# Patient Record
Sex: FEMALE | Race: WHITE | NOT HISPANIC OR LATINO | Employment: OTHER | URBAN - METROPOLITAN AREA
[De-identification: names, ages, dates, MRNs, and addresses within clinical notes are randomized per-mention and may not be internally consistent; named-entity substitution may affect disease eponyms.]

---

## 2017-04-17 ENCOUNTER — ALLSCRIPTS OFFICE VISIT (OUTPATIENT)
Dept: OTHER | Facility: OTHER | Age: 57
End: 2017-04-17

## 2017-06-08 ENCOUNTER — ALLSCRIPTS OFFICE VISIT (OUTPATIENT)
Dept: OTHER | Facility: OTHER | Age: 57
End: 2017-06-08

## 2017-06-08 DIAGNOSIS — Z12.31 ENCOUNTER FOR SCREENING MAMMOGRAM FOR MALIGNANT NEOPLASM OF BREAST: ICD-10-CM

## 2017-06-16 ENCOUNTER — HOSPITAL ENCOUNTER (OUTPATIENT)
Dept: RADIOLOGY | Facility: HOSPITAL | Age: 57
Discharge: HOME | End: 2017-06-16
Admitting: FAMILY MEDICINE
Payer: COMMERCIAL

## 2017-06-16 PROCEDURE — 76706 US ABDL AORTA SCREEN AAA: CPT

## 2017-06-20 ENCOUNTER — GENERIC CONVERSION - ENCOUNTER (OUTPATIENT)
Dept: OTHER | Facility: OTHER | Age: 57
End: 2017-06-20

## 2017-06-22 ENCOUNTER — GENERIC CONVERSION - ENCOUNTER (OUTPATIENT)
Dept: OTHER | Facility: OTHER | Age: 57
End: 2017-06-22

## 2017-07-20 ENCOUNTER — HOSPITAL ENCOUNTER (OUTPATIENT)
Dept: RADIOLOGY | Facility: HOSPITAL | Age: 57
Discharge: HOME | End: 2017-07-20
Payer: COMMERCIAL

## 2017-07-20 DIAGNOSIS — Z12.31 ENCOUNTER FOR SCREENING MAMMOGRAM FOR MALIGNANT NEOPLASM OF BREAST: ICD-10-CM

## 2017-07-20 PROCEDURE — G0202 SCR MAMMO BI INCL CAD: HCPCS

## 2017-07-25 ENCOUNTER — HOSPITAL ENCOUNTER (OUTPATIENT)
Dept: ULTRASOUND IMAGING | Facility: CLINIC | Age: 57
Discharge: HOME | End: 2017-07-25
Payer: COMMERCIAL

## 2017-07-25 ENCOUNTER — HOSPITAL ENCOUNTER (OUTPATIENT)
Dept: MAMMOGRAPHY | Facility: CLINIC | Age: 57
Discharge: HOME | End: 2017-07-25
Payer: COMMERCIAL

## 2017-07-25 ENCOUNTER — GENERIC CONVERSION - ENCOUNTER (OUTPATIENT)
Dept: OTHER | Facility: OTHER | Age: 57
End: 2017-07-25

## 2017-07-25 DIAGNOSIS — R92.8 MAMMOGRAM ABNORMAL: ICD-10-CM

## 2017-07-25 PROCEDURE — G0279 TOMOSYNTHESIS, MAMMO: HCPCS

## 2017-07-25 PROCEDURE — 76642 ULTRASOUND BREAST LIMITED: CPT

## 2017-07-25 PROCEDURE — G0206 DX MAMMO INCL CAD UNI: HCPCS

## 2017-08-08 ENCOUNTER — TRANSCRIBE ORDERS (OUTPATIENT)
Dept: LAB | Facility: CLINIC | Age: 57
End: 2017-08-08

## 2017-08-08 ENCOUNTER — APPOINTMENT (OUTPATIENT)
Dept: LAB | Facility: CLINIC | Age: 57
End: 2017-08-08
Payer: COMMERCIAL

## 2017-08-08 DIAGNOSIS — E06.3 CHRONIC LYMPHOCYTIC THYROIDITIS: ICD-10-CM

## 2017-08-08 DIAGNOSIS — R73.9 HYPERGLYCEMIA: ICD-10-CM

## 2017-08-08 DIAGNOSIS — E66.9 OBESITY, UNSPECIFIED: ICD-10-CM

## 2017-08-08 DIAGNOSIS — E66.9 OBESITY, UNSPECIFIED: Primary | ICD-10-CM

## 2017-08-08 LAB
ALBUMIN SERPL BCP-MCNC: 3.5 G/DL (ref 3.5–5)
ALP SERPL-CCNC: 90 U/L (ref 46–116)
ALT SERPL W P-5'-P-CCNC: 30 U/L (ref 12–78)
ANION GAP SERPL CALCULATED.3IONS-SCNC: 8 MMOL/L (ref 4–13)
AST SERPL W P-5'-P-CCNC: 23 U/L (ref 5–45)
BASOPHILS # BLD AUTO: 0.02 THOUSANDS/ΜL (ref 0–0.1)
BASOPHILS NFR BLD AUTO: 0 % (ref 0–1)
BILIRUB SERPL-MCNC: 0.4 MG/DL (ref 0.2–1)
BUN SERPL-MCNC: 19 MG/DL (ref 5–25)
CALCIUM SERPL-MCNC: 8.9 MG/DL (ref 8.3–10.1)
CHLORIDE SERPL-SCNC: 102 MMOL/L (ref 100–108)
CHOLEST SERPL-MCNC: 217 MG/DL (ref 50–200)
CO2 SERPL-SCNC: 30 MMOL/L (ref 21–32)
CREAT SERPL-MCNC: 0.77 MG/DL (ref 0.6–1.3)
EOSINOPHIL # BLD AUTO: 0.05 THOUSAND/ΜL (ref 0–0.61)
EOSINOPHIL NFR BLD AUTO: 1 % (ref 0–6)
ERYTHROCYTE [DISTWIDTH] IN BLOOD BY AUTOMATED COUNT: 14.3 % (ref 11.6–15.1)
GFR SERPL CREATININE-BSD FRML MDRD: 85 ML/MIN/1.73SQ M
GLUCOSE P FAST SERPL-MCNC: 117 MG/DL (ref 65–99)
HCT VFR BLD AUTO: 44 % (ref 34.8–46.1)
HDLC SERPL-MCNC: 79 MG/DL (ref 40–60)
HGB BLD-MCNC: 14.3 G/DL (ref 11.5–15.4)
LDLC SERPL CALC-MCNC: 124 MG/DL (ref 0–100)
LYMPHOCYTES # BLD AUTO: 1.53 THOUSANDS/ΜL (ref 0.6–4.47)
LYMPHOCYTES NFR BLD AUTO: 24 % (ref 14–44)
MCH RBC QN AUTO: 29.9 PG (ref 26.8–34.3)
MCHC RBC AUTO-ENTMCNC: 32.5 G/DL (ref 31.4–37.4)
MCV RBC AUTO: 92 FL (ref 82–98)
MONOCYTES # BLD AUTO: 0.57 THOUSAND/ΜL (ref 0.17–1.22)
MONOCYTES NFR BLD AUTO: 9 % (ref 4–12)
NEUTROPHILS # BLD AUTO: 4.15 THOUSANDS/ΜL (ref 1.85–7.62)
NEUTS SEG NFR BLD AUTO: 66 % (ref 43–75)
PLATELET # BLD AUTO: 253 THOUSANDS/UL (ref 149–390)
PMV BLD AUTO: 10.4 FL (ref 8.9–12.7)
POTASSIUM SERPL-SCNC: 4.1 MMOL/L (ref 3.5–5.3)
PROT SERPL-MCNC: 7.3 G/DL (ref 6.4–8.2)
RBC # BLD AUTO: 4.78 MILLION/UL (ref 3.81–5.12)
SODIUM SERPL-SCNC: 140 MMOL/L (ref 136–145)
TRIGL SERPL-MCNC: 72 MG/DL
TSH SERPL DL<=0.05 MIU/L-ACNC: 3.46 UIU/ML (ref 0.36–3.74)
WBC # BLD AUTO: 6.32 THOUSAND/UL (ref 4.31–10.16)

## 2017-08-08 PROCEDURE — 84443 ASSAY THYROID STIM HORMONE: CPT

## 2017-08-08 PROCEDURE — 80061 LIPID PANEL: CPT

## 2017-08-08 PROCEDURE — 36415 COLL VENOUS BLD VENIPUNCTURE: CPT

## 2017-08-08 PROCEDURE — 85025 COMPLETE CBC W/AUTO DIFF WBC: CPT

## 2017-08-08 PROCEDURE — 83036 HEMOGLOBIN GLYCOSYLATED A1C: CPT

## 2017-08-08 PROCEDURE — 80053 COMPREHEN METABOLIC PANEL: CPT

## 2017-08-09 DIAGNOSIS — R73.9 HYPERGLYCEMIA: ICD-10-CM

## 2017-08-09 LAB
EST. AVERAGE GLUCOSE BLD GHB EST-MCNC: 128 MG/DL
HBA1C MFR BLD: 6.1 % (ref 4.2–6.3)

## 2017-09-13 ENCOUNTER — GENERIC CONVERSION - ENCOUNTER (OUTPATIENT)
Dept: OTHER | Facility: OTHER | Age: 57
End: 2017-09-13

## 2017-09-14 ENCOUNTER — HOSPITAL ENCOUNTER (EMERGENCY)
Facility: HOSPITAL | Age: 57
End: 2017-09-14
Attending: EMERGENCY MEDICINE | Admitting: EMERGENCY MEDICINE
Payer: COMMERCIAL

## 2017-09-14 ENCOUNTER — HOSPITAL ENCOUNTER (INPATIENT)
Facility: HOSPITAL | Age: 57
LOS: 6 days | Discharge: HOME | DRG: 885 | End: 2017-09-20
Attending: PSYCHIATRY & NEUROLOGY | Admitting: PSYCHIATRY & NEUROLOGY
Payer: COMMERCIAL

## 2017-09-14 VITALS
BODY MASS INDEX: 33.2 KG/M2 | SYSTOLIC BLOOD PRESSURE: 121 MMHG | RESPIRATION RATE: 20 BRPM | OXYGEN SATURATION: 100 % | DIASTOLIC BLOOD PRESSURE: 63 MMHG | WEIGHT: 170 LBS | HEART RATE: 67 BPM | TEMPERATURE: 97.9 F

## 2017-09-14 DIAGNOSIS — F32.A DEPRESSION: Primary | ICD-10-CM

## 2017-09-14 DIAGNOSIS — F33.2 SEVERE EPISODE OF RECURRENT MAJOR DEPRESSIVE DISORDER, WITHOUT PSYCHOTIC FEATURES (HCC): Primary | Chronic | ICD-10-CM

## 2017-09-14 DIAGNOSIS — R45.851 SUICIDAL IDEATION: ICD-10-CM

## 2017-09-14 LAB
ALBUMIN SERPL BCP-MCNC: 3.7 G/DL (ref 3.5–5)
ALP SERPL-CCNC: 65 U/L (ref 46–116)
ALT SERPL W P-5'-P-CCNC: 35 U/L (ref 12–78)
AMPHETAMINES SERPL QL SCN: NEGATIVE
ANION GAP SERPL CALCULATED.3IONS-SCNC: 6 MMOL/L (ref 4–13)
AST SERPL W P-5'-P-CCNC: 22 U/L (ref 5–45)
BARBITURATES UR QL: NEGATIVE
BASOPHILS # BLD AUTO: 0.02 THOUSANDS/ΜL (ref 0–0.1)
BASOPHILS NFR BLD AUTO: 0 % (ref 0–1)
BENZODIAZ UR QL: NEGATIVE
BILIRUB SERPL-MCNC: 0.6 MG/DL (ref 0.2–1)
BUN SERPL-MCNC: 9 MG/DL (ref 5–25)
CALCIUM SERPL-MCNC: 9 MG/DL (ref 8.3–10.1)
CHLORIDE SERPL-SCNC: 104 MMOL/L (ref 100–108)
CLARITY, POC: CLEAR
CO2 SERPL-SCNC: 31 MMOL/L (ref 21–32)
COCAINE UR QL: NEGATIVE
COLOR, POC: CLEAR
CREAT SERPL-MCNC: 0.86 MG/DL (ref 0.6–1.3)
EOSINOPHIL # BLD AUTO: 0.01 THOUSAND/ΜL (ref 0–0.61)
EOSINOPHIL NFR BLD AUTO: 0 % (ref 0–6)
ERYTHROCYTE [DISTWIDTH] IN BLOOD BY AUTOMATED COUNT: 14.6 % (ref 11.6–15.1)
ETHANOL EXG-MCNC: 0 MG/DL
EXT BILIRUBIN, UA: NEGATIVE
EXT BLOOD URINE: NEGATIVE
EXT GLUCOSE, UA: NEGATIVE
EXT KETONES: NEGATIVE
EXT NITRITE, UA: NEGATIVE
EXT PH, UA: 6
EXT PROTEIN, UA: NEGATIVE
EXT SPECIFIC GRAVITY, UA: 1.01
EXT UROBILINOGEN: 0.2
GFR SERPL CREATININE-BSD FRML MDRD: 75 ML/MIN/1.73SQ M
GLUCOSE SERPL-MCNC: 104 MG/DL (ref 65–140)
HCT VFR BLD AUTO: 43.8 % (ref 34.8–46.1)
HGB BLD-MCNC: 14.5 G/DL (ref 11.5–15.4)
LYMPHOCYTES # BLD AUTO: 1.32 THOUSANDS/ΜL (ref 0.6–4.47)
LYMPHOCYTES NFR BLD AUTO: 18 % (ref 14–44)
MCH RBC QN AUTO: 30.1 PG (ref 26.8–34.3)
MCHC RBC AUTO-ENTMCNC: 33.1 G/DL (ref 31.4–37.4)
MCV RBC AUTO: 91 FL (ref 82–98)
METHADONE UR QL: NEGATIVE
MONOCYTES # BLD AUTO: 0.66 THOUSAND/ΜL (ref 0.17–1.22)
MONOCYTES NFR BLD AUTO: 9 % (ref 4–12)
NEUTROPHILS # BLD AUTO: 5.37 THOUSANDS/ΜL (ref 1.85–7.62)
NEUTS SEG NFR BLD AUTO: 73 % (ref 43–75)
OPIATES UR QL SCN: NEGATIVE
PCP UR QL: NEGATIVE
PLATELET # BLD AUTO: 242 THOUSANDS/UL (ref 149–390)
PMV BLD AUTO: 10.4 FL (ref 8.9–12.7)
POTASSIUM SERPL-SCNC: 3.6 MMOL/L (ref 3.5–5.3)
PROT SERPL-MCNC: 7.1 G/DL (ref 6.4–8.2)
RBC # BLD AUTO: 4.81 MILLION/UL (ref 3.81–5.12)
SODIUM SERPL-SCNC: 141 MMOL/L (ref 136–145)
THC UR QL: NEGATIVE
TSH SERPL DL<=0.05 MIU/L-ACNC: 1.59 UIU/ML (ref 0.36–3.74)
WBC # BLD AUTO: 7.38 THOUSAND/UL (ref 4.31–10.16)
WBC # BLD EST: NEGATIVE 10*3/UL

## 2017-09-14 PROCEDURE — 80307 DRUG TEST PRSMV CHEM ANLYZR: CPT | Performed by: EMERGENCY MEDICINE

## 2017-09-14 PROCEDURE — 82075 ASSAY OF BREATH ETHANOL: CPT | Performed by: EMERGENCY MEDICINE

## 2017-09-14 PROCEDURE — 99285 EMERGENCY DEPT VISIT HI MDM: CPT

## 2017-09-14 PROCEDURE — 81002 URINALYSIS NONAUTO W/O SCOPE: CPT | Performed by: EMERGENCY MEDICINE

## 2017-09-14 PROCEDURE — 85025 COMPLETE CBC W/AUTO DIFF WBC: CPT | Performed by: EMERGENCY MEDICINE

## 2017-09-14 PROCEDURE — 84443 ASSAY THYROID STIM HORMONE: CPT | Performed by: EMERGENCY MEDICINE

## 2017-09-14 PROCEDURE — 36415 COLL VENOUS BLD VENIPUNCTURE: CPT | Performed by: EMERGENCY MEDICINE

## 2017-09-14 PROCEDURE — 80053 COMPREHEN METABOLIC PANEL: CPT | Performed by: EMERGENCY MEDICINE

## 2017-09-14 PROCEDURE — 93005 ELECTROCARDIOGRAM TRACING: CPT | Performed by: EMERGENCY MEDICINE

## 2017-09-14 RX ORDER — LORAZEPAM 2 MG/ML
0.5 INJECTION INTRAMUSCULAR EVERY 6 HOURS PRN
Status: DISCONTINUED | OUTPATIENT
Start: 2017-09-14 | End: 2017-09-20 | Stop reason: HOSPADM

## 2017-09-14 RX ORDER — ACETAMINOPHEN 325 MG/1
650 TABLET ORAL EVERY 6 HOURS PRN
Status: CANCELLED | OUTPATIENT
Start: 2017-09-14

## 2017-09-14 RX ORDER — OLANZAPINE 10 MG/1
2.5 INJECTION, POWDER, LYOPHILIZED, FOR SOLUTION INTRAMUSCULAR EVERY 6 HOURS PRN
Status: CANCELLED | OUTPATIENT
Start: 2017-09-14

## 2017-09-14 RX ORDER — OLANZAPINE 10 MG/1
2.5 INJECTION, POWDER, LYOPHILIZED, FOR SOLUTION INTRAMUSCULAR EVERY 6 HOURS PRN
Status: DISCONTINUED | OUTPATIENT
Start: 2017-09-14 | End: 2017-09-20 | Stop reason: HOSPADM

## 2017-09-14 RX ORDER — ACETAMINOPHEN 325 MG/1
650 TABLET ORAL EVERY 4 HOURS PRN
Status: DISCONTINUED | OUTPATIENT
Start: 2017-09-14 | End: 2017-09-20 | Stop reason: HOSPADM

## 2017-09-14 RX ORDER — OLANZAPINE 5 MG/1
2.5 TABLET ORAL EVERY 6 HOURS PRN
Status: DISCONTINUED | OUTPATIENT
Start: 2017-09-14 | End: 2017-09-20 | Stop reason: HOSPADM

## 2017-09-14 RX ORDER — ACETAMINOPHEN 325 MG/1
650 TABLET ORAL EVERY 6 HOURS PRN
Status: DISCONTINUED | OUTPATIENT
Start: 2017-09-14 | End: 2017-09-20 | Stop reason: HOSPADM

## 2017-09-14 RX ORDER — OLANZAPINE 2.5 MG/1
2.5 TABLET ORAL EVERY 6 HOURS PRN
Status: CANCELLED | OUTPATIENT
Start: 2017-09-14

## 2017-09-14 RX ORDER — LORAZEPAM 0.5 MG/1
0.5 TABLET ORAL EVERY 6 HOURS PRN
Status: CANCELLED | OUTPATIENT
Start: 2017-09-14

## 2017-09-14 RX ORDER — RISPERIDONE 1 MG/1
1 TABLET, ORALLY DISINTEGRATING ORAL EVERY 8 HOURS PRN
Status: DISCONTINUED | OUTPATIENT
Start: 2017-09-14 | End: 2017-09-20 | Stop reason: HOSPADM

## 2017-09-14 RX ORDER — MAGNESIUM HYDROXIDE/ALUMINUM HYDROXICE/SIMETHICONE 120; 1200; 1200 MG/30ML; MG/30ML; MG/30ML
30 SUSPENSION ORAL EVERY 4 HOURS PRN
Status: DISCONTINUED | OUTPATIENT
Start: 2017-09-14 | End: 2017-09-20 | Stop reason: HOSPADM

## 2017-09-14 RX ORDER — ACETAMINOPHEN 325 MG/1
650 TABLET ORAL EVERY 4 HOURS PRN
Status: CANCELLED | OUTPATIENT
Start: 2017-09-14

## 2017-09-14 RX ORDER — TRAZODONE HYDROCHLORIDE 50 MG/1
25 TABLET ORAL
Status: CANCELLED | OUTPATIENT
Start: 2017-09-14

## 2017-09-14 RX ORDER — RISPERIDONE 1 MG/1
1 TABLET, ORALLY DISINTEGRATING ORAL EVERY 8 HOURS PRN
Status: CANCELLED | OUTPATIENT
Start: 2017-09-14

## 2017-09-14 RX ORDER — LACOSAMIDE 100 MG/1
100 TABLET ORAL EVERY 12 HOURS SCHEDULED
COMMUNITY
End: 2018-03-19 | Stop reason: SDUPTHER

## 2017-09-14 RX ORDER — MAGNESIUM HYDROXIDE/ALUMINUM HYDROXICE/SIMETHICONE 120; 1200; 1200 MG/30ML; MG/30ML; MG/30ML
30 SUSPENSION ORAL EVERY 4 HOURS PRN
Status: CANCELLED | OUTPATIENT
Start: 2017-09-14

## 2017-09-14 RX ORDER — LORAZEPAM 0.5 MG/1
0.5 TABLET ORAL EVERY 6 HOURS PRN
Status: DISCONTINUED | OUTPATIENT
Start: 2017-09-14 | End: 2017-09-20 | Stop reason: HOSPADM

## 2017-09-14 RX ORDER — TRAZODONE HYDROCHLORIDE 50 MG/1
25 TABLET ORAL
Status: DISCONTINUED | OUTPATIENT
Start: 2017-09-14 | End: 2017-09-20 | Stop reason: HOSPADM

## 2017-09-14 RX ORDER — ACETAMINOPHEN 325 MG/1
975 TABLET ORAL EVERY 6 HOURS PRN
Status: CANCELLED | OUTPATIENT
Start: 2017-09-14

## 2017-09-14 RX ORDER — ACETAMINOPHEN 325 MG/1
975 TABLET ORAL EVERY 6 HOURS PRN
Status: DISCONTINUED | OUTPATIENT
Start: 2017-09-14 | End: 2017-09-20 | Stop reason: HOSPADM

## 2017-09-14 RX ORDER — LORAZEPAM 2 MG/ML
0.5 INJECTION INTRAMUSCULAR EVERY 6 HOURS PRN
Status: CANCELLED | OUTPATIENT
Start: 2017-09-14

## 2017-09-14 RX ADMIN — TRAZODONE HYDROCHLORIDE 25 MG: 50 TABLET ORAL at 21:01

## 2017-09-15 PROBLEM — F33.2 SEVERE EPISODE OF RECURRENT MAJOR DEPRESSIVE DISORDER, WITHOUT PSYCHOTIC FEATURES (HCC): Chronic | Status: ACTIVE | Noted: 2017-09-15

## 2017-09-15 LAB
ALBUMIN SERPL BCP-MCNC: 3.4 G/DL (ref 3.5–5)
ALP SERPL-CCNC: 68 U/L (ref 46–116)
ALT SERPL W P-5'-P-CCNC: 30 U/L (ref 12–78)
ANION GAP SERPL CALCULATED.3IONS-SCNC: 5 MMOL/L (ref 4–13)
AST SERPL W P-5'-P-CCNC: 17 U/L (ref 5–45)
BASOPHILS # BLD AUTO: 0.01 THOUSANDS/ΜL (ref 0–0.1)
BASOPHILS NFR BLD AUTO: 0 % (ref 0–1)
BILIRUB SERPL-MCNC: 0.54 MG/DL (ref 0.2–1)
BUN SERPL-MCNC: 12 MG/DL (ref 5–25)
CALCIUM SERPL-MCNC: 8.7 MG/DL (ref 8.3–10.1)
CHLORIDE SERPL-SCNC: 106 MMOL/L (ref 100–108)
CO2 SERPL-SCNC: 28 MMOL/L (ref 21–32)
CREAT SERPL-MCNC: 0.72 MG/DL (ref 0.6–1.3)
EOSINOPHIL # BLD AUTO: 0.02 THOUSAND/ΜL (ref 0–0.61)
EOSINOPHIL NFR BLD AUTO: 0 % (ref 0–6)
ERYTHROCYTE [DISTWIDTH] IN BLOOD BY AUTOMATED COUNT: 14.4 % (ref 11.6–15.1)
GFR SERPL CREATININE-BSD FRML MDRD: 93 ML/MIN/1.73SQ M
GLUCOSE SERPL-MCNC: 115 MG/DL (ref 65–140)
HCT VFR BLD AUTO: 44 % (ref 34.8–46.1)
HGB BLD-MCNC: 14.7 G/DL (ref 11.5–15.4)
LYMPHOCYTES # BLD AUTO: 1.42 THOUSANDS/ΜL (ref 0.6–4.47)
LYMPHOCYTES NFR BLD AUTO: 21 % (ref 14–44)
MCH RBC QN AUTO: 30.6 PG (ref 26.8–34.3)
MCHC RBC AUTO-ENTMCNC: 33.4 G/DL (ref 31.4–37.4)
MCV RBC AUTO: 92 FL (ref 82–98)
MONOCYTES # BLD AUTO: 0.64 THOUSAND/ΜL (ref 0.17–1.22)
MONOCYTES NFR BLD AUTO: 9 % (ref 4–12)
NEUTROPHILS # BLD AUTO: 4.78 THOUSANDS/ΜL (ref 1.85–7.62)
NEUTS SEG NFR BLD AUTO: 70 % (ref 43–75)
NRBC BLD AUTO-RTO: 0 /100 WBCS
PLATELET # BLD AUTO: 237 THOUSANDS/UL (ref 149–390)
PMV BLD AUTO: 10.7 FL (ref 8.9–12.7)
POTASSIUM SERPL-SCNC: 3.9 MMOL/L (ref 3.5–5.3)
PROT SERPL-MCNC: 6.7 G/DL (ref 6.4–8.2)
RBC # BLD AUTO: 4.8 MILLION/UL (ref 3.81–5.12)
SODIUM SERPL-SCNC: 139 MMOL/L (ref 136–145)
TSH SERPL DL<=0.05 MIU/L-ACNC: 1.98 UIU/ML (ref 0.36–3.74)
WBC # BLD AUTO: 6.89 THOUSAND/UL (ref 4.31–10.16)

## 2017-09-15 PROCEDURE — 85025 COMPLETE CBC W/AUTO DIFF WBC: CPT | Performed by: PHYSICIAN ASSISTANT

## 2017-09-15 PROCEDURE — 86592 SYPHILIS TEST NON-TREP QUAL: CPT | Performed by: PHYSICIAN ASSISTANT

## 2017-09-15 PROCEDURE — 80053 COMPREHEN METABOLIC PANEL: CPT | Performed by: PHYSICIAN ASSISTANT

## 2017-09-15 PROCEDURE — 84443 ASSAY THYROID STIM HORMONE: CPT | Performed by: PHYSICIAN ASSISTANT

## 2017-09-15 RX ORDER — CITALOPRAM 20 MG/1
20 TABLET ORAL DAILY
Status: DISCONTINUED | OUTPATIENT
Start: 2017-09-15 | End: 2017-09-20 | Stop reason: HOSPADM

## 2017-09-15 RX ORDER — MIRTAZAPINE 15 MG/1
15 TABLET, FILM COATED ORAL
Status: DISCONTINUED | OUTPATIENT
Start: 2017-09-15 | End: 2017-09-20 | Stop reason: HOSPADM

## 2017-09-15 RX ORDER — LACOSAMIDE 100 MG/1
100 TABLET ORAL EVERY 12 HOURS SCHEDULED
Status: DISCONTINUED | OUTPATIENT
Start: 2017-09-15 | End: 2017-09-20 | Stop reason: HOSPADM

## 2017-09-15 RX ORDER — CITALOPRAM 10 MG/1
10 TABLET ORAL DAILY
Status: DISCONTINUED | OUTPATIENT
Start: 2017-09-15 | End: 2017-09-15

## 2017-09-15 RX ADMIN — LACOSAMIDE 100 MG: 100 TABLET, FILM COATED ORAL at 21:22

## 2017-09-15 RX ADMIN — TRAZODONE HYDROCHLORIDE 25 MG: 50 TABLET ORAL at 21:23

## 2017-09-15 RX ADMIN — MIRTAZAPINE 15 MG: 15 TABLET, FILM COATED ORAL at 21:22

## 2017-09-15 RX ADMIN — LACOSAMIDE 100 MG: 100 TABLET, FILM COATED ORAL at 13:00

## 2017-09-15 RX ADMIN — CITALOPRAM HYDROBROMIDE 20 MG: 20 TABLET ORAL at 12:25

## 2017-09-16 RX ADMIN — MIRTAZAPINE 15 MG: 15 TABLET, FILM COATED ORAL at 21:11

## 2017-09-16 RX ADMIN — CITALOPRAM HYDROBROMIDE 20 MG: 20 TABLET ORAL at 08:47

## 2017-09-16 RX ADMIN — LACOSAMIDE 100 MG: 100 TABLET, FILM COATED ORAL at 08:47

## 2017-09-16 RX ADMIN — TRAZODONE HYDROCHLORIDE 25 MG: 50 TABLET ORAL at 21:11

## 2017-09-16 RX ADMIN — LACOSAMIDE 100 MG: 100 TABLET, FILM COATED ORAL at 21:11

## 2017-09-17 RX ADMIN — LACOSAMIDE 100 MG: 100 TABLET, FILM COATED ORAL at 21:15

## 2017-09-17 RX ADMIN — MIRTAZAPINE 15 MG: 15 TABLET, FILM COATED ORAL at 21:15

## 2017-09-17 RX ADMIN — TRAZODONE HYDROCHLORIDE 25 MG: 50 TABLET ORAL at 21:15

## 2017-09-17 RX ADMIN — LACOSAMIDE 100 MG: 100 TABLET, FILM COATED ORAL at 08:41

## 2017-09-17 RX ADMIN — CITALOPRAM HYDROBROMIDE 20 MG: 20 TABLET ORAL at 08:41

## 2017-09-18 LAB — RPR SER QL: NORMAL

## 2017-09-18 RX ADMIN — CITALOPRAM HYDROBROMIDE 20 MG: 20 TABLET ORAL at 08:22

## 2017-09-18 RX ADMIN — TRAZODONE HYDROCHLORIDE 25 MG: 50 TABLET ORAL at 21:19

## 2017-09-18 RX ADMIN — MIRTAZAPINE 15 MG: 15 TABLET, FILM COATED ORAL at 21:19

## 2017-09-18 RX ADMIN — LACOSAMIDE 100 MG: 100 TABLET, FILM COATED ORAL at 08:22

## 2017-09-18 RX ADMIN — LACOSAMIDE 100 MG: 100 TABLET, FILM COATED ORAL at 21:19

## 2017-09-19 ENCOUNTER — GENERIC CONVERSION - ENCOUNTER (OUTPATIENT)
Dept: OTHER | Facility: OTHER | Age: 57
End: 2017-09-19

## 2017-09-19 RX ADMIN — TRAZODONE HYDROCHLORIDE 25 MG: 50 TABLET ORAL at 21:29

## 2017-09-19 RX ADMIN — MIRTAZAPINE 15 MG: 15 TABLET, FILM COATED ORAL at 21:30

## 2017-09-19 RX ADMIN — LACOSAMIDE 100 MG: 100 TABLET, FILM COATED ORAL at 21:30

## 2017-09-19 RX ADMIN — CITALOPRAM HYDROBROMIDE 20 MG: 20 TABLET ORAL at 08:23

## 2017-09-19 RX ADMIN — LACOSAMIDE 100 MG: 100 TABLET, FILM COATED ORAL at 08:23

## 2017-09-20 VITALS
WEIGHT: 170 LBS | DIASTOLIC BLOOD PRESSURE: 64 MMHG | TEMPERATURE: 97.6 F | SYSTOLIC BLOOD PRESSURE: 121 MMHG | HEART RATE: 74 BPM | HEIGHT: 60 IN | RESPIRATION RATE: 18 BRPM | BODY MASS INDEX: 33.38 KG/M2 | OXYGEN SATURATION: 99 %

## 2017-09-20 LAB
ATRIAL RATE: 59 BPM
P AXIS: 64 DEGREES
PR INTERVAL: 148 MS
QRS AXIS: 23 DEGREES
QRSD INTERVAL: 72 MS
QT INTERVAL: 406 MS
QTC INTERVAL: 401 MS
T WAVE AXIS: 18 DEGREES
VENTRICULAR RATE: 59 BPM

## 2017-09-20 RX ORDER — MIRTAZAPINE 15 MG/1
15 TABLET, FILM COATED ORAL
Qty: 30 TABLET | Refills: 0 | Status: SHIPPED | OUTPATIENT
Start: 2017-09-20 | End: 2021-01-20 | Stop reason: HOSPADM

## 2017-09-20 RX ORDER — CITALOPRAM 20 MG/1
20 TABLET ORAL DAILY
Qty: 30 TABLET | Refills: 0 | Status: SHIPPED | OUTPATIENT
Start: 2017-09-20 | End: 2021-01-20 | Stop reason: HOSPADM

## 2017-09-20 RX ADMIN — CITALOPRAM HYDROBROMIDE 20 MG: 20 TABLET ORAL at 08:20

## 2017-09-20 RX ADMIN — LACOSAMIDE 100 MG: 100 TABLET, FILM COATED ORAL at 08:20

## 2017-09-21 ENCOUNTER — GENERIC CONVERSION - ENCOUNTER (OUTPATIENT)
Dept: OTHER | Facility: OTHER | Age: 57
End: 2017-09-21

## 2017-09-21 ENCOUNTER — ALLSCRIPTS OFFICE VISIT (OUTPATIENT)
Dept: PSYCHOLOGY | Facility: CLINIC | Age: 57
End: 2017-09-21
Payer: COMMERCIAL

## 2017-09-21 PROCEDURE — G0410 GRP PSYCH PARTIAL HOSP 45-50: HCPCS | Performed by: PSYCHIATRY & NEUROLOGY

## 2017-09-21 PROCEDURE — S0201 PARTIAL HOSPITALIZATION SERV: HCPCS | Performed by: PSYCHIATRY & NEUROLOGY

## 2017-09-21 PROCEDURE — 90791 PSYCH DIAGNOSTIC EVALUATION: CPT | Performed by: PSYCHIATRY & NEUROLOGY

## 2017-09-21 PROCEDURE — G0176 OPPS/PHP;ACTIVITY THERAPY: HCPCS | Performed by: PSYCHIATRY & NEUROLOGY

## 2017-09-21 PROCEDURE — G0177 OPPS/PHP; TRAIN & EDUC SERV: HCPCS | Performed by: PSYCHIATRY & NEUROLOGY

## 2017-09-22 ENCOUNTER — GENERIC CONVERSION - ENCOUNTER (OUTPATIENT)
Dept: OTHER | Facility: OTHER | Age: 57
End: 2017-09-22

## 2017-09-22 ENCOUNTER — APPOINTMENT (OUTPATIENT)
Dept: PSYCHOLOGY | Facility: CLINIC | Age: 57
End: 2017-09-22
Payer: COMMERCIAL

## 2017-09-22 PROCEDURE — G0176 OPPS/PHP;ACTIVITY THERAPY: HCPCS | Performed by: PSYCHIATRY & NEUROLOGY

## 2017-09-22 PROCEDURE — S0201 PARTIAL HOSPITALIZATION SERV: HCPCS | Performed by: PSYCHIATRY & NEUROLOGY

## 2017-09-22 PROCEDURE — G0177 OPPS/PHP; TRAIN & EDUC SERV: HCPCS | Performed by: PSYCHIATRY & NEUROLOGY

## 2017-09-22 PROCEDURE — G0410 GRP PSYCH PARTIAL HOSP 45-50: HCPCS | Performed by: PSYCHIATRY & NEUROLOGY

## 2017-09-25 ENCOUNTER — GENERIC CONVERSION - ENCOUNTER (OUTPATIENT)
Dept: OTHER | Facility: OTHER | Age: 57
End: 2017-09-25

## 2017-09-25 ENCOUNTER — APPOINTMENT (OUTPATIENT)
Dept: PSYCHOLOGY | Facility: CLINIC | Age: 57
End: 2017-09-25
Payer: COMMERCIAL

## 2017-09-25 PROCEDURE — S0201 PARTIAL HOSPITALIZATION SERV: HCPCS | Performed by: PSYCHIATRY & NEUROLOGY

## 2017-09-25 PROCEDURE — G0176 OPPS/PHP;ACTIVITY THERAPY: HCPCS | Performed by: PSYCHIATRY & NEUROLOGY

## 2017-09-25 PROCEDURE — G0177 OPPS/PHP; TRAIN & EDUC SERV: HCPCS | Performed by: PSYCHIATRY & NEUROLOGY

## 2017-09-25 PROCEDURE — G0410 GRP PSYCH PARTIAL HOSP 45-50: HCPCS | Performed by: PSYCHIATRY & NEUROLOGY

## 2017-09-26 ENCOUNTER — APPOINTMENT (OUTPATIENT)
Dept: PSYCHOLOGY | Facility: CLINIC | Age: 57
End: 2017-09-26
Payer: COMMERCIAL

## 2017-09-26 ENCOUNTER — GENERIC CONVERSION - ENCOUNTER (OUTPATIENT)
Dept: OTHER | Facility: OTHER | Age: 57
End: 2017-09-26

## 2017-09-26 PROCEDURE — G0410 GRP PSYCH PARTIAL HOSP 45-50: HCPCS | Performed by: PSYCHIATRY & NEUROLOGY

## 2017-09-26 PROCEDURE — G0177 OPPS/PHP; TRAIN & EDUC SERV: HCPCS | Performed by: PSYCHIATRY & NEUROLOGY

## 2017-09-26 PROCEDURE — G0176 OPPS/PHP;ACTIVITY THERAPY: HCPCS | Performed by: PSYCHIATRY & NEUROLOGY

## 2017-09-26 PROCEDURE — S0201 PARTIAL HOSPITALIZATION SERV: HCPCS | Performed by: PSYCHIATRY & NEUROLOGY

## 2017-09-27 ENCOUNTER — GENERIC CONVERSION - ENCOUNTER (OUTPATIENT)
Dept: OTHER | Facility: OTHER | Age: 57
End: 2017-09-27

## 2017-09-27 ENCOUNTER — APPOINTMENT (OUTPATIENT)
Dept: PSYCHOLOGY | Facility: CLINIC | Age: 57
End: 2017-09-27
Payer: COMMERCIAL

## 2017-09-27 PROCEDURE — G0177 OPPS/PHP; TRAIN & EDUC SERV: HCPCS | Performed by: PSYCHIATRY & NEUROLOGY

## 2017-09-27 PROCEDURE — G0410 GRP PSYCH PARTIAL HOSP 45-50: HCPCS | Performed by: PSYCHIATRY & NEUROLOGY

## 2017-09-27 PROCEDURE — S0201 PARTIAL HOSPITALIZATION SERV: HCPCS | Performed by: PSYCHIATRY & NEUROLOGY

## 2017-09-27 PROCEDURE — G0176 OPPS/PHP;ACTIVITY THERAPY: HCPCS | Performed by: PSYCHIATRY & NEUROLOGY

## 2017-09-28 ENCOUNTER — GENERIC CONVERSION - ENCOUNTER (OUTPATIENT)
Dept: OTHER | Facility: OTHER | Age: 57
End: 2017-09-28

## 2017-09-28 ENCOUNTER — APPOINTMENT (OUTPATIENT)
Dept: PSYCHOLOGY | Facility: CLINIC | Age: 57
End: 2017-09-28
Payer: COMMERCIAL

## 2017-09-28 PROCEDURE — S0201 PARTIAL HOSPITALIZATION SERV: HCPCS | Performed by: PSYCHIATRY & NEUROLOGY

## 2017-09-28 PROCEDURE — G0177 OPPS/PHP; TRAIN & EDUC SERV: HCPCS | Performed by: PSYCHIATRY & NEUROLOGY

## 2017-09-28 PROCEDURE — G0176 OPPS/PHP;ACTIVITY THERAPY: HCPCS | Performed by: PSYCHIATRY & NEUROLOGY

## 2017-09-28 PROCEDURE — G0410 GRP PSYCH PARTIAL HOSP 45-50: HCPCS | Performed by: PSYCHIATRY & NEUROLOGY

## 2017-09-29 ENCOUNTER — APPOINTMENT (OUTPATIENT)
Dept: PSYCHOLOGY | Facility: CLINIC | Age: 57
End: 2017-09-29
Payer: COMMERCIAL

## 2017-09-29 ENCOUNTER — GENERIC CONVERSION - ENCOUNTER (OUTPATIENT)
Dept: OTHER | Facility: OTHER | Age: 57
End: 2017-09-29

## 2017-09-29 PROCEDURE — G0177 OPPS/PHP; TRAIN & EDUC SERV: HCPCS | Performed by: PSYCHIATRY & NEUROLOGY

## 2017-09-29 PROCEDURE — G0410 GRP PSYCH PARTIAL HOSP 45-50: HCPCS | Performed by: PSYCHIATRY & NEUROLOGY

## 2017-09-29 PROCEDURE — G0176 OPPS/PHP;ACTIVITY THERAPY: HCPCS | Performed by: PSYCHIATRY & NEUROLOGY

## 2017-09-29 PROCEDURE — S0201 PARTIAL HOSPITALIZATION SERV: HCPCS | Performed by: PSYCHIATRY & NEUROLOGY

## 2017-10-02 ENCOUNTER — GENERIC CONVERSION - ENCOUNTER (OUTPATIENT)
Dept: OTHER | Facility: OTHER | Age: 57
End: 2017-10-02

## 2017-10-02 ENCOUNTER — APPOINTMENT (OUTPATIENT)
Dept: PSYCHOLOGY | Facility: CLINIC | Age: 57
End: 2017-10-02
Payer: COMMERCIAL

## 2017-10-02 PROCEDURE — S0201 PARTIAL HOSPITALIZATION SERV: HCPCS | Performed by: PSYCHIATRY & NEUROLOGY

## 2017-10-02 PROCEDURE — G0410 GRP PSYCH PARTIAL HOSP 45-50: HCPCS | Performed by: PSYCHIATRY & NEUROLOGY

## 2017-10-02 PROCEDURE — G0176 OPPS/PHP;ACTIVITY THERAPY: HCPCS | Performed by: PSYCHIATRY & NEUROLOGY

## 2017-10-02 PROCEDURE — G0177 OPPS/PHP; TRAIN & EDUC SERV: HCPCS | Performed by: PSYCHIATRY & NEUROLOGY

## 2017-10-03 ENCOUNTER — APPOINTMENT (OUTPATIENT)
Dept: PSYCHOLOGY | Facility: CLINIC | Age: 57
End: 2017-10-03
Payer: COMMERCIAL

## 2017-10-03 ENCOUNTER — GENERIC CONVERSION - ENCOUNTER (OUTPATIENT)
Dept: OTHER | Facility: OTHER | Age: 57
End: 2017-10-03

## 2017-10-03 PROCEDURE — G0176 OPPS/PHP;ACTIVITY THERAPY: HCPCS | Performed by: PSYCHIATRY & NEUROLOGY

## 2017-10-03 PROCEDURE — S0201 PARTIAL HOSPITALIZATION SERV: HCPCS | Performed by: PSYCHIATRY & NEUROLOGY

## 2017-10-03 PROCEDURE — G0177 OPPS/PHP; TRAIN & EDUC SERV: HCPCS | Performed by: PSYCHIATRY & NEUROLOGY

## 2017-10-03 PROCEDURE — G0410 GRP PSYCH PARTIAL HOSP 45-50: HCPCS | Performed by: PSYCHIATRY & NEUROLOGY

## 2017-10-04 ENCOUNTER — GENERIC CONVERSION - ENCOUNTER (OUTPATIENT)
Dept: OTHER | Facility: OTHER | Age: 57
End: 2017-10-04

## 2017-10-04 ENCOUNTER — APPOINTMENT (OUTPATIENT)
Dept: PSYCHOLOGY | Facility: CLINIC | Age: 57
End: 2017-10-04
Payer: COMMERCIAL

## 2017-10-04 PROCEDURE — S0201 PARTIAL HOSPITALIZATION SERV: HCPCS | Performed by: PSYCHIATRY & NEUROLOGY

## 2017-10-04 PROCEDURE — G0176 OPPS/PHP;ACTIVITY THERAPY: HCPCS | Performed by: PSYCHIATRY & NEUROLOGY

## 2017-10-04 PROCEDURE — G0410 GRP PSYCH PARTIAL HOSP 45-50: HCPCS | Performed by: PSYCHIATRY & NEUROLOGY

## 2017-10-04 PROCEDURE — G0177 OPPS/PHP; TRAIN & EDUC SERV: HCPCS | Performed by: PSYCHIATRY & NEUROLOGY

## 2018-01-10 NOTE — PSYCH
Assessment    1  Major depressive disorder, recurrent severe without psychotic features (296 33) (F33 2)   2  Anxiety (300 00) (F41 9)   3  History of concussion (V15 52) (Z87 820)   4  History of  Section   5  Education Level: Some college   6  Daily caffeine consumption   7     8  Disabled    Plan    1  Citalopram Hydrobromide 10 MG Oral Tablet    [facility] Physician's Orders  ADMIT TO: Partial Hospitalization 5 x per week for 15 days  Vital signs routine  Diet: Regular   Group Psychotherapy 9 x per week    Allied Therapy Group 6 x per week     Diagnosis: F 33 2, F 41 9  Medications: As per medication list     âI certify that the continuation of Partial Hospitalization services is medically necessary to improve and/or maintain the patient's condition and functional level, and to prevent relapse or hospitalization, and that this could not be done at a less intensive level of care  â     Physician Signature: Ced Gilbert MD     Nurse Signature: Talita Quintero RN      Chief Complaint  This is a 59year-old female who came for follow up after discharge from [facility] where she was admitted from 2017 to 2017 due to severe depression and suicidal thoughts  History of Present Illness    Benjamin Goldberg is a 59year-old female with depression and anxiety who was admitted to [place] [address] Unit from 2017 to 2017 due to severe depression, agitation, suicidal thoughts, disorganized since she had a surgery lobectomy on   She has been feeling hopeless, confused, she has episodes of increase agitation toward her   She was complaining of anxiety, depression, difficulty sleeping, suicidal thoughts, poor concentration, poor appetite and tearfulness  Onset of symptoms was gradual starting after a surgery and has more difficulties after moving to a new placed on 2017   She was seen on Consultation on  to aggression and confusion  Her stressors are medical issues and difficulty sleeping  She states she love her family and does no want to hurt herself or others  Today she is anxious, denies any suicidal or homicidal ideation, plan or intent and she denies any psychotic symptoms, denies any manic episodes  Her PHQ-9 is 9  Review of Systems  depression, sleep disturbances and decreased functioning ability  Constitutional: No fever, no chills, no recent weight gain or recent weight loss  ENT: no ear ache, no loss of hearing, no nosebleeds or nasal discharge, no sore throat or hoarseness  Cardiovascular: no complaints of slow or fast heart rate, no chest pain, no palpitations, no leg claudication or lower extremity edema  Respiratory: no complaints of shortness of breath, no wheezing, no dyspnea on exertion, no orthopnea or PND  Gastrointestinal: no complaints of abdominal pain, no constipation, no nausea or diarrhea, no vomiting, no bloody stools  Genitourinary: no complaints of dysuria, no incontinence, no pelvic pain, no dysmenorrhea, no vaginal discharge or abnormal vaginal bleeding  Musculoskeletal: no complaints of arthralgia, no myalgia, no joint swelling or stiffness, no limb pain or swelling  Integumentary: no complaints of skin rash or lesion, no itching or dry skin, no skin wounds  Neurological: no complaints of headache, no confusion, no numbness or tingling, no dizziness or fainting  Other Symptoms: Endocrine is negative  ROS reviewed  Past Psychiatric History    Past Psychiatric History: The patient has depression on 1999 and was treated with Zoloft, Celexa and Remeron  Substance Abuse Hx    Substance Abuse History: She denies alcohol, drugs or tobacco           Active Problems    1  Anxiety (300 00) (F41 9)   2  Complex partial epilepsy with generalization and with intractable epilepsy (345 41)   (G40 219)   3  Hashimoto's thyroiditis (245 2) (E06 3)   4  Idiopathic insomnia (307 42) (F51 01)   5  Obesity (BMI 30-39 9) (278 00) (E66 9)   6  Prediabetes (790 29) (R73 03)    Past Medical History    1  History of concussion (V15 52) (Z87 820)   2  History of stroke (V12 54) (Z86 73)    The active problems and past medical history were reviewed and updated today  Surgical History    The surgical history was reviewed and updated today  Allergies    1  No Known Drug Allergies    Current Meds   1  CeleXA 20 MG Oral Tablet (Citalopram Hydrobromide); TAKE 1 TABLET Every morning; Therapy: (Recorded:07Aop8319) to Recorded   2  Citalopram Hydrobromide 10 MG Oral Tablet; Take 1 tablet daily; Therapy: 33XLZ6482 to (Evaluate:10Ggh9282)  Requested for: 76PRB9099; Last   Rx:85Qfd6064 Ordered   3  Mirtazapine 15 MG Oral Tablet; TAKE 1 TABLET Bedtime; Therapy: (Susana Holt) to Recorded   4  Vimpat 100 MG Oral Tablet; take 1 tablet twice a day; Therapy: 48HIQ3578 to (Last Rx:34Kgb3151) Ordered    The medication list was reviewed and updated today  Family Psych History  Mother    1  Family history of AAA (abdominal aortic aneurysm), not a candidate for repair  Family History    2  No pertinent family history  She denies any  The family history was reviewed and updated today  Social History    · Daily caffeine consumption   · Disabled   · Education Level: Some college   ·    · Never a smoker  The social history was reviewed and updated today  The patient is , live with her , is disable, has two sons,and finished some college  No legal history and no  history  History Of Phys/Sex Abuse Or Perpetration    History Of Phys/Sex Abuse or Perpetration: She denies any history of physical abuse and sexual abuse        Vitals  Signs   Recorded: 26JNN4346 09:47AM   Temperature: 96 5 F, Oral  Heart Rate: 71, L Radial  Pulse Quality: Normal, L Radial  Respiration Quality: Normal  Respiration: 18  Systolic: 330, LUE, Sitting  Diastolic: 81, LUE, Sitting  Height: 5 ft   Weight: 173 lb 2 oz  BMI Calculated: 33 81  BSA Calculated: 1 76    Physical Exam    Appearance: was calm and cooperative, adequate hygiene and grooming and good eye contact  Observed mood: depressed  Observed mood: affect appropriate  Speech: a normal rate  Thought processes: coherent/organized  Hallucinations: no hallucinations present  Thought Content: no delusions  Abnormal Thoughts: The patient has no suicidal thoughts and no homicidal thoughts  Orientation: The patient is oriented to person, place and time  Recent and Remote Memory: short term memory impaired and long term memory intact  Attention Span And Concentration: concentration impaired  Insight: Insight intact  Judgment: Her judgment was intact  Muscle Strength And Tone  Muscle strength and tone were normal  Normal gait and station  Language: no difficulty naming common objects, no difficulty repeating a phrase and no difficulty writing a sentence  Fund of knowledge: Patient displays adequate knowledge of current events, adequate fund of knowledge regarding past history and adequate fund of knowledge regarding vocabulary  The patient is experiencing no localized pain  On a scale of 0 - 10 the pain severity is a 0  Treatment Recommendations: 1  Admit to [place] 2  Medication Management 3  Group Therapy  Risks, Benefits And Possible Side Effects Of Medications: Risks, benefits, and possible side effects of medications explained to patient and patient verbalizes understanding  No records found for controlled prescriptions according to [employer]         Results/Data  (1) CBC/PLT/DIFF 66QCL5171 07:58AM Narendra Doty     Test Name Result Flag Reference   WBC COUNT 6 32 Thousand/uL  4 31-10 16   RBC COUNT 4 78 Million/uL  3 81-5 12   HEMOGLOBIN 14 3 g/dL  11 5-15 4   HEMATOCRIT 44 0 %  34 8-46  1   MCV 92 fL 82-98   MCH 29 9 pg  26 8-34 3   MCHC 32 5 g/dL  31 4-37 4   RDW 14 3 %  11 6-15 1   MPV 10 4 fL  8 9-12 7   PLATELET COUNT 976 Thousands/uL  149-390   NEUTROPHILS RELATIVE PERCENT 66 %  43-75   LYMPHOCYTES RELATIVE PERCENT 24 %  14-44   MONOCYTES RELATIVE PERCENT 9 %  4-12   EOSINOPHILS RELATIVE PERCENT 1 %  0-6   BASOPHILS RELATIVE PERCENT 0 %  0-1   NEUTROPHILS ABSOLUTE COUNT 4 15 Thousands/? ??L  1 85-7 62   LYMPHOCYTES ABSOLUTE COUNT 1 53 Thousands/? ??L  0 60-4 47   MONOCYTES ABSOLUTE COUNT 0 57 Thousand/? ??L  0 17-1 22   EOSINOPHILS ABSOLUTE COUNT 0 05 Thousand/? ??L  0 00-0 61   BASOPHILS ABSOLUTE COUNT 0 02 Thousands/? ??L  0 00-0 10   This bloodwork is non-fasting  Please drink two glasses of water morning of  bloodwork  (1) COMPREHENSIVE METABOLIC PANEL 86OQU2826 41:90GG Cleveland Sanjuana     Test Name Result Flag Reference   SODIUM 140 mmol/L  136-145   POTASSIUM 4 1 mmol/L  3 5-5 3   CHLORIDE 102 mmol/L  100-108   CARBON DIOXIDE 30 mmol/L  21-32   ANION GAP (CALC) 8 mmol/L  4-13   BLOOD UREA NITROGEN 19 mg/dL  5-25   CREATININE 0 77 mg/dL  0 60-1 30   Standardized to IDMS reference method   CALCIUM 8 9 mg/dL  8 3-10 1   BILI, TOTAL 0 40 mg/dL  0 20-1 00   ALK PHOSPHATAS 90 U/L     ALT (SGPT) 30 U/L  12-78   Specimen collection should occur prior to Sulfasalazine administration due to the potential for falsely depressed results  AST(SGOT) 23 U/L  5-45   Specimen collection should occur prior to Sulfasalazine administration due to the potential for falsely depressed results  ALBUMIN 3 5 g/dL  3 5-5 0   TOTAL PROTEIN 7 3 g/dL  6 4-8 2   eGFR 85 ml/min/1 73sq m     National Kidney Disease Education Program recommendations are as follows:  GFR calculation is accurate only with a steady state creatinine  Chronic Kidney disease less than 60 ml/min/1 73 sq  meters  Kidney failure less than 15 ml/min/1 73 sq  meters     GLUCOSE FASTING 117 mg/dL H 65-99   Specimen collection should occur prior to Sulfasalazine administration due to the potential for falsely depressed results  Specimen collection should occur prior to Sulfapyridine administration due to the potential for falsely elevated results  (1) LIPID PANEL, FASTING 75Uzf1191 07:50AM Narendra Doty     Test Name Result Flag Reference   CHOLESTEROL 217 mg/dL H    HDL,DIRECT 79 mg/dL H 40-60   Specimen collection should occur prior to Metamizole administration due to the potential for falsley depressed results  LDL CHOLESTEROL CALCULATED 124 mg/dL H 0-100   This is a fasting blood test  Water,black tea or black  coffee only after 9:00pm the night before test  Drink 2 glasses of water the morning of test         Triglyceride:        Normal ??? ??? ??? ??? ??? ??? ??? <150 mg/dl   ??? ??? ???Borderline High ??? ??? 150-199 mg/dl   ??? ??? ? ?? High ??? ??? ??? ??? ??? ??? ??? 200-499 mg/dl   ??? ??? ? ??Very High ??? ??? ??? ??? ??? >499 mg/dl      Cholesterol:       Desirable ??? ??? ??? ??? <200 mg/dl   ??? ??? Borderline High ??? 200-239 mg/dl   ??? ??? High ??? ??? ??? ??? ??? ??? >239 mg/dl      HDL Cholesterol:       High ??? ???>59 mg/dL   ??? ??? Low ??? ??? <41 mg/dL      This screening LDL is a calculated result  It does not have the accuracy of the Direct Measured LDL in the monitoring of patients with hyperlipidemia and/or statin therapy  Direct Measure LDL (PFP023) must be ordered separately in these patients  TRIGLYCERIDES 72 mg/dL  <=150   Specimen collection should occur prior to N-Acetylcysteine or Metamizole administration due to the potential for falsely depressed results  DSM    Provisional Diagnosis: 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   Anxiety disorder, unspecified  End of Encounter Meds    1  CeleXA 20 MG Oral Tablet (Citalopram Hydrobromide); TAKE 1 TABLET Every morning; Therapy: (Recorded:98Vzu4183) to Recorded   2   Mirtazapine 15 MG Oral Tablet; TAKE 1 TABLET Bedtime; Therapy: (Steele Klinefelter) to Recorded    3  Vimpat 100 MG Oral Tablet; take 1 tablet twice a day; Therapy: 41JKQ3826 to (Last Rx:28Apr2017) Ordered    Future Appointments    Date/Time Provider Specialty Site   04/17/2018 11:00 AM CINTHYA Otero   Neurology [place] [facility]     Signatures   Electronically signed by : CINTHYA Matson ; Sep 21 2017  9:49AM EST                       (Author)    Electronically signed by : Nely Stubbs RN; Sep 21 2017 10:12AM EST                       (Author)    Electronically signed by : Nely Stubbs RN; Sep 21 2017 10:14AM EST                       (Author)    Electronically signed by : CINTHYA Matson ; Sep 21 2017 11:03AM EST                       (Author)

## 2018-01-10 NOTE — DISCHARGE SUMMARY
Discharge Summary  [facility] Discharge Summary [facility]:   Admission Date: 9/21/17  Patient was referred by [facility]  Discharge Date: 10/4/17  This was a routine discharge  Diagnosis: Axis I: F33 2 Major Depressive Disorder; F41 9 Anxiety Disorder  Treating Physician: Dr Dayanna Alanis MD    Treatment Complications: medical complications  Presenting Problem: Martha [facility] IP stay precipitated by suicidal thoughts  She reports she was not sleeping well and had been avoiding life  It was hard for her to leave the home  Herself and  moved back into their home which was under construction for several years 9/9/17  They had been residing in a rental property for several years "I had a routine - I am just worried about the new home and if I can handle it"  "I should be grateful I have a beautiful new home, but I am confused and have to learn new things - like gas stove versus electric"  She reports having difficulty expressing herself and sharing her feelings  She has a history of agitation and confusion  Today she presented with thought blocking and word finding difficulties  Her symptoms worsened after brain surgery for her epilepsy in 2013  Course of treatment includes group counseling, pharmacotherapy, individual case management, allied therapy, psychoeducation and psychiatric evaluation  Treatment Progress: Martha actively shared in 10 sessions in PHP  She was initially quiet, yet throughout the course of stay tried to speak up and shared feedback with peers  She identified being more active at home and worked on feeling more comfortable within the changes in her environment  She shared that it is hard for her to express her feelings, but did work on skills to improve putting feelings into words  She denied issues with the medications  She had a PHQ9 score of 9 upon admission and 1 upon discharge  Family meeting was offered but she did not wish to schedule     Aftercare recommendations include  Dr Trey Essex - [phone] medication management 10/10/17 at 11:15am  Keep Follow Up Appointments, take medications as prescribed, utilize your WRAP  At present, Lashae Leon did not wish to have an OP therapist  Please know that [facility] has meetings in [place] and [address] [place] [address] has meetings at [facility] in [place], [place] - additional information can be found on the internet  [address]  Discharge Medications include: see below  Discharge [address] [facility] [address] Rashid [place]:     Disposition: Home/Family  Address: [address] [place] [address]  Diagnosis: Major Depressive Disorder; Anxiety Disorder  Allergies (Drug/Food): NKDA  Activity: you may not return to work until na and you may not drive until na  Diet: Regular  Diagnostic/Laboratory Orders: None ordered  Vaccines: If you received a vaccine, please notify your family physician on your next visit  Pneumococcal vaccine not given, Influenza vaccine not given  Follow-up appointments/Referrals:   Dr Trey Essex - [phone] medication management 10/10/17 at 11:15am    Keep Follow Up Appointments, take medications as prescribed, utilize your WRAP    At present, Lashae Leon did not wish to have an OP therapist  Please know that [facility] has meetings in [place] and [address] [place] [address] has meetings at [facility] in [place], [place] - additional information can be found on the internet  [address] [facility]: [facility] [phone]  Crisis Intervention (Emergency) [employer]: [place]: [phone]  [place] Crisis Intervention Hotline: 5-532.595.8625  National Suicide Crisis Hotline: 0-442.293.7294  I, the undersigned, have received and understand the above instructions        Patient/Rep Signature: __________________________________ Date/Time: ______________     Physician Signature: ____________________________________ Date/Time: ______________      Signature: ________________________________ Date/Time: ______________        Current Meds   1  CeleXA 20 MG Oral Tablet (Citalopram Hydrobromide); TAKE 1 TABLET EVERY   MORNING; Therapy: (Recorded:66Vmx3427) to Recorded   2  Mirtazapine 15 MG Oral Tablet; TAKE 1 TABLET Bedtime; Therapy: (Nimo Diego) to Recorded   3  Vimpat 100 MG Oral Tablet; take 1 tablet twice a day; Therapy: 64MVF5152 to (Last Rx:28Apr2017) Ordered  Medication List Reviewed: The medication list was reviewed and updated today  Allergies    1   No Known Drug Allergies    Signatures   Electronically signed by : KATHIE Ramos; Oct  3 2017  2:28PM EST                       (Author)    Electronically signed by : KATHIE Ramos; Oct  4 2017  3:28PM EST                       (Author)    Electronically signed by : CINTHYA Edwards ; Oct  4 2017  3:55PM EST                       (Author)

## 2018-01-10 NOTE — RESULT NOTES
Verified Results  [facility] 16Jun2017 07:28AM Maninder Nair Order Number: [id number]     Test Name Result Flag Reference   US ABDOMINAL AORTA SCREENING AAA (Report)     ABDOMINAL AORTIC ULTRASOUND     INDICATION: Evaluate for aortic aneurysm, family history of AAA  COMPARISON: None  FINDINGS:      Ultrasound of the abdominal aorta was performed in longitudinal and transverse planes with a curvilinear transducer  Proximal aorta: 2 0 cm   Mid aorta:  1 8 cm   Distal aorta:  1 7 cm   Right common iliac origin: 1 1 cm   Left common iliac origin: 1 2 cm     No periaortic collections or adenopathy detected  IMPRESSION:     No evidence for abdominal aortic aneurysm         Workstation performed: [id number]     Signed by:   Ted Tong MD   6/19/17

## 2018-01-10 NOTE — PROCEDURES
Procedures by Janice Barber MD at 2016   2:16 PM      Author:  Janice Barber MD Service:  Neurology Author Type:  Physician     Filed:  2016  2:18 PM Date of Service:  2016  2:16 PM Status:  Signed     :  Jnaice Barber MD (Physician)            Continuous Video EEG Monitoring       Patient Name:  Ronaldo Johnston  MRN: [id number]   :  1960 File #: Naz Chamberlain    Age: 60 y o  Encounter #: [id number]   Date performed: 2016-2016             Report date: 2016          Study type: Continuous video EEG    ICD 10 diagnosis: Complex partial epilepsy not intractable without status G40 209 and Spells/Fit NOS R56 9    Start time: 2016  08:01 End time: 2016 07:59     -------------------------------------------------------------------------------------------------------------------   Patient History: This recording was observed in a 60 y o  female  with history of localization related epilepsy, s/p right anterior temporal lobectomy to determine whether spells of confusion  are seizures  Medications include:     carBAMazepine 200 mg Oral BID   citalopram 10 mg Oral Daily   clonazePAM 0 5 mg Oral BID   clonazePAM 1 5 mg Oral HS   lacosamide 100 mg Oral Q12H [facility]   melatonin 6 mg Oral Q24H       -------------------------------------------------------------------------------------------------------------------   Description of Procedure:  ·  32 channel digital recording with electrodes placed according to the International 10-20 system with additional  T1/T2 electrodes, EOG, EKG, and simultaneous  video  A monitoring technologist supervised the continuous recording  The recording was technically satisfactory  -------------------------------------------------------------------------------------------------------------------   Results:   Manual Review:   Manual review of the tracing was unchanged from the prior day's recording   During wakefulness, there were brief  runs of poorly regulated, low amplitude, posteriorly dominant, symmetric  8-10 cps alpha rhythm that attenuated with eye opening  There were symmetric low  to medium amplitude, diffuse, enhanced beta activities  There was persistent  right temporal low amplitude polymorphic delta activities  There were intermittent left posterior temporal, low to medium amplitude, mixed theta and delta activities  With drowsiness, alpha activity attenuated and was replaced by diffusely distributed theta and beta activities  With sleep, symmetric vertex sharp waves,  K complexes and sleep spindles were present  Other findings: The single lead ECG demonstrated a regular rhythm  Events:   No significant push buttons were activated  There were multiple erroneous push buttons      -------------------------------------------------------------------------------------------------------------------   Interpretation: This prolonged, continuous video-EEG recording is abnormal  Persistent right temporal delta activities suggests an underlying structural lesion  Intermittent left temporal theta and delta activities  suggests independent left temporal neuronal dysfunction  Enhanced beta activities are commonly seen due to medication effect of benzodiazepines and/or barbiturates  Chuyita Oseguera MD   [address] [facility]               Received for:Floyd REILLY    Jun 13 2016  2:17PM [facility] Standard Time

## 2018-01-10 NOTE — PSYCH
History of Present Illness  [facility] Clinical Progress Note [facility]:   Specialized Services Documentation - Therapist must complete separate progress note for each specific clinical activity in which the client participated during the day  (055) Group Psychotherapy: (9:30-10:30) Geri Barrios was excused from psychotherapy group for initial eval with psychiatrist and intake with AUGIE Rothman MSW, LSW     (110) Group Psychotherapy: [id number] Geri Barrios participated in wellness group focused on learning ways the immune system is often negatively affective with mental illness and ways to improve your immune system to increase both physical and mental health wellness  Geri Barrios was attentive to education and briefly shared several facts about adding healthy foods to diet to improve nutrition  Peers were receptive to her input  Geri Barrios made good beginning progress toward goals  Continue to offer group to provide education on the immune system and how best to increase immunity when coping with mental illness  Treatment Plan Problem(s): 1 1,1 2  Destini Perez RN       (103) Education Therapy Goals set - Relaxation time    Treatment Plan Problem(s): 1 1, 1 2  Education Therapy Time - 0900 - 0930 (First treatment day)   Readiness to Learning:  She is receptive to learning  There are  no barriers to learning  Learning Assessment Time - 1330 - 1400   Education completed on  illness, medication and wellness tools  The teaching method was  verbal and demonstration  Shared area of learning: Yes  Choco Riojas MTI  Author KATHIE Stahl     (123) Allied Therapy 8229-7872 Geri Chaparrotocks actively shared in [facility] group focused on Interpersonal Effectiveness skill âDEARMANâ  Group explored objectives in interactions and effective ways to express  Geri Christines was attentive during maryuri analysis and small group practice  Group practiced identifying objectives and ways to get needs met   Geri Chaparrotocks was observed engaged and participating in the small group assignments  She identified gaining from task and acknowledged it is difficult for her to express needs  Some beginning work toward goal noted  Continue AT to encourage skill awareness, development and practice to improve interpersonal effectiveness  Treatment Plan Problem(s): 1 1, 1 4  Shivam Benavidez MTCrowBC       Case Management Note:   [id number] Met with Rika Arechiga  She was cooperative and pleasant during initial evaluation  She signed releases for OP provider and PCP (health care coordination letter sent to both) as well as her spouse as emergency contact  Given on call number and reviewed program    TREATMENT SESSION NUMBER: 1   Current suicide risk is low  Medications not changed/added/denied  KATHIE Collins      Active Problems     1  Complex partial epilepsy with generalization and with intractable epilepsy (345 41)   (G40 219)   2  Hashimoto's thyroiditis (245 2) (E06 3)   3  Idiopathic insomnia (307 42) (F51 01)   4  Obesity (BMI 30-39 9) (278 00) (E66 9)   5  Prediabetes (790 29) (R73 03)    Anxiety (300 00) (F41 9)          Past Medical History    1  History of stroke (V12 54) (Z86 73)    Allergies    1  No Known Drug Allergies    Current Meds   1  Citalopram Hydrobromide 10 MG Oral Tablet; Take 1 tablet daily; Therapy: [id number] to (Evaluate:[id number])  Requested for: 17ING1007; Last   Rx:[id number] Ordered   2  Vimpat 100 MG Oral Tablet; take 1 tablet twice a day; Therapy: [id number] to (Last Rx:[id number]) Ordered    Family Psych History  Mother    1  Family history of AAA (abdominal aortic aneurysm), not a candidate for repair  Family History    2  No pertinent family history    Social History    · Never a smoker    Future Appointments    Date/Time Provider Specialty Site   04/17/2018 11:00 AM CINTHYA Elias   Neurology [facility]     Signatures   Electronically signed by : SANDEE Schroeder; Sep 21 2017 10:46AM EST                       (Author) Electronically signed by : Zana Owusu RN; Sep 21 2017 12:52PM EST                       (Author)    Electronically signed by : KATHIE Martin; Sep 21 2017  2:27PM EST                       (Author)    Electronically signed by : KEVIN Weems; Sep 21 2017  2:52PM EST                       (Author)

## 2018-01-10 NOTE — PROCEDURES
Procedures by Sunday Miner MD at 2016   1:54 PM      Author:  Sunday Miner MD Service:  Neurology Author Type:  Physician     Filed:  2016  2:06 PM Date of Service:  2016  1:54 PM Status:  Signed     :  Sunday Miner MD (Physician)            ELECTROENCEPHALOGRAM    Patient Name:  Jose Antonio Martinez  MRN: [id number]   :  1960 File #: Ewa Sepulveda   Age: 60 y o  Encounter #: [id number]   Date performed: 2016 Referring Provider: Dr Kenny Kennedy          Report date: 2016          Study type: awake and drowsy EEG    ICD 10 diagnosis: Complex partial epilepsy intractable without status  G40 219, Spells/Fit NOS R56 9 and Encephalopathy, unspecified G93 40    Patient History:  EEG is requested to assess for seizures and/or classification of epilepsy  Patient is 60 y o  female s/p right temporal lobectomy, presenting with psychotic behaviors  History of grand mal seizures and automatisms  Current AEDs:  Medications include:   carBAMazepine 400 mg Oral BID   citalopram 10 mg Oral Daily   levETIRAcetam 1,500 mg Oral HS   levETIRAcetam 750 mg Oral QAM   zolpidem 10 mg Oral Once       Description of Procedure: The EEG was performed with electrodes applied using the International 10-20 System  Additional electrodes used included EOG, ECG and T1/T2 electrodes  A single lead ECG channel is also  present  At least 16 channels are reviewed at a time  and formatted into longitudinal bipolar, transverse bipolar, and referential (to common reference or calculated common reference) montages  The EEG was recorded  with the patient awake  The recording was technically satisfactory  EEG was recorded from 10:02 to 10:28  Findings:   Background Activity: The background is grossly symmetric with respect to voltages and activities    During wakefulness, the background is well-organized with anterior low amplitude beta activity and low-moderate amplitude  posterior alpha activity  There is a symmetric 10-10 5 Hz posterior dominant rhythm that attenuates with eye opening  Activation Procedures:   Hyperventilation was not performed  Stepped photic stimulation from 1 to 30 fps was performed and produced symmetric photic driving response  Other findings: The single lead ECG shows a regular sinus cardiac rhythm  The patient frequently talks causing a glossokinetic artifact over the anterior derivations  Interpretation: This is a normal 26 minutes awake EEG  No epileptiform discharges are present; however, stage 2 sleep is not recorded  If clinically indicated repeat EEG with sleep deprivation or prolonged study to capture sleep may increase diagnostic  yield  Terrilyn Curling, MD  [address] [place] [address] [facility]  Breonna REILLY    Jun 7 2016  2:06PM [facility] Standard Time

## 2018-01-11 NOTE — PSYCH
History of Present Illness  [facility]:   Specialized Services Documentation - Therapist must complete separate progress note for each specific clinical activity in which the client participated during the day  (844) Group Psychotherapy: (9:30-10:30) Arelis Aguilera participated in psychotherapy group focused on breaking down tasks and incorporating passion into one's life  Arelis Aguilera identified being anxious about the Fredie Able getting her here late today as a stressor  She more actively engaged in group discussion today, talking about how she often feels overwhelmed by daily tasks and stuck in her anxiety  Group discussed ways to break tasks down to make them feel more manageable and feel more productive  Group also discussed incorporating things one is passionate about into daily routine to boost mood and sense of purpose  Arelis Aguilera noted that her passion is animals and is considering volunteering with the [address]  Moderate progress toward goals today  Continue psychotherapy group to encourage Martha to explore stressors and coping  Treatment Plan Problem(s): 1 1, 1 2  Zuleyma Ortiz MSW, LSW     (514) Group Psychotherapy: 6794-5566 Arelis Aguilera participated in wellness group focused on ten things you should know regarding the positive impact of exercise on depression  Arelis Aguilera was attentive and made good eye contact during education and discussion but did not share personal information regarding her choice of physical activity  Arelis Aguilera made mild progress toward goals  Continue to offer group to educate, and encourage using physical activity as a healthy coping strategy to add to therapy and medication in recovery from depression  Treatment Plan Problem(s): 1 1,1 2  Pattie Delgado RN       (155) Education Therapy Goals set - Clean/organize fridge    Treatment Plan Problem(s): 1 1, 1 2  Education Therapy Time - 0900 - 0930 Previous goal was met  Readiness to Learning:  She is receptive to learning  There are  no barriers to learning  Learning Assessment Time - 1330 - 1400   Education completed on  illness and wellness tools  The teaching method was  verbal, audio/visual and demonstration  Shared area of learning: Yes  KEVIN Forte MT-BC     (162) Allied Therapy 8496-6104 Martha wolfe shared in [facility] group focused on setting boundaries and DBT module Interpersonal Effectiveness  She engaged in task of instrument improvisation and role-play boundary setting  Group explored ways to set boundaries and strengthening of internal boundaries  Group was introduced to GIVE as a way to keep that relationship while boundary setting  She was given hand-outs on topic  Yash Friedman remained mostly quiet throughout the group, but did engage in tasks  Minimal progress towards goal observed  Continue AT to further encourage the development of interpersonal effectiveness to promote recovery  KEVIN Forte  Treatment Plan Problem(s): 1 1, 1 2  KATHIE Blanco       Case Management Note:   9364-6264 Met with Yash Friedman  She denied concerns or questions "I feel like I should be asking more"  She was encouraged to work on her WRAP  She identified she used her stove again and is working on feeling more comfortable at home  She did meet the neighbors  Exercise was reviewed in an earlier group and she identified how she would like to work on increasing her physical activity  Encouraged to start planning her weekend  Asked her to have her spouse contact this writer if he had any questions or concerns  TREATMENT SESSION NUMBER: 6   Current suicide risk is low  Medications not changed/added/denied  KATHIE Blanco      Active Problems    1  Anxiety (300 00) (F41 9)   2  Complex partial epilepsy with generalization and with intractable epilepsy (345 41)   (G40 219)   3  Hashimoto's thyroiditis (245 2) (E06 3)   4  Idiopathic insomnia (307 42) (F51 01)   5   Major depressive disorder, recurrent severe without psychotic features (296 33) (F33 2)   6  Obesity (BMI 30-39 9) (278 00) (E66 9)   7  Prediabetes (790 29) (R73 03)    Past Medical History    1  History of concussion (V15 52) (Z87 820)   2  History of stroke (V12 54) (Z86 73)    Allergies    1  No Known Drug Allergies    Current Meds   1  CeleXA 20 MG Oral Tablet; TAKE 1 TABLET EVERY MORNING; Therapy: (Recorded:68Sgu0855) to Recorded   2  Mirtazapine 15 MG Oral Tablet; TAKE 1 TABLET Bedtime; Therapy: (Chemo Cowan) to Recorded   3  Vimpat 100 MG Oral Tablet; take 1 tablet twice a day; Therapy: [id number] to (Last Rx:28Apr2017) Ordered    Family Psych History  Mother    1  Family history of AAA (abdominal aortic aneurysm), not a candidate for repair  Family History    2  No pertinent family history    Social History    · Daily caffeine consumption   · Disabled   · Education Level: Some college   ·    · Never a smoker    Future Appointments    Date/Time Provider Specialty Site   04/17/2018 11:00 AM Beverly Ganser, M D  Neurology [facility] NEUROLOGY [facility]   09/29/2017 12:00 PM Nevaeh Sanches M D   Psychiatry [facility]     Signatures   Electronically signed by : SANDEE Miner; Sep 28 2017 11:58AM EST                       (Author)    Electronically signed by : KEVIN Good; Sep 28 2017  2:26PM EST                       (Author)    Electronically signed by : KATHIE Herzog; Sep 28 2017  2:35PM EST                       (Author)    Electronically signed by : Tiffany Ramirez RN; Sep 28 2017  3:20PM EST                       (Author)

## 2018-01-11 NOTE — PSYCH
History of Present Illness  [facility]:   Specialized Services Documentation - Therapist must complete separate progress note for each specific clinical activity in which the client participated during the day  (107) Group Psychotherapy: (12:30-13:30) Gilbert Gallo participated in psychotherapy group focused on feeling stuck in negative patterns  She identified wanting to talk more but struggling to do so as a stressor  She engaged in group discussion, talking about how she wants to reach out for support from her children but does not want to burden them or interfere with their lives  Peers talked about ways to reach out to supports and not feel like a burden to them, as well as ways to begin to change negative patterns  Some beginning progress noted toward goals  Continue psychotherapy group to encourage Martha to explore stressors and coping  Treatment Plan Problem(s): 1 1, 1 2, 1 4  Sri Isaacs MSW, LSW     (224) Group Psychotherapy: 3164-6934 Gilbert Gallo participated in weekly wellness group to discuss what particular area of wellness that has been worked on up to today in Program and choice of area to continue working on for recovery as targeted in treatment plan, by choice or in WRAP  Gilbert Gallo was attentive in group and completed wellness assessment  She was mostly quiet but did share that she is hoping to work on getting more physical exercise  Peers were supportive to her sharing  Gilbert Gallo made mild progress toward goal  Continue to offer weekly wellness as an strategy to offer opportunity to focus on goals and identify areas of goal achievement and need  Treatment Plan Problem(s): 1 1,1 2  Jonathan Silva RN       (295) Education Therapy Goals set - Reach out to family    Treatment Plan Problem(s): 1 1, 1 2  Education Therapy Time - 0900 - 0930 Previous goal was met  Readiness to Learning:  She is receptive to learning  There are  no barriers to learning    Learning Assessment Time - 1330 - 1400   Education completed on  illness and wellness tools  The teaching method was  verbal  Shared area of learning: Yes  KEVIN Up MT-BC     (664) Allied Therapy 6635-0515 Viviane Hayes actively shared in [facility] group focused on [address]  She engaged in maryuri analysis and emotion jeopardy task  Group explored difference between feeling, emotion, and mood  Group also explored biological reactions, wellness tools, prompting events, and myths or truths of emotions  Viviane Hayes engaged in task answering questions, and shared reaching out to someone as a way to handle negative emotions  Beginning progress towards goal observed and shared  Continue AT to further encourage the use of emotion regulation to promote recovery  KEVIN Up  Treatment Plan Problem(s): 1 1  KATHIE Howard       Case Management Note:   8131-3020 Met with Martha to review her treatment plan and weekend  She was constricted and expressed that she really has felt good about program the last 2 days  She thinks she can learn how to express herself better and holding back tears she stated "I am not the person I was before the surgery"  She shared that her  is not very emotional so it is hard for her to go to him  Encouraged use of her friend Maribeth Paige and her boys as they have expressed to her that they are there for her  Discussed the weekend and supports available  TREATMENT SESSION NUMBER: 2   Current suicide risk is low  Medications not changed/added/denied  KATHIE Howard      Active Problems    1  Anxiety (300 00) (F41 9)   2  Complex partial epilepsy with generalization and with intractable epilepsy (345 41)   (G40 219)   3  Hashimoto's thyroiditis (245 2) (E06 3)   4  Idiopathic insomnia (307 42) (F51 01)   5  Major depressive disorder, recurrent severe without psychotic features (296 33) (F33 2)   6  Obesity (BMI 30-39 9) (278 00) (E66 9)   7   Prediabetes (790 29) (R73 03)    Past Medical History    1  History of concussion (V15 52) (Z87 820)   2  History of stroke (V12 54) (Z86 73)    Allergies    1  No Known Drug Allergies    Current Meds   1  CeleXA 20 MG Oral Tablet; TAKE 1 TABLET EVERY MORNING; Therapy: (Recorded:40Tyq1758) to Recorded   2  Mirtazapine 15 MG Oral Tablet; TAKE 1 TABLET Bedtime; Therapy: (Eduardo Barber) to Recorded   3  Vimpat 100 MG Oral Tablet; take 1 tablet twice a day; Therapy: [id number] to (Last Rx:28Apr2017) Ordered    Family Psych History  Mother    1  Family history of AAA (abdominal aortic aneurysm), not a candidate for repair  Family History    2  No pertinent family history    Social History    · Daily caffeine consumption   · Disabled   · Education Level: Some college   ·    · Never a smoker    Future Appointments    Date/Time Provider Specialty Site   04/17/2018 11:00 AM CINTHYA Noyola  Neurology [place] [facility]   09/25/2017 12:15 PM Raciel Sanches M D  Psychiatry [facility]   09/26/2017 12:00 PM Raciel Sanches M D  [facility]iatry [facility]   09/27/2017 12:00 PM Raciel Sanches M D  Psychiatry [facility]   09/28/2017 11:45 AM CINTHYA Srivastava  [facility]iatry [facility]   09/29/2017 12:00 PM [facility]   [facility]     Signatures   Electronically signed by : SANDEE Hoffmann; Sep 22 2017  2:38PM EST                       (Author)    Electronically signed by : KEVIN Smith; Sep 22 2017  2:51PM EST                       (Author)    Electronically signed by : KATHIE Torres; Sep 22 2017  3:22PM EST                       (Author)    Electronically signed by : Bill Pulido RN; Sep 26 2017  4:54PM EST                       (Author)

## 2018-01-11 NOTE — PSYCH
Chief Complaint  This is a 59year-old female who came for follow up after discharge from [facility] adult inpatient unit where she was admitted from 9/14/2017 to 9/20/2017 due to severe depression and suicidal thoughts  History of Present Illness  Intake from [facility] - 9/21/17 ([id number])  "I was very anxious"    Per Dr Andrés Prince:  Daniel Venegas is a 59year-old female with depression and anxiety who was admitted to [place] [address] Unit from 9/14/2017 to 9/20/2017 due to severe depression, agitation, suicidal thoughts, disorganized since she had a surgery lobectomy on 2013  She has been feeling hopeless, confused, she has episodes of increase agitation toward her   She was complaining of anxiety, depression, difficulty sleeping, suicidal thoughts, poor concentration, poor appetite and tearfulness  Onset of symptoms was gradual starting after a surgery and has more difficulties after moving to a new placed on 9/9/2017  She was seen on Consultation on June, due to aggression and confusion  Her stressors are medical issues and difficulty sleeping  She states she love her family and does no want to hurt herself or others  Today she is anxious, denies any suicidal or homicidal ideation, plan or intent and she denies any psychotic symptoms, denies any manic episodes  Her PHQ-9 is 9  Pre-morbid level of function and History of Present Illness: Humza Nathan referred to [facility] following IP stay precipitated by suicidal thoughts  She reports she was not sleeping well and had been avoiding life  It was hard for her to leave the home  Herself and  moved back into their home which was under construction for several years 9/9/17   They had been residing in a rental property for several years "I had a routine - I am just worried about the new home and if I can handle it"  "I should be grateful I have a beautiful new home, but I am confused and have to learn new things - like gas stove versus electric"  She reports having difficulty expressing herself and sharing her feelings  She has a history of agitation and confusion  Today she presented with thought blocking and word finding difficulties  Her symptoms worsened after brain surgery for her epilepsy in   Reason for evaluation and partial hospitalization as an alternative to inpatient hospitalization:   PHP is medically necessary to prevent rehospitalization as transitions from IP to OP level of care  Milieu therapy to monitor for medication needs, provide wellness tool education and offer opportunity to share and connect to others  Group therapy, case management, psychiatric medication management, family contact and UR as indicated  ELOS 10 treatment days  Previous Psychiatric/psychological treatment/year: see below  Current Psychiatrist/Therapist: Dr Kerrie Bond (about 1 year) - she is not presently interested in a therapist    Outpatient and/or Partial and Other Community Resources Used (CTT, ICM, VNA): Inpatient: [facility] 17-17 - about 1 year ago in , Outpatient: she reports she saw a therapist for "awhile" in [place] but can not remember who and Partial: denied  Family Constellation (include parents, relationship with each and pertinent Psych/Medical History): Mother: Carmelina Sood (66) - recent health issues - lives in [address]: Marycruz Craven ( 36 years) supportive - has diabetes; works for [employer]   Father: raised by Cheli Lopez - he  in  and was good to them   Children: Saskia Woodruff (29)  and lives in [place]   Sibling: Konrad Mas (sister - 39 - cares for mom and lives in [facility]) (half sister)   Sibling: Evan Shirley (61) - lives in [place] - good relationship   Children: [address] (40)  and lives in [place]   Other: biological father - Geronimo Garg - she states that her hospitalization last  was precipitated by being obsessed with searching for him)   The patient relates best to ; friend Donald Eli   She lives with  and 2 beagles  She does not live alone  Domestic Violence: No past history of domestic violence  The patient is not currently experiencing domestic violence  There is not suspected domestic violence  There is no history of child abuse  denied  There is no history of sexual abuse  denied  Additional Comments related to family/relationships/peer support: She reports her family is very supportive  She states she has been more isolative and has a hard time expressing herself  School or Work History (strengths/limitations/needs: 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; 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  Her highest grade level achieved was  some college but her seizure disorder became problematic then   history includes none  Financial status includes stable  LEISURE ASSESSMENT (Include past and present hobbies/interests and level of involvement (Ex: Group/Club Affiliations): gardening; time with her dogs  Her primary language is  [place]  Ethnic considerations are   Religions affiliations and level of involvement - In past her and  were involved in [employer] however she has not been involved in any Catholic in a "long time"   Spirituality and jerad have not helped her cope with difficult situations in her life  FUNCTIONAL STATUS: There has been a recent change in the patient's ability to do the following: driving   She needs van service  Level of Assistance Needed/By Whom?: Independent  The patient learns best by  demonstration  SUBSTANCE ABUSE ASSESSMENT: no current substance abuse and no past substance abuse  Substance/Route/Age/Amount/Frequency/Last Use: denied  No previous detox/rehab treatment  HEALTH ASSESSMENT: She has not lost 10 lbs or more in the last 6 months without trying  She has not had decreased appetite for 5 days or more  She has not gained 10 lbs or more in the last 6 months without trying  no nausea  no vomiting  no diarrhea  no referral to PCP needed  no referral to nutritionist needed  no pregnancy  She is not receiving prenatal care  not referred to PCP  Current PCP: Dr Fina Linton  PCP notified  LEGAL: No Mental Health Advance Directive or Power of  on file  She does not want an information packet about Mental Health Advance Directives  denied  Diagnosis and Treatment Plan explained to patient, patient relates understanding diagnosis and is agreeable to Treatment Plan  Prognosis: Guarded    Strengths: family support, sees treatment as an opportunity to improve    Barriers: physical illness, she appears somewhat guarded  Review of Systems  depression, sleep disturbances and decreased functioning ability  Constitutional: No fever, no chills, no recent weight gain or recent weight loss  ENT: no ear ache, no loss of hearing, no nosebleeds or nasal discharge, no sore throat or hoarseness  Cardiovascular: no complaints of slow or fast heart rate, no chest pain, no palpitations, no leg claudication or lower extremity edema  Respiratory: no complaints of shortness of breath, no wheezing, no dyspnea on exertion, no orthopnea or PND  Gastrointestinal: no complaints of abdominal pain, no constipation, no nausea or diarrhea, no vomiting, no bloody stools  Genitourinary: no complaints of dysuria, no incontinence, no pelvic pain, no dysmenorrhea, no vaginal discharge or abnormal vaginal bleeding  Musculoskeletal: no complaints of arthralgia, no myalgia, no joint swelling or stiffness, no limb pain or swelling  Integumentary: no complaints of skin rash or lesion, no itching or dry skin, no skin wounds  Neurological: no complaints of headache, no confusion, no numbness or tingling, no dizziness or fainting  Other Symptoms: Endocrine is negative  ROS reviewed        Past Psychiatric History    Past Psychiatric History: She has had depression on 1999 and was treated with Zoloft, Celexa and Remeron  Substance Abuse Hx    Substance Abuse History: She denies alcohol, drugs or tobacco           Active Problems     1  Complex partial epilepsy with generalization and with intractable epilepsy (345 41)   (G40 219)   2  Hashimoto's thyroiditis (245 2) (E06 3)   3  Idiopathic insomnia (307 42) (F51 01)   4  Obesity (BMI 30-39 9) (278 00) (E66 9)   5  Prediabetes (790 29) (R73 03)    Anxiety (300 00) (F41 9)          Past Medical History    1  History of stroke (V12 54) (Z86 73)    The active problems and past medical history were reviewed and updated today  Surgical History    1  History of Craniotomy For Temporal Lobectomy With Electrocorticography    The surgical history was reviewed and updated today  Allergies    1  No Known Drug Allergies    Current Meds   1  Citalopram Hydrobromide 10 MG Oral Tablet; Take 1 tablet daily; Therapy: 29MKZ7079 to (Evaluate:63Nqs5595)  Requested for: 38ZNY3316; Last   Rx:[id number] Ordered   2  Vimpat 100 MG Oral Tablet; take 1 tablet twice a day; Therapy: 02ICI8802 to (Last Rx:80Qtx4820) Ordered    The medication list was reviewed and updated today  Family Psych History  Mother    1  Family history of AAA (abdominal aortic aneurysm), not a candidate for repair  Family History    2  No pertinent family history    The family history was reviewed and updated today  She denies any  Social History    · Never a smoker  The social history was reviewed and updated today  She is , live with her , is disabled, has two sons,and finished some college  No legal history and no  history  History Of Phys/Sex Abuse Or Perpetration    History Of Phys/Sex Abuse or Perpetration: She denies any history of physical abuse and sexual abuse  Physical Exam    Appearance: was calm and cooperative, adequate hygiene and grooming and good eye contact  Observed mood: depressed  Observed mood: affect appropriate  Speech: a normal rate   some word finding and thought blocking observed  Thought processes: coherent/organized  Hallucinations: no hallucinations present  Thought Content: no delusions  Abnormal Thoughts: The patient has no suicidal thoughts and no homicidal thoughts  Orientation: The patient is oriented to person, place and time  Recent and Remote Memory: short term memory impaired and long term memory intact  Attention Span And Concentration: concentration impaired  Insight: Insight intact  Judgment: Her judgment was intact  Muscle Strength And Tone  Muscle strength and tone were normal  Normal gait and station  Language: no difficulty naming common objects, no difficulty repeating a phrase and no difficulty writing a sentence  Fund of knowledge: Patient displays adequate knowledge of current events, adequate fund of knowledge regarding past history and adequate fund of knowledge regarding vocabulary  The patient is experiencing no localized pain  On a scale of 0 - 10 the pain severity is a 0  Treatment Recommendations: 1  Admit to [facility] 2  Medication Management 3  Group Therapy  Risks, Benefits And Possible Side Effects Of Medications: Risks, benefits, and possible side effects of medications explained to patient and patient verbalizes understanding  No records found for controlled prescriptions according to [place] Prescription Drug Monitoring Program         DSM    Provisional Diagnosis: 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   Anxiety disorder, unspecified  Plan  Admit to [facility]  Group therapy, medication management, UR, family contact, case management  ELOS 10 treatment days  Refer to OP services      Future Appointments    Date/Time Provider Specialty Site   04/17/2018 11:00 AM CINTHYA Jackson  Neurology [facility]  [place]     Subjective  ADMIT TO: Partial Hospitalization 5 x per week for 15 days  Vital signs routine  Diet: Regular   Group Psychotherapy 9 x per week    Allied Therapy Group 6 x per week     Diagnosis: F 33 2, F 41 9  Medications: As per medication list     âI certify that the continuation of Partial Hospitalization services is medically necessary to improve and/or maintain the patient's condition and functional level, and to prevent relapse or hospitalization, and that this could not be done at a less intensive level of care  â     Physician Signature: Lachelle Rodriguez MD     Nurse Signature: Iliana Mullins RN      Signatures   Electronically signed by : KATHIE Salgueor; Sep 21 2017 10:47AM EST                       (Author)    Electronically signed by : CINTHYA Lau ; Sep 21 2017 11:04AM EST                       (Author)    Electronically signed by : Iliana Mullins RN; Sep 21 2017  2:15PM EST                       (Author)

## 2018-01-11 NOTE — PSYCH
History of Present Illness  [facility] Clinical Progress Note [facility]:   Specialized Services Documentation - Therapist must complete separate progress note for each specific clinical activity in which the client participated during the day  (915) Group Psychotherapy: (9:30-10:30) Hayden Hicks participated in psychotherapy group focused on feeling overwhelmed by stressors and ways to cope  Hayden Hicks identified struggling to talk more in groups but wanting to do so as a stressor  She quietly engaged in group discussion, making good eye contact with peers, nodding in agreement with some talking points and relating to a peer's frustration about feeling overwhelmed  Mild progress toward goals noted  Continue psychotherapy group to encourage Martha to explore stressors and coping  Treatment Plan Problem(s): 1 1, 1 2  JUANITA BernabeW     (596) Group Psychotherapy: 5987-9342 Hayden Hicks participated in wellness group focused on learning how to interact, in healthier ways, with challenging people in their lives  Hayden Hicks was attentive to handout, education and peer discussion  She stated that she has difficult individuals in her life but did not share personal information  Hayden Hicks made slow progress toward goals  Continue to offer wellness group to educate, and to provide practice, in adapting new coping strategies to challenging communications and interactions with other for improved well being  Treatment Plan Problem(s): 1 1,1 2,1 4  Fredo Hendrickson RN       (467) Education Therapy Goals set - Laundry    Treatment Plan Problem(s): 1 1, 1 2  Education Therapy Time - 0900 - 0930 Previous goal was met  Readiness to Learning:  She is receptive to learning  There are  no barriers to learning  Learning Assessment Time - 1330 - 1400   Education completed on  illness and wellness tools  The teaching method was  verbal and demonstration  Shared area of learning: Yes  KEVIN Cristina MT-BC     (220) Allied Therapy 9673-0143 Oni Tapia actively shared in [facility] group focused on DBT skill of Improving The Moment  She engaged in song discussion and DBT skill book creation task  Group discussed songs that are encouraging to them and why  Group explored DBT skills for distress tolerance, breath counting, and visualization  Martha shared the song âWdebbie Are The Championsâ and said it was encouraging to her because she often feels passive and quiet and the song makes her feel different  Good progress towards goal observed and shared  Continue AT to further encourage the use of distress tolerance skills to promote recovery  KEVIN Newton  Treatment Plan Problem(s): 1 1, 1 2  LESLEY ScottBC       Case Management Note:   2571-9146 Met with Oni Tapia  She denied issues and concerns  She asked about length of stay  Discussed ways she will continue to stay well after discharge - she identified the need to come up with a schedule and set daily goals (worksheet to assist with this given)  She also shared she had filled out an application to volunteer at the [facility] in the past and thinks this could be positive for her  Reminded her that initially she was not interested in an OP therapist, but if she changes her mind to let this writer know  TREATMENT SESSION NUMBER: 5   Current suicide risk is low  Medications not changed/added/denied  KATHIE Scott      Active Problems    1  Anxiety (300 00) (F41 9)   2  Complex partial epilepsy with generalization and with intractable epilepsy (345 41)   (G40 219)   3  Hashimoto's thyroiditis (245 2) (E06 3)   4  Idiopathic insomnia (307 42) (F51 01)   5  Major depressive disorder, recurrent severe without psychotic features (296 33) (F33 2)   6  Obesity (BMI 30-39 9) (278 00) (E66 9)   7  Prediabetes (790 29) (R73 03)    Past Medical History    1  History of concussion (V15 52) (Z87 820)   2  History of stroke (V12 54) (Z86 73)    Allergies    1   No Known Drug Allergies    Current Meds   1  CeleXA 20 MG Oral Tablet; TAKE 1 TABLET EVERY MORNING; Therapy: (Recorded:73Kis4456) to Recorded   2  Mirtazapine 15 MG Oral Tablet; TAKE 1 TABLET Bedtime; Therapy: (Gissel Del Rosario) to Recorded   3  Vimpat 100 MG Oral Tablet; take 1 tablet twice a day; Therapy: 55YZB8499 to (Last Rx:28Apr2017) Ordered    Family Psych History  Mother    1  Family history of AAA (abdominal aortic aneurysm), not a candidate for repair  Family History    2  No pertinent family history    Social History    · Daily caffeine consumption   · Disabled   · Education Level: Some college   ·    · Never a smoker    Future Appointments    Date/Time Provider Specialty Site   04/17/2018 11:00 AM CINTHYA Khan  [facility]rology [facility]   09/28/2017 11:45 AM Hollis Sanches M D  Psychiatry [facility] PARTIAL HOSPITALIZATION   09/29/2017 12:00 PM Hollis Sanches M D   Psychiatry [facility]OSPITALIZATION     Signatures   Electronically signed by : SANDEE Garcia; Sep 27 2017  2:11PM EST                       (Author)    Electronically signed by : KEVIN Whiting; Sep 27 2017  2:44PM EST                       (Author)    Electronically signed by : KATHIE Leslie; Sep 27 2017  2:54PM EST                       (Author)    Electronically signed by : Oscar De León RN; Sep 27 2017  3:19PM EST                       (Author)

## 2018-01-11 NOTE — PSYCH
History of Present Illness  [facility] Clinical Progress Note [facility]:   Specialized Services Documentation - Therapist must complete separate progress note for each specific clinical activity in which the client participated during the day  (356) Group Psychotherapy: 9606-5105 Lashae Feliciano participated in wellness group focused on working to increase and improve healthy motivation in recovery from depression  Two handouts were utilized; one assessing aspects of individual motivation and the other gave six strategies that can help to spark or maintain motivation for change  Lashae Feliciano shared that she needs to motivate herself to get more organized at home  She only spoke briefly when encouraged to do so but another peer related to her goal for motivation and also discussed the challenges of getting organized after 64 Yadi Feliciano spoke  Lashae Feliciano was attentive, completed handout, although initially she was unable to identify some goal to be motivated on completing nevertheless she was able to eventually identify organization as something she needs to motivate herself to do  Lashae Feliciano made mild progress toward goal  Continue to offer group to educate, and provide support and encouragement of peer/education setting to learn and practice new strategies for increased motivation to achieve wellness goals  Treatment Plan Problem(s): 1 1,1 2  Devan Glynn RN     (785) Group Psychotherapy: 4249-3999  Martha attended psychotherapy group focused on expectations and self-care  She mildly engaged in group discussion toward the end of group, offering support to a peer  She otherwise seemed attentive to peers throughout the hour  Lashae Feliciano has made mild progress towards goals  Continue psychotherapy group  DESTINY Snyder Intern  Treatment Plan Problem(s): 1 1, 1 4  Dasha DIXON, LSW       (495) Education Therapy Goals set - Clean bathroom    Treatment Plan Problem(s): 1 1, 1 2     Education Therapy Time - 0900 - 0930 Previous goal was met    Readiness to Learning:  She is receptive to learning  There are  no barriers to learning  Learning Assessment Time - 1330 - 1400   Education completed on  illness and wellness tools  The teaching method was  verbal and demonstration  Shared area of learning: Yes  KEVIN Francisco MT-BC     (828) Allied Therapy 8695-0873 Tesha  actively shared in [facility] group focused on [place]  She engaged in maryuri analysis and emotion improvisation task  Group explored observing, describing, and participating nonjudgmentally, one-mindfully, and effectively  Group also discussed how expressing can lessen the feeling of misunderstood  She received hand-outs on topic  Tesha Correa was able to identify mindfulness in the beginning of the group, and shared that she is passive when she is in a negative mood  Good progress towards goal observed and shared  Continue AT to further encourage the use of mindfulness to promote recovery  KEVIN Francisco  Treatment Plan Problem(s): 1 1, 1 2  KATHIE Dunn       Case Management Note:   5610-5878 Met with Tesha Correa  Treatment plan updated  She stated her weekend was fine and she worked in her garden  She reports she is trying to speak up more in groups and know that she needs to work on "advocacy"  She was constricted while stating that she is only covered until Wednesday and did not seem to grasp IOP concept and does not feel she can continue in program due to "co-pays" "we have a lot of debt"  Discussed OP options and she is still resistive to therapy referral and at present will only follow up with Dr Iver Closs  She stated that in order to stay well, she needs to use the program handouts and meditation when she is anxious  TREATMENT SESSION NUMBER: 8   Current suicide risk is low  Medications not changed/added/denied  KATHIE Dunn      Active Problems    1  Anxiety (300 00) (F41 9)   2   Complex partial epilepsy with generalization and with intractable epilepsy (345 41)   (G40 219)   3  Hashimoto's thyroiditis (245 2) (E06 3)   4  Idiopathic insomnia (307 42) (F51 01)   5  Major depressive disorder, recurrent severe without psychotic features (296 33) (F33 2)   6  Obesity (BMI 30-39 9) (278 00) (E66 9)   7  Prediabetes (790 29) (R73 03)    Past Medical History    1  History of concussion (V15 52) (Z87 820)   2  History of stroke (V12 54) (Z86 73)    Allergies    1  No Known Drug Allergies    Current Meds   1  CeleXA 20 MG Oral Tablet; TAKE 1 TABLET EVERY MORNING; Therapy: (Recorded:[id number]) to Recorded   2  Mirtazapine 15 MG Oral Tablet; TAKE 1 TABLET Bedtime; Therapy: (Danica Quick) to Recorded   3  Vimpat 100 MG Oral Tablet; take 1 tablet twice a day; Therapy: 27GNZ3114 to (Last Rx:28Apr2017) Ordered    Family Psych History  Mother    1  Family history of AAA (abdominal aortic aneurysm), not a candidate for repair  Family History    2  No pertinent family history    Social History    · Daily caffeine consumption   · Disabled   · Education Level: Some college   ·    · Never a smoker    Future Appointments    Date/Time Provider Specialty Site   04/17/2018 11:00 AM CINTHYA Choi  Neurology [facility]   10/03/2017 10:45 AM CINTHYA Klein  Psychiatry [facility] PARTIAL HOSPITALIZATION   10/04/2017 10:45 AM CINTHYA Klein   Psychiatry [facility] PARTIAL HOSPITALIZATION     Signatures   Electronically signed by : Prabhakar Youngblood RN; Oct  2 2017 11:49AM EST                       (Author)    Electronically signed by : SANDEE Jenkins; Oct  2 2017  2:52PM EST                       (Author)    Electronically signed by : KEVIN Hernandez; Oct  2 2017  3:02PM EST                       (Author)    Electronically signed by : KATHIE Cardenas; Oct  2 2017  3:12PM EST                       (Author)

## 2018-01-12 NOTE — PSYCH
History of Present Illness  [facility] Clinical Progress Note [facility]:   Specialized Services Documentation - Therapist must complete separate progress note for each specific clinical activity in which the client participated during the day  (135) Group Psychotherapy: (9:30-10:30) Scout Walton participated in psychotherapy group focused on maintaining wellness after discharge from program  Scout Walton stated she feels positive today  She moderately engaged in group discussion, agreeing with some peers about connection to higher power being helpful in managing difficult times  Peers discussed utilizing different methods to continue their work toward wellness, such as support groups, spirituality, and referencing WRAP and program materials regularly  Some moderate progress toward goals  Discharge at the end of program day today  Treatment Plan Problem(s): 1 1, 1 2, 1 4  Kavita Collado MSW, LSW     (778) Group Psychotherapy: 3038-3334 Scout Walton actively shared in psychotherapy group focused on decreasing self- stigma and supports  She attentively listened to [facility] share his life story  Group encouraged power of learning about self, accepting illness and personal responsibility in recovery  Community resources reviewed in addition to personal resources like the [employer]  Scout Walton offered gains from group and participated in mantra experience  Positive progress toward goal noted  Discharge at the end of treatment day  Treatment Plan Problem(s): 1 5, 1 4  KATHIE Chau       (609) Education Therapy   Treatment Plan Problem(s): 1 0    Education Therapy Time - 0900 - 0930 Previous goal was met  Readiness to Learning:  She is receptive to learning  There are  no barriers to learning  Learning Assessment Time - 1330 - 1400   Education completed on  illness, medication, aftercare and wellness tools  The teaching method was  verbal, written and demonstration  Shared area of learning: Yes      Named 5 key facets of wellness  Discharge today at end of program day  KEVIN Nick MT-BC     (011) Allied Therapy [id number] Paula Huffman actively shared in [facility] group focused on [address]  She engaged in maryuri analysis and mantra writing/speaking task  Group was introduced to Half-Smiling and Willing Hands  Group explored willfulness and negative self-talk and how to change that and turn the mind  She was given hand-outs on topic  Paula Hfufman shared more during this group than she has in the past, and identified coming to program as a solution to needing direction  Some progress towards goal shared  Discharge today at end of program day  KEVIN Nick  Treatment Plan Problem(s): 1 1  KATHIE Fletcher       Case Management Note:   2520-7830 Met with Paula Huffman to review discharge  She reported she felt less depressed and was ready for discharge  She identified she was feeling less anxiety, sleeping better and more interested in ADLs  She denied SI, HI and psychosis  Reviewed aftercare and medications  Copies provided  UR notified  TREATMENT SESSION NUMBER: 10   Current suicide risk is low  Medications not changed/added/denied  KATHIE Fletcher   Innovations Follow-up Physician's Orders:   10/4/2017  8:27 AM Discharge Today    MD Iza Nelson RN      Active Problems    1  Anxiety (300 00) (F41 9)   2  Complex partial epilepsy with generalization and with intractable epilepsy (345 41)   (G40 219)   3  Hashimoto's thyroiditis (245 2) (E06 3)   4  Idiopathic insomnia (307 42) (F51 01)   5  Major depressive disorder, recurrent severe without psychotic features (296 33) (F33 2)   6  Obesity (BMI 30-39 9) (278 00) (E66 9)   7  Prediabetes (790 29) (R73 03)    Past Medical History    1  History of concussion (V15 52) (Z87 820)   2  History of stroke (V12 54) (Z86 73)    Allergies    1  No Known Drug Allergies    Current Meds   1   CeleXA 20 MG Oral Tablet; TAKE 1 TABLET EVERY MORNING; Therapy: (Recorded:26Dwh0518) to Recorded   2  Mirtazapine 15 MG Oral Tablet; TAKE 1 TABLET Bedtime; Therapy: (Benito Man) to Recorded   3  Vimpat 100 MG Oral Tablet; take 1 tablet twice a day; Therapy: 42DDA2717 to (Last Rx:28Apr2017) Ordered    Family Psych History  Mother    1  Family history of AAA (abdominal aortic aneurysm), not a candidate for repair  Family History    2  No pertinent family history    Social History    · Daily caffeine consumption   · Disabled   · Education Level: Some college   ·    · Never a smoker    Future Appointments    Date/Time Provider Specialty Site   04/17/2018 11:00 AM CINTHYA Nguyễn  Neurology [facility] NEUROLOGY [facility]   10/04/2017 10:45 AM CINTHYA Pastor   Psychiatry [facility]PITAL[facility]ATION     Signatures   Electronically signed by : Lattie Hatchet, M D ; Oct  4 2017  8:28AM EST                       (Author)    Electronically signed by : Berenice Bowie RN; Oct  4 2017  8:47AM EST                       (Author)    Electronically signed by : SANDEE Hu; Oct  4 2017 11:27AM EST                       (Author)    Electronically signed by : KEVIN Up; Oct  4 2017  3:06PM EST                       (Author)    Electronically signed by : KATHIE Howard; Oct  4 2017  3:22PM EST                       (Author)

## 2018-01-12 NOTE — MISCELLANEOUS
History of Present Illness  TCM Communication [facility]: The patient is being contacted for follow-up after hospitalization  Hospital records were reviewed  She was hospitalized at [facility]  The dates of hospitalization: 6/6/16-6/18/16, date of admission: 6/6/16, date of discharge: 6/18/16  Diagnosis: Acute encephalopathy, seizure disorder, acute paranoia  She was discharged to home, with home health services  Medications reviewed and updated today  She refused a follow up appointment due to follow up appts with neurology and psychiatry  Counseling was provided to the patient  Topics counseled included home health agency benefits  Communication performed and completed by Ric Carrillo      Active Problems    1  Acute confusion (293 0) (R41 0)   2  Anxiety (300 00) (F41 9)   3  Complex partial epilepsy with generalization and with intractable epilepsy (345 41)   (G40 219)    Past Medical History    1  History of stroke (V12 54) (Z86 73)    Surgical History    1  History of Craniotomy For Temporal Lobectomy With Electrocorticography    Family History  Family History    1  No pertinent family history    Social History    · Never a smoker    Current Meds   1  CarBAMazepine  MG Oral Tablet Extended Release 12 Hour; take 2 tablets twice   a day; Therapy: 96BBR9423 to (Evaluate:52Rgy5384)  Requested for: 85PUP2470; Last   LW:10PEM9471 Ordered   2  Citalopram Hydrobromide 10 MG Oral Tablet; Take 1 tablet daily; Therapy: 42FHF0436 to (Evaluate:12Xsp6850)  Requested for: 64MKJ3764; Last   Rx:81Jre5712 Ordered   3  LevETIRAcetam  MG Oral Tablet Extended Release 24 Hour; TAKE 1 TABLET   EVERY MORNINGAND 2 TABLETS AT BEDTIME; Therapy: 80QOC2988 to (Evaluate:75Nob0802)  Requested for: 70Git0229; Last   Rx:41Zdp5942 Ordered    Allergies    1   No Known Drug Allergies    Signatures   Electronically signed by : CINTHYA Redd ; Jun 21 2016 10:43AM EST                       (Author)

## 2018-01-12 NOTE — PROCEDURES
Procedures by Pamela Brizuela MD  at 2016 11:06 AM      Author:  Pamela Brizuela MD Service:  Neurology Author Type:  Physician     Filed:  2016 11:25 AM Date of Service:  2016 11:06 AM Status:  Signed     :  Pamela Brizuela MD (Physician)            Continuous Video EEG Monitoring       Patient Name:  Opal Roche  MRN: [id number]   :  1960 File #: Fredderick Mando [id number]   Age: 60 y o  Encounter #: [id number]   Date Performed: 2016      Report date: 2016          Study type: Continuous video EEG    ICD 10 diagnosis: Complex partial epilepsy intractable without status  G40 219 and Encephalopathy, unspecified G93 40    Start time: 08:01:23 on 16 End time: 16:17:18 on 16     -------------------------------------------------------------------------------------------------------------------   Patient History:  60year old female who presents with encephalopathy  History was unobtainable from patient, as she was  actively paranoid at the time of admission  This has continued to wax and wane  History of right temporal lobectomy for management of intractable seizures in   Etiology uncertain with broad  differential, including, but not limited to subclinical seizures,  central nervous system infection, and undiagnosed psychiatric condition  Patient has not had seizures in 4 years   These manifested themselves as grand mal seizures  After her resection, she experienced partial seizures appearing as automatisms  Tech found patient to be pleasant during test, but increasingly tense as test progressed, with patient having to constantly reassess the situation  She was talking and asking questions throughout  Tech did not perform hyperventilation, as patient was too tense and was taking deep breaths throughout test to calm herself  Tech allowed patient to decide whether she would do strobe or not  She got through it      On 2016, patient was hooked up to long term monitoring due to an increase in confusion and bizarre behavior  Staff reports the patient being anxious and disoragnized  EEG ordered to rule out seizures  Medications include:   carBAMazepine 100 mg Oral Q12H [facility]   citalopram 10 mg Oral Daily   clonazePAM 0 5 mg Oral QAM   clonazePAM 2 mg Oral HS   lacosamide 100 mg Oral Q12H [facility]   melatonin 6 mg Oral Q24H       -------------------------------------------------------------------------------------------------------------------   Description of Procedure:  ·  32 channel digital recording with electrodes placed according to the International 10-20 system with additional  T1/T2 electrodes, EOG, EKG, and simultaneous  video  A monitoring technologist supervised the continuous recording  The recording was technically satisfactory  -------------------------------------------------------------------------------------------------------------------   Results:   Background Activity:   During wakefulness, background activity consists of brief runs of slightly irregular,  low to mid amplitude, posteriorly dominant, 8-10 Hz  activity with some intermixed 5-6 Hz activity that attenuated with eye opening  With drowsiness and light sleep, alpha activity attenuated and was replaced by diffusely distributed theta activities  Activation Procedures:   Neither hyperventilation nor photic stimulation was performed  Abnormal Findings:  See Background Activity  Continuous, low to mid amplitude, irregular, 2-3 Hz delta slowing is noted at F8-T4-T6  No interictal epileptiform discharges were noted  No electrographic seizures were observed during this recording  Other findings: The single lead ECG demonstrated a regular sinus rhythm  Events:   No push buttons were activated      -------------------------------------------------------------------------------------------------------------------   Interpretation:   Moderately abnormal 8  hour continuous video-EEG recording, due to background irregularity and intermixed  theta slowing, and continuous right temporal irregular delta slowing  No interictal epileptiform discharges were noted  No electrographic seizures occurred during the recording  Scribe Attestation:  Documented by Anshul Fu, acting as a scribe for Dr Ronda Johnson on 6/14/16 at 11:25 A M  Physician Attestation:  All documentation recorded by the scribe accurately reflects the service I personally performed and the decisions made by me  I was present during the time the encounter was recorded and have reviewed all the documentation and confirm that it is all accurate  and representative of the encounter  Shon Lopez MD   Medical Director  [facility]  Pager # [place] Derrick REILLY    Jun 14 2016 11:25AM [facility] Standard Time

## 2018-01-12 NOTE — PSYCH
History of Present Illness  [facility] Clinical Progress Note [facility]:   Specialized Services Documentation - Therapist must complete separate progress note for each specific clinical activity in which the client participated during the day  (915) Group Psychotherapy: (9:30-10:30) Mitzi Denis participated in psychotherapy group focused anger and its underlying emotions  Mitzi Denis identified paying bills as a stressor today  She was otherwise quiet throughout group discussion, but did appear attentive to peers and topic of conversation  Minimal outward progress noted toward goals  Continue psychotherapy group to encourage Martha to explore stressors and coping  Treatment Plan Problem(s): 1 1, 1 2  Gabrielle Dale MSW, LSW     (202) Group Psychotherapy: 2673-9371 Mitzi Denis participated in wellness group focused on learning the role sleep disturbances have in coping with concurrent mental illness as well as behavioral and cognitive strategies, that can be learned and practiced, to increase healthy sleep  Mitzi Denis was attentive to education and completed handout and quiz on sleep during group exercise  Mitzi Denis briefly shared in discussion of sleeping challenges and information discussed during group with peers  Mitzi Denis made moderate progress toward goals  Treatment Plan Problem(s): 1 1,1 2  Penny Agrawal, PHILLY       (019) Education Therapy Goals set - Organize kitchen    Treatment Plan Problem(s): 1 1, 1 2  Education Therapy Time - 0900 - 0930 Previous goal was met  Readiness to Learning:  She is receptive to learning  There are  no barriers to learning  Learning Assessment Time - 1330 - 1400   Education completed on  illness and wellness tools  The teaching method was  verbal  Shared area of learning: Yes  Annetta Cowden, MTI Thedore Reeks, MT-BC     (726) Allied Therapy 6062-3681 Mitzi Denis actively shared in [facility] group focused on [address]   She engaged in painting emotions from music task and maryuri analysis  Group explored the meaning of the biosocial theory  Group also explored balancing dialectics for Interpersonal Effectiveness  She was given hand-outs on topic  Martha roberts [facility] as something she connects to disappointment  Some small progress towards goal observed and shared  Continue AT to further encourage the understanding of wellness tool to promote recovery  KEVIN Helms  Treatment Plan Problem(s): 1 1, 1 2  Shivam [facility]       Case Management Note:   1930-1090 Met with Rika Arechiga  She identified having a "nice" weekend in which herself and her  did many outdoor activities  She denied issues with her current medications  Her  was concerned with billing issues, so she cancelled her upcoming Dr Laura Oreilly appointment until 10/11/17  Discussed the WRAP concept and reviewed how to work on it and its benefits  TREATMENT SESSION NUMBER: 3   Current suicide risk is low  Medications not changed/added/denied  Shivam Benavidez [facility]      Active Problems    1  Anxiety (300 00) (F41 9)   2  Complex partial epilepsy with generalization and with intractable epilepsy (345 41)   (G40 219)   3  Hashimoto's thyroiditis (245 2) (E06 3)   4  Idiopathic insomnia (307 42) (F51 01)   5  Major depressive disorder, recurrent severe without psychotic features (296 33) (F33 2)   6  Obesity (BMI 30-39 9) (278 00) (E66 9)   7  Prediabetes (790 29) (R73 03)    Past Medical History    1  History of concussion (V15 52) (Z87 820)   2  History of stroke (V12 54) (Z86 73)    Allergies    1  No Known Drug Allergies    Current Meds   1  CeleXA 20 MG Oral Tablet; TAKE 1 TABLET EVERY MORNING; Therapy: (Recorded:09Zil4553) to Recorded   2  Mirtazapine 15 MG Oral Tablet; TAKE 1 TABLET Bedtime; Therapy: (Re Pena) to Recorded   3  Vimpat 100 MG Oral Tablet; take 1 tablet twice a day; Therapy: 54JMJ9062 to (Last Rx:28Apr2017) Ordered    Family Psych History  Mother    1   Family history of AAA (abdominal aortic aneurysm), not a candidate for repair  Family History    2  No pertinent family history    Social History    · Daily caffeine consumption   · Disabled   · Education Level: Some college   ·    · Never a smoker    Future Appointments    Date/Time Provider Specialty Site   04/17/2018 11:00 AM CINTHYA Zuniga  Neurology [facility] NEUROLOGY [facility]  Ruben [facility]rish[facility]   09/26/2017 12:00 PM Anais Sanches M D  Psychiatry [facility] PARTIAL HOSPITALIZATION   09/27/2017 12:00 PM Anais Sanches M D  Psychiatry [facility] PARTIAL HOSPITALIZATION   09/28/2017 11:45 AM CINTHYA Moore  Psychiatry [facility] PARTIAL HOSPITALIZATION   09/29/2017 12:00 PM Anais Sanches M D   Psychiatry [facility] PARTIAL HOSPITALIZATION     Signatures   Electronically signed by : SANDEE Tellez; Sep 25 2017 10:59AM EST                       (Author)    Electronically signed by : Ishmael Landau, RN; Sep 25 2017  2:44PM EST                       (Author)    Electronically signed by : KEVIN Lang; Sep 25 2017  2:57PM EST                       (Author)    Electronically signed by : KATHIE Beverly; Sep 25 2017  3:22PM EST                       (Author)

## 2018-01-13 VITALS
HEART RATE: 71 BPM | BODY MASS INDEX: 33.99 KG/M2 | WEIGHT: 173.13 LBS | SYSTOLIC BLOOD PRESSURE: 121 MMHG | HEIGHT: 60 IN | RESPIRATION RATE: 18 BRPM | TEMPERATURE: 96.5 F | DIASTOLIC BLOOD PRESSURE: 81 MMHG

## 2018-01-13 VITALS
TEMPERATURE: 97.6 F | BODY MASS INDEX: 34.95 KG/M2 | HEIGHT: 60 IN | SYSTOLIC BLOOD PRESSURE: 110 MMHG | DIASTOLIC BLOOD PRESSURE: 60 MMHG | WEIGHT: 178 LBS

## 2018-01-13 NOTE — PSYCH
Assessment    1  Anxiety (300 00) (F41 9)   2  Major depressive disorder, recurrent severe without psychotic features (296 33) (F33 2)    Innovations Treatment Plan    In[facility]ations Treatment Plan   AREAS OF NEEDS: Depression as evidenced by anxiety, recent difficulty leaving the house with isolation, ruminative worries, sleep disturbance, agitation, confusion and hopelessness related to move and ongoing health concerns  Date Initiated: 17     LONG TERM GOAL: 1 0 I will identify 3 signs that my symptoms have improved and I am feeling more comfortable in my new environment  Date Initiated: 17   Target Date: 10/19/17   Completion Date: 10/4/17     Date Initiated: 17    1 1 I will identify 3 way I can reduce and reassure myself when I am feeling anxious  Target Date: 10/2/17   Completion Date: 10/4/17   Date Initiated: 17    1 2 I will problem solve ways I can feel more organized and comfortable in my home through writing out goals for each day and following through with at least 1 task daily   Target Date: 10/2/17   Completion Date: 10/4/17   Date Initiated: 17    1 3 I will take medications as prescribed and share questions and concerns if arise  Target Date: 10/2/17   Completion Date: 10/4/17   Date Initiated: 17    1 4 I will identify 3 ways my supports can assist in my recovery and agree to staff/support contact as indicated  Target Date: 10/2/17   Completion Date: 10/4/17          7 DAY REVISION: 1 5 I will complete my relapse prevention plan and identify 2 ways I will actively work on my wellness after discharge   Date Initiated: 10/11/17    Target Date: 10/11/17   Completion Date: 10/4/17                 PSYCHIATRY: Medication management and education  Continue medication management and education     Date Initiated: 2017   Revision Date: 10/2/2017   The person(s) responsible for carrying out the plan is Wiley Bobby MD    NURSIN 1,1 2,1 3,1 4 This RN will provide daily wellness group, Monday through Friday, to educate Martha on her diagnoses and medications used in treatment  1 1,1 2,1 3,1 4 This RN will continue to provide daily wellness group to educate Danilo Nathan on S/S of her diagnoses and medications  Date Initiated: 9/21/17     Revision Date: 10/2/17     The person(s) responsible for carrying out the plan is Ileana Lopez RN    PSYCHOLOGY: 1 1, 1 2, 1 4 Provide psychotherapy group 5 times per week to allow opportunity for Martha to explore stressors and ways of coping  1 1, 1 2, 1 4, 1 5  Continue psychotherapy group each program day to encourage Martha to further explore life stressors and healthier ways of coping  Date Initiated: 9/21/2017   Revision Date: 10/02/2017   The person(s) responsible for carrying out the plan is DESTINY Juan  ALLIED THERAPY: 1 1,1 2 Engage Martha in AT group 5 times daily to encourage development and use of wellness tools to decrease symptoms and promote recovery through meaningful activity  1 1,1 2,1 5 Continue to encourage Martha to participate in AT group daily to practice wellness tools within program and transfer to home sharing successes and barriers through healthy task involvement  Date Initiated: 9/21/17   Revision Date: 10/11/17   The person(s) responsible for carrying out the plan is KATHIE Wray  CASE MANAGEMENT: 1 0 Meet with Martha 3-4 times weekly to assess treatment progress, discharge planning, support contact, UR and WRAP development  Date Initiated: 9/21/17   Revision Date: 10/2/17   The person(s) responsible for carrying out the plan is KATHIE Wray           DISCHARGE CRITERIA: Identify 3 signs of progress and complete relapse prevention plan     DISCHARGE PLAN: Dr Ruth Awad, Consider OP therapy or support groups   Estimated Length of Stay: 10 treatment days   Strengths: family support, desire to feel gratitute and sees treatment as an opportunity to better herself   Limitations: medical condition   Diagnosis and Treatment Plan explained to patient, patient relates understanding diagnosis and is agreeable to Treatment Plan           CLIENT COMMENTS / Please share your thoughts, feelings, need and/or experiences regarding your treatment plan: _____________________________________________________________________________________________________________________________________________________________________________________________________________________________________________________________________________________________________________________ Date/Time: ______________     Patient Signature: _________________________________ Date/Time: ______________      Signatures   Electronically signed by : CINTHYA Walters ; Sep 21 2017  9:51AM EST                       (Author)    Electronically signed by : Efrain Wray RN; Sep 21 2017 10:16AM EST                       (Author)    Electronically signed by : SANDEE Schroeder; Sep 21 2017 10:46AM EST                       (Author)    Electronically signed by : KATHIE Collins; Sep 21 2017 10:54AM EST                       (Author)    Electronically signed by : CINTHYA Walters ; Oct  2 2017  8:31AM EST                       (Author)    Electronically signed by : Efrain Wray RN; Oct  2 2017  8:33AM EST                       (Author)    Electronically signed by : KATHIE Collins; Oct  2 2017  9:38AM EST                       (Author)    Electronically signed by : SANDEE Schroeder; Oct  2 2017  3:30PM EST                       (Author)    Electronically signed by : KATHIE Collins; Oct  4 2017  3:23PM EST                       (Author)

## 2018-01-14 NOTE — RESULT NOTES
Verified [facility]  [facility]28[facility] Sarath Mckenna Order Number: [id number]     Test Name Result Flag Reference   US ABDOMINAL AORTA SCREENING AAA (Report)     ABDOMINAL AORTIC ULTRASOUND     INDICATION: Evaluate for aortic aneurysm, family history of AAA  COMPARISON: None  FINDINGS:      Ultrasound of the abdominal aorta was performed in longitudinal and transverse planes with a curvilinear transducer  Proximal aorta: 2 0 cm   Mid aorta:  1 8 cm   Distal aorta:  1 7 cm   Right common iliac origin: 1 1 cm   Left common iliac origin: 1 2 cm     No periaortic collections or adenopathy detected  IMPRESSION:     No evidence for abdominal aortic aneurysm         Workstation performed: [id number]     Signed by:   Calin Masterson MD   6/19/17

## 2018-01-15 VITALS
OXYGEN SATURATION: 98 % | DIASTOLIC BLOOD PRESSURE: 84 MMHG | HEART RATE: 67 BPM | HEIGHT: 60 IN | BODY MASS INDEX: 34.63 KG/M2 | WEIGHT: 176.38 LBS | SYSTOLIC BLOOD PRESSURE: 122 MMHG

## 2018-01-15 NOTE — RESULT NOTES
Verified Results  *US BREAST LEFT LIMITED (DIAGNOSTIC) [id number] 01:24PM Cliffordzayra El     Test Name Result Flag Reference   US BREAST LEFT LIMITED (Report)     Patient History:   Patient is postmenopausal    No Hormone Replacement Therapy   Patient has never smoked  Patient's BMI is 34 2  Reason for exam: addl evaluation requested from abnormal    screening  Callback for nodular density in the inner left breast (slightly    upper)  Mammo Diagnostic Left W DBT and CAD: July 25, 2017 - Check In #:    [de-identified]   3D Procedure   3D views: CC and MLO view(s) were taken of the left breast    2D Synthetic views: CC and MLO view(s) were taken of the left    breast        Technologist: KIRNA Estrada (KIRAN)(M)   Prior study comparison: July 20, 2017, mammo screening bilateral    W CAD, performed at [address]      There are scattered fibroglandular densities  These images were    obtained using digital technique and with the assistance of    Computer Aided Detection  There are no dominant masses, foci of    architectural distortion or suspicious clusters of calcification    to suggest malignancy  The visualized skin appears normal     Previously described asymmetric density is most clearly seen on    the craniocaudad tomographic images 10:00  Targeted ultrasound demonstrates no evidence of hypoechoic mass    or architectural distortion to suggest malignancy  Potential    benign appearing tissue for the mammographic finding is noted at    the 10 o'clock position, 6 cm from the nipple  US Breast Left Limited: July 25, 2017 - Check In #: [de-identified]   Standard views  Technologist: Chuy Hill RDMS   A uniformly echogenic layer of fibroglandular tissue        ACR BI-RADSï¾® Assessments: BiRad:2 - Benign (Overall)   Left breast Lt Diag Mammo: BiRad:2 - benign finding in the left    breast    Left breast Left Brst US: BiRad:2 - benign finding in the left    breast      Recommendation: Routine screening mammogram of both breasts in 1 year  The patient is scheduled in a reminder system  Transcription Location: [facility]ensen 98: [id number]     Risk Value(s):   Tyrer-Cuzick 10 Year: 2 600%, Tyrer-Cuzick Lifetime: 7 100%,    Myriad Table: 1 5%, DAV 5 Year: 1 1%, NCI Lifetime: 6 3%   Signed by:   Holly Azul MD   7/25/17     MAMMO DIAGNOSTIC LEFT W 3D & CAD [id number] 01:23PM Kenya Multaniraina     Test Name Result Flag Reference   [facility] DIAGNOSTIC LEFT W 3D & CAD (Report)     Patient History:   Patient is postmenopausal    No Hormone Replacement Therapy   Patient has never smoked  Patient's BMI is 34 2  Reason for exam: addl evaluation requested from abnormal    screening  Callback for nodular density in the inner left breast (slightly    upper)  Mammo Diagnostic Left W DBT and CAD: July 25, 2017 - Check In #:    [de-identified]   3D Procedure   3D views: CC and MLO view(s) were taken of the left breast    2D Synthetic views: CC and MLO view(s) were taken of the left    breast        Technologist: KIRAN Moreno (KIRAN)(M)   Prior study comparison: July 20, 2017, mammo screening bilateral    W CAD, performed at [address]      There are scattered fibroglandular densities  These images were    obtained using digital technique and with the assistance of    Computer Aided Detection  There are no dominant masses, foci of    architectural distortion or suspicious clusters of calcification    to suggest malignancy  The visualized skin appears normal     Previously described asymmetric density is most clearly seen on    the craniocaudad tomographic images 10:00  Targeted ultrasound demonstrates no evidence of hypoechoic mass    or architectural distortion to suggest malignancy  Potential    benign appearing tissue for the mammographic finding is noted at    the 10 o'clock position, 6 cm from the nipple       US Breast Left Limited: July 25, 2017 - Check In #: [de-identified] Standard views  Technologist: Lucho Peck RDMS   A uniformly echogenic layer of fibroglandular tissue  ACR BI-RADSï¾® Assessments: BiRad:2 - Benign (Overall)   Left breast Lt Diag Mammo: BiRad:2 - benign finding in the left    breast    Left breast Left Brst US: BiRad:2 - benign finding in the left    breast      Recommendation:   Routine screening mammogram of both breasts in 1 year  The patient is scheduled in a reminder system  Transcription Location: [facility] 98: [id number]     Risk Value(s):   Tyrer-Cuzick 10 Year: 2 600%, Tyrer-Cuzick Lifetime: 7 100%,    Myriad Table: 1 5%, DAV 5 Year: 1 1%, NCI Lifetime: 6 3%   Signed by:   Yaritza Levi MD   7/25/17     * MAMMO SCREENING BILATERAL W CAD [id number] 12:36PM Madeline Alpers Order Number: [id number]    - Patient Instructions: To schedule this appointment, please contact Central Scheduling at [phone]  Do not wear any perfume, powder, lotion or deodorant on breast or underarm area  Please bring your doctors order, referral (if needed) and insurance information with you on the day of the test  Failure to bring this information may result in this test being rescheduled  Arrive 15 minutes prior to your appointment time to register  On the day of your test, please bring any prior mammogram or breast studies with you that were not performed at a [facility]  Failure to bring prior exams may result in your test needing to be rescheduled  Test Name Result Flag Reference   MAMMO SCREENING BILATERAL W CAD (Report)     Patient History:   Patient is postmenopausal    No Hormone Replacement Therapy   Patient has never smoked  Patient's BMI is 34 2  Reason for exam: screening, asymptomatic  Screening     Mammo Screening Bilateral W CAD: July 20, 2017 - Check In #:    [de-identified]   Bilateral MLO, CC, and XCCL view(s) were taken       Technologist: RT Chelsi(KIRAN)(M)   No prior studies available for comparison  There are scattered fibroglandular densities  There is a 1 1 cm    nodular asymmetry in the medial left breast, 10 cm from the    nipple, seen on the craniocaudal and axillary rotated    craniocaudal views only  This may represent nothing more than    summation artifact but, as this is the patient's baseline study,    and the stability of this finding cannot be determined, the    patient should return for further evaluation with diagnostic    mammography and possible ultrasound  No dominant soft tissue mass, architectural distortion or    suspicious calcifications are noted in either breast  The skin    and nipple contours are within normal limits  1  Medial left breast 1 1 cm nodular asymmetry  Further    evaluation with diagnostic mammography and possible ultrasound    recommended  2  Unremarkable right mammogram      ACR BI-RADSï¾® Assessments: BiRad:0 - Incomplete: needs additional    imaging evaluation - Left     Recommendation:   Further imaging of the left breast    A breast health care nurse from our facility will be contacting    the patient regarding the need for additional imaging  Analyzed by CAD     The patient is scheduled in a reminder system  8-10% of cancers will be missed on mammography  Management of a    palpable abnormality must be based on clinical grounds  Patients   will be notified of their results via letter from our facility  Accredited by [employer] of Radiology and FDA  Transcription Location: [facility] 98: [id number]     Risk Value(s):   Tyrer-Cuzick 10 Year: 2 600%, Tyrer-Cuzick Lifetime: 7 100%,    Myriad Table: 1 5%, DAV 5 Year: 1 1%, NCI Lifetime: 6 3%   Signed by:   Flori Hollingsworth MD   7/21/17       Plan  Encounter for screening mammogram for breast cancer    · * MAMMO SCREENING BILATERAL W CAD ; every 1 year;  Last [id number]; Next  [id number]; Status:Active

## 2018-01-15 NOTE — PSYCH
History of Present Illness  [facility] Clinical Progress Note [facility]:   Specialized Services Documentation - Therapist must complete separate progress note for each specific clinical activity in which the client participated during the day  (414) Group Psychotherapy: [id number]  Arelis Aguilera participated in psychotherapy group that was focused on grief and ways of coping with grief  She identified wanting to be able to speak up more and being shy as a stressor for today  She did not participate in group discussion, but remained attentive to peers  No progress noted towards goals  Continue group to encourage her to explore stressors and coping  DESTINY Bell Intern  Treatment Plan Problem(s): 1 1, 1 4  Zuleyma Ortiz MSW, LSW     (187) Group Psychotherapy: [phone] Arelis Aguilera participated in wellness group focused on learning to identify differences between healthy assertive communication and unhealthy passive and aggressive communication styles and how to practice healthier communication in daily life  Arelis Aguilera was attentive and shared that she is beginning to identify what some of her needs are for recovery  She stated that since beginning Program she has identified that she needs more support from her   Arelis Aguilera discussed being assertive and also asking for this additional support while also knowing what type of support she is hoping to receive from him, as well as other supports  Arelis Aguilera made good progress toward goals  Continue to offer group to assist in personal communication skill building and practice to achieve better mental health and coping skills  Treatment Plan Problem(s): 1 1,1 2,1 4  Pattie Delgado RN       (383) Education Therapy Goals set - Pay bills    Treatment Plan Problem(s): 1 2  Education Therapy Time - 0900 - 0930 Previous goal was met  Readiness to Learning:  She is receptive to learning  There are  no barriers to learning    Learning Assessment Time - 1330 - 1400   Education completed on  illness and wellness tools  The teaching method was  verbal  Shared area of learning: Yes  Bismark Murray, MTI  KATHIE Fletcher     (209) Allied Therapy 9:30 to 10:30 Martha participated in a group playing [place]  The question being asked was What would you like to see different in your life? She was very quiet and shared just a small amount  She did however pay attention saying at the end that she liked not feeling alone when she heard the topics discussed  She liked that everyone did share during the group  She continue to benefit from group participation  Dot Jo MARTIN  Treatment Plan Problem(s): 1 1, 1 2  Case Management Note:   [id number] Met with Martha  She identified that she is learning how to express herself better through observing others in groups  With prompts encouraged her to talk about ways she is adjusting to her new home  She presents with word finding difficulties and it does appear difficult for her to share how she feels  She shared she enjoys drawing and art and was encouraged to explore this as a way to express herself  Remains receptive to suggestions and voice back group concepts  TREATMENT SESSION NUMBER: 4   Current suicide risk is low  Medications not changed/added/denied  KATHIE Fletcher      Active Problems    1  Anxiety (300 00) (F41 9)   2  Complex partial epilepsy with generalization and with intractable epilepsy (345 41)   (G40 219)   3  Hashimoto's thyroiditis (245 2) (E06 3)   4  Idiopathic insomnia (307 42) (F51 01)   5  Major depressive disorder, recurrent severe without psychotic features (296 33) (F33 2)   6  Obesity (BMI 30-39 9) (278 00) (E66 9)   7  Prediabetes (790 29) (R73 03)    Past Medical History    1  History of concussion (V15 52) (Z87 820)   2  History of stroke (V12 54) (Z86 73)    Allergies    1  No Known Drug Allergies    Current Meds   1  CeleXA 20 MG Oral Tablet; TAKE 1 TABLET EVERY MORNING;    Therapy: (Recorded:[id number]) to Recorded   2  Mirtazapine 15 MG Oral Tablet; TAKE 1 TABLET Bedtime; Therapy: (Ludmila Paris) to Recorded   3  Vimpat 100 MG Oral Tablet; take 1 tablet twice a day; Therapy: 37FHT1022 to (Last Rx:28Apr2017) Ordered    Family Psych History  Mother    1  Family history of AAA (abdominal aortic aneurysm), not a candidate for repair  Family History    2  No pertinent family history    Social History    · Daily caffeine consumption   · Disabled   · Education Level: Some college   ·    · Never a smoker    Future Appointments    Date/Time Provider Specialty Site   04/17/2018 11:00 AM CINTHYA Ly  Neurology [facility]  [facility]   09/27/2017 12:00 PM Rebel Sanches M D  Psychiatry [facility] PARTIAL HOSPITALIZATION   09/28/2017 11:45 AM CINTHYA Simons  Psychiatry [facility] PARTIAL HOSPITALIZATION   09/29/2017 12:00 PM Rebel Sanches M D  Psychiatry [facility] HOSPITAL[facility]     Signatures   Electronically signed by :  Loco Ben[facility]z [facility]; Sep 26 2017 11:27AM EST                       (Author)    Electronically signed by : KEVIN Cardozo; Sep 26 2017  2:12PM EST                       (Author)    Electronically signed by : KATHIE Davenport; Sep 26 2017  2:40PM EST                       (Author)    Electronically signed by : SANDEE Mansfield; Sep 26 2017  2:45PM EST                       (Author)    Electronically signed by : Ysabel Roberson RN; Sep 26 2017  4:40PM EST                       (Author)

## 2018-01-15 NOTE — PSYCH
Risk Assessment    The following ratings are based on my review of records and observation of this patient over the last intake todasy  Risk of Harm to Self:   Demographic risk factors include , alaskan, or native [place]  Recent Specific Risk Factors include: sense of hopelessness/helplessness, chronic pain or health problems and diagnosis of depression  These risk factors presented within the last month  Risk of Harm to Others:   Access to Weapons: The patient has access to the following weapons: weapons were locked and  notified   the following steps have been taken to ensure weapons are properly secured: see above  Based on the above information, the client presents the following risk of harm to self or others: low  The following interventions are recommended: no intervention changes  Notes regarding this Risk Assessment: on call number given and reviewed - [facility]  Active Problems     1  Complex partial epilepsy with generalization and with intractable epilepsy (345 41)   (G40 219)   2  Hashimoto's thyroiditis (245 2) (E06 3)   3  Idiopathic insomnia (307 42) (F51 01)   4  Obesity (BMI 30-39 9) (278 00) (E66 9)   5  Prediabetes (790 29) (R73 03)    Anxiety (300 00) (F41 9)          Past Medical History    1  History of stroke (V12 54) (Z86 73)    Future Appointments    Date/Time Provider Specialty Site   04/17/2018 11:00 AM CINTHYA Quezada   Neurology [address]     Signatures   Electronically signed by : KATHIE Walker; Sep 21 2017 10:49AM EST                       (Author)

## 2018-01-16 NOTE — PROCEDURES
Procedures by Ginger Stoll MD at 2016   9:34 AM      Author:  Ginger Stoll MD Service:  Neurology Author Type:  Physician     Filed:  2016  9:39 AM Date of Service:  2016  9:34 AM Status:  Signed     :  Ginger Stoll MD (Physician)            Continuous Video EEG Monitoring       Patient Name:  Yobani Banks  MRN: [id number]   :  1960 File #: Edmund Reveles -   Age: 60 y o  Encounter #: [id number]   Date performed: 2016-2016            Report date: 2016          Study type: Continuous video EEG    ICD 10 diagnosis: Complex partial epilepsy not intractable without status G40 209 and Spells/Fit NOS R56 9    Start time: 2016  15:04 End time: 2016 07:59     -------------------------------------------------------------------------------------------------------------------   Patient History: This recording was observed in a 60 y o  female with history of localization related epilepsy, s/p right anterior  temporal lobectomy to determine whether spells of confusion are seizures  Medications include:   carBAMazepine 200 mg Oral BID   citalopram 10 mg Oral Daily   clonazePAM 0 5 mg Oral BID   clonazePAM 1 5 mg Oral HS   lacosamide 100 mg Oral Q12H [facility]   melatonin 6 mg Oral Q24H       -------------------------------------------------------------------------------------------------------------------   Description of Procedure:  ·  32 channel digital recording with electrodes placed according to the International 10-20 system with additional  T1/T2 electrodes, EOG, EKG, and simultaneous  video  A monitoring technologist supervised the continuous recording  The recording was technically satisfactory      -------------------------------------------------------------------------------------------------------------------   Results:   Manual Review:   During wakefulness, there were brief runs of poorly regulated,  low amplitude, posteriorly dominant, symmetric 8-10 cps alpha rhythm that attenuate d with eye opening  There were symmetric low to medium amplitude, diffuse,  enhanced beta activities  There was persistent right temporal low amplitude polymorphic delta activities  There were intermittent left posterior temporal, low to  medium amplitude, mixed theta and delta activities  With drowsiness, alpha activity attenuated and was replaced by diffusely distributed theta and beta activities  With sleep, symmetric vertex sharp waves,  K complexes and sleep spindles were present  Other findings: The single lead ECG demonstrated a regular rhythm  Events:   No push buttons were activated  -------------------------------------------------------------------------------------------------------------------   Interpretation: This prolonged, continuous video-EEG recording is abnormal  Persistent right temporal delta activities suggests an underlying structural lesion  Intermittent left temporal theta and delta activities  suggests independent left temporal neuronal dysfunction  Enhanced beta activities are commonly seen due to medication effect of benzodiazepines and/or barbiturates  Idalia Mcbride MD   [address] [facility]               Received for:Floyd REILLY    Jun 12 2016  9:39AM [facility] Standard Time

## 2018-01-16 NOTE — PSYCH
History of Present Illness  [facility] Clinical Progress Note [facility]:   Specialized Services Documentation - Therapist must complete separate progress note for each specific clinical activity in which the client participated during the day  (546) Group Psychotherapy: [id number] Ketty Pedraza participated in wellness group focused on the connection between procrastination and depression and identifying what things specifically have been avoided, and why, since increase in mental health symptoms  Ketty Pedraza was attentive to handouts and peer discussion but did not share her own challenge with procrastination  Ketty Pedraza made slow progress toward goals  Continue to offer group to educate on how depression and increase procrastination and how increased procrastination can exacerbate depression and other mental health symptoms and what strategies are helpful in increasing motivation and productivity  Treatment Plan Problem(s): 1 1,1 2  Natalie Elizondo RN     (437) Group Psychotherapy: (12:30-13:30) Ketty Pedraza participated in psychotherapy group focused on mental health stigma  Ketty Pedraza noted that she has no identifiable stressor today  She actively engaged in group discussion, talking about how dismissive others can be of her feelings and condition  She was supportive of peers during group discussion of the frustrations of people's general lack of understanding and insensitivity  Moderate progress noted toward goals  Continue psychotherapy group to encourage Martha to explore stressors and coping  Treatment Plan Problem(s): 1 1, 1 2, 1 4  Anoop Arroyo MSW, LSW       (712) Education Therapy Goals set - make applesauce in her slow cooker    Treatment Plan Problem(s): 1 2  Education Therapy Time - 0900 - 0930 Previous goal was met  Readiness to Learning:  She is receptive to learning  There are  no barriers to learning  Learning Assessment Time - 1330 - 1400   Education completed on  illness, aftercare and wellness tools     The teaching method was  verbal and demonstration  Shared area of learning: Yes  KATHIE Scott     (149) Allied Therapy [id number] Martha wolfe shared in [facility] group focused on relaxation techniques and DBT skill Radical Acceptance  She engaged in therapist-led relaxation exercises and maryuri analysis  Group explored diaphragmatic breathing and progressive muscle relaxation  Group also explored ways to radically accept reality and let go  She was given hand-outs on topic  Oni Tapia remained mostly quiet during group, but did appear to be engaged in relaxation exercises  Some small progress towards goal observed  Continue AT to further encourage the development of distress tolerance to promote recovery  KEVIN Newton  Treatment Plan Problem(s): 1 1  KATHIE Scott       Case Management Note:   3765-9155 Met with Oni Tapia  Reviewed upcoming plans for discharge and given relapse prevention plan to complete  She reports that she is feeling better and liked coming to program  She feels she needs to "stay active" in order to stay well  TREATMENT SESSION NUMBER: 9   Current suicide risk is low  Medications not changed/added/denied  KATHIE Scott      Active Problems    1  Anxiety (300 00) (F41 9)   2  Complex partial epilepsy with generalization and with intractable epilepsy (345 41)   (G40 219)   3  Hashimoto's thyroiditis (245 2) (E06 3)   4  Idiopathic insomnia (307 42) (F51 01)   5  Major depressive disorder, recurrent severe without psychotic features (296 33) (F33 2)   6  Obesity (BMI 30-39 9) (278 00) (E66 9)   7  Prediabetes (790 29) (R73 03)    Past Medical History    1  History of concussion (V15 52) (Z87 820)   2  History of stroke (V12 54) (Z86 73)    Allergies    1  No Known Drug Allergies    Current Meds   1  CeleXA 20 MG Oral Tablet; TAKE 1 TABLET EVERY MORNING; Therapy: (Recorded:16Ety3698) to Recorded   2  Mirtazapine 15 MG Oral Tablet; TAKE 1 TABLET Bedtime;    Therapy: (April Altamirano) to Recorded   3  Vimpat 100 MG Oral Tablet; take 1 tablet twice a day; Therapy: [id number] to (Last Rx:28Apr2017) Ordered    Family Psych History  Mother    1  Family history of AAA (abdominal aortic aneurysm), not a candidate for repair  Family History    2  No pertinent family history    Social History    · Daily caffeine consumption   · Disabled   · Education Level: Some college   ·    · Never a smoker    Future Appointments    Date/Time Provider Specialty Site   04/17/2018 11:00 AM CINTHYA Nguyễn  Neurology [facility]  Sonia Ortiz   10/04/2017 10:45 AM CINTHYA Pastor   Psychiatry [facility] PARTIAL HOSPITALIZATION     Signatures   Electronically signed by : KEVIN Up; Oct  3 2017  2:36PM EST                       (Author)    Electronically signed by : SANDEE Hu; Oct  3 2017  2:51PM EST                       (Author)    Electronically signed by : KATHIE Howard; Oct  3 2017  2:52PM EST                       (Author)    Electronically signed by : Berenice Bowie RN; Oct  3 2017  3:37PM EST                       (Author)

## 2018-01-17 NOTE — PSYCH
History of Present Illness  [facility] Clinical Progress Note [facility]:   Specialized Services Documentation - Therapist must complete separate progress note for each specific clinical activity in which the client participated during the day  (082) Group Psychotherapy: [id number] Alexus Ma participated in weekly wellness group to discuss what particular area of wellness that has been worked on up to today in Program and choice of area to continue working on for recovery as targeted in treatment plan, by choice or in WRAP  Alexus Ma was attentive and shared that her son who is a Marine motivates her to move forward  Peers offered good feedback and encouragement to Alexus Ma who made mild progress toward goals  Continue to offer weekly wellness as an strategy to offer opportunity to focus on goals and identify areas of goal achievement and need  Treatment Plan Problem(s): 1 1,1 2, 1 4  Broderick Heart RN     (225) Group Psychotherapy: (9:30-10:30) Alexus Ma participated in psychotherapy group focused on setting boundaries with loved ones  Alexus Ma identified not being able to sleep as s stressor  She provided some input into discussion on setting boundaries, although she shared minimal personal experience  She was more animated and active in discussion today than she has been in the past  Mild progress toward goals today  Continue psychotherapy group to encourage Martha to explore stressors and coping  Treatment Plan Problem(s): 1 1, 1 2, 1 4  Nicole Lori MSW, LSW       (140) Education Therapy Goals set - Take care of garden    Treatment Plan Problem(s): 1 1, 1 2  Education Therapy Time - 0900 - 0930 Previous goal was met  Readiness to Learning:  She is receptive to learning  There are  no barriers to learning  Learning Assessment Time - 1330 - 1400   Education completed on  illness and wellness tools  The teaching method was  verbal  Shared area of learning: Yes  Diane Oconnor MTI   Max Silva MT-BC (182) Allied Therapy 3976-4346 Shahab Rubio actively shared in [facility] group focused on [address] and Opposite Action skill  She engaged in maryuri analysis and [place] question answering task  Group explored different triggers and responses to emotions, and whether they are justified or unjustified, effective or ineffective  Group discussed putting opposite action into place if feeling lazy this weekend  Shahab Rubio shared how she learned about using opposite action and being compassionate when feeling angry  Some good progress towards goal observed and shared  Continue AT to further encourage the use of emotion regulation to promote recovery  KEVIN Landry  Treatment Plan Problem(s): 1 1, 1 2  KATHIE Lorenzo       Case Management Note:   7746-7525 Met with Shahab Rubio  She identified she is a 4 of 10 with 10 being highest related to her depression  She states she is doing more around the house (made salsa from her garden ingredients last night)  She identified that she believes she needs to work on positive affirmations and was given 2 handouts to do so over the weekend  Weekend supports reviewed  TREATMENT SESSION NUMBER: 7   Current suicide risk is low  Medications not changed/added/denied  KATHIE Lorenzo      Active Problems    1  Anxiety (300 00) (F41 9)   2  Complex partial epilepsy with generalization and with intractable epilepsy (345 41)   (G40 219)   3  Hashimoto's thyroiditis (245 2) (E06 3)   4  Idiopathic insomnia (307 42) (F51 01)   5  Major depressive disorder, recurrent severe without psychotic features (296 33) (F33 2)   6  Obesity (BMI 30-39 9) (278 00) (E66 9)   7  Prediabetes (790 29) (R73 03)    Past Medical History    1  History of concussion (V15 52) (Z87 820)   2  History of stroke (V12 54) (Z86 73)    Allergies    1  No Known Drug Allergies    Current Meds   1  CeleXA 20 MG Oral Tablet; TAKE 1 TABLET EVERY MORNING; Therapy: (Recorded:[id number]) to Recorded   2  Mirtazapine 15 MG Oral Tablet; TAKE 1 TABLET Bedtime; Therapy: (Deann Fine) to Recorded   3  Vimpat 100 MG Oral Tablet; take 1 tablet twice a day; Therapy: [id number] to (Last Rx:28Apr2017) Ordered    Family Psych History  Mother    1  Family history of AAA (abdominal aortic aneurysm), not a candidate for repair  Family History    2  No pertinent family history    Social History    · Daily caffeine consumption   · Disabled   · Education Level: Some college   ·    · Never a smoker    Future Appointments    Date/Time Provider Specialty Site   04/17/2018 11:00 AM CINTHYA Damon  Neurology [facility] NEUROLOG[facility]   10/02/2017 10:45 AM CINTHYA Wilson  Psychiatry [facility] PARTIAL HOSPITAL[facility]   10/03/2017 10:45 AM CINTHYA Wilson  Psychiatry [facility] PART[facility] HOSPITAL[facility]   10/04/2017 10:45 AM CINTHYA Wilson   Psychiatry [facility] PART[facility] HOSPITAL[facility]ATION     Signatures   Electronically signed by : Zachary Mendoza RN; Sep 29 2017  2:01PM EST                       (Author)    Electronically signed by : SANDEE Velasquez; Sep 29 2017  2:47PM EST                       (Author)    Electronically signed by : KEVIN Goldberg; Sep 29 2017  3:29PM EST                       (Author)    Electronically signed by : KATHIE Martin; Sep 29 2017  3:41PM EST                       (Author)

## 2018-01-17 NOTE — RESULT NOTES
Verified Results  (1) CBC/PLT/DIFF [id number] 07:58AM Narendra Doty     Test Name Result Flag Reference   WBC COUNT 6 32 Thousand/uL  4 31-10 16   RBC COUNT 4 78 Million/uL  3 81-5 12   HEMOGLOBIN 14 3 g/dL  11 5-15 4   HEMATOCRIT 44 0 %  34 8-46  1   MCV 92 fL  82-98   MCH 29 9 pg  26 8-34 3   MCHC 32 5 g/dL  31 4-37 4   RDW 14 3 %  11 6-15 1   MPV 10 4 fL  8 9-12 7   PLATELET COUNT 931 Thousands/uL  149-390   NEUTROPHILS RELATIVE PERCENT 66 %  43-75   LYMPHOCYTES RELATIVE PERCENT 24 %  14-44   MONOCYTES RELATIVE PERCENT 9 %  4-12   EOSINOPHILS RELATIVE PERCENT 1 %  0-6   BASOPHILS RELATIVE PERCENT 0 %  0-1   NEUTROPHILS ABSOLUTE COUNT 4 15 Thousands/? ??L  1 85-7 62   LYMPHOCYTES ABSOLUTE COUNT 1 53 Thousands/? ??L  0 60-4 47   MONOCYTES ABSOLUTE COUNT 0 57 Thousand/? ??L  0 17-1 22   EOSINOPHILS ABSOLUTE COUNT 0 05 Thousand/? ??L  0 00-0 61   BASOPHILS ABSOLUTE COUNT 0 02 Thousands/? ??L  0 00-0 10   This bloodwork is non-fasting  Please drink two glasses of water morning of  bloodwork  (1) HEMOGLOBIN A1C [id number] 07:58AM Nathan Old Order Number: [id number]     Test Name Result Flag Reference   HEMOGLOBIN A1C 6 1 %  4 2-6 3   EST  AVG  GLUCOSE 128 mg/dl       (1) COMPREHENSIVE METABOLIC PANEL [id number] 34:08BC Sue Wasserman     Test Name Result Flag Reference   SODIUM 140 mmol/L  136-145   POTASSIUM 4 1 mmol/L  3 5-5 3   CHLORIDE 102 mmol/L  100-108   CARBON DIOXIDE 30 mmol/L  21-32   ANION GAP (CALC) 8 mmol/L  4-13   BLOOD UREA NITROGEN 19 mg/dL  5-25   CREATININE 0 77 mg/dL  0 60-1 30   Standardized to IDMS reference method   CALCIUM 8 9 mg/dL  8 3-10 1   BILI, TOTAL 0 40 mg/dL  0 20-1 00   ALK PHOSPHATAS 90 U/L     ALT (SGPT) 30 U/L  12-78   Specimen collection should occur prior to Sulfasalazine administration due to the potential for falsely depressed results     AST(SGOT) 23 U/L  5-45   Specimen collection should occur prior to Sulfasalazine administration due to the potential for falsely depressed results  ALBUMIN 3 5 g/dL  3 5-5 0   TOTAL PROTEIN 7 3 g/dL  6 4-8 2   eGFR 85 ml/min/1 73sq m     National Kidney Disease Education Program recommendations are as follows:  GFR calculation is accurate only with a steady state creatinine  Chronic Kidney disease less than 60 ml/min/1 73 sq  meters  Kidney failure less than 15 ml/min/1 73 sq  meters  GLUCOSE FASTING 117 mg/dL H 65-99   Specimen collection should occur prior to Sulfasalazine administration due to the potential for falsely depressed results  Specimen collection should occur prior to Sulfapyridine administration due to the potential for falsely elevated results  (1) LIPID PANEL, FASTING [id number] 07:50AM Narendra Doty     Test Name Result Flag Reference   CHOLESTEROL 217 mg/dL H    HDL,DIRECT 79 mg/dL H 40-60   Specimen collection should occur prior to Metamizole administration due to the potential for falsley depressed results  LDL CHOLESTEROL CALCULATED 124 mg/dL H 0-100   This is a fasting blood test  Water,black tea or black  coffee only after 9:00pm the night before test  Drink 2 glasses of water the morning of test         Triglyceride:        Normal ??? ??? ??? ??? ??? ??? ??? <150 mg/dl   ??? ??? ???Borderline High ??? ??? 150-199 mg/dl   ??? ??? ? ?? High ??? ??? ??? ??? ??? ??? ??? 200-499 mg/dl   ??? ??? ? ??Very High ??? ??? ??? ??? ??? >499 mg/dl      Cholesterol:       Desirable ??? ??? ??? ??? <200 mg/dl   ??? ??? Borderline High ??? 200-239 mg/dl   ??? ??? High ??? ??? ??? ??? ??? ??? >239 mg/dl      HDL Cholesterol:       High ??? ???>59 mg/dL   ??? ??? Low ??? ??? <41 mg/dL      This screening LDL is a calculated result  It does not have the accuracy of the Direct Measured LDL in the monitoring of patients with hyperlipidemia and/or statin therapy  Direct Measure LDL (MZY929) must be ordered separately in these patients     TRIGLYCERIDES 72 mg/dL  <=150   Specimen collection should occur prior to N-Acetylcysteine or Metamizole administration due to the potential for falsely depressed results  (1) TSH [id number] 07:50AM Bert Mary     Test Name Result Flag Reference   TSH 3 455 uIU/mL  0 358-3 740   This bloodwork is non-fasting  Please drink two glasses of water morning of  bloodwork  Patients undergoing fluorescein dye angiography may retain small amounts of fluorescein in the body for 48-72 hours post procedure  Samples containing fluorescein can produce falsely depressed TSH values  If the patient had this procedure,a specimen should be resubmitted post fluorescein clearance            The recommended reference ranges for TSH during pregnancy are as follows:  First trimester 0 1 to 2 5 uIU/mL  Second trimester  0 2 to 3 0 uIU/mL  Third trimester 0 3 to 3 0 uIU/m

## 2018-03-07 NOTE — PSYCH
History of Present Illness    Presenting Problems: Stressors: PATIENT WAS ADMITTED FOR SUICIDAL IDEATION WITH NO PLAN DISORGANIZED THOUGHTS MENTAL INSTABILITY SINCE JAYSON SURGERY IN 2013 FEELING HOPELESS  Referral Source: [address] [facility] [employer]  She is not employed  She is not a smoker  Symptoms: suicidal ideation, plan: NO PLAN, no self abusive behaviors, no homicidal thoughts, no history of violence toward others, depressed mood, anxiety, no psychosis, no medication noncompliance, sleep disturbances, no change in appetite, no agitation, no hypomania and POOR CONCENTRATION AND HOPELESSNESS  Provisional Diagnosis: Axis I: MDD RECURRENT SERVERE W/O PSYCHOSIS  Substance Abuse: No substance abuse  Psychiatric Treatment History: CONSULT DR Luz Elena Loya 2016, Current Psychiatrist: DR Cotter Cost and Therapist: NONE WOULD LIKE A FEMALE THERAPIST   The patient does not require ambulatory assistance  Legal Issues: The patient does not have legal issues  ACCEPTED  Appointment Date: 09/21/2017        Signatures   Electronically signed by : Tristin Cedeño, ; Sep 19 2017  2:02PM EST                       (Author)

## 2018-03-23 ENCOUNTER — TELEPHONE (OUTPATIENT)
Dept: NEUROLOGY | Facility: CLINIC | Age: 58
End: 2018-03-23

## 2018-04-16 ENCOUNTER — TELEPHONE (OUTPATIENT)
Dept: NEUROLOGY | Facility: CLINIC | Age: 58
End: 2018-04-16

## 2018-05-21 ENCOUNTER — TELEPHONE (OUTPATIENT)
Dept: NEUROLOGY | Facility: CLINIC | Age: 58
End: 2018-05-21

## 2018-05-21 NOTE — TELEPHONE ENCOUNTER
Patient called asking for refill of Vimpat 100mg  States the last script that was filled was in January  Made patient aware script was sent to pharmacy March 19 for 90 day supply with 1 refill  Will call pharmacy

## 2018-05-29 ENCOUNTER — OFFICE VISIT (OUTPATIENT)
Dept: NEUROLOGY | Facility: CLINIC | Age: 58
End: 2018-05-29
Payer: COMMERCIAL

## 2018-05-29 VITALS — SYSTOLIC BLOOD PRESSURE: 118 MMHG | DIASTOLIC BLOOD PRESSURE: 72 MMHG | HEART RATE: 76 BPM

## 2018-05-29 DIAGNOSIS — G40.219 COMPLEX PARTIAL EPILEPSY WITH GENERALIZATION AND WITH INTRACTABLE EPILEPSY (HCC): Primary | ICD-10-CM

## 2018-05-29 DIAGNOSIS — F51.01 IDIOPATHIC INSOMNIA: ICD-10-CM

## 2018-05-29 DIAGNOSIS — F41.9 ANXIETY: ICD-10-CM

## 2018-05-29 DIAGNOSIS — F33.2 SEVERE EPISODE OF RECURRENT MAJOR DEPRESSIVE DISORDER, WITHOUT PSYCHOTIC FEATURES (HCC): Chronic | ICD-10-CM

## 2018-05-29 PROCEDURE — 99243 OFF/OP CNSLTJ NEW/EST LOW 30: CPT | Performed by: PSYCHIATRY & NEUROLOGY

## 2018-05-29 NOTE — PROGRESS NOTES
Patient ID: Ted Gallardo is a 59 y o  female  Assessment/Plan:       Problem List Items Addressed This Visit        Nervous and Auditory    Complex partial epilepsy with generalization and with intractable epilepsy ([facility] [address] ) - Primary       Other    Severe episode of recurrent major depressive disorder, without psychotic features ([facility] [address] ) (Chronic)    Anxiety    Idiopathic insomnia            Discussion Summary:    Patient is doing well  She has remained seizure free  She became seizure free after she had right temporal lobectomy at [place] [address] for seizure control  After being maintained on carbamazepine and levetiracetam for two years, carbamazepine was taperd off in 2015  Seizures remained controlled on levetiracetam monotherapy, but in June 2016, under stress and with insomnia, the patient decompensated psychologically after having found her biological father  She developed panic attacks, paranoia, and confusion  She was admitted to the EMU with long term video EEG monitoring which did not show any seizures nor interictal abnormalities, and I took this admission as an opportunity to switch her from [address] to Vimpat since [address] can contribute to psychological problems  It is fortunate that we made that change because on 9/14/17, patient was admitted for depression with suicidial ideations  At least with her being on Vimpat at that time, I could be assured that her seizure medication was not contributing to that  She was started on citalopram and mirtazapine and is doing well psychologically now  Mirtazapine has helped her sleep better and more consistently which also helps seizure control    Thus, she is seizure free with no side effects on Vimpat and will continue on that in order to [facility] seizure freedom   Even though her temporal lobectomy theoretically removed the epileptogenic zone, patients who have had this procedure for their epilpesy have better long term prognosis for maintainig seizure freedom if they remain on seizure medication long term  Subjective:    HPI    Annual follow-up for a 59year-old right-handed female with history of stroke with no residual deficits who was a former patient of Dr Lucina Maddox  She has not had a seizure since March 2013 after she had right temporal lobectomy at [place] [address] for control of her epilepsy (she did not have a right temporal lobe tumor)  Her first seizure occurred on 6/8/80 when she was 7 months pregnant  I saw the patient for the first time on 8/5/2014  I saw the patient on 6/24/15 and received the results of her [facility] follow up workup; I gave her instructions to taper off of carbamazepine  At first she said that it took some getting used to, but ultimately getting off the carbamazepine made her less tired and she didnt experience any auras or seizures  Patient did well on levetiracetam monotherapy until June 2016 after a bout of insomnia and anxiety when the patient developed panic attacks, paranoia, and confusion  Video EEG was unrevealing  It became apparent that her psychological decompensation occurred because of her temporal lobectomy and she was no longer receiving the mood stabilizing effect of her carbamazepine  Patient was successfully transitioned from levetiracetam to Vimpat which helped to alleviate her insomnia and anxiety  Last seen on 4/17/17    INTERVAL HISTORY:    Patient was admitted to the hospital from 9/14/17-9/20/17 for depression and suicidal ideation  She had recently had an increase in her citalopram from 10 mg to 20 mg with no improvement in her symptoms  Patient was maintained on this dose in the hospital and she was given Remeron  She had improvement of her suicidal ideation and became stabilized  She was going to attend a partial hospitalization program upon discharge  Current AEDs:  Vimpat 100 mg tablets, 1 tab BID     Patient reports that she has not had any seizures since her last visit   Denies any side effects on her Vimpat and she is very happy with this medication  She states that she went to a program called [facility] and she has since been on citalopram and mirtazapine which has been helping her mood and insomnia significantly since her last hospitalization  The following portions of the patient's history were reviewed and updated as appropriate: allergies, current medications, past family history, past medical history, past social history, past surgical history and problem list        Objective:    Blood pressure 118/72, pulse 76  Physical Exam   Constitutional: She appears well-developed and well-nourished  overweight   HENT:   Head: Normocephalic and atraumatic  Eyes: EOM are normal  Pupils are equal, round, and reactive to light  Neck: Neck supple  Cardiovascular: Normal rate and regular rhythm  Pulmonary/Chest: Effort normal    Musculoskeletal: Normal range of motion  Neurological: She is alert  She has normal strength  Gait and coordination normal    Psychiatric: Her speech is normal    Vitals reviewed  Neurological Exam    Mental Status  The patient is alert and oriented to person, place, time, and situation  Her recent and remote memory are normal  Her speech is normal  Her language is fluent with no aphasia  She has normal attention span and concentration  She has a normal fund of knowledge  Cranial Nerves    CN II: The patient's visual acuity and visual fields are normal   CN III, IV, VI: The patient's pupils are equally round and reactive to light and ocular movements are normal   CN V: The patient has normal facial sensation  CN VII:  The patient has symmetric facial movement  CN VIII:  The patient's hearing is normal   CN IX, X: The patient has symmetric palate movement and normal gag reflex  CN XI: The patient's shoulder shrug strength is normal   CN XII: The patient's tongue is midline without atrophy or fasciculations      Motor   Her strength is 5/5 throughout all four extremities  Gait and Coordination  The patient has normal gait and station and normal casual, toe, heel, and tandem gait  She has normal coordination bilaterally  ROS:    Review of Systems   HENT: Negative  Eyes: Negative  Respiratory: Negative  Cardiovascular: Negative  Gastrointestinal: Negative  Endocrine: Negative  Genitourinary: Positive for difficulty urinating  Musculoskeletal: Positive for back pain and gait problem  Pain when walking    Skin: Negative  Allergic/Immunologic: Negative  Neurological: Positive for weakness  Hematological: Bruises/bleeds easily  Psychiatric/Behavioral: Positive for suicidal ideas  The patient is nervous/anxious  Counseling Documentation:  Time in: 12:35, Time out: 1:00  Total time encounter was 25 minutes and 20 minutes were spent counseling the patient  Attestation:   By signing my name below, Laurel Camargo, attest that this documentation has been prepared under the direction and in the presence of Lona Nunez MD  Electronically Signed: Charlene Kamara  05/29/18     I, Lona Nunez, personally performed the services described in this documentation  All medical record entries made by the scribe were at my direction and in my presence  I have reviewed the chart and discharge instructions and agree that the record reflects my personal performance and is accurate and complete    Lona Nunez MD  05/29/18

## 2018-05-29 NOTE — LETTER
May 30, 2018     Lois Hill MD  [address] [facility]  [address] [facility]    Patient: Manuel Blount   YOB: 1960   Date of Visit: 5/29/2018       Dear Dr Roberson Mean:    Thank you for referring Rebecca Felipe to me for evaluation  Below are my notes for this consultation  If you have questions, please do not hesitate to call me  I look forward to following your patient along with you  Sincerely,        Elva Villagran MD        CC: No Recipients  Elva Villagran MD  5/30/2018  1:43 PM  Signed  Patient ID: Manuel Blount is a 59 y o  female  Assessment/Plan:       Problem List Items Addressed This Visit        Nervous and Auditory    Complex partial epilepsy with generalization and with intractable epilepsy ([facility] [address] ) - Primary       Other    Severe episode of recurrent major depressive disorder, without psychotic features ([facility] [address] ) (Chronic)    Anxiety    Idiopathic insomnia            Discussion Summary:    Patient is doing well  She has remained seizure free  She became seizure free after she had right temporal lobectomy at [place] [address] for seizure control  After being maintained on carbamazepine and levetiracetam for two years, carbamazepine was taperd off in 2015  Seizures remained controlled on levetiracetam monotherapy, but in June 2016, under stress and with insomnia, the patient decompensated psychologically after having found her biological father  She developed panic attacks, paranoia, and confusion  She was admitted to the EMU with long term video EEG monitoring which did not show any seizures nor interictal abnormalities, and I took this admission as an opportunity to switch her from [address] to Vimpat since [address] can contribute to psychological problems  It is fortunate that we made that change because on 9/14/17, patient was admitted for depression with suicidial ideations   At least with her being on Vimpat at that time, I could be assured that her seizure medication was not contributing to that  She was started on citalopram and mirtazapine and is doing well psychologically now  Mirtazapine has helped her sleep better and more consistently which also helps seizure control    Thus, she is seizure free with no side effects on Vimpat and will continue on that in order to [facility] seizure freedom  Even though her temporal lobectomy theoretically removed the epileptogenic zone, patients who have had this procedure for their epilpesy have better long term prognosis for maintainig seizure freedom if they remain on seizure medication long term  Subjective:    \[facility]\""    Annual follow-up for a 59year-old right-handed female with history of stroke with no residual deficits who was a former patient of Dr Jose Keenan  She has not had a seizure since March 2013 after she had right temporal lobectomy at [place] [address] for control of her epilepsy (she did not have a right temporal lobe tumor)  Her first seizure occurred on 6/8/80 when she was 7 months pregnant  I saw the patient for the first time on 8/5/2014  I saw the patient on 6/24/15 and received the results of her [facility] follow up workup; I gave her instructions to taper off of carbamazepine  At first she said that it took some getting used to, but ultimately getting off the carbamazepine made her less tired and she didnt experience any auras or seizures  Patient did well on levetiracetam monotherapy until June 2016 after a bout of insomnia and anxiety when the patient developed panic attacks, paranoia, and confusion  Video EEG was unrevealing  It became apparent that her psychological decompensation occurred because of her temporal lobectomy and she was no longer receiving the mood stabilizing effect of her carbamazepine  Patient was successfully transitioned from levetiracetam to Vimpat which helped to alleviate her insomnia and anxiety       Last seen on 4/17/17    INTERVAL HISTORY:    Patient was admitted to the hospital from 9/14/17-9/20/17 for depression and suicidal ideation  She had recently had an increase in her citalopram from 10 mg to 20 mg with no improvement in her symptoms  Patient was maintained on this dose in the hospital and she was given Remeron  She had improvement of her suicidal ideation and became stabilized  She was going to attend a partial hospitalization program upon discharge  Current AEDs:  Vimpat 100 mg tablets, 1 tab BID     Patient reports that she has not had any seizures since her last visit  Denies any side effects on her Vimpat and she is very happy with this medication  She states that she went to a program called [facility] and she has since been on citalopram and mirtazapine which has been helping her mood and insomnia significantly since her last hospitalization  The following portions of the patient's history were reviewed and updated as appropriate: allergies, current medications, past family history, past medical history, past social history, past surgical history and problem list        Objective:    Blood pressure 118/72, pulse 76  Physical Exam   Constitutional: She appears well-developed and well-nourished  overweight   HENT:   Head: Normocephalic and atraumatic  Eyes: EOM are normal  Pupils are equal, round, and reactive to light  Neck: Neck supple  Cardiovascular: Normal rate and regular rhythm  Pulmonary/Chest: Effort normal    Musculoskeletal: Normal range of motion  Neurological: She is alert  She has normal strength  Gait and coordination normal    Psychiatric: Her speech is normal    Vitals reviewed  Neurological Exam    Mental Status  The patient is alert and oriented to person, place, time, and situation  Her recent and remote memory are normal  Her speech is normal  Her language is fluent with no aphasia  She has normal attention span and concentration  She has a normal fund of knowledge      Cranial Nerves    CN II: The patient's visual acuity and visual fields are normal   CN III, IV, VI: The patient's pupils are equally round and reactive to light and ocular movements are normal   CN V: The patient has normal facial sensation  CN VII:  The patient has symmetric facial movement  CN VIII:  The patient's hearing is normal   CN IX, X: The patient has symmetric palate movement and normal gag reflex  CN XI: The patient's shoulder shrug strength is normal   CN XII: The patient's tongue is midline without atrophy or fasciculations  Motor   Her strength is 5/5 throughout all four extremities  Gait and Coordination  The patient has normal gait and station and normal casual, toe, heel, and tandem gait  She has normal coordination bilaterally  ROS:    Review of Systems   HENT: Negative  Eyes: Negative  Respiratory: Negative  Cardiovascular: Negative  Gastrointestinal: Negative  Endocrine: Negative  Genitourinary: Positive for difficulty urinating  Musculoskeletal: Positive for back pain and gait problem  Pain when walking    Skin: Negative  Allergic/Immunologic: Negative  Neurological: Positive for weakness  Hematological: Bruises/bleeds easily  Psychiatric/Behavioral: Positive for suicidal ideas  The patient is nervous/anxious  Counseling Documentation:  Time in: 12:35, Time out: 1:00  Total time encounter was 25 minutes and 20 minutes were spent counseling the patient  Attestation:   By signing my name below, Tejinder Mumtaz attaustin that this documentation has been prepared under the direction and in the presence of Amparo Ledesma MD  Electronically Signed: Charlene Spence  05/29/18     I, Amparo Ledesma, personally performed the services described in this documentation  All medical record entries made by the scribe were at my direction and in my presence  I have reviewed the chart and discharge instructions and agree that the record reflects my personal performance and is accurate and complete    [facility] Trevor Felder MD  05/29/18

## 2018-05-30 PROBLEM — F51.01 IDIOPATHIC INSOMNIA: Status: ACTIVE | Noted: 2017-04-17

## 2018-05-30 PROBLEM — E06.3 HASHIMOTO'S THYROIDITIS: Status: ACTIVE | Noted: 2017-06-08

## 2018-05-30 PROBLEM — E66.9 OBESITY (BMI 30-39.9): Status: ACTIVE | Noted: 2017-06-08

## 2018-05-30 PROBLEM — R73.03 PREDIABETES: Status: ACTIVE | Noted: 2017-09-13

## 2018-08-29 DIAGNOSIS — G40.919 INTRACTABLE EPILEPSY WITHOUT STATUS EPILEPTICUS, UNSPECIFIED EPILEPSY TYPE (HCC): ICD-10-CM

## 2018-08-29 RX ORDER — LACOSAMIDE 100 MG/1
100 TABLET ORAL EVERY 12 HOURS SCHEDULED
Qty: 180 TABLET | Refills: 1 | Status: SHIPPED | OUTPATIENT
Start: 2018-08-29 | End: 2019-03-08 | Stop reason: SDUPTHER

## 2019-03-08 DIAGNOSIS — G40.919 INTRACTABLE EPILEPSY WITHOUT STATUS EPILEPTICUS, UNSPECIFIED EPILEPSY TYPE (HCC): ICD-10-CM

## 2019-03-11 RX ORDER — LACOSAMIDE 100 MG/1
100 TABLET ORAL EVERY 12 HOURS SCHEDULED
Qty: 180 TABLET | Refills: 1 | Status: SHIPPED | OUTPATIENT
Start: 2019-03-11 | End: 2019-06-03 | Stop reason: SDUPTHER

## 2019-06-03 ENCOUNTER — OFFICE VISIT (OUTPATIENT)
Dept: NEUROLOGY | Facility: CLINIC | Age: 59
End: 2019-06-03
Payer: COMMERCIAL

## 2019-06-03 VITALS
HEART RATE: 63 BPM | WEIGHT: 205.6 LBS | SYSTOLIC BLOOD PRESSURE: 122 MMHG | DIASTOLIC BLOOD PRESSURE: 70 MMHG | HEIGHT: 60 IN | BODY MASS INDEX: 40.37 KG/M2

## 2019-06-03 DIAGNOSIS — G40.219 COMPLEX PARTIAL EPILEPSY WITH GENERALIZATION AND WITH INTRACTABLE EPILEPSY (HCC): ICD-10-CM

## 2019-06-03 DIAGNOSIS — F51.01 IDIOPATHIC INSOMNIA: ICD-10-CM

## 2019-06-03 DIAGNOSIS — E06.3 HASHIMOTO'S THYROIDITIS: ICD-10-CM

## 2019-06-03 DIAGNOSIS — G40.919 INTRACTABLE EPILEPSY WITHOUT STATUS EPILEPTICUS, UNSPECIFIED EPILEPSY TYPE (HCC): Primary | ICD-10-CM

## 2019-06-03 PROCEDURE — 99213 OFFICE O/P EST LOW 20 MIN: CPT | Performed by: PSYCHIATRY & NEUROLOGY

## 2019-06-03 RX ORDER — LACOSAMIDE 100 MG/1
100 TABLET ORAL EVERY 12 HOURS SCHEDULED
Qty: 180 TABLET | Refills: 1 | Status: SHIPPED | OUTPATIENT
Start: 2019-06-03 | End: 2019-08-30 | Stop reason: SDUPTHER

## 2019-07-08 ENCOUNTER — OFFICE VISIT (OUTPATIENT)
Dept: FAMILY MEDICINE CLINIC | Facility: CLINIC | Age: 59
End: 2019-07-08
Payer: COMMERCIAL

## 2019-07-08 VITALS
HEART RATE: 69 BPM | SYSTOLIC BLOOD PRESSURE: 128 MMHG | HEIGHT: 60 IN | BODY MASS INDEX: 40.05 KG/M2 | DIASTOLIC BLOOD PRESSURE: 82 MMHG | TEMPERATURE: 97.3 F | WEIGHT: 204 LBS | OXYGEN SATURATION: 97 %

## 2019-07-08 DIAGNOSIS — Z11.59 NEED FOR HEPATITIS C SCREENING TEST: ICD-10-CM

## 2019-07-08 DIAGNOSIS — E06.3 HASHIMOTO'S THYROIDITIS: ICD-10-CM

## 2019-07-08 DIAGNOSIS — Z12.11 COLON CANCER SCREENING: ICD-10-CM

## 2019-07-08 DIAGNOSIS — Z00.00 ANNUAL PHYSICAL EXAM: ICD-10-CM

## 2019-07-08 DIAGNOSIS — E66.9 OBESITY (BMI 30-39.9): ICD-10-CM

## 2019-07-08 DIAGNOSIS — F33.2 SEVERE EPISODE OF RECURRENT MAJOR DEPRESSIVE DISORDER, WITHOUT PSYCHOTIC FEATURES (HCC): Chronic | ICD-10-CM

## 2019-07-08 DIAGNOSIS — G40.219 COMPLEX PARTIAL EPILEPSY WITH GENERALIZATION AND WITH INTRACTABLE EPILEPSY (HCC): ICD-10-CM

## 2019-07-08 DIAGNOSIS — Z13.0 SCREENING FOR IRON DEFICIENCY ANEMIA: ICD-10-CM

## 2019-07-08 DIAGNOSIS — R73.03 PREDIABETES: ICD-10-CM

## 2019-07-08 DIAGNOSIS — Z12.31 ENCOUNTER FOR SCREENING MAMMOGRAM FOR MALIGNANT NEOPLASM OF BREAST: Primary | ICD-10-CM

## 2019-07-08 PROCEDURE — 99396 PREV VISIT EST AGE 40-64: CPT | Performed by: FAMILY MEDICINE

## 2019-07-08 NOTE — ASSESSMENT & PLAN NOTE
We noted to the patient that she is borderline morbidly obese  She now only has to improve her diet and possibly consider weight watchers but she needs to begin a regular aerobic exercise program which we discussed  She agrees

## 2019-07-08 NOTE — ASSESSMENT & PLAN NOTE
The patient presents today for annual wellness examination  All components of the examination were covered  We are going to get a CBC, CMP and lipids as well as hepatitis-C for screening  Additionally she has had no colorectal cancer screening we encouraged her to get a colonoscopy  We gave a referral for [facility]  She is encouraged to increase her aerobic activity level and improve her diet as covered in the obesity section

## 2019-07-08 NOTE — PATIENT INSTRUCTIONS

## 2019-07-08 NOTE — PROGRESS NOTES
ADULT ANNUAL PHYSICAL  [facility]    NAME: Ivory Diallo  AGE: 59 y o  SEX: female  : 1960     DATE: 2019     Assessment and Plan:     Problem List Items Addressed This Visit        Endocrine    Hashimoto's thyroiditis     TSH  Relevant Orders    TSH, 3rd generation       Nervous and Auditory    Complex partial epilepsy with generalization and with intractable epilepsy ([facility] [address] )     Followed by Neurology  Other    Severe episode of recurrent major depressive disorder, without psychotic features ([facility] [address] ) (Chronic)     Followed by Psychiatry  Prediabetes     Will get hemoglobin A1c today  Relevant Orders    Comprehensive metabolic panel    Lipid panel    Obesity (BMI 30-39  9)     We noted to the patient that she is borderline morbidly obese  She now only has to improve her diet and possibly consider weight watchers but she needs to begin a regular aerobic exercise program which we discussed  She agrees  Annual physical exam     The patient presents today for annual wellness examination  All components of the examination were covered  We are going to get a CBC, CMP and lipids as well as hepatitis-C for screening  Additionally she has had no colorectal cancer screening we encouraged her to get a colonoscopy  We gave a referral for [facility]  She is encouraged to increase her aerobic activity level and improve her diet as covered in the obesity section             Other Visit Diagnoses     Encounter for screening mammogram for malignant neoplasm of breast    -  Primary    Relevant Orders    Mammo screening bilateral w 3d & cad    Need for hepatitis C screening test        Relevant Orders    Hepatitis C Ab W/Refl To HCV RNA, Qn, PCR    Screening for iron deficiency anemia        Relevant Orders    CBC    Colon cancer screening        Relevant Orders    Ambulatory referral to Gastroenterology          Immunizations and preventive care screenings were discussed with patient today  Appropriate education was printed on patient's after visit summary  Counseling:  BMI Counseling: Body mass index is 39 84 kg/m²  Discussed the patient's BMI with her  The BMI is above average  BMI counseling and education was provided to the patient  Nutrition recommendations include reducing portion sizes, 3-5 servings of fruits/vegetables daily, consuming healthier snacks and moderation in carbohydrate intake  Exercise recommendations include exercising 3-5 times per week  Alcohol/drug use: discussed moderation in alcohol intake and avoidance of illicit drug use  · Dental Health: discussed importance of regular tooth brushing, flossing, and dental visits  No follow-ups on file  Chief Complaint:     Chief Complaint   Patient presents with    Physical Exam     here for a yearly pe  pt due for fbw will enter for  [employer]  pt prefers to do cologuard  bmi f/u plan needed  History of Present Illness:     Adult Annual Physical   Patient here for a comprehensive physical exam  The patient reports problems - Follows with neurology and no seizure since surgery 2013       Diet and Physical Activity  · Diet/Nutrition: frequent junk food  · Exercise: no formal exercise  Depression Screening  PHQ-9 Depression Screening    PHQ-9:    Frequency of the following problems over the past two weeks:            General Health  · Sleep: sleeps well and gets 7-8 hours of sleep on average  · Hearing: decreased - bilateral   · Vision: wears glasses  Reading glasses  Last eye exam 1 years ago  · Dental: regular dental visits and brushes teeth twice daily  /GYN Health  · Patient is: postmenopausal  · Last menstrual period: N/A  · Contraceptive method: N/A  Review of Systems:     Review of Systems   Constitutional: Negative for activity change, appetite change, fatigue and unexpected weight change  Respiratory: Negative  Cardiovascular: Negative  Gastrointestinal: Negative for blood in stool, constipation and diarrhea  Endocrine: Negative for polydipsia, polyphagia and polyuria  Psychiatric/Behavioral: Negative for dysphoric mood and sleep disturbance  The patient is nervous/anxious         Past Medical History:     Past Medical History:   Diagnosis Date    Anxiety     Concussion     Last assessed 2017    Depression     ISCHEMIC 2013     ISCHEMIC 2013    Seizures [facility])       Past Surgical History:     Past Surgical History:   Procedure Laterality Date    BRAIN SURGERY      IMMED FOLLOWING STROKE IN 2013     SECTION        Social History:     Social History     Socioeconomic History    Marital status: /Civil Union     Spouse name: None    Number of children: None    Years of education: None    Highest education level: None   Occupational History    Occupation: Disabled   Social Needs    Financial resource strain: None    Food insecurity:     Worry: None     Inability: None    Transportation needs:     Medical: None     Non-medical: None   Tobacco Use    Smoking status: Never Smoker    Smokeless tobacco: Never Used   Substance and Sexual Activity    Alcohol use: No    Drug use: No    Sexual activity: Never   Lifestyle    Physical activity:     Days per week: None     Minutes per session: None    Stress: None   Relationships    Social connections:     Talks on phone: None     Gets together: None     Attends [employer] service: None     Active member of club or organization: None     Attends meetings of clubs or organizations: None     Relationship status: None    Intimate partner violence:     Fear of current or ex partner: None     Emotionally abused: None     Physically abused: None     Forced sexual activity: None   Other Topics Concern    None   Social History Narrative    Daily caffeine consumption      Family History:     Family History   Problem Relation Age of Onset    Aortic aneurysm Mother       Current Medications:     Current Outpatient Medications   Medication Sig Dispense Refill    citalopram (CeleXA) 20 mg tablet Take 1 tablet by mouth daily 30 tablet 0    lacosamide (VIMPAT) 100 mg tablet Take 1 tablet (100 mg total) by mouth every 12 (twelve) hours 180 tablet 1    mirtazapine (REMERON) 15 mg tablet Take 1 tablet by mouth daily at bedtime 30 tablet 0     No current facility-administered medications for this visit  Allergies:     No Known Allergies   Physical Exam:     /82   Pulse 69   Temp (!) 97 3 °F (36 3 °C)   Ht 5' (1 524 m)   Wt 92 5 kg (204 lb)   SpO2 97%   BMI 39 84 kg/m²     Physical Exam   Constitutional: She is oriented to person, place, and time  She appears well-developed and well-nourished  No distress  Obese and in NAD   Eyes: No scleral icterus  Neck: Normal range of motion  No thyromegaly present  Cardiovascular: Normal rate, regular rhythm and normal heart sounds  No murmur heard  Pulmonary/Chest: Effort normal and breath sounds normal  She has no wheezes  She has no rales  Musculoskeletal: She exhibits no edema  Lymphadenopathy:     She has no cervical adenopathy  Neurological: She is alert and oriented to person, place, and time  No cranial nerve deficit  Skin: Capillary refill takes less than 2 seconds  Psychiatric: She has a normal mood and affect  Thought content normal    Nursing note and vitals reviewed        Fifi Rowland MD  [address] [facility]

## 2019-08-30 DIAGNOSIS — G40.919 INTRACTABLE EPILEPSY WITHOUT STATUS EPILEPTICUS, UNSPECIFIED EPILEPSY TYPE (HCC): ICD-10-CM

## 2019-08-30 RX ORDER — LACOSAMIDE 100 MG/1
100 TABLET ORAL EVERY 12 HOURS SCHEDULED
Qty: 180 TABLET | Refills: 1 | Status: SHIPPED | OUTPATIENT
Start: 2019-08-30 | End: 2019-09-27 | Stop reason: SDUPTHER

## 2019-08-30 NOTE — TELEPHONE ENCOUNTER
Pt called Rx line for med refill for lacosamide 100 mg, 1 tab every 12 hour, quantity of 90 days to be sent to [facility] pharmacy  Pt last ov 6/3/19 no f/u scheduled

## 2019-09-27 DIAGNOSIS — G40.919 INTRACTABLE EPILEPSY WITHOUT STATUS EPILEPTICUS, UNSPECIFIED EPILEPSY TYPE (HCC): ICD-10-CM

## 2019-09-27 RX ORDER — LACOSAMIDE 100 MG/1
100 TABLET ORAL EVERY 12 HOURS SCHEDULED
Qty: 180 TABLET | Refills: 1 | Status: SHIPPED | OUTPATIENT
Start: 2019-09-27 | End: 2020-01-10 | Stop reason: SDUPTHER

## 2019-09-27 NOTE — TELEPHONE ENCOUNTER
Patient came into office requesting refill request for lacosamide  Patient will be out of medication tomorrow  Please sign off on refill  Patient requesting script sent to [facility]

## 2020-01-10 DIAGNOSIS — G40.919 INTRACTABLE EPILEPSY WITHOUT STATUS EPILEPTICUS, UNSPECIFIED EPILEPSY TYPE (HCC): ICD-10-CM

## 2020-01-10 RX ORDER — LACOSAMIDE 100 MG/1
100 TABLET ORAL EVERY 12 HOURS SCHEDULED
Qty: 30 TABLET | Refills: 5 | Status: SHIPPED | OUTPATIENT
Start: 2020-01-10 | End: 2020-01-16 | Stop reason: SDUPTHER

## 2020-01-10 NOTE — TELEPHONE ENCOUNTER
Patient calling stating that she has new insurance  She is requesting to have her prescription transferred to [facility]  Patient does not know the exact pharmacy that she needs to use  Advised patient to call member services on her new card to determine the correct pharmacy to use and to call back and let us know  In the meantime, patient is currently out of her medication and requires short fill until she can get mail order  Requesting 14 day fill of lacosamide 100mg tablet 1 tablet every 12 hours, to retail pharmacy  Please fill if agreeable  I have queued up for you

## 2020-01-16 DIAGNOSIS — G40.919 INTRACTABLE EPILEPSY WITHOUT STATUS EPILEPTICUS, UNSPECIFIED EPILEPSY TYPE (HCC): ICD-10-CM

## 2020-01-16 RX ORDER — LACOSAMIDE 100 MG/1
100 TABLET ORAL EVERY 12 HOURS SCHEDULED
Qty: 180 TABLET | Refills: 3 | Status: SHIPPED | OUTPATIENT
Start: 2020-01-16 | End: 2020-01-17 | Stop reason: SDUPTHER

## 2020-01-17 DIAGNOSIS — G40.919 INTRACTABLE EPILEPSY WITHOUT STATUS EPILEPTICUS, UNSPECIFIED EPILEPSY TYPE (HCC): ICD-10-CM

## 2020-01-17 RX ORDER — LACOSAMIDE 100 MG/1
100 TABLET ORAL EVERY 12 HOURS SCHEDULED
Qty: 30 TABLET | Refills: 0 | Status: SHIPPED | OUTPATIENT
Start: 2020-01-17 | End: 2021-01-20 | Stop reason: HOSPADM

## 2020-01-17 NOTE — TELEPHONE ENCOUNTER
Patient calling regarding her medication again  Patient AED is lacosamide 100mg tablet 1 tablet every 12 hours  Patient prescription was sent yesterday to [facility] for 90 day prescription, but this will not get to patient until next week sometime  Patient needs a short supply of medication sent to local pharmacy, or she will not have medication after tonight  Patient currently has one pill remaining

## 2020-01-17 NOTE — TELEPHONE ENCOUNTER
Pharmacist Bessie called and states that Maggy Eryn was sent to [facility] pharmacy ([facility])  Pt preferred [address] on file  Rx (lacosamide (VIMPAT) 100 mg tablet  Take 1 tablet (100 mg total) by mouth every 12 (twelve) hours  Quantity 30  Refills 0) called into [facility] pharmacy as ordered/requested  Bessie verbalized understanding

## 2020-02-28 ENCOUNTER — OFFICE VISIT (OUTPATIENT)
Dept: NEUROLOGY | Facility: CLINIC | Age: 60
End: 2020-02-28
Payer: COMMERCIAL

## 2020-02-28 VITALS
HEIGHT: 60 IN | HEART RATE: 62 BPM | BODY MASS INDEX: 40.25 KG/M2 | DIASTOLIC BLOOD PRESSURE: 62 MMHG | WEIGHT: 205 LBS | SYSTOLIC BLOOD PRESSURE: 122 MMHG

## 2020-02-28 DIAGNOSIS — G40.219 COMPLEX PARTIAL EPILEPSY WITH GENERALIZATION AND WITH INTRACTABLE EPILEPSY (HCC): Primary | ICD-10-CM

## 2020-02-28 DIAGNOSIS — R41.3 MEMORY PROBLEM: ICD-10-CM

## 2020-02-28 DIAGNOSIS — F41.9 ANXIETY: ICD-10-CM

## 2020-02-28 PROCEDURE — 1036F TOBACCO NON-USER: CPT | Performed by: PSYCHIATRY & NEUROLOGY

## 2020-02-28 PROCEDURE — 99213 OFFICE O/P EST LOW 20 MIN: CPT | Performed by: PSYCHIATRY & NEUROLOGY

## 2020-02-28 PROCEDURE — 3008F BODY MASS INDEX DOCD: CPT | Performed by: PSYCHIATRY & NEUROLOGY

## 2020-02-28 NOTE — PROGRESS NOTES
Patient ID: Isaac Felty is a 62 y o  female  Assessment/Plan:    There are no diagnoses linked to this encounter  Discussion Summary:  Patient has been seizure free since she had her right temporal lobectomy done at [place] [address] in 2013  She had been on Keppra but that was switched to Vimpat in 2018 when her anxiety flared up and she started developing panic attacks  She still has some degree of anxiety managed by a psychiatrist with mirtazapine and citalopram  She worries about some memory problems but reportedly stated that that could just be because shes currently somewhat overwhelmed because her  was just recently diagnosed to have Parkinsons disease and she also has a new puppy  Patient can continue to take her current dose of Vimpat and follow up in a year when she will see our nurse practitioner, Melissa Salazar  Subjective:    HPI    9 month follow-up for a 62year-old right-handed female with history of stroke with no residual deficits who was a former patient of Dr Tara Fontanez  She has not had a seizure since March 2013 after she had right temporal lobectomy at [place] [address] for control of her epilepsy (she did not have a right temporal lobe tumor)  Her first seizure occurred on 6/8/80 when she was 7 months pregnant  I saw the patient for the first time on 8/5/2014  I saw the patient on 6/24/15 and received the results of her NIH follow up workup; I gave her instructions to taper off of carbamazepine  At first she said that it took some getting used to, but ultimately getting off the carbamazepine made her less tired and she didnt experience any auras or seizures  Patient did well on levetiracetam monotherapy until June 2016 after a bout of insomnia and anxiety when the patient developed panic attacks, paranoia, and confusion  Video EEG was unrevealing   It became apparent that her psychological decompensation occurred because of her temporal lobectomy and she was no longer receiving the mood stabilizing effect of her carbamazepine  Patient was successfully transitioned from levetiracetam to Vimpat which helped to alleviate her insomnia and anxiety  When I saw the patient on 5/29/18, she remained seizure free  However she had been admitted for depression on 9/14/17 and it was found that the panic attacks she had experienced in 2016 resulted in part from finding her biological father, but had been doing well with citalopram and mirtazapine added to her regimen  Mirtazapine also helped her sleep better  When I saw the patient on 6/3/2019, she remained seizure free on Vimpat monotherapy  This dose also seemed to help stabilize her anxiety and depression  Last seen on 6/3/2019  INTERVAL HISTORY:    Patient has remained seizure free  Current AEDs:  Vimpat 100 mg tablets, 1 tablet twice a day    The patient states that she has had some memory loss  She has trouble with her long-term memory  She states she is overwhelmed at the moment  She has a new puppy and has found out that her  has Parkinsons disease  The patient is on Disability  She sometimes feels anxious and has fear  She sees psychiatry and is on low dose mirtazapine and citalopram  She thinks this may be due to age  The patient does not think she has experienced any confusion  She hasn't had any loss of consciousness  She has her first grandchild on the way  The following portions of the patient's history were reviewed and updated as appropriate: allergies, current medications, past family history, past medical history, past social history, past surgical history and problem list          Objective:    Blood pressure 122/62, pulse 62, height 5' (1 524 m), weight 93 kg (205 lb)  Physical Exam   Constitutional: She is oriented to person, place, and time  She appears well-developed and well-nourished  Morbidly obese   HENT:   Head: Normocephalic and atraumatic     Right Ear: External ear normal    Left Ear: External ear normal    Eyes: Pupils are equal, round, and reactive to light  EOM and lids are normal    Neck: Neck supple  Cardiovascular: Normal rate and regular rhythm  Pulmonary/Chest: Effort normal    Musculoskeletal: Normal range of motion  Neurological: She is alert and oriented to person, place, and time  She has normal strength and normal reflexes  Coordination normal    Skin: Skin is warm and dry  Psychiatric: She has a normal mood and affect  Her speech is normal    Vitals reviewed  Neurological Exam  Mental Status  Awake and alert  Oriented to person, place and time  Speech is normal  Language is fluent with no aphasia  Attention and concentration are normal     Cranial Nerves  CN II: Visual acuity is normal  Visual fields full to confrontation  CN III, IV, VI: Extraocular movements intact bilaterally  Extraocular movements intact bilaterally  Normal lids and orbits bilaterally  Pupils equal round and reactive to light bilaterally  CN V: Facial sensation is normal   CN VII: Full and symmetric facial movement  CN VIII: Hearing is normal   CN IX, X: Palate elevates symmetrically  Normal gag reflex  CN XI: Shoulder shrug strength is normal   CN XII: Tongue midline without atrophy or fasciculations  Motor   Strength is 5/5 throughout all four extremities  Sensory  Sensation is intact to light touch, pinprick, vibration and proprioception in all four extremities  Reflexes  Deep tendon reflexes are 2+ and symmetric in all four extremities with downgoing toes bilaterally  Coordination  Finger-to-nose, rapid alternating movements and heel-to-shin normal bilaterally without dysmetria  Gait  Normal casual, toe, heel and tandem gait  Romberg is absent  ROS:    Review of Systems   Constitutional: Negative  Negative for appetite change and fever  HENT: Negative  Negative for hearing loss, tinnitus, trouble swallowing and voice change  Eyes: Negative    Negative for photophobia and pain  Respiratory: Negative  Negative for shortness of breath  Cardiovascular: Negative  Negative for palpitations  Gastrointestinal: Negative  Negative for nausea and vomiting  Endocrine: Negative  Negative for cold intolerance and heat intolerance  Genitourinary: Negative  Negative for dysuria, frequency and urgency  Musculoskeletal: Negative  Negative for myalgias and neck pain  Skin: Negative  Negative for rash  Neurological: Negative  Negative for dizziness, tremors, seizures, syncope, facial asymmetry, speech difficulty, weakness, light-headedness, numbness and headaches  Hematological: Negative  Does not bruise/bleed easily  Psychiatric/Behavioral: Negative  Negative for confusion, hallucinations and sleep disturbance           Attestation:   By signing my name below, Jann Garza, attest that this documentation has been prepared under the direction and in the presence of Demetrio Kenny MD  Electronically Signed: Ernestene Moritz, Scribe  02/28/2020     I, Demetrio Kenny, personally performed the services described in this documentation  All medical record entries made by the scribe were at my direction and in my presence   I have reviewed the chart and discharge instructions and agree that the record reflects my personal performance and is accurate and complete  Rhianna Ortiz MD  02/28/2020

## 2020-02-28 NOTE — PATIENT INSTRUCTIONS
1  Continue to take Vimpat 100 mg tablets, 1 tablet twice a day  2  Follow up with Juli Stern, NP in one year and call with any questions or concerns

## 2020-03-02 PROBLEM — R41.3 MEMORY PROBLEM: Status: ACTIVE | Noted: 2020-03-02

## 2020-07-03 ENCOUNTER — PREP FOR PROCEDURE (OUTPATIENT)
Dept: GASTROENTEROLOGY | Facility: AMBULARY SURGERY CENTER | Age: 60
End: 2020-07-03

## 2020-07-03 ENCOUNTER — TELEPHONE (OUTPATIENT)
Dept: GASTROENTEROLOGY | Facility: AMBULARY SURGERY CENTER | Age: 60
End: 2020-07-03

## 2020-07-03 DIAGNOSIS — Z12.11 SCREENING FOR COLON CANCER: Primary | ICD-10-CM

## 2020-07-03 DIAGNOSIS — Z20.822 ENCOUNTER FOR LABORATORY TESTING FOR COVID-19 VIRUS: ICD-10-CM

## 2020-07-03 NOTE — TELEPHONE ENCOUNTER
Called pt to schedule colonoscopy and review OA screening questions from 7/25/19  Pt passed again  Pt is scheduled for colonoscopy with Dr Staci Hope on 7/25 at [address] [place] [address] Street  Pt aware to get covid testing done on 7/20  Reviewed prep instructions with pt, reminded needs a  for the procedure and will get a call the day before with the arrival time  Emailed prep and covid info to pt  Pt is scheduled and covid testing sent  Please send Suprep to pt's [address] in [facility]

## 2020-07-03 NOTE — TELEPHONE ENCOUNTER
07/25/19  Screened by: Mekhi Rodriguez     Referring Provider      Pre- Screening: Body mass index is 39 84 kg/m²  Has patient been referred for a routine screening Colonoscopy? yes  Is the patient between 39-70 years old? yes     SCHEDULING STAFF  · If the patient is between 45yrs-49yrs, please advise patient to confirm benefits/coverage with their insurance company for a routine screening colonoscopy, some insurance carriers will only cover at [phone] or older  · If the patient is over 66years old, please schedule an office visit      Do you have any of the following symptoms?        Have you had a coronary or vascular stent within the last year? no     Have you had a heart attack or stroke in the last 6 months? no     Have you had intestinal surgery in the last 3 months? no     Do you have problems with:      Do you use:      Have you been hospitalized in the last Month? no     Have you been diagnosed with a bleeding disorder or anemia? no     Have you had chest pain (angina) or breathing problems  (COPD) in the last 3 months? no     Do you have any difficulty walking up a flight of stairs? no     Have you had Kidney failure or insufficiency? no     Have you had heart valve surgery? no     Are you confined to a wheelchair? no     Do you take      Have you been diagnosed with Diabetes or are you taking any   Diabetic medications? no     : If patient answers NO to medical questions, then schedule procedure  If patient answers YES to medical questions, then schedule office appointment      Previous Colonoscopy yes  Date and Facility of last colonoscopy? 20 years ago     Comments:  [facility]

## 2020-07-04 DIAGNOSIS — Z12.11 COLON CANCER SCREENING: Primary | ICD-10-CM

## 2020-07-24 RX ORDER — SODIUM CHLORIDE, SODIUM LACTATE, POTASSIUM CHLORIDE, CALCIUM CHLORIDE 600; 310; 30; 20 MG/100ML; MG/100ML; MG/100ML; MG/100ML
125 INJECTION, SOLUTION INTRAVENOUS CONTINUOUS
Status: CANCELLED | OUTPATIENT
Start: 2020-07-24

## 2020-07-25 ENCOUNTER — HOSPITAL ENCOUNTER (OUTPATIENT)
Dept: GASTROENTEROLOGY | Facility: HOSPITAL | Age: 60
Setting detail: OUTPATIENT SURGERY
Discharge: HOME | End: 2020-07-25
Attending: INTERNAL MEDICINE

## 2020-09-02 ENCOUNTER — OFFICE VISIT (OUTPATIENT)
Dept: PSYCHIATRY | Facility: CLINIC | Age: 60
End: 2020-09-02
Payer: COMMERCIAL

## 2020-09-02 ENCOUNTER — OFFICE VISIT (OUTPATIENT)
Dept: PSYCHOLOGY | Facility: CLINIC | Age: 60
End: 2020-09-02
Payer: COMMERCIAL

## 2020-09-02 DIAGNOSIS — F33.2 SEVERE EPISODE OF RECURRENT MAJOR DEPRESSIVE DISORDER, WITHOUT PSYCHOTIC FEATURES (HCC): Primary | ICD-10-CM

## 2020-09-02 DIAGNOSIS — R41.844 EXECUTIVE FUNCTION DEFICIT: ICD-10-CM

## 2020-09-02 DIAGNOSIS — F33.2 SEVERE EPISODE OF RECURRENT MAJOR DEPRESSIVE DISORDER, WITHOUT PSYCHOTIC FEATURES (HCC): Primary | Chronic | ICD-10-CM

## 2020-09-02 DIAGNOSIS — F41.1 GENERALIZED ANXIETY DISORDER: ICD-10-CM

## 2020-09-02 PROCEDURE — 90791 PSYCH DIAGNOSTIC EVALUATION: CPT

## 2020-09-02 PROCEDURE — S0201 PARTIAL HOSPITALIZATION SERV: HCPCS

## 2020-09-02 PROCEDURE — 90792 PSYCH DIAG EVAL W/MED SRVCS: CPT | Performed by: PSYCHIATRY & NEUROLOGY

## 2020-09-02 RX ORDER — CITALOPRAM 10 MG/1
TABLET ORAL
Qty: 30 TABLET | Refills: 0
Start: 2020-09-02 | End: 2021-01-20 | Stop reason: HOSPADM
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
## 2020-09-02 NOTE — PSYCH
Assessment/Plan:      Diagnoses and all orders for this visit:    Severe episode of recurrent major depressive disorder, without psychotic features ([facility] [address] )    Generalized anxiety disorder          Subjective:     Patient ID: Osorio Mosley is a 62 y o  female  [facility] Treatment Plan   AREAS OF NEED: Depression as evidenced by rumination, passive suicidal ideation, and isolation due to pandemic and historical illness  Date Initiated: 09/02/20    Strengths: "good cook, survivor"     LONG TERM GOAL:   Date Initiated: 09/02/20  1 0 I will identify three ways in which my overall day-to-day functioning has improved since attending [facility]  Target Date: 09/30/20  Completion Date:       SHORT TERM OBJECTIVES:     Date Initiated: 09/02/20  1 1 I will provide experiential feedback at least once per treatment day to build my natural support network  Revision Date:   Target Date: 09/15/20  Completion Date:     Date Initiated: 09/02/20  1 2 I will identify two relaxation techniques I have learned to help manage my anxiety  Revision Date:   Target Date: 09/15/20  Completion Date:    Date Initiated: 09/02/20  1 3 I will take medications as prescribed and share questions and concerns if arise  Revision Date:  Target Date: 09/15/20  Completion Date:     Date Initiated: 09/02/20  1 4 I will identify 3 ways my supports can assist in my recovery and agree to staff/support contact as indicated      Revision Date:  Target Date: 09/15/20  Completion Date:          7 DAY REVISION:    Date Initiated:  Revision Date:   Target Date:   Completion Date:      PSYCHIATRY:  Date Initiated:  09/02/20  Medication Management and Education       Revision Date:   The person(s) responsible for carrying out the plan is Terrance Ledesma MD    NURSING:   Date Initiated: 09/02/20  1 1,1 2,1 3,1 4 This RN will provide daily wellness group five days weekly to educate Osorio Mosley on S/S of her diagnoses and medications used in treatment  Revision Date:  The person(s) responsible for carrying out the plan is Gila Arteaga RN    PSYCHOLOGY:   Date Initiated: 09/02/20       1 1, 1 2, 1 4 Provide psychotherapy group 5 times per week to allow opportunity for Pretty Rodriguez  to explore stressors and ways of coping  Revision Date:   The person(s) responsible for carrying out the plan is Maame Altman    ALLIED THERAPY:   Date Initiated: 09/02/20  1 1,1 2 Ace Jennifer in AT group 5 times daily to encourage development and use of wellness tools to decrease symptoms and promote recovery through meaningful activity  Revision Date:   The person(s) responsible for carrying out the plan is KATHIE Paz     CASE MANAGEMENT:   Date Initiated: 09/02/20      1 0 This  will meet with Pretty Rodriguez  3-4 times weekly to assess treatment progress, discharge planning, connection to community supports and UR as indicated  Revision Date:   The person(s) responsible for carrying out the plan is Maame Altman    TREATMENT REVIEW/COMMENTS:     DISCHARGE CRITERIA: Identify 3 signs of progress and complete relapse prevention plan  DISCHARGE PLAN: Establish outpatient therapy and return to medication management  Estimated Length of Stay: 10 treatment days            Diagnosis and Treatment Plan explained to Teresa Fierro relates understanding diagnosis and is agreeable to Treatment Plan           CLIENT COMMENTS / Please share your thoughts, feelings, need and/or experiences regarding your treatment plan: _____________________________________________________________________________________________________________________________________________________________________________________________________________________________________________________________________________________________________________________ Date/Time: ______________     Patient Signature: _________________________________ Date/Time: ______________      Signature: _________________________________    Date/Time: ______________

## 2020-09-02 NOTE — PSYCH
Subjective:     Patient ID: Osorio Mosley is a 62 y o  female  [facility] Clinical Progress Notes      Specialized Services Documentation  Therapist must complete separate progress note for each specific clinical activity in which the individual participated during the day  ([id number]) Spoke with Marlee Kelsey from [facility] with a contact number [phone], using Tax ID [id number]  Location address remains [address] [place] [address]  Osorio Mosley was authorized 10 days from 09/02/20 to 09/16/2020 with an authorization code of [id number]

## 2020-09-02 NOTE — PSYCH
Reason for visit: " I became very depressed and had suicidal thoughts"    HPI     Sindi Hewitt is a 62 y o  female with History of Major Depression, recurrent without psychosis, Generalized Anxiety Disorder, Executive function Deficits due to Right Temporal Lobe resection in 2013 due to intractable Seizures  referred by Dr Breana Murrell due to expressing suicidal thoughts reporting her depression had worsened due to the pandemic  She has had less energy, has less interactions with others, decreased energy, hard to motivate herself to do things and more disorganized than usual and   because   of recent suicidal thoughts  Onset of symptoms was quick a few hours after he heard her  describe her deificits with rapidly worsening course since that time  Psychosocial Stressors: health, occupational and social    Bernadette Memo stated since the Pandemic she has lost her routine and she is more isolated  She has not been as productive and is less efficient in the things that she has to do  What brought the situation to a crisis was that she over heard her  talk to his therapist discussing her deficits  She started to have thoughts saying " blow your brain"  She became frightened and after a visit with the Psychiatrist they decided it would be a good idea to start Partial  Hospitalization Program   There was a gun in the house that has been removed and karen stated she has no access to weapons  She does not want to hurt herself and contracts for safety  During my session with Bernadette Hampton, initially she was tearful that she did not realized how she was coming across, but she wants to use this moment to be more aware of what she needs to work on  Because she has lost her routine, she tries to be busy, but ends up doing things that are not productive and she feels " empty" that she was not more productive with her time    We discussed an important goal for her treatment is to develop routines that have meaning and purpose for her  She had been in the Partial Program in the past and is looking forward to be around other that think and feel like she does  Jeraline Memo brought her information organized and with written notes so that she would not forget  She takes Citalopram 20 mg and doctor gave her a script for 10 mg  We discussed she was taking 20 mg and her doctor would like her to increase dose to 30 mg daily  She understood and agreed to plan of care                                                                                                                                                                                                                                                                                                                                                                                                                                                                                                                                                                         Review Of Systems:     Mood Anxiety, Depression and Emotional Lability   Behavior has been more disorganized and less productive since the pandemic    Thought Content Disturbing Thoughts, Feelings and Unreasonalbe or Irrational Fears   General Relationship Problems and Decreased Functioning   Personality decrease functioning since her Rt temporal lobe surgery   Other Psych Symptoms suicidal thoughts/ contracts for safety   Constitutional Feeling Poorly   ENT Negative   Cardiovascular Negative   Respiratory Negative   Gastrointestinal Negative   Genitourinary Negative   Musculoskeletal Negative   Integumentary Negative   Neurological History of Intractable Seizures and had Right Temporal Lobe resection in 2013   Endocrine History of Hashimoto thyroidis   Other Symptoms Normal        Past Psychiatric History:      Past Inpatient Psychiatric Treatment:   In Patient in Adult Behavioral health unit at [facility] from 9/14/2020 to 9/20/2020 Past Outpatient Psychiatric Treatment:    PT follows up with Dr Macie Clifford  Past Suicide Attempts:    Has expressed suicidal thoughts but no previous attempt  Past Violent Behavior:    Since the surgery had episode when became aggressive  Past Psychiatric Medication Trials:    Citalopram, Mirtazapine    Family Psychiatric History:   Family History   Problem Relation Age of Onset    Aortic aneurysm Mother     No Known Problems Father        Social History:    Education: some college  Learning Disabilities: none  Marital history:   Living arrangement, social support: Lives with her   He is suffering from [address]  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, in [address]: good relationship with spouse or significant other  Other Pertinent History: no legal problems or  history    Social History     Substance and Sexual Activity   Drug Use No   Mother living at [address] [place] [address]  Father   Son in [place] recently had baby daughter  Another son in the [facility]    PT denied family history of Mental Illness and no substance abuse or suicide attempts  Traumatic History:       Abuse: denied  Other Traumatic Events: denied    The following portions of the patient's history were reviewed and updated as appropriate: allergies, current medications, past family history, past medical history, past social history, past surgical history and problem list        Mental status:  Appearance calm and cooperative , adequate hygiene and grooming and fidgeting with her hands   Mood depressed and anxious   Affect affect appropriate    Speech some delays as she answered   Thought Processes coherent   Hallucinations no hallucinations present    Thought Content no delusions   Abnormal Thoughts passive/fleeting thoughts of suicide   Orientation  oriented to person and place and time   Remote Memory long term memory intact and at times difficulty with short term memory    Attention Span concentration impaired   Intellect Appears to be of Average Intelligence   Fund of Knowledge displays adequate knowledge of current events   Insight improving   Judgement improving   Muscle Strength Normal gait    Language articulate   Pain denied   Pain Scale 0         Laboratory Results: No results found for this or any previous visit  Assessment/Plan: Shreyas Tay is a 62year old female with past history of depression, anxiety, status post brain surgery to address intractable seizures  Post surgery she has been more depressed and anxious and executive function deficits  She became suicidal when she overheard  mention her deficits to his therapist   She meets criteria for admission to Partial hospitalization Program and she is currently ike for safety  Diagnoses and all orders for this visit:    Severe episode of recurrent major depressive disorder, without psychotic features ([facility] )  -     citalopram (CeleXA) 10 mg tablet; Take one tablet by mouth in the morning ( total of 30 mg as per Dr Melly Martínez)    Executive function deficit          Treatment Recommendations- Risks Benefits: Discussed         Immediate Medical/Psychiatric/Psychotherapy Treatments and Any Precautions: Discussed    Risks, Benefits And Possible Side Effects Of Medications:  Risks, benefits, and possible side effects of medications explained to patient and patient verbalizes understanding    Controlled Medication Discussion: no controlled substances      [facility] Physician's Orders     Admit to: Partial Hospitalization, 5 x per week, for 15 days  Vital signs Routine  Diet Regular  Group Psychotherapy 9 x per week  Allied Therapy Group 6 x per week  Diagnosis:   1  Severe episode of recurrent major depressive disorder, without psychotic features ([facility] )  citalopram (CeleXA) 10 mg tablet   2   Executive function deficit       Medications:   Current Outpatient Medications:     citalopram (CeleXA) 10 mg tablet, Take one tablet by mouth in the morning ( total of 30 mg as per Dr Matthieu Hutchinson), Disp: 30 tablet, Rfl: 0    citalopram (CeleXA) 20 mg tablet, Take 1 tablet by mouth daily, Disp: 30 tablet, Rfl: 0    lacosamide (VIMPAT) 100 mg tablet, Take 1 tablet (100 mg total) by mouth every 12 (twelve) hours, Disp: 30 tablet, Rfl: 0    mirtazapine (REMERON) 15 mg tablet, Take 1 tablet by mouth daily at bedtime, Disp: 30 tablet, Rfl: 0    Na Sulfate-K Sulfate-Mg Sulf 17 5-3 13-1 6 GM/177ML SOLN, Take 1 kit by mouth once for 1 dose, Disp: 2 Bottle, Rfl: 0    I certify that the continuation of Partial Hospitalization services is medically necessary to improve and/or maintain the patients condition and functional level, and to prevent relapse or hospitalization, and that this could not be done at a less intensive level of care  

## 2020-09-02 NOTE — PSYCH
Subjective:     Patient ID: Melinda Lau is a 62 y o  female  [facility] Clinical Progress Notes      Specialized Services Documentation  Therapist must complete separate progress note for each specific clinical activity in which the individual participated during the day  Other   This case management session was facilitated virtually in a private office using [employer] and Approved [employer] Teams  Melinda Lau consented to the use of tele-video modality of treatment  Case Management Note    Dalia Groves [facility]    Current suicide risk : Low     INDIVIDUAL PSYCHOTHERAPY [id number] CM engaged with Melinda Lau  Reviewed program structure changes, expectations and was given on call number and crisis phone numbers via verbally and in email  Melinda Lau completed necessary releases and OP providers/ PCP notified of admission and health care coordination form completed if requested by client  Completed initial psycho-social evaluation and initial treatment goals discussed  Developed treatment plan collaboratively with client  Reviewed virtual structure and discussed changes in program dynamic as well as guidelines implemented for optimal success  Discussed verbal consents for treatment and ROIs and overall expectations for the virtual platform  Reviewed any technological questions they had to ensure group participation the following treatment day  Medications changes/added/denied? No     Treatment session number: Assessment only    Individual Case Management Visit provided today?  Yes     [facility] follow up physician's orders: None at this time

## 2020-09-03 ENCOUNTER — OFFICE VISIT (OUTPATIENT)
Dept: PSYCHOLOGY | Facility: CLINIC | Age: 60
End: 2020-09-03
Payer: COMMERCIAL

## 2020-09-03 DIAGNOSIS — F33.2 SEVERE EPISODE OF RECURRENT MAJOR DEPRESSIVE DISORDER, WITHOUT PSYCHOTIC FEATURES (HCC): Primary | ICD-10-CM

## 2020-09-03 DIAGNOSIS — F41.1 GENERALIZED ANXIETY DISORDER: ICD-10-CM

## 2020-09-03 PROCEDURE — S0201 PARTIAL HOSPITALIZATION SERV: HCPCS

## 2020-09-03 PROCEDURE — G0410 GRP PSYCH PARTIAL HOSP 45-50: HCPCS

## 2020-09-03 PROCEDURE — G0177 OPPS/PHP; TRAIN & EDUC SERV: HCPCS

## 2020-09-03 PROCEDURE — G0176 OPPS/PHP;ACTIVITY THERAPY: HCPCS

## 2020-09-03 PROCEDURE — 90832 PSYTX W PT 30 MINUTES: CPT

## 2020-09-03 NOTE — PSYCH
Subjective:     Patient ID: Curt Manzanares is a 62 y o  female  [facility] Clinical Progress Notes      Specialized Services Documentation  Therapist must complete separate progress note for each specific clinical activity in which the individual participated during the day  GROUP PSYCHOTHERAPY (6815-3019) Group was co-facilitated virtually in a private office using HIPAA Compliant and Approved [employer] Teams  Curt Manzanares consented to the use of tele-video modality of treatment and was virtually present for group psychotherapy today  Psychotherapy community outreach group focused on decreasing self- stigma and increasing available supports  Clients attentively listened to [facility] share his life story  Group encouraged power of learning about self, accepting mental health and personal responsibility in recovery and maintenance of wellness  Community resources reviewed in addition to personal resources including self talk/kerrie Vásquez Million was attentive and engaged in group  She is encouraged to continue group to encourage self-awareness and healthy engagement of supports  TX Plan Objective: 1 4   Therapists:  Kavin Resendez MA, LPC, Paz Guillaume, [address] [place] [address] [place]        Education Therapy  This group was facilitated virtually in a private office using HIPAA Compliant and Approved [employer] Teams  Curt Manzanares consented to the use of tele-video modality of treatment    Time:  2483-0196  Previous goal met: Yes   Readiness to Learning: Receptive  Barriers to Learning: None  Learning Assessment  Time: 0500-9590  Education Completed: Aftercare, Allied therapy, Wellness Tools and Psychotherapy   Teaching Method: Verbal, A/V and Demonstration  Shared Area of Learning: Yes   Goal Set: Walk my dog  Tx Plan Objective: 1 4, Therapist: Kavin Resendez MA, LPC

## 2020-09-03 NOTE — PSYCH
Subjective:     Patient ID: Kayleigh Lake is a 62 y o  female  [facility] Clinical Progress Notes      Specialized Services Documentation  Therapist must complete separate progress note for each specific clinical activity in which the individual participated during the day  Allied Therapy   This group was facilitated virtually in a private office using [employer] and Approved [employer] Teams  Kayleigh Lake consented to the use of tele-video modality of treatment  0814-0815 Kayleigh Lake actively shared in [facility] group focused on self- care  Benjamin was encouraged to identify aspects of self-care and barriers to it  The concept of self -care versus selfishness explored  Max identified aspects as self-care and barriers to it  Group reinforced the necessity of self -care in wellness versus seeing this as a luxury and tips to increase self-care  a mindfulness exercise  introduced and practiced  When asked at end of a specific thing she could commit to in order to increase self-care, she stated she could work on setting time aside for take dog for a walk  Some beginning progress voiced toward goal   Continue AT to engage in the development and practice of wellness tools           Tx Plan Objective: 1 2, Therapist:  Coral VÁZQUEZ

## 2020-09-03 NOTE — PSYCH
Subjective:     Patient ID: Martha Hinson is a 62 y o  female  [facility] Clinical Progress Notes      Case Management Note    Brandon Lion MA    Current suicide risk : Low     (4728- 2286 ) Kae Stevens enjoyed seeing everyone in group and picked up on things to work on  She felt she will work on accepting responsibility  She stated, "I haven't been being responsible for myself and I have a lot work on"  Her goal for today is to get dog out for a walk and trying to think "more positively"  Kae Stevens explained that she started the new prescription of medications changed by Dr Fredirick Litten at initial assessment  Kae Stevens disclosed that he was clashing with  and not feeling supported when needing it  She wants "reach out for more support"  She stated, "I am feeling optimistic"     Medications changes/added/denied? No    Treatment session number: 1    Individual Case Management Visit provided today? Yes     [facility] follow up physician's orders: none at this time

## 2020-09-03 NOTE — PSYCH
Subjective:     Patient ID: Perez Black is a 62 y o  female  [facility] Clinical Progress Notes      Specialized Services Documentation  Therapist must complete separate progress note for each specific clinical activity in which the individual participated during the day  Group Psychotherapy     ([id number]) Group was facilitated virtually in a private office using HIPAA Compliant and Approved [employer] Teams  Perez Black consented to the use of tele-video modality of treatment and was virtually present for group psychotherapy process today  Perez Black  actively engaged in wellness group entitled [employer]  Group opened by discussing verbal vs non verbal communication and the benefits on developing good social skills  The group then played Social Bingo which featured a board with social prompts on it  When members got a letter and number combination from their board, they used the prompt to talk about themselves, something relevant to treatment or the world today  Activity helped the group to develop social talking points and to learn about each other  Good progress towards goal noted through participation and peer support  Continue wellness and psychoeducational groups to educate on the importance of developing social skills    Tx Plan Objective: 1 4, Therapist:  Eladio Celis RN

## 2020-09-03 NOTE — PSYCH
Virtual Regular Visit      Assessment/Plan:    Problem List Items Addressed This Visit        Other    Severe episode of recurrent major depressive disorder, without psychotic features ([facility] [address] ) - Primary (Chronic)    Generalized anxiety disorder               Reason for visit is No chief complaint on file  Encounter provider [facility]    Provider located at [address] [place] [phone]      Recent Visits  Date Type Provider Dept   20 Office Visit [facility]   Showing recent visits within past 7 days and meeting all other requirements     Today's Visits  Date Type Provider Dept   20 Office Visit [facility]   Showing today's visits and meeting all other requirements     Future Appointments  No visits were found meeting these conditions  Showing future appointments within next 150 days and meeting all other requirements        The patient was identified by name and date of birth  Pretty Najerabecca was informed that this is a telemedicine visit and that the visit is being conducted through [employer]  My office door was closed  No one else was in the room  She acknowledged consent and understanding of privacy and security of the video platform  The patient has agreed to participate and understands they can discontinue the visit at any time  Patient is aware this is a billable service  Derek Galvan is a 62 y o  female         HPI     Past Medical History:   Diagnosis Date    Anxiety     Concussion     Last assessed 2017    Depression     ISCHEMIC 2013     ISCHEMIC 2013    Seizures [facility])        Past Surgical History:   Procedure Laterality Date    BRAIN SURGERY      IMMED FOLLOWING STROKE IN 2013     SECTION         Current Outpatient Medications   Medication Sig Dispense Refill    citalopram (CeleXA) 10 mg tablet Take one tablet by mouth in the morning ( total of 30 mg as per Dr Migdalia Bowles) 30 tablet 0    citalopram (CeleXA) 20 mg tablet Take 1 tablet by mouth daily 30 tablet 0    lacosamide (VIMPAT) 100 mg tablet Take 1 tablet (100 mg total) by mouth every 12 (twelve) hours 30 tablet 0    mirtazapine (REMERON) 15 mg tablet Take 1 tablet by mouth daily at bedtime 30 tablet 0    Na Sulfate-K Sulfate-Mg Sulf 17 5-3 13-1 6 GM/177ML SOLN Take 1 kit by mouth once for 1 dose 2 Bottle 0     No current facility-administered medications for this visit  No Known Allergies    Review of Systems    Video Exam    There were no vitals filed for this visit  Physical Exam     I spent FOUR GROUP HOURS PLUS CASE MANAGEMENT minutes with patient today in which greater than 50% of the time was spent in counseling/coordination of care regarding PHP - SEE NOTES  VIRTUAL VISIT DISCLAIMER    Martha MIGEL Zeus Barbara acknowledges that she has consented to an online visit or consultation  She understands that the online visit is based solely on information provided by her, and that, in the absence of a face-to-face physical evaluation by the physician, the diagnosis she receives is both limited and provisional in terms of accuracy and completeness  This is not intended to replace a full medical face-to-face evaluation by the physician  Martha Hinson understands and accepts these terms

## 2020-09-04 ENCOUNTER — OFFICE VISIT (OUTPATIENT)
Dept: PSYCHOLOGY | Facility: CLINIC | Age: 60
End: 2020-09-04
Payer: COMMERCIAL

## 2020-09-04 DIAGNOSIS — F33.2 SEVERE EPISODE OF RECURRENT MAJOR DEPRESSIVE DISORDER, WITHOUT PSYCHOTIC FEATURES (HCC): Primary | Chronic | ICD-10-CM

## 2020-09-04 PROCEDURE — G0177 OPPS/PHP; TRAIN & EDUC SERV: HCPCS

## 2020-09-04 PROCEDURE — S0201 PARTIAL HOSPITALIZATION SERV: HCPCS

## 2020-09-04 PROCEDURE — G0410 GRP PSYCH PARTIAL HOSP 45-50: HCPCS

## 2020-09-04 PROCEDURE — G0176 OPPS/PHP;ACTIVITY THERAPY: HCPCS

## 2020-09-04 NOTE — PSYCH
Subjective:     Patient ID: Pat Guadalupe is a 62 y o  female  Innovations Clinical Progress Notes      Specialized Services Documentation  Therapist must complete separate progress note for each specific clinical activity in which the individual participated during the day  Allied Therapy   This group was facilitated virtually in a private office using [employer] and Approved [employer] Teams  Pat Guadalupe consented to the use of tele-video modality of treatment  [id number] Pat Guadalupe actively shared in [facility] group focused on DBT emotional regulation skill accumulating positive emotion and weekend planning  Group emphasized participating and focusing on positive tasks to change uncomfortable emotions (active versus passive process)  Benjamin was given Pleasant Events List and shared she could rent movies and get pizza to work on increasing pleasant events this weekend  Some beginning exploration of goal observed and shared  Continue AT to encourage the awareness of strengths; strategies and personal application        Tx Plan Objective: 1 2, Therapist:  Urszula VÁZQUEZ

## 2020-09-04 NOTE — PSYCH
Subjective:     Patient ID: Ozzie Moritz is a 62 y o  female  Innovations Clinical Progress Notes      Specialized Services Documentation  Therapist must complete separate progress note for each specific clinical activity in which the individual participated during the day  Group Psychotherapy     (6133-2805) Group was facilitated virtually in a private office using HIPPA Compliant and Approved Microsoft Teams  Ozzie Moritz consented to the use of tele-video modality of treatment and was virtually present for group psychotherapy process today  Ozzie Moritz actively shared in psychotherapy group which focused on Weekly Wellness Assessment  Theyengaged in self-rate and discussion  Group members individually went through the assessment and rated themselves based on the past week  Group members shared assessment results  Group discussed the six domains of wellness; physical, emotional, cognitive, vocational, social, and spiritual  Group discussed strengths, challenges, barriers, and ways to increase each domain in and open forum and developed goals  Good progress towards goal observed and shared  Continue psychotherapy to further encourage self-reflection on strengths and challenges with personal wellness      Tx Plan Objective:  1 1,1 2,1 3,1 4 Therapist:  Barbie Nick RN

## 2020-09-04 NOTE — PSYCH
Virtual Regular Visit      Assessment/Plan:    Problem List Items Addressed This Visit        Other    Severe episode of recurrent major depressive disorder, without psychotic features ([facility] [address] ) - Primary (Chronic)               Reason for visit is VIRTUAL PHP GROUP DUE TO COVID-19  Encounter provider [facility]    Provider located at [address] [address]    The patient was identified by name and date of birth  Cornelius Rojo was informed that this is a telemedicine visit and that the visit is being conducted through [employer]  My office door was closed  No one else was in the room  She acknowledged consent and understanding of privacy and security of the video platform  The patient has agreed to participate and understands they can discontinue the visit at any time  Patient is aware this is a billable service  Jo Ann Mitchell is a 62 y o  female    I spent FOUR GROUP HOURS PLUS CASE MANAGEMENT minutes with patient today in which greater than 50% of the time was spent in counseling/coordination of care regarding PHP - SEE NOTES  VIRTUAL VISIT DISCLAIMER    Martha MIGEL Lew acknowledges that she has consented to an online visit or consultation  She understands that the online visit is based solely on information provided by her, and that, in the absence of a face-to-face physical evaluation by the physician, the diagnosis she receives is both limited and provisional in terms of accuracy and completeness  This is not intended to replace a full medical face-to-face evaluation by the physician  Cornelius Rojo understands and accepts these terms

## 2020-09-04 NOTE — PSYCH
Subjective:     Patient ID: Rolando Greene is a 62 y o  female  Innovations Clinical Progress Notes      Specialized Services Documentation  Therapist must complete separate progress note for each specific clinical activity in which the individual participated during the day  Education Therapy   Time:  0352-3354  Previous goal met: No  Readiness to Learning: Receptive  Barriers to Learning: None  Learning Assessment  Time: 9742-0701  Education Completed: Illness, Medication, Aftercare and Wellness Tools  Teaching Method: Verbal, Written, A/V and Demonstration  Shared Area of Learning: Yes   Goal Set: to walk her dog  Tx Plan Objective: 1 1,1 2, 1 4 Therapist:  Chapo Ha MA      Case Management Note    Chapo Ha MA    Current suicide risk: Low     A case management session is not scheduled today with Rolando Greene; additionally, they did not request a CM meeting  Next scheduled session is 09/08/2020  Medications changes/added/denied? No    Treatment session number: 2    Individual Case Management Visit provided today?  No    Innovations follow up physician's orders: None at this time

## 2020-09-08 ENCOUNTER — OFFICE VISIT (OUTPATIENT)
Dept: PSYCHOLOGY | Facility: CLINIC | Age: 60
End: 2020-09-08
Payer: COMMERCIAL

## 2020-09-08 DIAGNOSIS — F41.9 ANXIETY: ICD-10-CM

## 2020-09-08 DIAGNOSIS — F33.2 SEVERE EPISODE OF RECURRENT MAJOR DEPRESSIVE DISORDER, WITHOUT PSYCHOTIC FEATURES (HCC): Primary | Chronic | ICD-10-CM

## 2020-09-08 PROCEDURE — S0201 PARTIAL HOSPITALIZATION SERV: HCPCS

## 2020-09-08 PROCEDURE — G0176 OPPS/PHP;ACTIVITY THERAPY: HCPCS

## 2020-09-08 PROCEDURE — G0177 OPPS/PHP; TRAIN & EDUC SERV: HCPCS

## 2020-09-08 PROCEDURE — G0410 GRP PSYCH PARTIAL HOSP 45-50: HCPCS

## 2020-09-08 NOTE — PSYCH
Subjective:     Patient ID: Ricarda Denver is a 62 y o  female  Innovations Clinical Progress Notes      Specialized Services Documentation  Therapist must complete separate progress note for each specific clinical activity in which the individual participated during the day  Education Therapy   This group was facilitated virtually in a private office using [employer] and Approved [employer] Teams    Ricarda Denver consented to the use of tele-video modality of treatment '  Time:  0614-4178  Previous goal met: Yes   Readiness to Learning: Receptive  Barriers to Learning: None  Learning Assessment  Time: 8589-1340  Education Completed: Illness, Medication and Wellness Tools  Teaching Method: Verbal, Written and A/V  Shared Area of Learning: Yes   Goal Set: focus on being in the present  Tx Plan Objective: 1 2, Therapist:  Cami Orellana LCSW

## 2020-09-08 NOTE — PSYCH
Subjective:     Patient ID: Osorio Mosley is a 62 y o  female  Innovations Clinical Progress Notes      Specialized Services Documentation  Therapist must complete separate progress note for each specific clinical activity in which the individual participated during the day  Group Psychotherapy (4804-2148) Group was facilitated virtually in a private office using HIPAA Compliant and Approved Microsoft Teams  Osorio Mosley consented to the use of tele-video modality of treatment and was virtually present for group psychotherapy process today  Discussion today focusing on an open forum, which presented clients with the space to share current challenges and stressors related to specifically why they chose to come to treatment now  Clients spoke about their challenges with COVID-19  Clients engaged in the altruistic aspect of process psychotherapy and engaged in the connections with others as a means to overcome challenges  Eduard Hayes made progress toward goal through group participation and is encouraged to continue progressing towards long term goals through program compliance and participation      Tx Plan Objective: 1 4 Therapist:  Chele Ferrer RN

## 2020-09-08 NOTE — PSYCH
Subjective:     Patient ID: Jason Cramer is a 62 y o  female  Innovations Clinical Progress Notes      Specialized Services Documentation  Therapist must complete separate progress note for each specific clinical activity in which the individual participated during the day  Case Management Note    Marquis Lua MA    Current suicide risk : Low     ([phone] ) Attempted Case Management session with Jason Cramer  Left voicemail with Martha to call back for meeting  Medications changes/added/denied? No    Treatment session number: 3    Individual Case Management Visit provided today? yes    Innovations follow up physician's orders: none at this time

## 2020-09-08 NOTE — PSYCH
Subjective:     Patient ID: Osorio Mosley is a 62 y o  female  Innovations Clinical Progress Notes      Specialized Services Documentation  Therapist must complete separate progress note for each specific clinical activity in which the individual participated during the day  Allied Therapy   This group was facilitated virtually in a private office using [employer] [place] and Approved [employer] Teams  Osorio Mosley consented to the use of tele-video modality of treatment  [id number] Osorio Mosley  actively shared in [facility] group focused on relaxation techniques and concept of mindfulness  Chapaadan Hayes was observed to be engaged in therapist led progressive muscle relaxation exercises  Goals of mindfulness reviewed and she identified a specific way to utilize mindfulness tonight when she sits outside  Group discussed specific ways to practice refocusing thoughts to the present and offered encouragement to use these things regularly to manage stressors consistently  Some beginning effort noted toward tx goal   Continue AT to encourage development of wellness skills and consistent practice        Tx Plan Objective: 1 1,1 2, Therapist:  Duc Celis MT-BC

## 2020-09-08 NOTE — PSYCH
Virtual Regular Visit      Assessment/Plan:    Problem List Items Addressed This Visit        Other    Severe episode of recurrent major depressive disorder, without psychotic features ([facility] [address] ) - Primary (Chronic)    Anxiety               Reason for visit is VIRTUAL PHP GROUP DUE TO COVID-19  Encounter provider [facility]    Provider located at [address] [address]    The patient was identified by name and date of birth  Cat Quiroz was informed that this is a telemedicine visit and that the visit is being conducted through [employer]  My office door was closed  No one else was in the room  She acknowledged consent and understanding of privacy and security of the video platform  The patient has agreed to participate and understands they can discontinue the visit at any time  Patient is aware this is a billable service  Zulema Blank is a 62 y o  female   I spent FOUR GROUP HOURS PLUS CASE MANAGEMENT minutes with patient today in which greater than 50% of the time was spent in counseling/coordination of care regarding PHP - SEE NOTES  VIRTUAL VISIT DISCLAIMER    Martha Castrouson acknowledges that she has consented to an online visit or consultation  She understands that the online visit is based solely on information provided by her, and that, in the absence of a face-to-face physical evaluation by the physician, the diagnosis she receives is both limited and provisional in terms of accuracy and completeness  This is not intended to replace a full medical face-to-face evaluation by the physician  Cat Quiroz understands and accepts these terms

## 2020-09-08 NOTE — PSYCH
Subjective:     Patient ID: Rabia Cast is a 62 y o  female  Innovations Clinical Progress Notes      Specialized Services Documentation  Therapist must complete separate progress note for each specific clinical activity in which the individual participated during the day  Group Psychotherapy     ([id number]) Group was facilitated virtually in a private office using HIPAA Compliant and Approved [employer] Teams  Rabia Cast consented to the use of tele-video modality of treatment and was virtually present for group psychotherapy process today  ,name  engaged in psychotherapy group which focused on gratitude and gratitude journaling  Group started by discussing the benefits of practicing gratitude for spiritual, emotional and mental health  Group then discussed how to journal  The group was given a list of prompts in which to utilize to make gratitude journaling easier  They used the prompts to develop a journal entry to share with the group  Peers discussed the value of gratitude lists and made a short list of 5 things they are grateful for  Mary Dys participated when prompted  She attempted to support others and participate throughout group  Moderate progress toward goal shared and observed  Continue psychotherapy group to educate on the importance of practicing gratitude for mental health      Tx Plan Objective: 1 1,1 2,1 4 Therapist:  Greer Suero RN

## 2020-09-09 ENCOUNTER — OFFICE VISIT (OUTPATIENT)
Dept: PSYCHOLOGY | Facility: CLINIC | Age: 60
End: 2020-09-09
Payer: COMMERCIAL

## 2020-09-09 DIAGNOSIS — G40.219 COMPLEX PARTIAL EPILEPSY WITH GENERALIZATION AND WITH INTRACTABLE EPILEPSY (HCC): Primary | ICD-10-CM

## 2020-09-09 PROCEDURE — 90832 PSYTX W PT 30 MINUTES: CPT

## 2020-09-09 PROCEDURE — G0176 OPPS/PHP;ACTIVITY THERAPY: HCPCS

## 2020-09-09 PROCEDURE — S0201 PARTIAL HOSPITALIZATION SERV: HCPCS

## 2020-09-09 PROCEDURE — G0410 GRP PSYCH PARTIAL HOSP 45-50: HCPCS

## 2020-09-09 PROCEDURE — G0177 OPPS/PHP; TRAIN & EDUC SERV: HCPCS

## 2020-09-09 NOTE — PSYCH
Virtual Regular Visit      Assessment/Plan:    Problem List Items Addressed This Visit        Nervous and Auditory    Complex partial epilepsy with generalization and with intractable epilepsy ([facility] [address] ) - Primary               Reason for visit is VIRTUAL PHP GROUP DUE TO COVID-19  Encounter provider [facility]    Provider located at [address] [address]    The patient was identified by name and date of birth  Kennedi Tabares was informed that this is a telemedicine visit and that the visit is being conducted through [employer]  My office door was closed  No one else was in the room  She acknowledged consent and understanding of privacy and security of the video platform  The patient has agreed to participate and understands they can discontinue the visit at any time  Patient is aware this is a billable service  Cristiano Dc is a 62 y o  female   I spent FOUR GROUP HOURS PLUS CASE MANAGEMENT minutes with patient today in which greater than 50% of the time was spent in counseling/coordination of care regarding PHP - SEE NOTES  VIRTUAL VISIT DISCLAIMER    Martha Montes acknowledges that she has consented to an online visit or consultation  She understands that the online visit is based solely on information provided by her, and that, in the absence of a face-to-face physical evaluation by the physician, the diagnosis she receives is both limited and provisional in terms of accuracy and completeness  This is not intended to replace a full medical face-to-face evaluation by the physician  Kennedi Tabares understands and accepts these terms

## 2020-09-09 NOTE — PSYCH
Subjective:     Patient ID: Curt Manzanares is a 62 y o  female  Innovations Clinical Progress Notes      Specialized Services Documentation  Therapist must complete separate progress note for each specific clinical activity in which the individual participated during the day  Group Psychotherapy [id number]  This group was facilitated virtually in a private office using [employer] and Approved Microsoft Teams  Curt Manzanares consented to the use of tele-video modality of treatment  Psychotherapy group focused on building self esteem  This psychotherapy group is a continuation of yesterdays, 20  Different wellness tools to improve self esteem were explored includin)  managing your inner critic  2) focusing on what is going well for you  3) aiming for effort rather than perfection  4) viewing mistakes as learning opportunities  5) editing  thoughts that get you to feel inferior  6) reminding yourself that everyone excels at different things  7) trying new things and giving yourself credit  8) recognizing what you can change and what you cant  9) setting goals  10)  taking pride in your opinions and ideas  11)  accepting compliments  12)  making a contribution  Thalia Vance was provided a list of positive affirmations to begin incorporating into her day to day routine  Through group discussion, Thalia Vance shared that one way she can begin improving her self esteem is by practicing being fair to herself, focusing on daily gratitude, and her accomplishments  Martha shared how social media has been a trigger to her low self esteem in the past   Due to this, Thalia Vance removed herself from all social media platforms  Some positive progress toward goals  Thalia Vance is encouraged to continue making progress towards goals and objectives through group participation and will continue to attend psychotherapy group     Tx Plan Objective: 1 1, Therapist:  Preston Sam LCSW

## 2020-09-09 NOTE — PSYCH
Subjective:     Patient ID: Broderick Cadet is a 62 y o  female  Innovations Clinical Progress Notes      Specialized Services Documentation  Therapist must complete separate progress note for each specific clinical activity in which the individual participated during the day  Allied Therapy   This group was facilitated virtually in a private office using [employer] and Approved [employer] Teams  Broderick Cadet consented to the use of tele-video modality of treatment  [id number] Broderick Cadet  actively shared in [facility] group exploring DBT distress tolerance crisis survival strategies  Group explored crisis survival strategies ACCEPTS, TIP, STOP, and PROS & CONS) reinforcing actions one could take to learn to tolerate stressful experiences, thoughts and urges  Chanel Byrne is engaged and participates  She felt she could put effort into practicing the STOP skill  Positive beginning progress toward goal noted  Continue AT to encourage learning, practice and home practice of skills to manage distress  Tx Plan Objective: 1 1,1 2 Therapist:  Azalea VÁZQUEZ    Education Therapy   This group was facilitated virtually in a private office using [employer] and Approved [employer] Teams  Broderick Cadet consented to the use of tele-video modality of treatment       Time:  3094-6922  Previous goal met: Yes   Readiness to Learning: Receptive  Barriers to Learning: None  Learning Assessment  Time: 6163-0103  Education Completed: Illness and Wellness Tools  Teaching Method: Verbal, A/V and Demonstration  Shared Area of Learning: Yes   Goal Set: go to the food store  Tx Plan Objective: 1 2, Therapist:  Azalea VÁZQUEZ

## 2020-09-09 NOTE — PSYCH
Subjective:     Patient ID: Aldo Novak is a 62 y o  female  Innovations Clinical Progress Notes      Specialized Services Documentation  Therapist must complete separate progress note for each specific clinical activity in which the individual participated during the day  Case Management Note    Hannah Howard MA    Current suicide risk : Low     (9335 - 46) Talita Velasquez was out shopping at [employer] at time of case management call  "It's a simple pleasure"  She identified being good and sleeping good  Sanonjaclyn Velasquez is feeling like her concentration is much better  She is engaging in mindfulness and deep breathing   "I am happy that I am there with the group"  Talita Velasquez took down information about [employer] groups and [employer] community resources  Talita Eva has been scheduling time to walk the dogs  A goal she wants to accomplish is waking up early and go out for a 2 block walk  Constance Lemus and her  have been working on putting a roof on their shed  Talita Velasquez stated that her  being home and structure of group has been helpful  Talita Velasquez explained that her  has Parkinson's and she has been helping him get things done around the house  Talita Velasquez discussed ways that she could incorporate a similar structure to keep her motivation active  Medications changes/added/denied? No    Treatment session number: 4    Individual Case Management Visit provided today?  Yes

## 2020-09-10 ENCOUNTER — OFFICE VISIT (OUTPATIENT)
Dept: PSYCHOLOGY | Facility: CLINIC | Age: 60
End: 2020-09-10
Payer: COMMERCIAL

## 2020-09-10 DIAGNOSIS — F33.2 SEVERE EPISODE OF RECURRENT MAJOR DEPRESSIVE DISORDER, WITHOUT PSYCHOTIC FEATURES (HCC): Primary | Chronic | ICD-10-CM

## 2020-09-10 PROCEDURE — G0177 OPPS/PHP; TRAIN & EDUC SERV: HCPCS

## 2020-09-10 PROCEDURE — 90832 PSYTX W PT 30 MINUTES: CPT

## 2020-09-10 PROCEDURE — S0201 PARTIAL HOSPITALIZATION SERV: HCPCS

## 2020-09-10 PROCEDURE — G0176 OPPS/PHP;ACTIVITY THERAPY: HCPCS

## 2020-09-10 PROCEDURE — G0410 GRP PSYCH PARTIAL HOSP 45-50: HCPCS

## 2020-09-10 NOTE — PSYCH
Subjective:     Patient ID: Eliana Qiu is a 62 y o  female  Innovations Clinical Progress Notes      Specialized Services Documentation  Therapist must complete separate progress note for each specific clinical activity in which the individual participated during the day  Allied Therapy   This group was facilitated virtually in a private office using [email or web address] and Approved [employer] Teams  Eliana Qiu consented to the use of tele-video modality of treatment  [id number] Eliana Qiu  actively shared in [facility] group focused on assertiveness  Exercise explored communication styles and value of assertiveness as discussion focused on ways to increase assertiveness  Group discussed impact on treatment and ways to increase assertive behavior in getting needs met  Martha engaged and shared appropriate feedback  Some effort noted toward goal   Continue AT to explore wellness strategies and encourage personal practice       Tx Plan Objective: 1 1,1 2, Therapist:  Phong VÁZQUEZ

## 2020-09-10 NOTE — PSYCH
Subjective:     Patient ID: Mariano Wilde is a 62 y o  female  Innovations Clinical Progress Notes      Specialized Services Documentation  Therapist must complete separate progress note for each specific clinical activity in which the individual participated during the day  Group Psychotherapy     (8689-1877) Group was facilitated virtually in a private office using HIPAA Compliant and Approved [employer] Teams  Mariano Wilde consented to the use of tele-video modality of treatment and was virtually present for group psychotherapy process today  Mariano Wilde  actively participated in psychoeducational group on mental health  The group played mental health jeopardy  Group learned about diagnosis, treatments, coping skills, social supports and self-esteem  Group was able to use questions as prompts for further discussions  She was engaged and participatory through entire game  Good progress towards goal  Continue psychoeducational group to educate about mental health and treatments      Tx Plan Objective: 1 3, 1 4 Therapist:  Ruth Galindo RN

## 2020-09-10 NOTE — PSYCH
Subjective:     Patient ID: Rex Dennis is a 62 y o  female  Innovations Clinical Progress Notes      Specialized Services Documentation  Therapist must complete separate progress note for each specific clinical activity in which the individual participated during the day  GROUP PSYCHOTHERAPY (1160 - 7979) Group was facilitated virtually in a private office using HIPAA Compliant and Approved Microsoft Teams  Rex Dennis consented to the use of tele-video modality of treatment and was virtually present for group psychotherapy today  The group engaged in education about emotions, identifying triggers and coping skills  Clients participated actively by engaging in mindfulness to become more aware of triggers  Alejandra Briscoe shared that she is unsure of her triggers  She explained her tendency is to feel numb and "emotionally shut down"  Alejandra Briscoe agreed to be receptive to triggers and make note of when she finds herself shutting down the most  Alejandra Briscoe continues to make progress towards goals through verbal participation in group; to accomplish long term goals continue to utilize skills learned in programming  TX Plan Objectives: 1 1, 1 2, 1 4   Therapist: Ani Patton MA      Education Therapy   Time:  1496-0853  Previous goal met: Yes   Readiness to Learning: Receptive  Barriers to Learning: None  Learning Assessment  Time: 6572-0508  Education Completed: Illness, Medication, Aftercare and Wellness Tools  Teaching Method: Verbal, Written, A/V and Demonstration  Shared Area of Learning: Yes   Goal Set: to clean her kitchen  Tx Plan Objective: 1 1, 1 2, 1 4, Therapist:  Ani Patton MA      Case Management Note    Ani Patton MA    Current suicide risk : Low     (3093 - 2318) Martha admitted to wanting to join in on [employer] Groups for Thursday evening    Alejandra Briscoe resonated with being passive aggressive and putting other first   She would like to work on telling people what is on her mind rather than holding everything in  She understands "that people can't read minds"  Ibrahima Rodriguez was concerned that she was only approved until 09/11/2020  This writer informed Ibrahima Rodriguez that she was approved until 09/16/2020  Ibrahima Rodriguez stated that she is going to call Dr Che Niño for follow-up appointments to begin for discharge  Ibrahima Rodriguez is interested in beginning outpatient therapy and will be provided a list of recommendations within network to begin calling for an initial assessment  Medications changes/added/denied? No    Treatment session number: 4    Individual Case Management Visit provided today? Yes     Innovations follow up physician's orders: none at this time

## 2020-09-10 NOTE — PSYCH
Virtual Regular Visit      Assessment/Plan:    Problem List Items Addressed This Visit        Other    Severe episode of recurrent major depressive disorder, without psychotic features ([facility] [address] ) - Primary (Chronic)               Reason for visit is VIRTUAL PHP GROUP DUE TO COVID-19  Encounter provider [facility]    Provider located at [address] [address]    The patient was identified by name and date of birth  Leta Love was informed that this is a telemedicine visit and that the visit is being conducted through [employer]  My office door was closed  No one else was in the room  She acknowledged consent and understanding of privacy and security of the video platform  The patient has agreed to participate and understands they can discontinue the visit at any time  Patient is aware this is a billable service  Khai Sun is a 62 y o  female   I spent FOUR GROUP HOURS PLUS CASE MANAGEMENT minutes with patient today in which greater than 50% of the time was spent in counseling/coordination of care regarding PHP - SEE NOTES  VIRTUAL VISIT DISCLAIMER    Martha MIGEL Brandt Chapin acknowledges that she has consented to an online visit or consultation  She understands that the online visit is based solely on information provided by her, and that, in the absence of a face-to-face physical evaluation by the physician, the diagnosis she receives is both limited and provisional in terms of accuracy and completeness  This is not intended to replace a full medical face-to-face evaluation by the physician  Leta Love understands and accepts these terms

## 2020-09-11 ENCOUNTER — OFFICE VISIT (OUTPATIENT)
Dept: PSYCHOLOGY | Facility: CLINIC | Age: 60
End: 2020-09-11
Payer: COMMERCIAL

## 2020-09-11 ENCOUNTER — TELEMEDICINE (OUTPATIENT)
Dept: PSYCHIATRY | Facility: CLINIC | Age: 60
End: 2020-09-11
Payer: COMMERCIAL

## 2020-09-11 DIAGNOSIS — F33.2 SEVERE EPISODE OF RECURRENT MAJOR DEPRESSIVE DISORDER, WITHOUT PSYCHOTIC FEATURES (HCC): Primary | Chronic | ICD-10-CM

## 2020-09-11 DIAGNOSIS — F41.1 GENERALIZED ANXIETY DISORDER: ICD-10-CM

## 2020-09-11 DIAGNOSIS — F51.01 IDIOPATHIC INSOMNIA: ICD-10-CM

## 2020-09-11 PROCEDURE — S0201 PARTIAL HOSPITALIZATION SERV: HCPCS

## 2020-09-11 PROCEDURE — 99213 OFFICE O/P EST LOW 20 MIN: CPT | Performed by: NURSE PRACTITIONER

## 2020-09-11 PROCEDURE — G0410 GRP PSYCH PARTIAL HOSP 45-50: HCPCS

## 2020-09-11 PROCEDURE — G0176 OPPS/PHP;ACTIVITY THERAPY: HCPCS

## 2020-09-11 PROCEDURE — G0177 OPPS/PHP; TRAIN & EDUC SERV: HCPCS

## 2020-09-11 NOTE — PSYCH
Subjective:     Patient ID: Ozzie Moritz is a 62 y o  female  Innovations Clinical Progress Notes      GROUP PSYCHOTHERAPY ([phone]) Group was facilitated virtually in a private office using HIPAA Compliant and Approved Microsoft Teams  Ozzie Moritz consented to the use of tele-video modality of treatment and was virtually present for group psychotherapy today  The group was offered process time to discuss current stressors  The group watched a clip teaching fair fighting rules  Clients participated actively by engaging in an open discussion regarding their un-fair fighting and how they can improve communication in future interactions  Martha shared pieces of advice from her experience regarding fair fighting rules  She finds it difficult when her  does not make eye contact with her in a conversation  She felt at times she would stonewall a conversation to avoid talking about topics  Vikash Singletary continues to make progress towards goals through verbal participation in group; to accomplish long term goals continue to utilize skills learned in programming  TX Plan Objectives: 1 1, 1 2, 1 4   Therapist: Chapo Gandhi MA    Case Management Note    Chapo Gandhi MA    Current suicide risk: Low     A case management session is not scheduled today with Ozzie Moritz; additionally, they did not request a CM meeting  Next scheduled session is 09/14/2020  Medications changes/added/denied? No    Treatment session number: 6    Individual Case Management Visit provided today?  No    Innovations follow up physician's orders: None at this time

## 2020-09-11 NOTE — PSYCH
Subjective:     Patient ID: Kennedi Tabares is a 62 y o  female  Innovations Clinical Progress Notes      Specialized Services Documentation  Therapist must complete separate progress note for each specific clinical activity in which the individual participated during the day  Allied Therapy [id number] This group was facilitated virtually in a private office using [employer] and Approved Microsoft Teams  Kennedi Tabares consented to the use of tele-video modality of treatment  Poornima Corea actively shared in [facility] group focused on DBT Emotion Regulation  She engaged in discussion and music sharing and listening activity  Group reviewed goals of emotion regulation and why we practice it  Group discussed understanding and naming emotions by knowing what they do for you, factors that make regulating them hard, and using ways to describe them  Group reviewed prompting events, biological changes, expressions and actions, and aftereffects of experiencing different emotions, and practiced naming different aspects in those categories  Group members shared songs that remind them, or prompt, a certain emotion  Matrha shared that watching all of her sons get  prompted happiness, and thinking about the weddings also does it  Good progress towards goal observed and shared  Continue AT to further encourage understanding and naming emotions to work on emotion regulation   Tx Plan Objective: 1 1, 1 2 Therapist:  KATHIE Pandey

## 2020-09-11 NOTE — PSYCH
Subjective:     Patient ID: Martha Hinson is a 62 y o  female  In[facility]ations Clinical Progress Notes      Specialized Services Documentation  Therapist must complete separate progress note for each specific clinical activity in which the individual participated during the day  Group Psychotherapy ([id number])   This group was facilitated virtually in a private office using [employer] and Approved [employer] Teams  Martha Hinson consented to the use of tele-video modality of treatment  Martha Hinson did not actively share in psychotherapy group today which focused on Weekly Wellness Assessment  She engaged in self-rate  Group members individually went through the assessment and rated themselves based on the past week  Group members shared assessment results  Group discussed the six domains of wellness; physical, emotional, cognitive, vocational, social, and spiritual  Group discussed strengths, challenges, barriers, and ways to increase each domain  Kae Stevens had her camera on and appeared engaged in the group topic and others during discussion  However, she did not participate in discussion  No progress toward goals noted  Continue psychotherapy to further encourage self-reflection on strengths and challenges with personal wellness  Tx Plan Objective: 1 1, 1 4 Therapist:  Michael Johnson RN    Education Therapy   This group was facilitated virtually in a private office using [employer] and Approved Microsoft Teams  Martha Hinson consented to the use of tele-video modality of treatment    Time:  0383-7823  Previous goal met: Yes   Readiness to Learning: Receptive  Barriers to Learning: None  Learning Assessment  Time: 1090-6535  Education Completed: Wellness Tools  Teaching Method: Verbal, A/V and Demonstration  Shared Area of Learning: Yes   Goal Set: To clean/organize closet  Tx Plan Objective: 1 2 Therapist:  Michael Johnson RN

## 2020-09-11 NOTE — PSYCH
Virtual Regular Visit    Problem List Items Addressed This Visit        Other    Severe episode of recurrent major depressive disorder, without psychotic features ([facility] [address] ) - Primary (Chronic)    Idiopathic insomnia    Generalized anxiety disorder        It was my intent to perform this visit via video technology but the patient was not able to do a video connection so the visit was completed via audio telephone only  Encounter provider OSMAN Arguello    Provider located at   [address] [place] [address] Rd [address]    Recent Visits  No visits were found meeting these conditions  Showing recent visits within past 7 days and meeting all other requirements     Future Appointments  No visits were found meeting these conditions  Showing future appointments within next 150 days and meeting all other requirements      The patient was identified by name and date of birth  Nona Foss was informed that this is a telemedicine visit and that the visit is being conducted through [employer]  My office door was closed  No one else was in the room  She acknowledged consent and understanding of privacy and security of the video platform  The patient has agreed to participate and understands they can discontinue the visit at any time  Patient is aware this is a billable service       HPI     Current Outpatient Medications   Medication Sig Dispense Refill    citalopram (CeleXA) 10 mg tablet Take one tablet by mouth in the morning ( total of 30 mg as per Dr Jessica Crocker) 30 tablet 0    citalopram (CeleXA) 20 mg tablet Take 1 tablet by mouth daily 30 tablet 0    lacosamide (VIMPAT) 100 mg tablet Take 1 tablet (100 mg total) by mouth every 12 (twelve) hours 30 tablet 0    mirtazapine (REMERON) 15 mg tablet Take 1 tablet by mouth daily at bedtime 30 tablet 0    Na Sulfate-K Sulfate-Mg Sulf 17 5-3 13-1 6 GM/177ML SOLN Take 1 kit by mouth once for 1 dose 2 Bottle 0     No current facility-administered medications for this visit  Review of Systems  Video Exam    There were no vitals filed for this visit  Physical Exam   As a result of this visit, I have referred the patient for further respiratory evaluation  No    I spent 10 minutes directly with the patient during this visit  [address] acknowledges that she has consented to an online visit or consultation  She understands that the online visit is based solely on information provided by her, and that, in the absence of a face-to-face physical evaluation by the physician, the diagnosis she receives is both limited and provisional in terms of accuracy and completeness  This is not intended to replace a full medical face-to-face evaluation by the physician  Kennedi Tabares understands and accepts these terms  PHP MEDICATION MANAGEMENT NOTE        [address]    Name and Date of Birth:  Kennedi Tabares 62 y o  1960 MRN: [id number]    Date of Visit: September 11, 2020    No Known Allergies  SUBJECTIVE:    Poornima Corea is seen today for a follow up for depression and anxiety  She reports that she has improved since beginning PHP  Patient states that his sleep has improved the patient finds she has more energy and motivation after participating in PHP during the day  No specific medication complaints at this time  Patient does continues to have significant symptoms of depression and anxiety including lower than normal daytime energy low mood       She denies any side effects from medications      PLAN:  Continue all psychiatric medications as ordered  Aware of 24 hour and weekend coverage for urgent situations accessed by calling [facility] main practice number  Continue partial hospitalization program    Diagnoses and all orders for this visit:    Severe episode of recurrent major depressive disorder, without psychotic features ([facility] [address] )    Idiopathic insomnia    Generalized anxiety disorder        Psychotherapy Provided:     Individual psychotherapy provided: No    HPI ROS Appetite Changes and Sleep:     She reports normal sleep, normal appetite, decreased energy   Patient denies suicidal or homicidal ideation    Review Of Systems:      General emotional problems and decreased functioning   Personality no change in personality   Constitutional negative   ENT negative   Cardiovascular negative   Respiratory negative   Gastrointestinal negative   Genitourinary negative   Musculoskeletal negative   Integumentary negative   Neurological negative   Endocrine negative   Other Symptoms none, all other systems are negative     Mental Status Evaluation:    Appearance Unable to assess   Behavior cooperative   Mood depressed  Depression Scale - 6 of 10 (0 = No depression)  Anxiety Scale - 7 of 10 (0 = No anxiety)   Speech Normal rate and volume   Affect Unable to assess   Thought Processes Goal directed and coherent   Thought Content Does not verbalize delusional material   Associations Tightly connected   Perceptual Disturbances Denies hallucinations and does not appear to be responding to internal stimuli   Risk Potential Suicidal/Homicidal Ideation - No evidence of suicidal or homicidal ideation and Patient does not verbalize suicidal or homicidal ideation  Risk of Violence - No evidence of risk for violence found on assessment  Risk of Self Mutilation - No evidence of risk for self mutilation found on assessment   Orientation oriented to person, place, time/date and situation   Memory recent and remote memory grossly intact   Consciousness alert and awake   Attention/Concentration attention span and concentration are age appropriate   Insight fair   Judgement fair   Muscle Strength and Gait normal muscle strength and normal muscle tone, normal gait/station and normal balance   Motor Activity no abnormal movements Language no difficulty naming common objects, no difficulty repeating a phrase, no difficulty writing a sentence   Fund of Knowledge adequate knowledge of current events  adequate fund of knowledge regarding past history  adequate fund of knowledge regarding vocabulary      Past Psychiatric History Update:     Inpatient Psychiatric Admission Since Last Encounter:   no  Suicide Attempt Or Self Mutilation Since Last Encounter:   no  Incidence of Violent Behavior Since Last Encounter:   no    Traumatic History Update:     New Onset of Abuse Since Last Encounter:   no  Traumatic Events Since Last Encounter:   no    Past Medical History:    Past Medical History:   Diagnosis Date    Anxiety     Concussion     Last assessed 2017    Depression     ISCHEMIC 2013     ISCHEMIC 2013    Seizures ([facility] [address] )      No past medical history pertinent negatives    Past Surgical History:   Procedure Laterality Date    BRAIN SURGERY      IMMED FOLLOWING STROKE IN 2013     SECTION       No Known Allergies  Substance Abuse History:    Social History     Substance and Sexual Activity   Alcohol Use No     Social History     Substance and Sexual Activity   Drug Use No     Social History:    Social History     Socioeconomic History    Marital status: /Civil Union     Spouse name: Not on file    Number of children: Not on file    Years of education: Not on file    Highest education level: Not on file   Occupational History    Occupation: Disabled   Social Needs    Financial resource strain: Not on file    Food insecurity     Worry: Not on file     Inability: Not on file   [employer] needs     Medical: Not on file     Non-medical: Not on file   Tobacco Use    Smoking status: Never Smoker    Smokeless tobacco: Never Used   Substance and Sexual Activity    Alcohol use: No    Drug use: No    Sexual activity: Not Currently     Partners: Male   Lifestyle    Physical activity     Days per week: Not on file Minutes per session: Not on file    Stress: Not on file   Relationships    Social connections     Talks on phone: Not on file     Gets together: Not on file     Attends Sabianism service: Not on file     Active member of club or organization: Not on file     Attends meetings of clubs or organizations: Not on file     Relationship status: Not on file    Intimate partner violence     Fear of current or ex partner: Not on file     Emotionally abused: Not on file     Physically abused: Not on file     Forced sexual activity: Not on file   Other Topics Concern    Not on file   Social History Narrative    Daily caffeine consumption     Family Psychiatric History:     Family History   Problem Relation Age of Onset    Aortic aneurysm Mother     No Known Problems Father      History Review: The following portions of the patient's history were reviewed and updated as appropriate: allergies, current medications, past family history, past medical history, past social history, past surgical history and problem list     OBJECTIVE:     Vital signs in last 24 hours: There were no vitals filed for this visit  Laboratory Results: I have personally reviewed all pertinent laboratory/tests results  Medications Risks/Benefits:      Risks, Benefits And Possible Side Effects Of Medications:    Discussed risks and benefits of treatment with patient including risk of suicidality and serotonin syndrome related to treatment with antidepressants;  Risk of induction of manic symptoms in certain patient populations     Controlled Medication Discussion:     Not applicable    OSMAN Deal 09/11/20

## 2020-09-11 NOTE — PSYCH
Virtual Regular Visit      Assessment/Plan:    Problem List Items Addressed This Visit        Other    Severe episode of recurrent major depressive disorder, without psychotic features ([facility] [address] ) - Primary (Chronic)    Generalized anxiety disorder               Reason for visit is PHP VIRTUAL GROUP DUE TO COVID-19      Encounter provider [facility] [address] [facility]    Provider located at [facility] [address]  [address] [address]    The patient was identified by name and date of birth  Aldo Novak was informed that this is a telemedicine visit and that the visit is being conducted through [employer]  My office door was closed  No one else was in the room  She acknowledged consent and understanding of privacy and security of the video platform  The patient has agreed to participate and understands they can discontinue the visit at any time  Patient is aware this is a billable service  Wong Judd is a 62 y o  female       Video Exam    There were no vitals filed for this visit  Physical Exam     I spent 4 hours of group + case management minutes with patient today in which greater than 50% of the time was spent in counseling/coordination of care regarding see notes      [address] acknowledges that she has consented to an online visit or consultation  She understands that the online visit is based solely on information provided by her, and that, in the absence of a face-to-face physical evaluation by the physician, the diagnosis she receives is both limited and provisional in terms of accuracy and completeness  This is not intended to replace a full medical face-to-face evaluation by the physician  Aldo Novak understands and accepts these terms

## 2020-09-14 ENCOUNTER — OFFICE VISIT (OUTPATIENT)
Dept: PSYCHOLOGY | Facility: CLINIC | Age: 60
End: 2020-09-14
Payer: COMMERCIAL

## 2020-09-14 DIAGNOSIS — F33.2 SEVERE EPISODE OF RECURRENT MAJOR DEPRESSIVE DISORDER, WITHOUT PSYCHOTIC FEATURES (HCC): Primary | Chronic | ICD-10-CM

## 2020-09-14 DIAGNOSIS — F41.1 GENERALIZED ANXIETY DISORDER: ICD-10-CM

## 2020-09-14 PROCEDURE — S0201 PARTIAL HOSPITALIZATION SERV: HCPCS

## 2020-09-14 PROCEDURE — G0410 GRP PSYCH PARTIAL HOSP 45-50: HCPCS

## 2020-09-14 PROCEDURE — G0177 OPPS/PHP; TRAIN & EDUC SERV: HCPCS

## 2020-09-14 PROCEDURE — G0176 OPPS/PHP;ACTIVITY THERAPY: HCPCS

## 2020-09-14 NOTE — PSYCH
Virtual Regular Visit      Assessment/Plan:    Problem List Items Addressed This Visit        Other    Severe episode of recurrent major depressive disorder, without psychotic features ([facility] [address] ) - Primary (Chronic)    Generalized anxiety disorder               Reason for visit is PHP VIRTUAL GROUP DUE TO COVID-19      Encounter provider BE [address]    Provider located at [facility] [address]  [address] [address]      The patient was identified by name and date of birth  Rolando Greene was informed that this is a telemedicine visit and that the visit is being conducted through [employer]  My office door was closed  No one else was in the room  She acknowledged consent and understanding of privacy and security of the video platform  The patient has agreed to participate and understands they can discontinue the visit at any time  Patient is aware this is a billable service  Subjective  Martha Robbins is a 62 y o  female   I spent 4 hours of group + case management minutes with patient today in which greater than 50% of the time was spent in counseling/coordination of care regarding see notes      [address] acknowledges that she has consented to an online visit or consultation  She understands that the online visit is based solely on information provided by her, and that, in the absence of a face-to-face physical evaluation by the physician, the diagnosis she receives is both limited and provisional in terms of accuracy and completeness  This is not intended to replace a full medical face-to-face evaluation by the physician  Rolando Greene understands and accepts these terms

## 2020-09-14 NOTE — PSYCH
Subjective:     Patient ID: Kristy Hodgkin is a 62 y o  female  [facility]ations Clinical Progress Notes      Specialized Services Documentation  Therapist must complete separate progress note for each specific clinical activity in which the individual participated during the day  Group Psychotherapy 0930-1015 Tx Plan Objective: 1 2, Therapist:  Eduardo Pan RN    Group therapy was facilitated virtually in a private office setting using HIPAA compliant and approved [employer] TEAMS  Martha consented to the use of tele-video modality of treatment and was virtually present for group psychotherapy today       A group on healthy coping skills was conducted via a game format  Earle Martinez actively participated during the group  Earle Martinez shared that she utilizes breathing exercises as an effective coping skill and that she would like to work on willingness to learn things to add to her current list of coping skills  Earle Martinez continues to make progress towards goals through participation in groups   Nursing will continue psychoeducation groups to promote wellness and healthy coping skills

## 2020-09-14 NOTE — PSYCH
Subjective:     Patient ID: Cornelius Rojo is a 62 y o  female  In[facility]ations Clinical Progress Notes      Specialized Services Documentation  Therapist must complete separate progress note for each specific clinical activity in which the individual participated during the day  Allied Therapy   This group was facilitated virtually in a private office using [employer] and Approved [email or web address] Teams  Cornleius Rojo consented to the use of tele-video modality of treatment  [id number] Cornelius Rojo  [facility] in [facility] focused on DBT skill gregory mind  Leatha Mckeon engaged in tasks exploring differences between reasonable and emotion mind and ways to get to wise mind  Group explored the benefits of mindfulness and practiced ways to slow down to begin to develop wise mind  Group reinforced role of participating with wise mind in order to prevent getting stuck in the past or fears of the future  Some effort toward treatment goal noted  Continue AT to encourage healthy skill development and practice of explored strategies         Tx Plan Objective: 1 2, Therapist:  Cecilia VÁZQUEZ

## 2020-09-14 NOTE — PSYCH
Subjective:     Patient ID: Eliana Qiu is a 62 y o  female  In[facility]ations Clinical Progress Notes      Specialized Services Documentation  Therapist must complete separate progress note for each specific clinical activity in which the individual participated during the day  Case Management Note    Scarlet Rae MA     Current suicide risk : Low     ([id number]) Martha was called for case management session  A voicemail was left to call this writer back if needing support  This writer explained that if not call was returned, a case management session would be scheduled for tomorrow to update the treatment plan  Medications changes/added/denied? No    Treatment session number: 6    Individual Case Management Visit provided today? Yes     Innovations follow up physician's orders: none at this time

## 2020-09-14 NOTE — PSYCH
Subjective:     Patient ID: Rolando Greene is a 62 y o  female  [facility] Clinical Progress Notes      Specialized Services Documentation  Therapist must complete separate progress note for each specific clinical activity in which the individual participated during the day  Group Psychotherapy (0719-2426)   This group was facilitated virtually in a private office using [employer] and Approved [employer] Teams  Rolando Greene consented to the use of tele-video modality of treatment  Rolando Greene actively participated in psychoeducation group this morning which focused on developing positive coping skills  Group was given a large list of coping skills and encouraged to state which ones they currently rely on and/or ones they have found helpful in the past to help with life stressors  Group members discussed SMART goals and individual versus group coping skills  Patients were then divided into two groups and competed against each other in a coping skills jeopardy game  Good progress toward goals noted  Continue psychoeducation to explore positive coping skills and understanding the importance of practicing them in order to maintain wellness  Tx Plan Objective: 1 2, 1 4 Therapist:  Abner Lawson RN    Education Therapy   This group was facilitated virtually in a private office using [employer] and Approved [employer] Teams  Rolando Greene consented to the use of tele-video modality of treatment    Time:  7241-0377  Previous goal met: No  Readiness to Learning: Receptive  Barriers to Learning: None  Learning Assessment  Time: 3856-9027  Education Completed: Wellness Tools  Teaching Method: Verbal, Written and A/V  Shared Area of Learning: Yes   Goal Set: To dust and vacuum  Tx Plan Objective: 1 2 Therapist:  Abner Lawson RN

## 2020-09-15 ENCOUNTER — OFFICE VISIT (OUTPATIENT)
Dept: PSYCHOLOGY | Facility: CLINIC | Age: 60
End: 2020-09-15
Payer: COMMERCIAL

## 2020-09-15 DIAGNOSIS — F41.1 GENERALIZED ANXIETY DISORDER: ICD-10-CM

## 2020-09-15 DIAGNOSIS — F33.2 SEVERE EPISODE OF RECURRENT MAJOR DEPRESSIVE DISORDER, WITHOUT PSYCHOTIC FEATURES (HCC): Primary | Chronic | ICD-10-CM

## 2020-09-15 PROCEDURE — S0201 PARTIAL HOSPITALIZATION SERV: HCPCS

## 2020-09-15 PROCEDURE — G0410 GRP PSYCH PARTIAL HOSP 45-50: HCPCS

## 2020-09-15 PROCEDURE — 90832 PSYTX W PT 30 MINUTES: CPT

## 2020-09-15 PROCEDURE — G0176 OPPS/PHP;ACTIVITY THERAPY: HCPCS

## 2020-09-15 PROCEDURE — G0177 OPPS/PHP; TRAIN & EDUC SERV: HCPCS

## 2020-09-15 NOTE — PSYCH
Subjective:     Patient ID: Rakesh Stanton is a 62 y o  female  [facility] Clinical Progress Notes      Specialized Services Documentation  Therapist must complete separate progress note for each specific clinical activity in which the individual participated during the day  GROUP PSYCHOTHERAPY (7936-3635) Group was facilitated virtually in a private office using HIPAA Compliant and Approved Microsoft Teams  Rakesh Stanton consented to the use of tele-video modality of treatment and was virtually present for group psychotherapy today  The group reviewed symptoms of stress and engaged in a visualization video  The group participated in discussion about how visualization can be used for relaxation and preparation for stressful situations  Martha shared that she enjoyed the beach visualization  She recalled positive memories and added sensations to her practice  Brandie Thorne continues to make progress towards goals through verbal participation in group; to accomplish long term goals continue to utilize skills learned in programming  TX Plan Objectives: 1 1, 1 2, 1 4   Therapist: Kirby Forrest MA      Education Therapy   Time:  9788-2856  Previous goal met: Yes   Readiness to Learning: Receptive  Barriers to Learning: None  Learning Assessment  Time: 3462-7321  Education Completed: Illness, Medication, Aftercare and Wellness Tools  Teaching Method: Verbal, Written and A/V  Shared Area of Learning: Yes   Goal Set: call my mom  Tx Plan Objective: 1 1, 1 2, 1 4, Therapist:  Kirby Forrest MA    Other [phone] with representatives from [facility] with a contact number [phone], using Tax ID [id number]  Location address remains [address] [place] [address]  To step Martha down to IOP level of care starting 09/17/2020  This writer was transferred to Brooks at [phone] for case review   Larry was out of the office but was left a voicemail to call back to discuss step down to IOP  Case Management Note    Pablo Gatica MA    Current suicide risk : Low     (5887-5960) Leatha Mckeon feels she is on the right medications  She's learned a lot from program, especially the skills regarding visualization and mindfulness  She identified being not as stressed out, as she used too  For the visualization, she likes using the woods and campfires as her objects to focus on  Leatha Mckeon discussed progress, and time in program  She agreed to stepping down to IOP level, with plans to connect to outpatient providers prior to discharge  Medications changes/added/denied? No    Treatment session number: 6    Individual Case Management Visit provided today? Yes     [facility] follow up physician's orders: none at this time

## 2020-09-15 NOTE — PSYCH
Virtual Regular Visit      Assessment/Plan:    Problem List Items Addressed This Visit        Other    Severe episode of recurrent major depressive disorder, without psychotic features ([facility] [address] ) - Primary (Chronic)    Generalized anxiety disorder               Reason for visit is PHP VIRTUAL GROUP DUE TO COVID-19      Encounter provider [facility]    Provider located at [facility]  [address] [address]    The patient was identified by name and date of birth  Homer Carrillo was informed that this is a telemedicine visit and that the visit is being conducted through [employer]  My office door was closed  No one else was in the room  She acknowledged consent and understanding of privacy and security of the video platform  The patient has agreed to participate and understands they can discontinue the visit at any time  Patient is aware this is a billable service  Subjective  Martha Cody is a 62 y o  female          I spent 4 hours of group + case management minutes with patient today in which greater than 50% of the time was spent in counseling/coordination of care regarding see ntoes      [address] acknowledges that she has consented to an online visit or consultation  She understands that the online visit is based solely on information provided by her, and that, in the absence of a face-to-face physical evaluation by the physician, the diagnosis she receives is both limited and provisional in terms of accuracy and completeness  This is not intended to replace a full medical face-to-face evaluation by the physician  Homer Carrillo understands and accepts these terms

## 2020-09-15 NOTE — PSYCH
Subjective:     Patient ID: Ricarda Denver is a 62 y o  female  [facility] Clinical Progress Notes      Specialized Services Documentation  Therapist must complete separate progress note for each specific clinical activity in which the individual participated during the day  Allied Therapy   This group was facilitated virtually in a private office using [employer] and Approved [employer] Teams  Ricarda Denver consented to the use of tele-video modality of treatment  [id number] Ricarda Denver  actively shared in [facility] group focused on distress tolerance skill self-soothe  Shreyas Tay was observed to be engaged in therapist led relaxation exercises  Group discussed specific items that could help self soothe if in an anxiety crisis with encouragement to use these things regularly to manage stressors consistently  She identified she would put music  in  a self soothe kit  Some effort noted toward tx goal   Continue AT to encourage development of wellness skills and consistent practice         Tx Plan Objective: 1 2, Therapist:  Lety VÁZQUEZ

## 2020-09-15 NOTE — PSYCH
Subjective:     Patient ID: Jorge Rodriguez is a 62 y o  female  [facility] Clinical Progress Notes      Specialized Services Documentation  Therapist must complete separate progress note for each specific clinical activity in which the individual participated during the day  Group Psychotherapy     ([id number]) Group was facilitated virtually in a private office using HIPAA Compliant and Approved Microsoft Teams  Jorge Rodriguez consented to the use of tele-video modality of treatment and was virtually present for group psychotherapy process today  Jorge Rodriguez  actively shared in psychoeducational group which focused on healthy leisure activities  The group first reflected on what positive vs negative leisure activities are  The benefits of healthy leisure activities were then discussed  The group was handed cards with possible leisure activities on the cards  Group members were asked to discuss the individual benefit of the activity  She shared being with friends and family as a leisure activity  Members then played, The Wheel of Leisure, to reinforce information learned in the group  The group wrapped up by members sharing one activity they will do to promote happiness and satisfaction over the next week and how the activity will increase these  Good progress towards goal noted  Continue psychoeducation to educate on the importance of positive thought as a healthy coping skill to improve mental wellbeing      Tx Plan Objective: 1 4, Therapist:  Irena Ferreira RN

## 2020-09-15 NOTE — PSYCH
Assessment/Plan:       Diagnoses and all orders for this visit:     Severe episode of recurrent major depressive disorder, without psychotic features ([facility] [address] )     Generalized anxiety disorder            Subjective:      Patient ID: Osorio Mosley is a 62 y o  female      [facility] Treatment Plan   AREAS OF NEED: Depression as evidenced by rumination, passive suicidal ideation, and isolation due to pandemic and historical illness  Date Initiated: 09/02/20     Strengths: "good cook, survivor"            LONG TERM GOAL:   Date Initiated: 09/02/20  1 0 I will identify three ways in which my overall day-to-day functioning has improved since attending [facility]  Target Date: 09/30/20  Completion Date:         SHORT TERM OBJECTIVES:      Date Initiated: 09/02/20  1 1 I will provide experiential feedback at least once per treatment day to build my natural support network  Revision Date: 09/15/20  Target Date: 09/24/2020  Completion Date:      Date Initiated: 09/02/20  1 2 I will identify two relaxation techniques I have learned to help manage my anxiety  Revision Date: 09/15/20  Target Date: 09/24/2020  Completion Date:     Date Initiated: 09/02/20  1 3 I will take medications as prescribed and share questions and concerns if arise  Revision Date: 09/15/20  Target Date: 09/24/2020  Completion Date:      Date Initiated: 09/02/20  1 4 I will identify 3 ways my supports can assist in my recovery and agree to staff/support contact as indicated  Revision Date: 09/15/20  Target Date: 09/24/2020  Completion Date:            7 DAY REVISION:     Date Initiated: 09/15/20   1 5 I would like to learn and practice assertive communication to improve my identity in relationships      Revision Date:   Target Date: 09/24/2020  Completion Date:        PSYCHIATRY:  Date Initiated:  09/02/20  Medication Management and Education       Revision Date:   The person(s) responsible for carrying out the plan is Terrance Ledesma, MD     NURSING:   Date Initiated: 09/02/20  1 1,1 2,1 3,1 4 This RN will provide daily wellness group five days weekly to educate Mariano Wilde on S/S of her diagnoses and medications used in treatment  Revision Date:  The person(s) responsible for carrying out the plan is Devi Schroeder RN     PSYCHOLOGY:   Date Initiated: 09/02/20       1 1, 1 2, 1 4 Provide psychotherapy group 5 times per week to allow opportunity for Mariano Doing  to explore stressors and ways of coping  Revision Date:   The person(s) responsible for carrying out the plan is Sofia Ngo     ALLIED THERAPY:   Date Initiated: 09/02/20  1 1,1 2 Dormjaclyn Pinto in AT group 5 times daily to encourage development and use of wellness tools to decrease symptoms and promote recovery through meaningful activity  Revision Date:   The person(s) responsible for carrying out the plan is KATHIE Maher      CASE MANAGEMENT:   Date Initiated: 09/02/20      1 0 This  will meet with Mariano Wilde  3-4 times weekly to assess treatment progress, discharge planning, connection to community supports and UR as indicated  Revision Date:   The person(s) responsible for carrying out the plan is Sofia Ngo     TREATMENT REVIEW/COMMENTS:      DISCHARGE CRITERIA: Identify 3 signs of progress and complete relapse prevention plan      DISCHARGE PLAN: Establish outpatient therapy and return to medication management  Estimated Length of Stay: 10 treatment days     Diagnosis and Treatment Plan explained to eBn Fernandez relates understanding diagnosis and is agreeable to Treatment Plan             CLIENT COMMENTS / Please share your thoughts, feelings, need and/or experiences regarding your treatment plan: _____________________________________________________________________________________________________________________________________________________________________________________________________________________________________________________________________________________________________________________ Date/Time: ______________      Patient Signature: __     REVIEWED VIRTUALLY VIA [employer] TEAMS DUE TO COVID-19       _______________________________      Date/Time: ______________       Signature: _________________________________     Date/Time: ______________

## 2020-09-16 ENCOUNTER — TELEMEDICINE (OUTPATIENT)
Dept: PSYCHIATRY | Facility: CLINIC | Age: 60
End: 2020-09-16
Payer: COMMERCIAL

## 2020-09-16 ENCOUNTER — OFFICE VISIT (OUTPATIENT)
Dept: PSYCHOLOGY | Facility: CLINIC | Age: 60
End: 2020-09-16
Payer: COMMERCIAL

## 2020-09-16 DIAGNOSIS — F33.2 SEVERE EPISODE OF RECURRENT MAJOR DEPRESSIVE DISORDER, WITHOUT PSYCHOTIC FEATURES (HCC): Primary | Chronic | ICD-10-CM

## 2020-09-16 DIAGNOSIS — F41.1 GENERALIZED ANXIETY DISORDER: ICD-10-CM

## 2020-09-16 DIAGNOSIS — F33.2 SEVERE EPISODE OF RECURRENT MAJOR DEPRESSIVE DISORDER, WITHOUT PSYCHOTIC FEATURES (HCC): ICD-10-CM

## 2020-09-16 DIAGNOSIS — F41.1 GENERALIZED ANXIETY DISORDER: Primary | ICD-10-CM

## 2020-09-16 PROCEDURE — G0410 GRP PSYCH PARTIAL HOSP 45-50: HCPCS

## 2020-09-16 PROCEDURE — 99213 OFFICE O/P EST LOW 20 MIN: CPT | Performed by: PHYSICIAN ASSISTANT

## 2020-09-16 PROCEDURE — S0201 PARTIAL HOSPITALIZATION SERV: HCPCS

## 2020-09-16 PROCEDURE — G0176 OPPS/PHP;ACTIVITY THERAPY: HCPCS

## 2020-09-16 PROCEDURE — G0177 OPPS/PHP; TRAIN & EDUC SERV: HCPCS

## 2020-09-16 NOTE — PSYCH
Subjective:     Patient ID: Herlinda Corona is a 62 y o  female  [facility] Clinical Progress Notes      Specialized Services Documentation  Therapist must complete separate progress note for each specific clinical activity in which the individual participated during the day  Group Psychotherapy     ([id number]) Group was facilitated virtually in a private office using HIPAA Compliant and Approved Microsoft Teams  Herlinda Corona consented to the use of tele-video modality of treatment and was virtually present for group psychotherapy process today  Herlinda Corona  actively participated in wellness group today on Bipolar disorder  Group started with an Educational Video entitled [facility]  Discussion  started off with definition and signs and symptoms of cecy vs depression  The group discussed resources available to those with mental illness  The group also discussed relapse prevention  Group members formulated their own relapse prevention plans and discussed red flags that might mean they are relapsing into their illness  The necessity of education, therapy and medication management was enforced  Information provided on the resources available to treat this disease  Support groups like [facility] ([address] [place] [address] Avenue) is a great tool available for people diagnosed with this illness, families and friends  Good progress towards goal noted  Continue wellness groups in order to educate on various illnesses and the importance of relapse prevention    Tx Plan Objective: 1 3,1 4, Therapist:  Gabbie Benavides RN

## 2020-09-16 NOTE — PSYCH
Virtual Regular Visit                Encounter provider Ludivina Quigley PA-C    Provider located at [address] [place] [address]  [address]  [facility] [id number]      Recent Visits  Date Type Provider Dept   09/11/20 Telemedicine [address] recent visits within past 7 days and meeting all other requirements     Future Appointments  No visits were found meeting these conditions  Showing future appointments within next 150 days and meeting all other requirements        The patient was identified by name and date of birth  Katelyn Coppola was informed that this is a telemedicine visit and that the visit is being conducted through [employer]  My office door was closed  No one else was in the room  She acknowledged consent and understanding of privacy and security of the video platform  The patient has agreed to participate and understands they can discontinue the visit at any time  Patient is aware this is a billable service  VIRTUAL VISIT DISCLAIMER    Martha Brooks acknowledges that she has consented to an online visit or consultation  She understands that the online visit is based solely on information provided by her, and that, in the absence of a face-to-face physical evaluation by the physician, the diagnosis she receives is both limited and provisional in terms of accuracy and completeness  This is not intended to replace a full medical face-to-face evaluation by the physician  Katelyn Anat understands and accepts these terms  Progress Note - Behavioral Health   [facility], [facility]  Katelyn Coppola 62 y o  female MRN: [id number]     Progress at partial program: improving  Steva Postin states she is doing better- feels more relaxed and calm and feels she is benefiting from program   Reviews with Berkley things that she is learning in grp   Is tolerating meds and says she is sleeping well  Reports good appetite  States self-care and also improved and denies SI  Still struggles with low motivation, low mood, difficulty focusing and organizing; difficulty planning and seeing the "big picture"  Has decreased her caffeine use  ROS:   No complaints    Denies seizures    Active Ambulatory Problems     Diagnosis Date Noted    Severe episode of recurrent major depressive disorder, without psychotic features ([facility] ) 09/15/2017    Complex partial epilepsy with generalization and with intractable epilepsy ([place] [address] ) 08/05/2014    Hashimoto's thyroiditis 06/08/2017    Idiopathic insomnia 04/17/2017    Obesity (BMI 30-39 9) 06/08/2017    Prediabetes 09/13/2017    Annual physical exam 07/08/2019    Memory problem 03/02/2020    Generalized anxiety disorder 09/02/2020    Executive function deficit 09/02/2020     Resolved Ambulatory Problems     Diagnosis Date Noted    Acute encephalopathy 06/06/2016    Acute paranoia ([place] [address] ) 06/06/2016    Anxiety 06/24/2015     Past Medical History:   Diagnosis Date    Concussion     Depression     ISCHEMIC 2013     Seizures ([place] [address] )      No Known Allergies       Mental Status Evaluation:    Appearance:  adequate grooming   Behavior:  pleasant, cooperative   Speech:  normal rate and volume   Mood:  anxious   Affect:  increased in intensity   Thought Process:  goal directed   Associations: intact associations   Thought Content:  no overt delusions   Perceptual Disturbances: none   Risk Potential: Suicidal ideation - None  Homicidal ideation - None   Sensorium:  oriented to person, place and time/date   Memory:  recent and remote memory grossly intact   Consciousness:  alert and awake   Attention: attention span and concentration appear shorter than expected for age   Insight:  good   Judgment: good   Gait/Station: unable to assess   Motor Activity: unable to assess today due to virtual visit       Laboratory results:no recent labs      Assessment Diagnoses and all orders for this visit:    Severe episode of recurrent major depressive disorder, without psychotic features (HCC)    Generalized anxiety disorder       Current Outpatient Medications   Medication Sig Dispense Refill    citalopram (CeleXA) 10 mg tablet Take one tablet by mouth in the morning ( total of 30 mg as per Dr Rock Dorsey) 30 tablet 0    citalopram (CeleXA) 20 mg tablet Take 1 tablet by mouth daily 30 tablet 0    lacosamide (VIMPAT) 100 mg tablet Take 1 tablet (100 mg total) by mouth every 12 (twelve) hours 30 tablet 0    mirtazapine (REMERON) 15 mg tablet Take 1 tablet by mouth daily at bedtime 30 tablet 0    Na Sulfate-K Sulfate-Mg Sulf 17 5-3 13-1 6 GM/177ML SOLN Take 1 kit by mouth once for 1 dose 2 Bottle 0     No current facility-administered medications for this visit  Plan    Planned medication and treatment changes:  No med change  Continue celexa 30 mg /d and remeron 15 mg q bedtime  All current active medications have been reviewed  Continue treatment with group therapy and partial program      Risks / Benefits of Treatment:    Risks, benefits, and possible side effects of medications explained to patient and patient verbalizes understanding and agrees to medications  Counseling / Coordination of Care:    Patient's progress discussed with staff in treatment team meeting  Medications, treatment progress and treatment plan reviewed with patient      Thelma Benedict PA-C 09/16/20

## 2020-09-16 NOTE — PSYCH
Subjective:     Patient ID: Gerardo Israel is a 62 y o  female  [facility] Clinical Progress Notes      Specialized Services Documentation  Therapist must complete separate progress note for each specific clinical activity in which the individual participated during the day  GROUP PSYCHOTHERAPY (5076-6983) Group was facilitated virtually in a private office using HIPAA Compliant and Approved Microsoft Teams  Gerardo Israel consented to the use of tele-video modality of treatment and was virtually present for group psychotherapy today  The group was invited to talk about current stressors or concerns  The group was educated about protective factors and encouraged to use them when facing a challenge  Martha shared that her strongest protective factor is her healthy thinking  She would like to utilize her coping skills to recognize the emotions she may be feeling and how she would like to communicate them  Iggy Avila continues to make progress towards goals through verbal participation in group; to accomplish long term goals continue to utilize skills learned in programming  [address] [place] [address] Objectives: 1 1, 1 2, 1 4   Therapist: Salma Cline MA      Other [phone] with Gracie Mendoza from [facility] with a contact number [phone], using Tax ID [id number]  Location address remains [address] [place] [address]  Gerardo Israel was authorized 12 IOP days from 09/17/20 to 10/31/2020 with an authorization code of [id number]  Case Management Note    Salma Cline MA    Current suicide risk : Low     (9617 -26) Martha shared she just got her lawn cut and was happy to have that done  She discussed MWF Schedule for following week and will start her IOP schedule by attending Thursday and Friday of this week  This writer talked about outpatient providers and resent the list of options while Iggy Avila was on the phone   Iggy Avila confirmed receiving the list  She feels satisfied with program and much healthier, wants to ask what am I feeling today to give permission to feel feelings  Goal to log onto [facility] groups  Earle Martinez was sent and confirmed receiving information about [facility] groups and contact information  Medications changes/added/denied? No - See Arabella Rather note    Treatment session number: 8    Individual Case Management Visit provided today? Yes     [facility] follow up physician's orders: none at this time

## 2020-09-16 NOTE — PSYCH
Virtual Regular Visit      Assessment/Plan:    Problem List Items Addressed This Visit        Other    Severe episode of recurrent major depressive disorder, without psychotic features ([facility] [address] ) (Chronic)    Generalized anxiety disorder - Primary               Reason for visit is PHP VIRTUAL GROUP DUE TO COVID-19      Encounter provider BE [address] [facility]    Provider located at [facility] [address]  [address] [address]       The patient was identified by name and date of birth  Randal Segovia was informed that this is a telemedicine visit and that the visit is being conducted through [employer]  My office door was closed  No one else was in the room  She acknowledged consent and understanding of privacy and security of the video platform  The patient has agreed to participate and understands they can discontinue the visit at any time  Patient is aware this is a billable service  Subjective  Marthakarlie Mcduffie is a 62 y o  female     I spent 4 hours of group + case management minutes with patient today in which greater than 50% of the time was spent in counseling/coordination of care regarding see notes      [address] acknowledges that she has consented to an online visit or consultation  She understands that the online visit is based solely on information provided by her, and that, in the absence of a face-to-face physical evaluation by the physician, the diagnosis she receives is both limited and provisional in terms of accuracy and completeness  This is not intended to replace a full medical face-to-face evaluation by the physician  Randal Pair understands and accepts these terms

## 2020-09-16 NOTE — PSYCH
Subjective:     Patient ID: Osorio Mosley is a 62 y o  female  Innovations Clinical Progress Notes      Specialized Services Documentation  Therapist must complete separate progress note for each specific clinical activity in which the individual participated during the day  Allied Therapy   This group was facilitated virtually in a private office using [employer] and Approved [employer]  Osorio Mosley consented to the use of tele-video modality of treatment  [id number] Osorio Mosley  actively shared in [facility] group focused DBT Module Interpersonal Effectiveness and [facility] skill  Engaged in tasks exploring what makes it difficult to share and strategies to improve with supports  Chaparebecca Hayes participated in discussion related to communication roadblocks  She was an active participant as group worked together to practice writing out a meaningful interaction using [facility]  Positive progress toward goal noted  Continue AT to encourage sharing, personal advocacy and practice of wellness tools  Tx Plan Objective: 1 4,1 2, Therapist:  Duc VÁZQUEZ    Education Therapy   This group was facilitated virtually in a private office using [employer] and Approved [employer]  Osorio Mosley consented to the use of tele-video modality of treatment       Time:  5649-2807  Previous goal met: Yes   Readiness to Learning: Receptive  Barriers to Learning: None  Learning Assessment  Time: 0207-6843  Education Completed: Illness and Wellness Tools  Teaching Method: Verbal, A/V and Demonstration  Shared Area of Learning: Yes   Goal Set: get outside  Tx Plan Objective: 1 2, Therapist:  Duc VÁZQUEZ

## 2020-09-17 ENCOUNTER — OFFICE VISIT (OUTPATIENT)
Dept: PSYCHOLOGY | Facility: CLINIC | Age: 60
End: 2020-09-17
Payer: COMMERCIAL

## 2020-09-17 DIAGNOSIS — F41.1 GENERALIZED ANXIETY DISORDER: ICD-10-CM

## 2020-09-17 DIAGNOSIS — F33.2 SEVERE EPISODE OF RECURRENT MAJOR DEPRESSIVE DISORDER, WITHOUT PSYCHOTIC FEATURES (HCC): Primary | Chronic | ICD-10-CM

## 2020-09-17 PROCEDURE — S9480 INTENSIVE OUTPATIENT PSYCHIA: HCPCS

## 2020-09-17 NOTE — PSYCH
Subjective:     Patient ID: Aguilar Medina is a 56 y o  female  Innovations Clinical Progress Notes      Specialized Services Documentation  Therapist must complete separate progress note for each specific clinical activity in which the individual participated during the day  Group Psychotherapy     (9004-6074) Group was facilitated virtually in a private office using HIPAA Compliant and Approved [employer] Teams  Aguilar Medina  consented to the use of tele-video modality of treatment and was virtually present for group psychotherapy process today  Aguilar Medina  actively engaged in psychotherapy  group focused on decreasing self- stigma and supports  They  attentively listened to Certified Peer Specialist Paris Ford share his life story  Group encouraged power of learning about self, accepting illness and personal responsibility in recovery  Community resources reviewed in addition to personal resources like the [employer]  Good progress toward goal noted  Continue psychotherapy to encourage self -awareness and healthy engagement of supports  Tx Plan Objective: 1 4 Therapist: Belkys Barroso RN      Education Therapy   Group was facilitated virtually in a private office using HIPAA Compliant and Approved [employer] Teams  Aguilar Medina consented to the use of tele-video modality of treatment and was virtually present for group psychotherapy process today       Time:  4505-5533  Previous goal met: Yes   Readiness to Learning: Receptive  Barriers to Learning: None  Learning Assessment  Time: 3170-4303  Education Completed: Wellness Tools  Teaching Method: Verbal  Shared Area of Learning: Yes   Goal Set: go to doctor appt    Tx Plan Objective: 1 4 , Therapist:  Belkys Barroso RN

## 2020-09-17 NOTE — PSYCH
Subjective:     Patient ID: Melinda Lau is a 56 y o  female  Innovations Clinical Progress Notes      Specialized Services Documentation  Therapist must complete separate progress note for each specific clinical activity in which the individual participated during the day  Group Psychotherapy (9:30-10:30am) Group was facilitated virtually in a private office using HIPAA Compliant and Approved [employer] Teams  Raffaele Roy consented to the use of tele-video modality of treatment and was virtually present for group psychotherapy today  Group was facilitated virtually in a private office using HIPAA Compliant and Approved [employer] Teams  This group focused on self compassion beginning with a brief video introducing the concept of self compassion and a follow up discussion on areas of focus that allow us to be more compassionate to oneself, such as not realizing the journey and fairly measuring success despite failures, understanding other are responsible for things that have happened in life, that luck exists, people are not measured by accomplishments and that times crisis do pass  Participants then took part in mindful exercise on self compassion, repeating words of comfort and assurance in the midst of reflecting on difficult situations in their lives  The group then discussed kind words shared with friends verse the self criticism one uses with oneself  Participants were asked to write down and reflect on instances of self criticism for the remainder of the day and to challenge the inner critic inside about the fairness of these criticisms  Matheus Sherman was active and participated throughout the group, sometimes taking notes during the discussion  Matheus Sherman stated it as helpful for her to hear the message that failure is normal in life   She discussed her belief that all things can be achieved through hard work and action but was challenged to think more self compassionately when things don't go well despite this  Vikash Singletary is working toward her treatment goals and is encouraged to continue participation in group and application of the skills learned      Tx Plan Objective: 1 1,1 2 Therapist:  DESTINY Damian

## 2020-09-17 NOTE — PSYCH
Subjective:     Patient ID: Aguilar Medina is a 56 y o  female  Innovations Clinical Progress Notes      Specialized Services Documentation  Therapist must complete separate progress note for each specific clinical activity in which the individual participated during the day  Allied Therapy   This group was facilitated virtually in a private office using "[employer] and Approved Microsoft Teams  Aguilar Medina consented to the use of tele-video modality of treatment  [id number] Aguilar Medina  actively shared in [facility] group exploring DBT skill changing emotional responses  Lori Ordonez engaged in task sharing triggers and responses to given emotions  Group explored differences between justified and unjustified emotions to prompting events and effective versus ineffective responses  With weekend approach, group explored risk of feeling lazy and she was able to voice that it is important for her wellness to stay active  Group reviewed skill Opposite Action related to depression withdraw versus get active and productive choices for unstructured time offered  Some effort and progress noted toward goals  Continue AT to explore healthy emotional regulation and role in practicing wellness tools       Tx Plan Objective: 1 2,1 5, Therapist:  Titus VÁZQUEZ

## 2020-09-17 NOTE — PSYCH
Virtual Regular Visit      Assessment/Plan:    Problem List Items Addressed This Visit        Other    Severe episode of recurrent major depressive disorder, without psychotic features ([facility] [address] ) - Primary (Chronic)    Generalized anxiety disorder               Reason for visit is PHP VIRTUAL GROUP DUE TO COVID-19      Encounter provider BE [address] [facility]    Provider located at [facility] [address]  [address] [address]      The patient was identified by name and date of birth  Katelyn Coppola was informed that this is a telemedicine visit and that the visit is being conducted through [employer]  My office door was closed  No one else was in the room  She acknowledged consent and understanding of privacy and security of the video platform  The patient has agreed to participate and understands they can discontinue the visit at any time  Patient is aware this is a billable service  Subjective  Katelyn Coppola is a 56 y o  female        I spent 4 hours of group + case management note minutes with patient today in which greater than 50% of the time was spent in counseling/coordination of care regarding see notes      [address] acknowledges that she has consented to an online visit or consultation  She understands that the online visit is based solely on information provided by her, and that, in the absence of a face-to-face physical evaluation by the physician, the diagnosis she receives is both limited and provisional in terms of accuracy and completeness  This is not intended to replace a full medical face-to-face evaluation by the physician  Katelyn Coppola understands and accepts these terms

## 2020-09-17 NOTE — PSYCH
Innovations Clinical Progress Notes      Specialized Services Documentation  Therapist must complete separate progress note for each specific clinical activity in which the individual participated during the day         Innovations follow up physician's orders:   DATE 9/17/2020  TIME 10:28 AM   [facility] TODAY  Andrés Kothari MD

## 2020-09-18 ENCOUNTER — OFFICE VISIT (OUTPATIENT)
Dept: PSYCHOLOGY | Facility: CLINIC | Age: 60
End: 2020-09-18
Payer: COMMERCIAL

## 2020-09-18 DIAGNOSIS — F33.2 SEVERE EPISODE OF RECURRENT MAJOR DEPRESSIVE DISORDER, WITHOUT PSYCHOTIC FEATURES (HCC): Primary | Chronic | ICD-10-CM

## 2020-09-18 DIAGNOSIS — F41.1 GENERALIZED ANXIETY DISORDER: ICD-10-CM

## 2020-09-18 PROCEDURE — S9480 INTENSIVE OUTPATIENT PSYCHIA: HCPCS

## 2020-09-18 NOTE — PSYCH
Subjective:     Patient ID: Kennedi Tabares is a 56 y o  female  In[facility]ations Clinical Progress Notes      Specialized Services Documentation  Therapist must complete separate progress note for each specific clinical activity in which the individual participated during the day  Allied Therapy   This group was facilitated virtually in a private office using [employer] and Approved "[employer] Teams  Kennedi Tabares consented to the use of tele-video modality of treatment  [id number] Kennedi Tabares  actively shared in [facility] group focused on work stressors  Poornima Corea was able to speak to challenges in work and home environment   She was quiet as peers identified struggle and successes  Group explored ways to set day up for success and explore ways to set priorities  Relaxation technique object meditation reviewed  She was attentive  Inconsistent exploration of tx goals  Continue AT to encourage identification of stressors and wellness tools to manage stress consistently       Tx Plan Objective: 1 2,1 4, Therapist:  Katharine Barone MT-BC

## 2020-09-18 NOTE — PSYCH
Virtual Regular Visit      Assessment/Plan:    Problem List Items Addressed This Visit        Other    Severe episode of recurrent major depressive disorder, without psychotic features ([facility] [address] ) - Primary (Chronic)    Generalized anxiety disorder               Reason for visit is PHP VIRTUAL GROUP DUE TO COVID-19      Encounter provider BE [address] [facility]    Provider located at [place] [address]  [address] [address]  The patient was identified by name and date of birth  Pretty Rodriguez was informed that this is a telemedicine visit and that the visit is being conducted through [employer]  My office door was closed  No one else was in the room  She acknowledged consent and understanding of privacy and security of the video platform  The patient has agreed to participate and understands they can discontinue the visit at any time  Patient is aware this is a billable service  Subjective  Pretty Rodriguez is a 56 y o  female         I spent 4 hours of group + case management minutes with patient today in which greater than 50% of the time was spent in counseling/coordination of care regarding see notes      [address] acknowledges that she has consented to an online visit or consultation  She understands that the online visit is based solely on information provided by her, and that, in the absence of a face-to-face physical evaluation by the physician, the diagnosis she receives is both limited and provisional in terms of accuracy and completeness  This is not intended to replace a full medical face-to-face evaluation by the physician  Breonnajalcyn becca understands and accepts these terms

## 2020-09-18 NOTE — PSYCH
Subjective:     Patient ID: Jason Cramer is a 56 y o  female  Innovations Clinical Progress Notes      Specialized Services Documentation  Therapist must complete separate progress note for each specific clinical activity in which the individual participated during the day  Group Psychotherapy (9:30am-10:15am) Group was facilitated virtually in a private office using HIPAA Compliant and Approved [employer] Teams  Martha Cartagena consented to the use of tele-video modality of treatment and was virtually present for group psychotherapy today  This group focused on interpersonal relationships  The group began with a brief  healing meditation  Participants took part in an opener in which they were asked to reflect on the most positive interpersonal relationship in their lives  Participants then engaged in a group activity and discussion listing the characteristics of positive relationships and the characteristics and impact of negative relationships or negative interactions in a relationship  Participants also were asked to reflect on positive and negative relationships in their lives and the needs of themselves in the relationship as well as others  Lu Ceja was attentive during this group  She shared some general characteristics of relationships but did not share anything personal to her on this topic   Lu Ceja has made some good progress toward goals during this program      Tx Goal: 1 1,1 2 Therapist:DESTINY Acevedo

## 2020-09-18 NOTE — PSYCH
Subjective:     Patient ID: Ozzie Moritz is a 56 y o  female  Innovations Clinical Progress Notes      Specialized Services Documentation  Therapist must complete separate progress note for each specific clinical activity in which the individual participated during the day  Group Psychotherapy ([id number])   This group was facilitated virtually in a private office using [employer] and Approved [employer] Teams  Ozzie Moritz consented to the use of tele-video modality of treatment  Ozzie Moritz actively shared in psychotherapy group today which focused on Weekly Wellness Assessment  She engaged in self-rate and discussion  Group members individually went through the assessment and rated themselves based on the past week  Group members shared assessment results  Group discussed the six domains of wellness; physical, emotional, cognitive, vocational, social, and spiritual  Group discussed strengths, challenges, barriers, and ways to increase each domain  Jozefduy Anguianoters chose the physical domain and identified the need to increase her physical activity  She plans to challenge herself by taking her dogs for walks more frequently  Good progress toward goal noted  Continue psychotherapy to further encourage self-reflection on strengths and challenges with personal wellness       Tx Plan Objective: 1 2 Therapist:  Stacey Luna RN

## 2020-09-18 NOTE — PSYCH
Subjective:     Patient ID: Kayleigh Lake is a 56 y o  female  Innovations Clinical Progress Notes      Specialized Services Documentation  Therapist must complete separate progress note for each specific clinical activity in which the individual participated during the day  Group Psychotherapy      Education Therapy   Group was facilitated virtually in a private office using HIPAA Compliant and Approved [employer] Teams  Kayleigh Lake consented to the use of tele-video modality of treatment and was virtually present for group psychotherapy process today       Time:  9281-1642  Previous goal met: Yes   Readiness to Learning: Receptive  Barriers to Learning: None  Learning Assessment  Time: 2444-9722  Education Completed: Wellness Tools  Teaching Method: Verbal  Shared Area of Learning: Yes   Goal Set: dust and vacuum    Tx Plan Objective: 1 4 , Therapist:  Nahum Tello RN

## 2020-09-21 ENCOUNTER — OFFICE VISIT (OUTPATIENT)
Dept: PSYCHOLOGY | Facility: CLINIC | Age: 60
End: 2020-09-21
Payer: COMMERCIAL

## 2020-09-21 DIAGNOSIS — F33.2 SEVERE EPISODE OF RECURRENT MAJOR DEPRESSIVE DISORDER, WITHOUT PSYCHOTIC FEATURES (HCC): Primary | Chronic | ICD-10-CM

## 2020-09-21 DIAGNOSIS — F41.1 GENERALIZED ANXIETY DISORDER: ICD-10-CM

## 2020-09-21 PROCEDURE — S9480 INTENSIVE OUTPATIENT PSYCHIA: HCPCS

## 2020-09-21 NOTE — PSYCH
Virtual Regular Visit      Assessment/Plan:    Problem List Items Addressed This Visit        Other    Severe episode of recurrent major depressive disorder, without psychotic features ([facility] [address] ) - Primary (Chronic)    Generalized anxiety disorder               Reason for visit is PHP VIRTUAL GROUP DUE TO COVID-19      Encounter provider BE [address] [facility]    Provider located at [facility] [address]  [address] [address]         The patient was identified by name and date of birth  Kristy Hodgkin was informed that this is a telemedicine visit and that the visit is being conducted through [email or web address]  My office door was closed    No one else was in the room  She acknowledged consent and understanding of privacy and security of the video platform  The patient has agreed to participate and understands they can discontinue the visit at any time  Patient is aware this is a billable service  Subjective  Kristy Hodgkin is a 56 y o  female       I spent 4 hours of group + case management minutes with patient today in which greater than 50% of the time was spent in counseling/coordination of care regarding see notes      [address] acknowledges that she has consented to an online visit or consultation  She understands that the online visit is based solely on information provided by her, and that, in the absence of a face-to-face physical evaluation by the physician, the diagnosis she receives is both limited and provisional in terms of accuracy and completeness  This is not intended to replace a full medical face-to-face evaluation by the physician  Kristy Hodgkin understands and accepts these terms

## 2020-09-21 NOTE — PSYCH
Subjective:     Patient ID: Aguilar Medina is a 56 y o  female  Innovations Clinical Progress Notes      Specialized Services Documentation  Therapist must complete separate progress note for each specific clinical activity in which the individual participated during the day  Allied Therapy   This group was facilitated virtually in a private office using [employer] and Approved [employer] Teams  Aguilar Medina consented to the use of tele-video modality of treatment  [id number] Aguilar Medina  actively shared in [facility] group focused on relaxation techniques and mindfulness strategies  Martha engaged in [address] and breathing techniques  Group explored practice of what skills through observing, describing and participating in a maryuri listening and then engaging in song  She identified that can listen to nature to practice mindfulness tonight  Group discussed ways to apply to slowing down to make more effective choices  Some positive  effort noted toward treatment goal   Continue AT to encourage the development and practice of mindfulness strategies to alleviate symptoms and support wellness      Tx Plan Objective: 1 2,1 5, Therapist:  Titus SUTTONBC

## 2020-09-21 NOTE — PSYCH
Subjective:     Patient ID: Mariluz Rivera is a 56 y o  female  Innovations Clinical Progress Notes      Specialized Services Documentation  Therapist must complete separate progress note for each specific clinical activity in which the individual participated during the day  Group Psychotherapy     ([id number]) Group was facilitated virtually in a private office using HIPAA Compliant and Approved [employer] Teams  ,name consented to the use of tele-video modality of treatment and was virtually present for group psychotherapy process today  Mariluz Rivera  actively shared in psychotherapy group which focused on taking responsibility for physical health and well being  Group members were paired up and encouraged to explore different categories of physical health which included:  weight, flexibility, skin, strength, eyes, nutrition, dental, foot care, and preventative care  They then discussed ideas on how to take responsibility for each physical aspect  Britney Hammer appeared engaged and participated when prompted  Moderate progress toward goal noted  Continue psychotherapy to educate on the importance of making physical health a priority        Tx Plan Objective: 1 3,1 4, Therapist:  Steve Barber RN

## 2020-09-21 NOTE — PSYCH
Subjective:     Patient ID: Homer Carrillo is a 56 y o  female  [facility] Clinical Progress Notes      Specialized Services Documentation  Therapist must complete separate progress note for each specific clinical activity in which the individual participated during the day  GROUP PSYCHOTHERAPY ([id number]) Group was facilitated virtually in a private office using HIPAA Compliant and Approved Microsoft Teams  Martha consented to the use of tele-video modality of treatment and was virtually present for group psychotherapy today  The group participated in an educational activity around irrational beliefs and then discussed and practiced working on reframing these beliefs  The group was educated about protective factors and encouraged to use them when facing a challenge  Haresh Lara was able to identify some irrational beliefs about her husbands illness and reframed it into a positive reframe and continues to make progress towards goals through verbal participation in group; to accomplish long term goals continue to utilize skills learned in programming  TX Plan Objectives: 1 1, 1 2, 1 4   Therapist: Mily Magaña MA; SAVANNAH Talbot    Education Therapy   Time:  3421-2443  Previous goal met: Yes   Readiness to Learning: Receptive  Barriers to Learning: None  Learning Assessment  Time: 2107-0761  Education Completed: Illness, Medication, Aftercare and Wellness Tools  Teaching Method: Verbal, Written, A/V and Demonstration  Shared Area of Learning: Yes   Goal Set: Get stuff done in garden  Tx Plan Objective: 1 1,1 2,1 4 Therapist:  Mily Magaña MA; SAVANNAH Talbot      Case Management Note    Mily Magaña MA    Current suicide risk : Low     ([id number]) Haresh Lara was gardening when the writer called for case management  Haresh Lara explained that a goal of hers for the afternoon was to reach out to the outpatient providers to schedule and initial assessment to prepare for discharge    This writer reviewed Martha's IOP schedule  Medications changes/added/denied? No    Treatment session number: 12    Individual Case Management Visit provided today? Yes    [facility] follow up physician's orders: none at this time

## 2020-09-22 ENCOUNTER — APPOINTMENT (OUTPATIENT)
Dept: PSYCHOLOGY | Facility: CLINIC | Age: 60
End: 2020-09-22
Payer: COMMERCIAL

## 2020-09-22 ENCOUNTER — DOCUMENTATION (OUTPATIENT)
Dept: PSYCHOLOGY | Facility: CLINIC | Age: 60
End: 2020-09-22

## 2020-09-22 NOTE — PROGRESS NOTES
Subjective:     Patient ID: Katelyn Coppola is a 56 y o  female  [facility] Clinical Progress Notes      Specialized Services Documentation  Therapist must complete separate progress note for each specific clinical activity in which the individual participated during the day  Case Management Note    Ani Sanchez MA    Current suicide risk : Unable to assess due to absence  Katelyn Coppola was excused from program on 09/22/20 due to not being a scheduled IOP day  Medications changes/added/denied? No    Treatment session number: N/A    Individual Case Management Visit provided today? No    [facility] follow up physician's orders: None at this time

## 2020-09-23 ENCOUNTER — OFFICE VISIT (OUTPATIENT)
Dept: PSYCHOLOGY | Facility: CLINIC | Age: 60
End: 2020-09-23
Payer: COMMERCIAL

## 2020-09-23 DIAGNOSIS — F41.1 GENERALIZED ANXIETY DISORDER: Primary | ICD-10-CM

## 2020-09-23 DIAGNOSIS — F33.2 SEVERE EPISODE OF RECURRENT MAJOR DEPRESSIVE DISORDER, WITHOUT PSYCHOTIC FEATURES (HCC): Chronic | ICD-10-CM

## 2020-09-23 PROCEDURE — S9480 INTENSIVE OUTPATIENT PSYCHIA: HCPCS

## 2020-09-23 NOTE — PSYCH
Virtual Regular Visit      Assessment/Plan:    Problem List Items Addressed This Visit        Other    Severe episode of recurrent major depressive disorder, without psychotic features ([facility] [address] ) (Chronic)    Generalized anxiety disorder - Primary               Reason for visit is PHP VIRTUAL GROUP DUE TO COVID-19       Encounter provider [facility] [address]    Provider located at [address] [id number]      Recent Visits  Date Type Provider Dept   09/21/20 Office Visit [facility]   09/18/20 [facility] Visit [facility]   09/17/20 [facility] Visit [facility]   09/16/20 [facility] Visit [facility]   Showing recent visits within past 7 days and meeting all other requirements     Today's Visits  Date Type Provider Dept   09/23/20 Office Visit [facility]   Showing today's visits and meeting all other requirements     Future Appointments  No visits were found meeting these conditions  Showing future appointments within next 150 days and meeting all other requirements        The patient was identified by name and date of birth  Jason Cramer was informed that this is a telemedicine visit and that the visit is being conducted through [facility]  My office door was closed  No one else was in the room  She acknowledged consent and understanding of privacy and security of the video platform  The patient has agreed to participate and understands they can discontinue the visit at any time  Patient is aware this is a billable service  Dustin Shearer is a 56 y o  female          HPI     Past Medical History:   Diagnosis Date    Anxiety     Concussion     Last assessed 9/21/2017    Depression     ISCHEMIC 2013     ISCHEMIC 2013    Seizures [facility])        Past Surgical History: Procedure Laterality Date    BRAIN SURGERY      IMMED FOLLOWING STROKE IN 2013     SECTION         Current Outpatient Medications   Medication Sig Dispense Refill    citalopram (CeleXA) 10 mg tablet Take one tablet by mouth in the morning ( total of 30 mg as per Dr Susie Minor) 30 tablet 0    citalopram (CeleXA) 20 mg tablet Take 1 tablet by mouth daily 30 tablet 0    lacosamide (VIMPAT) 100 mg tablet Take 1 tablet (100 mg total) by mouth every 12 (twelve) hours 30 tablet 0    mirtazapine (REMERON) 15 mg tablet Take 1 tablet by mouth daily at bedtime 30 tablet 0    Na Sulfate-K Sulfate-Mg Sulf 17 5-3 13-1 6 GM/177ML SOLN Take 1 kit by mouth once for 1 dose 2 Bottle 0     No current facility-administered medications for this visit  No Known Allergies    Review of Systems    Video Exam    There were no vitals filed for this visit  Physical Exam     I spent 4 hours of group + case management minutes with patient today in which greater than 50% of the time was spent in counseling/coordination of care regarding see notes      [id number] acknowledges that she has consented to an online visit or consultation  She understands that the online visit is based solely on information provided by her, and that, in the absence of a face-to-face physical evaluation by the physician, the diagnosis she receives is both limited and provisional in terms of accuracy and completeness  This is not intended to replace a full medical face-to-face evaluation by the physician  Leta Love understands and accepts these terms

## 2020-09-23 NOTE — PSYCH
Subjective:     Patient ID: Curt Manzanares is a 56 y o  female  [facility] Clinical Progress Notes      Specialized Services Documentation  Therapist must complete separate progress note for each specific clinical activity in which the individual participated during the day  Allied Therapy   This group was facilitated virtually in a private office using [employer] and Approved [employer] Teams  Curt Manzanares consented to the use of tele-video modality of treatment  [id number] Curt Manzanares   actively  shared in [facility] group focused on boundary setting  Martha engaged in improvisation and discussion exploring value of and ways to set healthy limits with self and others  DBT strategy DARCY GIVE introduced as skill to voice boundaries  She participated in practicing boundaries with given scenarios  Some positive  work toward goal noted  Continue AT to encourage skill development and use         Tx Plan Objective: 1 4,1 5, Therapist:  Maru VÁZQUEZ

## 2020-09-23 NOTE — PSYCH
Assessment/Plan:       Diagnoses and all orders for this visit:     Severe episode of recurrent major depressive disorder, without psychotic features ([facility] [address] )     Generalized anxiety disorder            Subjective:      Patient ID: Martha Chauhan is a 62 y o  female      [facility] Treatment Plan   AREAS OF NEED: Depression as evidenced by rumination, passive suicidal ideation, and isolation due to pandemic and historical illness  Date Initiated: 09/02/20     Strengths: "good cook, survivor"            LONG TERM GOAL:   Date Initiated: 09/02/20  1 0 I will identify three ways in which my overall day-to-day functioning has improved since attending [facility]    Target Date: 09/30/20  Completion Date:         SHORT TERM OBJECTIVES:      Date Initiated: 09/02/20  1 1 I will provide experiential feedback at least once per treatment day to build my natural support network    Revision Date: 09/15/20  Target Date: 09/24/2020  Completion Date:      Date Initiated: 09/02/20  1 2 I will identify two relaxation techniques I have learned to help manage my anxiety    Revision Date: 09/15/20  Target Date: 09/24/2020  Completion Date:     Date Initiated: 09/02/20  1 3 I will take medications as prescribed and share questions and concerns if arise     Revision Date: 09/15/20  Target Date: 09/24/2020  Completion Date:      Date Initiated: 09/02/20  1 4 I will identify 3 ways my supports can assist in my recovery and agree to staff/support contact as indicated     Revision Date: 09/15/20  Target Date: 09/24/2020  Completion Date:            7 DAY REVISION:     Date Initiated: 09/15/20   1 5 I would like to learn and practice assertive communication to improve my identity in relationships      Revision Date:   Target Date: 09/24/2020  Completion Date:     Date Initiated: 09/23/2020  1 6 I will select and schedule an initial assessment with 1 outpatient therapist    Revision Date:   Target Date: 10/02/2020  Completion Date:          PSYCHIATRY:  Date Initiated:  09/02/20  Medication Management and Education       Revision Date:   The person(s) responsible for carrying out the plan is Aida Sanches MD     NURSING:   Date Initiated: 09/02/20  1 1,1 2,1 3,1 4 This RN will provide daily wellness group five days weekly to educate Martha Chauhan on S/S of her diagnoses and medications used in treatment  Revision Date:  The person(s) responsible for carrying out the plan is Ced Hernandez RN     PSYCHOLOGY:   Date Initiated: 09/02/20       1 1, 1 2, 1 4 Provide psychotherapy group 5 times per week to allow opportunity for [employer] explore stressors and ways of coping  Revision Date:   The person(s) responsible for carrying out the plan is Maame Richards     ALLIED THERAPY:   Date Initiated: 09/02/20  1 1,1 2 Engage Martha Chauhan in AT group 5 times daily to encourage development and use of wellness tools to decrease symptoms and promote recovery through meaningful activity  Revision Date:   The person(s) responsible for carrying out the plan is KATHIE Gleason      CASE MANAGEMENT:   Date Initiated: 09/02/20      1 0 This  will meet with Martha Chauhan  3-4 times weekly to assess treatment progress, discharge planning, connection to community supports and UR as indicated    Revision Date:   The person(s) responsible for carrying out the plan Julita Romano MA     TREATMENT REVIEW/COMMENTS:      DISCHARGE CRITERIA: Identify 3 signs of progress and complete relapse prevention plan     DISCHARGE PLAN: Establish outpatient therapy and return to medication management  Estimated Length of Stay: 10 treatment days      Diagnosis and Treatment Plan explained to Martha Thomas relates understanding diagnosis and is agreeable to Treatment Plan             CLIENT COMMENTS / Please share your thoughts, feelings, need and/or experiences regarding your treatment plan: _____________________________________________________________________________________________________________________________________________________________________________________________________________________________________________________________________________________________________________________ Date/Time: ______________      Patient Signature: __     REVIEWED VIRTUALLY VIA [employer] TEAMS DUE TO VIXVS-22       _______________________________      Date/Time: ___09/23/2020___@ 1242________       Signature: Cherylene Heller, MA______________________________     Date/Time: __09/23/2020__@ 1243__________

## 2020-09-23 NOTE — PSYCH
Subjective:     Patient ID: Edgardo Saunders is a 56 y o  female  [facility] Clinical Progress Notes      Specialized Services Documentation  Therapist must complete separate progress note for each specific clinical activity in which the individual participated during the day  GROUP PSYCHOTHERAPY ([id number]) Group was facilitated virtually in a private office using HIPAA Compliant and Approved Microsoft Teams  Edgardo Saunders consented to the use of tele-video modality of treatment and was virtually present for group psychotherapy today  The group engaged in Emotional Freedom Tapping by following along to a video done by Davide Soares  The group rated their anxiety levels before and after participating in the tapping exercise  Kelley Tovar shared that she started at a level 6 of anxiety  After completing the exercise her anxiety reduced to a level 2  Kelley Tovar identified to have more ease that she would like to take tasks one at a time  Kelley Tovar continues to make progress towards goals through verbal participation in group; to accomplish long term goals continue to utilize skills learned in programming  TX Plan Objectives: 1 1, 1 2, 1 4   Therapist: Joie Tipton MA        Case Management Note    Joie Tipton MA    Current suicide risk : Low     ([id number]) Kelley Tovar was attempting [employer] for beg[employer] when this writer called for case management  Martha's plans are to go [address]  Kelley Monikakassie identified that she will utilize the tapping as a new coping skill  Martha reviewed the treatment plan and agreed to the new goal of scheduling an outpatient therapist   Kelley Frankkassie is happy with the medication that she is taking  Kelley Tovar was nervous about starting the [facility] groups but finds that it would be beneficial to join  Reviewed Martha's med check on Friday with Kendrick  Medications changes/added/denied? No    Treatment session number: 13    Individual Case Management Visit provided today?  Yes [facility] follow up physician's orders: none at this time

## 2020-09-23 NOTE — PSYCH
Subjective:     Patient ID: Eliana Qiu is a 56 y o  female  [facility] Clinical Progress Notes      Specialized Services Documentation  Therapist must complete separate progress note for each specific clinical activity in which the individual participated during the day  Group Psychotherapy     ([id number]) Group was facilitated virtually in a private office using HIPAA Compliant and Approved [employer] Teams  Eliana Qiu consented to the use of tele-video modality of treatment and was virtually present for group psychotherapy process today  Eliana Qiu  actively participated in psychoeducational group which focused on exercise to improve physical and mental wellbeing  Topics included the benefits of exercise to improve physical and mental wellbeing  The group then participated in 20 minutes of low intensity chair yoga  They then discussed ideas on how to improve stamina and ways to incorporate exercise into their lives  Moderate progress toward goal noted  Continue psychoeducation to educate on the importance of making physical fitness a priority  Tx Plan Objective: 1 3,1 4, Therapist:  Tamir Bhandari RN    Education Therapy   Group was facilitated virtually in a private office using HIPAA Compliant and Approved [employer] Teams  Eliana Qiu consented to the use of tele-video modality of treatment and was virtually present for group psychotherapy process today       Time:  0429-3970  Previous goal met: Yes   Readiness to Learning: Receptive  Barriers to Learning: None  Learning Assessment  Time: 6735-4377  Education Completed: Wellness Tools  Teaching Method: Verbal  Shared Area of Learning: Yes   Goal Set: Work on [employer]    Tx Plan Objective: 1 4 , Therapist:  Tamir Bhandari RN

## 2020-09-24 ENCOUNTER — DOCUMENTATION (OUTPATIENT)
Dept: PSYCHOLOGY | Facility: CLINIC | Age: 60
End: 2020-09-24

## 2020-09-24 ENCOUNTER — APPOINTMENT (OUTPATIENT)
Dept: PSYCHOLOGY | Facility: CLINIC | Age: 60
End: 2020-09-24
Payer: COMMERCIAL

## 2020-09-24 NOTE — PROGRESS NOTES
Subjective:     Patient ID: Cat Quiroz is a 56 y o  female  [facility] Clinical Progress Notes      Specialized Services Documentation  Therapist must complete separate progress note for each specific clinical activity in which the individual participated during the day  Case Management Note    Catina Akhtar MA    Current suicide risk : Unable to assess due to absence  Cat Quiroz was excused from program on 09/24/20 due to not being a scheduled IOP day  Medications changes/added/denied? No    Treatment session number: N/A    Individual Case Management Visit provided today? No    [facility] follow up physician's orders: None at this time

## 2020-09-25 ENCOUNTER — OFFICE VISIT (OUTPATIENT)
Dept: PSYCHOLOGY | Facility: CLINIC | Age: 60
End: 2020-09-25
Payer: COMMERCIAL

## 2020-09-25 ENCOUNTER — TELEMEDICINE (OUTPATIENT)
Dept: PSYCHIATRY | Facility: CLINIC | Age: 60
End: 2020-09-25
Payer: COMMERCIAL

## 2020-09-25 DIAGNOSIS — F33.2 SEVERE EPISODE OF RECURRENT MAJOR DEPRESSIVE DISORDER, WITHOUT PSYCHOTIC FEATURES (HCC): Primary | Chronic | ICD-10-CM

## 2020-09-25 DIAGNOSIS — F41.1 GENERALIZED ANXIETY DISORDER: ICD-10-CM

## 2020-09-25 PROCEDURE — S9480 INTENSIVE OUTPATIENT PSYCHIA: HCPCS

## 2020-09-25 PROCEDURE — 99213 OFFICE O/P EST LOW 20 MIN: CPT | Performed by: NURSE PRACTITIONER

## 2020-09-25 NOTE — PSYCH
Virtual Regular Visit    Problem List Items Addressed This Visit        Other    Severe episode of recurrent major depressive disorder, without psychotic features ([facility] [address] ) - Primary (Chronic)    Generalized anxiety disorder        It was my intent to perform this visit via video technology but the patient was not able to do a video connection so the visit was completed via audio telephone only  Encounter provider OSMAN Hogan    Provider located at   [address] [place] [address]  [address] [address]    Recent Visits  No visits were found meeting these conditions  Showing recent visits within past 7 days and meeting all other requirements     Today's Visits  Date Type Provider Dept   09/25/20 Telemedicine [facility] today's visits and meeting all other requirements     Future Appointments  No visits were found meeting these conditions  Showing future appointments within next 150 days and meeting all other requirements      The patient was identified by name and date of birth  Herlinda Corona was informed that this is a telemedicine visit and that the visit is being conducted through [employer]  My office door was closed  No one else was in the room  She acknowledged consent and understanding of privacy and security of the video platform  The patient has agreed to participate and understands they can discontinue the visit at any time  Patient is aware this is a billable service       HPI     Current Outpatient Medications   Medication Sig Dispense Refill    citalopram (CeleXA) 10 mg tablet Take one tablet by mouth in the morning ( total of 30 mg as per Dr Sahara Camarena) 30 tablet 0    citalopram (CeleXA) 20 mg tablet Take 1 tablet by mouth daily 30 tablet 0    lacosamide (VIMPAT) 100 mg tablet Take 1 tablet (100 mg total) by mouth every 12 (twelve) hours 30 tablet 0    mirtazapine (REMERON) 15 mg tablet Take 1 tablet by mouth daily at bedtime 30 tablet 0    Na Sulfate-K Sulfate-Mg Sulf 17 5-3 13-1 6 GM/177ML SOLN Take 1 kit by mouth once for 1 dose 2 Bottle 0     No current facility-administered medications for this visit  Review of Systems  Video Exam    There were no vitals filed for this visit  Physical Exam   As a result of this visit, I have referred the patient for further respiratory evaluation  No    I spent 10 minutes directly with the patient during this visit  [address] acknowledges that she has consented to an online visit or consultation  She understands that the online visit is based solely on information provided by her, and that, in the absence of a face-to-face physical evaluation by the physician, the diagnosis she receives is both limited and provisional in terms of accuracy and completeness  This is not intended to replace a full medical face-to-face evaluation by the physician  Mariluz Rivera understands and accepts these terms  PHP MEDICATION MANAGEMENT NOTE        [facility]    Name and Date of Birth:  Mariluz Rivera 56 y o  1960 MRN: [id number]    Date of Visit: September 25, 2020    No Known Allergies  SUBJECTIVE:    Britney Hammer is seen today for a follow up for depression and anxiety  She reports that she has done well since the last visit  Patient has no medication complaints  Patient finds the support from [facility] program helpful  Positive attitude making progress       She denies any side effects from medications      PLAN:  No medication changes at this time  Aware of 24 hour and weekend coverage for urgent situations accessed by calling [facility] main practice number  Continue partial hospitalization program    Diagnoses and all orders for this visit:    Severe episode of recurrent major depressive disorder, without psychotic features ([facility] )    Generalized anxiety disorder        Psychotherapy Provided:     Individual psychotherapy provided:     HPI ROS Appetite Changes and Sleep:     She reports normal sleep, normal appetite, normal energy level   Patient denies suicidal or homicidal ideation    Review Of Systems:      General normal    Personality no change in personality   Constitutional negative   ENT negative   Cardiovascular negative   Respiratory negative   Gastrointestinal negative   Genitourinary negative   Musculoskeletal negative   Integumentary negative   Neurological negative   Endocrine negative   Other Symptoms none, all other systems are negative     Mental Status Evaluation:    Appearance Unable to assess   Behavior cooperative   Mood euthymic  Depression Scale -  of 10 (0 = No depression)  Anxiety Scale -  of 10 (0 = No anxiety)   Speech Normal rate and volume   Affect appropriate and mood-congruent   Thought Processes Goal directed and coherent   Thought Content Does not verbalize delusional material   Associations Tightly connected   Perceptual Disturbances Denies hallucinations and does not appear to be responding to internal stimuli   Risk Potential Suicidal/Homicidal Ideation - No evidence of suicidal or homicidal ideation and Patient does not verbalize suicidal or homicidal ideation  Risk of Violence - No evidence of risk for violence found on assessment  Risk of Self Mutilation - No evidence of risk for self mutilation found on assessment   Orientation oriented to person, place, time/date and situation   Memory recent and remote memory grossly intact   Consciousness alert and awake   Attention/Concentration attention span and concentration are age appropriate   Insight fair   Judgement fair   Muscle Strength and Gait normal muscle strength and normal muscle tone, normal gait/station and normal balance   Motor Activity no abnormal movements   Language no difficulty naming common objects, no difficulty repeating a phrase, no difficulty writing a sentence   Fund of Knowledge adequate knowledge of current events  adequate fund of knowledge regarding past history  adequate fund of knowledge regarding vocabulary      Past Psychiatric History Update:     Inpatient Psychiatric Admission Since Last Encounter:   no  Suicide Attempt Or Self Mutilation Since Last Encounter:   no  Incidence of Violent Behavior Since Last Encounter:   no    Traumatic History Update:     New Onset of Abuse Since Last Encounter:   no  Traumatic Events Since Last Encounter:   no    Past Medical History:    Past Medical History:   Diagnosis Date    Anxiety     Concussion     Last assessed 2017    Depression     ISCHEMIC 2013     ISCHEMIC 2013    Seizures ([facility] [address] )      No past medical history pertinent negatives    Past Surgical History:   Procedure Laterality Date    BRAIN SURGERY      IMMED FOLLOWING STROKE IN 2013     SECTION       No Known Allergies  Substance Abuse History:    Social History     Substance and Sexual Activity   Alcohol Use No     Social History     Substance and Sexual Activity   Drug Use No     Social History:    Social History     Socioeconomic History    Marital status: /Civil Union     Spouse name: Not on file    Number of children: Not on file    Years of education: Not on file    Highest education level: Not on file   Occupational History    Occupation: Disabled   Social Needs    Financial resource strain: Not on file    Food insecurity     Worry: Not on file     Inability: Not on file   [employer] needs     Medical: Not on file     Non-medical: Not on file   Tobacco Use    Smoking status: Never Smoker    Smokeless tobacco: Never Used   Substance and Sexual Activity    Alcohol use: No    Drug use: No    Sexual activity: Not Currently     Partners: Male   Lifestyle    Physical activity     Days per week: Not on file     Minutes per session: Not on file    Stress: Not on file   Relationships    Social connections     Talks on phone: Not on file     Gets together: Not on file     Attends Denominational service: Not on file     Active member of club or organization: Not on file     Attends meetings of clubs or organizations: Not on file     Relationship status: Not on file    Intimate partner violence     Fear of current or ex partner: Not on file     Emotionally abused: Not on file     Physically abused: Not on file     Forced sexual activity: Not on file   Other Topics Concern    Not on file   Social History Narrative    Daily caffeine consumption     Family Psychiatric History:     Family History   Problem Relation Age of Onset    Aortic aneurysm Mother     No Known Problems Father      History Review: The following portions of the patient's history were reviewed and updated as appropriate: allergies, current medications, past family history, past medical history, past social history, past surgical history and problem list     OBJECTIVE:     Vital signs in last 24 hours: There were no vitals filed for this visit  Laboratory Results: I have personally reviewed all pertinent laboratory/tests results  Medications Risks/Benefits:      Risks, Benefits And Possible Side Effects Of Medications:    Discussed risks and benefits of treatment with patient including risk of suicidality and serotonin syndrome related to treatment with antidepressants;  Risk of induction of manic symptoms in certain patient populations     Controlled Medication Discussion:     Not applicable    OSMAN Medina 09/25/20

## 2020-09-25 NOTE — PSYCH
Subjective:     Patient ID: Aldo Novak is a 56 y o  female  [facility] Clinical Progress Notes      Specialized Services Documentation  Therapist must complete separate progress note for each specific clinical activity in which the individual participated during the day  GROUP PSYCHOTHERAPY ([id number]) Group was facilitated virtually in a private office using HIPAA Compliant and Approved [employer] Teams  Aldo Novak consented to the use of tele-video modality of treatment and was virtually present for group psychotherapy today  The group reviewed, practiced and created SMART goal setting  Talita Velasquez shared that she was having difficulty creating the smart goals  With the support of group, Talita Velasquez was able to create a SMART goal for organizing her kitchen  Talita Velasquez continues to make progress towards goals through verbal participation in group; to accomplish long term goals continue to utilize skills learned in programming  TX Plan Objectives: 1 1, 1 2, 1 4   Therapist: Hannah Howard MA    Education Therapy   Time:  2287-8985  Previous goal met: Yes   Readiness to Learning: Receptive  Barriers to Learning: None  Learning Assessment  Time: 7822-0123  Education Completed: Illness, Medication, Aftercare and Wellness Tools  Teaching Method: Verbal, Written, A/V and Demonstration  Shared Area of Learning: Yes   Goal Set: Clean the dishes  Tx Plan Objective: 1 1,1 2,1 4 Therapist:  Hannah Howard MA      Case Management Note    Hannah Howard MA    Current suicide risk : Low     ([id number]) Talita Velasquez was in her kitchen working on her dishes  She explained she was frustrated because she didn't get things done yesterday, which made her have a bad mood this morning  She attempted to join the [facility] groups but "chickened" out  She finds it difficult to stay organized and concentrating on tasks    Work on creating schedule for when no longer in program  Brainstormed creating a to-do list for her own structure  Fearful that she is going to forget what she wants to do  She will use 1 of the 2 calendars to help her remember her tasks for the day  She will continue to work up the courage to get into [facility] groups  She contact Angie Ghotra for outpatient therapist  She received a text on Thursday about setting up an appointment  Bernadette Hampton is waiting to hear back if [employer] would take her insurance  Once she hears from [employer], she will schedule an initial assessment  Bernadette Memo will update this writer when an appointment is scheduled  Medications changes/added/denied? No    Treatment session number: 14    Individual Case Management Visit provided today? Yes     [facility] follow up physician's orders: See Finesse Chua' note

## 2020-09-25 NOTE — PSYCH
Virtual Regular Visit      Assessment/Plan:    Problem List Items Addressed This Visit        Other    Severe episode of recurrent major depressive disorder, without psychotic features ([facility] [address] ) - Primary (Chronic)    Generalized anxiety disorder               Reason for visit is PHP VIRTUAL GROUP DUE TO COVID-19      Encounter provider [facility] [address]    Provider located at [address] [place] [phone]      Recent Visits  Date Type Provider Dept   09/23/20 Office Visit [facility]   09/21/20 Office Visit [facility]   09/18/20 Office Visit [facility]   Showing recent visits within past 7 days and meeting all other requirements     Today's Visits  Date Type Provider Dept   09/25/20 Office Visit [facility]   Showing today's visits and meeting all other requirements     Future Appointments  No visits were found meeting these conditions  Showing future appointments within next 150 days and meeting all other requirements        The patient was identified by name and date of birth  Martha Hinson was informed that this is a telemedicine visit and that the visit is being conducted through [employer]  My office door was closed  No one else was in the room  She acknowledged consent and understanding of privacy and security of the video platform  The patient has agreed to participate and understands they can discontinue the visit at any time  Patient is aware this is a billable service       Subjective  Martha Hinson is a 56 y o  female       HPI     Past Medical History:   Diagnosis Date    Anxiety     Concussion     Last assessed 9/21/2017    Depression     ISCHEMIC 2013     ISCHEMIC 2013    Seizures [facility])        Past Surgical History:   Procedure Laterality Date    BRAIN SURGERY      IMMED FOLLOWING STROKE IN 2013     SECTION         Current Outpatient Medications   Medication Sig Dispense Refill    citalopram (CeleXA) 10 mg tablet Take one tablet by mouth in the morning ( total of 30 mg as per Dr Sintia Arellano) 30 tablet 0    citalopram (CeleXA) 20 mg tablet Take 1 tablet by mouth daily 30 tablet 0    lacosamide (VIMPAT) 100 mg tablet Take 1 tablet (100 mg total) by mouth every 12 (twelve) hours 30 tablet 0    mirtazapine (REMERON) 15 mg tablet Take 1 tablet by mouth daily at bedtime 30 tablet 0    Na Sulfate-K Sulfate-Mg Sulf 17 5-3 13-1 6 GM/177ML SOLN Take 1 kit by mouth once for 1 dose 2 Bottle 0     No current facility-administered medications for this visit  No Known Allergies    Review of Systems    Video Exam    There were no vitals filed for this visit  Physical Exam     I spent 4 hours of group + case management  minutes with patient today in which greater than 50% of the time was spent in counseling/coordination of care regarding see notes      [address] acknowledges that she has consented to an online visit or consultation  She understands that the online visit is based solely on information provided by her, and that, in the absence of a face-to-face physical evaluation by the physician, the diagnosis she receives is both limited and provisional in terms of accuracy and completeness  This is not intended to replace a full medical face-to-face evaluation by the physician  Osorio Mosley understands and accepts these terms

## 2020-09-25 NOTE — PSYCH
Subjective:     Patient ID: Joni Najera is a 56 y o  female    [facility] Clinical Progress Notes      Specialized Services Documentation  Therapist must complete separate progress note for each specific clinical activity in which the individual participated during the day  Psychotherapy Group (10:25-11:10) This group was facilitated virtually in a private office using HIPAA Compliant and Approved [employer] Teams  Joni Najera consented to the use of tele-video modality of treatment  Pt actively participated in [address] [place] [address] group  This group was facilitated virtually in a private office using HIPAA Compliant and Approved [employer] Teams  Joni Najera consented to the use of tele-video modality of treatment and was virtually present for weekly wellness assessment group today  Clients reviewing weekend supports and plan as they approach the weekend  Compliance of medications reviewed and understood  The six (6) dimension of wellness discussed (Physical, Emotional, Cognitive, Vocational, Social and Spiritual)  The format for assessing wellness and measuring progress reviewed  62 Yadi Feliciano willingly shared that she wants to work on her emotional wellness by improving her positive affirmations as well as identifying things that worry her  She stated that she'd like to find healthy outlets for her emotions for her emotional wellness, and learn something new for her cognitive wellness  Joni Najear is making progress towards goals and objectives and will continue to attend wellness group  Pt actively engaged with peers and shared willingly during group discussion  Pt was able to attend to activity and discussion throughout duration of group session  Continue to engage pt in group psychotherapy in order to continue to progress toward long-term goal achievement  Tx Plan Objective: 1 1, 1 2, 1 3 and 1 4    Therapist: Hemant Naranjo MS, OTR/LEYLA

## 2020-09-25 NOTE — PSYCH
Subjective:     Patient ID: Meron Su is a 56 y o  female  [facility] Clinical Progress Notes      Specialized Services Documentation  Therapist must complete separate progress note for each specific clinical activity in which the individual participated during the day  Allied Therapy   This group was facilitated virtually in a private office using [employer] and Approved [employer] Teams  Meron Su consented to the use of tele-video modality of treatment  [id number] Meron Su  actively shared in [facility] group focused on managing anger and emotional regulation skills  Martha engaged in task exploring effective versus ineffective ways in addition to skills to refocus in the moment  Group explored the benefits of positive choices with intense emotion and factors that increase emotional vulnerability  Some  work toward goal noted   Continue AT to explore wellness tool awareness and practice       Tx Plan Objective: 1 1,1 5, Therapist:  Kishore VÁZQUEZ

## 2020-09-28 ENCOUNTER — OFFICE VISIT (OUTPATIENT)
Dept: PSYCHOLOGY | Facility: CLINIC | Age: 60
End: 2020-09-28
Payer: COMMERCIAL

## 2020-09-28 DIAGNOSIS — F33.2 SEVERE EPISODE OF RECURRENT MAJOR DEPRESSIVE DISORDER, WITHOUT PSYCHOTIC FEATURES (HCC): Primary | Chronic | ICD-10-CM

## 2020-09-28 DIAGNOSIS — F41.1 GENERALIZED ANXIETY DISORDER: ICD-10-CM

## 2020-09-28 PROCEDURE — S9480 INTENSIVE OUTPATIENT PSYCHIA: HCPCS

## 2020-09-28 NOTE — PSYCH
Subjective:     Patient ID: Joni Najera is a 56 y o  female  [facility] Clinical Progress Notes      Specialized Services Documentation  Therapist must complete separate progress note for each specific clinical activity in which the individual participated during the day  Allied Therapy   This group was facilitated virtually in a private office using [employer] and Approved [employer] Teams  Joni Najera consented to the use of tele-video modality of treatment  [id number] Joni Najera  actively shared in [place] group focused on mindfulness and the [facility] strategies  Everette Koch engaged in review of the what skills of observe, describe and participate  Group introduced to and practiced the [facility] skills non-judgmental, one-mindfully, and effectiveness through various tasks  Group discussed ways to apply to slowing down to make more effective choices  Some positive effort noted toward treatment goal   Continue AT to encourage the development and practice of mindfulness strategies to alleviate symptoms and support wellness        Tx Plan Objective: 1 2,1 5, Therapist:  Lauren VÁZQUEZ

## 2020-09-28 NOTE — PSYCH
Virtual Regular Visit      Assessment/Plan:    Problem List Items Addressed This Visit        Other    Severe episode of recurrent major depressive disorder, without psychotic features ([facility] [address] ) - Primary (Chronic)    Generalized anxiety disorder               Reason for visit is PHP VIRTUAL GROUP DUE TO COVID-19   Encounter provider [facility] [address]    Provider located at [address] [phone]      Recent Visits  Date Type Provider Dept   09/25/20 Office Visit [facility]   09/23/20 Office Visit [facility]   09/21/20 Office Visit [facility]   Showing recent visits within past 7 days and meeting all other requirements     Today's Visits  Date Type Provider Dept   09/28/20 Office Visit [facility]   Showing today's visits and meeting all other requirements     Future Appointments  No visits were found meeting these conditions  Showing future appointments within next 150 days and meeting all other requirements        The patient was identified by name and date of birth  Rex Dennis was informed that this is a telemedicine visit and that the visit is being conducted through [employer]  My office door was closed  No one else was in the room  She acknowledged consent and understanding of privacy and security of the video platform  The patient has agreed to participate and understands they can discontinue the visit at any time  Patient is aware this is a billable service       Subjective  Rex Dennis is a 56 y o  female       HPI     Past Medical History:   Diagnosis Date    Anxiety     Concussion     Last assessed 9/21/2017    Depression     ISCHEMIC 2013     ISCHEMIC 2013    Seizures [facility])        Past Surgical History:   Procedure Laterality Date    BRAIN SURGERY      IMMED FOLLOWING STROKE IN 2013   SECTION         Current Outpatient Medications   Medication Sig Dispense Refill    citalopram (CeleXA) 10 mg tablet Take one tablet by mouth in the morning ( total of 30 mg as per Dr Rani Grewal) 30 tablet 0    citalopram (CeleXA) 20 mg tablet Take 1 tablet by mouth daily 30 tablet 0    lacosamide (VIMPAT) 100 mg tablet Take 1 tablet (100 mg total) by mouth every 12 (twelve) hours 30 tablet 0    mirtazapine (REMERON) 15 mg tablet Take 1 tablet by mouth daily at bedtime 30 tablet 0    Na Sulfate-K Sulfate-Mg Sulf 17 5-3 13-1 6 GM/177ML SOLN Take 1 kit by mouth once for 1 dose 2 Bottle 0     No current facility-administered medications for this visit  No Known Allergies    Review of Systems    Video Exam    There were no vitals filed for this visit  Physical Exam     I spent 4 hours of group + case management minutes with patient today in which greater than 50% of the time was spent in counseling/coordination of care regarding see notes      [address] acknowledges that she has consented to an online visit or consultation  She understands that the online visit is based solely on information provided by her, and that, in the absence of a face-to-face physical evaluation by the physician, the diagnosis she receives is both limited and provisional in terms of accuracy and completeness  This is not intended to replace a full medical face-to-face evaluation by the physician  Mariluz Rivera understands and accepts these terms

## 2020-09-28 NOTE — PSYCH
Subjective:     Patient ID: Gerardo Israel is a 56 y o  female  [facility] Clinical Progress Notes      Specialized Services Documentation  Therapist must complete separate progress note for each specific clinical activity in which the individual participated during the day  GROUP PSYCHOTHERAPY ([id number]) Group was facilitated virtually in a private office using HIPAA Compliant and Approved [employer] Teams  Martha consented to the use of tele-video modality of treatment and was virtually present for group psychotherapy today  The group reviewed their SMART goal from last week and discussed strengths and weaknesses moving forward with their goals  Iggy Avila gave positive support to others in group regarding moving towards their goals and continues to make progress towards goals through verbal participation in group; to accomplish long term goals continue to utilize skills learned in programming  TX Plan Objectives: 1 1, 1 2, 1 4   Therapist: Salma Cline MA; SAVANNAH Tang      Education Therapy   Time:  6010-7855  Previous goal met: Yes   Readiness to Learning: Receptive  Barriers to Learning: None  Learning Assessment  Time: 8660-5579  Education Completed: Illness, Medication, Aftercare and Wellness Tools  Teaching Method: Verbal, Written, A/V and Demonstration  Shared Area of Learning: Yes   Goal Set: Start a journal    Tx Plan Objective: 1 1,1 2,1 4 Therapist:  Salma Cline MA; SAVANNAH Tang          Case Management Note    Salma Cline MA    Current suicide risk : Low     ([phone]) Martha shared that she is going to start journaling and that she would like to join the [facility] support groups this week  She enjoys the group support and will work on finding support groups for after discharge  She explained that she is nervous about discharge and is fearful about being on her own   She was reminded that she can work on her schedule and structure on the days she is out of program and is encouraged to discuss struggles when she returns to group  She explained that her ability to focus is starting to come back  She stated that [place] has not returned her calls, so she will begin to reach out to other outpatient providers for therapy upon disharge  [facility] has been informed of this writer's leave of absence and that a reserve staff will be in contact with her  Medications changes/added/denied? No    Treatment session number: 15    Individual Case Management Visit provided today? Yes     [facility] follow up physician's orders: none at this time

## 2020-09-28 NOTE — PSYCH
Subjective:     Patient ID: Cornelius Rojo is a 56 y o  female  [facility] Clinical Progress Notes      Specialized Services Documentation  Therapist must complete separate progress note for each specific clinical activity in which the individual participated during the day  Group Psychotherapy     ([phone]) Group was facilitated virtually in a private office using HIPAA Compliant and Approved [employer] Teams  Cornelius Rojo consented to the use of tele-video modality of treatment and was virtually present for group psychotherapy process today  Cornelius Rojo did not actively engage in wellness group entitled Litepoint  Group opened by discussing verbal vs non verbal communication and the benefits on developing good social skills  The group then played Social Bingo which featured a board with social prompts on it  When members got a letter and number combination from their board, they used the prompt to talk about themselves or something relevant to treatment or the world today  Activity helped the group to develop social talking points and to learn about each other  Minimal progress towards goal noted through participation and peer support  Continue wellness and psychoeducational groups to educate on the importance of developing social skills      Tx Plan Objective: 1 4, Therapist:  Jesus Saunders RN

## 2020-09-29 ENCOUNTER — DOCUMENTATION (OUTPATIENT)
Dept: PSYCHOLOGY | Facility: CLINIC | Age: 60
End: 2020-09-29

## 2020-09-29 NOTE — PROGRESS NOTES
Subjective:     Patient ID: Rex Dennis is a 56 y o  female  [facility] Clinical Progress Notes      Specialized Services Documentation  Therapist must complete separate progress note for each specific clinical activity in which the individual participated during the day  Case Management Note    Ani Patton MA    Current suicide risk : Unable to assess due to absence  Rex Dennis was excused from program on 09/29/20 due to not being a scheduled IOP day  Medications changes/added/denied? No    Treatment session number: N/A    Individual Case Management Visit provided today? No    [facility] follow up physician's orders: None at this time

## 2020-09-30 ENCOUNTER — OFFICE VISIT (OUTPATIENT)
Dept: PSYCHOLOGY | Facility: CLINIC | Age: 60
End: 2020-09-30
Payer: COMMERCIAL

## 2020-09-30 DIAGNOSIS — F33.2 SEVERE EPISODE OF RECURRENT MAJOR DEPRESSIVE DISORDER, WITHOUT PSYCHOTIC FEATURES (HCC): Primary | Chronic | ICD-10-CM

## 2020-09-30 DIAGNOSIS — F41.1 GENERALIZED ANXIETY DISORDER: ICD-10-CM

## 2020-09-30 PROCEDURE — S9480 INTENSIVE OUTPATIENT PSYCHIA: HCPCS

## 2020-09-30 NOTE — PSYCH
Subjective:     Patient ID: Randal Segovia is a 56 y o  female  [facility] Clinical Progress Notes      Specialized Services Documentation  Therapist must complete separate progress note for each specific clinical activity in which the individual participated during the day  GROUP PSYCHOTHERAPY ([phone] 3024) Group was facilitated virtually in a private office using HIPAA Compliant and Approved [employer] Teams  Randal Segovia consented to the use of tele-video modality of treatment and was virtually present for group psychotherapy today  The group was offered process time to discuss current stressors  Deann Teague with other group members about toxic family members  She encouraged members to be aware of triggers  Valdez Mariangel continues to make progress towards goals through verbal participation in group; to accomplish long term goals continue to utilize skills learned in programming  TX Plan Objectives: 1 1, 1 2, 1 4   Therapist: Robi Bautista MA        Case Management Note    Robi Bautista MA    Current suicide risk : Low     [phone]) Valdez Mariangel got in touch with [facility] and will be setting up appointment for next week, she will be sent paperwork to fill out by Harry Mora  She scheduled with Dr Eric Rodriguez on 10/15/2020  Valdez August stated that she will attend group for BRIAN  She is continuously working on goals  Medications changes/added/denied? No    Treatment session number: 16 (IOP 7)    Individual Case Management Visit provided today? Yes     [facility] follow up physician's orders: none at this time

## 2020-09-30 NOTE — PSYCH
Subjective:     Patient ID: Rabia Cast is a 56 y o  female  [facility] Clinical Progress Notes      Specialized Services Documentation  Therapist must complete separate progress note for each specific clinical activity in which the individual participated during the day  Group Psychotherapy     ([phone]) Group was facilitated virtually in a private office using HIPAA Compliant and Approved [employer] Teams  Rabia Cast consented to the use of tele-video modality of treatment and was virtually present for group psychotherapy process today  Martha Hodgson minimally engaged in psychoeducational group about depression  The group viewed the first part of an educational video about depression called, Depression: Out of the Shadows   Video educated the group on signs and symptoms of depression, medications, and therapies  The first half of the video depicted medication as well as non medication treatment  Video  discussed the positive outcomes for those who seek help  Video and discussion afterward offered group members a chance to further explore and become educated on their own diagnosis  Mild progress towards goals through engagement and participation  Continue psychoeducational groups to educate on diagnosis  Tx Plan Objective:  1 3, 1 4 Therapist:  Greer Suero RN     Education Therapy   Group was facilitated virtually in a private office using HIPAA Compliant and Approved [employer] Teams  Rabia Cast consented to the use of tele-video modality of treatment and was virtually present for group psychotherapy process today       Time:  5809-7139  Previous goal met: Yes   Readiness to Learning: Receptive  Barriers to Learning: None  Learning Assessment  Time: 6066-3094  Education Completed: Wellness Tools  Teaching Method: Verbal  Shared Area of Learning: Yes   Goal Set: Doctor appointment    Tx Plan Objective: 1 4 , Therapist:  Greer Suero RN

## 2020-09-30 NOTE — PSYCH
Subjective:     Patient ID: Kayleigh Lake is a 56 y o  female  [facility] Clinical Progress Notes      Specialized Services Documentation  Therapist must complete separate progress note for each specific clinical activity in which the individual participated during the day  Allied Therapy   This group was facilitated virtually in a private office using [employer] and Approved [employer] Teams  Kayleigh Lake consented to the use of tele-video modality of treatment  [phone] Kayleigh Lake   actively shared in [facility] group focused on self- care  Tony Matthews was encouraged to identify aspects of self-care and barriers to it  The concept of self -care versus selfishness explored  When asked at end of a specific thing she could commit to in order to increase self-care, she stated could follow through with doctor appointments and eat better  Some progress voiced toward goal   Continue AT to engage in the development and practice of wellness tools         Tx Plan Objective: 1 2,, Therapist:  Coral VÁZQUEZ

## 2020-09-30 NOTE — PSYCH
Virtual Regular Visit      Assessment/Plan:    Problem List Items Addressed This Visit        Other    Severe episode of recurrent major depressive disorder, without psychotic features ([facility] [address] ) - Primary (Chronic)    Generalized anxiety disorder               Reason for visit is PHP VIRTUAL GROUP DUE TO COVID-19      [address] [facility] [email or web address]    Provider located at [address] [address]      Recent Visits  Date Type Provider Dept   09/28/20 Office Visit [facility]   09/25/20 Office Visit [facility]   09/23/20 Office Visit [facility]   Showing recent visits within past 7 days and meeting all other requirements     Today's Visits  Date Type Provider Dept   09/30/20 Office Visit [facility]   Showing today's visits and meeting all other requirements     Future Appointments  No visits were found meeting these conditions  Showing future appointments within next 150 days and meeting all other requirements        The patient was identified by name and date of birth  Jorge Rodriguez was informed that this is a telemedicine visit and that the visit is being conducted through [employer]  My office door was closed  No one else was in the room  She acknowledged consent and understanding of privacy and security of the video platform  The patient has agreed to participate and understands they can discontinue the visit at any time  Patient is aware this is a billable service       Subjective  Jorge Rodriguez is a 56 y o  female       HPI     Past Medical History:   Diagnosis Date    Anxiety     Concussion     Last assessed 9/21/2017    Depression     ISCHEMIC 2013     ISCHEMIC 2013    Seizures [facility])        Past Surgical History:   Procedure Laterality Date    BRAIN SURGERY      IMMED FOLLOWING STROKE IN 2013     SECTION         Current Outpatient Medications   Medication Sig Dispense Refill    citalopram (CeleXA) 10 mg tablet Take one tablet by mouth in the morning ( total of 30 mg as per Dr Marie Barrera) 30 tablet 0    citalopram (CeleXA) 20 mg tablet Take 1 tablet by mouth daily 30 tablet 0    lacosamide (VIMPAT) 100 mg tablet Take 1 tablet (100 mg total) by mouth every 12 (twelve) hours 30 tablet 0    mirtazapine (REMERON) 15 mg tablet Take 1 tablet by mouth daily at bedtime 30 tablet 0    Na Sulfate-K Sulfate-Mg Sulf 17 5-3 13-1 6 GM/177ML SOLN Take 1 kit by mouth once for 1 dose 2 Bottle 0     No current facility-administered medications for this visit  No Known Allergies    Review of Systems    Video Exam    There were no vitals filed for this visit  Physical Exam     I spent 4 hours of group + case management minutes with patient today in which greater than 50% of the time was spent in counseling/coordination of care regarding see notes      [address] acknowledges that she has consented to an online visit or consultation  She understands that the online visit is based solely on information provided by her, and that, in the absence of a face-to-face physical evaluation by the physician, the diagnosis she receives is both limited and provisional in terms of accuracy and completeness  This is not intended to replace a full medical face-to-face evaluation by the physician  Perez Black understands and accepts these terms

## 2020-10-01 ENCOUNTER — DOCUMENTATION (OUTPATIENT)
Dept: PSYCHOLOGY | Facility: CLINIC | Age: 60
End: 2020-10-01

## 2020-10-02 ENCOUNTER — OFFICE VISIT (OUTPATIENT)
Dept: PSYCHOLOGY | Facility: CLINIC | Age: 60
End: 2020-10-02
Payer: COMMERCIAL

## 2020-10-02 DIAGNOSIS — F33.2 SEVERE EPISODE OF RECURRENT MAJOR DEPRESSIVE DISORDER, WITHOUT PSYCHOTIC FEATURES (HCC): Primary | Chronic | ICD-10-CM

## 2020-10-02 DIAGNOSIS — F41.1 GENERALIZED ANXIETY DISORDER: ICD-10-CM

## 2020-10-02 PROCEDURE — S9480 INTENSIVE OUTPATIENT PSYCHIA: HCPCS

## 2020-10-05 ENCOUNTER — OFFICE VISIT (OUTPATIENT)
Dept: PSYCHOLOGY | Facility: CLINIC | Age: 60
End: 2020-10-05
Payer: COMMERCIAL

## 2020-10-05 DIAGNOSIS — F33.2 SEVERE EPISODE OF RECURRENT MAJOR DEPRESSIVE DISORDER, WITHOUT PSYCHOTIC FEATURES (HCC): Primary | Chronic | ICD-10-CM

## 2020-10-05 PROCEDURE — S9480 INTENSIVE OUTPATIENT PSYCHIA: HCPCS

## 2020-10-07 ENCOUNTER — OFFICE VISIT (OUTPATIENT)
Dept: PSYCHOLOGY | Facility: CLINIC | Age: 60
End: 2020-10-07
Payer: COMMERCIAL

## 2020-10-07 DIAGNOSIS — F41.1 GENERALIZED ANXIETY DISORDER: ICD-10-CM

## 2020-10-07 DIAGNOSIS — F33.2 SEVERE EPISODE OF RECURRENT MAJOR DEPRESSIVE DISORDER, WITHOUT PSYCHOTIC FEATURES (HCC): Primary | Chronic | ICD-10-CM

## 2020-10-07 PROCEDURE — S9480 INTENSIVE OUTPATIENT PSYCHIA: HCPCS

## 2020-10-08 ENCOUNTER — DOCUMENTATION (OUTPATIENT)
Dept: PSYCHOLOGY | Facility: CLINIC | Age: 60
End: 2020-10-08

## 2020-10-09 ENCOUNTER — OFFICE VISIT (OUTPATIENT)
Dept: PSYCHOLOGY | Facility: CLINIC | Age: 60
End: 2020-10-09
Payer: COMMERCIAL

## 2020-10-09 ENCOUNTER — TELEMEDICINE (OUTPATIENT)
Dept: PSYCHIATRY | Facility: CLINIC | Age: 60
End: 2020-10-09
Payer: COMMERCIAL

## 2020-10-09 DIAGNOSIS — F41.1 GENERALIZED ANXIETY DISORDER: ICD-10-CM

## 2020-10-09 DIAGNOSIS — F33.2 SEVERE EPISODE OF RECURRENT MAJOR DEPRESSIVE DISORDER, WITHOUT PSYCHOTIC FEATURES (HCC): Primary | Chronic | ICD-10-CM

## 2020-10-09 DIAGNOSIS — F33.2 SEVERE EPISODE OF RECURRENT MAJOR DEPRESSIVE DISORDER, WITHOUT PSYCHOTIC FEATURES (HCC): Chronic | ICD-10-CM

## 2020-10-09 PROCEDURE — S9480 INTENSIVE OUTPATIENT PSYCHIA: HCPCS

## 2020-10-09 PROCEDURE — 99214 OFFICE O/P EST MOD 30 MIN: CPT | Performed by: NURSE PRACTITIONER

## 2020-10-12 ENCOUNTER — TELEMEDICINE (OUTPATIENT)
Dept: PSYCHIATRY | Facility: CLINIC | Age: 60
End: 2020-10-12
Payer: COMMERCIAL

## 2020-10-12 ENCOUNTER — OFFICE VISIT (OUTPATIENT)
Dept: PSYCHOLOGY | Facility: CLINIC | Age: 60
End: 2020-10-12
Payer: COMMERCIAL

## 2020-10-12 DIAGNOSIS — F41.1 GENERALIZED ANXIETY DISORDER: ICD-10-CM

## 2020-10-12 DIAGNOSIS — F33.2 SEVERE EPISODE OF RECURRENT MAJOR DEPRESSIVE DISORDER, WITHOUT PSYCHOTIC FEATURES (HCC): Primary | Chronic | ICD-10-CM

## 2020-10-12 PROCEDURE — S9480 INTENSIVE OUTPATIENT PSYCHIA: HCPCS

## 2020-10-12 PROCEDURE — 1036F TOBACCO NON-USER: CPT | Performed by: NURSE PRACTITIONER

## 2020-10-12 PROCEDURE — 99213 OFFICE O/P EST LOW 20 MIN: CPT | Performed by: NURSE PRACTITIONER

## 2020-12-15 ENCOUNTER — OFFICE VISIT (OUTPATIENT)
Dept: NEUROLOGY | Facility: CLINIC | Age: 60
End: 2020-12-15
Payer: COMMERCIAL

## 2020-12-15 VITALS
WEIGHT: 210 LBS | DIASTOLIC BLOOD PRESSURE: 86 MMHG | BODY MASS INDEX: 41.23 KG/M2 | HEART RATE: 70 BPM | SYSTOLIC BLOOD PRESSURE: 128 MMHG | HEIGHT: 60 IN

## 2020-12-15 DIAGNOSIS — R41.3 MEMORY PROBLEM: ICD-10-CM

## 2020-12-15 DIAGNOSIS — G40.219 COMPLEX PARTIAL EPILEPSY WITH GENERALIZATION AND WITH INTRACTABLE EPILEPSY (HCC): Primary | ICD-10-CM

## 2020-12-15 DIAGNOSIS — R41.844 EXECUTIVE FUNCTION DEFICIT: ICD-10-CM

## 2020-12-15 DIAGNOSIS — F41.1 GENERALIZED ANXIETY DISORDER: ICD-10-CM

## 2020-12-15 PROCEDURE — 1036F TOBACCO NON-USER: CPT | Performed by: PSYCHIATRY & NEUROLOGY

## 2020-12-15 PROCEDURE — 3008F BODY MASS INDEX DOCD: CPT | Performed by: PSYCHIATRY & NEUROLOGY

## 2020-12-15 PROCEDURE — 99214 OFFICE O/P EST MOD 30 MIN: CPT | Performed by: PSYCHIATRY & NEUROLOGY

## 2021-01-06 ENCOUNTER — HOSPITAL ENCOUNTER (EMERGENCY)
Facility: HOSPITAL | Age: 61
End: 2021-01-07
Attending: EMERGENCY MEDICINE
Payer: COMMERCIAL

## 2021-01-06 DIAGNOSIS — E06.3 HASHIMOTO'S THYROIDITIS: ICD-10-CM

## 2021-01-06 DIAGNOSIS — R73.03 PREDIABETES: ICD-10-CM

## 2021-01-06 DIAGNOSIS — Z86.69 HISTORY OF EPILEPSY: ICD-10-CM

## 2021-01-06 DIAGNOSIS — G40.219 COMPLEX PARTIAL EPILEPSY WITH GENERALIZATION AND WITH INTRACTABLE EPILEPSY (HCC): ICD-10-CM

## 2021-01-06 DIAGNOSIS — R45.851 DEPRESSION WITH SUICIDAL IDEATION: Primary | ICD-10-CM

## 2021-01-06 DIAGNOSIS — F32.A DEPRESSION WITH SUICIDAL IDEATION: Primary | ICD-10-CM

## 2021-01-06 DIAGNOSIS — F41.9 ANXIETY: ICD-10-CM

## 2021-01-06 LAB
ALBUMIN SERPL BCP-MCNC: 4.1 G/DL (ref 3.5–5)
ALP SERPL-CCNC: 84 U/L (ref 46–116)
ALT SERPL W P-5'-P-CCNC: 30 U/L (ref 12–78)
AMPHETAMINES SERPL QL SCN: NEGATIVE
ANION GAP SERPL CALCULATED.3IONS-SCNC: 7 MMOL/L (ref 4–13)
AST SERPL W P-5'-P-CCNC: 19 U/L (ref 5–45)
ATRIAL RATE: 63 BPM
BARBITURATES UR QL: NEGATIVE
BASOPHILS # BLD AUTO: 0.05 THOUSANDS/ΜL (ref 0–0.1)
BASOPHILS NFR BLD AUTO: 0 % (ref 0–1)
BENZODIAZ UR QL: NEGATIVE
BILIRUB SERPL-MCNC: 0.48 MG/DL (ref 0.2–1)
BILIRUB UR QL STRIP: NEGATIVE
BUN SERPL-MCNC: 11 MG/DL (ref 5–25)
CALCIUM SERPL-MCNC: 10 MG/DL (ref 8.3–10.1)
CHLORIDE SERPL-SCNC: 106 MMOL/L (ref 100–108)
CLARITY UR: CLEAR
CO2 SERPL-SCNC: 26 MMOL/L (ref 21–32)
COCAINE UR QL: NEGATIVE
COLOR UR: YELLOW
CREAT SERPL-MCNC: 0.86 MG/DL (ref 0.6–1.3)
EOSINOPHIL # BLD AUTO: 0.02 THOUSAND/ΜL (ref 0–0.61)
EOSINOPHIL NFR BLD AUTO: 0 % (ref 0–6)
ERYTHROCYTE [DISTWIDTH] IN BLOOD BY AUTOMATED COUNT: 13.8 % (ref 11.6–15.1)
ETHANOL EXG-MCNC: 0 MG/DL
FLUAV RNA RESP QL NAA+PROBE: NEGATIVE
FLUBV RNA RESP QL NAA+PROBE: NEGATIVE
GFR SERPL CREATININE-BSD FRML MDRD: 73 ML/MIN/1.73SQ M
GLUCOSE SERPL-MCNC: 103 MG/DL (ref 65–140)
GLUCOSE UR STRIP-MCNC: NEGATIVE MG/DL
HCT VFR BLD AUTO: 46.5 % (ref 34.8–46.1)
HGB BLD-MCNC: 15.3 G/DL (ref 11.5–15.4)
HGB UR QL STRIP.AUTO: NEGATIVE
IMM GRANULOCYTES # BLD AUTO: 0.04 THOUSAND/UL (ref 0–0.2)
IMM GRANULOCYTES NFR BLD AUTO: 0 % (ref 0–2)
KETONES UR STRIP-MCNC: NEGATIVE MG/DL
LEUKOCYTE ESTERASE UR QL STRIP: NEGATIVE
LYMPHOCYTES # BLD AUTO: 1.87 THOUSANDS/ΜL (ref 0.6–4.47)
LYMPHOCYTES NFR BLD AUTO: 16 % (ref 14–44)
MCH RBC QN AUTO: 29.7 PG (ref 26.8–34.3)
MCHC RBC AUTO-ENTMCNC: 32.9 G/DL (ref 31.4–37.4)
MCV RBC AUTO: 90 FL (ref 82–98)
METHADONE UR QL: NEGATIVE
MONOCYTES # BLD AUTO: 0.92 THOUSAND/ΜL (ref 0.17–1.22)
MONOCYTES NFR BLD AUTO: 8 % (ref 4–12)
NEUTROPHILS # BLD AUTO: 8.75 THOUSANDS/ΜL (ref 1.85–7.62)
NEUTS SEG NFR BLD AUTO: 76 % (ref 43–75)
NITRITE UR QL STRIP: NEGATIVE
NRBC BLD AUTO-RTO: 0 /100 WBCS
OPIATES UR QL SCN: NEGATIVE
OXYCODONE+OXYMORPHONE UR QL SCN: NEGATIVE
P AXIS: 45 DEGREES
PCP UR QL: NEGATIVE
PH UR STRIP.AUTO: 5.5 [PH]
PLATELET # BLD AUTO: 277 THOUSANDS/UL (ref 149–390)
PMV BLD AUTO: 10.5 FL (ref 8.9–12.7)
POTASSIUM SERPL-SCNC: 3.6 MMOL/L (ref 3.5–5.3)
PR INTERVAL: 128 MS
PROT SERPL-MCNC: 7.9 G/DL (ref 6.4–8.2)
PROT UR STRIP-MCNC: NEGATIVE MG/DL
QRS AXIS: 27 DEGREES
QRSD INTERVAL: 72 MS
QT INTERVAL: 412 MS
QTC INTERVAL: 421 MS
RBC # BLD AUTO: 5.15 MILLION/UL (ref 3.81–5.12)
RSV RNA RESP QL NAA+PROBE: NEGATIVE
SARS-COV-2 RNA RESP QL NAA+PROBE: NEGATIVE
SODIUM SERPL-SCNC: 139 MMOL/L (ref 136–145)
SP GR UR STRIP.AUTO: 1.01 (ref 1–1.03)
T WAVE AXIS: 29 DEGREES
THC UR QL: NEGATIVE
TSH SERPL DL<=0.05 MIU/L-ACNC: 4.04 UIU/ML (ref 0.36–3.74)
UROBILINOGEN UR QL STRIP.AUTO: 0.2 E.U./DL
VENTRICULAR RATE: 63 BPM
WBC # BLD AUTO: 11.65 THOUSAND/UL (ref 4.31–10.16)

## 2021-01-06 PROCEDURE — NC001 PR NO CHARGE: Performed by: EMERGENCY MEDICINE

## 2021-01-06 PROCEDURE — 99285 EMERGENCY DEPT VISIT HI MDM: CPT | Performed by: EMERGENCY MEDICINE

## 2021-01-06 PROCEDURE — 81003 URINALYSIS AUTO W/O SCOPE: CPT | Performed by: EMERGENCY MEDICINE

## 2021-01-06 PROCEDURE — 93010 ELECTROCARDIOGRAM REPORT: CPT | Performed by: INTERNAL MEDICINE

## 2021-01-06 PROCEDURE — 99285 EMERGENCY DEPT VISIT HI MDM: CPT

## 2021-01-06 PROCEDURE — 80053 COMPREHEN METABOLIC PANEL: CPT | Performed by: EMERGENCY MEDICINE

## 2021-01-06 PROCEDURE — 82075 ASSAY OF BREATH ETHANOL: CPT | Performed by: EMERGENCY MEDICINE

## 2021-01-06 PROCEDURE — 85025 COMPLETE CBC W/AUTO DIFF WBC: CPT | Performed by: EMERGENCY MEDICINE

## 2021-01-06 PROCEDURE — 36415 COLL VENOUS BLD VENIPUNCTURE: CPT | Performed by: EMERGENCY MEDICINE

## 2021-01-06 PROCEDURE — 80307 DRUG TEST PRSMV CHEM ANLYZR: CPT | Performed by: EMERGENCY MEDICINE

## 2021-01-06 PROCEDURE — 93005 ELECTROCARDIOGRAM TRACING: CPT

## 2021-01-06 PROCEDURE — 0241U HB NFCT DS VIR RESP RNA 4 TRGT: CPT | Performed by: EMERGENCY MEDICINE

## 2021-01-06 PROCEDURE — 84443 ASSAY THYROID STIM HORMONE: CPT | Performed by: EMERGENCY MEDICINE

## 2021-01-06 RX ORDER — MIRTAZAPINE 15 MG/1
15 TABLET, FILM COATED ORAL ONCE
Status: COMPLETED | OUTPATIENT
Start: 2021-01-06 | End: 2021-01-06

## 2021-01-06 RX ADMIN — MIRTAZAPINE 15 MG: 15 TABLET, FILM COATED ORAL at 21:28

## 2021-01-06 NOTE — ED ATTENDING ATTESTATION
1/6/2021  IJohn MD, saw and evaluated the patient  I have discussed the patient with the resident/non-physician practitioner and agree with the resident's/non-physician practitioner's findings, Plan of Care, and MDM as documented in the resident's/non-physician practitioner's note, except where noted  All available labs and Radiology studies were reviewed  I was present for key portions of any procedure(s) performed by the resident/non-physician practitioner and I was immediately available to provide assistance  At this point I agree with the current assessment done in the Emergency Department  I have conducted an independent evaluation of this patient a history and physical is as follows:    ED Course     68-year-old female, history of depression, presenting to the emergency department for evaluation of suicidal ideation with plan to overdose on pills  Patient has no medical complaints    Unremarkable screening exam     Labs Reviewed   CBC AND DIFFERENTIAL - Abnormal       Result Value Ref Range Status    WBC 11 65 (*) 4 31 - 10 16 Thousand/uL Final    RBC 5 15 (*) 3 81 - 5 12 Million/uL Final    Hemoglobin 15 3  11 5 - 15 4 g/dL Final    Hematocrit 46 5 (*) 34 8 - 46 1 % Final    MCV 90  82 - 98 fL Final    MCH 29 7  26 8 - 34 3 pg Final    MCHC 32 9  31 4 - 37 4 g/dL Final    RDW 13 8  11 6 - 15 1 % Final    MPV 10 5  8 9 - 12 7 fL Final    Platelets 412  067 - 390 Thousands/uL Final    nRBC 0  /100 WBCs Final    Neutrophils Relative 76 (*) 43 - 75 % Final    Immat GRANS % 0  0 - 2 % Final    Lymphocytes Relative 16  14 - 44 % Final    Monocytes Relative 8  4 - 12 % Final    Eosinophils Relative 0  0 - 6 % Final    Basophils Relative 0  0 - 1 % Final    Neutrophils Absolute 8 75 (*) 1 85 - 7 62 Thousands/µL Final    Immature Grans Absolute 0 04  0 00 - 0 20 Thousand/uL Final    Lymphocytes Absolute 1 87  0 60 - 4 47 Thousands/µL Final    Monocytes Absolute 0 92  0 17 - 1 22 Thousand/µL Final    Eosinophils Absolute 0 02  0 00 - 0 61 Thousand/µL Final    Basophils Absolute 0 05  0 00 - 0 10 Thousands/µL Final   TSH, 3RD GENERATION - Abnormal    TSH 3RD GENERATON 4 040 (*) 0 358 - 3 740 uIU/mL Final    Comment: The recommended reference ranges for TSH during pregnancy are as follows:   First trimester 0 1 to 2 5 uIU/mL   Second trimester  0 2 to 3 0 uIU/mL   Third trimester 0 3 to 3 0 uIU/m    Note: Normal ranges may not apply to patients who are transgender, non-binary, or whose legal sex, sex at birth, and gender identity differ  Using supplements with high doses of biotin 20 to more than 300 times greater than the adequate daily intake for adults of 30 mcg/day as established by the [employer], can cause falsely depress results  Narrative:     Patients undergoing fluorescein dye angiography may retain small amounts of fluorescein in the body for 48-72 hours post procedure  Samples containing fluorescein can produce falsely depressed TSH values  If the patient had this procedure,a specimen should be resubmitted post fluorescein clearance  COVID19, INFLUENZA A/B, RSV PCR, [facility] - Normal    SARS-CoV-2 Negative  Negative Final    INFLUENZA A PCR Negative  Negative Final    INFLUENZA B PCR Negative  Negative Final    RSV PCR Negative  Negative Final    Narrative: This test has been authorized by FDA under an EUA (Emergency Use Assay) for use by authorized laboratories  Clinical caution and judgement should be used with the interpretation of these results with consideration of the clinical impression and other laboratory testing  Testing reported as "Positive" or "Negative" has been proven to be accurate according to standard laboratory validation requirements  All testing is performed with control materials showing appropriate reactivity at standard intervals     RAPID DRUG SCREEN, URINE - Normal    Amph/Meth UR Negative  Negative Final    Barbiturate Ur Negative  Negative Final    Benzodiazepine Urine Negative  Negative Final    Cocaine Urine Negative  Negative Final    Methadone Urine Negative  Negative Final    Opiate Urine Negative  Negative Final    PCP Ur Negative  Negative Final    THC Urine Negative  Negative Final    Oxycodone Urine Negative  Negative Final    Narrative:     FOR MEDICAL PURPOSES ONLY  IF CONFIRMATION NEEDED PLEASE CONTACT THE LAB WITHIN 5 DAYS  Drug Screen Cutoff Levels:  AMPHETAMINE/METHAMPHETAMINES  1000 ng/mL  BARBITURATES     200 ng/mL  BENZODIAZEPINES     200 ng/mL  COCAINE      300 ng/mL  METHADONE      300 ng/mL  OPIATES      300 ng/mL  PHENCYCLIDINE     25 ng/mL  THC       50 ng/mL  OXYCODONE      100 ng/mL   POCT ALCOHOL BREATH TEST - Normal    EXTBreath Alcohol 0 000   Final   COMPREHENSIVE METABOLIC PANEL    Sodium 578  136 - 145 mmol/L Final    Potassium 3 6  3 5 - 5 3 mmol/L Final    Chloride 106  100 - 108 mmol/L Final    CO2 26  21 - 32 mmol/L Final    ANION GAP 7  4 - 13 mmol/L Final    BUN 11  5 - 25 mg/dL Final    Creatinine 0 86  0 60 - 1 30 mg/dL Final    Comment: Standardized to IDMS reference method    Glucose 103  65 - 140 mg/dL Final    Comment: If the patient is fasting, the ADA then defines impaired fasting glucose as > 100 mg/dL and diabetes as > or equal to 123 mg/dL  Specimen collection should occur prior to Sulfasalazine administration due to the potential for falsely depressed results  Specimen collection should occur prior to Sulfapyridine administration due to the potential for falsely elevated results  Calcium 10 0  8 3 - 10 1 mg/dL Final    AST 19  5 - 45 U/L Final    Comment: Specimen collection should occur prior to Sulfasalazine administration due to the potential for falsely depressed results  ALT 30  12 - 78 U/L Final    Comment: Specimen collection should occur prior to Sulfasalazine and/or Sulfapyridine administration due to the potential for falsely depressed results       Alkaline Phosphatase 84  46 - 116 U/L Final    Total Protein 7 9  6 4 - 8 2 g/dL Final    Albumin 4 1  3 5 - 5 0 g/dL Final    Total Bilirubin 0 48  0 20 - 1 00 mg/dL Final    Comment: Use of this assay is not recommended for patients undergoing treatment with eltrombopag due to the potential for falsely elevated results      eGFR 73  ml/min/1 73sq m Final    Narrative:     National Kidney Disease Foundation guidelines for Chronic Kidney Disease (CKD):     Stage 1 with normal or high GFR (GFR > 90 mL/min/1 73 square meters)    Stage 2 Mild CKD (GFR = 60-89 mL/min/1 73 square meters)    Stage 3A Moderate CKD (GFR = 45-59 mL/min/1 73 square meters)    Stage 3B Moderate CKD (GFR = 30-44 mL/min/1 73 square meters)    Stage 4 Severe CKD (GFR = 15-29 mL/min/1 73 square meters)    Stage 5 End Stage CKD (GFR <15 mL/min/1 73 square meters)  Note: GFR calculation is accurate only with a steady state creatinine   UA W REFLEX TO MICROSCOPIC WITH REFLEX TO CULTURE    Color, UA Yellow   Final    Clarity, UA Clear   Final    Specific Gravity, UA 1 013  1 003 - 1 030 Final    pH, UA 5 5  4 5, 5 0, 5 5, 6 0, 6 5, 7 0, 7 5, 8 0 Final    Leukocytes, UA Negative  Negative Final    Nitrite, UA Negative  Negative Final    Protein, UA Negative  Negative mg/dl Final    Glucose, UA Negative  Negative mg/dl Final    Ketones, UA Negative  Negative mg/dl Final    Urobilinogen, UA 0 2  0 2, 1 0 E U /dl E U /dl Final    Bilirubin, UA Negative  Negative Final    Blood, UA Negative  Negative Final           Critical Care Time  Procedures

## 2021-01-06 NOTE — ED PROVIDER NOTES
History  Chief Complaint   Patient presents with    Psychiatric Evaluation     Pt reports x2 days of SI with a plan to OD on sleeping pills or cut her wrists  Pt lookin for inpatient treatment  Pt denies HI or hallucinations     57 y/o female with past medical history of anxiety, depression, and seizures s/p temporal lobectomy (2013) presents to the ED, accompanied by her , for evaluation of suicidal ideation x2 days  She states that she has had depressed mood and thoughts of harming herself over the last 2 days, with plan to overdose on sleeping medication and cut her wrists  She has had thoughts self-harm in the past, has not acted on these thoughts previously and states that she has not acted on her current thoughts, but she is scared that she might do so  She has been averaging about 8 hours of sleep per night, although last night she woke up at 3:00 AM Due to her feelings of anxiousness  She has also had decreased appetite over the last 2 days  She has been previously treated for depression and suicidal ideation in 2017 and most recently last September (4 months ago), at which time she was treated in the inpatient setting at [facility], which she states helped  She denies hallucinations and homicidal ideation  She reports no physical symptoms or complaints at this  She notes that she has been off her lacosamide since 2-3 weeks ago, after she asked her neurologist who should be tapered off as she has not been having any seizures since her temporal lobectomy and she had difficulty filling her prescriptions through her pharmacy delivery service  She is on citalopram and mirtazapine for anxiety  Prior to Admission Medications   Prescriptions Last Dose Informant Patient Reported? Taking?    Na Sulfate-K Sulfate-Mg Sulf 17 5-3 13-1 6 GM/177ML SOLN   No No   Sig: Take 1 kit by mouth once for 1 dose   citalopram (CeleXA) 10 mg tablet   No No   Sig: Take one tablet by mouth in the morning ( total of 30 mg as per Dr Teresita Salazar)   citalopram (CeleXA) 20 mg tablet   No No   Sig: Take 1 tablet by mouth daily   lacosamide (VIMPAT) 100 mg tablet   No No   Sig: Take 1 tablet (100 mg total) by mouth every 12 (twelve) hours   Patient not taking: Reported on 12/15/2020   mirtazapine (REMERON) 15 mg tablet   No No   Sig: Take 1 tablet by mouth daily at bedtime      Facility-Administered Medications: None       Past Medical History:   Diagnosis Date    Anxiety     Concussion     Last assessed 2017    Depression     ISCHEMIC 2013     ISCHEMIC 2013    Seizures [facility])        Past Surgical History:   Procedure Laterality Date    BRAIN SURGERY      IMMED FOLLOWING STROKE IN 2013     SECTION         Family History   Problem Relation Age of Onset    Aortic aneurysm Mother     No Known Problems Father      I have reviewed and agree with the history as documented  E-Cigarette/Vaping    E-Cigarette Use Never User      E-Cigarette/Vaping Substances     Social History     Tobacco Use    Smoking status: Never Smoker    Smokeless tobacco: Never Used   Substance Use Topics    Alcohol use: No    Drug use: No        Review of Systems   Constitutional: Negative for chills and fever  HENT: Negative for congestion and sore throat  Respiratory: Negative for cough and shortness of breath  Cardiovascular: Negative for chest pain and palpitations  Gastrointestinal: Negative for abdominal pain, diarrhea, nausea and vomiting  Genitourinary: Negative for dysuria and hematuria  Musculoskeletal: Negative for back pain and neck pain  Neurological: Negative for dizziness, light-headedness and headaches  Psychiatric/Behavioral: Positive for suicidal ideas  Negative for hallucinations  The patient is nervous/anxious  All other systems reviewed and are negative        Physical Exam  ED Triage Vitals   Temperature Pulse Respirations Blood Pressure SpO2   21 1607 21 1604 21 1604 21 1604 01/06/21 1604   98 °F (36 7 °C) 79 16 145/94 96 %      Temp Source Heart Rate Source Patient Position - Orthostatic VS BP Location FiO2 (%)   01/06/21 1607 01/06/21 2136 01/06/21 2136 01/06/21 2136 --   Oral Monitor Sitting Left arm       Pain Score       --                    Orthostatic Vital Signs  Vitals:    01/07/21 0023 01/07/21 0621 01/07/21 1113 01/07/21 1517   BP: 118/72 142/60 117/67 114/64   Pulse: 74 66 63 77   Patient Position - Orthostatic VS: Sitting  Sitting Sitting       Physical Exam  Vitals signs and nursing note reviewed  Constitutional:       General: She is in acute distress (2/2 depression and thoughts of suicide)  Appearance: Normal appearance  She is normal weight  HENT:      Head: Normocephalic and atraumatic  Right Ear: External ear normal       Left Ear: External ear normal       Nose: Nose normal       Mouth/Throat:      Mouth: Mucous membranes are moist       Pharynx: Oropharynx is clear  Eyes:      Extraocular Movements: Extraocular movements intact  Conjunctiva/sclera: Conjunctivae normal       Pupils: Pupils are equal, round, and reactive to light  Neck:      Musculoskeletal: Normal range of motion and neck supple  Cardiovascular:      Rate and Rhythm: Normal rate and regular rhythm  Pulses: Normal pulses  Heart sounds: Normal heart sounds  Pulmonary:      Effort: Pulmonary effort is normal       Breath sounds: Normal breath sounds  No wheezing or rales  Abdominal:      General: Abdomen is flat  Palpations: Abdomen is soft  Tenderness: There is no abdominal tenderness  There is no guarding  Musculoskeletal: Normal range of motion  General: No swelling or tenderness  Skin:     General: Skin is warm and dry  Capillary Refill: Capillary refill takes less than 2 seconds  Neurological:      General: No focal deficit present  Mental Status: She is alert and oriented to person, place, and time        Sensory: No sensory deficit  Motor: No weakness  Psychiatric:         Attention and Perception: Attention and perception normal  She does not perceive auditory or visual hallucinations  Mood and Affect: Mood is anxious and depressed  Affect is tearful  Speech: Speech normal          Behavior: Behavior normal  Behavior is cooperative  Thought Content: Thought content includes suicidal ideation  Thought content does not include homicidal ideation  Thought content includes suicidal plan  Thought content does not include homicidal plan  Cognition and Memory: Cognition normal          Judgment: Judgment normal          ED Medications  Medications   citalopram (CeleXA) tablet 30 mg (30 mg Oral Given 1/7/21 0917)   mirtazapine (REMERON) tablet 15 mg (15 mg Oral Given 1/6/21 2128)   acetaminophen (TYLENOL) tablet 975 mg (975 mg Oral Given 1/7/21 0020)       Diagnostic Studies  Results Reviewed     Procedure Component Value Units Date/Time    COVID19, Influenza A/B, RSV PCR, [facility] [[id number]]  (Normal) Collected: 01/06/21 1724    Lab Status: Final result Specimen: Nares from Nasopharyngeal Swab Updated: 01/06/21 1842     SARS-CoV-2 Negative     INFLUENZA A PCR Negative     INFLUENZA B PCR Negative     RSV PCR Negative    Narrative: This test has been authorized by FDA under an EUA (Emergency Use Assay) for use by authorized laboratories  Clinical caution and judgement should be used with the interpretation of these results with consideration of the clinical impression and other laboratory testing  Testing reported as "Positive" or "Negative" has been proven to be accurate according to standard laboratory validation requirements  All testing is performed with control materials showing appropriate reactivity at standard intervals      UA w Reflex to Microscopic w Reflex to Culture [[id number]] Resulted: 01/06/21 1820    Lab Status: Final result Specimen: Urine Updated: 01/06/21 1820     Color, UA Yellow     Clarity, UA Clear     Specific Gravity, UA 1 013     pH, UA 5 5     Leukocytes, UA Negative     Nitrite, UA Negative     Protein, UA Negative mg/dl      Glucose, UA Negative mg/dl      Ketones, UA Negative mg/dl      Urobilinogen, UA 0 2 E U /dl      Bilirubin, UA Negative     Blood, UA Negative    POCT alcohol breath test [[id number]]  (Normal) Resulted: 01/06/21 1809    Lab Status: Final result Updated: 01/06/21 1809     EXTBreath Alcohol 0 000    TSH [[id number]]  (Abnormal) Collected: 01/06/21 1724    Lab Status: Final result Specimen: Blood from Hand, Right Updated: 01/06/21 1803     TSH 3RD GENERATON 4 040 uIU/mL     Narrative:      Patients undergoing fluorescein dye angiography may retain small amounts of fluorescein in the body for 48-72 hours post procedure  Samples containing fluorescein can produce falsely depressed TSH values  If the patient had this procedure,a specimen should be resubmitted post fluorescein clearance        Comprehensive metabolic panel [[id number]] Collected: 01/06/21 1724    Lab Status: Final result Specimen: Blood from Hand, Right Updated: 01/06/21 1757     Sodium 139 mmol/L      Potassium 3 6 mmol/L      Chloride 106 mmol/L      CO2 26 mmol/L      ANION GAP 7 mmol/L      BUN 11 mg/dL      Creatinine 0 86 mg/dL      Glucose 103 mg/dL      Calcium 10 0 mg/dL      AST 19 U/L      ALT 30 U/L      Alkaline Phosphatase 84 U/L      Total Protein 7 9 g/dL      Albumin 4 1 g/dL      Total Bilirubin 0 48 mg/dL      eGFR 73 ml/min/1 73sq m     Narrative:      Antonette guidelines for Chronic Kidney Disease (CKD):     Stage 1 with normal or high GFR (GFR > 90 mL/min/1 73 square meters)    Stage 2 Mild CKD (GFR = 60-89 mL/min/1 73 square meters)    Stage 3A Moderate CKD (GFR = 45-59 mL/min/1 73 square meters)    Stage 3B Moderate CKD (GFR = 30-44 mL/min/1 73 square meters)    Stage 4 Severe CKD (GFR = 15-29 mL/min/1 73 square meters)    Stage 5 End Stage CKD (GFR <15 mL/min/1 73 square meters)  Note: GFR calculation is accurate only with a steady state creatinine    Rapid drug screen, urine [[id number]]  (Normal) Collected: 01/06/21 1725    Lab Status: Final result Specimen: Urine, Clean Catch Updated: 01/06/21 1752     Amph/Meth UR Negative     Barbiturate Ur Negative     Benzodiazepine Urine Negative     Cocaine Urine Negative     Methadone Urine Negative     Opiate Urine Negative     PCP Ur Negative     THC Urine Negative     Oxycodone Urine Negative    Narrative:      FOR MEDICAL PURPOSES ONLY  IF CONFIRMATION NEEDED PLEASE CONTACT THE LAB WITHIN 5 DAYS      Drug Screen Cutoff Levels:  AMPHETAMINE/METHAMPHETAMINES  1000 ng/mL  BARBITURATES     200 ng/mL  BENZODIAZEPINES     200 ng/mL  COCAINE      300 ng/mL  METHADONE      300 ng/mL  OPIATES      300 ng/mL  PHENCYCLIDINE     25 ng/mL  THC       50 ng/mL  OXYCODONE      100 ng/mL    CBC and differential [[id number]]  (Abnormal) Collected: 01/06/21 1724    Lab Status: Final result Specimen: Blood from Hand, Right Updated: 01/06/21 1734     WBC 11 65 Thousand/uL      RBC 5 15 Million/uL      Hemoglobin 15 3 g/dL      Hematocrit 46 5 %      MCV 90 fL      MCH 29 7 pg      MCHC 32 9 g/dL      RDW 13 8 %      MPV 10 5 fL      Platelets 365 Thousands/uL      nRBC 0 /100 WBCs      Neutrophils Relative 76 %      Immat GRANS % 0 %      Lymphocytes Relative 16 %      Monocytes Relative 8 %      Eosinophils Relative 0 %      Basophils Relative 0 %      Neutrophils Absolute 8 75 Thousands/µL      Immature Grans Absolute 0 04 Thousand/uL      Lymphocytes Absolute 1 87 Thousands/µL      Monocytes Absolute 0 92 Thousand/µL      Eosinophils Absolute 0 02 Thousand/µL      Basophils Absolute 0 05 Thousands/µL                  No orders to display         Procedures  Procedures      ED Course  ED Course as of Jan 07 1518 Wed Jan 06, 2021   1821 Procedure Note: EKG  Date/Time: 01/06/21 5:50 PM   Interpreted by: Janet Oneill MD  Indications / Diagnosis: Geriatric Psychiatric  ECG reviewed by me, the ED Physician: yes   The EKG demonstrates:  Rhythm: normal sinus 63 bpm  Intervals: normal intervals  Axis: normal axis  QRS/Blocks: normal QRS  ST Changes: No acute ST Changes, no STD/JAQUAN  No significant change compared to EKG from 9/14/17                                SBIRT 22yo+      Most Recent Value   SBIRT (25 yo +)   In order to provide better care to our patients, we are screening all of our patients for alcohol and drug use  Would it be okay to ask you these screening questions? No Filed at: 01/06/2021 [address] [place] [address]  Number of Diagnoses or Management Options  Anxiety:   Depression with suicidal ideation:   Hashimoto's thyroiditis:   History of epilepsy:   Diagnosis management comments: 57 y/o female with past medical history of anxiety, depression, and seizures s/p temporal lobectomy (2013) presents to the ED, accompanied by her , for evaluation of suicidal ideation x2 days  (+) depressed mood, SI with plan to overdose on sleeping medications and cut wrists  Has not acted on these thoughts but fears she might  No HI, hallucinations, or other physical symptoms  On exam, afebrile and VSS, tearful and anxious with depressed affect and thoughts of suicide  Remainder of exam is non-contributory  Older adult psychiatric evaluation performed, including CBC, CMP, TSH, COVID-19 test, UA, UDS, BAT, and EKG  Evidence of Hashimoto's thyroiditis on TSH  CBC, CMP, UA, UDS, BAT, and COVID-19 swab all negative  EKG with NSR and no acute ST changes  Discussed plan for admission and transfer to inpatient psychiatric care facility and the patient understands and agrees  Case discussed with Crisis Worker and the patient has been accepted to [facility] by Dr Luis Marshall        Disposition  Final diagnoses:   Hashimoto's thyroiditis   Depression with suicidal ideation   Anxiety   History of epilepsy     Time reflects when diagnosis was documented in both MDM as applicable and the Disposition within this note     Time User Action Codes Description Comment    1/7/2021 12:18 PM Edmonia  Add [G40 219] Complex partial epilepsy with generalization and with intractable epilepsy ([facility] [address] )     1/7/2021 12:18 PM Edmonia  Modify [G40 219] Complex partial epilepsy with generalization and with intractable epilepsy ([facility] [address] )     1/7/2021 12:18 PM Edmonia  Add [E06 3] Hashimoto's thyroiditis     1/7/2021 12:18 PM Edmonia  Add [R73 03] Prediabetes     1/7/2021  3:13 PM Lorence Hertford Add [F32 9,  R45 851] Depression with suicidal ideation     1/7/2021  3:13 PM Lorence Hertford Add [F41 9] Anxiety     1/7/2021  3:13 PM Lorence Nely Modify [G40 219] Complex partial epilepsy with generalization and with intractable epilepsy ([facility] [address] )     1/7/2021  3:13 PM Lorence Hertford Modify [F32 9,  R45 851] Depression with suicidal ideation     1/7/2021  3:14 PM Lorence Hertford Add [Z86 69] History of epilepsy     1/7/2021  3:14 PM Lorence Hertford Modify [E06 3] Hashimoto's thyroiditis       ED Disposition     ED Disposition Condition Date/Time Comment    Transfer to [facility]  Thu Jan 7, 2021 12:37 PM Harriet Santiago should be transferred out to [facility] and has been medically cleared          MD Documentation      Most Recent Value   Patient Condition  The patient has been stabilized such that within reasonable medical probability, no material deterioration of the patient condition or the condition of the unborn child(katherine) is likely to result from the transfer   Reason for Transfer  Level of Care needed not available at this facility   Benefits of Transfer  Specialized equipment and/or services available at the receiving facility (Include comment)________________________   Risks of Transfer  Potential for delay in receiving treatment   Accepting Physician  [address] Name, [address] [place] [address],[address] by (Company and Unit [address])  Alfreda Lacey MD, Dr  [facility]   Provider Certification  General risk, such as traffic hazards, adverse weather conditions, rough terrain or turbulence, possible failure of equipment (including vehicle or aircraft), or consequences of actions of persons outside the control of the transport personnel      RN Documentation      Most [address] [address] Name, [address] [employer] and [address])  CTS      Follow-up Information    None         Patient's Medications   Discharge Prescriptions    No medications on file     No discharge procedures on file  PDMP Review     None           ED Provider  Attending physically available and evaluated Samra Chauhan I managed the patient along with the ED Attending      Electronically Signed by         Priscilla Trevino MD  01/10/21 5075

## 2021-01-07 ENCOUNTER — HOSPITAL ENCOUNTER (INPATIENT)
Facility: HOSPITAL | Age: 61
LOS: 13 days | Discharge: HOME | DRG: 885 | End: 2021-01-20
Attending: PSYCHIATRY & NEUROLOGY | Admitting: PSYCHIATRY & NEUROLOGY
Payer: COMMERCIAL

## 2021-01-07 VITALS
SYSTOLIC BLOOD PRESSURE: 114 MMHG | HEART RATE: 77 BPM | RESPIRATION RATE: 18 BRPM | BODY MASS INDEX: 40.25 KG/M2 | DIASTOLIC BLOOD PRESSURE: 64 MMHG | OXYGEN SATURATION: 97 % | HEIGHT: 60 IN | WEIGHT: 205 LBS | TEMPERATURE: 98 F

## 2021-01-07 DIAGNOSIS — M19.90 DJD (DEGENERATIVE JOINT DISEASE): ICD-10-CM

## 2021-01-07 DIAGNOSIS — F51.01 IDIOPATHIC INSOMNIA: ICD-10-CM

## 2021-01-07 DIAGNOSIS — G40.219 COMPLEX PARTIAL EPILEPSY WITH GENERALIZATION AND WITH INTRACTABLE EPILEPSY (HCC): ICD-10-CM

## 2021-01-07 DIAGNOSIS — E06.3 HASHIMOTO'S THYROIDITIS: ICD-10-CM

## 2021-01-07 DIAGNOSIS — F33.2 SEVERE EPISODE OF RECURRENT MAJOR DEPRESSIVE DISORDER, WITHOUT PSYCHOTIC FEATURES (HCC): Primary | Chronic | ICD-10-CM

## 2021-01-07 DIAGNOSIS — E55.9 VITAMIN D DEFICIENCY: ICD-10-CM

## 2021-01-07 DIAGNOSIS — K59.00 CONSTIPATION: ICD-10-CM

## 2021-01-07 DIAGNOSIS — R73.03 PREDIABETES: ICD-10-CM

## 2021-01-07 RX ORDER — ACETAMINOPHEN 325 MG/1
975 TABLET ORAL ONCE
Status: COMPLETED | OUTPATIENT
Start: 2021-01-07 | End: 2021-01-07

## 2021-01-07 RX ORDER — RISPERIDONE 0.5 MG/1
0.5 TABLET, ORALLY DISINTEGRATING ORAL EVERY 8 HOURS PRN
Status: CANCELLED | OUTPATIENT
Start: 2021-01-07

## 2021-01-07 RX ORDER — OLANZAPINE 5 MG/1
5 TABLET ORAL EVERY 8 HOURS PRN
Status: DISCONTINUED | OUTPATIENT
Start: 2021-01-07 | End: 2021-01-20 | Stop reason: HOSPADM

## 2021-01-07 RX ORDER — HALOPERIDOL 2 MG/1
2 TABLET ORAL EVERY 8 HOURS PRN
Status: CANCELLED | OUTPATIENT
Start: 2021-01-07

## 2021-01-07 RX ORDER — AMOXICILLIN 250 MG
1 CAPSULE ORAL DAILY PRN
Status: DISCONTINUED | OUTPATIENT
Start: 2021-01-07 | End: 2021-01-20 | Stop reason: HOSPADM

## 2021-01-07 RX ORDER — POLYETHYLENE GLYCOL 3350 17 G/17G
17 POWDER, FOR SOLUTION ORAL DAILY PRN
Status: CANCELLED | OUTPATIENT
Start: 2021-01-07

## 2021-01-07 RX ORDER — AMOXICILLIN 250 MG
1 CAPSULE ORAL DAILY PRN
Status: CANCELLED | OUTPATIENT
Start: 2021-01-07

## 2021-01-07 RX ORDER — MIRTAZAPINE 15 MG/1
15 TABLET, FILM COATED ORAL
Status: CANCELLED | OUTPATIENT
Start: 2021-01-07

## 2021-01-07 RX ORDER — ACETAMINOPHEN 325 MG/1
975 TABLET ORAL EVERY 6 HOURS PRN
Status: DISCONTINUED | OUTPATIENT
Start: 2021-01-07 | End: 2021-01-20 | Stop reason: HOSPADM

## 2021-01-07 RX ORDER — IBUPROFEN 400 MG/1
400 TABLET ORAL EVERY 8 HOURS PRN
Status: CANCELLED | OUTPATIENT
Start: 2021-01-07

## 2021-01-07 RX ORDER — MIRTAZAPINE 15 MG/1
15 TABLET, FILM COATED ORAL
Status: DISCONTINUED | OUTPATIENT
Start: 2021-01-07 | End: 2021-01-08

## 2021-01-07 RX ORDER — MAGNESIUM HYDROXIDE/ALUMINUM HYDROXICE/SIMETHICONE 120; 1200; 1200 MG/30ML; MG/30ML; MG/30ML
30 SUSPENSION ORAL EVERY 4 HOURS PRN
Status: CANCELLED | OUTPATIENT
Start: 2021-01-07

## 2021-01-07 RX ORDER — CITALOPRAM 20 MG/1
20 TABLET ORAL DAILY
Status: CANCELLED | OUTPATIENT
Start: 2021-01-07

## 2021-01-07 RX ORDER — LORAZEPAM 2 MG/ML
1 INJECTION INTRAMUSCULAR EVERY 6 HOURS PRN
Status: DISCONTINUED | OUTPATIENT
Start: 2021-01-07 | End: 2021-01-20 | Stop reason: HOSPADM

## 2021-01-07 RX ORDER — HYDROXYZINE HYDROCHLORIDE 25 MG/1
25 TABLET, FILM COATED ORAL EVERY 6 HOURS PRN
Status: CANCELLED | OUTPATIENT
Start: 2021-01-07

## 2021-01-07 RX ORDER — POLYETHYLENE GLYCOL 3350 17 G/17G
17 POWDER, FOR SOLUTION ORAL DAILY PRN
Status: DISCONTINUED | OUTPATIENT
Start: 2021-01-07 | End: 2021-01-20 | Stop reason: HOSPADM

## 2021-01-07 RX ORDER — HALOPERIDOL 2 MG/1
2 TABLET ORAL EVERY 8 HOURS PRN
Status: DISCONTINUED | OUTPATIENT
Start: 2021-01-07 | End: 2021-01-20 | Stop reason: HOSPADM

## 2021-01-07 RX ORDER — LORAZEPAM 2 MG/ML
1 INJECTION INTRAMUSCULAR EVERY 6 HOURS PRN
Status: CANCELLED | OUTPATIENT
Start: 2021-01-07

## 2021-01-07 RX ORDER — LORAZEPAM 0.5 MG/1
0.5 TABLET ORAL EVERY 6 HOURS PRN
Status: CANCELLED | OUTPATIENT
Start: 2021-01-07

## 2021-01-07 RX ORDER — LORAZEPAM 0.5 MG/1
0.5 TABLET ORAL EVERY 6 HOURS PRN
Status: DISCONTINUED | OUTPATIENT
Start: 2021-01-07 | End: 2021-01-20 | Stop reason: HOSPADM

## 2021-01-07 RX ORDER — ACETAMINOPHEN 325 MG/1
975 TABLET ORAL EVERY 6 HOURS PRN
Status: CANCELLED | OUTPATIENT
Start: 2021-01-07

## 2021-01-07 RX ORDER — OLANZAPINE 5 MG/1
5 TABLET ORAL EVERY 8 HOURS PRN
Status: CANCELLED | OUTPATIENT
Start: 2021-01-07

## 2021-01-07 RX ORDER — OLANZAPINE 10 MG/1
5 INJECTION, POWDER, LYOPHILIZED, FOR SOLUTION INTRAMUSCULAR EVERY 8 HOURS PRN
Status: DISCONTINUED | OUTPATIENT
Start: 2021-01-07 | End: 2021-01-20 | Stop reason: HOSPADM

## 2021-01-07 RX ORDER — ACETAMINOPHEN 325 MG/1
650 TABLET ORAL EVERY 6 HOURS PRN
Status: CANCELLED | OUTPATIENT
Start: 2021-01-07

## 2021-01-07 RX ORDER — MAGNESIUM HYDROXIDE/ALUMINUM HYDROXICE/SIMETHICONE 120; 1200; 1200 MG/30ML; MG/30ML; MG/30ML
30 SUSPENSION ORAL EVERY 4 HOURS PRN
Status: DISCONTINUED | OUTPATIENT
Start: 2021-01-07 | End: 2021-01-20 | Stop reason: HOSPADM

## 2021-01-07 RX ORDER — RISPERIDONE 0.5 MG/1
0.5 TABLET, ORALLY DISINTEGRATING ORAL EVERY 8 HOURS PRN
Status: DISCONTINUED | OUTPATIENT
Start: 2021-01-07 | End: 2021-01-20 | Stop reason: HOSPADM

## 2021-01-07 RX ORDER — CITALOPRAM 20 MG/1
20 TABLET ORAL DAILY
Status: DISCONTINUED | OUTPATIENT
Start: 2021-01-08 | End: 2021-01-08

## 2021-01-07 RX ORDER — HYDROXYZINE HYDROCHLORIDE 25 MG/1
25 TABLET, FILM COATED ORAL EVERY 6 HOURS PRN
Status: DISCONTINUED | OUTPATIENT
Start: 2021-01-07 | End: 2021-01-20 | Stop reason: HOSPADM

## 2021-01-07 RX ORDER — ACETAMINOPHEN 325 MG/1
650 TABLET ORAL EVERY 6 HOURS PRN
Status: DISCONTINUED | OUTPATIENT
Start: 2021-01-07 | End: 2021-01-20 | Stop reason: HOSPADM

## 2021-01-07 RX ORDER — IBUPROFEN 400 MG/1
400 TABLET ORAL EVERY 8 HOURS PRN
Status: DISCONTINUED | OUTPATIENT
Start: 2021-01-07 | End: 2021-01-20 | Stop reason: HOSPADM

## 2021-01-07 RX ORDER — OLANZAPINE 10 MG/1
5 INJECTION, POWDER, LYOPHILIZED, FOR SOLUTION INTRAMUSCULAR EVERY 8 HOURS PRN
Status: CANCELLED | OUTPATIENT
Start: 2021-01-07

## 2021-01-07 RX ADMIN — ACETAMINOPHEN 975 MG: 325 TABLET, FILM COATED ORAL at 00:20

## 2021-01-07 RX ADMIN — MIRTAZAPINE 15 MG: 15 TABLET, FILM COATED ORAL at 21:59

## 2021-01-07 RX ADMIN — CITALOPRAM 30 MG: 10 TABLET ORAL at 09:17

## 2021-01-07 NOTE — ED NOTES
Patient states she takes Citalopram at 8am for her daily meds  RN aware       Milagros Parks  01/07/21 0730

## 2021-01-07 NOTE — ED NOTES
Patient is accepted at [facility] [place] [facility]  Patient is accepted by Dr Gadiel Peter per [address] is arranged with [facility]  Transportation is scheduled for [address] [facility]       Nurse report is to be called to [phone] prior to patient transfer

## 2021-01-07 NOTE — ED PROVIDER NOTES
The patient is medically stable and she is able to be cleared for admission to a psychiatric facility     Rosemarie Bryan MD  01/07/21 5969

## 2021-01-07 NOTE — ED NOTES
Patient sitting quietly reading a book at this time  Patient very pleasant       Lance Lima  01/07/21 0796

## 2021-01-07 NOTE — ED NOTES
Attempted to call report twice, no answer  RN can call [phone] if there is any questions on patient        Gian Ibrahim RN  01/07/21 1800

## 2021-01-07 NOTE — ED NOTES
Patient ambulated to bathroom  Calm, cooperative and pleasant  at this time       Lance Lima  01/07/21 0880

## 2021-01-07 NOTE — ED NOTES
Patient present to the emergency room with suicidal ideation with plan to overdose  Patient reports she has been feeling overwhelmed since her  went back to work after being home for a few weeks  She states since then she has been having increasing anxiety and depression and started getting intrusive thoughts to overdose and feared she may act on it soon without help  She also reports her  has Parkinson's, and has been overwhelmed with thinking about their future  She also has not been getting therapy as often due to COVID and then the holidays  Patient has been compliant with medications  Patient denies homicidal ideation, auditory hallucinations, and visual hallucinations  She reports diminished appetite for last few days and anxiety worsening and interrupting her sleep last night  Patient was hesitant to sign 201, as she wanted to stay in [address] as recommended to her by her psychiatrist    Patient was explained we can see if a bed opens up in morning and if it does not we will need to discuss expanding bed search  Patient signed 12

## 2021-01-07 NOTE — ED NOTES
Insurance Authorization for admission:   [place] [facility]  [employer] number: [phone]   [facility]    [facility]  Level of care: inpatient psych  Review on 1-11-21  Authorization # [id number]      EVS (Eligibility Verification System) called - [phone]  Automated system indicates: [facility] primary   Medicare A secondary

## 2021-01-07 NOTE — ED NOTES
Pt given reading book and glasses per request  Pt calm, cooperative, no issues at this time  VSS  No other needs   Sitter remains at bedside     [facility]  01/07/21 1857

## 2021-01-07 NOTE — EMTALA/ACUTE CARE TRANSFER
[facility] [address]  Dept: [phone]      LBCXUN TRANSFER CONSENT    NAME Rambo Block                                         1960                              MRN [id number]    I have been informed of my rights regarding examination, treatment, and transfer   by Dr Willa Chacon MD    Benefits: Specialized equipment and/or services available at the receiving facility (Include comment)________________________    Risks: Potential for delay in receiving treatment      Transfer Request   I acknowledge that my medical condition has been evaluated and explained to me by the emergency department physician or other qualified medical person and/or my attending physician who has recommended and offered to me further medical examination and treatment  I understand the Hospital's obligation with respect to the treatment and stabilization of my emergency medical condition  I nevertheless request to be transferred  I release the Hospital, the doctor, and any other persons caring for me from all responsibility or liability for any injury or ill effects that may result from my transfer and agree to accept all responsibility for the consequences of my choice to transfer, rather than receive stabilizing treatment at the Hospital  I understand that because the transfer is my request, my insurance may not provide reimbursement for the services  The Hospital will assist and direct me and my family in how to make arrangements for transfer, but the hospital is not liable for any fees charged by the transport service  In spite of this understanding, I refuse to consent to further medical examination and treatment which has been offered to me, and request transfer to [address], [address] : SLL OA   I authorize the performance of emergency medical procedures and treatments upon me in both transit and upon arrival at the receiving facility  Additionally, I authorize the release of any and all medical records to the receiving facility and request they be transported with me, if possible  I authorize the performance of emergency medical procedures and treatments upon me in both transit and upon arrival at the receiving facility  Additionally, I authorize the release of any and all medical records to the receiving facility and request they be transported with me, if possible  I understand that the safest mode of transportation during a medical emergency is an ambulance and that the Hospital advocates the use of this mode of transport  Risks of traveling to the receiving facility by car, including absence of medical control, life sustaining equipment, such as oxygen, and medical personnel has been explained to me and I fully understand them  (OLEGARIO CORRECT BOX BELOW)  [  ]  I consent to the stated transfer and to be transported by ambulance/helicopter  [  ]  I consent to the stated transfer, but refuse transportation by ambulance and accept full responsibility for my transportation by car  I understand the risks of non-ambulance transfers and I exonerate the Hospital and its staff from any deterioration in my condition that results from this refusal     X___________________________________________    DATE  21  TIME________  Signature of patient or legally responsible individual signing on patient behalf           RELATIONSHIP TO PATIENT_________________________          Provider Certification    NAME Alida Mazariegos                                        [facility] 1960                              MRN [id number]    A medical screening exam was performed on the above named patient  Based on the examination:    Condition Necessitating Transfer The primary encounter diagnosis was Complex partial epilepsy with generalization and with intractable epilepsy ([facility] [address] )   Diagnoses of Hashimoto's thyroiditis and Prediabetes were also pertinent to this visit  Patient Condition: The patient has been stabilized such that within reasonable medical probability, no material deterioration of the patient condition or the condition of the unborn child(katherine) is likely to result from the transfer    Reason for Transfer: Level of Care needed not available at this facility    Transfer Requirements: [address]   · Space available and qualified personnel available for treatment as acknowledged by    · Agreed to accept transfer and to provide appropriate medical treatment as acknowledged by       Torrey Todd  · Appropriate medical records of the examination and treatment of the patient are provided at the time of transfer   [facility], [address] _______  · Transfer will be performed by qualified personnel from [address] [place] [address]  and appropriate transfer equipment as required, including the use of necessary and appropriate life support measures  Provider Certification: I have examined the patient and explained the following risks and benefits of being transferred/refusing transfer to the patient/family:  General risk, such as traffic hazards, adverse weather conditions, rough terrain or turbulence, possible failure of equipment (including vehicle or aircraft), or consequences of actions of persons outside the control of the transport personnel      Based on these reasonable risks and benefits to the patient and/or the unborn child(katherine), and based upon the information available at the time of the patients examination, I certify that the medical benefits reasonably to be expected from the provision of appropriate medical treatments at another medical facility outweigh the increasing risks, if any, to the individuals medical condition, and in the case of labor to the unborn child, from effecting the transfer      X____________________________________________ DATE 01/07/21        TIME_______      ORIGINAL - SEND TO MEDICAL RECORDS   COPY - SEND WITH PATIENT DURING TRANSFER

## 2021-01-07 NOTE — ED NOTES
Confirm patient's [employer] through [place]  Will not be able to pre cert until accepting is identified  Secondary through Medicare A only, per bertin  Per [facility], subscriber not found

## 2021-01-08 PROCEDURE — 99221 1ST HOSP IP/OBS SF/LOW 40: CPT | Performed by: PSYCHIATRY & NEUROLOGY

## 2021-01-08 RX ORDER — TRAZODONE HYDROCHLORIDE 50 MG/1
50 TABLET ORAL
Status: DISCONTINUED | OUTPATIENT
Start: 2021-01-08 | End: 2021-01-20 | Stop reason: HOSPADM

## 2021-01-08 RX ORDER — MIRTAZAPINE 15 MG/1
7.5 TABLET, FILM COATED ORAL
Status: DISCONTINUED | OUTPATIENT
Start: 2021-01-08 | End: 2021-01-10

## 2021-01-08 RX ADMIN — MIRTAZAPINE 7.5 MG: 15 TABLET, FILM COATED ORAL at 21:35

## 2021-01-08 RX ADMIN — CITALOPRAM HYDROBROMIDE 20 MG: 20 TABLET ORAL at 08:49

## 2021-01-08 NOTE — TREATMENT PLAN
TREATMENT PLAN REVIEW - [address] 56 y o  1960 female MRN: [id number]    [address]  Room / Bed: [facility] Encounter: [id number]          Admit Date/Time:  1/7/2021  7:06 PM    Treatment Team: Attending Provider: Becky Liang MD; Consulting Physician: Umberto Do MD; Registered Nurse: Merle Dillard RN; Patient Care Assistant: Moshe Hayes; Certified Nursing Assistant: Leticia Lion; Patient Care Technician: Malina Lloyd; Recreational Therapist: Arsenio Kitchen; Care Manager: Rito Capmos, RN; Patient Care Assistant: Drea Felipe; Registered Nurse: Kathryn Cross RN    Diagnosis: Principal Problem:    Severe episode of recurrent major depressive disorder, without psychotic features [facility])  Active Problems:    Complex partial epilepsy with generalization and with intractable epilepsy ([facility] [address] )    Hashimoto's thyroiditis    Idiopathic insomnia    Obesity (BMI 30-39  9)    Prediabetes    Generalized anxiety disorder      Patient Strengths/Assets: family ties, negotiates basic needs, patient is willing to work on problems    Patient Barriers/Limitations: difficulty adapting, poor physical health, poor self-care    Short Term Goals: decrease in depressive symptoms, decrease in anxiety symptoms, decrease in suicidal thoughts, ability to stay safe on the unit, improvement in insight, improvement in reasoning ability, improvement in self care, sleep improvement, improvement in appetite, mood stabilization, increase in group attendance, increase in socialization with peers on the unit    Long Term Goals: improvement in depression, improvement in anxiety, stabilization of mood, free of suicidal thoughts, improved insight, able to express basic needs, acceptance of need for psychiatric medications, acceptance of need for psychiatric treatment, adequate self care, adequate sleep, adequate appetite, adequate oral intake, appropriate interaction with peers    Progress Towards Goals: starting psychiatric medications as prescribed    Recommended Treatment: medication management, patient medication education, group therapy, milieu therapy, continued Behavioral Health psychiatric evaluation/assessment process, medical follow up with medical team    Treatment Frequency: daily medication monitoring, group and milieu therapy daily, monitoring through interdisciplinary rounds, monitoring through weekly patient care conferences    Expected Discharge Date: 10 midnights     Discharge Plan: will be determined    Treatment Plan Created/Updated By: Xin Ardon MD

## 2021-01-08 NOTE — PROGRESS NOTES
01/08/21 [address] of Residence [place]   Patient Information   Mental Status Alert   Primary Caregiver Self   Accompanied by/Relationship    Support System Immediate family; Extended family   Baptism/Cultural Requests spiritual   Legal Information   Tx Plan Signed Yes   Current Status: 201   Advance Directives   (none)   Advance Directives Status Discussed - Patient/Family Declined   Activities of Daily Living Prior to Admission   Functional Status Independent   Assistive Device No device needed   Living Arrangement House;Lives with someone   Ambulation Independent   Access to Firearms   Access to Firearms No   Income Information   Income Source SSI/SSD  ($900/SDI)   Means of Transportation   Means of Transport to Appts: Drive Self

## 2021-01-08 NOTE — PROGRESS NOTES
01/08/21 404 [facility] Admission Notification   Notification of Admission Provided to: PCP;Psychiatrist   PCP Notified via: Phone call  (Dr Daniel Schumacher -[phone])   Psychiatrist Notified via: Phone call  (Dr Alicia antis - [phone])     SW placed phone call to pt psych provider, Dr Rosalie Flaherty, left voicemail advising of admission

## 2021-01-08 NOTE — PROGRESS NOTES
01/08/21   Team Meeting   Meeting Type Daily Rounds   Team Members Present   Team Members Present Physician;Nurse;;Occupational Therapist   Physician Team Member Dr Mike Marques MD   Nursing Team Member Nilda Daniels, PHILLY   Care Management Team Member MS Francisco, [facility]   OT Team Member Tara Hidalgo, [place]   Patient/Family Present   Patient Present No   Patient's Family Present No   New admission readmit score 15  Severe anxiety and fear, passive SI in the past, stressors include half day program similar to [facility] and it was cnx due to covid,  has parkinson's and support group was cnx due to covid, increased ruminations; HX of inpatient stay in 2017 for passive si for 14 days, sees Dr Yuliya Jarvis past 2 years, on disability since 2014  temporal lobe resection to alleviate seizures, use to be   Slept, pleasant and cooperative

## 2021-01-08 NOTE — PROGRESS NOTES
01/08/21 404 [facility] Admission Notification   Notification of Admission Provided to: PCP   PCP Notified via: Phone call  (Dr Lesly Darnell -[phone])     SW spoke to [place]; advised of admission; scheduled followup 1/27 @ 930am in PERSON - fasting for blood work

## 2021-01-08 NOTE — PLAN OF CARE
Problem: Alteration in Thoughts and Perception  Goal: Verbalize thoughts and feelings  Description: Interventions:  - Promote a nonjudgmental and trusting relationship with the patient through active listening and therapeutic communication  - Assess patient's level of functioning, behavior and potential for risk  - Engage patient in 1 on 1 interactions  - Encourage patient to express fears, feelings, frustrations, and discuss symptoms    - Floral patient to reality, help patient recognize reality-based thinking   - Administer medications as ordered and assess for potential side effects  - Provide the patient education related to the signs and symptoms of the illness and desired effects of prescribed medications  Outcome: Progressing

## 2021-01-08 NOTE — PROGRESS NOTES
Patient belongings as follows:    Room:  Silver wedding ring  Eyeglasses  Blue long sleeve top  Green pants  Gray knee socks  Black leggings  Blue jeans  Underwear  Gray shoe ([employer]end) w/o laces    Room contraband cabinet:  Gray coat  Shoe strings    No cell phome   No wallet   No money  No dentures

## 2021-01-08 NOTE — PROGRESS NOTES
Sw met with patient in small group room; pt denies SI/HI/AH/VH, dep 6, anx 2; pt requesting [facility] Partial program upon discharge        01/08/21 1451   Patient Intake   Living Arrangement House;Lives with someone   Can patient return home? Yes   Address to be Discharge to: Radha Cullen, [address] [place] [address]   Patient's Telephone Number [phone]   Access to Firearms No   Work History Retired   School Grade/Year 12th  (graduated h/s - some college)   Admission Status   Status of Admission [address]   Patient History   Treatment History no hx AIP   Currently in Treatment Yes   Current Psychiatrist/Therapist Dr Britta Price - would like partial program upon discharge   Legal Issues denies current and hx   Substance Abuse No   Crisis Info   Release of Information Signed Yes  (psych, pcp, [facility] and )     Behavioral Health Psychosocial    Stressors/Triggers: social isolation; family stressors  Strengths:creative, taking care of others   Limitations: confidence, chronic anxiety  Coping Skills: being social  Writing   Hx Mental Illness: depression  Past Hospitalizations? Dates? No hx AIP   Hx Psych Meds: admits  Current Med Compliance: admits  Hx Non Compliance: denies  Hx SA or SI in last 12 mons  : denies hx SA   Access to Firearms: denies  Hx Violence to self or others past 12 mons  : denies  Hx HI past 12 mons  : denies  Hx abuse: denies  Current psychological trauma: denies  Family hx mental illness: brother - bipolar  Family hx suicide/homicide: denies  Family hx substance abuse: denies  Family hx dementia: denies  Current Substance abuse: denies x 3   Other Addictions? Past or Present? Denies hx   Hx Arrests, Probation or [place]? denies  Current Legal Problems? denies  Marital Status/Relationship Hx?  40 yrs - Myrl Searcy - first and only   Sexual Preference? Heterosexual - no concerns  Children? Ages? Relationship?  [place] - 1 grandchild  Are you currently responsible for any children? no  Pets? 2 dogs - Aleksandr and [place]  Parents? Relationships? Pura Zuleta - living; father   Are you a caregiver? no  Siblings? Relationships? Geno FOLEY) - close; M[place] - not close  Any other family supports? no  Current living situation? Able to return? Own home - can return   Caregivers for pt - any stressors? Education Hx? H/s and some college  Employment Hx?  Hx? none  Caodaism preferences? Spiritual   Would you like Bahai notified? no  Cultural needs? none  How do you get to your appointments? Drive self or   Financial Resources:    SSI/SSDI      Ability to pay for meds: yes  Monthly Income: $900/SDI  Medical Insurance: [facility]   Power of ? none   If no, Info?  declined  POA for Mental Health?  none  If no, Info? declined  Advanced directives?  none     If no, Info? declined  Guardian? denies  Does someone provide financial assistance to you?   Current providers:   PCP? Dr Presley Rosenberg -[phone]  Psych? Dr Yuliya Jarvis -[phone]  Therapist? none  Community Support Team (ACT/ICM/CM) none  Home Care Provider - none  Current Pharmacy [facility]   Family notification? [address] [address] [place] [address]? declined  Aftercare needs? Psych/PHP  Discharge disposition? Home   Partial Referral? Yes!

## 2021-01-08 NOTE — H&P
Psychiatric Evaluation - Behavioral Health     Identification Data:Martha Pate Guardian 56 y o  female MRN: [id number]  Unit/Bed#: Letitia Sacks Encounter: [id number]    Chief Complaint: suicidal ideation    History of Present Illness     Samra Chauhan is a 56 y o  female with a history of depression and anxiety who was admitted to the inpatient adult psychiatric unit on a voluntary 201 commitment basis due to depression, anxiety and suicidal ideation with plan to overdose on mirtazepine or cut wrists  Had one prior inpatient psychiatric admission in 2017 for increased anxiety and SI with plan to jump off balcony or electrocute self in bathtub  Sees psychiatrist Dr Stephen Smith every six weeks as well as Dr Denis Mao, psychologist, for therapy since she was discharged from [facility] Day program October 2020  On evaluation in the inpatient psychiatric unit Martha was calm and cooperative  Reported increased anxiety and difficulty with sleep  Also reports depressive symptoms including tearfulness, apathy, decreased concentration, and SI  Brought to Emergency Department by  1/6/21 for SI with plan to overdose on medications  Patient reports she has had increasing anxiety "a few days prior" which lead to her having thoughts of suicide  Described the last few weeks as "pretty good " "We delebrated holidays and everything was fine  I don't know what happened "  Reports anxiety consisted of her pacing, having decreased concentration, decreased sleep, tearfulness, and apathy  Went on to say that suicidal thoughts began to emerge with plan to overdose on her Remeron or slit her writs  Yousuf Lozano did report that she had access to a pill bottle of Remeron since it is prescribed to her  Denies any further thought of how she would slit her wrist  "I didn't think too much into it, I just thought it was an option " She did verbalize that the thoughts of SI were bothersome   "It bothered me that I would think of doing something like that " That is when she told her  about her thoughts and he brought her to the emergency department  Denied any substance use prior to onset of symptoms  Also denies any symptoms of court, AVH, or HI  Psychiatric Review Of Systems:    Sleep changes: decreased  Appetite changes:no change  Weight changes: increased  Energy: decreased  Interest/pleasure/: decreased  Anhedonia: yes  Anxiety: yes  Court: no  Guilt:  no  Hopeless:  no  Self injurious behavior/risky behavior: no  Suicidal ideation: yes  Homicidal ideation: no  Auditory hallucinations: no  Visual hallucinations: no  Delusional thinking: no  Eating disorder history: no  Obsessive/compulsive symptoms: no    Historical Information     Past Psychiatric History:     Past Inpatient Psychiatric Treatment:   Past inpatient psychiatric admission at [facility] in 2017  Past Outpatient Psychiatric Treatment:    Currently in outpatient psychiatric treatment with a psychiatrist Dr Sia Hernandez  Past Suicide Attempts: no  Past Violent Behavior: No  Past Psychiatric Medication Trials: Celexa and Remeron     Substance Abuse History:    Social History     Tobacco History     Smoking Status  Never Smoker    Smokeless Tobacco Use  Never Used          Alcohol History     Alcohol Use Status  No Comment  doesnt drink  Drug Use     Drug Use Status  No          Sexual Activity     Sexually Active  Not Currently Partners  Male          Activities of Daily Living    Not Asked               Additional Substance Use Detail     Questions Responses    Problems Due to Past Use of Alcohol? No    Problems Due to Past Use of Substances?  No    Substance Use Assessment Denies substance use within the past 12 months    Alcohol Use Frequency Denies use in past 12 months    Cannabis frequency Denies use in past 12 months    Heroin Frequency Denies use in past 12 months    Cocaine frequency Denies past use in past 12 months    Crack Cocaine Frequency Denies use in past 12 months    Methamphetamine Frequency Denies use in past 12 months    Narcotic Frequency Denies use in past 12 months    Benzodiazepine Frequency Denies use in past 12 months    Amphetamine frequency Denies use in past 12 months    Barbituate Frequency Denies use use in past 12 months    Inhalant frequency Denies use in past 12 months    Hallucinogen frequency Denies use in past 12 months    Ecstasy frequency Denies use past 12 months    Other drug frequency Denies use in past 12 months    Opiate frequency Denies use in past 12 months    Last reviewed by Darryle Harden, PA-C on 2021        I have assessed this patient for substance use within the past 12 months    Alcohol use: denies use  Recreational drug use: denies use    Family Psychiatric History:   Brother (+) Bipolar disorder  No family history of suicide/completed suicide attempts  Social History:    Education: high school diploma/GED and some college  Marital History:   Children: 2 adult sons  Living Arrangement: lives in home with   Occupational History: disabled d/t brain surgery   Functioning Relationships: good support system  Legal History: none   History: None    Traumatic History:   None  Past Medical History:      Past Medical History:   Diagnosis Date    Anxiety     Concussion     Last assessed 2017    Depression     ISCHEMIC 2013     ISCHEMIC 2013    Seizures [facility])      Past Surgical History:   Procedure Laterality Date    BRAIN SURGERY      IMMED FOLLOWING STROKE IN 2013     SECTION         Medical Review Of Systems:    Pertinent items are noted in HPI  All other systems negative for acute change  Allergies:    No Known Allergies    Medications: All current active medications have been reviewed      OBJECTIVE:    Vital signs in last 24 hours:    Temp:  [97 °F (36 1 °C)-97 7 °F (36 5 °C)] 97 °F (36 1 °C)  HR:  [69-77] 75  Resp:  [18-19] 18  BP: (114-145)/(64-82) 120/80    No intake or output data in the 24 hours ending 01/08/21 1403     Mental Status Evaluation:    Appearance:  age appropriate   Behavior:  cooperative   Speech:  normal rate and volume   Mood:  depressed   Affect:  constricted   Language: naming objects   Thought Process:  goal directed   Associations: intact associations   Thought Content:  no overt delusions   Perceptual Disturbances: none   Risk Potential: Suicidal ideation - Yes, without plan  Homicidal ideation - None at present  Potential for aggression - No   Sensorium:  oriented to person, place and time/date   Memory:  recent and remote memory grossly intact   Consciousness:  alert and awake   Attention: attention span and concentration are age appropriate   Intellect: average   Fund of Knowledge: awareness of current events: yes   Insight:  limited   Judgment: fair   Muscle Strength Muscle Tone: normal  normal   Gait/Station: normal gait/station   Motor Activity: no abnormal movements       Laboratory Results:   I have personally reviewed all pertinent laboratory/tests results  Imaging Studies:   No results found  Assessment/Plan   Principal Problem:    Severe episode of recurrent major depressive disorder, without psychotic features ([facility] )  Active Problems:    Complex partial epilepsy with generalization and with intractable epilepsy ([facility] )    Hashimoto's thyroiditis    Idiopathic insomnia    Obesity (BMI 30-39  9)    Prediabetes    Generalized anxiety disorder      Patient Strengths: ability for insight, capable of independent living, compliant with medication, good past treatment response, good support system, Congregational affiliation, supportive family     Patient Barriers: chronic mental illness, medical problems, 's health problems, COVID 19 restrictions    Treatment Plan:     Planned Treatment and Medication Changes:     All current active medications have been reviewed  Encourage group therapy, milieu therapy and occupational therapy  Behavioral Health checks every 7 minutes  Increase Celexa to 30mg PO QD  Trazodone 50mg PO QHS PRN insomnia      Risks / Benefits of Treatment:    Risks, benefits, and possible side effects of medications explained to patient and patient verbalizes understanding and agreement for treatment  Counseling / Coordination of Care: Total floor / unit time spent today 25 minutes  Greater than 50% of total time was spent with the patient and / or family counseling and / or coordination of care  A description of the counseling / coordination of care:   Patient's presentation on admission and proposed treatment plan discussed with treatment team   Diagnosis, medication changes and treatment plan reviewed with patient  Events leading to admission reviewed with patient  Supportive therapy provided to patient      Inpatient Psychiatric Certification:    Estimated length of stay: 10 midnights      Megan De La Torre MD 01/08/21

## 2021-01-08 NOTE — PROGRESS NOTES
Pt slept throughout the night without issue  Continuous visual safety checks performed throughout the night  No sign of distress or labored breathing  Safety precautions maintained  Will continue to monitor

## 2021-01-08 NOTE — PROGRESS NOTES
Pt stated that she has been hospitalized several years ago back in 2017 after poor sleep for several weeks, passive suicidal ideations and spent 14 days in patient in [place]  Pt stated she was seeing a psychiatrist for the past 3 years and was feeling much better  Pt stated her  who has Parkinson's Disease, causes her great distress as well as her children living in [place] and [place]  Pt stated she started having trouble sleeping the past 2 days and started having ruminations of wanting to die  Pt cited as covid cancelling groups that her and her  would attend as a stressor  Pt also stated that she was just taken of VIMPAT which she had been on for the past 7 years recently for seizures, as she was deemed seizure free by her doctor  Pt stated she is a 5/10 anxiety and feels no depression and does not have suicidal or homicidal ideations currently  Pt denies any hallucinations or cecy  Pt stated her sister is bipolar but no other family history, she doesn't know her biological father though  Pt stated 3 years ago she did have passive suicidal thoughts but she began attending a program known as [facility]  That was a half day program which allowed her to journal which was helping her  Pt does not work, became disabled in 2013 and has been collecting SSD ever since  Pt does not have any acute concerns or complaints at this time  Continuous visual safety checks performed throughout the shift  Safety precautions maintained  Will continue to monitor

## 2021-01-08 NOTE — PROGRESS NOTES
Pt up ad eloisa & gait is steady  Pt denies any depression & c/o anxiety 5/10  Pt denies any hallucinations, suicidal or homicidal ideations  Q 7 min checks maintained to monitor pt's behavior & safety  Pt is pleasant & cooperative  Pt is compliant with medications

## 2021-01-09 LAB
25(OH)D3 SERPL-MCNC: 12.6 NG/ML (ref 30–100)
TSH SERPL DL<=0.05 MIU/L-ACNC: 4.33 UIU/ML (ref 0.45–5.33)

## 2021-01-09 PROCEDURE — 99232 SBSQ HOSP IP/OBS MODERATE 35: CPT | Performed by: PSYCHIATRY & NEUROLOGY

## 2021-01-09 PROCEDURE — 84443 ASSAY THYROID STIM HORMONE: CPT | Performed by: PHYSICIAN ASSISTANT

## 2021-01-09 PROCEDURE — 82306 VITAMIN D 25 HYDROXY: CPT | Performed by: PHYSICIAN ASSISTANT

## 2021-01-09 PROCEDURE — 82607 VITAMIN B-12: CPT | Performed by: PHYSICIAN ASSISTANT

## 2021-01-09 RX ADMIN — MIRTAZAPINE 7.5 MG: 15 TABLET, FILM COATED ORAL at 21:46

## 2021-01-09 RX ADMIN — CITALOPRAM HYDROBROMIDE 30 MG: 20 TABLET ORAL at 08:49

## 2021-01-09 NOTE — PROGRESS NOTES
Pt denies ay depression & c/o anxiety 5/10  Pt denies any hallucinations, suicidal or homicidal ideations  Q 7 min checks maintained to monitor pt's behavior & safety  Pt up ad eloisa & gait is steady  Pt is pleasant & cooperative  Pt is compliant with medications  Pt does socialize with other patients

## 2021-01-09 NOTE — H&P
Shivani Chauhan#  [id number] F  JVW:[id number]    OVM:[id number]  Adm Date: 1/7/2021 1906  7:06 PM   ATT PHY: Heri Antoniadebbie, [id number]          Chief Complaint: worsening depression and suicidal ideation    History of Presenting Illness: Alan Faye is a(n) 56 y o  female presenting under 12 voluntary commitment due to increased depression and suicidal ideation  Patient examined at bedside  Patient reports PMH of epilepsy for which she had right temporal lobectomy in March of 2013  Patient reports otherwise unremarkable medical history  Patient's TSH was found to be 4 040 on 01/06/2021  She is asymptomatic  No Known Allergies    No current facility-administered medications on file prior to encounter        Current Outpatient Medications on File Prior to Encounter   Medication Sig Dispense Refill    citalopram (CeleXA) 10 mg tablet Take one tablet by mouth in the morning ( total of 30 mg as per Dr Wendy Beard) 30 tablet 0    citalopram (CeleXA) 20 mg tablet Take 1 tablet by mouth daily 30 tablet 0    mirtazapine (REMERON) 15 mg tablet Take 1 tablet by mouth daily at bedtime 30 tablet 0    lacosamide (VIMPAT) 100 mg tablet Take 1 tablet (100 mg total) by mouth every 12 (twelve) hours (Patient not taking: Reported on 12/15/2020) 30 tablet 0    Na Sulfate-K Sulfate-Mg Sulf 17 5-3 13-1 6 GM/177ML SOLN Take 1 kit by mouth once for 1 dose 2 Bottle 0       Active Ambulatory Problems     Diagnosis Date Noted    Severe [facility] of [facility] major [facility], [facility] features [facility] ) 09/15/2017    Complex partial epilepsy with generalization and with intractable epilepsy ([facility] ) 08/05/2014    Hashimoto's thyroiditis 06/08/2017    Idiopathic insomnia 04/17/2017    Obesity (BMI 30-39 9) 06/08/2017    Prediabetes 09/13/2017    Annual physical exam 07/08/2019    Memory problem 03/02/2020    Generalized anxiety disorder 2020    Executive function deficit 2020     Resolved Ambulatory Problems     Diagnosis Date Noted    Acute encephalopathy 2016    [facility] ) 2016    Anxiety 2015     Past Medical History:   Diagnosis Date    Concussion     Depression     ISCHEMIC 2013     [facility] )        Past Surgical History:   Procedure Laterality Date    BRAIN SURGERY      IMMED FOLLOWING STROKE IN 2013     SECTION         Social History:   Social History     Socioeconomic History    Marital status: /Civil Union     Spouse name: None    Number of children: None    Years of education: None    Highest education level: None   Occupational History    Occupation: Disabled   Social Needs    Financial resource strain: None    Food insecurity     Worry: None     Inability: None    Transportation needs     Medical: None     Non-medical: None   Tobacco Use    Smoking status: Never Smoker    Smokeless tobacco: Never Used   Substance and Sexual Activity    Alcohol use: No     Frequency: Never     Binge frequency: Never     Comment: doesnt drink      Drug use: No    Sexual activity: Not Currently     Partners: Male   Lifestyle    Physical activity     Days per week: None     Minutes per session: None    Stress: None   Relationships    Social connections     Talks on phone: None     Gets together: None     Attends [employer] service: None     Active member of club or organization: None     Attends meetings of clubs or organizations: None     Relationship status: None    Intimate partner violence     Fear of current or ex partner: None     Emotionally abused: None     Physically abused: None     Forced sexual activity: None   Other Topics Concern    None   Social History Narrative    Daily caffeine consumption       Family History:   Family History   Problem Relation Age of Onset    Aortic aneurysm Mother     No Known Problems Father        Review of Systems Musculoskeletal: Positive for arthralgias  All other systems reviewed and are negative  Physical Exam   Constitutional: Awake and Alert  Well-developed and well-nourished  No distress  HENT: PERR,EOMI, conjunctiva normal  Head: Normocephalic and atraumatic  Mouth/Throat: Oropharynx is clear and moist     Eyes: Conjunctivae and EOM are normal  Pupils are equal, round, and reactive to light  Right and left eye exhibits no discharge  Neck: Neck supple  No tracheal deviation present  No thyromegaly present  Cardiovascular: Normal rate, regular rhythm and normal heart sounds  Exam reveals no friction rub  No murmur heard  Pulmonary/Chest: Effort normal and breath sounds normal  No respiratory distress  No wheezes  Abdominal: Soft  Bowel sounds are normal  No distension  No tenderness  No rebound tenderness and no guarding  Neurological: Cranial Nerves grossly intact  No sensory deficit  Coordination normal    Musculoskeletal:   Nontender spine  Skin: Skin is warm and dry  No rash noted  No diaphoresis  No erythema  No edema  No cyanosis  Assessment     Mario Alberto Benedict is a(n) 56 y o  female with MDD  1  DJD  Patient may receive Tylenol as needed  2  History of epilepsy  Stable since right temporal lobectomy in March 2013  3  Elevated TSH  Patient's TSH was found to be 4 040 on 01/06/2021  She is asymptomatic  I will order free T4 for further assessment  4  MDD  Patient is being managed by psych  Prognosis: Fair  Discharge Plan: In progress  Advanced Directives: I have discussed in detail with the patient the advanced directives  The patient's emergency contact is her , Emily Felder ([phone])  When discussing cardiac and pulmonary resuscitation efforts with the patient, the patient wishes to be full code      I have spent more than 50 minutes gathering data, doing physical examination, and discussing the advanced directives, which was witnessed by caring staff     The patient was discussed with Dr Sammie Coppola and he is in agreement with the above note

## 2021-01-09 NOTE — NURSING NOTE
Patient was present in the community this evening  She is pleasant during staff interactions  She informs she is "feeling good" this evening  Patient rates anxiety 4/10, denies depression, denies SI, HI and hallucinations  Denies pain  LBM 1/8/2021 per patient  No acute behaviors observed  Will CTM via q7 minute safety checks

## 2021-01-09 NOTE — NURSING NOTE
Patient remains in bed sleeping at this time  She appears to have slept through the overnight without any apparent issue  No complaints voiced  No acute behaviors observed  Will CTM via q7 minute safety checks

## 2021-01-10 LAB — VIT B12 SERPL-MCNC: 513 PG/ML (ref 100–900)

## 2021-01-10 PROCEDURE — 99232 SBSQ HOSP IP/OBS MODERATE 35: CPT | Performed by: PSYCHIATRY & NEUROLOGY

## 2021-01-10 PROCEDURE — 82746 ASSAY OF FOLIC ACID SERUM: CPT | Performed by: FAMILY MEDICINE

## 2021-01-10 RX ORDER — MIRTAZAPINE 15 MG/1
15 TABLET, FILM COATED ORAL
Status: DISCONTINUED | OUTPATIENT
Start: 2021-01-10 | End: 2021-01-11

## 2021-01-10 RX ADMIN — CITALOPRAM HYDROBROMIDE 30 MG: 20 TABLET ORAL at 08:41

## 2021-01-10 RX ADMIN — LORAZEPAM 0.5 MG: 0.5 TABLET ORAL at 21:23

## 2021-01-10 RX ADMIN — MIRTAZAPINE 15 MG: 15 TABLET, FILM COATED ORAL at 21:22

## 2021-01-10 NOTE — NURSING NOTE
Patient out in the milieu social with peers and staff  Ate HS snack  Upon approach patient's mood and affect is appropriate but anxious  Patient rates anxiety 5/10  Calm and cooperative  Denies SI/HI/AH/VH/pain/depression  Took HS medication without difficulty  Will continue to monitor safety and behaviors every 7 minutes

## 2021-01-10 NOTE — PROGRESS NOTES
Progress Note - Hans Humphrey 484 56 y o  female MRN: [id number]  Unit/Bed#: Dorota Mueller Encounter: [id number]    Assessment/Plan   Principal Problem:    Severe episode of recurrent major depressive disorder, without psychotic features ([facility] )  Active Problems:    Complex partial epilepsy with generalization and with intractable epilepsy ([facility] )    Hashimoto's thyroiditis    Idiopathic insomnia    Obesity (BMI 30-39  9)    Prediabetes    Generalized anxiety disorder      Behavior over the last 24 hours:  Unchanged  Patient reports that she still adjusting to the unit  She reports that Celexa was increased to 30 mg and denies having any side effects  Her EKG was done prior to admission and it was normal     Sleep: normal  Appetite: poor  Medication side effects: No  ROS: no complaints    Mental Status Evaluation:  Appearance:  casually dressed   Behavior:  normal   Speech:  soft   Mood:  anxious and sad   Affect:  mood-congruent   Thought Process:  circumstantial   Thought Content:  normal   Perceptual Disturbances: None   Risk Potential: Suicidal Ideations none  Homicidal Ideations none  Potential for Aggression No   Sensorium:  person and place   Memory:  recent and remote memory grossly intact   Consciousness:  awake    Attention: attention span appeared shorter than expected for age   Insight:  limited   Judgment: limited   Gait/Station: slow   Motor Activity: no abnormal movements     Progress Toward Goals: ongoing    Recommended Treatment: Continue with group therapy, milieu therapy and occupational therapy  Risks, benefits and possible side effects of Medications:   Risks, benefits, and possible side effects of medications explained to patient and patient verbalizes understanding  Medications: continue current psychiatric medications  Labs: I have personally reviewed all pertinent laboratory/tests results       Counseling / Coordination of Care  Total floor / unit time spent today 25 minutes  Greater than 50% of total time was spent with the patient and / or family counseling and / or coordination of care   A description of the counseling / coordination of care:

## 2021-01-10 NOTE — PROGRESS NOTES
Progress Note - Miriam Zuniga 56 y o  female MRN: [id number]    Unit/Bed#: [facility] 200-01 Encounter: [id number]      Assessment:  1  DJD  Simple analgesics  2  History of epilepsy  Stable  3  Elevated TSH  Check free T4  4  MDD  To be managed by our psychiatric team    Plan:  As above    Subjective:   None    Objective:     Vitals: Blood pressure 119/70, pulse 72, temperature 97 9 °F (36 6 °C), temperature source Temporal, resp  rate 18, height 5' (1 524 m), weight 91 2 kg (201 lb), SpO2 95 %  ,Body mass index is 39 26 kg/m²  Intake/Output Summary (Last 24 hours) at 1/9/2021 2014  Last data filed at 1/9/2021 1927  Gross per 24 hour   Intake 600 ml   Output --   Net 600 ml       Physical Exam: /70 (BP Location: Right arm)   Pulse 72   Temp 97 9 °F (36 6 °C) (Temporal)   Resp 18   Ht 5' (1 524 m)   Wt 91 2 kg (201 lb)   SpO2 95%   BMI 39 26 kg/m²     General Appearance:    Alert, cooperative, no distress, appears stated age   Head:    Normocephalic, without obvious abnormality, atraumatic                   Neck:   Supple, symmetrical, trachea midline, no adenopathy;     thyroid:  no enlargement/tenderness/nodules; no carotid    bruit or JVD   Back:     Symmetric, no curvature, ROM normal, no CVA tenderness   Lungs:     Clear to auscultation bilaterally, respirations unlabored   Chest Wall:    No tenderness or deformity    Heart:    Regular rate and rhythm, S1 and S2 normal, no murmur, rub   or gallop       Abdomen:     Soft, non-tender, bowel sounds active all four quadrants,     no masses, no organomegaly           Extremities:   Extremities normal, atraumatic, no cyanosis or edema   Pulses:   2+ and symmetric all extremities   Skin:   Skin color, texture, turgor normal, no rashes or lesions   Lymph nodes:   Cervical, supraclavicular, and axillary nodes normal            Invasive Devices     None                 Lab, Imaging and other studies: I have personally reviewed pertinent reports

## 2021-01-10 NOTE — PROGRESS NOTES
Pt up ad eloisa & gait is steady  Q 7 min checks maintained to monitor pt's behavior & safety  Pt c/o depression 7/10 & anxiety 4/10  Pt is pleasant & socializes with other patients  Pt denies any hallucinations, suicidal or homicidal ideations  Pt is cooperative & compliant with medications

## 2021-01-10 NOTE — PROGRESS NOTES
Progress Note - Tika Brand 56 y o  female MRN: [id number]    Unit/Bed#: [facility] 200-01 Encounter: [id number]      Assessment:  Vital signs stable  1  DJD  Simple analgesics  2  History of epilepsy  Stable  3  Elevated TSH  Check free T4    Plan:  See above    Subjective:   No subjective complaints    Objective:     Vitals: Blood pressure 135/64, pulse 68, temperature 97 5 °F (36 4 °C), temperature source Temporal, resp  rate 20, height 5' (1 524 m), weight 91 2 kg (201 lb), SpO2 96 %  ,Body mass index is 39 26 kg/m²  Intake/Output Summary (Last 24 hours) at 1/10/2021 1736  Last data filed at 1/9/2021 1938  Gross per 24 hour   Intake 600 ml   Output --   Net 600 ml       Physical Exam: /64 (BP Location: Right arm)   Pulse 68   Temp 97 5 °F (36 4 °C) (Temporal)   Resp 20   Ht 5' (1 524 m)   Wt 91 2 kg (201 lb)   SpO2 96%   BMI 39 26 kg/m²     General Appearance:    Alert, cooperative, no distress, appears stated age   Head:    Normocephalic, without obvious abnormality, atraumatic                   Neck:   Supple, symmetrical, trachea midline, no adenopathy;     thyroid:  no enlargement/tenderness/nodules; no carotid    bruit or JVD   Back:     Symmetric, no curvature, ROM normal, no CVA tenderness   Lungs:     Clear to auscultation bilaterally, respirations unlabored   Chest Wall:    No tenderness or deformity    Heart:    Regular rate and rhythm, S1 and S2 normal, no murmur, rub   or gallop       Abdomen:     Soft, non-tender, bowel sounds active all four quadrants,     no masses, no organomegaly           Extremities:   Extremities normal, atraumatic, no cyanosis or edema   Pulses:   2+ and symmetric all extremities   Skin:   Skin color, texture, turgor normal, no rashes or lesions   Lymph nodes:   Cervical, supraclavicular, and axillary nodes normal       Invasive Devices     None                 Lab, Imaging and other studies: I have personally reviewed pertinent reports

## 2021-01-10 NOTE — PROGRESS NOTES
Progress Note - Hans Humphrey 484 56 y o  female MRN: [id number]  Unit/Bed#: [facility] Encounter: [id number]    Assessment/Plan   Principal Problem:    Severe episode of recurrent major depressive disorder, without psychotic features ([facility] )  Active Problems:    Complex partial epilepsy with generalization and with intractable epilepsy ([facility] )    Hashimoto's thyroiditis    Idiopathic insomnia    Obesity (BMI 30-39  9)    Prediabetes    Generalized anxiety disorder      Behavior over the last 24 hours:  Improved  Patient reports that she has been feeling better overall and she has been adjusting well to the unit  She reports that she has been calling her  and checking on their pets  She reports that she would like to go home soon so she can take care of the pets but still wants to make sure that she will be safe and ready to go back home  Sleep: normal  Appetite: normal  Medication side effects: No  ROS: no complaints    Mental Status Evaluation:  Appearance:  casually dressed   Behavior:  guarded   Speech:  soft   Mood:  anxious   Affect:  constricted   Thought Process:  circumstantial   Thought Content:  obsessions   Perceptual Disturbances: None   Risk Potential: Suicidal Ideations none  Homicidal Ideations none  Potential for Aggression No   Sensorium:  person and place   Memory:  recent and remote memory grossly intact   Consciousness:  awake    Attention: attention span and concentration were age appropriate   Insight:  limited   Judgment: limited   Gait/Station: normal gait/station   Motor Activity: no abnormal movements     Progress Toward Goals: ongoing    Recommended Treatment: Continue with group therapy, milieu therapy and occupational therapy  Risks, benefits and possible side effects of Medications:   Risks, benefits, and possible side effects of medications explained to patient and patient verbalizes understanding        Medications: all current active meds have been reviewed  Labs: I have personally reviewed all pertinent laboratory/tests results  Counseling / Coordination of Care  Total floor / unit time spent today 25 minutes  Greater than 50% of total time was spent with the patient and / or family counseling and / or coordination of care   A description of the counseling / coordination of care:

## 2021-01-11 LAB — FOLATE SERPL-MCNC: 7.1 NG/ML (ref 3.1–17.5)

## 2021-01-11 PROCEDURE — 99232 SBSQ HOSP IP/OBS MODERATE 35: CPT | Performed by: NURSE PRACTITIONER

## 2021-01-11 RX ORDER — MELATONIN
1000 DAILY
Qty: 30 TABLET | Refills: 0 | Status: SHIPPED | OUTPATIENT
Start: 2021-04-12

## 2021-01-11 RX ORDER — MELATONIN
1000 DAILY
Status: DISCONTINUED | OUTPATIENT
Start: 2021-04-12 | End: 2021-01-20 | Stop reason: HOSPADM

## 2021-01-11 RX ORDER — POLYETHYLENE GLYCOL 3350 17 G/17G
17 POWDER, FOR SOLUTION ORAL DAILY PRN
Qty: 30 EACH | Refills: 0 | Status: SHIPPED | OUTPATIENT
Start: 2021-01-11 | End: 2021-03-16 | Stop reason: HOSPADM

## 2021-01-11 RX ORDER — ERGOCALCIFEROL 1.25 MG/1
50000 CAPSULE ORAL WEEKLY
Status: DISCONTINUED | OUTPATIENT
Start: 2021-01-11 | End: 2021-01-20 | Stop reason: HOSPADM

## 2021-01-11 RX ORDER — IBUPROFEN 400 MG/1
400 TABLET ORAL EVERY 8 HOURS PRN
Qty: 60 TABLET | Refills: 0 | Status: SHIPPED | OUTPATIENT
Start: 2021-01-11 | End: 2022-06-28

## 2021-01-11 RX ORDER — ERGOCALCIFEROL 1.25 MG/1
50000 CAPSULE ORAL WEEKLY
Qty: 13 CAPSULE | Refills: 0 | Status: SHIPPED | OUTPATIENT
Start: 2021-01-11 | End: 2022-06-28

## 2021-01-11 RX ORDER — MIRTAZAPINE 15 MG/1
7.5 TABLET, FILM COATED ORAL
Status: DISCONTINUED | OUTPATIENT
Start: 2021-01-11 | End: 2021-01-18

## 2021-01-11 RX ORDER — AMOXICILLIN 250 MG
1 CAPSULE ORAL DAILY PRN
Qty: 30 TABLET | Refills: 0 | Status: SHIPPED | OUTPATIENT
Start: 2021-01-11 | End: 2021-03-16 | Stop reason: HOSPADM

## 2021-01-11 RX ORDER — BUSPIRONE HYDROCHLORIDE 5 MG/1
5 TABLET ORAL 3 TIMES DAILY
Status: DISCONTINUED | OUTPATIENT
Start: 2021-01-11 | End: 2021-01-15

## 2021-01-11 RX ADMIN — ERGOCALCIFEROL 50000 UNITS: 1.25 CAPSULE ORAL at 11:40

## 2021-01-11 RX ADMIN — BUSPIRONE HYDROCHLORIDE 5 MG: 5 TABLET ORAL at 21:11

## 2021-01-11 RX ADMIN — BUSPIRONE HYDROCHLORIDE 5 MG: 5 TABLET ORAL at 16:39

## 2021-01-11 RX ADMIN — CITALOPRAM HYDROBROMIDE 30 MG: 20 TABLET ORAL at 08:40

## 2021-01-11 RX ADMIN — MIRTAZAPINE 7.5 MG: 15 TABLET, FILM COATED ORAL at 21:11

## 2021-01-11 NOTE — SOCIAL WORK
SW met with pt for check-in regarding which pt expressed that she feels much improved since admission  Pt is agreeable to some med changes to assist sleep with reference to which she spoke with psych provider  Pt is looking forward to participation in [employer] with which she is familiar, as she participated in same in the past   She has spoken with  who is supportive of current placement; he will  pt upon discharge

## 2021-01-11 NOTE — PROGRESS NOTES
The patient noted to be visible in the milieu with breakfast meal at time of initial assessment  The patient noted to be pleasant upon approach, the patient states anxiety and depression are "better" than day previous  The patient denies si, hi, and hallucinations  The patient compliant with meals and medications  The patient denies complaints of pain  Will continue to monitor

## 2021-01-11 NOTE — PLAN OF CARE
Problem: DISCHARGE PLANNING - CARE MANAGEMENT  Goal: Discharge to post-acute care or home with appropriate resources  Description: INTERVENTIONS:  - Conduct assessment to determine patient/family and health care team treatment goals, and need for post-acute services based on payer coverage, community resources, and patient preferences, and barriers to discharge  - Address psychosocial, clinical, and financial barriers to discharge as identified in assessment in conjunction with the patient/family and health care team  - Arrange appropriate level of post-acute services according to patients   needs and preference and payer coverage in collaboration with the physician and health care team  - Communicate with and update the patient/family, physician, and health care team regarding progress on the discharge plan  - Arrange appropriate transportation to post-acute venues  Outcome: Progressing    Pt is looking forward to participation in [facility], upon discharge

## 2021-01-11 NOTE — PROGRESS NOTES
Pt was present in the hallway upon assessment  Pt was bright and pleasant but did endorse anxiety at the moment  Pt required PRN ativan  Pt was pleasant and cooperative  Pt admitted that she believes she feels a little better and stated that the doctor was going to prescribe her ativan, but she didn't know if it was PRN or scheduled  Pt denies ah, vh, si, hi  Pt denies pain  Compliant with medications, went to bed after HS medications  Continuous visual safety checks performed throughout the shift  Safety precautions maintained  Will continue to monitor

## 2021-01-11 NOTE — PROGRESS NOTES
01/11/21 0932   Team Meeting   Meeting Type Daily Rounds   Team Members Present   Team Members Present Physician;Nurse;; Occupational Therapist   Physician Team Member Dr Keira Rizo MD; Kade Paz[address]   Nursing Team Member Jonna Shay, RN   Social Work Team Member [facility]   OT Team Member Theo Wall [place]   Patient/Family Present   Patient Present No   Patient's Family Present No     No discharge date - will return home upon stabilization with PHP program; med adjustments; bright, pleasant, anx/dep "better"; prns utilized for anxiety; slept

## 2021-01-11 NOTE — PLAN OF CARE
Problem: Anxiety  Goal: Anxiety is at manageable level  Description: Interventions:  - Assess and monitor patient's anxiety level  - Monitor for signs and symptoms (heart palpitations, chest pain, shortness of breath, headaches, nausea, feeling jumpy, restlessness, irritable, apprehensive)  - Collaborate with interdisciplinary team and initiate plan and interventions as ordered    - Alger patient to unit/surroundings  - Explain treatment plan  - Encourage participation in care  - Encourage verbalization of concerns/fears  - Identify coping mechanisms  - Assist in developing anxiety-reducing skills  - Administer/offer alternative therapies  - Limit or eliminate stimulants  Outcome: Progressing     Problem: Ineffective Coping  Goal: Cooperates with admission process  Description: Interventions:   - Complete admission process  Outcome: Progressing  Goal: Identifies healthy coping skills  Outcome: Progressing  Goal: Patient/Family participate in treatment and DC plans  Description: Interventions:  - Provide therapeutic environment  Outcome: Progressing  Goal: Patient/Family verbalizes awareness of resources  Outcome: Progressing     Problem: Risk for Self Injury/Neglect  Goal: Treatment Goal: Remain safe during length of stay, learn and adopt new coping skills, and be free of self-injurious ideation, impulses and acts at the time of discharge  Outcome: Progressing  Goal: Verbalize thoughts and feelings  Description: Interventions:  - Assess and re-assess patient's lethality and potential for self-injury  - Engage patient in 1:1 interactions, daily, for a minimum of 15 minutes  - Encourage patient to express feelings, fears, frustrations, hopes  - Establish rapport/trust with patient   Outcome: Progressing  Goal: Refrain from harming self  Description: Interventions:  - Monitor patient closely, per order  - Develop a trusting relationship  - Supervise medication ingestion, monitor effects and side effects Outcome: Progressing  Goal: Recognize maladaptive responses and adopt new coping mechanisms  Outcome: Progressing     Problem: Depression  Goal: Treatment Goal: Demonstrate behavioral control of depressive symptoms, verbalize feelings of improved mood/affect, and adopt new coping skills prior to discharge  Outcome: Progressing  Goal: Refrain from isolation  Description: Interventions:  - Develop a trusting relationship   - Encourage socialization   Outcome: Progressing  Goal: Refrain from self-neglect  Outcome: Progressing  Goal: Complete daily ADLs, including personal hygiene independently, as able  Description: Interventions:  - Observe, teach, and assist patient with ADLS  -  Monitor and promote a balance of rest/activity, with adequate nutrition and elimination   Outcome: Progressing

## 2021-01-11 NOTE — PROGRESS NOTES
Progress Note - Mario Alberto Benedict 56 y o  female MRN: [id number]    Unit/Bed#: Aspen Singh Encounter: [id number]        Subjective:   Patient seen and examined at bedside after reviewing the chart and discussing the case with the caring staff  Patient examined at bedside  Patient has no acute issues  On review of patient's labs it was found that patient's vitamin-D levels were low 12 6  Patient's vitamin B12 levels were within normal limits  Patient is a possible discharge for tomorrow 01/12/2021  Patient will be needing her prescriptions  I reviewed and reconciled patient's problem list and medications  Physical Exam   Vitals: Blood pressure 102/54, pulse 74, temperature (!) 97 4 °F (36 3 °C), temperature source Temporal, resp  rate 18, height 5' (1 524 m), weight 91 2 kg (201 lb), SpO2 98 %  ,Body mass index is 39 26 kg/m²  Constitutional: He appears well-developed  HEENT: PERR, EOMI, MMM  Cardiovascular: Normal rate and regular rhythm  Pulmonary/Chest: Effort normal and breath sounds normal    Abdomen: Soft, + BS, NT    Assessment/Plan:  Mario Alberto Benedict is a(n) 56 y o  female with MDD      MEDICAL CLEARANCE  Patient is medically cleared for discharge  All scripts will be sent out for the patient  1  DJD  Patient may receive Tylenol as needed  2  History of epilepsy  Stable since right temporal lobectomy in March 2013  3  Elevated TSH  Patient's TSH was found to be 4 040 on 01/06/2021  She is asymptomatic  Patient's free T4 levels were found to be within normal limits 1 07   4  New vitamin-D deficiency  I will put the patient on vitamin-D bolus doses for 14 weeks followed by vitamin D3 1000 units daily

## 2021-01-11 NOTE — PROGRESS NOTES
This note was not shared with the patient due to reasonable likelihood of causing patient harm  Progress Note - Hans Humphrey 484 56 y o  female MRN: [id number]  Unit/Bed#: Lucie Russell Encounter: [id number]    Assessment/Plan   Principal Problem:    Severe episode of recurrent major depressive disorder, without psychotic features ([facility] )  Active Problems:    Complex partial epilepsy with generalization and with intractable epilepsy ([facility] )    Hashimoto's thyroiditis    Idiopathic insomnia    Obesity (BMI 30-39  9)    Prediabetes    Generalized anxiety disorder      Behavior over the last 24 hours:  unchanged  Sleep: normal  Appetite: normal  Medication side effects: No  ROS: no complaints and all other systems negative for acute change     Martha was seen today for follow up and case was discussed with treatment team this morning  Upon interaction, Fawn Chanel was calm, cooperative, and slightly guarded  Reports increased anxiety and rates as 5/10  Denies depression, but occasionally tearful  Goal directed in that she wishes to go home  When speaking about medications, discussed plan to taper Remeron, since that was what patient wished to overdose on, and initiate Buspar to help with anxiety/depression  She then proceeded to state, "well I had two plans  One was to take the medicine, and the other was to cut my wrist " Denies any SI/HI currently  Continues to minimize depressive symptoms  Medication and meal compliant  Visible on unit and social with select peers       Mental Status Evaluation:  Appearance:  age appropriate, casually dressed and overweight   Behavior:  cooperative, mildy guarded   Speech:  normal pitch and normal volume   Mood:  anxious   Affect:  constricted and sad   Thought Process:  goal directed and perserverative on wanting to go home   Thought Content:  no overt delusions   Perceptual Disturbances: None   Risk Potential: Suicidal Ideations none  Homicidal Ideations none  Potential for Aggression No   Sensorium:  person, place, time/date and situation   Memory:  recent and remote memory grossly intact   Consciousness:  alert and awake    Attention: attention span and concentration were age appropriate   Insight:  limited   Judgment: limited   Gait/Station: normal gait/station and normal balance   Motor Activity: no abnormal movements     Progress Toward Goals: Slightly improving, although patient continues to minimize depressive symptoms  No discharge date at this time  Recommended Treatment: Continue with group therapy, milieu therapy and occupational therapy  Risks, benefits and possible side effects of Medications:   Risks, benefits, and possible side effects of medications explained to patient and patient verbalizes understanding  Medications: all current active meds have been reviewed, continue current psychiatric medications and planned medication changes: Decrease Remeron to 7 5mg PO QHS  Add Buspar 5mg PO TID       Labs: I have personally reviewed all pertinent laboratory/tests results  Counseling / Coordination of Care  Total floor / unit time spent today 30 minutes  Greater than 50% of total time was spent with the patient and / or family counseling and / or coordination of care   A description of the counseling / coordination of care: medication education, treatment plan, supportive therapy

## 2021-01-11 NOTE — PLAN OF CARE
Pt progressing; no discharge date - will return home upon stabilization; will participate in [facility]

## 2021-01-12 PROCEDURE — 99232 SBSQ HOSP IP/OBS MODERATE 35: CPT | Performed by: NURSE PRACTITIONER

## 2021-01-12 RX ADMIN — MIRTAZAPINE 7.5 MG: 15 TABLET, FILM COATED ORAL at 21:18

## 2021-01-12 RX ADMIN — BUSPIRONE HYDROCHLORIDE 5 MG: 5 TABLET ORAL at 16:25

## 2021-01-12 RX ADMIN — BUSPIRONE HYDROCHLORIDE 5 MG: 5 TABLET ORAL at 08:43

## 2021-01-12 RX ADMIN — BUSPIRONE HYDROCHLORIDE 5 MG: 5 TABLET ORAL at 21:19

## 2021-01-12 RX ADMIN — CITALOPRAM HYDROBROMIDE 30 MG: 20 TABLET ORAL at 08:42

## 2021-01-12 NOTE — PROGRESS NOTES
01/12/21 0936   Team Meeting   Meeting Type Daily Rounds   Team Members Present   Team Members Present Physician;Nurse;; Occupational Therapist   Physician Team Member Dr Jenaro Collins MD   Nursing Team Member Ellie Adams RN   Social Work Team Member Rene Hollins [place]   OT Team Member Dorothea Howard [place]   Patient/Family Present   Patient Present No   Patient's Family Present No     DC Friday - will return home with PHP referral/participation; bright, pleasant, reports feeling better; med adjustments

## 2021-01-12 NOTE — DISCHARGE INSTR - APPOINTMENTS
The treatment team recommends participation in a partial hospitalization program with continued medication management, group and individual therapy services upon discharge; you agreed to a referral   A referral was made on your behalf to [facility] located at [address] [facility], [address], Phone: [phone]  You are scheduled for intake IN PERSON on Thursday, Jan 21st at 36a  You MUST wear a mask to this appointment  You will begin group sessions on Friday, January 22nd via TEAMS  Instructions for daily participation are included in your discharge packet and will be reviewed with you during intake appointment  Your discharge instructions will be faxed to this provider for continuity of care  You have identified Dr Yarely Ordonez MD, [address] [place] [address], [address] [place] [address], Phone: [phone] as your primary care provider  An appointment has been scheduled on your behalf for follow-up on Weds, January 27th at 930am IN-PERSON  Please FAST for this appointment with nothing to eat or drink after midnight  Please plan to arrive 15 minutes prior to your scheduled appointment and bring your photo ID/Insurance Card(s)  You MUST wear a mask to this appointment  Your discharge will be faxed to this provider for continuity of care  Justin Gerardo RN, our [employer], will be calling you after your discharge, on the phone number that you provided  She will be available as an additional support, if needed  If you wish to speak with her, you may contact Lorenzo Clark at [phone]

## 2021-01-12 NOTE — PROGRESS NOTES
Progress Note - Arlene Berkowitz 56 y o  female MRN: [id number]    Unit/Bed#: Sebastian Maldonado Encounter: [id number]        Subjective:   Patient seen and examined at bedside after reviewing the chart and discussing the case with the caring staff  Patient examined at bedside  Patient has no acute issues  On review of patient's labs it was found that patient's vitamin-D levels were low 12 6  Patient's vitamin B12 levels were within normal limits  Patient is a possible discharge this week  Patient will be needing her prescriptions  I reviewed and reconciled patient's problem list and medications  Physical Exam   Vitals: Blood pressure 130/70, pulse 69, temperature (!) 97 4 °F (36 3 °C), temperature source Temporal, resp  rate 16, height 5' (1 524 m), weight 91 2 kg (201 lb), SpO2 96 %  ,Body mass index is 39 26 kg/m²  Constitutional: He appears well-developed  HEENT: PERR, EOMI, MMM  Cardiovascular: Normal rate and regular rhythm  Pulmonary/Chest: Effort normal and breath sounds normal    Abdomen: Soft, + BS, NT    Assessment/Plan:  Arlene Berkowitz is a(n) 56 y o  female with MDD      MEDICAL CLEARANCE  Patient is medically cleared for discharge  All scripts will be sent out for the patient  1  DJD  Patient may receive Tylenol as needed  2  History of epilepsy  Stable since right temporal lobectomy in March 2013  3  Elevated TSH  Patient's TSH was found to be 4 040 on 01/06/2021  She is asymptomatic  Patient's free T4 levels were found to be within normal limits 1 07   4  New vitamin-D deficiency  I will put the patient on vitamin-D bolus doses for 14 weeks followed by vitamin D3 1000 units daily

## 2021-01-12 NOTE — DISCHARGE INSTR - OTHER ORDERS
[facility] [address] [facility] [address]; Phone: [phone]    Triggers you have identified during your hospitalization that led to your admission with distressed mood includes COVID 19 Isolation and family stressors  Coping skills you have identified during your hospitalization include watching tv and spending time with peers  If you are unable to deal with your distressed mood alone please talk to a member of the partial staff or your , whom you identified as supportive  If that is not effective and you continue to have distressed mood, please contact [facility] at [phone], dial [phone] or go to the nearest emergency center  *National Suicide Prevention Lifeline:  9-600.306.7583  *[facility] Mental Illness ([facility]) HELPLINE: 983.365.5008/Website: www yasemin org  *Substance Abuse and Mental Health Services Administration([facility]) [employer], which is a confidential, free, 24-hour-a-day, 365-day-a-year, information service for individuals and family members facing mental health and/or substance use disorders  This service provides referrals to local treatment facilities, support groups, and community-based organizations  Callers can also order free publications and other information  Call 2-723.254.1581/Website: www [facility] gov  *[employer] 2-1-1: This is a toll free, confidential, 24-hour-a-day service which connects you to a community  in your area who can help you find services and resources that are available to you locally and provide critical services that can improve and save lives  Call: 80  /Website: https://[employer] net/    What you need to know aboutcoronavirus disease 2019 (COVID-19)     What is coronavirus disease 2019 (COVID-19)? Coronavirus disease 2019 (COVID-19) is a respiratory illness that can spread from person to person   The virus that causes COVID-19 is a novel coronavirus that was first identified during an investigation into an outbreak in [place]  Can people in the [place]  get COVID-19? Yes  COVID-19 is spreading from person to person in parts of the [place]  Risk of infection with COVID-19 is higher for people who are close contacts of someone known to have COVID-19, for example healthcare workers, or household members  Other people at higher risk for infection are those who live in or have recently been in an area with ongoing spread of COVID-19  Learn more about places with ongoing spread at   [facility]  html#geographic  Have there been cases of COVID-19 in the [place] ?   Yes  The first case of COVID-19 in the [place] was reported on January 21, 2020  The current count of cases of COVID-19 in the [place] is available on [employer] at [facility]  How does COVID-19 spread? The virus that causes COVID-19 probably emerged from an animal source, but is now spreading from person to person  The virus is thought to spread mainly between people who are in close contact with one another (within about 6 feet) through respiratory droplets produced when an infected person coughs or sneezes  It also may be possible that a person can get COVID-19 by touching a surface or object that has the virus on it and then touching their own mouth, nose, or possibly their eyes, but this is not thought to be the main way the virus spreads  Learn what is known about the spread of newly emerged coronaviruses at [facility]  What are the symptoms of COVID-19? Patients with COVID-19 have had mild to severe respiratory illness with symptoms of   fever   cough   shortness of breath  What are severe complications from this virus? Some patients have pneumonia in both lungs, multi-organ failure and in some cases death     How can I help protect myself? People can help protect themselves from respiratory illness with everyday preventive actions  Avoid close contact with people who are sick  Avoid touching your eyes, nose, and mouth withunwashed hands  Wash your hands often with soap and water for at least 20 seconds  Use an alcohol-based hand  that contains at least 60% alcohol if soap and water are not available  If you are sick, to keep from spreading respiratory illness to others, you should   Stay home when you are sick  Cover your cough or sneeze with a tissue, then throw the tissue in the trash  Clean and disinfect frequently touched objectsand surfaces  What should I do if I recently traveled from an area with ongoing spread of COVID-19? If you have traveled from an affected area, there may be restrictions on your movements for up to 2 weeks  If you develop symptoms during that period (fever, cough, trouble breathing), seek medical advice  Call the office of your health care provider before you go, and tell them about your travel and your symptoms  They will give you instructions on how to get care without exposing other people to your illness  While sick, avoid contact with people, don't go out and delay any travel to reduce the possibility of spreading illness to others  Is there a vaccine? There is currently no vaccine to protect against COVID-19  The best way to prevent infection is to take everyday preventive actions, like avoiding close contact with people who are sick and washing your hands often  Is there a treatment? There is no specific antiviral treatment for COVID-19  People with COVID-19 can seek medical care to helprelieve symptoms    For more information: www cdc gov/NMWPC71IJ 765900-G 03/03/2020       What to do if you are sick withcoronavirus disease 2019 (COVID-19)     If you are sick with COVID-19 or suspect you are infected with the virus that causes COVID-19, follow the steps below to help prevent the disease from spreading to people in your home and community  Stay home except to get medical care   You should restrict activities outside your home, except for getting medical care  Do not go to work, school, or public areas  Avoid using public transportation, ride-sharing, or taxis  Separate yourself from other people and animals inyour home  People: As much as possible, you should stay in a specific room and away from other people in your home  Also, you should use a separate bathroom, if available  Animals: Do not handle pets or other animals while sick  See COVID-19 and Animals for more information  Call ahead before visiting your doctor   If you have a medical appointment, call the healthcare provider and tell them that you have or may have COVID-19  This will help the healthcare provider's office take steps to keep other people from getting infected or exposed  Wear a facemask  You should wear a facemask when you are around other people (e g , sharing a room or vehicle) or pets and before you enter a healthcare provider's office  If you are not able to wear a facemask (for example, because it causes trouble breathing), then people who live with you should not stay in the same room with you, or they should wear a facemask if they enteryour room  Cover your coughs and sneezes   Cover your mouth and nose with a tissue when you cough or sneeze  Throw used tissues in a lined trash can; immediately wash your hands with soap and water for at least 20 seconds or clean your hands with an alcohol-based hand  that contains at least 60 to 95% alcohol, covering all surfaces of your hands and rubbing them together until they feel dry  Soap and water should be used preferentially if hands are visibly dirty  Avoid sharing personal household items   You should not share dishes, drinking glasses, cups, eating utensils, towels, or bedding with other people or pets in your home   After using these items, they should be washed thoroughly with soap and water  Clean your hands often  Wash your hands often with soap and water for at least 20 seconds  If soap and water are not available, clean your hands with an alcohol-based hand  that contains at least 60% alcohol, covering all surfaces of your hands and rubbing them together until they feel dry  Soap and water should be used preferentially if hands are visibly dirty  Avoid touching your eyes, nose, and mouth with unwashed hands  Clean all "high-touch" surfaces every day  High touch surfaces include counters, tabletops, doorknobs, bathroom fixtures, toilets, phones, keyboards, tablets, and bedside tables  Also, clean any surfaces that may have blood, stool, or body fluids on them  Use a household cleaning spray or wipe, according to the label instructions  Labels contain instructions for safe and effective use of the cleaning product including precautions you should take when applying the product, such as wearing gloves and making sure you have good ventilation during use of the product  Monitor your symptoms  Seek prompt medical attention if your illness is worsening (e g , difficulty breathing)  Before seeking care, call your healthcare provider and tell them that you have, or are being evaluated for, COVID-19  Put on a facemask before you enter the facility  These steps will help the healthcare provider's office to keep other people in the office or waiting room from getting infectedor exposed  Ask your healthcare provider to call the local or [facility] health department  Persons who are placed under active monitoring or facilitated self-monitoring should follow instructions provided by their local health department or occupational health professionals, as appropriate  If you have a medical emergency and need to call 911, notify the dispatch personnel that you have, or are being evaluated for COVID-19   If possible, put on a facemask before emergency medical services arrive  Discontinuing home isolation  Patients with confirmed COVID-19 should remain under home isolation precautions until the risk of secondary transmission to others is thought to be low  The decision to discontinue home isolation precautions should be made on a case-by-case basis, in consultation with healthcare providers and state and local health departments  For more information: www cdc gov/WOQYR23FD 195529-A 02/24/2020       Stay home when you are sick,except to get medical care  Wash your hands often with soap and water for at least 20 seconds  Cover your cough or sneeze with a tissue, then throw the tissue in the trash  Clean and disinfect frequently touched objects and surfaces  Avoid touching your eyes, nose, and mouth  STOP THE SPREAD OF GERMS  For more information: www cdc gov/COVID19 Avoid close contact with people who are sick  Help prevent the spread of respiratory diseases like COVID-19  [facility] [place]   COVID-19 CRISIS COUNSELING PROGRAM   GET CONNECTED WITH A FREE CRISIS COUNSELOR   CALL [phone]   Do you feel    Stressed? Overwhelmed? Alone? Afraid? Anxiety? During these uncertain times, you are not alone  We are here to listen  Please call our [facility] and Referral Line   [phone] TTY: [phone]   There are trained professionals available 24/7 ready to help you navigate these unprecedented challenges  These services are FREE & CONFIDENTIAL  This publication was made possible by [employer] Number [id number], in collaboration with the [address] [place] [address]

## 2021-01-12 NOTE — PLAN OF CARE
Problem: Anxiety  Goal: Anxiety is at manageable level  Description: Interventions:  - Assess and monitor patient's anxiety level  - Monitor for signs and symptoms (heart palpitations, chest pain, shortness of breath, headaches, nausea, feeling jumpy, restlessness, irritable, apprehensive)  - Collaborate with interdisciplinary team and initiate plan and interventions as ordered    - [place] patient to unit/surroundings  - Explain treatment plan  - Encourage participation in care  - Encourage verbalization of concerns/fears  - Identify coping mechanisms  - Assist in developing anxiety-reducing skills  - Administer/offer alternative therapies  - Limit or eliminate stimulants  Outcome: Progressing     Problem: Ineffective Coping  Goal: Cooperates with admission process  Description: Interventions:   - Complete admission process  Outcome: Progressing  Goal: Identifies healthy coping skills  Outcome: Progressing  Goal: Patient/Family participate in treatment and DC plans  Description: Interventions:  - Provide therapeutic environment  Outcome: Progressing  Goal: Patient/Family verbalizes awareness of resources  Outcome: Progressing     Problem: Risk for Self Injury/Neglect  Goal: Treatment Goal: Remain safe during length of stay, learn and adopt new coping skills, and be free of self-injurious ideation, impulses and acts at the time of discharge  Outcome: Progressing  Goal: Verbalize thoughts and feelings  Description: Interventions:  - Assess and re-assess patient's lethality and potential for self-injury  - Engage patient in 1:1 interactions, daily, for a minimum of 15 minutes  - Encourage patient to express feelings, fears, frustrations, hopes  - Establish rapport/trust with patient   Outcome: Progressing  Goal: Refrain from harming self  Description: Interventions:  - Monitor patient closely, per order  - Develop a trusting relationship  - Supervise medication ingestion, monitor effects and side effects Outcome: Progressing  Goal: Recognize maladaptive responses and adopt new coping mechanisms  Outcome: Progressing     Problem: Depression  Goal: Treatment Goal: Demonstrate behavioral control of depressive symptoms, verbalize feelings of improved mood/affect, and adopt new coping skills prior to discharge  Outcome: Progressing  Goal: Refrain from isolation  Description: Interventions:  - Develop a trusting relationship   - Encourage socialization   Outcome: Progressing  Goal: Refrain from self-neglect  Outcome: Progressing  Goal: Complete daily ADLs, including personal hygiene independently, as able  Description: Interventions:  - Observe, teach, and assist patient with ADLS  -  Monitor and promote a balance of rest/activity, with adequate nutrition and elimination   Outcome: Progressing

## 2021-01-12 NOTE — SOCIAL WORK
[address]    Name and Date of Birth:  Yuliet Bernabe 56 y o  1960    Date of Referral: January 12, 2021    Presenting Symptoms and Stressors:      Symptoms:  depression and anxiety  Stressors:  COVID-19 issues, family conflict, relationship problems and everyday stressors    Access to Weapons:  No    Smoking Status: denies use    Substance Use:  None    Suicidal Ideation: None    Homicidal Ideation: None    Depressed Mood: depressed mood    Court/Hypomania: None    Psychosis: None    Agitation: No    Appetite Changes: normal appetite    Sleep Disturbance: normal sleep    Diagnoses:  1   Major Depressive Disorder, recurrent, severe without psychotic features    Current Psychiatrist or Therapist:    Psychiatrist: Dr Avril Estevez  Therapist: None    Do they Require Ambulatory Assistance: No    Communication Assistance: not required     Legal Issues: None     PT will discharge by end of week; [facility] program         DESTINY Purcell

## 2021-01-12 NOTE — PROGRESS NOTES
Progress Note - Hans Humphrey 484 56 y o  female MRN: [id number]  Unit/Bed#: Roe Brizuela Encounter: [id number]    Assessment/Plan   Principal Problem:    Severe episode of recurrent major depressive disorder, without psychotic features ([facility] )  Active Problems:    Complex partial epilepsy with generalization and with intractable epilepsy ([facility] )    Hashimoto's thyroiditis    Idiopathic insomnia    Obesity (BMI 30-39  9)    Prediabetes    Generalized anxiety disorder      Behavior over the last 24 hours:  unchanged  Sleep: normal  Appetite: normal  Medication side effects: No  ROS: no complaints and all other systems are negative for acute change     Martha was seen today for follow up and case was discussed with treatment team this morning  Sitting in bed reading a book upon interaction  Describes mood as "good" and rates anxiety as 3/10  When asked if she was having any depression she reported, "no, I don't think so "  Denies any side effects from medications and remains med compliant  Sleeping well and appetite is adequate  Spoke about potential discharge this coming Friday and safety plan as to what patient would do should SI thoughts occur after discharge  She became tearful and stated, "I honestly haven't thought about that " [employer] then voiced some options, "Well, I could go outside for a walk, talk to my family, or call my friend Leoncio Ojeda " Denies any current SI or HI  Also denies any AVH   Reported she uses reading as her "outlet "       Mental Status Evaluation:  Appearance:  age appropriate, casually dressed and overweight   Behavior:  mildy guarded, but cooperative   Speech:  normal pitch and normal volume   Mood:  describes as "good "   Affect:  constricted   Thought Process:  goal directed   Thought Content:  No overt delusions   Perceptual Disturbances: None   Risk Potential: Suicidal Ideations none  Homicidal Ideations none  Potential for Aggression No   Sensorium:  person, place, time/date and situation   Memory:  recent and remote memory grossly intact   Consciousness:  alert and awake    Attention: attention span and concentration were age appropriate   Insight:  limited   Judgment: limited   Gait/Station: normal gait/station and normal balance   Motor Activity: no abnormal movements     Progress Toward Goals: Slight improvement  Changes made to medication regimen yesterday; continue with current meds  Potential to discharge home Friday 1/15/21 with partial hospitalization programming  Recommended Treatment: Continue with group therapy, milieu therapy and occupational therapy  Risks, benefits and possible side effects of Medications:   Risks, benefits, and possible side effects of medications explained to patient and patient verbalizes understanding  Medications: all current active meds have been reviewed and continue current psychiatric medications  Labs: I have personally reviewed all pertinent laboratory/tests results  Counseling / Coordination of Care  Total floor / unit time spent today 30  minutes  Greater than 50% of total time was spent with the patient and / or family counseling and / or coordination of care   A description of the counseling / coordination of care: treatment plan, medication education, supportive therapy

## 2021-01-12 NOTE — PROGRESS NOTES
Patient visible on the unit, pleasant and cooperative  Denies S/I H/I A/V/H,  Anxiety and depression low  Looking forward to discharge  Complaint with medications and meals  Continual monitoring maintaited

## 2021-01-12 NOTE — PROGRESS NOTES
The patient noted to be visible on the unit and in the milieu upon rounds  The patient pleasant upon approach  The patient states mood, anxiety and depression are "better " The patient states that she slept well last hs  The patient denies complaints of pain  The patient noted to be compliant with meals and medications  Q 7 minute safety checks maintained

## 2021-01-13 PROCEDURE — 99232 SBSQ HOSP IP/OBS MODERATE 35: CPT | Performed by: NURSE PRACTITIONER

## 2021-01-13 RX ADMIN — BUSPIRONE HYDROCHLORIDE 5 MG: 5 TABLET ORAL at 08:14

## 2021-01-13 RX ADMIN — CITALOPRAM HYDROBROMIDE 30 MG: 20 TABLET ORAL at 08:14

## 2021-01-13 RX ADMIN — BUSPIRONE HYDROCHLORIDE 5 MG: 5 TABLET ORAL at 21:25

## 2021-01-13 RX ADMIN — MIRTAZAPINE 7.5 MG: 15 TABLET, FILM COATED ORAL at 21:24

## 2021-01-13 RX ADMIN — BUSPIRONE HYDROCHLORIDE 5 MG: 5 TABLET ORAL at 16:16

## 2021-01-13 NOTE — PROGRESS NOTES
The patient noted to be visible on the unit and in the milieu upon rounds throughout the shift  The patient noted to be selectively social with peers, states mood is "upbeat" today, states anxiety and depression are improved  The patient denies si, hi, and hallucinations  The patient denies complaints of pain  The patient compliant with meals and medications  q 7 minute safety checks maintained

## 2021-01-13 NOTE — PROGRESS NOTES
Progress Note - Sharona Garcia 56 y o  female MRN: [id number]    Unit/Bed#: Madisyn Peres Encounter: [id number]        Subjective:   Patient seen and examined at bedside after reviewing the chart and discussing the case with the caring staff  Patient examined at bedside  Patient has no acute concerns  Patient is a possible discharge for Friday, 01/15/2021   Patient will be needing her prescriptions  I reviewed and reconciled patient's problem list and medications  Physical Exam   Vitals: Blood pressure 125/77, pulse 62, temperature (!) 96 8 °F (36 °C), temperature source Temporal, resp  rate 16, height 5' (1 524 m), weight 91 2 kg (201 lb), SpO2 96 %  ,Body mass index is 39 26 kg/m²  Constitutional: He appears well-developed  HEENT: PERR, EOMI, MMM  Cardiovascular: Normal rate and regular rhythm  Pulmonary/Chest: Effort normal and breath sounds normal    Abdomen: Soft, + BS, NT    Assessment/Plan:  Sharona Garcia is a(n) 56 y o  female with MDD      MEDICAL CLEARANCE  Patient is medically cleared for discharge  All scripts will be sent out for the patient  1  DJD  Patient may receive Tylenol as needed  2  History of epilepsy  Stable since right temporal lobectomy in March 2013  3  Elevated TSH  Patient's TSH was found to be 4 040 on 01/06/2021  She is asymptomatic  Patient's free T4 levels were found to be within normal limits 1 07   4  New vitamin-D deficiency  I will put the patient on vitamin-D bolus doses for 14 weeks followed by vitamin D3 1000 units daily  The patient was discussed with Dr Bernie Lovell and he is in agreement with the above note

## 2021-01-13 NOTE — PROGRESS NOTES
This note was not shared with the patient due to this is a psychotherapy note  Progress Note - Hans Humphrey 484 56 y o  female MRN: [id number]  Unit/Bed#: [facility] 200-01 Encounter: [id number]    Assessment/Plan   Principal Problem:    Severe episode of recurrent major depressive disorder, without psychotic features ([facility] 75 )  Active Problems:    Complex partial epilepsy with generalization and with intractable epilepsy ([facility] )    Hashimoto's thyroiditis    Idiopathic insomnia    Obesity (BMI 30-39  9)    Prediabetes    Generalized anxiety disorder      Behavior over the last 24 hours:  unchanged  Sleep: normal  Appetite: normal  Medication side effects: No  ROS: no complaints and all other systems negative for acute change    Martha was seen for follow up and case discussed with treatment team this morning  Fawn Darío stated she did not wish to talk earlier in the morning  Approached during the afternoon and more receptive to conversation  Described her mood as "alright, pretty good I guess " Affect incongruent; holding back tears while smiling, hesitant with answers  Reports her anxiety and depression as "okay, I think it's getting better "  When confronted about affect, patient became hesitant, no verbal answer given  Inquired about how she felt about discharge this coming Friday and again became hesitant to answer, "I think I will be okay  I will be okay  I'm ready " Patient then reported she is not sure about how she is feeling, but is ready to go home  Denies any SI/HI presently, although appeared overwhelmed when inquiring about safety plan at home  Denies any AVH   Reports sleep and appetite are "good "     Mental Status Evaluation:  Appearance:  age appropriate, casually dressed and overweight   Behavior:  guarded and cooperative   Speech:  soft spoken, delayed responses at times   Mood:  describes as "alright" and "pretty good "    Affect:  mood-incongruent and superficially bright, hesistant, tearful   Thought Process:  linear   Thought Content:  no overt delusions   Perceptual Disturbances: None   Risk Potential: Suicidal Ideations none  Homicidal Ideations none  Potential for Aggression No   Sensorium:  person, place, time/date and situation   Memory:  recent and remote memory grossly intact   Consciousness:  alert and awake    Attention: attention span and concentration were age appropriate   Insight:  limited   Judgment: limited   Gait/Station: normal gait/station and normal balance   Motor Activity: no abnormal movements     Progress Toward Goals: Buspar recently added  Continue current regimen  Recommended Treatment: Continue with group therapy, milieu therapy and occupational therapy  Risks, benefits and possible side effects of Medications:   Risks, benefits, and possible side effects of medications explained to patient and patient verbalizes understanding  Medications: all current active meds have been reviewed and continue current psychiatric medications  Labs: I have personally reviewed all pertinent laboratory/tests results  Counseling / Coordination of Care  Total floor / unit time spent today 30 minutes  Greater than 50% of total time was spent with the patient and / or family counseling and / or coordination of care   A description of the counseling / coordination of care: treatment plan, medication education, supportive therapy

## 2021-01-13 NOTE — PROGRESS NOTES
Patient alert, quiet, and cooperative  Pleasant with staff and peers  Patient bright upon approach  Compliant with meds  Patient state "mood is good"  Denies si, hi, and hallucinations  Denies pain  Q 7 minute checks maintained  Will continue to monitor

## 2021-01-13 NOTE — SOCIAL WORK
SW met with patient in pt room; pt presents as overly bright in conversation, at times smiling inappropriately with conversation; pt denies SI/HI/AH/VH, dep 7, anx 6; Pt and SW discussed discharge plan for Friday - pt hesitant in responses re: crisis plan, coping skills, managing life at home upon return; SW inquired if patient feels ready for discharge on Friday, pt responded "Umm    I feel    (hesitation for several seconds)    honestly, I don't know how I feel  I am having trouble processing things right now  Like, I know what I want to say but I can't seem to say it when I need to";  SW suggested patient remain on unit until she is feeling better, pt states "No, I'm ready to go home on Friday   I have so much energy and need to get it out of my system"; pt presents as odd/bizarre in conversation; body language guarded; pt had no questions or concerns for SW     SW discussed concerns and patient presentation with provider; provider will follow up

## 2021-01-13 NOTE — NURSING NOTE
Patient withdrawn to her room reading and writing in her journal  Refused HS snack  Upon approach patient's mood and affect is pleasant and mildly anxious  Brightens with conversation  Calm and cooperative  Denies SI/HI/AH/VH/pain/depression  Took HS medication without difficulty  Will continue to monitor safety and behaviors every 7 minutes

## 2021-01-13 NOTE — NURSING NOTE
Patient appears to be sleeping without difficulty throughout the night  No complaints or concerns  Will continue to monitor safety and behaviors every 7 minutes

## 2021-01-13 NOTE — PROGRESS NOTES
01/13/21 0818   Team Meeting   Meeting Type Daily Rounds   Team Members Present   Team Members Present Physician;Nurse;;Occupational Therapist   Physician Team Member Dr Kailey Hernandez MD   Nursing Team Member Consuelo Arrieta RN   Care Management Team Member Emmy Singh [address]   OT Team Member Pravin DreRudy, [place]   Patient/Family Present   Patient Present No   Patient's Family Present No   D/C home Friday,starts [place] partial program on Monday  Will need to schedule transport, bright pleasant, upbeat, slept well, reads and journals

## 2021-01-14 PROCEDURE — 99232 SBSQ HOSP IP/OBS MODERATE 35: CPT | Performed by: NURSE PRACTITIONER

## 2021-01-14 RX ADMIN — BUSPIRONE HYDROCHLORIDE 5 MG: 5 TABLET ORAL at 16:03

## 2021-01-14 RX ADMIN — MIRTAZAPINE 7.5 MG: 15 TABLET, FILM COATED ORAL at 21:36

## 2021-01-14 RX ADMIN — BUSPIRONE HYDROCHLORIDE 5 MG: 5 TABLET ORAL at 21:36

## 2021-01-14 RX ADMIN — BUSPIRONE HYDROCHLORIDE 5 MG: 5 TABLET ORAL at 08:32

## 2021-01-14 RX ADMIN — CITALOPRAM HYDROBROMIDE 30 MG: 20 TABLET ORAL at 08:31

## 2021-01-14 NOTE — PROGRESS NOTES
Progress Note - Hans Humphrey 484 56 y o  female MRN: [id number]  Unit/Bed#: [facility] Encounter: [id number]    Assessment/Plan   Principal Problem:    Severe episode of recurrent major depressive disorder, without psychotic features ([facility] 75 )  Active Problems:    Complex partial epilepsy with generalization and with intractable epilepsy ([facility] 75 )    Hashimoto's thyroiditis    Idiopathic insomnia    Obesity (BMI 30-39  9)    Prediabetes    Generalized anxiety disorder      Behavior over the last 24 hours:  unchanged  Sleep: normal  Appetite: normal  Medication side effects: No  ROS: no complaints and all other systems negative for acute change     Martha was seen today for follow up and case discussed with treatment team this morning  Upon approach, patient was less guarded and appeared less anxious  Rates anxiety as 4  Denies any depression  Revealed that she is used to keeping her emotions bottled up and not being honest about how she has been feeling  "My  has autism, so I learned not to share my emotions with him because he wouldn't understand and it would just frustrate me  So I keep it all to myself " Ruthdebbie Us then went on to speak about how she would like to work on being more open about her feelings and is looking forward to learn about coping mechanisms for anxiety and depression  Spoke in goal directed manner  Affect became more congruent with mood throughout interview  Denies any AVH/SI/HI  Reports she slept well last night and appetite is adequate  Remains medication compliant and denies any side effects   Patient in agreement to postpone discharge until next week, "I think that's a good idea "     Mental Status Evaluation:  Appearance:  age appropriate, casually dressed and overweight   Behavior:  calm, cooperative, less guarded   Speech:  normal pitch and normal volume   Mood:  "better "   Affect:  mood-congruent and less hesistant    Thought Process:  goal directed   Thought Content:  no overt delusions   Perceptual Disturbances: None   Risk Potential: Suicidal Ideations none  Homicidal Ideations none  Potential for Aggression No   Sensorium:  person, place, time/date and situation   Memory:  recent and remote memory grossly intact   Consciousness:  alert and awake    Attention: attention span and concentration were age appropriate   Insight:  limited   Judgment: limited   Gait/Station: normal gait/station and normal balance   Motor Activity: no abnormal movements     Progress Toward Goals: Recent med adjustment  Continue current psychotropic regimen  Discharge will be moved to next week to further assist patient with managing anxiety, depression, and safety plan  Recommended Treatment: Continue with group therapy, milieu therapy and occupational therapy  Risks, benefits and possible side effects of Medications:   Risks, benefits, and possible side effects of medications explained to patient and patient verbalizes understanding  Medications: all current active meds have been reviewed and continue current psychiatric medications  Labs: I have personally reviewed all pertinent laboratory/tests results  Counseling / Coordination of Care  Total floor / unit time spent today 20 minutes  Greater than 50% of total time was spent with the patient and / or family counseling and / or coordination of care

## 2021-01-14 NOTE — PROGRESS NOTES
01/14/21    Team Meeting   Meeting Type Daily Rounds   Team Members Present   Team Members Present Physician;Nurse;;Occupational Therapist   Physician Team Member Dr Devorah Ureña MD   Nursing Team Member Zen Aceves RN   Care Management Team Member MS Francisco, Jacquie ''R'' Us, [facility]   OT Team Member [place], [place]   Patient/Family Present   Patient Present No   Patient's Family Present No   PT D/C cnx for tomorrow, potential next week, she will follow up with partial program  Pleasant and cooperative  Superficial and bizarre yesterday  Will need to establish a safety plan

## 2021-01-14 NOTE — SOCIAL WORK
SAVANAH placed phone call to pt , Blu Pappas, [phone] to provide update and advise of change in DC plan; DC canceled for tomorrow; left message on voicemail - awaiting response

## 2021-01-14 NOTE — PLAN OF CARE
Problem: Anxiety  Goal: Anxiety is at manageable level  Description: Interventions:  - Assess and monitor patient's anxiety level  - Monitor for signs and symptoms (heart palpitations, chest pain, shortness of breath, headaches, nausea, feeling jumpy, restlessness, irritable, apprehensive)  - Collaborate with interdisciplinary team and initiate plan and interventions as ordered    - [place] patient to unit/surroundings  - Explain treatment plan  - Encourage participation in care  - Encourage verbalization of concerns/fears  - Identify coping mechanisms  - Assist in developing anxiety-reducing skills  - Administer/offer alternative therapies  - Limit or eliminate stimulants  Outcome: Progressing     Problem: Ineffective Coping  Goal: Participates in unit activities  Description: Interventions:  - Provide therapeutic environment   - Provide required programming   - Redirect inappropriate behaviors   Outcome: Progressing  Goal: Cooperates with admission process  Description: Interventions:   - Complete admission process  Outcome: Progressing  Goal: Identifies healthy coping skills  Outcome: Progressing  Goal: Patient/Family participate in treatment and DC plans  Description: Interventions:  - Provide therapeutic environment  Outcome: Progressing  Goal: Patient/Family verbalizes awareness of resources  Outcome: Progressing     Problem: Risk for Self Injury/Neglect  Goal: Treatment Goal: Remain safe during length of stay, learn and adopt new coping skills, and be free of self-injurious ideation, impulses and acts at the time of discharge  Outcome: Progressing  Goal: Verbalize thoughts and feelings  Description: Interventions:  - Assess and re-assess patient's lethality and potential for self-injury  - Engage patient in 1:1 interactions, daily, for a minimum of 15 minutes  - Encourage patient to express feelings, fears, frustrations, hopes  - Establish rapport/trust with patient   Outcome: Progressing  Goal: Refrain from harming self  Description: Interventions:  - Monitor patient closely, per order  - Develop a trusting relationship  - Supervise medication ingestion, monitor effects and side effects   Outcome: Progressing  Goal: Recognize maladaptive responses and adopt new coping mechanisms  Outcome: Progressing     Problem: Depression  Goal: Treatment Goal: Demonstrate behavioral control of depressive symptoms, verbalize feelings of improved mood/affect, and adopt new coping skills prior to discharge  Outcome: Progressing  Goal: Refrain from isolation  Description: Interventions:  - Develop a trusting relationship   - Encourage socialization   Outcome: Progressing  Goal: Refrain from self-neglect  Outcome: Progressing  Goal: Complete daily ADLs, including personal hygiene independently, as able  Description: Interventions:  - Observe, teach, and assist patient with ADLS  -  Monitor and promote a balance of rest/activity, with adequate nutrition and elimination   Outcome: Progressing

## 2021-01-14 NOTE — NURSING NOTE
Assumed care of this patient from prior shift nurse at 21   Patient is currently in bed sleeping  No acute behaviors observed  Will CTM via q7 minute safety checks

## 2021-01-14 NOTE — PROGRESS NOTES
Patient visible in milieu, pleasant and cooperative in interaction, social with staff and peers  Endorses anxiety/depression, unable to give number of severity  Denies SI/HI, hallucinations  Remains medication compliant and on 7" checks for safety and behaviors

## 2021-01-14 NOTE — PLAN OF CARE
Problem: Ineffective Coping  Goal: Participates in unit activities  Description: Interventions:  - Provide therapeutic environment   - Provide required programming   - Redirect inappropriate behaviors   Outcome: Progressing no

## 2021-01-14 NOTE — PROGRESS NOTES
Progress Note - Guillermo Agrawal 56 y o  female MRN: [id number]    Unit/Bed#: [facility] Encounter: [id number]        Subjective:   Patient seen and examined at bedside after reviewing the chart and discussing the case with the caring staff  Patient examined at bedside  Patient has no acute concerns  Patient's discharge for tomorrow, 01/15/2021, has been canceled  She will discharge sometime next week  Physical Exam   Vitals: Blood pressure 99/55, pulse 60, temperature (!) 96 9 °F (36 1 °C), temperature source Temporal, resp  rate 18, height 5' (1 524 m), weight 91 2 kg (201 lb), SpO2 97 %  ,Body mass index is 39 26 kg/m²  Constitutional: He appears well-developed  HEENT: PERR, EOMI, MMM  Cardiovascular: Normal rate and regular rhythm  Pulmonary/Chest: Effort normal and breath sounds normal    Abdomen: Soft, + BS, NT    Assessment/Plan:  Guillermo Agrawal is a(n) 56 y o  female with MDD  1  DJD  Patient may receive Tylenol as needed  2  History of epilepsy  Stable since right temporal lobectomy in March 2013  3  Elevated TSH  Patient's TSH was found to be 4 040 on 01/06/2021  She is asymptomatic  Patient's free T4 levels were found to be within normal limits 1 07   4  Vitamin-D deficiency  Continue vitamin-D bolus doses for 14 weeks followed by vitamin D3 1000 units daily  The patient was discussed with Dr Alana Bravo and he is in agreement with the above note

## 2021-01-15 PROCEDURE — 99232 SBSQ HOSP IP/OBS MODERATE 35: CPT | Performed by: NURSE PRACTITIONER

## 2021-01-15 RX ORDER — BUSPIRONE HYDROCHLORIDE 5 MG/1
10 TABLET ORAL 3 TIMES DAILY
Status: DISCONTINUED | OUTPATIENT
Start: 2021-01-15 | End: 2021-01-20 | Stop reason: HOSPADM

## 2021-01-15 RX ADMIN — LORAZEPAM 0.5 MG: 0.5 TABLET ORAL at 21:10

## 2021-01-15 RX ADMIN — CITALOPRAM HYDROBROMIDE 30 MG: 20 TABLET ORAL at 08:36

## 2021-01-15 RX ADMIN — BUSPIRONE HYDROCHLORIDE 10 MG: 5 TABLET ORAL at 16:34

## 2021-01-15 RX ADMIN — BUSPIRONE HYDROCHLORIDE 5 MG: 5 TABLET ORAL at 08:35

## 2021-01-15 RX ADMIN — BUSPIRONE HYDROCHLORIDE 10 MG: 5 TABLET ORAL at 21:09

## 2021-01-15 NOTE — SOCIAL WORK
SAVANAH placed phone call to pt , Emma Crawford, [phone]; SW provided update on tx and dc plan; pt  states "she's never been the same since that brain surgery  She used to be so upbeat, positive, all of the time   Now she's always negative and worried"; SW provided support as needed; pt  agreeable to DC next week if patient stable; no additional questions/concerns; SW will maintain contact with pt  as needed for tx and dc planning; call mutually ended    SAVANAH sent email via Epic to postpone PHP participation and reschedule later in week - awaiting response

## 2021-01-15 NOTE — PLAN OF CARE
Problem: Depression  Goal: Refrain from isolation  Description: Interventions:  - Develop a trusting relationship   - Encourage socialization   Outcome: Progressing

## 2021-01-15 NOTE — NURSING NOTE
Patient is in bed sleeping at this time  She was observed to be awake on two separate instances during the overnight period  No complaints voiced  No obvious signs of distress or discomfort noted  Will CTM via q7 minute safety checks

## 2021-01-15 NOTE — PROGRESS NOTES
01/15/21 0842   Team Meeting   Meeting Type Daily Rounds   Team Members Present   Team Members Present Nurse;; Occupational Therapist;Physician   Physician Team Member Dr Clive Niño MD; Silvio Sever, CRNP   Nursing Team Member Neida Godinez RN   Social Work [facility]   OT Team Member Erika WilliamAgenda, [place]   Patient/Family Present   Patient Present No   Patient's Family Present No     DC tentative Weds 1/20; up x 2 - no prns utilized; pleasant, cooperative; anx/dep 5/10

## 2021-01-15 NOTE — NURSING NOTE
Assumed care of this patient from prior shift nurse at [phone]  Patient is currently in bed, appears to be sleeping  No acute behaviors observed  Will CTM via q7 minute safety checks

## 2021-01-15 NOTE — PROGRESS NOTES
Progress Note - Margaret Fierro 56 y o  female MRN: [id number]    Unit/Bed#: [facility] Encounter: [id number]        Subjective:   Patient seen and examined at bedside after reviewing the chart and discussing the case with the caring staff  Patient examined at bedside  Patient has no acute concerns  Patient's discharge for 01/15/2021 has been canceled  She is a possible discharge for Wednesday, 01/20/2021  Physical Exam   Vitals: Blood pressure 118/56, pulse 65, temperature (!) 97 4 °F (36 3 °C), temperature source Temporal, resp  rate 18, height 5' (1 524 m), weight 91 2 kg (201 lb), SpO2 96 %  ,Body mass index is 39 26 kg/m²  Constitutional: He appears well-developed  HEENT: PERR, EOMI, MMM  Cardiovascular: Normal rate and regular rhythm  Pulmonary/Chest: Effort normal and breath sounds normal    Abdomen: Soft, + BS, NT    Assessment/Plan:  Margaret Fierro is a(n) 56 y o  female with MDD  1  DJD  Patient may receive Tylenol as needed  2  History of epilepsy  Stable since right temporal lobectomy in March 2013  3  Elevated TSH  Patient's TSH was found to be 4 040 on 01/06/2021  She is asymptomatic  Patient's free T4 levels were found to be within normal limits 1 07   4  Vitamin-D deficiency  Continue vitamin-D bolus doses for 14 weeks followed by vitamin D3 1000 units daily  The patient was discussed with Dr Nadine Cotton and he is in agreement with the above note

## 2021-01-15 NOTE — PROGRESS NOTES
Patient has been pleasant and receptive to approach, reporting improvement in mood and level of anxiety  Rates anxiety at a "4 or 5" out of 10; depression a 4  Denies lethality or psychotic sx  Vague re: stressors or contributing factors; states, "I'm happy, positive and optimistic  Has been interactive; attends and participates in structured groups  Cooperative with meds/unit protocol  Reports no current unmet needs  Will continue to support positive gains

## 2021-01-15 NOTE — PROGRESS NOTES
Patient alert and ambulating throughout unit  Cooperative and compliant with meds and meals  Pleasant and social with peers and staff  Denies si and hi  Denies pain  Depression and anxiety 5/10  Q 7 min checks maintained  Currently playing cards in dining room  Will continue to monitor

## 2021-01-15 NOTE — PROGRESS NOTES
Progress Note - Hans Humphrey 484 56 y o  female MRN: [id number]  Unit/Bed#: [facility] Encounter: [id number]    Assessment/Plan   Principal Problem:    Severe episode of recurrent major depressive disorder, without psychotic features ([facility] )  Active Problems:    Complex partial epilepsy with generalization and with intractable epilepsy ([facility] )    Hashimoto's thyroiditis    Idiopathic insomnia    Obesity (BMI 30-39  9)    Prediabetes    Generalized anxiety disorder      Behavior over the last 24 hours:  unchanged  Sleep: normal  Appetite: normal  Medication side effects: No  ROS: no complaints and all other systems negative for acute change     Martha was seen today for follow up and case discussed with treatment team this morning  Upon interaction, Rubin Baker was pleasant and cooperative  Reports, "my mood has definitely been elevated "  Rates anxiety as 7/10 and depression as 4/10  Verbalized that she has been trying to utilize coping mechanisms for anxiety with some effectiveness  Denies any AHV/SI/HI  Sleeping well throughout the night and appetite has been adequate  Denies any side effects from medications  Visible on unit and social with peers  Soft spoken   Thoughts more goal directed toward managing anxiety and "advocating for myself "      Mental Status Evaluation:  Appearance:  casually dressed and overweight   Behavior:  pleasant, cooperative, less guarded   Speech:  soft spoken, slow   Mood:  "elevated" and "doing okay "   Affect:  constricted   Thought Process:  goal directed   Thought Content:  no overt delusions   Perceptual Disturbances: None   Risk Potential: Suicidal Ideations none  Homicidal Ideations none  Potential for Aggression No   Sensorium:  person, place, time/date and situation   Memory:  recent and remote memory grossly intact   Consciousness:  alert and awake    Attention: attention span and concentration were age appropriate   Insight:  improving   Judgment: limited Gait/Station: normal gait/station and normal balance   Motor Activity: no abnormal movements     Progress Toward Goals: Increase Buspar, continue all other psychotropic medications as ordered  Discharge discussed for next Wednesday depending on patient progress and presentation  Recommended Treatment: Continue with group therapy, milieu therapy and occupational therapy  Risks, benefits and possible side effects of Medications:   Risks, benefits, and possible side effects of medications explained to patient and patient verbalizes understanding  Medications: all current active meds have been reviewed, continue current psychiatric medications and Increase Buspar to 10mg PO TID  Labs: I have personally reviewed all pertinent laboratory/tests results  Counseling / Coordination of Care  Total floor / unit time spent today 20 minutes  Greater than 50% of total time was spent with the patient and / or family counseling and / or coordination of care

## 2021-01-15 NOTE — SOCIAL WORK
SW met with patient in pt room; pt laying in bed; smiling with contact; pt denies SI/HI/AH/VH, dep/anx "getting better";   Pt and SW discussed DC plan for next weds - pt agreeable stating "I think I'll be good by then"; pt offered no other information or insight in to how she was feeling on previous visit; no questions or concerns for SW; SW will continue to meet with patient as needed for tx and dc planning     Pt rescheduled for PHP for Weds 1/20 at 830am IN-PERSON for intake

## 2021-01-16 PROCEDURE — 99232 SBSQ HOSP IP/OBS MODERATE 35: CPT | Performed by: PSYCHIATRY & NEUROLOGY

## 2021-01-16 RX ADMIN — MIRTAZAPINE 7.5 MG: 15 TABLET, FILM COATED ORAL at 21:26

## 2021-01-16 RX ADMIN — BUSPIRONE HYDROCHLORIDE 10 MG: 5 TABLET ORAL at 15:38

## 2021-01-16 RX ADMIN — BUSPIRONE HYDROCHLORIDE 10 MG: 5 TABLET ORAL at 21:26

## 2021-01-16 RX ADMIN — BUSPIRONE HYDROCHLORIDE 10 MG: 5 TABLET ORAL at 09:29

## 2021-01-16 RX ADMIN — CITALOPRAM HYDROBROMIDE 30 MG: 20 TABLET ORAL at 09:30

## 2021-01-16 NOTE — PROGRESS NOTES
Patient was pleasant, receptive on initial approach  Stated she felt "good emotionally", denying significant depression/anxiety; denied A/V hallucinations/ lethality  However, when further info re: numeric levels requested, patient reported '7' out of 10  When further explanation re: levels provided, affect was bewildered; patient became tearful, stating, "I'm confused; my brain isn't working " Support provided; requested time to rest  Remained in room during AM  Attended Elvia; able to follow process overall  Did become slightly confused re: instruction for additional game  Cooperative with med administration/ unit protocol  No complaints offered  Will continue to monitor/assess

## 2021-01-16 NOTE — PROGRESS NOTES
Progress Note - Rambo Block 56 y o  female MRN: [id number]    Unit/Bed#: [facility] Encounter: [id number]        Subjective:   Patient seen and examined at bedside after reviewing the chart and discussing the case with the caring staff  Patient examined at bedside  Patient has no acute concerns  Patient is a possible discharge for Wednesday, 01/20/2021  Physical Exam   Vitals: Blood pressure 145/72, pulse 66, temperature (!) 97 4 °F (36 3 °C), temperature source Temporal, resp  rate 16, height 5' (1 524 m), weight 93 6 kg (206 lb 6 4 oz), SpO2 95 %  ,Body mass index is 40 31 kg/m²  Constitutional: He appears well-developed  HEENT: PERR, EOMI, MMM  Cardiovascular: Normal rate and regular rhythm  Pulmonary/Chest: Effort normal and breath sounds normal    Abdomen: Soft, + BS, NT    Assessment/Plan:  Rambo Block is a(n) 56 y o  female with MDD  1  DJD  Patient may receive Tylenol as needed  2  History of epilepsy  Stable since right temporal lobectomy in March 2013  3  Elevated TSH  Patient's TSH was found to be 4 040 on 01/06/2021  She is asymptomatic  Patient's free T4 levels were found to be within normal limits 1 07   4  Vitamin-D deficiency  Continue vitamin-D bolus doses for 14 weeks followed by vitamin D3 1000 units daily

## 2021-01-16 NOTE — NURSING NOTE
Patient out in the milieu social with peers and staff  Ate HS snack  Upon approach patient's mood and affect is blunted and anxious  Patient stated, "I am unsure how I feel?" Denies SI/HI/AHVH/pain/depression  Patient approached this writer requesting an Ativan with her HS medication  Patient was upset and stated, "I felt compromised by another patient " "It was Angeles Patrick " Patient did not reiterate any other information  Gave reassurance  Took HS medication without difficulty  Will continue to monitor safety and behaviors every 7 minutes

## 2021-01-16 NOTE — PROGRESS NOTES
C/O"    Report from staff regarding this patient received and record reviewed  prior to seeing this patient   Behavior over the last 24 hours:    Sleep:  Appetite:  Medication side effects:  ROS:  Mental Status Evaluation:  Appearance:  Dressed appropriately, short stature white woman, modestly obese   Behavior:  cooperative   Speech:  normal   Mood:  Depressed   Affect:  appropriate     Thought Process:  Goal directed   Thought Content:  normal   Perceptual Disturbances: Denied AV hallucination   Risk Potential: NO ERICH    Sensorium:  normal   Cognition:  intact   Consciousness:  Alert, OX3   Attention: Fair   Insight:  fair   Judgment: fair   Gait/Station: With in normal range   Motor Activity: With in normal range     Progress Toward Goals: working on current treatment goals, no changes  Made in treatment plan   Recommended Treatment: Continue with group therapy, milieu therapy and occupational therapy  Risks, benefits and possible side effects of Medications:   Risks, benefits, and possible side effects of medications explained to patient and patient verbalizes understanding        Medications:   current meds:   Current Facility-Administered Medications   Medication Dose Route Frequency    acetaminophen (TYLENOL) tablet 650 mg  650 mg Oral Q6H PRN    acetaminophen (TYLENOL) tablet 975 mg  975 mg Oral Q6H PRN    aluminum-magnesium hydroxide-simethicone (MYLANTA) oral suspension 30 mL  30 mL Oral Q4H PRN    busPIRone (BUSPAR) tablet 10 mg  10 mg Oral TID    [START ON 4/12/2021] cholecalciferol (VITAMIN D3) tablet 1,000 Units  1,000 Units Oral Daily    citalopram (CeleXA) tablet 30 mg  30 mg Oral Daily    ergocalciferol (VITAMIN D2) capsule 50,000 Units  50,000 Units Oral Weekly    haloperidol (HALDOL) tablet 2 mg  2 mg Oral Q8H PRN    hydrOXYzine HCL (ATARAX) tablet 25 mg  25 mg Oral Q6H PRN    ibuprofen (MOTRIN) tablet 400 mg  400 mg Oral Q8H PRN    influenza vaccine, recombinant, quadrivalent (FLUBLOK) IM injection 0 5 mL  0 5 mL Intramuscular Once    LORazepam (ATIVAN) injection 1 mg  1 mg Intramuscular Q6H PRN    LORazepam (ATIVAN) tablet 0 5 mg  0 5 mg Oral Q6H PRN    mirtazapine (REMERON) tablet 7 5 mg  7 5 mg Oral HS    OLANZapine (ZyPREXA) IM injection 5 mg  5 mg Intramuscular Q8H PRN    OLANZapine (ZyPREXA) tablet 5 mg  5 mg Oral Q8H PRN    polyethylene glycol (MIRALAX) packet 17 g  17 g Oral Daily PRN    risperiDONE (RisperDAL M-TABS) dispersible tablet 0 5 mg  0 5 mg Oral Q8H PRN    senna-docusate sodium (SENOKOT S) 8 6-50 mg per tablet 1 tablet  1 tablet Oral Daily PRN    traZODone (DESYREL) tablet 50 mg  50 mg Oral HS PRN     Labs: NA    Assessment, Diagnosis  and Plan: continue with current meds and goals, F/U tomorrow    Counseling / Coordination of Care  Total floor / unit time spent today20 minutes  minutes  Greater than 50% of total time was spent with the patient and / or family counseling and / or coordination of care  A description of the counseling / coordination of care:      Scarlett Blanco MD

## 2021-01-16 NOTE — PLAN OF CARE
Problem: Risk for Self Injury/Neglect  Goal: Refrain from harming self  Description: Interventions:  - Monitor patient closely, per order  - Develop a trusting relationship  - Supervise medication ingestion, monitor effects and side effects   Outcome: Progressing     Problem: Depression  Goal: Refrain from isolation  Description: Interventions:  - Develop a trusting relationship   - Encourage socialization   Outcome: Progressing

## 2021-01-17 PROCEDURE — 99232 SBSQ HOSP IP/OBS MODERATE 35: CPT | Performed by: NURSE PRACTITIONER

## 2021-01-17 RX ADMIN — BUSPIRONE HYDROCHLORIDE 10 MG: 5 TABLET ORAL at 08:47

## 2021-01-17 RX ADMIN — BUSPIRONE HYDROCHLORIDE 10 MG: 5 TABLET ORAL at 15:32

## 2021-01-17 RX ADMIN — BUSPIRONE HYDROCHLORIDE 5 MG: 5 TABLET ORAL at 21:42

## 2021-01-17 RX ADMIN — MIRTAZAPINE 7.5 MG: 15 TABLET, FILM COATED ORAL at 21:43

## 2021-01-17 RX ADMIN — CITALOPRAM HYDROBROMIDE 30 MG: 20 TABLET ORAL at 08:47

## 2021-01-17 NOTE — PLAN OF CARE
Problem: Risk for Self Injury/Neglect  Goal: Verbalize thoughts and feelings  Description: Interventions:  - Assess and re-assess patient's lethality and potential for self-injury  - Engage patient in 1:1 interactions, daily, for a minimum of 15 minutes  - Encourage patient to express feelings, fears, frustrations, hopes  - Establish rapport/trust with patient   Outcome: Progressing

## 2021-01-17 NOTE — PROGRESS NOTES
Patient reported feeling "OK at the moment", acknowledging the chance of sx increase later in the day  Stated she feels medications are the contributing factor to symptoms experienced  When asked about sx description, she stated, "last night I felt high energy, high anxiety  I was on Vimpat in the past and this feels like that; like withdrawal " Cooperative with med administration  Appetite good  Selective interaction with peers  Initiates limited contact; brief responses to approach  Attends structured groups; appropriate participation  Will continue to monitor/assess

## 2021-01-17 NOTE — PROGRESS NOTES
Progress Note - Hans Humphrey 484 56 y o  female MRN: [id number]  Unit/Bed#: [facility] Encounter: [id number]    Assessment/Plan   Principal Problem:    Severe episode of recurrent major depressive disorder, without psychotic features ([facility] )  Active Problems:    Complex partial epilepsy with generalization and with intractable epilepsy ([facility] )    Hashimoto's thyroiditis    Idiopathic insomnia    Obesity (BMI 30-39  9)    Prediabetes    Generalized anxiety disorder  Patient was seen for continuing care and treatment  Case was discussed with the nursing staff  On examination today, the patient is looking forward to her discharge which is occurring this week  She is looking forward to attending the partial hospitalization program   She is looking forward to coping mechanisms that can help improve her mood and her reactions to events that can contribute to her anxiety  Overall, she feels her current medication regimen is working fairly well to keep her symptoms under good control  At times, she still worries but for the moment, she feels [employer]  We will continue her current meds and treatment per the treatment team   Discharge planning and disposition is ongoing        Behavior over the last 24 hours:  improved  Sleep: normal  Appetite: normal  Medication side effects: No  ROS: no complaints    Mental Status Evaluation:  Appearance:  age appropriate and casually dressed   Behavior:  normal   Speech:  normal pitch and normal volume   Mood:  euthymic   Affect:  normal   Thought Process:  goal directed   Thought Content:  normal   Perceptual Disturbances: None   Risk Potential: Suicidal Ideations none  Homicidal Ideations none  Potential for Aggression No   Sensorium:  person, place and time/date   Memory:  recent and remote memory grossly intact   Consciousness:  alert and awake    Attention: attention span and concentration were age appropriate   Insight:  fair   Judgment: fair   Gait/Station: normal gait/station   Motor Activity: no abnormal movements     Progress Toward Goals:  Mood is improving  Patient still with anxiety at times but overall, is doing fairly well  Recommended Treatment: Continue with group therapy, milieu therapy and occupational therapy  Risks, benefits and possible side effects of Medications:   Risks, benefits, and possible side effects of medications explained to patient and patient verbalizes understanding  Medications: continue current psychiatric medications  Labs: I have personally reviewed all pertinent laboratory/tests results  Most Recent Labs:   Lab Results   Component Value Date    WBC 11 65 (H) 01/06/2021    RBC 5 15 (H) 01/06/2021    HGB 15 3 01/06/2021    HCT 46 5 (H) 01/06/2021     01/06/2021    RDW 13 8 01/06/2021    NEUTROABS 8 75 (H) 01/06/2021    SODIUM 139 01/06/2021    K 3 6 01/06/2021     01/06/2021    CO2 26 01/06/2021    BUN 11 01/06/2021    CREATININE 0 86 01/06/2021    GLUC 103 01/06/2021    GLUF 117 (H) 08/08/2017    CALCIUM 10 0 01/06/2021    AST 19 01/06/2021    ALT 30 01/06/2021    ALKPHOS 84 01/06/2021    TP 7 9 01/06/2021    ALB 4 1 01/06/2021    TBILI 0 48 01/06/2021    CHOLESTEROL 217 (H) 08/08/2017    HDL 79 (H) 08/08/2017    TRIG 72 08/08/2017    LDLCALC 124 (H) 08/08/2017    CARBAMAZEPIN 6 0 06/06/2016    AMMONIA 30 06/06/2016    VKA8FZZJUSPE 4 330 01/09/2021    FREET4 1 07 06/06/2016    RPR Non-Reactive 09/15/2017    HGBA1C 6 1 08/08/2017     08/08/2017       Counseling / Coordination of Care  Total floor / unit time spent today 25 minutes  Greater than 50% of total time was spent with the patient and / or family counseling and / or coordination of care   A description of the counseling / coordination of care: treatment

## 2021-01-17 NOTE — NURSING NOTE
Patient compliant with 1700 medications and 2100  Out for meals and snacks  Patient pleasant and cooperative  Denies any SI/HI  Rated her anxiety and depression as low "2 out of 10" and stated she felt ready to go home  No other concerns or problems reported  Patient currently resting in her room  Q 7 mins checks in place for safety  Will continue to monitor for behaviors and changes

## 2021-01-17 NOTE — PROGRESS NOTES
Progress Note - Guillermo Agrawal 56 y o  female MRN: [id number]    Unit/Bed#: [facility] [place] Encounter: [id number]        Subjective:   Patient seen and examined at bedside after reviewing the chart and discussing the case with the caring staff  Patient examined at bedside  Patient has no acute concerns  Patient's TSH results were reviewed with her  Patient's repeat TSH on 01/09/2021 was found to be 4 33 which is within normal range  Patient is a possible discharge for Wednesday, 01/20/2021  Physical Exam   Vitals: Blood pressure 138/58, pulse 71, temperature (!) 97 1 °F (36 2 °C), temperature source Temporal, resp  rate 16, height 5' (1 524 m), weight 93 6 kg (206 lb 6 4 oz), SpO2 95 %  ,Body mass index is 40 31 kg/m²  Constitutional: He appears well-developed  HEENT: PERR, EOMI, MMM  Cardiovascular: Normal rate and regular rhythm  Pulmonary/Chest: Effort normal and breath sounds normal    Abdomen: Soft, + BS, NT    Assessment/Plan:  Guillermo Agrawal is a(n) 56 y o  female with MDD  1  DJD  Patient may receive Tylenol as needed  2  History of epilepsy  Stable since right temporal lobectomy in March 2013  3  History of Elevated TSH  Patient's repeat TSH is within normal limits 4 33   4  Vitamin-D deficiency  Continue vitamin-D bolus doses for 14 weeks followed by vitamin D3 1000 units daily

## 2021-01-17 NOTE — NURSING NOTE
No acute behaviors during the overnight period  Assumed care of this patient at 0300  She has remained in bed, appears to be sleeping  No complaints voiced  Q7 minute safety checks in progress  Monitoring continues

## 2021-01-18 PROCEDURE — 99232 SBSQ HOSP IP/OBS MODERATE 35: CPT | Performed by: NURSE PRACTITIONER

## 2021-01-18 RX ADMIN — CITALOPRAM HYDROBROMIDE 30 MG: 20 TABLET ORAL at 08:16

## 2021-01-18 RX ADMIN — TRAZODONE HYDROCHLORIDE 50 MG: 50 TABLET ORAL at 22:05

## 2021-01-18 RX ADMIN — BUSPIRONE HYDROCHLORIDE 10 MG: 5 TABLET ORAL at 08:16

## 2021-01-18 RX ADMIN — ERGOCALCIFEROL 50000 UNITS: 1.25 CAPSULE ORAL at 08:16

## 2021-01-18 RX ADMIN — BUSPIRONE HYDROCHLORIDE 10 MG: 5 TABLET ORAL at 21:12

## 2021-01-18 RX ADMIN — BUSPIRONE HYDROCHLORIDE 10 MG: 5 TABLET ORAL at 15:48

## 2021-01-18 NOTE — PROGRESS NOTES
Patient presented as calm, cooperative, receptive with brightened affect  Rated her depression/anxiety at a level 2 out of 10  Exhibited no difficulty in understanding the numeric scale nor processing thoughts  Stated she feels 'clearer' in thought and attributed previous difficulty to "getting used to the medications " Denied psychotic sx or lethality  Spontaneous and appropriate interaction with peers  Looking forward to group participation  Appetite good  No report of unmet needs  Will continue to reinforce positive gains

## 2021-01-18 NOTE — NURSING NOTE
[phone]  Pt found to be resting quietly on most of authors rounds  No acute behavioral issues noted  Q 7 min checks maintained  Fall protocol in place

## 2021-01-18 NOTE — PROGRESS NOTES
Patient visible on the unit  Social and pleasant with staff and peers  Looking forward to discharge  Denies anxiety, depression, A/V/H S/I H/I  Complaint with medications and meals  Will continue to monitor and access

## 2021-01-18 NOTE — SOCIAL WORK
SW met with pt for check-in during which pt presented as bright, open, and upbeat about discharge on Wednesday, having noted that she will talk with her  today to arrange a time for her pickup and notify Joseph Spearing  Pt related that she feels 100% better due to [facility] services for which she is appreciative  Pt is looking forward to participation in [facility] services

## 2021-01-18 NOTE — PROGRESS NOTES
Progress Note - Jeannie Lawson 56 y o  female MRN: [id number]    Unit/Bed#: [facility] Encounter: [id number]        Subjective:   Patient seen and examined at bedside after reviewing the chart and discussing the case with the caring staff  Patient examined at bedside  Patient has no acute concerns  Patient's TSH results were reviewed with her  Patient's repeat TSH on 01/09/2021 was found to be 4 33 which is within normal range  Patient is a possible discharge for Wednesday, 01/20/2021  Physical Exam   Vitals: Blood pressure 117/73, pulse 68, temperature (!) 97 1 °F (36 2 °C), temperature source Temporal, resp  rate 18, height 5' (1 524 m), weight 93 6 kg (206 lb 6 4 oz), SpO2 97 %  ,Body mass index is 40 31 kg/m²  Constitutional: He appears well-developed  HEENT: PERR, EOMI, MMM  Cardiovascular: Normal rate and regular rhythm  Pulmonary/Chest: Effort normal and breath sounds normal    Abdomen: Soft, + BS, NT    Assessment/Plan:  Jeannie Lawson is a(n) 56 y o  female with MDD  1  DJD  Patient may receive Tylenol as needed  2  History of epilepsy  Stable since right temporal lobectomy in March 2013  3  History of Elevated TSH  Patient's repeat TSH is within normal limits 4 33   4  Vitamin-D deficiency  Continue vitamin-D bolus doses for 14 weeks followed by vitamin D3 1000 units daily

## 2021-01-18 NOTE — NURSING NOTE
Pt request to take only 5 mg of buspar at HS states more will cause her to become shakey  States she is okay with taking 10 mg doses during the day  Affect bright on approach  Mood is anxious  thoughts linear  No acute behavioral issues noted  Q 7 min checks ongoing

## 2021-01-18 NOTE — PROGRESS NOTES
Progress Note - Hans Humphrey 484 56 y o  female MRN: [id number]  Unit/Bed#: [facility] Encounter: [id number]    Assessment/Plan   Principal Problem:    Severe episode of recurrent major depressive disorder, without psychotic features ([facility] )  Active Problems:    Complex partial epilepsy with generalization and with intractable epilepsy ([facility] )    Hashimoto's thyroiditis    Idiopathic insomnia    Obesity (BMI 30-39  9)    Prediabetes    Generalized anxiety disorder      Behavior over the last 24 hours:  unchanged  Sleep: normal  Appetite: normal  Medication side effects: No  ROS: no complaints and all other systems negative for acute change    Martha was seen today for follow up and case discussed with treatment team this morning  Pleasant and bright upon interaction  States, "I'm feeling a lot better and I'm thinking clearer "  Rates anxiety as "2/10" and denies any depression  Also denies any AVH/SI/HI  Questioned about safety plan at home should suicidal thoughts emerge and stated, "I have a lot of things I could do  I can go outside, talk to my , call my friend Rozdebbie Houston, call 911, or the crisis line again "  Expressed excitement about returning to [facility] day program  Describes her mood as "really good" and "a lot better " Sleeping throughout the night and appetite remains adequate  Denies any side effects from medications and remains med compliant  "I think the medications are really working for me "  Visible on unit and social with peers      Mental Status Evaluation:  Appearance:  age appropriate, casually dressed and overweight   Behavior:  pleasant, bright, cooperative   Speech:  soft spoken   Mood:  euthymic   Affect:  mood-congruent   Thought Process:  goal directed, logical and linear   Thought Content:  No overt delusions   Perceptual Disturbances: None   Risk Potential: Suicidal Ideations none  Homicidal Ideations none  Potential for Aggression No   Sensorium:  person, place, time/date and situation   Memory:  recent and remote memory grossly intact   Consciousness:  alert and awake    Attention: attention span and concentration were age appropriate   Insight:  fair, improving   Judgment: fair   Gait/Station: normal gait/station and normal balance   Motor Activity: no abnormal movements     Progress Toward Goals: Will discontinue Remeron  Plan is to discharge back home Wednesday, 1/20/21, with PHP at [facility] to start Thursday 1/21/21  Recommended Treatment: Continue with group therapy, milieu therapy and occupational therapy  Risks, benefits and possible side effects of Medications:   Risks, benefits, and possible side effects of medications explained to patient and patient verbalizes understanding  Medications: all current active meds have been reviewed, continue current psychiatric medications and Discontinue Remeron 7 5mg PO QHS  Labs: I have personally reviewed all pertinent laboratory/tests results  Counseling / Coordination of Care  Total floor / unit time spent today 20 minutes  Greater than 50% of total time was spent with the patient and / or family counseling and / or coordination of care

## 2021-01-18 NOTE — PROGRESS NOTES
01/18/21    Team Meeting   Meeting Type Daily Rounds   Team Members Present   Team Members Present Physician;Nurse;;Occupational Therapist   Physician Team Member Dr Ervin November, DO; OSMAN Wild   Nursing Team Member Dulce Jones, PHILLY   Care Management Team Member [facility] , [facility]   Patient/Family Present   Patient Present No   Patient's Family Present No   D/C Wednesday, will start PHP [facility] on Thursday  PT reports feeling clearer, PT feels she is adjusting to medications  Slept and showered

## 2021-01-19 PROCEDURE — 99238 HOSP IP/OBS DSCHRG MGMT 30/<: CPT | Performed by: NURSE PRACTITIONER

## 2021-01-19 PROCEDURE — 90682 RIV4 VACC RECOMBINANT DNA IM: CPT | Performed by: PHYSICIAN ASSISTANT

## 2021-01-19 PROCEDURE — 90471 IMMUNIZATION ADMIN: CPT | Performed by: PHYSICIAN ASSISTANT

## 2021-01-19 PROCEDURE — NC001 PR NO CHARGE: Performed by: NURSE PRACTITIONER

## 2021-01-19 RX ORDER — BUSPIRONE HYDROCHLORIDE 10 MG/1
10 TABLET ORAL 3 TIMES DAILY
Qty: 45 TABLET | Refills: 1 | Status: SHIPPED | OUTPATIENT
Start: 2021-01-19 | End: 2021-01-21 | Stop reason: SDUPTHER

## 2021-01-19 RX ORDER — LANOLIN ALCOHOL/MO/W.PET/CERES
3 CREAM (GRAM) TOPICAL
Status: DISCONTINUED | OUTPATIENT
Start: 2021-01-19 | End: 2021-01-20 | Stop reason: HOSPADM

## 2021-01-19 RX ORDER — CITALOPRAM 10 MG/1
30 TABLET ORAL DAILY
Qty: 45 TABLET | Refills: 1 | Status: SHIPPED | OUTPATIENT
Start: 2021-01-20 | End: 2021-03-16 | Stop reason: HOSPADM

## 2021-01-19 RX ORDER — LANOLIN ALCOHOL/MO/W.PET/CERES
3 CREAM (GRAM) TOPICAL
Qty: 30 TABLET | Refills: 0 | Status: SHIPPED | OUTPATIENT
Start: 2021-01-19 | End: 2021-02-18

## 2021-01-19 RX ADMIN — MELATONIN 3 MG: at 21:01

## 2021-01-19 RX ADMIN — CITALOPRAM HYDROBROMIDE 30 MG: 20 TABLET ORAL at 08:28

## 2021-01-19 RX ADMIN — ACETAMINOPHEN 975 MG: 325 TABLET ORAL at 03:58

## 2021-01-19 RX ADMIN — INFLUENZA A VIRUS A/HAWAII/70/2019 (H1N1) RECOMBINANT HEMAGGLUTININ ANTIGEN, INFLUENZA A VIRUS A/MINNESOTA/41/2019 (H3N2) RECOMBINANT HEMAGGLUTININ ANTIGEN, INFLUENZA B VIRUS B/WASHINGTON/02/2019 RECOMBINANT HEMAGGLUTININ ANTIGEN, AND INFLUENZA B VIRUS B/PHUKET/3073/2013 RECOMBINANT HEMAGGLUTININ ANTIGEN 0.5 ML: 45; 45; 45; 45 INJECTION INTRAMUSCULAR at 14:22

## 2021-01-19 RX ADMIN — BUSPIRONE HYDROCHLORIDE 10 MG: 5 TABLET ORAL at 16:41

## 2021-01-19 RX ADMIN — BUSPIRONE HYDROCHLORIDE 10 MG: 5 TABLET ORAL at 08:27

## 2021-01-19 RX ADMIN — BUSPIRONE HYDROCHLORIDE 10 MG: 5 TABLET ORAL at 21:01

## 2021-01-19 NOTE — PROGRESS NOTES
01/19/21 0933   Team Meeting   Meeting Type Daily Rounds   Team Members Present   Team Members Present Physician;Nurse;; Occupational Therapist   Physician Team Member Dr Epifanio Terry MD; Kesha Martinez [place]   Nursing Team Member Jackie Simental RN   Social Work Team Member Sherine Lopez [facility]   OT Team Member Emiliano Martinez [place]   Patient/Family Present   Patient Present No   Patient's Family Present No     DC Weds 11am to home with ; follow up with PHP program - start Thurs 1/21; prn trazodone utilized - work 4am with headache; pleasant, positive; dep/anx 2

## 2021-01-19 NOTE — PROGRESS NOTES
Progress Note - Maicol Chua 56 y o  female MRN: [id number]    Unit/Bed#: [facility] Encounter: [id number]        Subjective:   Patient seen and examined at bedside after reviewing the chart and discussing the case with the caring staff  Patient examined at bedside  Patient has no acute concerns  Patient's TSH results were reviewed with her  Patient's repeat TSH on 01/09/2021 was found to be 4 33 which is within normal range  Patient is scheduled for discharge on Wednesday, 01/20/2021  Patient is requesting all her prescriptions  I reviewed and reconciled patient's problem list and medications  Physical Exam   Vitals: Blood pressure 139/69, pulse 68, temperature (!) 97 3 °F (36 3 °C), temperature source Temporal, resp  rate 18, height 5' (1 524 m), weight 93 6 kg (206 lb 6 4 oz), SpO2 96 %  ,Body mass index is 40 31 kg/m²  Constitutional: He appears well-developed  HEENT: PERR, EOMI, MMM  Cardiovascular: Normal rate and regular rhythm  Pulmonary/Chest: Effort normal and breath sounds normal    Abdomen: Soft, + BS, NT    Assessment/Plan:  Maicol Chua is a(n) 56 y o  female with MDD  MEDICAL CLEARANCE  Patient is medically cleared for discharge  All scripts will be sent out for the patient  1  DJD  Patient may receive Tylenol as needed  2  History of epilepsy  Stable since right temporal lobectomy in March 2013  3  History of Elevated TSH  Patient's repeat TSH is within normal limits 4 33   4  Vitamin-D deficiency  Continue vitamin-D bolus doses for 14 weeks followed by vitamin D3 1000 units daily

## 2021-01-19 NOTE — PROGRESS NOTES
Patient observed ambulating in hallway at the outset of shift  Mood and affect bright/engaging  Stated she felt "good today"--- I feel positive/upbeat :"  Denied lethality/psychotic sx  However, did report interrupted sleep, having awakened at 4 AM, along with a 'severe headache'; believes secondary to use of Trazodone prn last pm  Remains pleasant on interaction; social with peers  Compliant and cooperative  Will continue to support positive gains

## 2021-01-19 NOTE — SOCIAL WORK
SAVANAH placed phone call to pt , Gonzalo Velásquez, [phone]; SAVANAH reviewed DC plan for tomorrow - will  pt around 12pm; no question/concerns; call mutually ended

## 2021-01-19 NOTE — BH TRANSITION RECORD
Contact Information: If you have any questions, concerns, pended studies, tests and/or procedures, or emergencies regarding your inpatient behavioral health visit  Please contact [facility] adult behavioral [facility] unit [phone] and ask to speak to a , nurse or physician  A contact is available 24 hours/ 7 days a week at this number  Summary of Procedures Performed During your Stay:  Below is a list of major procedures performed during your hospital stay and a summary of results:  - No major procedures performed  Pending Studies (From admission, onward)    None        If studies are pending at discharge, follow up with your PCP and/or referring provider

## 2021-01-19 NOTE — DISCHARGE SUMMARY
Discharge Summary - Hans Humphrey 484 56 y o  female MRN: [id number]  Unit/Bed#: [facility] 200-01 Encounter: [id number]     Admission Date: 1/7/2021         Discharge Date: 1/20/21 @1150    Attending Psychiatrist: Dian Olmos MD    Reason for Admission/HPI: Major depressive disorder, single episode, unspecified [F32 9]    According to H&P by Dr Ketan Myers 1/8/21:  History of Present Illness     Martha Lauren is a 56 y o  female with a history of depression and anxiety who was admitted to the inpatient adult psychiatric unit on a voluntary 201 commitment basis due to depression, anxiety and suicidal ideation with plan to overdose on mirtazepine or cut wrists  Had one prior inpatient psychiatric admission in 2017 for increased anxiety and SI with plan to jump off balcony or electrocute self in bathtub  Sees psychiatrist Dr Margaret Lucero every six weeks as well as Dr Steffanie Reed, psychologist, for therapy since she was discharged from [facility] October 2020  On evaluation in the inpatient psychiatric unit Martha was calm and cooperative  Reported increased anxiety and difficulty with sleep  Also reports depressive symptoms including tearfulness, apathy, decreased concentration, and SI  Brought to Emergency Department by  1/6/21 for SI with plan to overdose on medications  Patient reports she has had increasing anxiety "a few days prior" which lead to her having thoughts of suicide  Described the last few weeks as "pretty good " "We celebrated holidays and everything was fine  I don't know what happened "  Reports anxiety consisted of her pacing, having decreased concentration, decreased sleep, tearfulness, and apathy  Went on to say that suicidal thoughts began to emerge with plan to overdose on her Remeron or slit her writs  Belinda Pradhan did report that she had access to a pill bottle of Remeron since it is prescribed to her   Denies any further thought of how she would slit her wrist  "I didn't think too much into it, I just thought it was an option " She did verbalize that the thoughts of SI were bothersome  "It bothered me that I would think of doing something like that " That is when she told her  about her thoughts and he brought her to the emergency department  Denied any substance use prior to onset of symptoms  Also denies any symptoms of cecy, AVH, or HI  Hospital Course: The patient was admitted to the inpatient psychiatric unit and started on every 7 minutes precautions  During the hospitalization the patient was attending individual therapy, group therapy, milieu therapy and occupational therapy  Psychiatric medications were titrated over the hospital stay  To address depression, depressive symptoms, anxiety symptoms, insomnia, attention and concentration difficulties and apathy, and suicidal ideation with plan to overdose or cut wrists the patient was started on antidepressant Celexa and anxiolytic medication Buspar  Medication doses were titrated during the hospital course  Prior to beginning of treatment medications risks and benefits and possible side effects including risk of suicidality and serotonin syndrome related to treatment with antidepressants were reviewed with the patient  The patient verbalized understanding and agreement for treatment  Patient's symptoms improved gradually over the hospital course  At the end of treatment the patient was doing well  Mood was stable at the time of discharge  The patient denied suicidal ideation, intent or plan at the time of discharge and denied homicidal ideation, intent or plan at the time of discharge  There was no overt psychosis at the time of discharge  Sleep and appetite were improved  The patient was tolerating medications and was not reporting any significant side effects at the time of discharge      Since Scarlett Obrien was doing well at the end of the hospitalization, treatment team felt that she could be safely discharged to outpatient care  The outpatient follow up with [facility] [address] [facility] [address] and family physician was arranged by the unit  upon discharge  Will start PHP at [facility] Thursday 1/21/21  At time of discharge, Josefina Son had adequate safety plan in place should SI re-emerge  She expressed she would call family, friends, or go to nearest ER or call 911  Also aware of crisis hotline number which will be given to her upon discharge home  She reciprocated understanding  Mental Status at time of Discharge:     Appearance:  age appropriate, casually dressed and overweight   Behavior:  pleasant, calm, cooperative   Speech:  soft spoken, normal rate   Mood:  euthymic   Affect:  mood-congruent   Thought Process:  goal directed and logical   Thought Content:  normal   Perceptual Disturbances: None   Risk Potential: Denies SI/HI  Sensorium:  person, place, time/date and situation   Cognition:  recent and remote memory grossly intact   Consciousness:  alert and awake    Attention: attention span and concentration were age appropriate   Insight:  fair   Judgment: fair   Gait/Station: normal gait/station and normal balance   Motor Activity: no abnormal movements     Admission Diagnosis:Major depressive disorder, single episode, unspecified [F32 9]    Discharge Diagnosis:   Principal Problem:    Severe episode of recurrent major depressive disorder, without psychotic features ([facility] [address] )  Active Problems:    Hashimoto's thyroiditis    Idiopathic insomnia    Obesity (BMI 30-39  9)    Prediabetes    Generalized anxiety disorder  Resolved Problems:    Complex partial epilepsy with generalization and with intractable epilepsy ([facility] )        Lab results:  Admission on 01/07/2021   Component Date Value    Vit D, 25-Hydroxy 01/09/2021 12 6*    Vitamin B-12 01/09/2021 513     TSH 3RD GENERATON 01/09/2021 4 330     Folate 01/10/2021 7 1        Discharge Medications:  Current Discharge Medication List      START taking these medications    Details   busPIRone (BUSPAR) 10 mg tablet Take 1 tablet (10 mg total) by mouth 3 (three) times a day for 15 days  Qty: 45 tablet, Refills: 1    Associated Diagnoses: Severe episode of recurrent major depressive disorder, without psychotic features ([facility])      cholecalciferol (VITAMIN D3) 1,000 units tablet Take 1 tablet (1,000 Units total) by mouth daily  Qty: 30 tablet, Refills: 0    Associated Diagnoses: Vitamin D deficiency      ergocalciferol (VITAMIN D2) 50,000 units Take 1 capsule (50,000 Units total) by mouth once a week for 14 doses  Qty: 13 capsule, Refills: 0    Associated Diagnoses: Vitamin D deficiency      ibuprofen (MOTRIN) 400 mg tablet Take 1 tablet (400 mg total) by mouth every 8 (eight) hours as needed for moderate pain  Qty: 60 tablet, Refills: 0    Associated Diagnoses: DJD (degenerative joint disease)      melatonin 3 mg Take 1 tablet (3 mg total) by mouth daily at bedtime  Qty: 30 tablet, Refills: 0    Associated Diagnoses: Severe episode of recurrent major depressive disorder, without psychotic features ([facility] [address] );  Idiopathic insomnia      polyethylene glycol (MIRALAX) 17 g packet Take 17 g by mouth daily as needed (constipation refractory to Senokot-S)  Qty: 30 each, Refills: 0    Associated Diagnoses: Constipation      senna-docusate sodium (SENOKOT S) 8 6-50 mg per tablet Take 1 tablet by mouth daily as needed for constipation  Qty: 30 tablet, Refills: 0    Associated Diagnoses: Constipation            Current Discharge Medication List      STOP taking these medications       mirtazapine (REMERON) 15 mg tablet Comments:   Reason for Stopping:         lacosamide (VIMPAT) 100 mg tablet Comments:   Reason for Stopping:         Na Sulfate-K Sulfate-Mg Sulf 17 5-3 13-1 6 GM/177ML SOLN Comments:   Reason for Stopping:              Current Discharge Medication List      CONTINUE these medications which have CHANGED    Details   citalopram (CeleXA) 10 mg tablet Take 3 tablets (30 mg total) by mouth daily for 15 doses  Qty: 45 tablet, Refills: 1    Associated Diagnoses: Severe episode of recurrent major depressive disorder, without psychotic features [facility])            Current Discharge Medication List           Discharge instructions/Information to patient and family:   See after visit summary for information provided to patient and family  Provisions for Follow-Up Care:  See after visit summary for information related to follow-up care and any pertinent home health orders  Discharge Statement:    I spent 25 minutes discharging the patient  This time was spent on the day of discharge  I had direct contact with the patient on the day of discharge  Additional documentation is required if more than 30 minutes were spent on discharge:    I reviewed with Martha importance of compliance with medications and outpatient treatment after discharge  I discussed the medication regimen and possible side effects of the medications with Martha prior to discharge  At the time of discharge she was tolerating psychiatric medications  I discussed outpatient follow up with Eugene Appiah  I reviewed with Martha crisis plan and safety plan upon discharge      OSMAN Jauregui 01/19/21

## 2021-01-19 NOTE — PROGRESS NOTES
Progress Note - Hans Humphrey 484 56 y o  female MRN: [id number]  Unit/Bed#: Arminda Mandel Encounter: [id number]    Assessment/Plan   Principal Problem:    Severe episode of recurrent major depressive disorder, without psychotic features ([facility] )  Active Problems:    Complex partial epilepsy with generalization and with intractable epilepsy ([facility] )    Hashimoto's thyroiditis    Idiopathic insomnia    Obesity (BMI 30-39  9)    Prediabetes    Generalized anxiety disorder      Behavior over the last 24 hours:  unchanged  Sleep: normal  Appetite: normal  Medication side effects: No  ROS: no complaints and all other systems negative for acute change     Martha was seen today for follow up and case discussed with treatment team this morning  Pleasant and bright upon approach  Described mood as "I'm feeling really good "  Voiced excitement about discharge tomorrow and starting PHP through [facility]  Rates anxiety as a 2 and reports no depression  Also denies AVH/SI/HI  Voiced adequate safety plan should suicidal thoughts occur after discharge  "I don't have any of those thoughts at all any more, but I would definitely reach out to my family or call the ambulance if I do " Reports some of her anxiety revolves around wondering if she will sleep as good once she is home; collaborative decision made to initiate melatonin nightly  Appetite remains adequate  Compliant with medications  Visible on unit and social with peers  Attends and participates in groups appropriately       Mental Status Evaluation:  Appearance:  age appropriate, casually dressed and overweight   Behavior:  pleasant, cooperative   Speech:  soft spoken, normal rate    Mood:  euthymic   Affect:  mood-congruent   Thought Process:  goal directed and logical   Thought Content:  normal   Perceptual Disturbances: None   Risk Potential: Suicidal Ideations none  Homicidal Ideations none  Potential for Aggression No   Sensorium:  person, place, time/date and situation   Memory:  recent and remote memory grossly intact   Consciousness:  alert and awake    Attention: attention span and concentration were age appropriate   Insight:  fair, improving   Judgment: fair   Gait/Station: normal gait/station and normal balance   Motor Activity: no abnormal movements     Progress Toward Goals:  Continue current medication regimen  Discharge home tomorrow with PHP at [facility] starting this Thursday 1/21/21  Recommended Treatment: Continue with group therapy, milieu therapy and occupational therapy  Risks, benefits and possible side effects of Medications:   Risks, benefits, and possible side effects of medications explained to patient and patient verbalizes understanding  Medications: all current active meds have been reviewed, continue current psychiatric medications and Add Melatonin 3mg PO QHS  Labs: I have personally reviewed all pertinent laboratory/tests results  Counseling / Coordination of Care  Total floor / unit time spent today 20 minutes  Greater than 50% of total time was spent with the patient and / or family counseling and / or coordination of care

## 2021-01-19 NOTE — PLAN OF CARE
Problem: Ineffective Coping  Goal: Identifies healthy coping skills  Outcome: Progressing     Problem: Risk for Self Injury/Neglect  Goal: Verbalize thoughts and feelings  Description: Interventions:  - Assess and re-assess patient's lethality and potential for self-injury  - Engage patient in 1:1 interactions, daily, for a minimum of 15 minutes  - Encourage patient to express feelings, fears, frustrations, hopes  - Establish rapport/trust with patient   Outcome: Progressing

## 2021-01-20 VITALS
TEMPERATURE: 97.1 F | DIASTOLIC BLOOD PRESSURE: 65 MMHG | RESPIRATION RATE: 18 BRPM | OXYGEN SATURATION: 95 % | HEART RATE: 68 BPM | BODY MASS INDEX: 40.52 KG/M2 | SYSTOLIC BLOOD PRESSURE: 131 MMHG | HEIGHT: 60 IN | WEIGHT: 206.4 LBS

## 2021-01-20 RX ADMIN — BUSPIRONE HYDROCHLORIDE 10 MG: 5 TABLET ORAL at 08:47

## 2021-01-20 RX ADMIN — CITALOPRAM HYDROBROMIDE 30 MG: 20 TABLET ORAL at 08:46

## 2021-01-20 NOTE — PROGRESS NOTES
Belongings sent home with patient:    One pair of eyeglasses, white wedding band, blue long sleeve shirt, green pants, gray socks, black pants, blue jeans, gray shoes with strings, blue bra (with underwire), gray coat

## 2021-01-20 NOTE — PLAN OF CARE
Problem: Anxiety  Goal: Anxiety is at manageable level  Description: Interventions:  - Assess and monitor patient's anxiety level  - Monitor for signs and symptoms (heart palpitations, chest pain, shortness of breath, headaches, nausea, feeling jumpy, restlessness, irritable, apprehensive)  - Collaborate with interdisciplinary team and initiate plan and interventions as ordered    - [place] patient to unit/surroundings  - Explain treatment plan  - Encourage participation in care  - Encourage verbalization of concerns/fears  - Identify coping mechanisms  - Assist in developing anxiety-reducing skills  - Administer/offer alternative therapies  - Limit or eliminate stimulants  Outcome: Completed     Problem: Ineffective Coping  Goal: Cooperates with admission process  Description: Interventions:   - Complete admission process  Outcome: Completed  Goal: Identifies healthy coping skills  Outcome: Completed  Goal: Patient/Family participate in treatment and DC plans  Description: Interventions:  - Provide therapeutic environment  Outcome: Completed  Goal: Patient/Family verbalizes awareness of resources  Outcome: Completed     Problem: Risk for Self Injury/Neglect  Goal: Treatment Goal: Remain safe during length of stay, learn and adopt new coping skills, and be free of self-injurious ideation, impulses and acts at the time of discharge  Outcome: Completed  Goal: Verbalize thoughts and feelings  Description: Interventions:  - Assess and re-assess patient's lethality and potential for self-injury  - Engage patient in 1:1 interactions, daily, for a minimum of 15 minutes  - Encourage patient to express feelings, fears, frustrations, hopes  - Establish rapport/trust with patient   Outcome: Completed  Goal: Refrain from harming self  Description: Interventions:  - Monitor patient closely, per order  - Develop a trusting relationship  - Supervise medication ingestion, monitor effects and side effects   Outcome: Completed  Goal: Recognize maladaptive responses and adopt new coping mechanisms  Outcome: Completed     Problem: Depression  Goal: Treatment Goal: Demonstrate behavioral control of depressive symptoms, verbalize feelings of improved mood/affect, and adopt new coping skills prior to discharge  Outcome: Completed  Goal: Refrain from isolation  Description: Interventions:  - Develop a trusting relationship   - Encourage socialization   Outcome: Completed  Goal: Refrain from self-neglect  Outcome: Completed  Goal: Complete daily ADLs, including personal hygiene independently, as able  Description: Interventions:  - Observe, teach, and assist patient with ADLS  -  Monitor and promote a balance of rest/activity, with adequate nutrition and elimination   Outcome: Completed

## 2021-01-20 NOTE — NURSING NOTE
Patient out in the milieu social with peers and staff  Ate HS snack and attended group  Upon approach patient's mood and affect is pleasant and bright  Calm and cooperative  Patient is looking forward to discharge tomorrow  Denies anxiety/depression/SI/HI/AHVH/pain  Took HS medication without difficulty  Will continue to monitor safety and behaviors every 7 minutes

## 2021-01-20 NOTE — PROGRESS NOTES
01/20/21    Team Meeting   Meeting Type Daily Rounds   Team Members Present   Team Members Present Physician;Nurse;;Occupational Therapist   Physician Team Member Dr Phyllis Garcia MD; Talita Calloway [place]   Nursing Team Member Armani Iglesias RN   Care Management Team Member MS Francisco, [facility]   OT Team Member Meagan Field, [place]   Patient/Family Present   Patient Present No   Patient's Family Present No   D/C today at noon will follow up with partial  Has been pleasant social and cooperative, denies all

## 2021-01-20 NOTE — NURSING NOTE
Reviewed AVS/discharge packet with patient  No further questions  Belongings and AVS/discharge packet given to patient upon discharge

## 2021-01-20 NOTE — PROGRESS NOTES
Progress Note - Alida Mazariegos 56 y o  female MRN: [id number]    Unit/Bed#: Rory Berger Encounter: [id number]        Subjective:   Patient seen and examined at bedside after reviewing the chart and discussing the case with the caring staff  Patient examined at bedside  Patient has no acute concerns  Patient's TSH results were reviewed with her  Patient's repeat TSH on 01/09/2021 was found to be 4 33 which is within normal range  Patient is scheduled for discharge today  Patient is requesting all her prescriptions  I reviewed and reconciled patient's problem list and medications  Physical Exam   Vitals: Blood pressure 131/65, pulse 68, temperature (!) 97 1 °F (36 2 °C), temperature source Temporal, resp  rate 18, height 5' (1 524 m), weight 93 6 kg (206 lb 6 4 oz), SpO2 95 %  ,Body mass index is 40 31 kg/m²  Constitutional: He appears well-developed  HEENT: PERR, EOMI, MMM  Cardiovascular: Normal rate and regular rhythm  Pulmonary/Chest: Effort normal and breath sounds normal    Abdomen: Soft, + BS, NT    Assessment/Plan:  Alida Mazariegos is a(n) 56 y o  female with MDD  MEDICAL CLEARANCE  Patient is medically cleared for discharge  All scripts will be sent out for the patient  1  DJD  Patient may receive Tylenol as needed  2  History of epilepsy  Stable since right temporal lobectomy in March 2013  3  History of Elevated TSH  Patient's repeat TSH is within normal limits 4 33   4  Vitamin-D deficiency  Continue vitamin-D bolus doses for 14 weeks followed by vitamin D3 1000 units daily

## 2021-01-20 NOTE — NURSING NOTE
Pt is pleasant, social and cooperative  Pt compliant with medications  Pt states she is definitely ready for discharge and feeling positive  Pt rates anxiety low at 3/10  Pt states she slept better with melatonin and Buspar now in her regimen  No other complaints offered at this time  Q 7 min safety checks maintained

## 2021-01-20 NOTE — TREATMENT TEAM
01/20/21 1147   Discharge Charting   Discharge Via Walking   Accompanied by: PCA   AVS Reviewed With & Written Instructions Given To: Patient   Prescription Given To: e-Prescribe; No Prescription Written

## 2021-01-21 ENCOUNTER — OFFICE VISIT (OUTPATIENT)
Dept: PSYCHOLOGY | Facility: CLINIC | Age: 61
End: 2021-01-21
Payer: COMMERCIAL

## 2021-01-21 ENCOUNTER — OFFICE VISIT (OUTPATIENT)
Dept: PSYCHIATRY | Facility: CLINIC | Age: 61
End: 2021-01-21
Payer: COMMERCIAL

## 2021-01-21 VITALS
DIASTOLIC BLOOD PRESSURE: 77 MMHG | BODY MASS INDEX: 40.44 KG/M2 | RESPIRATION RATE: 18 BRPM | TEMPERATURE: 98.5 F | WEIGHT: 206 LBS | HEIGHT: 60 IN | HEART RATE: 69 BPM | SYSTOLIC BLOOD PRESSURE: 134 MMHG

## 2021-01-21 DIAGNOSIS — F33.2 SEVERE EPISODE OF RECURRENT MAJOR DEPRESSIVE DISORDER, WITHOUT PSYCHOTIC FEATURES (HCC): Chronic | ICD-10-CM

## 2021-01-21 DIAGNOSIS — F41.1 GENERALIZED ANXIETY DISORDER: Primary | ICD-10-CM

## 2021-01-21 DIAGNOSIS — F33.2 SEVERE EPISODE OF RECURRENT MAJOR DEPRESSIVE DISORDER, WITHOUT PSYCHOTIC FEATURES (HCC): ICD-10-CM

## 2021-01-21 PROCEDURE — 99215 OFFICE O/P EST HI 40 MIN: CPT | Performed by: PSYCHIATRY & NEUROLOGY

## 2021-01-21 PROCEDURE — 90791 PSYCH DIAGNOSTIC EVALUATION: CPT

## 2021-01-21 PROCEDURE — S0201 PARTIAL HOSPITALIZATION SERV: HCPCS

## 2021-01-21 RX ORDER — BUSPIRONE HYDROCHLORIDE 10 MG/1
10 TABLET ORAL 3 TIMES DAILY
Qty: 270 TABLET | Refills: 0 | Status: SHIPPED | OUTPATIENT
Start: 2021-01-21 | End: 2021-03-16 | Stop reason: HOSPADM

## 2021-01-21 NOTE — PSYCH
This note was not shared with the patient due to this is a psychotherapy note   Subjective:     Patient ID: Romy Le is a 56 y o  female  [facility] Clinical Progress Notes      Specialized Services Documentation  Therapist must complete separate progress note for each specific clinical activity in which the individual participated during the day  Other ([id number]) Spoke with Joseph Lino from [employer] with a contact number [phone], using Tax ID [id number]  Location address [place] [address] was authorized 7 days from 01/21/21 to 01/29 with an authorization code of [id number]  Case Management Note    Devi Pacheco MA    Current suicide risk : Low    ([id number]) Met with Romy Le  Reviewed program and given on call number  she completed releases and OP providers/ PCP notified of admission and health care coordination form completed  Completed initial psycho-social evaluation and initial treatment goals discussed  Medications changes/added/denied? No - See Dr Hilda Perdomo Note    Treatment session number: Assessment only    Individual Case Management Visit provided today?  No    [facility] follow up physician's orders: Admit to PHP - See Dr Hilda Perdomo Note

## 2021-01-21 NOTE — PSYCH
This note was not shared with the patient due to patient requested    Reason for visit:   Chief Complaint   Patient presents with    Depression    Anxiety    Follow-up       HPI     Tika Brand is a 56 y o  female with major depressive disorder, generalized anxiety disorder, Hashimoto, history of complex partial epilepsy  [facility]  where she was admitted from January 7, 2021 to January 20, 2021  Because she was feeling depressed, anxious and had suicidal ideation to overdose with medications or cut her wrist  Onset of symptoms was a few Days prior to the admission ago with rapidly worsening course since that time  Psychosocial Stressors:  medical problems   She states that she was doing well but started to become very anxious, poor sleep and sudden she started having thoughts of hurting herself and she called the hotline for that reason she was admitted  She states that she feels  better, she still has some issue with sleep  Paulino Rojas feels less depressed, she denies any active suicidal homicidal ideation plan or intent  She denies any psychotic symptoms, patient does not have any history manic episode  Her PHQ-9 is 8        Review Of Systems:     Mood Depression   Behavior Normal    Thought Content Normal   General Sleep Disturbances   Personality Normal   Other Psych Symptoms Normal   Constitutional Negative   ENT Negative   Cardiovascular Negative   Respiratory Negative   Gastrointestinal Negative   Genitourinary Negative   Musculoskeletal Negative   Integumentary Negative   Neurological Negative   Endocrine Normal    Other Symptoms Normal        Past Psychiatric History:      Past Inpatient Psychiatric Treatment:   She has depression and anxiety, she had prior inpatient psych admission at [facility], she had been in partial in the past  Past Outpatient Psychiatric Treatment:    She follow with Dr Britta Price is psychiatrist and also Dorota Anton a psychologist  Past Suicide Attempts:    no  Past Violent Behavior:    She has some episode of aggression when she had the seizures  Past Psychiatric Medication Trials:    Zoloft, Celexa, Remeron and Buspar    Family Psychiatric History:   Family History   Problem Relation Age of Onset    Aortic aneurysm Mother     No Known Problems Father     Psychiatric Illness Neg Hx        Social History:    Education: some college  Learning Disabilities: None  Marital history:   Living arrangement, social support: She live with her   Occupational History: on permanent disability  Functioning Relationships: good support system  Other Pertinent History: No legal or  history    Social History     Substance and Sexual Activity   Drug Use No       Traumatic History:       Abuse: No history of abuse  Other Traumatic Events: None    The following portions of the patient's history were reviewed and updated as appropriate:   She  has a past medical history of Anxiety, Concussion, Depression, DJD (degenerative joint disease), Gait abnormality, ISCHEMIC 2013, and Seizures ([facility] )  She   Patient Active Problem List    Diagnosis Date Noted    Generalized anxiety disorder 2020    Executive function deficit 2020    Memory problem 2020    Annual physical exam 2019    Severe episode of recurrent major depressive disorder, without psychotic features ([facility] ) 09/15/2017    Prediabetes 2017    Hashimoto's thyroiditis 2017    Obesity (BMI 30-39 9) 2017    Idiopathic insomnia 2017     She  has a past surgical history that includes  section and Brain surgery  Her family history includes Aortic aneurysm in her mother; No Known Problems in her father  She  reports that she has never smoked  She has never used smokeless tobacco  She reports that she does not drink alcohol or use drugs    Current Outpatient Medications   Medication Sig Dispense Refill    busPIRone (BUSPAR) 10 mg tablet Take 1 tablet (10 mg total) by mouth 3 (three) times a day for 15 days 45 tablet 1    [START ON 4/12/2021] cholecalciferol (VITAMIN D3) 1,000 units tablet Take 1 tablet (1,000 Units total) by mouth daily 30 tablet 0    citalopram (CeleXA) 10 mg tablet Take 3 tablets (30 mg total) by mouth daily for 15 doses 45 tablet 1    ergocalciferol (VITAMIN D2) 50,000 units Take 1 capsule (50,000 Units total) by mouth once a week for 14 doses 13 capsule 0    ibuprofen (MOTRIN) 400 mg tablet Take 1 tablet (400 mg total) by mouth every 8 (eight) hours as needed for moderate pain 60 tablet 0    melatonin 3 mg Take 1 tablet (3 mg total) by mouth daily at bedtime 30 tablet 0    polyethylene glycol (MIRALAX) 17 g packet Take 17 g by mouth daily as needed (constipation refractory to Senokot-S) 30 each 0    senna-docusate sodium (SENOKOT S) 8 6-50 mg per tablet Take 1 tablet by mouth daily as needed for constipation 30 tablet 0     No current facility-administered medications for this visit  She has No Known Allergies          Mental status:  Appearance calm and cooperative , adequate hygiene and grooming and good eye contact    Mood depressed   Affect affect appropriate    Speech a normal rate   Thought Processes circumstantial   Hallucinations no hallucinations present    Thought Content no delusions   Abnormal Thoughts no suicidal thoughts  and no homicidal thoughts    Orientation  oriented to person and place and time   Remote Memory short term memory intact and long term memory intact   Attention Span concentration intact   Intellect Appears to be of Average Intelligence   Fund of Knowledge displays adequate knowledge of current events, adequate fund of knowledge regarding past history and adequate fund of knowledge regarding vocabulary    Insight Insight intact   Judgement judgment was intact   Muscle Strength Muscle strength and tone were normal and Normal gait Language no difficulty naming common objects, no difficulty repeating a phrase  and no difficulty writing a sentence    Pain none   Pain Scale 0         Laboratory Results: No results found for this or any previous visit  Lab Results   Component Value Date    WBC 11 65 (H) 01/06/2021    HGB 15 3 01/06/2021    HCT 46 5 (H) 01/06/2021    MCV 90 01/06/2021     01/06/2021     Lab Results   Component Value Date    SODIUM 139 01/06/2021    K 3 6 01/06/2021     01/06/2021    CO2 26 01/06/2021    AGAP 7 01/06/2021    BUN 11 01/06/2021    CREATININE 0 86 01/06/2021    GLUC 103 01/06/2021    GLUF 117 (H) 08/08/2017    CALCIUM 10 0 01/06/2021    AST 19 01/06/2021    ALT 30 01/06/2021    ALKPHOS 84 01/06/2021    TP 7 9 01/06/2021    TBILI 0 48 01/06/2021    EGFR 73 01/06/2021         Assessment/Plan:      Diagnoses and all orders for this visit:    Severe episode of recurrent major depressive disorder, without psychotic features [facility] )    Generalized anxiety disorder          Treatment Recommendations- Risks Benefits         Immediate Medical/Psychiatric/Psychotherapy Treatments and Any Precautions:     Admit to innovation, medication management, group therapy    Risks, Benefits And Possible Side Effects Of Medications:  Risks, benefits, and possible side effects of medications explained to patient and patient verbalizes understanding    Controlled Medication Discussion: The patient has been filling controlled prescriptions on time as prescribed to [address] [place] [address] [employer] program        [facility] Physician's Orders     Admit to: Partial Hospitalization, 5 x per week, for 15 days  Vital signs routine  Diet regular  Group Psychotherapy 9 x per week  Allied Therapy Group 6 x per week  Diagnosis:   1  Severe episode of recurrent major depressive disorder, without psychotic features ([facility] )     2   Generalized anxiety disorder       Medications:   Current Outpatient Medications:    busPIRone (BUSPAR) 10 mg tablet, Take 1 tablet (10 mg total) by mouth 3 (three) times a day for 15 days, Disp: 45 tablet, Rfl: 1    [START ON 4/12/2021] cholecalciferol (VITAMIN D3) 1,000 units tablet, Take 1 tablet (1,000 Units total) by mouth daily, Disp: 30 tablet, Rfl: 0    citalopram (CeleXA) 10 mg tablet, Take 3 tablets (30 mg total) by mouth daily for 15 doses, Disp: 45 tablet, Rfl: 1    ergocalciferol (VITAMIN D2) 50,000 units, Take 1 capsule (50,000 Units total) by mouth once a week for 14 doses, Disp: 13 capsule, Rfl: 0    ibuprofen (MOTRIN) 400 mg tablet, Take 1 tablet (400 mg total) by mouth every 8 (eight) hours as needed for moderate pain, Disp: 60 tablet, Rfl: 0    melatonin 3 mg, Take 1 tablet (3 mg total) by mouth daily at bedtime, Disp: 30 tablet, Rfl: 0    polyethylene glycol (MIRALAX) 17 g packet, Take 17 g by mouth daily as needed (constipation refractory to Senokot-S), Disp: 30 each, Rfl: 0    senna-docusate sodium (SENOKOT S) 8 6-50 mg per tablet, Take 1 tablet by mouth daily as needed for constipation, Disp: 30 tablet, Rfl: 0  No current facility-administered medications for this visit  I certify that the continuation of Partial Hospitalization services is medically necessary to improve and/or maintain the patients condition and functional level, and to prevent relapse or hospitalization, and that this could not be done at a less intensive level of care  Jana Marshall MD

## 2021-01-21 NOTE — PSYCH
Assessment/Plan:      Diagnoses and all orders for this visit:    Generalized anxiety disorder    Severe episode of recurrent major depressive disorder, without psychotic features ([facility] )          Subjective:     Patient ID: Roge Hodges is a 56 y o  female  [facility] Treatment Plan   AREAS OF NEED: SWAPNA and MDD as evidenced by anxiousness, lethargy, tearfulness and suicidal ideation with a plan due to focusing on 's health more than her own  Date Initiated: 01/21/21    Strengths: "nurturing, loving, connection"     LONG TERM GOAL:   Date Initiated: 01/21/21  1 0 I will practice wellness tools and communication skills to improve my connections with my support persons  Target Date: 02/18/21  Completion Date:       SHORT TERM OBJECTIVES:     Date Initiated: 01/21/21  1 1 I will utilize assertive communication in 50% of my interactions with my   Revision Date:   Target Date: 02/02/21  Completion Date:     Date Initiated: 01/21/21  1 2 I will exercise 5 times a week to improve my overall health  Revision Date:   Target Date: 02/02/21  Completion Date:    Date Initiated: 01/21/21  1 3 I will take medications as prescribed and share questions and concerns if arise  Revision Date:  Target Date: 02/02/21  Completion Date:     Date Initiated: 01/21/21  1 4 I will identify 3 ways my supports can assist in my recovery and agree to staff/support contact as indicated  Revision Date:  Target Date: 02/02/21  Completion Date:          7 DAY REVISION:    Date Initiated:  Revision Date:   Target Date:   Completion Date:      PSYCHIATRY:  Date Initiated:  01/21/21  Medication Management and Education       Revision Date:   The person(s) responsible for carrying out the plan is Genesis Prather MD    NURSING:   Date Initiated: 01/21/21  1 1,1 2,1 3,1 4 This RN will provide daily wellness group five days weekly to educate Roge Hodges on S/S of her diagnoses and medications used in treatment  Revision Date:  The person(s) responsible for carrying out the plan is Nael Brumfield RN    PSYCHOLOGY:   Date Initiated: 01/21/21       1 1, 1 2, 1 4 Provide psychotherapy group 5 times per week to allow opportunity for Erika Lopez  to explore stressors and ways of coping  Revision Date:   The person(s) responsible for carrying out the plan is Sofia Tamayo    ALLIED THERAPY:   Date Initiated: 01/21/21  1 1,1 2 Cheron Closs in AT group 5 times daily to encourage development and use of wellness tools to decrease symptoms and promote recovery through meaningful activity  Revision Date:   The person(s) responsible for carrying out the plan is KATHIE Hancock     CASE MANAGEMENT:   Date Initiated: 01/21/21      1 0 This  will meet with Erika Lopez  3-4 times weekly to assess treatment progress, discharge planning, connection to community supports and UR as indicated  Revision Date:   The person(s) responsible for carrying out the plan is Sofia Tamayo    TREATMENT REVIEW/COMMENTS:     DISCHARGE CRITERIA: Identify 3 signs of progress and complete relapse prevention plan  DISCHARGE PLAN: Connect with identified outpatient providers  Estimated Length of Stay: 10 treatment days          Diagnosis and Treatment Plan explained to Edie Lambert relates understanding diagnosis and is agreeable to Treatment Plan           CLIENT COMMENTS / Please share your thoughts, feelings, need and/or experiences regarding your treatment plan: _____________________________________________________________________________________________________________________________________________________________________________________________________________________________________________________________________________________________________________________ Date/Time: ______________     Patient Signature: ______________Reviwed with Ulises Blackmand during initial evaluation - received copy__________________     Date/Time: _____01/21/21 @ 0955_________      Signature: _______________[facility], [place]__________________     Date/Time: ______01/21/21 @ 0955________

## 2021-01-21 NOTE — PSYCH
This note was not shared with the patient due to this is a psychotherapy note   Assessment/Plan:      Diagnoses and all orders for this visit:    Generalized anxiety disorder    Severe episode of recurrent major depressive disorder, without psychotic features ([facility] )          Subjective:     Patient ID: Sheela Ray is a 56 y o  female  HPI:     Pre-morbid level of function and History of Present Illness:   As per Dr Juan Rader: Sheela Ray is a 56 y o  female with major depressive disorder, generalized anxiety disorder, Hashimoto, history of complex partial epilepsy  ref[facility]  where she was admitted from January 7, 2021 to January 20, 2021  Because she was feeling depressed, anxious and had suicidal ideation to overdose with medications or cut her wrist  Onset of symptoms was a few Days prior to the admission ago with rapidly worsening course since that time  Psychosocial Stressors:  medical problems   She states that she was doing well but started to become very anxious, poor sleep and sudden she started having thoughts of hurting herself and she called the hotline for that reason she was admitted  She states that she feels  better, she still has some issue with sleep  Epimenio Force feels less depressed, she denies any active suicidal homicidal ideation plan or intent  She denies any psychotic symptoms, patient does not have any history manic episode  Her PHQ-9 is 8  As per this writer: Sheela Ray is a 56 y o  female using she/her pronouns referred to [facility] via [address] IP due to suicidal ideation with a plan  Gen Alfred presents as hopeful and goal oriented during initial evaluation  She reports isolation, being lethargic and tearful  She reports that her anxiety increased and she became casual about her suicidal thoughts    She felt fearful that she would act on the thoughts and signed a 201 to be admitted to the inpatient unit  She reported that the holidays were good, but she found herself focusing more on her 's physical and mental health rather than her own  She reported inconsistent sleep and poor appetite  As per Kurt Jimenez: "I felt like I was going through the motions" "I was ignoring the depression"      Reason for evaluation and partial hospitalization as an alternative to inpatient hospitalization PHP is medically necessary to prevent hospitalization as outpatient care has been unable to stabilize Kurt Jimenez and a greater intensity of treatment is indicated  Milieu therapy to monitor for medication needs, provide wellness tools education and offer opportunity to share and connect to others  Group therapy, case management, psychiatric medication management, family contact and UR as indicated  ELOS 10 treatment days  Previous Psychiatric/psychological treatment/year: Outpatient therapy, PHP, and inpatient care    Current Psychiatrist/Therapist:   Psychiatrist:   Dr Manuelito Valenzuela   [address] [address], [address]    Therapist:   Jarett Eason   [id number] [address] [place] [address], [address] [place] [address]    Primary Care Physician:   Prince Rivero MD  [address] [place] [id number]  [phone]  Fax: [phone]      Outpatient and/or Partial and Other Community Resources Used (CTT, ICM, VNA): None      Problem Assessment:     SOCIAL/VOCATION:  Family Constellation (include parents, relationship with each and pertinent Psych/Medical History):     Family History   Problem Relation Age of Onset    Aortic aneurysm Mother     No Known Problems Father     Psychiatric Illness Neg Hx      Family Constellation/Soical Supports:     Used from previous evaluation: Kurt Jimenez has been  to her , Francis Barahona, since 1  She identified her marriage as supportive and consistent, but that Francis Barahona has a certain way about things and she just follows them  According to Lashae Feliciano, her  has Parkinsons and high functioning Autism, and at times it can be stressful for her to be the "buffer"  She identified having two sons who are  and moved away, one recently just had a daughter whom Lashae Feliciano has not met yet  She shared that her  and oldest son do not speak  Lashae Feliciano has two siblings, a brother and a sister; she does not speak with either of them often  Martha shared having one true friend, Godfrey Mariano, who checks in on her  She did not report and major known mental health issues in her family  Update: Lashae Feliciano wants to work on being more assertive in her relationship with her   She feels he will say things that are hurtful but she does not want to cause added stress  Lashae Feliciano reports talking with her sister at least once a month  She talks with her mother 2x a week  She reports notifying some family members about her mental health struggles but continues to feel that she is a burden to them  She reports that her father passed in [address], but misses him very much  Lashae Feliciano was diagnosed with Epilepsy in her early 25s  She received a right frontal lobe resection in 2013 for the seizures, as she was on very high doses of medications that had no resolve  Lashae Feliciano reported that 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, she has not been the same  She identified struggling with every day functioning sine her surgery and struggles emotionally to cope with the different decisions she has made as well as the overall outcomes  She called it "the monkey's paw "  Martha identified wanting to be more confident and social, but overall struggles because she is afraid of what she will say and if she will say the right thing  No other major traumas reported from Lashae Feliciano despite her medical trauma  Domestic Violence: There is not suspected domestic violence    Trauma history: See above  Additional Comments related to family/relationships/peer support: None       School or Work History (strengths/limitations/needs): 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 history includes none reported    Financial status includes dependent on   LEISURE ASSESSMENT (Include past and present hobbies/interests and level of involvement (Ex: Group/Club Affiliations): adult coloring, painting, working on exercising, gardening, cooking    Primary/Preferred language is [place]  Ethnic considerations are None reported  Religions affiliations and level of involvement None reported   FUNCTIONAL STATUS: There has been a recent change in the patient's ability to do the following: does not need can service    Level of Assistance Needed/By Whom?: N/A    Yvrose Garcia learns best by  reading, listening, demonstration and picture    SUBSTANCE ABUSE ASSESSMENT: No reported abuse    Do you currently smoke? No    Offered smoking cessation? No    Substance/Route/Age/Amount/Frequency/Last Use: No reported abuse  DETOX HISTORY: None    Previous detox/rehab treatment: None    HEALTH ASSESSMENT: no referral to PCP needed    LEGAL: No Mental Health Advance Directive or Power of  on file    Risk Assessment:   The following ratings are based on my interview(s) with Maynor Clarso during initial assessment       Risk of Harm to Self:   Demographic risk factors include   Historical Risk Factors include chronic psychiatric problems  Recent Specific Risk Factors include experienced persistent ideation, stated suicide intentions/plans and diagnosis of depression     Risk of Harm to Others:   Demographic Risk Factors include unemployed  Historical Risk Factors include none reported  Recent Specific Risk Factors include None reported    Access to Weapons:   Yvrose Garcia has access to the following weapons: Hao's gun  The following steps have been taken to ensure weapons are properly secured: Locked away and has no access  Based on the above information, the client presents the following risk of harm to self or others: Low     The following interventions are recommended:   no intervention changes    Notes regarding this Risk Assessment: Provided crisis phone numbers for appropriate county and on-call number as well as warm lines and peer support hotlines  Review Of Systems:     Mood Depression   Behavior Normal    Thought Content Normal   General Sleep Disturbances   Personality Normal   Other Psych Symptoms Normal   Constitutional Negative   ENT Negative   Cardiovascular Negative   Respiratory Negative   Gastrointestinal Negative   Genitourinary Negative   Musculoskeletal Negative   Integumentary Negative   Neurological Negative   Endocrine Normal    Other Symptoms Normal        Mental status:  Appearance calm and cooperative , adequate hygiene and grooming and good eye contact    Mood depressed   Affect affect appropriate    Speech a normal rate   Thought Processes circumstantial   Hallucinations no hallucinations present    Thought Content no delusions   Abnormal Thoughts no suicidal thoughts  and no homicidal thoughts    Orientation  oriented to person and place and time   Remote Memory short term memory intact and long term memory intact   Attention Span concentration intact   Intellect Appears to be of Average Intelligence   Fund of Knowledge displays adequate knowledge of current events, adequate fund of knowledge regarding past history and adequate fund of knowledge regarding vocabulary    Insight Insight intact   Judgement judgment was intact   Muscle Strength Muscle strength and tone were normal and Normal gait    Language no difficulty naming common objects, no difficulty repeating a phrase  and no difficulty writing a sentence    Pain none   Pain Scale 0        DSM:   1   Generalized anxiety disorder     2  Severe episode of recurrent major depressive disorder, without psychotic features ([facility] )         Plan: Admit to PHP  Group therapy, case management, medication management, UR and family contact as indicated    ELOS 10 treatment days  Refer to OP psychiatry and therapy

## 2021-01-22 ENCOUNTER — OFFICE VISIT (OUTPATIENT)
Dept: PSYCHOLOGY | Facility: CLINIC | Age: 61
End: 2021-01-22
Payer: COMMERCIAL

## 2021-01-22 DIAGNOSIS — F33.2 SEVERE EPISODE OF RECURRENT MAJOR DEPRESSIVE DISORDER, WITHOUT PSYCHOTIC FEATURES (HCC): ICD-10-CM

## 2021-01-22 DIAGNOSIS — F41.1 GENERALIZED ANXIETY DISORDER: Primary | ICD-10-CM

## 2021-01-22 PROBLEM — E66.01 MORBID OBESITY (HCC): Status: ACTIVE | Noted: 2017-06-08

## 2021-01-22 PROCEDURE — S0201 PARTIAL HOSPITALIZATION SERV: HCPCS

## 2021-01-22 PROCEDURE — G0177 OPPS/PHP; TRAIN & EDUC SERV: HCPCS

## 2021-01-22 PROCEDURE — G0176 OPPS/PHP;ACTIVITY THERAPY: HCPCS

## 2021-01-22 PROCEDURE — G0410 GRP PSYCH PARTIAL HOSP 45-50: HCPCS

## 2021-01-22 NOTE — PSYCH
This note was not shared with the patient due to this is a psychotherapy note     Subjective:     Patient ID: Iman Mckay is a 56 y o  female  [facility] Clinical Progress Notes      Specialized Services Documentation  Therapist must complete separate progress note for each specific clinical activity in which the individual participated during the day  Allied Therapy   This group was facilitated virtually in a private office using [employer] and Approved [employer] Private Teams  Iman Mckay consented to the use of tele-video modality of treatment  [id number] Martha Chauhan moderately shared in [facility] group focused on managing anger and emotional regulation skills  She engaged in task exploring effective versus ineffective ways in addition to skills to refocus in the moment with prompts  She did not have her camera on and she needed to be muted several times due to background noise  Group explored the benefits of positive choices with intense emotion and factors that increase emotional vulnerability  Minimal initial  work toward goal noted   Continue AT to explore wellness tool awareness and practice       Tx Plan Objective: 1 1, Therapist:  April Swenson [facility]

## 2021-01-22 NOTE — PSYCH
This note was not shared with the patient due to this is a psychotherapy note    Subjective:     Patient ID: Prince Hernandez is a 56 y o  female  [facility] Clinical Progress Notes      Specialized Services Documentation  Therapist must complete separate progress note for each specific clinical activity in which the individual participated during the day  Group Psychotherapy     ([id number]) Prince Hernandez actively shared in psychotherapy group which focused on Weekly Wellness Assessment  Theyengaged in self-rate and discussion  Group members individually went through the assessment and rated themselves based on the past week  Group members shared assessment results  Group discussed the six domains of wellness; physical, emotional, cognitive, vocational, social, and spiritual  Group discussed strengths, challenges, barriers, and ways to increase each domain in and open forum and developed goals  Good progress towards goal observed and shared  Continue psychotherapy to further encourage self-reflection on strengths and challenges with personal wellness      Tx Plan Objective:  1 1,1 2,1 3,1 4 Therapist:  Michael Evans RN

## 2021-01-22 NOTE — PSYCH
This note was not shared with the patient due to this is a psychotherapy note   Subjective:     Patient ID: Samra Chauhan is a 56 y o  female  [facility] Clinical Progress Notes      Specialized Services Documentation  Therapist must complete separate progress note for each specific clinical activity in which the individual participated during the day  Education Therapy   [id number] Samra Chauhan actively shared in morning assessment and goal review  Presented as Receptive related to readiness to learn  Samra Chauhan did complete goal from last treatment day identifying gaining exercise  did not present with any barriers to learning  [id number] Samra Chauhan engaged throughout the treatment day  Was engaged in learning related to Illness, Medication, Aftercare and Wellness Tools  Staff utilized Verbal, Written, A/V and Demonstration teaching methods  Samra Chauhan shared area of learning and set a goal for outside of program to think, feel, respond and exercise  Tx Plan Objective: 1 1, 1 2, 1 4 Therapist:  Sebastian Shay MA       Case Management Note    Sebastian Shay MA    Current suicide risk : Low     [address] to check in on first day in program  She did not answer  Voicemail left to return call  No response  Medications changes/added/denied? No    Treatment session number: 1    Individual Case Management Visit provided today? No    [facility] follow up physician's orders: None at this time

## 2021-01-22 NOTE — PSYCH
This note was not shared with the patient due to this is a psychotherapy note  Subjective:     Patient ID: Rayne Crane is a 56 y o  female  [facility] Clinical Progress Notes      Specialized Services Documentation  Therapist must complete separate progress note for each specific clinical activity in which the individual participated during the day  Group Psychotherapy  Life Skills Group (10:25-11:10) Martha actively engaged in group focused on communicating with I statements which was facilitated virtually in a private office using HIPAA Compliant and Approved [employer] Teams  Martha consented to the use of tele-video modality of treatment and was virtually present for group psychotherapy today  Group members shared you statements that they have heard  Other group members responded to those "you" statements by using I statements  The group explored the benefits of using "I" statements  Mohinder Swift stated a you statement that she has heard  Mohinder Swift gave the example of an I statement she would say is, I feel frustrated because things aren't getting done  I want you to help me without having to be asked   Mohinder Swift continues to make progress towards goals through verbal participation in group; to accomplish long term goals continue to utilize skills learned in programming  Continue with psychotherapy to educate and encourage use of wellness tools   Tx Plan Objective: 1 1,1 2 Therapist:  DESTINY Shaver
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
## 2021-01-25 ENCOUNTER — OFFICE VISIT (OUTPATIENT)
Dept: PSYCHOLOGY | Facility: CLINIC | Age: 61
End: 2021-01-25
Payer: COMMERCIAL

## 2021-01-25 DIAGNOSIS — F41.1 GENERALIZED ANXIETY DISORDER: Primary | ICD-10-CM

## 2021-01-25 DIAGNOSIS — F33.2 SEVERE EPISODE OF RECURRENT MAJOR DEPRESSIVE DISORDER, WITHOUT PSYCHOTIC FEATURES (HCC): ICD-10-CM

## 2021-01-25 PROCEDURE — G0410 GRP PSYCH PARTIAL HOSP 45-50: HCPCS

## 2021-01-25 PROCEDURE — G0176 OPPS/PHP;ACTIVITY THERAPY: HCPCS

## 2021-01-25 PROCEDURE — G0177 OPPS/PHP; TRAIN & EDUC SERV: HCPCS

## 2021-01-25 PROCEDURE — S0201 PARTIAL HOSPITALIZATION SERV: HCPCS

## 2021-01-25 NOTE — PSYCH
This note was not shared with the patient due to this is a psychotherapy note   Subjective:     Patient ID: Romy Le is a 56 y o  female  [facility] Clinical Progress Notes      Specialized Services Documentation  Therapist must complete separate progress note for each specific clinical activity in which the individual participated during the day  GROUP PSYCHOTHERAPY ([id number]) Group was facilitated virtually in a private office using HIPAA Compliant and Approved [employer] Teams  Romy Le consented to the use of tele-video modality of treatment and was virtually present for group psychotherapy today  The group was offered process time to discuss current stressors  Group discussion revolved around the quote of the day, positive reframing and personal disclosures  Shelly Rivera shared that she struggles when her  does not comprehend the skills she is trying to utilize and teach him for better communication  She supported other members when they discussed their family dynamics  Shelly Rivera continues to make progress towards goals through verbal participation in group  Continue with Psychotherapy  TX Plan Objectives: 1 1, 1 2, 1 4   Therapist: Devi Pacheco MA      Case Management Note    Devi Pacheco MA    Current suicide risk : Low     ([id number]) Martha informed the CM that she has a doctor's appointment on Wednesday  She will be excused from group that day  She reports continued work on I statements, emotional regulation and the weekly assessment  She wants to continue utilizing [facility] support groups  She wants to be able to gain more power back in her relationship  Medications changes/added/denied? No    Treatment session number: 2    Individual Case Management Visit provided today? Yes     [facility] follow up physician's orders: None at this time

## 2021-01-25 NOTE — PSYCH
This note was not shared with the patient due to this is a psychotherapy note       Subjective:     Patient ID: Prince Hernandez is a 56 y o  female  [facility] Clinical Progress Notes      Specialized Services Documentation  Therapist must complete separate progress note for each specific clinical activity in which the individual participated during the day  Allied Therapy   This group was facilitated virtually in a private office using [employer] and Approved [employer] Teams  Prince Hernandez consented to the use of tele-video modality of treatment  [id number] Prince Hernandez  actively shared in [facility] group focused on relaxation techniques and mindfulness strategies  Edi Gamez engaged in review of the what skills of observe, describe and participate  Group introduced to and practiced the [facility] skills non-judgmental, one-mindfully, and effectiveness through various tasks  Group discussed ways to apply to slowing down to make more effective choices  Some effort noted toward treatment goal   Continue AT to encourage the development and practice of mindfulness strategies to alleviate symptoms and support wellness        Tx Plan Objective: 1 1, Therapist:  Jason VÁZQUEZ

## 2021-01-25 NOTE — PSYCH
Virtual Regular Visit      Assessment/Plan:    Problem List Items Addressed This Visit        Other    Severe episode of recurrent major depressive disorder, without psychotic features ([facility] [address] ) (Chronic)    Generalized anxiety disorder - Primary               Reason for visit is VIRTUAL PHP DUE TO COVID-19       Encounter provider BE [address]    Provider located at [address] [place] [id number]      Recent Visits  Date Type Provider Dept   21 Office Visit [facility]   21 Office Visit [facility]   Showing recent visits within past 7 days and meeting all other requirements     Future Appointments  No visits were found meeting these conditions  Showing future appointments within next 150 days and meeting all other requirements        The patient was identified by name and date of birth  Harriet Santiago was informed that this is a telemedicine visit and that the visit is being conducted through [employer] and patient was informed that this is a secure, HIPAA-compliant platform  She agrees to proceed     My office door was closed  No one else was in the room  She acknowledged consent and understanding of privacy and security of the video platform  The patient has agreed to participate and understands they can discontinue the visit at any time  Patient is aware this is a billable service  Felicity Ayon is a 56 y o  female          HPI     Past Medical History:   Diagnosis Date    Anxiety     Concussion     Last assessed 2017    Depression     DJD (degenerative joint disease)     Gait abnormality     Head injury     ISCHEMIC 2013     ISCHEMIC 2013    Seizures [facility])        Past Surgical History:   Procedure Laterality Date    BRAIN SURGERY      IMMED FOLLOWING STROKE IN      SECTION         Current Outpatient Medications Medication Sig Dispense Refill    busPIRone (BUSPAR) 10 mg tablet Take 1 tablet (10 mg total) by mouth 3 (three) times a day 270 tablet 0    [START ON 4/12/2021] cholecalciferol (VITAMIN D3) 1,000 units tablet Take 1 tablet (1,000 Units total) by mouth daily 30 tablet 0    citalopram (CeleXA) 10 mg tablet Take 3 tablets (30 mg total) by mouth daily for 15 doses 45 tablet 1    ergocalciferol (VITAMIN D2) 50,000 units Take 1 capsule (50,000 Units total) by mouth once a week for 14 doses 13 capsule 0    ibuprofen (MOTRIN) 400 mg tablet Take 1 tablet (400 mg total) by mouth every 8 (eight) hours as needed for moderate pain 60 tablet 0    melatonin 3 mg Take 1 tablet (3 mg total) by mouth daily at bedtime 30 tablet 0    polyethylene glycol (MIRALAX) 17 g packet Take 17 g by mouth daily as needed (constipation refractory to Senokot-S) (Patient not taking: Reported on 1/21/2021) 30 each 0    senna-docusate sodium (SENOKOT S) 8 6-50 mg per tablet Take 1 tablet by mouth daily as needed for constipation (Patient not taking: Reported on 1/21/2021) 30 tablet 0     No current facility-administered medications for this visit  No Known Allergies    Review of Systems    Video Exam    There were no vitals filed for this visit  Physical Exam     I spent 4 hours of group + case management minutes with patient today in which greater than 50% of the time was spent in counseling/coordination of care regarding see notes  VIRTUAL VISIT DISCLAIMER    Martha Burnsvin Darriandebbie acknowledges that she has consented to an online visit or consultation  She understands that the online visit is based solely on information provided by her, and that, in the absence of a face-to-face physical evaluation by the physician, the diagnosis she receives is both limited and provisional in terms of accuracy and completeness  This is not intended to replace a full medical face-to-face evaluation by the physician   Gianfranco Best understands and accepts these terms

## 2021-01-25 NOTE — PSYCH
This note was not shared with the patient due to this is a psychotherapy note      Subjective:     Patient ID: Roge Hodges is a 56 y o  female  [facility] Clinical Progress Notes      Specialized Services Documentation  Therapist must complete separate progress note for each specific clinical activity in which the individual participated during the day  This group was facilitated virtually in a private office using [employer] and Approved [employer] Teams  Roge Hodges consented to the use of tele-video modality of treatment  This note was not shared with the patient due to this is a psychotherapy note      Group Psychotherapy     ([id number]) Roge Hodges  was present in psychoeducational group which focused on exercise to improve physical and mental wellbeing  Topics included the benefits of exercise to improve physical and mental wellbeing  The group then participated in 20 minutes of low intensity chair yoga  They then discussed ideas on how to improve stamina and ways to incorporate exercise into their lives  Good progress toward goal noted  Continue psychoeducation to educate on the importance of making physical fitness a priority  Tx Plan Objective: 1 3, 1 4 Therapist:  Avery Ochoa RN      Education Therapy   [id number] Roge Hodges actively shared in morning assessment and goal review  Presented as Receptive related to readiness to learn  Roge Leacho did complete goal from last treatment day identifying gaining  emotional wellness  did not present with any barriers to learning  [id number] [place]  engaged throughout the treatment day  Was engaged in learning related to [employer]  Staff utilized Verbal, Written, A/V, and Demonstration teaching methods  [place]  shared area of learning and set a goal for outside of program to be mindful        Tx Plan Objective: 1 4, Therapist:  Avery Ochoa RN

## 2021-01-26 ENCOUNTER — OFFICE VISIT (OUTPATIENT)
Dept: PSYCHOLOGY | Facility: CLINIC | Age: 61
End: 2021-01-26
Payer: COMMERCIAL

## 2021-01-26 DIAGNOSIS — F41.1 GENERALIZED ANXIETY DISORDER: Primary | ICD-10-CM

## 2021-01-26 DIAGNOSIS — F33.2 SEVERE EPISODE OF RECURRENT MAJOR DEPRESSIVE DISORDER, WITHOUT PSYCHOTIC FEATURES (HCC): ICD-10-CM

## 2021-01-26 PROCEDURE — S0201 PARTIAL HOSPITALIZATION SERV: HCPCS

## 2021-01-26 PROCEDURE — G0410 GRP PSYCH PARTIAL HOSP 45-50: HCPCS

## 2021-01-26 PROCEDURE — G0177 OPPS/PHP; TRAIN & EDUC SERV: HCPCS

## 2021-01-26 NOTE — PSYCH
This note was not shared with the patient due to this is a psychotherapy note   Subjective:     Patient ID: Jovan Main is a 56 y o  female  [facility] Clinical Progress Notes      Specialized Services Documentation  Therapist must complete separate progress note for each specific clinical activity in which the individual participated during the day  GROUP PSYCHOTHERAPY ([id number] [id number]) Group was facilitated virtually in a private office using HIPAA Compliant and Approved [employer] Teams  Jovan Main consented to the use of tele-video modality of treatment and was virtually present for group psychotherapy today  The group members received a safe and non-judgmental space to discuss current stressors and received feedback from the group  The group discussions revolved around personal disclosures, coping skills and positive hobbies, thinking errors and naming it to tame it  Mirza Peacock talked about her accomplishments and her goals for progress  She presented as encouraging and empathetic to other group members  Mirza Peacock continues to make progress towards goals through verbal participation in group  Continue with psychotherapy  TX Plan Objectives: 1 1, 1 2, 1 4   Therapist: Vernon Gillespie MA    Education Therapy   [id number] Jovan Main actively shared in morning assessment and goal review  Presented as Receptive related to readiness to learn  Jovan Main did complete goal from last treatment day identifying gaining positivity  did not present with any barriers to learning  [id number] Jovan Main engaged throughout the treatment day  Was engaged in learning related to Illness, Medication, Aftercare and Wellness Tools  Staff utilized Verbal, Written, A/V and Demonstration teaching methods  Jovan Main shared area of learning and set a goal for outside of program to walk dogs and shovel         Tx Plan Objective: 1 1, 1 2, 1 4 Therapist:  Vernon Gillespie MA     Case Management Note    Kierra Richard MA     Current suicide risk : Low     A case management session is not scheduled today with Maicol Chua; additionally, they did not request a CM meeting  Next scheduled session is 01/27/21  Medications changes/added/denied? No    Treatment session number: 3    Individual Case Management Visit provided today?  No    [facility] follow up physician's orders: None at this time

## 2021-01-26 NOTE — PSYCH
This note was not shared with the patient due to this is a psychotherapy note  Subjective:     Patient ID: Joann Geiger is a 56 y o  female  [facility] Clinical Progress Notes      Specialized Services Documentation  Therapist must complete separate progress note for each specific clinical activity in which the individual participated during the day  Group Psychotherapy Life Skills Group (10:25-11:10) Martha actively engaged in group focused on the drama triangle which was facilitated virtually in a private office using HIPAA Compliant and Approved [employer] Teams  Martha consented to the use of tele-video modality of treatment and was virtually present for group psychotherapy today  The group learned about the drama triangle and the roles of the victim, rescuer, and persecutor  Jarrett Latham identified having been in the role of a victim and her  as the persecutor  During the activity clients learned how to stop the drama triangle from continuing  Jarrett Petite identified that she would use the technique of removing herself from the victim role and become more assertive in order to stop the drama triangle  Jarrett Latham continues to make progress towards goals through verbal participation in group; to accomplish long term goals continue to utilize skills learned in programming  Continue with psychotherapy to educate and encourage use of wellness tools  Tx Plan Objective: 1 1,1 2 Therapist:  DESTINY Darling

## 2021-01-26 NOTE — PSYCH
This note was not shared with the patient due to this is a psychotherapy note      Subjective:     Patient ID: Sharona Garcia is a 56 y o  female  [facility] Clinical Progress Notes      Specialized Services Documentation  Therapist must complete separate progress note for each specific clinical activity in which the individual participated during the day  This group was facilitated virtually in a private office using [employer] and Approved [employer] Teams  Sharona Garcia consented to the use of tele-video modality of treatment  This note was not shared with the patient due to this is a psychotherapy note      Group Psychotherapy     ([id number]) Sharona Garcia  was present in psychoeducational group which focused on nutrition to improve physical and mental wellbeing  Topics included appropriate serving sizes for all food groups as well as benefits to eating for health  Individuals were encouraged to think of ways they could make small changes to improve their overall health  Group learned statistics related to the benefits of healthy eating and it's link to mental health improvements  Lastly, they learned about vitamins and minerals proven to help with mental health  Good progress toward goal noted  Continue psychoeducation to educate on the importance of making nutrition a priority    Tx Plan Objective: 1 3, 1 4 Therapist:  Dina Perez RN

## 2021-01-26 NOTE — PSYCH
Virtual Regular Visit      Assessment/Plan:    Problem List Items Addressed This Visit        Other    Severe episode of recurrent major depressive disorder, without psychotic features ([facility] [address] ) (Chronic)    Generalized anxiety disorder - Primary               Reason for visit is  VIRTUAL PHP DUE TO COVID-19      Encounter provider BE [address]    Provider located at [address] [address]      Recent Visits  Date Type Provider Dept   01/25/21 Office Visit [facility]   01/22/21 [facility] Visit [facility]   01/21/21 Office Visit [facility]   Showing recent visits within past 7 days and meeting all other requirements     Future Appointments  No visits were found meeting these conditions  Showing future appointments within next 150 days and meeting all other requirements        The patient was identified by name and date of birth  Kurt Mis was informed that this is a telemedicine visit and that the visit is being conducted through [employer] and patient was informed that this is a secure, HIPAA-compliant platform  She agrees to proceed     My office door was closed  No one else was in the room  She acknowledged consent and understanding of privacy and security of the video platform  The patient has agreed to participate and understands they can discontinue the visit at any time  Patient is aware this is a billable service  Aristides Chauhan is a 56 y o  female          HPI     Past Medical History:   Diagnosis Date    Anxiety     Concussion     Last assessed 9/21/2017    Depression     DJD (degenerative joint disease)     Gait abnormality     Head injury     ISCHEMIC 2013     ISCHEMIC 2013    Seizures [facility])        Past Surgical History:   Procedure Laterality Date    BRAIN SURGERY      IMMED FOLLOWING STROKE IN 2013   SECTION         Current Outpatient Medications   Medication Sig Dispense Refill    busPIRone (BUSPAR) 10 mg tablet Take 1 tablet (10 mg total) by mouth 3 (three) times a day 270 tablet 0    [START ON 2021] cholecalciferol (VITAMIN D3) 1,000 units tablet Take 1 tablet (1,000 Units total) by mouth daily 30 tablet 0    citalopram (CeleXA) 10 mg tablet Take 3 tablets (30 mg total) by mouth daily for 15 doses 45 tablet 1    ergocalciferol (VITAMIN D2) 50,000 units Take 1 capsule (50,000 Units total) by mouth once a week for 14 doses 13 capsule 0    ibuprofen (MOTRIN) 400 mg tablet Take 1 tablet (400 mg total) by mouth every 8 (eight) hours as needed for moderate pain 60 tablet 0    melatonin 3 mg Take 1 tablet (3 mg total) by mouth daily at bedtime 30 tablet 0    polyethylene glycol (MIRALAX) 17 g packet Take 17 g by mouth daily as needed (constipation refractory to Senokot-S) (Patient not taking: Reported on 2021) 30 each 0    senna-docusate sodium (SENOKOT S) 8 6-50 mg per tablet Take 1 tablet by mouth daily as needed for constipation (Patient not taking: Reported on 2021) 30 tablet 0     No current facility-administered medications for this visit  No Known Allergies    Review of Systems    Video Exam    There were no vitals filed for this visit  Physical Exam     I spent 4 hours of group + case management minutes with patient today in which greater than 50% of the time was spent in counseling/coordination of care regarding see notes  VIRTUAL VISIT DISCLAIMER    Martha Mei acknowledges that she has consented to an online visit or consultation  She understands that the online visit is based solely on information provided by her, and that, in the absence of a face-to-face physical evaluation by the physician, the diagnosis she receives is both limited and provisional in terms of accuracy and completeness   This is not intended to replace a full medical face-to-face evaluation by the physician  Jovan Main understands and accepts these terms

## 2021-01-27 ENCOUNTER — APPOINTMENT (OUTPATIENT)
Dept: PSYCHOLOGY | Facility: CLINIC | Age: 61
End: 2021-01-27
Payer: COMMERCIAL

## 2021-01-27 ENCOUNTER — OFFICE VISIT (OUTPATIENT)
Dept: FAMILY MEDICINE CLINIC | Facility: CLINIC | Age: 61
End: 2021-01-27
Payer: COMMERCIAL

## 2021-01-27 ENCOUNTER — DOCUMENTATION (OUTPATIENT)
Dept: PSYCHOLOGY | Facility: CLINIC | Age: 61
End: 2021-01-27

## 2021-01-27 VITALS
WEIGHT: 206 LBS | TEMPERATURE: 96.3 F | BODY MASS INDEX: 40.44 KG/M2 | HEIGHT: 60 IN | SYSTOLIC BLOOD PRESSURE: 122 MMHG | DIASTOLIC BLOOD PRESSURE: 70 MMHG

## 2021-01-27 DIAGNOSIS — Z13.220 SCREENING FOR LIPID DISORDERS: ICD-10-CM

## 2021-01-27 DIAGNOSIS — F33.2 SEVERE EPISODE OF RECURRENT MAJOR DEPRESSIVE DISORDER, WITHOUT PSYCHOTIC FEATURES (HCC): Chronic | ICD-10-CM

## 2021-01-27 DIAGNOSIS — Z13.0 SCREENING, ANEMIA, DEFICIENCY, IRON: ICD-10-CM

## 2021-01-27 DIAGNOSIS — Z12.31 ENCOUNTER FOR SCREENING MAMMOGRAM FOR MALIGNANT NEOPLASM OF BREAST: ICD-10-CM

## 2021-01-27 DIAGNOSIS — R73.03 PREDIABETES: ICD-10-CM

## 2021-01-27 DIAGNOSIS — Z11.59 ENCOUNTER FOR HEPATITIS C SCREENING TEST FOR LOW RISK PATIENT: ICD-10-CM

## 2021-01-27 DIAGNOSIS — E06.3 HASHIMOTO'S THYROIDITIS: ICD-10-CM

## 2021-01-27 DIAGNOSIS — Z00.00 ANNUAL PHYSICAL EXAM: Primary | ICD-10-CM

## 2021-01-27 DIAGNOSIS — Z11.4 SCREENING FOR HIV (HUMAN IMMUNODEFICIENCY VIRUS): ICD-10-CM

## 2021-01-27 PROCEDURE — 36415 COLL VENOUS BLD VENIPUNCTURE: CPT | Performed by: FAMILY MEDICINE

## 2021-01-27 PROCEDURE — 1036F TOBACCO NON-USER: CPT | Performed by: FAMILY MEDICINE

## 2021-01-27 PROCEDURE — 3008F BODY MASS INDEX DOCD: CPT | Performed by: FAMILY MEDICINE

## 2021-01-27 PROCEDURE — 99214 OFFICE O/P EST MOD 30 MIN: CPT | Performed by: FAMILY MEDICINE

## 2021-01-27 NOTE — PATIENT INSTRUCTIONS

## 2021-01-27 NOTE — PROGRESS NOTES
[facility]    NAME: Alida Mazariegos  AGE: 56 y o  SEX: female  : 1960     DATE: 2021     Assessment and Plan:     Problem List Items Addressed This Visit        Endocrine    Hashimoto's thyroiditis     Two recent TSH measurements have been in the 4+ range  Will continue to follow  Other    Severe episode of recurrent major depressive disorder, without psychotic features ([address] ) (Chronic)     Recent hospitalization for depression and suicidal ideation  Her medication was changed  She follows with Psychiatry regularly as an outpatient is also currently enrolled in the partial program   She appears to be doing well presently  Prediabetes    Relevant Orders    Comprehensive metabolic panel    Annual physical exam - Primary     The patient is a 41-year-old female who presents today for annual physical examination  She recently he was admitted for depression with suicidal ideation to [facility] in [address]  That is covered in a separate section  We reviewed anticipatory guidance measures with her  We asked her to try to improve her exercise regimen  We asked her to go for mammography  We also asked her to complete a Cologuard  We obtain fasting blood work today and we will review results with her when they are available  She is in agreement with this plan             Other Visit Diagnoses     Screening, anemia, deficiency, iron        Relevant Orders    CBC    Screening for lipid disorders        Relevant Orders    Lipid panel    Screening for HIV (human immunodeficiency virus)        Relevant Orders    Human Immunodeficiency Virus 1/2 Antigen / Antibody ( Fourth Generation) with Reflex Testing    Encounter for hepatitis C screening test for low risk patient        Relevant Orders    Hepatitis C Ab W/Refl To HCV RNA, Qn, PCR    Encounter for screening mammogram for malignant neoplasm of breast Relevant Orders    Mammo screening bilateral w 3d & cad        Cologuard      Immunizations and preventive care screenings were discussed with patient today  Appropriate education was printed on patient's after visit summary  Counseling:  Alcohol/drug use: discussed moderation in alcohol intake, the recommendations for healthy alcohol use, and avoidance of illicit drug use  Dental Health: discussed importance of regular tooth brushing, flossing, and dental visits  · Exercise: the importance of regular exercise/physical activity was discussed  Recommend exercise 3-5 times per week for at least 30 minutes  BMI Counseling: Body mass index is 40 23 kg/m²  The BMI is above normal  Nutrition recommendations include decreasing portion sizes, encouraging healthy choices of fruits and vegetables, consuming healthier snacks and moderation in carbohydrate intake  Exercise recommendations include moderate physical activity 150 minutes/week and exercising 3-5 times per week  No pharmacotherapy was ordered  No follow-ups on file  Chief Complaint:     Chief Complaint   Patient presents with    Physical Exam     FBW      History of Present Illness:     Adult Annual Physical   Patient here for a comprehensive physical exam  The patient reports problems - Anxiety and depression, had a recent admission for depression  Citalopram and Buspar effective  Off Remeron and using Melatonin with some effect  Diet and Physical Activity  · Diet/Nutrition: limited junk food, low fat diet and consuming 3-5 servings of fruits/vegetables daily  · Exercise: walking and Needs to improve frequency   Depression Screening  PHQ-9 Depression Screening    PHQ-9:   Frequency of the following problems over the past two weeks:           General Health  · Sleep: gets 4-6 hours of sleep on average  · Hearing: normal - bilateral   · Vision: no vision problems and wears glasses     · Dental: regular dental visits and brushes teeth twice daily  /GYN Health  · Patient is: postmenopausal  · Last menstrual period:   · Contraceptive method:      Review of Systems:     Review of Systems   Constitutional: Negative for activity change and fatigue  Respiratory: Negative  Cardiovascular: Negative  Gastrointestinal: Negative for blood in stool, constipation and diarrhea  Endocrine: Negative for polydipsia, polyphagia and polyuria  Genitourinary: Negative for dysuria, frequency and urgency  Neurological: Negative for dizziness and headaches  Psychiatric/Behavioral: Positive for dysphoric mood, sleep disturbance and suicidal ideas  Past Medical History:     Past Medical History:   Diagnosis Date    Anxiety     Concussion     Last assessed 2017    Depression     DJD (degenerative joint disease)     Gait abnormality     Head injury     ISCHEMIC 2013     ISCHEMIC 2013    Seizures [facility])       Past Surgical History:     Past Surgical History:   Procedure Laterality Date    BRAIN SURGERY      IMMED FOLLOWING STROKE IN      SECTION        Social History:     E-Cigarette/Vaping    E-Cigarette Use Never User      E-Cigarette/Vaping Substances    Nicotine No     THC No     CBD No     Flavoring No     Other No     Unknown No      Social History     Socioeconomic History    Marital status: /Civil Union     Spouse name: None    Number of children: None    Years of education: None    Highest education level: Some college, no degree   Occupational History    Occupation: Disabled   Social Needs    Financial resource strain: Not hard at all   [employer] insecurity     Worry: None     Inability: None    Transportation needs     Medical: None     Non-medical: None   Tobacco Use    Smoking status: Never Smoker    Smokeless tobacco: Never Used   Substance and Sexual Activity    Alcohol use: No     Frequency: Never     Binge frequency: Never     Comment: doesnt drink      Drug use: No    Sexual activity: Not Currently     Partners: Male   Lifestyle    Physical activity     Days per week: 6 days     Minutes per session: None    Stress:  To some extent   Relationships    Social connections     Talks on phone: None     Gets together: None     Attends Sabianist service: None     Active member of club or organization: None     Attends meetings of clubs or organizations: None     Relationship status:     Intimate partner violence     Fear of current or ex partner: No     Emotionally abused: No     Physically abused: No     Forced sexual activity: No   Other Topics Concern    None   Social History Narrative    Daily caffeine consumption      Family History:     Family History   Problem Relation Age of Onset    Aortic aneurysm Mother     No Known Problems Father     Psychiatric Illness Neg Hx       Current Medications:     Current Outpatient Medications   Medication Sig Dispense Refill    busPIRone (BUSPAR) 10 mg tablet Take 1 tablet (10 mg total) by mouth 3 (three) times a day 270 tablet 0    [START ON 4/12/2021] cholecalciferol (VITAMIN D3) 1,000 units tablet Take 1 tablet (1,000 Units total) by mouth daily 30 tablet 0    citalopram (CeleXA) 10 mg tablet Take 3 tablets (30 mg total) by mouth daily for 15 doses 45 tablet 1    ergocalciferol (VITAMIN D2) 50,000 units Take 1 capsule (50,000 Units total) by mouth once a week for 14 doses 13 capsule 0    ibuprofen (MOTRIN) 400 mg tablet Take 1 tablet (400 mg total) by mouth every 8 (eight) hours as needed for moderate pain 60 tablet 0    melatonin 3 mg Take 1 tablet (3 mg total) by mouth daily at bedtime 30 tablet 0    polyethylene glycol (MIRALAX) 17 g packet Take 17 g by mouth daily as needed (constipation refractory to Senokot-S) (Patient not taking: Reported on 1/21/2021) 30 each 0    senna-docusate sodium (SENOKOT S) 8 6-50 mg per tablet Take 1 tablet by mouth daily as needed for constipation (Patient not taking: Reported on 1/21/2021) 30 tablet 0     No current facility-administered medications for this visit  Allergies:     No Known Allergies   Physical Exam:     /70 (BP Location: Left arm, Patient Position: Sitting, Cuff Size: Large)   Temp (!) 96 3 °F (35 7 °C)   Ht 5' (1 524 m)   Wt 93 4 kg (206 lb)   BMI 40 23 kg/m²     Physical Exam  Constitutional:       General: She is not in acute distress  Appearance: She is obese  She is not ill-appearing or toxic-appearing  HENT:      Right Ear: Tympanic membrane normal       Left Ear: Tympanic membrane normal       Mouth/Throat:      Pharynx: Oropharynx is clear  No oropharyngeal exudate or posterior oropharyngeal erythema  Eyes:      General: No scleral icterus  Right eye: No discharge  Left eye: No discharge  Conjunctiva/sclera: Conjunctivae normal       Pupils: Pupils are equal, round, and reactive to light  Cardiovascular:      Rate and Rhythm: Normal rate and regular rhythm  Heart sounds: No murmur  Pulmonary:      Effort: Pulmonary effort is normal  No respiratory distress  Breath sounds: Normal breath sounds  No wheezing, rhonchi or rales  Abdominal:      Palpations: There is no mass  Tenderness: There is no abdominal tenderness  Musculoskeletal:      Right lower leg: No edema  Left lower leg: No edema  Neurological:      General: No focal deficit present  Mental Status: She is alert and oriented to person, place, and time  Psychiatric:         Mood and Affect: Mood normal          Behavior: Behavior normal          Thought Content:  Thought content normal          Judgment: Judgment normal           Laquital MD Prasanth  [address]

## 2021-01-27 NOTE — ASSESSMENT & PLAN NOTE
Recent hospitalization for depression and suicidal ideation  Her medication was changed  She follows with Psychiatry regularly as an outpatient is also currently enrolled in the partial program   She appears to be doing well presently

## 2021-01-27 NOTE — ASSESSMENT & PLAN NOTE
The patient is a 64-year-old female who presents today for annual physical examination  She recently he was admitted for depression with suicidal ideation to [address] [place] [address] in [place]  That is covered in a separate section  We reviewed anticipatory guidance measures with her  We asked her to try to improve her exercise regimen  We asked her to go for mammography  We also asked her to complete a Cologuard  We obtain fasting blood work today and we will review results with her when they are available  She is in agreement with this plan

## 2021-01-27 NOTE — PROGRESS NOTES
This note was not shared with the patient due to this is a psychotherapy note   Subjective:     Patient ID: Maicol Chua is a 56 y o  female  [facility] Clinical Progress Notes      Specialized Services Documentation  Therapist must complete separate progress note for each specific clinical activity in which the individual participated during the day  Case Management Note    Kierra Richard MA    Current suicide risk : Unable to assess due to absence  Maicol Chua was excused from program today 01/27/21 for a medical doctor appointment following discharge from inpatient  Medications changes/added/denied? No    Treatment session number: N/A    Individual Case Management Visit provided today? No    [facility] follow up physician's orders: None at this time

## 2021-01-28 ENCOUNTER — OFFICE VISIT (OUTPATIENT)
Dept: PSYCHOLOGY | Facility: CLINIC | Age: 61
End: 2021-01-28
Payer: COMMERCIAL

## 2021-01-28 DIAGNOSIS — F41.1 GENERALIZED ANXIETY DISORDER: Primary | ICD-10-CM

## 2021-01-28 DIAGNOSIS — F33.2 SEVERE EPISODE OF RECURRENT MAJOR DEPRESSIVE DISORDER, WITHOUT PSYCHOTIC FEATURES (HCC): ICD-10-CM

## 2021-01-28 LAB
ALBUMIN SERPL-MCNC: 4.2 G/DL (ref 3.6–5.1)
ALBUMIN/GLOB SERPL: 1.4 (CALC) (ref 1–2.5)
ALP SERPL-CCNC: 71 U/L (ref 37–153)
ALT SERPL-CCNC: 17 U/L (ref 6–29)
AST SERPL-CCNC: 13 U/L (ref 10–35)
BILIRUB SERPL-MCNC: 0.5 MG/DL (ref 0.2–1.2)
BUN SERPL-MCNC: 11 MG/DL (ref 7–25)
BUN/CREAT SERPL: ABNORMAL (CALC) (ref 6–22)
CALCIUM SERPL-MCNC: 9.2 MG/DL (ref 8.6–10.4)
CHLORIDE SERPL-SCNC: 103 MMOL/L (ref 98–110)
CHOLEST SERPL-MCNC: 197 MG/DL
CHOLEST/HDLC SERPL: 3.3 (CALC)
CO2 SERPL-SCNC: 24 MMOL/L (ref 20–32)
CREAT SERPL-MCNC: 0.73 MG/DL (ref 0.5–0.99)
ERYTHROCYTE [DISTWIDTH] IN BLOOD BY AUTOMATED COUNT: 13.8 % (ref 11–15)
EST. AVERAGE GLUCOSE BLD GHB EST-MCNC: 128 (CALC)
EST. AVERAGE GLUCOSE BLD GHB EST-SCNC: 7.1 (CALC)
GLOBULIN SER CALC-MCNC: 2.9 G/DL (CALC) (ref 1.9–3.7)
GLUCOSE SERPL-MCNC: 111 MG/DL (ref 65–99)
HBA1C MFR BLD: 6.1 % OF TOTAL HGB
HCT VFR BLD AUTO: 43 % (ref 35–45)
HCV AB S/CO SERPL IA: 0.01
HCV AB SERPL QL IA: NORMAL
HDLC SERPL-MCNC: 60 MG/DL
HGB BLD-MCNC: 14.3 G/DL (ref 11.7–15.5)
HIV 1+2 AB+HIV1 P24 AG SERPL QL IA: NORMAL
LDLC SERPL CALC-MCNC: 117 MG/DL (CALC)
MCH RBC QN AUTO: 29.4 PG (ref 27–33)
MCHC RBC AUTO-ENTMCNC: 33.3 G/DL (ref 32–36)
MCV RBC AUTO: 88.5 FL (ref 80–100)
NONHDLC SERPL-MCNC: 137 MG/DL (CALC)
PLATELET # BLD AUTO: 288 THOUSAND/UL (ref 140–400)
PMV BLD REES-ECKER: 11.3 FL (ref 7.5–12.5)
POTASSIUM SERPL-SCNC: 4.4 MMOL/L (ref 3.5–5.3)
PROT SERPL-MCNC: 7.1 G/DL (ref 6.1–8.1)
RBC # BLD AUTO: 4.86 MILLION/UL (ref 3.8–5.1)
SL AMB EGFR AFRICAN AMERICAN: 102 ML/MIN/1.73M2
SL AMB EGFR NON AFRICAN AMERICAN: 88 ML/MIN/1.73M2
SODIUM SERPL-SCNC: 139 MMOL/L (ref 135–146)
TRIGL SERPL-MCNC: 94 MG/DL
WBC # BLD AUTO: 7.9 THOUSAND/UL (ref 3.8–10.8)

## 2021-01-28 PROCEDURE — G0176 OPPS/PHP;ACTIVITY THERAPY: HCPCS

## 2021-01-28 PROCEDURE — S0201 PARTIAL HOSPITALIZATION SERV: HCPCS

## 2021-01-28 PROCEDURE — G0177 OPPS/PHP; TRAIN & EDUC SERV: HCPCS

## 2021-01-28 PROCEDURE — G0410 GRP PSYCH PARTIAL HOSP 45-50: HCPCS

## 2021-01-28 NOTE — PSYCH
This note was not shared with the patient due to this is a psychotherapy note   Subjective:     Patient ID: Deniz Clayton is a 56 y o  female  [facility] Clinical Progress Notes      Specialized Services Documentation  Therapist must complete separate progress note for each specific clinical activity in which the individual participated during the day  GROUP PSYCHOTHERAPY ([id number]) Group was facilitated virtually in a private office using HIPAA Compliant and Approved [employer] Teams  Deniz Clayton consented to the use of tele-video modality of treatment and was virtually present for group psychotherapy today  Yanci Sepulveda attentively listened to [facility] share his life story as he co-led this session  Group encouraged power of learning about self, accepting illness and personal responsibility in recovery  Community resources reviewed in addition to personal resources like the affirmations  Progress toward goals noted  Continue psychotherapy to encourage self -awareness and healthy engagement of supports  [place] Plan Objectives: 1 1, 1 2, 1 4   Therapist: Erick Rosales MA     Case Management Note    Erick Rosales MA    Current suicide risk : Low     ([id number]) Yanci Sepulveda share about her experience at her PCP appointment  She connected the mental health and physical health by continuing her goal to exercise 2-3 times a week  She reports enjoying group and learning and practicing skills  She utilizes the I statements, journaling, self-care, assertiveness and the weekly assessment  She reports that she is working on putting herself first evident by her feeding herself before needing to feed her   She denies SI at this time  Medications changes/added/denied? No    Treatment session number: 4    Individual Case Management Visit provided today? Yes     [facility] follow up physician's orders: None at this time

## 2021-01-28 NOTE — PSYCH
This note was not shared with the patient due to this is a psychotherapy note  Subjective:     Patient ID: Rambo Block is a 56 y o  female  [facility] Clinical Progress Notes      Specialized Services Documentation  Therapist must complete separate progress note for each specific clinical activity in which the individual participated during the day  Group Psychotherapy Life Skills Group (10:25-11:10) Martha actively engaged in group focused on developing self compassion which was facilitated virtually in a private office using HIPAA Compliant and Approved [employer] Teams  Martha consented to the use of tele-video modality of treatment and was virtually present for group psychotherapy today  The group learned about the importance of self compassion and how to bring self-compassion into their lives  The group examined why they feel bad about themselves and the emotions that come up for them  Martha discussed the emotions of anger and sadness and the feelings of not being smart or resourceful that come up for her when she thinks about that aspect of her life  The group then explored what a friend who is unconditionally loving, accepting and compassionate would say to them about their perceived inadequacy  [place] states that a friend like that would say to her that she needs to get over these feelings she has of herself, that she is gifted,caring and capable of a lot of things and she has accomplished so much  Presley Piper continues to make progress towards goals through verbal participation in group; to accomplish long term goals continue to utilize skills learned in programming  Continue with psychotherapy to educate and encourage use of wellness tools   [place] Plan Objective: 1 1,1 2 Therapist:  Dorethea Dakins, MSW

## 2021-01-28 NOTE — PSYCH
Virtual Regular Visit      Assessment/Plan:    Problem List Items Addressed This Visit        Other    Severe episode of recurrent major depressive disorder, without psychotic features ([facility] [address] ) (Chronic)    Generalized anxiety disorder - Primary               Reason for visit is VIRTUAL PHP DUE TO COVID-19       Encounter provider BE [address]    Provider located at [place] [id number] [place] [id number]      Recent Visits  Date Type Provider Dept   01/27/21 Office Visit MD Soham Teague   01/26/21 [facility]   01/25/21 [facility]   01/22/21 [facility]   01/21/21 [facility]   Showing recent visits within past 7 days and meeting all other requirements     Today's Visits  Date Type Provider Dept   01/28/21 Office [facility]   Showing today's visits and meeting all other requirements     Future Appointments  No visits were found meeting these conditions  Showing future appointments within next 150 days and meeting all other requirements        The patient was identified by name and date of birth  Diamond July was informed that this is a telemedicine visit and that the visit is being conducted through "[employer] and patient was informed that this is a secure, HIPAA-compliant platform  She agrees to proceed     My office door was closed  No one else was in the room  She acknowledged consent and understanding of privacy and security of the video platform  The patient has agreed to participate and understands they can discontinue the visit at any time  Patient is aware this is a billable service  Azul Leigh is a 56 y o  female           HPI     Past Medical History:   Diagnosis Date    Anxiety  Concussion     Last assessed 2017    Depression     DJD (degenerative joint disease)     Gait abnormality     Head injury     ISCHEMIC      ISCHEMIC 2013    Seizures [facility])        Past Surgical History:   Procedure Laterality Date    BRAIN SURGERY      IMMED FOLLOWING STROKE IN 2013     SECTION         Current Outpatient Medications   Medication Sig Dispense Refill    busPIRone (BUSPAR) 10 mg tablet Take 1 tablet (10 mg total) by mouth 3 (three) times a day 270 tablet 0    [START ON 2021] cholecalciferol (VITAMIN D3) 1,000 units tablet Take 1 tablet (1,000 Units total) by mouth daily 30 tablet 0    citalopram (CeleXA) 10 mg tablet Take 3 tablets (30 mg total) by mouth daily for 15 doses 45 tablet 1    ergocalciferol (VITAMIN D2) 50,000 units Take 1 capsule (50,000 Units total) by mouth once a week for 14 doses 13 capsule 0    ibuprofen (MOTRIN) 400 mg tablet Take 1 tablet (400 mg total) by mouth every 8 (eight) hours as needed for moderate pain 60 tablet 0    melatonin 3 mg Take 1 tablet (3 mg total) by mouth daily at bedtime 30 tablet 0    polyethylene glycol (MIRALAX) 17 g packet Take 17 g by mouth daily as needed (constipation refractory to Senokot-S) (Patient not taking: Reported on 2021) 30 each 0    senna-docusate sodium (SENOKOT S) 8 6-50 mg per tablet Take 1 tablet by mouth daily as needed for constipation (Patient not taking: Reported on 2021) 30 tablet 0     No current facility-administered medications for this visit  No Known Allergies    Review of Systems    Video Exam    There were no vitals filed for this visit  Physical Exam     I spent 4 hours of group + case management minutes with patient today in which greater than 50% of the time was spent in counseling/coordination of care regarding see notes  VIRTUAL VISIT DISCLAIMER    Martha Bustillo acknowledges that she has consented to an online visit or consultation   She understands that the online visit is based solely on information provided by her, and that, in the absence of a face-to-face physical evaluation by the physician, the diagnosis she receives is both limited and provisional in terms of accuracy and completeness  This is not intended to replace a full medical face-to-face evaluation by the physician  Romy Le understands and accepts these terms

## 2021-01-28 NOTE — PSYCH
This note was not shared with the patient due to this is a psychotherapy note      Subjective:     Patient ID: Diamond Hernandez is a 56 y o  female  [facility] Clinical Progress Notes      Specialized Services Documentation  Therapist must complete separate progress note for each specific clinical activity in which the individual participated during the day  Allied Therapy   This group was facilitated virtually in a private office using [employer] and Approved [employer] Teams  Diamond Hernandez consented to the use of tele-video modality of treatment  [id number] Diamond Hernandez   actively shared in [facility] group focused on self- care  Moncho Lorenzana was encouraged to identify aspects of self-care and barriers to it  The concept of self -care versus selfishness explored  Group reinforced the necessity of self -care in wellness versus seeing this as a luxury and tips to increase self-care  When asked at end of group a specific thing she could commit to in order to increase self-care, she stated exercise more  Some progress voiced toward goal   Continue AT to engage in the development and practice of wellness tools  Tx Plan Objective: 1 2, Therapist:  Marla VÁZQUEZ    Education Therapy   [id number] Diamond Hernandez actively shared in morning assessment and goal review  Presented as Receptive related to readiness to learn  Diamond Hernandez did complete goal from last treatment day identifying gaining support  did not present with any barriers to learning  [id number] Diamond Hernandez engaged throughout the treatment day  Was engaged in learning related to Illness and Wellness Tools  Staff utilized Verbal, A/V and Demonstration teaching methods  Diamond Hernandez shared area of learning and set a goal for outside of program to vacuum and mop        Tx Plan Objective: 1 2, Therapist:  Marla VÁZQUEZ

## 2021-01-29 ENCOUNTER — OFFICE VISIT (OUTPATIENT)
Dept: PSYCHOLOGY | Facility: CLINIC | Age: 61
End: 2021-01-29
Payer: COMMERCIAL

## 2021-01-29 DIAGNOSIS — F33.2 SEVERE EPISODE OF RECURRENT MAJOR DEPRESSIVE DISORDER, WITHOUT PSYCHOTIC FEATURES (HCC): ICD-10-CM

## 2021-01-29 DIAGNOSIS — F41.1 GENERALIZED ANXIETY DISORDER: Primary | ICD-10-CM

## 2021-01-29 PROCEDURE — G0177 OPPS/PHP; TRAIN & EDUC SERV: HCPCS

## 2021-01-29 PROCEDURE — G0410 GRP PSYCH PARTIAL HOSP 45-50: HCPCS

## 2021-01-29 PROCEDURE — S0201 PARTIAL HOSPITALIZATION SERV: HCPCS

## 2021-01-29 NOTE — PSYCH
This note was not shared with the patient due to this is a psychotherapy note    Subjective:     Patient ID: Joann Geiger is a 56 y o  female  [facility] Clinical Progress Notes      Specialized Services Documentation  Therapist must complete separate progress note for each specific clinical activity in which the individual participated during the day  Group Psychotherapy     ([id number]) Joann Geiger actively shared in psychotherapy group which focused on Weekly Wellness Assessment  Theyengaged in self-rate and discussion  Group members individually went through the assessment and rated themselves based on the past week  Group members shared assessment results  Group discussed the six domains of wellness; physical, emotional, cognitive, vocational, social, and spiritual  Group discussed strengths, challenges, barriers, and ways to increase each domain in and open forum and developed goals  Good progress towards goal observed and shared  Continue psychotherapy to further encourage self-reflection on strengths and challenges with personal wellness      Tx Plan Objective:  1 1,1 2,1 3,1 4 Therapist:  Mike Calixto RN

## 2021-01-29 NOTE — PSYCH
This note was not shared with the patient due to this is a psychotherapy note   Subjective:     Patient ID: Tika Brand is a 56 y o  female  [facility] Clinical Progress Notes      Specialized Services Documentation  Therapist must complete separate progress note for each specific clinical activity in which the individual participated during the day  GROUP PSYCHOTHERAPY ([id number]) Group was facilitated virtually in a private office using HIPAA Compliant and Approved [employer] Teams  Tika Brand consented to the use of tele-video modality of treatment and was virtually present for group psychotherapy today  The group members received a safe and non-judgmental space to discuss current stressors and received feedback from the group  The group discussions revolved around personal disclosures, judgements and negative self-talk  Levon Campo shared that she wants to work on her sense of self  Levon Campo presented as impulsive, evident by lack of utilizing sharing options and talking over other group members without context  Levon Campo continues to make progress towards goals through verbal participation in group  Continue with psychotherapy  TX Plan Objectives: 1 1, 1 2, 1 4   Therapist: Damaris Rao MA    Education Therapy   [id number] Tika Brand actively shared in morning assessment and goal review  Presented as Receptive related to readiness to learn  Tika Brand did complete goal from last treatment day identifying gaining accomplishment and happiness  did not present with any barriers to learning  [id number] Tika Brand engaged throughout the treatment day  Was engaged in learning related to Illness, Medication, Aftercare and Wellness Tools  Staff utilized Verbal, Written, A/V and Demonstration teaching methods  Tika Brand shared area of learning and set a goal for outside of program to take a 10 minute nap and make mantra cards        Tx Plan Objective: 1 1, 1 2, 1 4 Therapist:  Frank Day MA      Other ([id number] with Osiris Bass from [facility] with a contact number [phone], using Tax ID [id number]  Location address remains [place] [address] and CM scheduled an appointment for  Monday 02/01/21 for live clinical review  Case Management Note    Frank Day MA     Current suicide risk : Low     A case management session is not scheduled today with Yanick Barkley; additionally, they did not request a CM meeting  Next scheduled session is 01/30/21  Medications changes/added/denied? No    Treatment session number: 5    Individual Case Management Visit provided today?  No    [facility] follow up physician's orders: None at this time

## 2021-01-29 NOTE — PSYCH
This note was not shared with the patient due to this is a psychotherapy note  Subjective:     Patient ID: Cayla Prather is a 56 y o  female  [facility] Clinical Progress Notes      Specialized Services Documentation  Therapist must complete separate progress note for each specific clinical activity in which the individual participated during the day  Group Psychotherapy Life Skills Group (10:25-11:10) Martha actively engaged in group focused on how to improve self-compassion which was facilitated virtually in a private office using HIPAA Compliant and Approved [employer] Teams  Martha consented to the use of tele-video modality of treatment and was virtually present for group psychotherapy today  The group was a continuation of yesterdays group which learned about the importance of self compassion and how to bring self-compassion into their lives  [facility] group examined possible changes they could make and discussed one compassionate thought that would comfort them  Rogelio Asencio discussed making a change by thinking more and talking less  Rogelio Elif states a comforting and compassionate thought that she has is she will talk with her friend tomorrow  Rogelio Asencio continues to make progress towards goals through verbal participation in group; to accomplish long term goals continue to utilize skills learned in programming  Continue with psychotherapy to educate and encourage use of wellness tools   Tx Plan Objective: 1 1,1 2 Therapist:  DESTINY Holt

## 2021-01-29 NOTE — PSYCH
Virtual Regular Visit      Assessment/Plan:    Problem List Items Addressed This Visit        Other    Severe episode of recurrent major depressive disorder, without psychotic features ([facility] [address] ) (Chronic)    Generalized anxiety disorder - Primary               Reason for visit is VIRTUAL PHP DUE TO COVID-19       Encounter provider [facility] [address]    [facility] [address] [facility] [place] [address]      Recent Visits  Date Type Provider Dept   01/28/21 Office Visit [facility]   01/27/21 [facility]   01/26/21 Office Visit [facility]   01/25/21 Office Visit [facility]   01/22/21 Office Visit [facility]   Showing recent visits within past 7 days and meeting all other requirements     Future Appointments  No visits were found meeting these conditions  Showing future appointments within next 150 days and meeting all other requirements        The patient was identified by name and date of birth  Nunoesha OrtizThorp was informed that this is a telemedicine visit and that the visit is being conducted through [employer] and patient was informed that this is a secure, HIPAA-compliant platform  She agrees to proceed     My office door was closed  No one else was in the room  She acknowledged consent and understanding of privacy and security of the video platform  The patient has agreed to participate and understands they can discontinue the visit at any time  Patient is aware this is a billable service  Niki Rock is a 56 y o  female           HPI     Past Medical History:   Diagnosis Date    Anxiety     Concussion     Last assessed 9/21/2017    Depression     DJD (degenerative joint disease)     Gait abnormality     Head injury     ISCHEMIC 2013     ISCHEMIC 2013  Seizures ([facility] [address] )        Past Surgical History:   Procedure Laterality Date    BRAIN SURGERY      IMMED FOLLOWING STROKE IN 2013     SECTION         Current Outpatient Medications   Medication Sig Dispense Refill    busPIRone (BUSPAR) 10 mg tablet Take 1 tablet (10 mg total) by mouth 3 (three) times a day 270 tablet 0    [START ON 2021] cholecalciferol (VITAMIN D3) 1,000 units tablet Take 1 tablet (1,000 Units total) by mouth daily 30 tablet 0    citalopram (CeleXA) 10 mg tablet Take 3 tablets (30 mg total) by mouth daily for 15 doses 45 tablet 1    ergocalciferol (VITAMIN D2) 50,000 units Take 1 capsule (50,000 Units total) by mouth once a week for 14 doses 13 capsule 0    ibuprofen (MOTRIN) 400 mg tablet Take 1 tablet (400 mg total) by mouth every 8 (eight) hours as needed for moderate pain 60 tablet 0    melatonin 3 mg Take 1 tablet (3 mg total) by mouth daily at bedtime 30 tablet 0    polyethylene glycol (MIRALAX) 17 g packet Take 17 g by mouth daily as needed (constipation refractory to Senokot-S) (Patient not taking: Reported on 2021) 30 each 0    senna-docusate sodium (SENOKOT S) 8 6-50 mg per tablet Take 1 tablet by mouth daily as needed for constipation (Patient not taking: Reported on 2021) 30 tablet 0     No current facility-administered medications for this visit  No Known Allergies    Review of Systems    Video Exam    There were no vitals filed for this visit  Physical Exam     I spent 4 hours of group + case management minutes with patient today in which greater than 50% of the time was spent in counseling/coordination of care regarding see notes  VIRTUAL VISIT DISCLAIMER    Martha Calderon Bolds acknowledges that she has consented to an online visit or consultation   She understands that the online visit is based solely on information provided by her, and that, in the absence of a face-to-face physical evaluation by the physician, the diagnosis she receives is both limited and provisional in terms of accuracy and completeness  This is not intended to replace a full medical face-to-face evaluation by the physician  Kurt Jimenez understands and accepts these terms

## 2021-02-01 ENCOUNTER — OFFICE VISIT (OUTPATIENT)
Dept: PSYCHOLOGY | Facility: CLINIC | Age: 61
End: 2021-02-01
Payer: COMMERCIAL

## 2021-02-01 ENCOUNTER — TELEMEDICINE (OUTPATIENT)
Dept: PSYCHIATRY | Facility: CLINIC | Age: 61
End: 2021-02-01
Payer: COMMERCIAL

## 2021-02-01 DIAGNOSIS — F41.1 GENERALIZED ANXIETY DISORDER: Primary | ICD-10-CM

## 2021-02-01 DIAGNOSIS — R41.844 EXECUTIVE FUNCTION DEFICIT: ICD-10-CM

## 2021-02-01 DIAGNOSIS — F51.01 IDIOPATHIC INSOMNIA: ICD-10-CM

## 2021-02-01 DIAGNOSIS — F33.2 SEVERE EPISODE OF RECURRENT MAJOR DEPRESSIVE DISORDER, WITHOUT PSYCHOTIC FEATURES (HCC): ICD-10-CM

## 2021-02-01 DIAGNOSIS — F33.2 SEVERE EPISODE OF RECURRENT MAJOR DEPRESSIVE DISORDER, WITHOUT PSYCHOTIC FEATURES (HCC): Chronic | ICD-10-CM

## 2021-02-01 DIAGNOSIS — R41.3 MEMORY PROBLEM: ICD-10-CM

## 2021-02-01 PROCEDURE — 1036F TOBACCO NON-USER: CPT | Performed by: NURSE PRACTITIONER

## 2021-02-01 PROCEDURE — G0177 OPPS/PHP; TRAIN & EDUC SERV: HCPCS

## 2021-02-01 PROCEDURE — G0410 GRP PSYCH PARTIAL HOSP 45-50: HCPCS

## 2021-02-01 PROCEDURE — S0201 PARTIAL HOSPITALIZATION SERV: HCPCS

## 2021-02-01 PROCEDURE — 99213 OFFICE O/P EST LOW 20 MIN: CPT | Performed by: NURSE PRACTITIONER

## 2021-02-01 PROCEDURE — G0176 OPPS/PHP;ACTIVITY THERAPY: HCPCS

## 2021-02-01 NOTE — PSYCH
Virtual Regular Visit    Problem List Items Addressed This Visit        Other    Severe episode of recurrent major depressive disorder, without psychotic features ([facility] [address] ) (Chronic)    Idiopathic insomnia    Generalized anxiety disorder - Primary    Memory problem    Executive function deficit             Encounter provider OSMAN Seo    Provider located at   [address] [place] [id number]    Recent Visits  Date Type Provider Dept   01/27/21 Office Visit Tita Turpin MD [facility]   Showing recent visits within past 7 days and meeting all other requirements     Future Appointments  No visits were found meeting these conditions  Showing future appointments within next 150 days and meeting all other requirements      The patient was identified by name and date of birth  Jovan Main was informed that this is a telemedicine visit and that the visit is being conducted through [employer]  My office door was closed  No one else was in the room  She acknowledged consent and understanding of privacy and security of the video platform  The patient has agreed to participate and understands they can discontinue the visit at any time  Patient is aware this is a billable service       HPI     Current Outpatient Medications   Medication Sig Dispense Refill    busPIRone (BUSPAR) 10 mg tablet Take 1 tablet (10 mg total) by mouth 3 (three) times a day 270 tablet 0    [START ON 4/12/2021] cholecalciferol (VITAMIN D3) 1,000 units tablet Take 1 tablet (1,000 Units total) by mouth daily 30 tablet 0    citalopram (CeleXA) 10 mg tablet Take 3 tablets (30 mg total) by mouth daily for 15 doses 45 tablet 1    ergocalciferol (VITAMIN D2) 50,000 units Take 1 capsule (50,000 Units total) by mouth once a week for 14 doses 13 capsule 0    ibuprofen (MOTRIN) 400 mg tablet Take 1 tablet (400 mg total) by mouth every 8 (eight) hours as needed for moderate pain 60 tablet 0    melatonin 3 mg Take 1 tablet (3 mg total) by mouth daily at bedtime 30 tablet 0    polyethylene glycol (MIRALAX) 17 g packet Take 17 g by mouth daily as needed (constipation refractory to Senokot-S) (Patient not taking: Reported on 1/21/2021) 30 each 0    senna-docusate sodium (SENOKOT S) 8 6-50 mg per tablet Take 1 tablet by mouth daily as needed for constipation (Patient not taking: Reported on 1/21/2021) 30 tablet 0     No current facility-administered medications for this visit  Review of Systems  Video Exam    There were no vitals filed for this visit  Physical Exam   As a result of this visit, I have referred the patient for further respiratory evaluation  No    I spent 15 minutes directly with the patient during this visit  [id number] [employer] [facility] acknowledges that she has consented to an online visit or consultation  She understands that the online visit is based solely on information provided by her, and that, in the absence of a face-to-face physical evaluation by the physician, the diagnosis she receives is both limited and provisional in terms of accuracy and completeness  This is not intended to replace a full medical face-to-face evaluation by the physician  Margaret Locoreddy understands and accepts these terms  PHP MEDICATION MANAGEMENT NOTE        [facility]    Name and Date of Birth:  Margaret Fierro 56 y o  1960 MRN: [id number]    Date of Visit: February 1, 2021    No Known Allergies  SUBJECTIVE:    Jose Arteaga is seen today for a follow up for Major Depressive Disorder and Generalized Anxiety Disorder  She reports that she has done fairly well since beginning PHP  Patient's mood anxiety has significantly improved  Patient has some insight into impulsiveness leading to suicide attempt and hospitalization    States at this times feeling good about life and positive for the future  Feels she is learning many useful coping mechanisms through PHP  Feels buspirone is very helpful for anxiety  She denies any side effects from medications  PLAN:    Continue buspirone 10 mg p o  t i d    continue Celexa 30 mg p o  daily  Aware of 24 hour and weekend coverage for urgent situations accessed by calling [facility] main practice number  Continue partial hospitalization program    Diagnoses and all orders for this visit:    Generalized anxiety disorder    Memory problem    Executive function deficit    Idiopathic insomnia    Severe episode of recurrent major depressive disorder, without psychotic features ([facility] [address] )        Current Outpatient Medications on File Prior to Visit   Medication Sig Dispense Refill    busPIRone (BUSPAR) 10 mg tablet Take 1 tablet (10 mg total) by mouth 3 (three) times a day 270 tablet 0    [START ON 4/12/2021] cholecalciferol (VITAMIN D3) 1,000 units tablet Take 1 tablet (1,000 Units total) by mouth daily 30 tablet 0    citalopram (CeleXA) 10 mg tablet Take 3 tablets (30 mg total) by mouth daily for 15 doses 45 tablet 1    ergocalciferol (VITAMIN D2) 50,000 units Take 1 capsule (50,000 Units total) by mouth once a week for 14 doses 13 capsule 0    ibuprofen (MOTRIN) 400 mg tablet Take 1 tablet (400 mg total) by mouth every 8 (eight) hours as needed for moderate pain 60 tablet 0    melatonin 3 mg Take 1 tablet (3 mg total) by mouth daily at bedtime 30 tablet 0    polyethylene glycol (MIRALAX) 17 g packet Take 17 g by mouth daily as needed (constipation refractory to Senokot-S) (Patient not taking: Reported on 1/21/2021) 30 each 0    senna-docusate sodium (SENOKOT S) 8 6-50 mg per tablet Take 1 tablet by mouth daily as needed for constipation (Patient not taking: Reported on 1/21/2021) 30 tablet 0     No current facility-administered medications on file prior to visit          Psychotherapy Provided:     Individual psychotherapy provided: No    HPI ROS Appetite Changes and Sleep:     She reports normal sleep, adequate appetite, adequate energy level   Patient denies suicidal or homicidal ideation    Review Of Systems:      General emotional problems and sleep disturbances   Personality no change in personality   Constitutional negative   ENT negative   Cardiovascular negative   Respiratory negative   Gastrointestinal negative   Genitourinary negative   Musculoskeletal negative   Integumentary negative   Neurological negative   Endocrine negative   Other Symptoms none, all other systems are negative     Mental Status Evaluation:    Appearance  unable to assess   Behavior calm and cooperative   Mood euthymic  Depression Scale -  of 10 (0 = No depression)  Anxiety Scale -  of 10 (0 = No anxiety)   Speech Normal rate and volume   Affect  unable to assess   Thought Processes Goal directed and coherent   Thought Content Does not verbalize delusional material   Associations Tightly connected   Perceptual Disturbances Denies hallucinations and does not appear to be responding to internal stimuli   Risk Potential Suicidal/Homicidal Ideation - No evidence of suicidal or homicidal ideation and Patient does not verbalize suicidal or homicidal ideation  Risk of Violence - No evidence of risk for violence found on assessment  Risk of Self Mutilation - No evidence of risk for self mutilation found on assessment   Orientation oriented to person, place, time/date and situation   Memory recent and remote memory grossly intact   Consciousness alert and awake   Attention/Concentration attention span and concentration are age appropriate   Insight fair   Judgement fair   Muscle Strength and Gait normal muscle strength and normal muscle tone, normal gait/station and normal balance   Motor Activity no abnormal movements   Language no difficulty naming common objects, no difficulty repeating a phrase, no difficulty writing a sentence   Fund of Knowledge adequate knowledge of current events  adequate fund of knowledge regarding past history  adequate fund of knowledge regarding vocabulary      Past Psychiatric History Update:     Inpatient Psychiatric Admission Since Last Encounter:   no  Suicide Attempt Or Self Mutilation Since Last Encounter:   no  Incidence of Violent Behavior Since Last Encounter:   no    Traumatic History Update:     New Onset of Abuse Since Last Encounter:   no  Traumatic Events Since Last Encounter:   no    Past Medical History:    Past Medical History:   Diagnosis Date    Anxiety     Concussion     Last assessed 2017    Depression     DJD (degenerative joint disease)     Gait abnormality     Head injury     ISCHEMIC      ISCHEMIC 2013    Seizures ([address] )      No past medical history pertinent negatives  Past Surgical History:   Procedure Laterality Date    BRAIN SURGERY      IMMED FOLLOWING STROKE IN      SECTION       No Known Allergies  Substance Abuse History:    Social History     Substance and Sexual Activity   Alcohol Use No    Frequency: Never    Binge frequency: Never    Comment: doesnt drink  Social History     Substance and Sexual Activity   Drug Use No     Social History:    Social History     Socioeconomic History    Marital status: /Civil Union     Spouse name: Not on file    Number of children: Not on file    Years of education: Not on file    Highest education level: Some college, no degree   Occupational History    Occupation: Disabled   Social Needs    Financial resource strain: Not hard at all   Henrry-Jennifer insecurity     Worry: Not on file     Inability: Not on file   [employer] needs     Medical: Not on file     Non-medical: Not on file   Tobacco Use    Smoking status: Never Smoker    Smokeless tobacco: Never Used   Substance and Sexual Activity    Alcohol use: No     Frequency: Never     Binge frequency: Never     Comment: doesnt drink      Drug use: No    Sexual activity: Not Currently Partners: Male   Lifestyle    Physical activity     Days per week: 6 days     Minutes per session: Not on file    Stress: To some extent   Relationships    Social connections     Talks on phone: Not on file     Gets together: Not on file     Attends Nondenominational service: Not on file     Active member of club or organization: Not on file     Attends meetings of clubs or organizations: Not on file     Relationship status:     Intimate partner violence     Fear of current or ex partner: No     Emotionally abused: No     Physically abused: No     Forced sexual activity: No   Other Topics Concern    Not on file   Social History Narrative    Daily caffeine consumption     Family Psychiatric History:     Family History   Problem Relation Age of Onset    Aortic aneurysm Mother     No Known Problems Father     Psychiatric Illness Neg Hx      History Review: The following portions of the patient's history were reviewed and updated as appropriate: allergies, current medications, past family history, past medical history, past social history, past surgical history and problem list     OBJECTIVE:     Vital signs in last 24 hours: There were no vitals filed for this visit  Laboratory Results: I have personally reviewed all pertinent laboratory/tests results  Medications Risks/Benefits:      Risks, Benefits And Possible Side Effects Of Medications:    Discussed risks and benefits of treatment with patient including risk of suicidality, serotonin syndrome and SIADH related to treatment with antidepressants; Risk of induction of manic symptoms in certain patient populations     Controlled Medication Discussion:     Not applicable    OSMAN Cherry 02/01/21    This note was shared with patient

## 2021-02-01 NOTE — PSYCH
Virtual Regular Visit      Assessment/Plan:    Problem List Items Addressed This Visit        Other    Severe episode of recurrent major depressive disorder, without psychotic features ([facility] [address] ) (Chronic)    Generalized anxiety disorder - Primary               Reason for visit is VIRTUAL PHP DUE TO COVID-19       Encounter provider BE [address]    Provider located at [place] [id number] [place] [id number]      Recent Visits  Date Type Provider Dept   01/29/21 Office Visit [facility]   01/28/21 Office Visit [facility]   01/27/21 Office Visit Herbert Parikh MD [facility]   01/26/21 Office Visit [facility]   01/25/21 Office Visit [facility]   Showing recent visits within past 7 days and meeting all other requirements     Today's Visits  Date Type Provider Dept   02/01/21 Office Visit [facility]   Showing today's visits and meeting all other requirements     Future Appointments  No visits were found meeting these conditions  Showing future appointments within next 150 days and meeting all other requirements        The patient was identified by name and date of birth  Yuliet Florecita was informed that this is a telemedicine visit and that the visit is being conducted through [employer] and patient was informed that this is a secure, HIPAA-compliant platform  She agrees to proceed     My office door was closed  No one else was in the room  She acknowledged consent and understanding of privacy and security of the video platform  The patient has agreed to participate and understands they can discontinue the visit at any time  Patient is aware this is a billable service  Syed Burr is a 56 y o  female          HPI     Past Medical History:   Diagnosis Date    Anxiety  Concussion     Last assessed 2017    Depression     DJD (degenerative joint disease)     Gait abnormality     Head injury     ISCHEMIC      ISCHEMIC 2013    Seizures [facility])        Past Surgical History:   Procedure Laterality Date    BRAIN SURGERY      IMMED FOLLOWING STROKE IN 2013     SECTION         Current Outpatient Medications   Medication Sig Dispense Refill    busPIRone (BUSPAR) 10 mg tablet Take 1 tablet (10 mg total) by mouth 3 (three) times a day 270 tablet 0    [START ON 2021] cholecalciferol (VITAMIN D3) 1,000 units tablet Take 1 tablet (1,000 Units total) by mouth daily 30 tablet 0    citalopram (CeleXA) 10 mg tablet Take 3 tablets (30 mg total) by mouth daily for 15 doses 45 tablet 1    ergocalciferol (VITAMIN D2) 50,000 units Take 1 capsule (50,000 Units total) by mouth once a week for 14 doses 13 capsule 0    ibuprofen (MOTRIN) 400 mg tablet Take 1 tablet (400 mg total) by mouth every 8 (eight) hours as needed for moderate pain 60 tablet 0    melatonin 3 mg Take 1 tablet (3 mg total) by mouth daily at bedtime 30 tablet 0    polyethylene glycol (MIRALAX) 17 g packet Take 17 g by mouth daily as needed (constipation refractory to Senokot-S) (Patient not taking: Reported on 2021) 30 each 0    senna-docusate sodium (SENOKOT S) 8 6-50 mg per tablet Take 1 tablet by mouth daily as needed for constipation (Patient not taking: Reported on 2021) 30 tablet 0     No current facility-administered medications for this visit  No Known Allergies    Review of Systems    Video Exam    There were no vitals filed for this visit  Physical Exam     I spent 4 hours of group + case management minutes with patient today in which greater than 50% of the time was spent in counseling/coordination of care regarding see notes  VIRTUAL VISIT DISCLAIMER    Martha Landry acknowledges that she has consented to an online visit or consultation   She understands that the online visit is based solely on information provided by her, and that, in the absence of a face-to-face physical evaluation by the physician, the diagnosis she receives is both limited and provisional in terms of accuracy and completeness  This is not intended to replace a full medical face-to-face evaluation by the physician  Guillermo Agrawal understands and accepts these terms

## 2021-02-01 NOTE — PSYCH
This note was not shared with the patient due to this is a psychotherapy note    Subjective:     Patient ID: Chitra Patino is a 56 y o  female  [facility] Clinical Progress Notes      Specialized Services Documentation  Therapist must complete separate progress note for each specific clinical activity in which the individual participated during the day  Group Psychotherapy [id number]    This group was facilitated virtually in a private office using [employer] and Approved Microsoft Teams  Chitra Patino consented to the use of tele-video modality of treatment  Psychotherapy group focused on building self esteem  Chitra Patino was educated on the correlation between low self esteem and anxiety, depression, poor relationships, and addiction  Different wellness tools to improve self esteem were reviewed includin) managing your inner critic  2) focusing on what is going well for you  3) aiming for effort rather than perfection  4) viewing mistakes as learning opportunities  5) editing  thoughts that get you to feel inferior  6) reminding yourself that everyone excels at different things  7) trying new things and giving yourself credit  8) recognizing what you can change and what you cant  9) setting goals  10)  taking pride in your opinions and ideas  11)  accepting compliments  12)  making a contribution  Chitra Patino was provided a list of positive affirmations to begin incorporating into her day to day routine  Through group discussion, Chitra Patino shared that one way she can begin improving her self esteem is by focusing on what is going well for her and not dwelling on the negative  Chitra Patino shared that she struggles to think positively about herself at times and seems to look past the things she is doing well  She reported that this is something that she continue to work towards  Positive progress toward goals     Chitra Patino is encouraged to continue to make progress towards goals and objectives through group participation and will continue to attend psychotherapy group             Tx Plan Objective: 1 1,1 2, Therapist:  DESTINY Power

## 2021-02-01 NOTE — PSYCH
This note was not shared with the patient due to this is a psychotherapy note   Subjective:     Patient ID: Yancik Barkley is a 56 y o  female  [facility] Clinical Progress Notes      Specialized Services Documentation  Therapist must complete separate progress note for each specific clinical activity in which the individual participated during the day  GROUP PSYCHOTHERAPY (8613-8727) Group was facilitated virtually in a private office using HIPAA Compliant and Approved [employer] group Teams  Yanick Barkley consented to the use of tele-video modality of treatment and was virtually present for group psychotherapy today  The group members received a safe and non-judgmental space to discuss current stressors and received feedback from the group  The group discussions reviewing and organizing skills and effective communication in positive relationships  The group was educated on Non-Violent Communication  Shaila Angel shared her knowledge about skills and her experiences utilizing them  Shaila Angel continues to make progress towards goals through verbal participation in group  Continue with psychotherapy  [place] Plan Objectives: 1 1, 1 2, 1 4   Therapist: Frank Day MA    Other CM talked with Osiris Bass from [facility] to obtain an extension of the final cover day to utilize her 7 authorized days  Osiris Bass granted an extension of coverage for 02/01/21  CM will request an extension tomorrow with Osiris Bass  Case Management Note    Frank Day MA    Current suicide risk : Low     Shaila Anegl was a no show for the virtual case management session  Next case management session will be for tomorrow  Medications changes/added/denied? No    Treatment session number: 6    Individual Case Management Visit provided today? No    [facility] follow up physician's orders: None at this time

## 2021-02-01 NOTE — PSYCH
This note was not shared with the patient due to this is a psychotherapy note       Subjective:     Patient ID: Yanikc Barkley is a 56 y o  female  [facility] Clinical Progress Notes      Specialized Services Documentation  Therapist must complete separate progress note for each specific clinical activity in which the individual participated during the day  Allied Therapy   This group was facilitated virtually in a private office using [employer] and Approved Microsoft Teams  Yanick Barkley consented to the use of tele-video modality of treatment  [id number]  Yanick Barkley  actively shared in [facility] group exploring DBT distress tolerance crisis survival strategies  Group explored crisis survival strategies ACCEPTS, TIP, STOP, and PROS & CONS) reinforcing actions one could take to learn to tolerate stressful experiences, thoughts and urges  Shaila Angel felt she could put effort into practicing "with other emotions" from [address]  Some progress toward goal noted  Continue AT to encourage learning, practice and home practice of skills to manage distress  Tx Plan Objective: 1 1, Therapist:  Temitope VÁZQUEZ    Education Therapy   3063-2407 Yanick Barkley actively shared in morning assessment and goal review  Presented as Receptive related to readiness to learn  Yanick Barkley did complete goal from last treatment day identifying gaining support  did not present with any barriers to learning  [id number] Yanick Barkley engaged throughout the treatment day  Was engaged in learning related to Illness and Wellness Tools  Staff utilized Verbal, A/V and Demonstration teaching methods  Yanick Barkley shared area of learning and set a goal for outside of program to do laundry and make lasgna        Tx Plan Objective: 1 2, Therapist:  Temitope VÁZQUEZ

## 2021-02-02 ENCOUNTER — OFFICE VISIT (OUTPATIENT)
Dept: PSYCHOLOGY | Facility: CLINIC | Age: 61
End: 2021-02-02
Payer: COMMERCIAL

## 2021-02-02 DIAGNOSIS — F33.2 SEVERE EPISODE OF RECURRENT MAJOR DEPRESSIVE DISORDER, WITHOUT PSYCHOTIC FEATURES (HCC): ICD-10-CM

## 2021-02-02 DIAGNOSIS — F41.1 GENERALIZED ANXIETY DISORDER: Primary | ICD-10-CM

## 2021-02-02 PROCEDURE — S0201 PARTIAL HOSPITALIZATION SERV: HCPCS

## 2021-02-02 PROCEDURE — G0176 OPPS/PHP;ACTIVITY THERAPY: HCPCS

## 2021-02-02 PROCEDURE — G0177 OPPS/PHP; TRAIN & EDUC SERV: HCPCS

## 2021-02-02 PROCEDURE — G0410 GRP PSYCH PARTIAL HOSP 45-50: HCPCS

## 2021-02-02 NOTE — PSYCH
This note was not shared with the patient due to this is a psychotherapy note   Subjective:     Patient ID: Deniz Clayton is a 56 y o  female  [facility] Clinical Progress Notes      Specialized Services Documentation  Therapist must complete separate progress note for each specific clinical activity in which the individual participated during the day  GROUP PSYCHOTHERAPY (6400 - 8414) Group was facilitated virtually in a private office using HIPAA Compliant and Approved Microsoft Teams  Deniz Clayton consented to the use of tele-video modality of treatment and was virtually present for group psychotherapy today  The group engaged in education on thought diffusion and using therapy metaphors to perceive their thoughts differently  The group discussed how to utilize the thought metaphors to help break the stigma of mental health and have open conversations with supports  The group concluded with a guided meditation  Martha shared her interpretations of the metaphors  Yanci Sepulveda continues to make progress towards goals through verbal participation in group  Continue with psychotherapy  [facility] Objectives: 1 1, 1 2, 1 4  Therapist: Maame Sargent    Other ([id number]) Spoke with Dalia Farah from [facility] with a contact number [phone], using Tax ID [id number]  Location address remains [address] [place] [address]  Deniz Clayton was authorized 7 days from 02/01/21 to 02/09/21 with an authorization code of [id number]  Case Management Note    Erick Rosales MA    Current suicide risk : Low     Martha was a no show for virtual case management  CM emailed and called to remind of session  No response  Case Management scheduled for tomorrow  Medications changes/added/denied? No    Treatment session number: 7    Individual Case Management Visit provided today? No    [facility] follow up physician's orders: None at this time

## 2021-02-02 NOTE — PSYCH
[facility] Discharge Instructions:   Disposition: home  Address: [address] [place] [address]      Diagnosis:  1  Generalized anxiety disorder     2  Severe episode of recurrent major depressive disorder, without psychotic features ([facility] [address] )         Allergies (Drug/Food): No Known Allergies  Activity: Follow PCP and other specialist recommendations  Diet:no recommendations  Smoking Cessation:not a smoker     Diagnostic/Laboratory Orders: None requested while at [facility]  Vaccines: If you received a vaccine, please notify your family physician on your next visit  For more information, please call [phone]  Follow-up appointments/Referrals: Keep Follow Up Appointments, Take Medication As Prescribed, Utilize WRAP, Consider Support Groups and/or Volunteer Opportunities    Psychiatrist: Appointment Date:   Dr Roseanne Way   [address] [facility] [address] [place] [address], [address]     Therapist: Appointment Date: 02/04/21  Devante Ventura   [id number] [facility], [address] [place] [address] Road  [place], [address] [place] [address]     Primary Care Physician: Seen on 1/27/21  Stephanie Lester MD  [address] [facility]  [address] [place] [address]  [phone]  Fax: [phone]    ICM/CTT:None    [facility]: Ron Bullard [phone]  Intake/Referral/Evaluation (Non-Emergency) *NON INSURED FOR FUNDING: [facility]: [phone], [facility]: [phone], [facility]: [phone] and [place]: [phone]  Crisis Intervention (Emergency) [address] [facility] Service: [facility]: [phone], [place]: [phone], [facility]: [phone], [facility]): [phone], Keila Mac: [phone] and [facility]: [phone]    _________________________________    National Crisis Intervention Hotline: 7-776-107-893.590.8289  National Suicide Crisis Hotline: 2-381.934.9617  I, the undersigned, have received and understand the above instructions          Patient/Rep Signature: __________________________________       Date/Time: ______________         Relationship: __________________________________________       Date/Time: ______________         Physician Signature: ____________________________________      Date/Time: ______________               Signature: ________________________________       Date/Time: ______________

## 2021-02-02 NOTE — PSYCH
Virtual Regular Visit      Assessment/Plan:    Problem List Items Addressed This Visit        Other    Severe episode of recurrent major depressive disorder, without psychotic features ([facility] [address] ) (Chronic)    Generalized anxiety disorder - Primary               Reason for visit is VIRTUAL PHP DUE TO COVID-19       Encounter provider BE [address]    Provider located at [address] [address]      Recent Visits  Date Type Provider Dept   02/01/21 Office Visit [facility]   01/29/21 [facility] Visit [facility]   01/28/21 [facility]   01/27/21 Office Visit Mely Kelley MD [facility]   01/26/21 Office Visit [facility]   Showing recent visits within past 7 days and meeting all other requirements     Today's Visits  Date Type Provider Dept   02/02/21 Office Visit [facility]   Showing today's visits and meeting all other requirements     Future Appointments  No visits were found meeting these conditions  Showing future appointments within next 150 days and meeting all other requirements        The patient was identified by name and date of birth  Vipul Pandyaen was informed that this is a telemedicine visit and that the visit is being conducted through [employer] and patient was informed that this is a secure, HIPAA-compliant platform  She agrees to proceed     My office door was closed  No one else was in the room  She acknowledged consent and understanding of privacy and security of the video platform  The patient has agreed to participate and understands they can discontinue the visit at any time  Patient is aware this is a billable service  Suzi Mcginnis is a 56 y o  female          HPI     Past Medical History:   Diagnosis Date    Anxiety  Concussion     Last assessed 2017    Depression     DJD (degenerative joint disease)     Gait abnormality     Head injury     ISCHEMIC      ISCHEMIC 2013    Seizures [facility])        Past Surgical History:   Procedure Laterality Date    BRAIN SURGERY      IMMED FOLLOWING STROKE IN 2013     SECTION         Current Outpatient Medications   Medication Sig Dispense Refill    busPIRone (BUSPAR) 10 mg tablet Take 1 tablet (10 mg total) by mouth 3 (three) times a day 270 tablet 0    [START ON 2021] cholecalciferol (VITAMIN D3) 1,000 units tablet Take 1 tablet (1,000 Units total) by mouth daily 30 tablet 0    citalopram (CeleXA) 10 mg tablet Take 3 tablets (30 mg total) by mouth daily for 15 doses 45 tablet 1    ergocalciferol (VITAMIN D2) 50,000 units Take 1 capsule (50,000 Units total) by mouth once a week for 14 doses 13 capsule 0    ibuprofen (MOTRIN) 400 mg tablet Take 1 tablet (400 mg total) by mouth every 8 (eight) hours as needed for moderate pain 60 tablet 0    melatonin 3 mg Take 1 tablet (3 mg total) by mouth daily at bedtime 30 tablet 0    polyethylene glycol (MIRALAX) 17 g packet Take 17 g by mouth daily as needed (constipation refractory to Senokot-S) (Patient not taking: Reported on 2021) 30 each 0    senna-docusate sodium (SENOKOT S) 8 6-50 mg per tablet Take 1 tablet by mouth daily as needed for constipation (Patient not taking: Reported on 2021) 30 tablet 0     No current facility-administered medications for this visit  No Known Allergies    Review of Systems    Video Exam    There were no vitals filed for this visit  Physical Exam     I spent 4 hours of group + case management minutes with patient today in which greater than 50% of the time was spent in counseling/coordination of care regarding see notes  VIRTUAL VISIT DISCLAIMER    Martha Shen acknowledges that she has consented to an online visit or consultation   She understands that the online visit is based solely on information provided by her, and that, in the absence of a face-to-face physical evaluation by the physician, the diagnosis she receives is both limited and provisional in terms of accuracy and completeness  This is not intended to replace a full medical face-to-face evaluation by the physician  Alan Faye understands and accepts these terms

## 2021-02-02 NOTE — PSYCH
Subjective:     Patient ID: Yanick Barkley is a 56 y o  female  [facility] Clinical Progress Notes      Specialized Services Documentation  Therapist must complete separate progress note for each specific clinical activity in which the individual participated during the day  This was not shared due to this is a psychotherapy note  GROUP PSYCHOTHERAPY (2637-8295) Group was facilitated virtually in a private office using HIPAA Compliant and Approved [employer] Teams  Martha consented to the use of tele-video modality of treatment and was virtually present for group psychotherapy today  The group engaged in education about self-sabotaging behavior  We specifically focused on procrastination and how each member engages in this self-sabotaging behavior  Each member was asked to answer the following questions:    - In what area of your life do you tend to procrastinate in the most?  - What feelings does procrastination bring up in you? Shaila Angel seemed very engaged, as demonstrated by her willingness to participate without prompting and providing feedback to peers  Shaila Angel stated that they procrastinate the most when it comes to cleaning her house due to feeling lack of motivation  Shaila Angel is encouraged to make progress towards goals and objectives through group participation and will continue to attend psychotherapy group       Tx plan objective: 1 1, 1 2   Therapist: Roshan Chung MA

## 2021-02-02 NOTE — PSYCH
This note was not shared with the patient due to this is a psychotherapy note   Subjective:     Patient ID: Alida Mazariegos is a 56 y o  female  [facility] Clinical Progress Notes      Specialized Services Documentation  Therapist must complete separate progress note for each specific clinical activity in which the individual participated during the day  This group was facilitated virtually in a private office using [employer] and Approved Microsoft Teams  Alida Mazariegos consented to the use of tele-video modality of treatment  Allied Therapy   5360-1234 Alida Mazariegos  actively shared in [facility] group focused on motivation  Engaged in [facility] discussion and tasks exploring definition of, challenges to and ways to improve motivation  Group explored CBT and BA techniques to counteract limiting beliefs and set self up for success  Belinda Pradhan identified "I am not lovable" as a limiting belief she can work on  Specific ways to begin to challenge limiting beliefs practiced  Some progress toward goals  Continue AT to explore wellness tools and encourage active practice  Tx Plan Objective: 1 2, Therapist:  Mason VÁZQUEZ    Education Therapy   9299-1401 Alida Mazariegos actively shared in morning assessment and goal review  Presented as Receptive related to readiness to learn  Alida Mazariegos did complete goal from last treatment day identifying gaining responsibility  did not present with any barriers to learning  3482-5775 Alida Mazariegos engaged throughout the treatment day  Was engaged in learning related to Illness and Wellness Tools  Staff utilized Verbal, Written, A/V and Demonstration teaching methods  Alida Mazariegos shared area of learning and set a goal for outside of program to complete 1 chore and then color        Tx Plan Objective: 1 2, Therapist:  Mason VÁZQUEZ

## 2021-02-02 NOTE — PSYCH
Assessment/Plan:       Diagnoses and all orders for this visit:     Generalized anxiety disorder     Severe episode of recurrent major depressive disorder, without psychotic features ([facility] [address] )        Subjective:      Patient ID: Chitra Patino is a 56 y o  female      [facility] Treatment Plan   AREAS OF NEED: SWAPNA and MDD as evidenced by anxiousness, lethargy, tearfulness and suicidal ideation with a plan due to focusing on 's health more than her own  Date Initiated: 01/21/21     Strengths: "nurturing, loving, connection"          LONG TERM GOAL:   Date Initiated: 01/21/21  1 0 I will practice wellness tools and communication skills to improve my connections with my support persons  Target Date: 02/18/21  Completion Date:         SHORT TERM OBJECTIVES:      Date Initiated: 01/21/21  1 1 I will utilize assertive communication in 50% of my interactions with my   Revision Date: 02/02/21   Target Date: 02/02/21  Completion Date:       Date Initiated: 01/21/21  1 2 I will exercise 5 times a week to improve my overall health  Revision Date: 02/02/21   Target Date: 02/02/21  Completion Date:     Date Initiated: 01/21/21  1 3 I will take medications as prescribed and share questions and concerns if arise  Revision Date: 02/02/21   Target Date: 02/02/21  Completion Date:      Date Initiated: 01/21/21  1 4 I will identify 3 ways my supports can assist in my recovery and agree to staff/support contact as indicated  Revision Date: 02/02/21   Target Date: 02/02/21  Completion Date:            7 DAY REVISION:  Date Initiated: 02/02/21   1 5 I will utilize the "I statements" in 50% of my conversations to continue advocating for myself in my relationships     Revision Date: 02/12/21  Target Date:   Completion Date:        PSYCHIATRY:  Date Initiated:  01/21/21  Medication Management and Education       Revision Date: 02/02/21        1 3 Continue medication management   The person(s) responsible for carrying out the plan is Diogo Easley MD     NURSING:   Date Initiated: 01/21/21  1 1,1 2,1 3,1 4 This RN will provide daily wellness group five days weekly to educate Cayla Prather on S/S of her diagnoses and medications used in treatment  Revision Date: 02/02/21 1 1,1 2,1 3,1 4,1 5 Continue to encourage Cayla Prather to participate in wellness groups daily to learn about symptoms, coping strategies and warning signs to promote relapse prevention  The person(s) responsible for carrying out the plan is Thi De La O RN     PSYCHOLOGY:   Date Initiated: 01/21/21       1 1, 1 2, 1 4 Provide psychotherapy group 5 times per week to allow opportunity for Cayla Prather  to explore stressors and ways of coping  Revision Date:  02/02/21 1 1,1 2,1 4,1 5  Continue to provide psychotherapy group daily to Cayla Prather and encourage sharing of stressors, skills and positive change  The person(s) responsible for carrying out the plan is Maame Adame     ALLIED THERAPY:   Date Initiated: 01/21/21  1 1,1 2 Engage Cayla Prather in AT group 5 times daily to encourage development and use of wellness tools to decrease symptoms and promote recovery through meaningful activity  Revision Date: 02/02/21 1 1,1 2,1 5 Continue to engage Cayla Prather to participate in AT group to practice wellness tools within program and transfer to home sharing successes and barriers through healthy task involvement  The person(s) responsible for carrying out the plan is KATHIE Davis      CASE MANAGEMENT:   Date Initiated: 01/21/21      1 0 This  will meet with Cayla Prather  3-4 times weekly to assess treatment progress, discharge planning, connection to community supports and UR as indicated    Revision Date: 02/02/21 1 0 Continue to meet with Cayla Prather 3-4 times weekly to assess growth, work toward goals, continued treatment needs, dc planning and use of supports  The person(s) responsible for carrying out the plan is Maame Hurst     TREATMENT REVIEW/COMMENTS:      DISCHARGE CRITERIA: Identify 3 signs of progress and complete relapse prevention plan  DISCHARGE PLAN: Connect with identified outpatient providers     Estimated Length of Stay: 10 treatment days             Diagnosis and Treatment Plan explained to John Diaz relates understanding diagnosis and is agreeable to Treatment Plan             CLIENT COMMENTS / Please share your thoughts, feelings, need and/or experiences regarding your treatment plan: _____________________________________________________________________________________________________________________________________________________________________________________________________________________________________________________________________________________________________________________ Date/Time: ______________      Patient Signature: ______________Reviwed with Verlan Ligas via TEAMS - received copy__________________      Date/Time: ______________       Signature: _______________Alyssa Piotr Gorham, MA__________________      Date/Time: ________________

## 2021-02-03 ENCOUNTER — OFFICE VISIT (OUTPATIENT)
Dept: PSYCHOLOGY | Facility: CLINIC | Age: 61
End: 2021-02-03
Payer: COMMERCIAL

## 2021-02-03 DIAGNOSIS — F41.1 GENERALIZED ANXIETY DISORDER: Primary | ICD-10-CM

## 2021-02-03 DIAGNOSIS — F33.2 SEVERE EPISODE OF RECURRENT MAJOR DEPRESSIVE DISORDER, WITHOUT PSYCHOTIC FEATURES (HCC): ICD-10-CM

## 2021-02-03 PROCEDURE — G0177 OPPS/PHP; TRAIN & EDUC SERV: HCPCS

## 2021-02-03 PROCEDURE — G0410 GRP PSYCH PARTIAL HOSP 45-50: HCPCS

## 2021-02-03 PROCEDURE — S0201 PARTIAL HOSPITALIZATION SERV: HCPCS

## 2021-02-03 PROCEDURE — G0176 OPPS/PHP;ACTIVITY THERAPY: HCPCS

## 2021-02-03 NOTE — PSYCH
Assessment/Plan:       Diagnoses and all orders for this visit:     Generalized anxiety disorder     Severe episode of recurrent major depressive disorder, without psychotic features ([facility] [address] )        Subjective:      Patient ID: Martha Chauhan is a 56 y  o  female      [facility] Treatment Plan   AREAS OF NEED: SWAPNA and MDD as evidenced by anxiousness, lethargy, tearfulness and suicidal ideation with a plan due to focusing on 's health more than her own  Date Initiated: 01/21/21     Strengths: "nurturing, loving, connection"          LONG TERM GOAL:   Date Initiated: 01/21/21  1 0 I will practice wellness tools and communication skills to improve my connections with my support persons    Target Date: 02/18/21  Completion Date: 02/03/21 - DISCHARGE         SHORT TERM OBJECTIVES:      Date Initiated: 01/21/21  1 1 I will utilize assertive communication in 50% of my interactions with my     Revision Date: 02/02/21   Target Date: 02/02/21  Completion Date:   02/03/21 - DISCHARGE      Date Initiated: 01/21/21  1 2 I will exercise 5 times a week to improve my overall health    Revision Date: 02/02/21   Target Date: 02/02/21  Completion Date: 02/03/21 - DISCHARGE      Date Initiated: 01/21/21  1 3 I will take medications as prescribed and share questions and concerns if arise     Revision Date: 02/02/21   Target Date: 02/02/21  Completion Date:  02/03/21 - DISCHARGE      Date Initiated: 01/21/21  1 4 I will identify 3 ways my supports can assist in my recovery and agree to staff/support contact as indicated     Revision Date: 02/02/21   Target Date: 02/02/21  Completion Date: 02/03/21 - DISCHARGE             7 DAY REVISION:  Date Initiated: 02/02/21   1 5 I will utilize the "I statements" in 50% of my conversations to continue advocating for myself in my relationships     Revision Date: 02/12/21  Target Date:   Completion Date: 02/03/21 - DISCHARGE       PSYCHIATRY:  Date Initiated:  01/21/21  Medication Management and Education       Revision Date: 02/02/21        1 3 Continue medication management   The person(s) responsible for carrying out the plan is Aida Sanches MD     NURSING:   Date Initiated: 01/21/21  1 1,1 2,1 3,1 4 This RN will provide daily wellness group five days weekly to educate Martha Chauhan on S/S of her diagnoses and medications used in treatment  Revision Date: 02/02/21 1 1,1 2,1 3,1 4,1 5 Continue to encourage Brandan Dueñas to participate in wellness groups daily to learn about symptoms, coping strategies and warning signs to promote relapse prevention  The person(s) responsible for carrying out the plan is Phyllis Vance RN     PSYCHOLOGY:   Date Initiated: 01/21/21       1 1, 1 2, 1 4 Provide psychotherapy group 5 times per week to allow opportunity for [employer] [facility] explore stressors and ways of coping  Revision Date:  02/02/21 1 1,1 2,1 4,1 5  Continue to provide psychotherapy group daily to Brandan Dueñas and encourage sharing of stressors, skills and positive change  The person(s) responsible for carrying out the plan is Maame Rocha     ALLIED THERAPY:   Date Initiated: 01/21/21  1 1,1 2 Engage Martha Chauhan in AT group 5 times daily to encourage development and use of wellness tools to decrease symptoms and promote recovery through meaningful activity  Revision Date: 02/02/21 1 1,1 2,1 5 Continue to engage Brandan Dueñas to participate in AT group to practice wellness tools within program and transfer to home sharing successes and barriers through healthy task involvement  The person(s) responsible for carrying out the plan is KATHIE Foreman      CASE MANAGEMENT:   Date Initiated: 01/21/21      1 0 This  will meet with Leocadia Bumpers A Ackerman  3-4 times weekly to assess treatment progress, discharge planning, connection to community supports and UR as indicated    Revision Date: 02/02/21 1 0 Continue to meet with Martha Chauhan 3-4 times weekly to assess growth, work toward goals, continued treatment needs, dc planning and use of supports  The person(s) responsible for carrying out the plan is Maame Harris     TREATMENT REVIEW/COMMENTS:      DISCHARGE CRITERIA: Identify 3 signs of progress and complete relapse prevention plan     DISCHARGE PLAN: Connect with identified outpatient providers     Estimated Length of Stay: 10 treatment days             Diagnosis and Treatment Plan explained to Martha Thomas relates understanding diagnosis and is agreeable to Treatment Plan             CLIENT COMMENTS / Please share your thoughts, feelings, need and/or experiences regarding your treatment plan: _____________________________________________________________________________________________________________________________________________________________________________________________________________________________________________________________________________________________________________________ Date/Time: ______________      Patient Signature: ______________Reviwed with Conrad Mar via TEAMS due to Covid-19 - received copy__________________      Date/Time: _____02/03/21 @ [id number] _________       Signature: _______________[facility] Staff, MA__________________      Date/Time: _______02/03/21 @ 1255_________

## 2021-02-03 NOTE — PSYCH
This note was not shared with the patient due to this is a psychotherapy note   Subjective:     Patient ID: Miriam Zuniga is a 56 y o  female  [facility] Discharge Summary:   Admission Date: 01/21/21  Patient was referred by [facility]  Discharge Date: 02/03/21   Was this a routine discharge? yes     Diagnosis: Axis I:   1  Generalized anxiety disorder     2  Severe episode of recurrent major depressive disorder, without psychotic features [facility])        Treating Physician: Dr Maggy Sanches    Treatment Complications: No treatment complication present  Presenting Need:   As per Dr Leticia Oakley: Sofia Wyman a 56 y  o  female with major depressive disorder, generalized anxiety disorder, Hashimoto, history of complex partial epilepsy  referred by [facility] in[facility]  [facility] she was admitted from January 7, 2021 to January 20, 2021  Because she was feeling depressed, anxious and had suicidal ideation to overdose with medications or cut her wrist  Onset of symptoms was a few Days prior to the [facility] with rapidly worsening course since that time  Psychosocial Stressors:  medical problems    She states that she was doing well but started to become very anxious, poor sleep and sudden she started having thoughts of hurting herself and she called the hotline for that reason she was admitted   She states that she feels  better, she still has some issue with sleep   TodayMaxine A Gissel feels less depressed, she denies any active suicidal homicidal ideation plan or intent   She denies any psychotic symptoms, patient does not have any history manic episode   Her PHQ-9 is 8      As per this writer: Miriam Zuniga is a 56 y o  female using she/her pronouns referred to [employer] via [address] [employer] due to suicidal ideation with a plan  Eugene Appiah presents as hopeful and goal oriented during initial evaluation  She reports isolation, being lethargic and tearful   She reports that her anxiety increased and she became casual about her suicidal thoughts  She felt fearful that she would act on the thoughts and signed a 201 to be admitted to the inpatient unit  She reported that the holidays were good, but she found herself focusing more on her 's physical and mental health rather than her own  She reported inconsistent sleep and poor appetite        As per Chitra Patino: "I felt like I was going through the motions" "I was ignoring the depression"      Course of treatment includes:    group counseling, medication management, individual case management, allied therapy, psychoeducation and psychiatric evaluation    Treatment Progress: Chitra Patino participated in 8 virtual treatment days; in addition to the in person initial evaluation with the doctor and   Chitra Patino engaged in group psychotherapy 9 x per week and Allied Therapy Group 6 x per week, along with case management sessions 3x per week  Oneda Blazing addressed with the  utilizing positive coping skills, creating a schedule and current stressors  Evidence of progress includes "applying what I have learned, resonating with others, accepting the present, happier, more optimistic"  Take aways from program include "DEAR MAN, assertive communication, need to express emotions and making requests to Kelsey Cisneros () rather than demands  Nervousness about suicidal ideation returning but has identified a plan to call supports, utilize crisis lines and engage in coping skills and wellness tools  Patient denies any current SI, HI or SIB                  Aftercare recommendations include: Keep Follow Up Appointments, Take Medication As Prescribed, Utilize WRAP, Consider Support Groups and/or Volunteer Opportunities     Psychiatrist: Appointment Date: 03/17/21  Dr Patrick Barth   [address] [address] [address]   [place]     Therapist: Appointment Date: 02/04/21 @ 3:00pm  [facility]   [phone] [address] [facility], [address] [place] [address], [address] [place] [address]     Primary Care Physician: Seen on 1/27/21  Peyton Smith MD  [address] [place], [place] [id number] [facility]  [phone]  RSE: [phone]      Discharge Medications include:  Current Outpatient Medications:     busPIRone (BUSPAR) 10 mg tablet, Take 1 tablet (10 mg total) by mouth 3 (three) times a day, Disp: 270 tablet, Rfl: 0    [START ON 4/12/2021] cholecalciferol (VITAMIN D3) 1,000 units tablet, Take 1 tablet (1,000 Units total) by mouth daily, Disp: 30 tablet, Rfl: 0    citalopram (CeleXA) 10 mg tablet, Take 3 tablets (30 mg total) by mouth daily for 15 doses, Disp: 45 tablet, Rfl: 1    ergocalciferol (VITAMIN D2) 50,000 units, Take 1 capsule (50,000 Units total) by mouth once a week for 14 doses, Disp: 13 capsule, Rfl: 0    ibuprofen (MOTRIN) 400 mg tablet, Take 1 tablet (400 mg total) by mouth every 8 (eight) hours as needed for moderate pain, Disp: 60 tablet, Rfl: 0    melatonin 3 mg, Take 1 tablet (3 mg total) by mouth daily at bedtime, Disp: 30 tablet, Rfl: 0    polyethylene glycol (MIRALAX) 17 g packet, Take 17 g by mouth daily as needed (constipation refractory to Senokot-S) (Patient not taking: Reported on 1/21/2021), Disp: 30 each, Rfl: 0    senna-docusate sodium (SENOKOT S) 8 6-50 mg per tablet, Take 1 tablet by mouth daily as needed for constipation (Patient not taking: Reported on 1/21/2021), Disp: 30 tablet, Rfl: 0

## 2021-02-03 NOTE — PSYCH
[facility] Clinical Progress Notes      Specialized Services Documentation  Therapist must complete separate progress note for each specific clinical activity in which the individual participated during the day         [facility] follow up physician's orders:   DATE  2/3/2021  TIME 10:44 AM  DISCHARGE TODAY  Chandler Lindsey MD

## 2021-02-03 NOTE — PSYCH
Subjective:     Patient ID: Jeannie Lawson is a 56 y o  female  [facility] Clinical Progress Notes      Specialized Services Documentation  Therapist must complete separate progress note for each specific clinical activity in which the individual participated during the day  This group was facilitated virtually in a private office using [employer] and Approved [employer] Teams  Jeannie Lawson consented to the use of tele-video modality of treatment  This note was not shared with the patient due to this is a psychotherapy note      Group Psychotherapy     ([id number]) Jeannie Lawson  was present in psychoeducational group which focused on sleep hygiene  Group shared current barriers contributing to decreased quality of sleep  They identified their family as a barrier  This writer explained the importance of quality sleep in relation to wellness  Additional tips on sleep hygiene were discussed  Jeannie Lawson appeared to be engaged in group, but did not participate until prompted  Some slow progress toward goal observed  Continue psychoeducation group to increase awareness of good sleeping habits to promote wellness           Tx Plan Objective: 1 3 Therapist:  Keiry Beard RN

## 2021-02-03 NOTE — PSYCH
This note was not shared with the patient due to this is a psychotherapy note   Subjective:     Patient ID: Cayla Prather is a 56 y o  female  [facility] Clinical Progress Notes      Specialized Services Documentation  Therapist must complete separate progress note for each specific clinical activity in which the individual participated during the day  Case Management Note    Anoop iL MA     Current suicide risk : Low     ([id number]) CM reviewed Relapse Prevention Plan, Discharge Instructions and Medication List with Cayla Prather  Evidence of progress includes "applying what I have learned, resonating with others, accepting the present, happier, more optimistic"  Take aways from program include "DEAR MAN, assertive communication, need to express emotions and making requests to Rafal Forbes () rather than demands  Medications changes/added/denied? No    Treatment session number: 8    Individual Case Management Visit provided today?  Yes     [facility] follow up physician's orders: Discharge - See Dr Bryanna Oliveira Note

## 2021-02-03 NOTE — PSYCH
This note was not shared with the patient due to this is a psychotherapy note   Subjective:     Patient ID: Jovan Main is a 56 y o  female  [facility] Clinical Progress Notes      Specialized Services Documentation  Therapist must complete separate progress note for each specific clinical activity in which the individual participated during the day  Allied Therapy   This group was facilitated virtually in a private office using [employer] and Approved [employer] Teams  Jovan Main consented to the use of tele-video modality of treatment  [id number] Jovan Main  actively shared in [facility] group focused DBT Module Interpersonal Effectiveness and [facility] skill  Engaged in tasks exploring what makes it difficult to share and strategies to improve with supports  She participated in discussion related to communication roadblocks  She was an active participant as group worked together to practice writing out a meaningful interaction using [facility]  Some progress toward goal noted  Discharge at the end of the treatment day  Tx Plan Objective: 1 2,1 4, Therapist:  Jana VÁZQUEZ    Education Therapy   [id number] Jovan Main actively shared in morning assessment and goal review  Presented as Receptive related to readiness to learn  Jovan Main did complete goal from last treatment day identifying gaining relaxation  did not present with any barriers to learning  [id number] Jovan Main engaged throughout the treatment day  Was engaged in learning related to Illness, Medication, Aftercare and Wellness Tools  Staff utilized Verbal, Written, A/V and Demonstration teaching methods  Jovan Main shared area of learning and set a goal for outside of program to run errands  She shared gratitude on this her last treatment day        Tx Plan Objective: 1 0, Therapist:  Jana VÁZQUEZ

## 2021-02-03 NOTE — PSYCH
Virtual Regular Visit      Assessment/Plan:    Problem List Items Addressed This Visit        Other    Severe episode of recurrent major depressive disorder, without psychotic features ([facility] [address] ) (Chronic)    Generalized anxiety disorder - Primary               Reason for visit is VIRTUAL PHP DUE TO COVID-19       Encounter [facility] [address]    Provider located at [address] [id number]      Recent Visits  Date Type Provider Dept   02/02/21 Office Visit [facility]   02/01/21 Office Visit [facility]   01/29/21 Office Visit [facility]   01/28/21 [facility] Visit [facility]   01/27/21 Office Visit Bari Luz MD [facility]   Showing recent visits within past 7 days and meeting all other requirements     Today's Visits  Date Type Provider Dept   02/03/21 Office Visit [facility]   Showing today's visits and meeting all other requirements     Future Appointments  No visits were found meeting these conditions  Showing future appointments within next 150 days and meeting all other requirements        The patient was identified by name and date of birth  Jaiden Montiel was informed that this is a telemedicine visit and that the visit is being conducted through [employer] and patient was informed that this is a secure, HIPAA-compliant platform  She agrees to proceed     My office door was closed  No one else was in the room  She acknowledged consent and understanding of privacy and security of the video platform  The patient has agreed to participate and understands they can discontinue the visit at any time  Patient is aware this is a billable service  Naylamiguel a Terry is a 56 y o  female          HPI     Past Medical History:   Diagnosis Date    Anxiety  Concussion     Last assessed 2017    Depression     DJD (degenerative joint disease)     Gait abnormality     Head injury     ISCHEMIC      ISCHEMIC 2013    Seizures [facility])        Past Surgical History:   Procedure Laterality Date    BRAIN SURGERY      IMMED FOLLOWING STROKE IN 2013     SECTION         Current Outpatient Medications   Medication Sig Dispense Refill    busPIRone (BUSPAR) 10 mg tablet Take 1 tablet (10 mg total) by mouth 3 (three) times a day 270 tablet 0    [START ON 2021] cholecalciferol (VITAMIN D3) 1,000 units tablet Take 1 tablet (1,000 Units total) by mouth daily 30 tablet 0    citalopram (CeleXA) 10 mg tablet Take 3 tablets (30 mg total) by mouth daily for 15 doses 45 tablet 1    ergocalciferol (VITAMIN D2) 50,000 units Take 1 capsule (50,000 Units total) by mouth once a week for 14 doses 13 capsule 0    ibuprofen (MOTRIN) 400 mg tablet Take 1 tablet (400 mg total) by mouth every 8 (eight) hours as needed for moderate pain 60 tablet 0    melatonin 3 mg Take 1 tablet (3 mg total) by mouth daily at bedtime 30 tablet 0    polyethylene glycol (MIRALAX) 17 g packet Take 17 g by mouth daily as needed (constipation refractory to Senokot-S) (Patient not taking: Reported on 2021) 30 each 0    senna-docusate sodium (SENOKOT S) 8 6-50 mg per tablet Take 1 tablet by mouth daily as needed for constipation (Patient not taking: Reported on 2021) 30 tablet 0     No current facility-administered medications for this visit  No Known Allergies    Review of Systems    Video Exam    There were no vitals filed for this visit  Physical Exam     I spent 4 hours of group + case managment minutes with patient today in which greater than 50% of the time was spent in counseling/coordination of care regarding see notes  VIRTUAL VISIT DISCLAIMER    Martha Sepulveda acknowledges that she has consented to an online visit or consultation   She understands that the online visit is based solely on information provided by her, and that, in the absence of a face-to-face physical evaluation by the physician, the diagnosis she receives is both limited and provisional in terms of accuracy and completeness  This is not intended to replace a full medical face-to-face evaluation by the physician  Robbin Flanagan understands and accepts these terms

## 2021-02-03 NOTE — PSYCH
This note was not shared with the patient due to this is a psychotherapy note  Subjective:     Patient ID: Sharona Garcia is a 56 y o  female  [facility] Clinical Progress Notes      Specialized Services Documentation  Therapist must complete separate progress note for each specific clinical activity in which the individual participated during the day  Group Psychotherapy Life Skills Group (10:25-11:10) Martha actively engaged in group focused on understanding what you can control in your life which was facilitated virtually in a private office using HIPAA Compliant and Approved evocatal Teams  Martha consented to the use of tele-video modality of treatment and was virtually present for group psychotherapy today  The objective of group was to increase the sense of control that clients have in their lives  Clients where asked to rate statements about the control in their lives on a scale of 1-10 as to whether or not that applied to them  Scores above a 50 indicated that the person feels like they have little control in their life  Jeffry Knight stated that she scored below a 50  Clients completed two activities and were asked to reflect on whether or not they believed it increased their sense of control or changed the way they viewed experiences  Jeffry Knight stated that she likes breaking down a goal into smaller steps because being organized makes her feel like she has more control   Jeffry Knight continues to make progress towards goals through verbal participation in group;will be discharged at the end of treatment day   Tx Plan Objective: 1 1,1 2 Therapist:  DESTINY Mayorga

## 2021-02-04 ENCOUNTER — APPOINTMENT (OUTPATIENT)
Dept: PSYCHOLOGY | Facility: CLINIC | Age: 61
End: 2021-02-04
Payer: COMMERCIAL

## 2021-02-05 ENCOUNTER — APPOINTMENT (OUTPATIENT)
Dept: PSYCHOLOGY | Facility: CLINIC | Age: 61
End: 2021-02-05
Payer: COMMERCIAL

## 2021-02-08 ENCOUNTER — HOSPITAL ENCOUNTER (OUTPATIENT)
Dept: MAMMOGRAPHY | Facility: HOSPITAL | Age: 61
Discharge: HOME | End: 2021-02-08
Payer: COMMERCIAL

## 2021-02-08 VITALS — WEIGHT: 206 LBS | BODY MASS INDEX: 40.44 KG/M2 | HEIGHT: 60 IN

## 2021-02-08 DIAGNOSIS — Z12.31 ENCOUNTER FOR SCREENING MAMMOGRAM FOR MALIGNANT NEOPLASM OF BREAST: ICD-10-CM

## 2021-02-08 PROCEDURE — 77063 BREAST TOMOSYNTHESIS BI: CPT

## 2021-02-08 PROCEDURE — 77067 SCR MAMMO BI INCL CAD: CPT

## 2021-03-05 ENCOUNTER — HOSPITAL ENCOUNTER (INPATIENT)
Facility: HOSPITAL | Age: 61
LOS: 11 days | Discharge: HOME | DRG: 885 | End: 2021-03-16
Attending: PSYCHIATRY & NEUROLOGY | Admitting: PSYCHIATRY & NEUROLOGY
Payer: COMMERCIAL

## 2021-03-05 ENCOUNTER — HOSPITAL ENCOUNTER (EMERGENCY)
Facility: HOSPITAL | Age: 61
End: 2021-03-05
Attending: EMERGENCY MEDICINE
Payer: COMMERCIAL

## 2021-03-05 VITALS
RESPIRATION RATE: 16 BRPM | OXYGEN SATURATION: 98 % | SYSTOLIC BLOOD PRESSURE: 132 MMHG | DIASTOLIC BLOOD PRESSURE: 62 MMHG | TEMPERATURE: 98.6 F | HEART RATE: 80 BPM

## 2021-03-05 DIAGNOSIS — R73.03 PREDIABETES: ICD-10-CM

## 2021-03-05 DIAGNOSIS — F33.2 SEVERE EPISODE OF RECURRENT MAJOR DEPRESSIVE DISORDER, WITHOUT PSYCHOTIC FEATURES (HCC): Primary | Chronic | ICD-10-CM

## 2021-03-05 DIAGNOSIS — E06.3 HASHIMOTO'S THYROIDITIS: ICD-10-CM

## 2021-03-05 DIAGNOSIS — E66.01 MORBID OBESITY (HCC): ICD-10-CM

## 2021-03-05 DIAGNOSIS — E53.8 VITAMIN B12 DEFICIENCY: ICD-10-CM

## 2021-03-05 DIAGNOSIS — F32.A DEPRESSION WITH SUICIDAL IDEATION: ICD-10-CM

## 2021-03-05 DIAGNOSIS — F51.01 IDIOPATHIC INSOMNIA: ICD-10-CM

## 2021-03-05 DIAGNOSIS — F41.1 GENERALIZED ANXIETY DISORDER: ICD-10-CM

## 2021-03-05 DIAGNOSIS — R45.851 DEPRESSION WITH SUICIDAL IDEATION: ICD-10-CM

## 2021-03-05 DIAGNOSIS — F32.9 MAJOR DEPRESSION: Primary | ICD-10-CM

## 2021-03-05 LAB
ALBUMIN SERPL BCP-MCNC: 3.7 G/DL (ref 3.5–5)
ALP SERPL-CCNC: 75 U/L (ref 46–116)
ALT SERPL W P-5'-P-CCNC: 34 U/L (ref 12–78)
AMPHETAMINES SERPL QL SCN: NEGATIVE
ANION GAP SERPL CALCULATED.3IONS-SCNC: 9 MMOL/L (ref 4–13)
AST SERPL W P-5'-P-CCNC: 17 U/L (ref 5–45)
ATRIAL RATE: 72 BPM
BARBITURATES UR QL: NEGATIVE
BASOPHILS # BLD AUTO: 0.04 THOUSANDS/ΜL (ref 0–0.1)
BASOPHILS NFR BLD AUTO: 0 % (ref 0–1)
BENZODIAZ UR QL: NEGATIVE
BILIRUB SERPL-MCNC: 0.52 MG/DL (ref 0.2–1)
BILIRUB UR QL STRIP: NEGATIVE
BUN SERPL-MCNC: 11 MG/DL (ref 5–25)
CALCIUM SERPL-MCNC: 9.3 MG/DL (ref 8.3–10.1)
CHLORIDE SERPL-SCNC: 102 MMOL/L (ref 100–108)
CLARITY UR: CLEAR
CO2 SERPL-SCNC: 26 MMOL/L (ref 21–32)
COCAINE UR QL: NEGATIVE
COLOR UR: YELLOW
CREAT SERPL-MCNC: 0.93 MG/DL (ref 0.6–1.3)
EOSINOPHIL # BLD AUTO: 0.02 THOUSAND/ΜL (ref 0–0.61)
EOSINOPHIL NFR BLD AUTO: 0 % (ref 0–6)
ERYTHROCYTE [DISTWIDTH] IN BLOOD BY AUTOMATED COUNT: 14.1 % (ref 11.6–15.1)
ETHANOL EXG-MCNC: 0 MG/DL
FLUAV RNA RESP QL NAA+PROBE: NEGATIVE
FLUBV RNA RESP QL NAA+PROBE: NEGATIVE
GFR SERPL CREATININE-BSD FRML MDRD: 66 ML/MIN/1.73SQ M
GLUCOSE SERPL-MCNC: 142 MG/DL (ref 65–140)
GLUCOSE UR STRIP-MCNC: NEGATIVE MG/DL
HCT VFR BLD AUTO: 47.4 % (ref 34.8–46.1)
HGB BLD-MCNC: 15.4 G/DL (ref 11.5–15.4)
HGB UR QL STRIP.AUTO: NEGATIVE
IMM GRANULOCYTES # BLD AUTO: 0.05 THOUSAND/UL (ref 0–0.2)
IMM GRANULOCYTES NFR BLD AUTO: 1 % (ref 0–2)
KETONES UR STRIP-MCNC: NEGATIVE MG/DL
LEUKOCYTE ESTERASE UR QL STRIP: NEGATIVE
LYMPHOCYTES # BLD AUTO: 1.23 THOUSANDS/ΜL (ref 0.6–4.47)
LYMPHOCYTES NFR BLD AUTO: 13 % (ref 14–44)
MCH RBC QN AUTO: 29.6 PG (ref 26.8–34.3)
MCHC RBC AUTO-ENTMCNC: 32.5 G/DL (ref 31.4–37.4)
MCV RBC AUTO: 91 FL (ref 82–98)
METHADONE UR QL: NEGATIVE
MONOCYTES # BLD AUTO: 0.82 THOUSAND/ΜL (ref 0.17–1.22)
MONOCYTES NFR BLD AUTO: 9 % (ref 4–12)
NEUTROPHILS # BLD AUTO: 7.49 THOUSANDS/ΜL (ref 1.85–7.62)
NEUTS SEG NFR BLD AUTO: 77 % (ref 43–75)
NITRITE UR QL STRIP: NEGATIVE
NRBC BLD AUTO-RTO: 0 /100 WBCS
OPIATES UR QL SCN: NEGATIVE
OXYCODONE+OXYMORPHONE UR QL SCN: NEGATIVE
P AXIS: 53 DEGREES
PCP UR QL: NEGATIVE
PH UR STRIP.AUTO: 7 [PH] (ref 4.5–8)
PLATELET # BLD AUTO: 283 THOUSANDS/UL (ref 149–390)
PMV BLD AUTO: 10.8 FL (ref 8.9–12.7)
POTASSIUM SERPL-SCNC: 4.1 MMOL/L (ref 3.5–5.3)
PR INTERVAL: 120 MS
PROT SERPL-MCNC: 7.3 G/DL (ref 6.4–8.2)
PROT UR STRIP-MCNC: NEGATIVE MG/DL
QRS AXIS: 12 DEGREES
QRSD INTERVAL: 70 MS
QT INTERVAL: 372 MS
QTC INTERVAL: 407 MS
RBC # BLD AUTO: 5.21 MILLION/UL (ref 3.81–5.12)
RSV RNA RESP QL NAA+PROBE: NEGATIVE
SARS-COV-2 RNA RESP QL NAA+PROBE: NEGATIVE
SODIUM SERPL-SCNC: 137 MMOL/L (ref 136–145)
SP GR UR STRIP.AUTO: 1.02 (ref 1–1.03)
T WAVE AXIS: 2 DEGREES
THC UR QL: NEGATIVE
TSH SERPL DL<=0.05 MIU/L-ACNC: 2.78 UIU/ML (ref 0.36–3.74)
UROBILINOGEN UR QL STRIP.AUTO: 0.2 E.U./DL
VENTRICULAR RATE: 72 BPM
WBC # BLD AUTO: 9.65 THOUSAND/UL (ref 4.31–10.16)

## 2021-03-05 PROCEDURE — 93010 ELECTROCARDIOGRAM REPORT: CPT | Performed by: INTERNAL MEDICINE

## 2021-03-05 PROCEDURE — 84443 ASSAY THYROID STIM HORMONE: CPT | Performed by: EMERGENCY MEDICINE

## 2021-03-05 PROCEDURE — 93005 ELECTROCARDIOGRAM TRACING: CPT

## 2021-03-05 PROCEDURE — 0241U HB NFCT DS VIR RESP RNA 4 TRGT: CPT | Performed by: EMERGENCY MEDICINE

## 2021-03-05 PROCEDURE — 80307 DRUG TEST PRSMV CHEM ANLYZR: CPT | Performed by: EMERGENCY MEDICINE

## 2021-03-05 PROCEDURE — 80053 COMPREHEN METABOLIC PANEL: CPT | Performed by: EMERGENCY MEDICINE

## 2021-03-05 PROCEDURE — 82075 ASSAY OF BREATH ETHANOL: CPT | Performed by: EMERGENCY MEDICINE

## 2021-03-05 PROCEDURE — 81003 URINALYSIS AUTO W/O SCOPE: CPT

## 2021-03-05 PROCEDURE — 85025 COMPLETE CBC W/AUTO DIFF WBC: CPT | Performed by: EMERGENCY MEDICINE

## 2021-03-05 PROCEDURE — 99285 EMERGENCY DEPT VISIT HI MDM: CPT | Performed by: EMERGENCY MEDICINE

## 2021-03-05 PROCEDURE — 99285 EMERGENCY DEPT VISIT HI MDM: CPT

## 2021-03-05 PROCEDURE — 36415 COLL VENOUS BLD VENIPUNCTURE: CPT | Performed by: EMERGENCY MEDICINE

## 2021-03-05 RX ORDER — LORAZEPAM 0.5 MG/1
0.5 TABLET ORAL
Status: DISCONTINUED | OUTPATIENT
Start: 2021-03-05 | End: 2021-03-16 | Stop reason: HOSPADM

## 2021-03-05 RX ORDER — LORAZEPAM 2 MG/ML
1 INJECTION INTRAMUSCULAR
Status: CANCELLED | OUTPATIENT
Start: 2021-03-05

## 2021-03-05 RX ORDER — LORAZEPAM 0.5 MG/1
0.5 TABLET ORAL
Status: CANCELLED | OUTPATIENT
Start: 2021-03-05

## 2021-03-05 RX ORDER — ACETAMINOPHEN 325 MG/1
650 TABLET ORAL EVERY 4 HOURS PRN
Status: DISCONTINUED | OUTPATIENT
Start: 2021-03-05 | End: 2021-03-16 | Stop reason: HOSPADM

## 2021-03-05 RX ORDER — BUSPIRONE HYDROCHLORIDE 5 MG/1
10 TABLET ORAL 3 TIMES DAILY
Status: DISCONTINUED | OUTPATIENT
Start: 2021-03-05 | End: 2021-03-08

## 2021-03-05 RX ORDER — POLYETHYLENE GLYCOL 3350 17 G/17G
17 POWDER, FOR SOLUTION ORAL DAILY PRN
Status: DISCONTINUED | OUTPATIENT
Start: 2021-03-05 | End: 2021-03-16 | Stop reason: HOSPADM

## 2021-03-05 RX ORDER — ACETAMINOPHEN 325 MG/1
650 TABLET ORAL EVERY 4 HOURS PRN
Status: CANCELLED | OUTPATIENT
Start: 2021-03-05

## 2021-03-05 RX ORDER — CITALOPRAM 10 MG/1
10 TABLET ORAL DAILY
Status: CANCELLED | OUTPATIENT
Start: 2021-03-06

## 2021-03-05 RX ORDER — MAGNESIUM HYDROXIDE/ALUMINUM HYDROXICE/SIMETHICONE 120; 1200; 1200 MG/30ML; MG/30ML; MG/30ML
30 SUSPENSION ORAL EVERY 4 HOURS PRN
Status: DISCONTINUED | OUTPATIENT
Start: 2021-03-05 | End: 2021-03-16 | Stop reason: HOSPADM

## 2021-03-05 RX ORDER — AMOXICILLIN 250 MG
1 CAPSULE ORAL DAILY PRN
Status: CANCELLED | OUTPATIENT
Start: 2021-03-05

## 2021-03-05 RX ORDER — OLANZAPINE 10 MG/1
5 INJECTION, POWDER, LYOPHILIZED, FOR SOLUTION INTRAMUSCULAR
Status: DISCONTINUED | OUTPATIENT
Start: 2021-03-05 | End: 2021-03-16 | Stop reason: HOSPADM

## 2021-03-05 RX ORDER — ACETAMINOPHEN 325 MG/1
975 TABLET ORAL EVERY 6 HOURS PRN
Status: CANCELLED | OUTPATIENT
Start: 2021-03-05

## 2021-03-05 RX ORDER — ACETAMINOPHEN 325 MG/1
975 TABLET ORAL EVERY 6 HOURS PRN
Status: DISCONTINUED | OUTPATIENT
Start: 2021-03-05 | End: 2021-03-16 | Stop reason: HOSPADM

## 2021-03-05 RX ORDER — TRAZODONE HYDROCHLORIDE 50 MG/1
50 TABLET ORAL
Status: CANCELLED | OUTPATIENT
Start: 2021-03-05

## 2021-03-05 RX ORDER — TRAZODONE HYDROCHLORIDE 50 MG/1
50 TABLET ORAL
Status: DISCONTINUED | OUTPATIENT
Start: 2021-03-05 | End: 2021-03-16 | Stop reason: HOSPADM

## 2021-03-05 RX ORDER — LORAZEPAM 1 MG/1
1 TABLET ORAL
Status: CANCELLED | OUTPATIENT
Start: 2021-03-05

## 2021-03-05 RX ORDER — AMOXICILLIN 250 MG
1 CAPSULE ORAL DAILY PRN
Status: DISCONTINUED | OUTPATIENT
Start: 2021-03-05 | End: 2021-03-16 | Stop reason: HOSPADM

## 2021-03-05 RX ORDER — OLANZAPINE 10 MG/1
5 INJECTION, POWDER, LYOPHILIZED, FOR SOLUTION INTRAMUSCULAR
Status: CANCELLED | OUTPATIENT
Start: 2021-03-05

## 2021-03-05 RX ORDER — MAGNESIUM HYDROXIDE/ALUMINUM HYDROXICE/SIMETHICONE 120; 1200; 1200 MG/30ML; MG/30ML; MG/30ML
30 SUSPENSION ORAL EVERY 4 HOURS PRN
Status: CANCELLED | OUTPATIENT
Start: 2021-03-05

## 2021-03-05 RX ORDER — OLANZAPINE 2.5 MG/1
2.5 TABLET ORAL
Status: DISCONTINUED | OUTPATIENT
Start: 2021-03-05 | End: 2021-03-16 | Stop reason: HOSPADM

## 2021-03-05 RX ORDER — OLANZAPINE 2.5 MG/1
5 TABLET ORAL
Status: CANCELLED | OUTPATIENT
Start: 2021-03-05

## 2021-03-05 RX ORDER — RISPERIDONE 0.25 MG/1
0.5 TABLET, FILM COATED ORAL
Status: CANCELLED | OUTPATIENT
Start: 2021-03-05

## 2021-03-05 RX ORDER — BUSPIRONE HYDROCHLORIDE 10 MG/1
10 TABLET ORAL 3 TIMES DAILY
Status: CANCELLED | OUTPATIENT
Start: 2021-03-05

## 2021-03-05 RX ORDER — HYDROXYZINE HYDROCHLORIDE 25 MG/1
25 TABLET, FILM COATED ORAL
Status: DISCONTINUED | OUTPATIENT
Start: 2021-03-05 | End: 2021-03-16 | Stop reason: HOSPADM

## 2021-03-05 RX ORDER — OLANZAPINE 5 MG/1
5 TABLET ORAL
Status: DISCONTINUED | OUTPATIENT
Start: 2021-03-05 | End: 2021-03-16 | Stop reason: HOSPADM

## 2021-03-05 RX ORDER — HYDROXYZINE HYDROCHLORIDE 25 MG/1
25 TABLET, FILM COATED ORAL
Status: CANCELLED | OUTPATIENT
Start: 2021-03-05

## 2021-03-05 RX ORDER — RISPERIDONE 0.5 MG/1
0.5 TABLET, FILM COATED ORAL
Status: DISCONTINUED | OUTPATIENT
Start: 2021-03-05 | End: 2021-03-16 | Stop reason: HOSPADM

## 2021-03-05 RX ORDER — POLYETHYLENE GLYCOL 3350 17 G/17G
17 POWDER, FOR SOLUTION ORAL DAILY PRN
Status: CANCELLED | OUTPATIENT
Start: 2021-03-05

## 2021-03-05 RX ORDER — LORAZEPAM 1 MG/1
1 TABLET ORAL
Status: DISCONTINUED | OUTPATIENT
Start: 2021-03-05 | End: 2021-03-16 | Stop reason: HOSPADM

## 2021-03-05 RX ORDER — OLANZAPINE 2.5 MG/1
2.5 TABLET ORAL
Status: CANCELLED | OUTPATIENT
Start: 2021-03-05

## 2021-03-05 RX ORDER — CITALOPRAM 10 MG/1
10 TABLET ORAL DAILY
Status: DISCONTINUED | OUTPATIENT
Start: 2021-03-06 | End: 2021-03-07

## 2021-03-05 RX ORDER — LORAZEPAM 2 MG/ML
1 INJECTION INTRAMUSCULAR
Status: DISCONTINUED | OUTPATIENT
Start: 2021-03-05 | End: 2021-03-16 | Stop reason: HOSPADM

## 2021-03-05 RX ADMIN — BUSPIRONE HYDROCHLORIDE 10 MG: 5 TABLET ORAL at 21:25

## 2021-03-05 NOTE — ED NOTES
supports inpatient admission, but voiced some dissatisfaction with the communication provided during her previous admission  He states "it was days before I heard anything from anyone"  He requests frequent communication with the treatment team at the admitting facility  Patient voices agreement with this and would like her  to be involved in her care

## 2021-03-05 NOTE — ED NOTES
Patient is accepted at [facility]  Patient is accepted by [facility] per Gilmer Clamp  Transportation is arranged with [facility]  Transportation is scheduled for [facility] [employer] will work on a time and call back)  Patient may go to the floor at anytime  Nurse report is to be called to [phone] prior to patient transfer

## 2021-03-05 NOTE — ED NOTES
Transportation at [facility] with [address]  03/05/21 [address] [facility]  03/05/21 1600

## 2021-03-05 NOTE — PLAN OF CARE
Problem: DEPRESSION  Goal: Will be euthymic at discharge  Description: INTERVENTIONS:  - Administer medication as ordered  - Provide emotional support via 1:1 interaction with staff  - Encourage involvement in milieu/groups/activities  - Monitor for social isolation  Outcome: Not Progressing

## 2021-03-05 NOTE — ED NOTES
56year old female arrives via family due to increasing depression, anxiety, confusion, and recent onset of suicidal ideas with a plan  Per reports to Crisis, she has had thoughts of using kitchen knives to cut her wrists or neck  She reported to other ED staff other methods like overdose and reported that recently she put something around her neck to strangle herself, but stopped  She cannot identify any precipitants  She has a history of stroke and surgery in 2013 that seemed to have initiated her mental health issues, but there is no current stressor  She just feels suddenly more depressed and anxious  She has difficulty elaborating and often pauses for long moments as if thinking, but then responds with, "I don't know"  She is pleasant and cooperative; alert and oriented   feels loneliness and COVID isolation is contributing  She reports she is not sleeping well but cannot quantify how much  She reports adequate appetite  No homicidal ideas, aggression, violence, and no hallucinations, delusions, or paranoia  She seems to have some mild thought blocking and word finding difficulties  Her  relays that this is intermittent since the stroke and 2013 surgery, but she is worse overall  She is interested in inpatient treatment  201 rights and 72 hour notice reviewed  Patient completed 201 and Dr Jorge Alvarez co-signed

## 2021-03-05 NOTE — EMTALA/ACUTE CARE TRANSFER
[address] [place] [address]  Dept: [phone]      EMTALA TRANSFER CONSENT    NAME Mario Alberto Benedict                                         1960                              MRN [id number]    I have been informed of my rights regarding examination, treatment, and transfer   by Dr Rj Mckeon DO    Benefits: Specialized equipment and/or services available at the receiving facility (Include comment)________________________, Continuity of care, Other benefits (Include comment)_______________________(inpatient mental health 201)    Risks: Potential for delay in receiving treatment, Potential deterioration of medical condition, Increased discomfort during transfer, Possible worsening of condition or death during transfer      Transfer Request   I acknowledge that my medical condition has been evaluated and explained to me by the emergency department physician or other qualified medical person and/or my attending physician who has recommended and offered to me further medical examination and treatment  I understand the Hospital's obligation with respect to the treatment and stabilization of my emergency medical condition  I nevertheless request to be transferred  I release the Hospital, the doctor, and any other persons caring for me from all responsibility or liability for any injury or ill effects that may result from my transfer and agree to accept all responsibility for the consequences of my choice to transfer, rather than receive stabilizing treatment at the Hospital  I understand that because the transfer is my request, my insurance may not provide reimbursement for the services  The Hospital will assist and direct me and my family in how to make arrangements for transfer, but the hospital is not liable for any fees charged by the transport service    In spite of this understanding, I refuse to consent to further medical examination and treatment which has been offered to me, and request transfer to [address] [place] [address] Rd Name, [facility] [address] [place], [place]    I authorize the performance of emergency medical procedures and treatments upon me in both transit and upon arrival at the receiving facility  Additionally, I authorize the release of any and all medical records to the receiving facility and request they be transported with me, if possible  I authorize the performance of emergency medical procedures and treatments upon me in both transit and upon arrival at the receiving facility  Additionally, I authorize the release of any and all medical records to the receiving facility and request they be transported with me, if possible  I understand that the safest mode of transportation during a medical emergency is an ambulance and that the Hospital advocates the use of this mode of transport  Risks of traveling to the receiving facility by car, including absence of medical control, life sustaining equipment, such as oxygen, and medical personnel has been explained to me and I fully understand them  (OLEGARIO CORRECT BOX BELOW)  [  ]  I consent to the stated transfer and to be transported by ambulance/helicopter  [  ]  I consent to the stated transfer, but refuse transportation by ambulance and accept full responsibility for my transportation by car    I understand the risks of non-ambulance transfers and I exonerate the Hospital and its staff from any deterioration in my condition that results from this refusal     X___________________________________________    DATE  21  TIME________  Signature of patient or legally responsible individual signing on patient behalf           RELATIONSHIP TO PATIENT_________________________          Provider Certification    NAME Cayla Prather                                         1960                              MRN [id number]    A medical screening exam was performed on the above named patient  Based on the examination:    Condition Necessitating Transfer The primary encounter diagnosis was Major depression  Diagnoses of Depression with suicidal ideation, Hashimoto's thyroiditis, Idiopathic insomnia, Morbid obesity ([address] ), and Prediabetes were also pertinent to this visit  Patient Condition: The patient has been stabilized such that within reasonable medical probability, no material deterioration of the patient condition or the condition of the unborn child(katherine) is likely to result from the transfer    Reason for Transfer: Level of Care needed not available at this facility, No bed available at level of patient's needs, Other (Include comment)____________________(inpatient mental health 201)    Transfer Requirements: Facility [address]  · Space available and qualified personnel available for treatment as acknowledged by Crisis [phone]  · Agreed to accept transfer and to provide appropriate medical treatment as acknowledged by       Dr Jorden Balderrama  · Appropriate medical records of the examination and treatment of the patient are provided at the time of transfer   [address] [place] [address] [address] _______  · Transfer will be performed by qualified personnel from [address] [place] [address]  and appropriate transfer equipment as required, including the use of necessary and appropriate life support measures      Provider Certification: I have examined the patient and explained the following risks and benefits of being transferred/refusing transfer to the patient/family:  General risk, such as traffic hazards, adverse weather conditions, rough terrain or turbulence, possible failure of equipment (including vehicle or aircraft), or consequences of actions of persons outside the control of the transport personnel, Unanticipated needs of medical equipment and personnel during transport, Risk of worsening condition, The possibility of a transport vehicle being unavailable, The patient is stable for psychiatric transfer because they are medically stable, and is protected from harming him/herself or others during transport      Based on these reasonable risks and benefits to the patient and/or the unborn child(katherine), and based upon the information available at the time of the patients examination, I certify that the medical benefits reasonably to be expected from the provision of appropriate medical treatments at another medical facility outweigh the increasing risks, if any, to the individuals medical condition, and in the case of labor to the unborn child, from effecting the transfer      X____________________________________________ DATE 03/05/21        TIME_______      ORIGINAL - SEND TO MEDICAL RECORDS   COPY - SEND WITH PATIENT DURING TRANSFER
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
## 2021-03-06 PROBLEM — M19.90 DJD (DEGENERATIVE JOINT DISEASE): Status: ACTIVE | Noted: 2021-03-06

## 2021-03-06 PROCEDURE — 99221 1ST HOSP IP/OBS SF/LOW 40: CPT | Performed by: PSYCHIATRY & NEUROLOGY

## 2021-03-06 PROCEDURE — 82607 VITAMIN B-12: CPT | Performed by: FAMILY MEDICINE

## 2021-03-06 PROCEDURE — 82306 VITAMIN D 25 HYDROXY: CPT | Performed by: FAMILY MEDICINE

## 2021-03-06 RX ORDER — LANOLIN ALCOHOL/MO/W.PET/CERES
3 CREAM (GRAM) TOPICAL
Status: DISCONTINUED | OUTPATIENT
Start: 2021-03-06 | End: 2021-03-12

## 2021-03-06 RX ADMIN — MELATONIN 3 MG: at 21:38

## 2021-03-06 RX ADMIN — BUSPIRONE HYDROCHLORIDE 10 MG: 5 TABLET ORAL at 21:38

## 2021-03-06 RX ADMIN — BUSPIRONE HYDROCHLORIDE 10 MG: 5 TABLET ORAL at 08:39

## 2021-03-06 RX ADMIN — BUSPIRONE HYDROCHLORIDE 10 MG: 5 TABLET ORAL at 16:39

## 2021-03-06 RX ADMIN — CITALOPRAM HYDROBROMIDE 10 MG: 10 TABLET ORAL at 08:39

## 2021-03-06 NOTE — PROGRESS NOTES
Patient arrived on unit from [facility] ED  Noted to be pleasant and cooperative  Recent onset of SI with plan to cut her throat  or wrist  Pt feels safe here and if she feels anxious or has any Suicidal  thoughts that she will come talk to us  Reports she can not sleep and believes that is her recent stressor that lead up to her being here  Pt stated she does have a support system at home including her , son and best friend  Pt denies HI  Pt had shower  Ate a sandwich and had something  drink    Pt In room resting offers no complaints at this time  7 min checks initiated  Call bell within reach  Will continue to monitor  Pt signed PINEDA for her  Mani Carlos

## 2021-03-06 NOTE — PROGRESS NOTES
Patient compliant with 1600 medications and meals  Patient back to her room after eating  Rated her anxiety and depression 8 out of 10 this shift  Denies any SI/HI  No other concerns or problems reported  Will continue to monitor for behaviors and changes

## 2021-03-06 NOTE — TREATMENT PLAN
TREATMENT PLAN REVIEW - [address] 56 y o  1960 female MRN: [id number]    [address]  [facility] [place] [address] Encounter: [id number]          Admit Date/Time:  3/5/2021  6:08 PM    Treatment Team: Attending Provider: Teresa Ignacio MD; Consulting Physician: Tina Mortimer, MD; Patient Care Technician: Vicki Barrios; Patient Care Assistant: Jameel Kenny; Patient Care Assistant: Jeannette Alvarez; Patient Care Assistant: Jeanne Smith;  Registered Nurse: Chelle Sanchez, RN; Registered Nurse: Gal Neely RN    Diagnosis: Principal Problem:    Severe episode of recurrent major depressive disorder, without psychotic features ([facility] )  Active Problems:    Hashimoto's thyroiditis    Idiopathic insomnia    Generalized anxiety disorder    DJD (degenerative joint disease)      Patient Strengths/Assets: negotiates basic needs, patient is on a voluntary commitment    Patient Barriers/Limitations: difficulty adapting, poor insight, poor physical health, self-care deficit    Short Term Goals: decrease in depressive symptoms, decrease in anxiety symptoms, decrease in suicidal thoughts, ability to stay safe on the unit, improvement in reasoning ability, improvement in self care, sleep improvement, mood stabilization, acceptance of need for psychiatric treatment, acceptance of psychiatric medications    Long Term Goals: improvement in depression, improvement in anxiety, stabilization of mood, free of suicidal thoughts, improvement in reasoning ability, improved insight, adequate self care, adequate sleep, appropriate interaction with peers    Progress Towards Goals: starting psychiatric medications as prescribed    Recommended Treatment: medication management, patient medication education, group therapy, milieu therapy, continued Behavioral Health psychiatric evaluation/assessment process, medical follow up with medical team    Treatment Frequency: daily medication monitoring, group and milieu therapy daily, monitoring through interdisciplinary rounds, monitoring through weekly patient care conferences    Expected Discharge Date: 10 midnights     Discharge Plan: will be determined    Treatment Plan Created/Updated By: Lisa Randall MD

## 2021-03-06 NOTE — H&P
Shivani Chauhan#  CARLY:[id number] F  UTU:[id number]    [facility]:[id number]  Adm Date: 3/5/2021 1808  6:08 PM   ATT PHY: Teresa Ignacio, [facility]         Chief Complaint:  Worsening depression with suicidal ideation    History of Presenting Illness: Jeannie Lawson is a(n) 56 y o  female presenting under 12 voluntary commitment for worsening depression and suicidal ideation  Patient examined at bedside  Patient has no acute concerns  No Known Allergies    No current facility-administered medications on file prior to encounter        Current Outpatient Medications on File Prior to Encounter   Medication Sig Dispense Refill    busPIRone (BUSPAR) 10 mg tablet Take 1 tablet (10 mg total) by mouth 3 (three) times a day 270 tablet 0    [START ON 4/12/2021] cholecalciferol (VITAMIN D3) 1,000 units tablet Take 1 tablet (1,000 Units total) by mouth daily 30 tablet 0    ergocalciferol (VITAMIN D2) 50,000 units Take 1 capsule (50,000 Units total) by mouth once a week for 14 doses 13 capsule 0    citalopram (CeleXA) 10 mg tablet Take 3 tablets (30 mg total) by mouth daily for 15 doses 45 tablet 1    ibuprofen (MOTRIN) 400 mg tablet Take 1 tablet (400 mg total) by mouth every 8 (eight) hours as needed for moderate pain 60 tablet 0    polyethylene glycol (MIRALAX) 17 g packet Take 17 g by mouth daily as needed (constipation refractory to Senokot-S) (Patient not taking: Reported on 1/21/2021) 30 each 0    senna-docusate sodium (SENOKOT S) 8 6-50 mg per tablet Take 1 tablet by mouth daily as needed for constipation (Patient not taking: Reported on 1/21/2021) 30 tablet 0       Active Ambulatory Problems     Diagnosis Date Noted    Severe episode of recurrent major depressive disorder, without psychotic features ([facility] ) 09/15/2017    Hashimoto's thyroiditis 06/08/2017    Idiopathic insomnia 04/17/2017    Morbid obesity ([facility] ) 06/08/2017    Prediabetes 09/13/2017  Annual physical exam 2019    Memory problem 2020    Generalized anxiety disorder 2020    Executive function deficit 2020     Resolved Ambulatory Problems     Diagnosis Date Noted    Acute encephalopathy 2016    [facility]anoia ([facility] 2016    Anxiety 2015    Complex partial epilepsy with generalization and with intractable epilepsy ([facility] ) 2014     Past Medical History:   Diagnosis Date    Concussion     Depression     DJD (degenerative joint disease)     Gait abnormality     Head injury     ISCHEMIC 2013     Seizures ([facility] )        Past Surgical History:   Procedure Laterality Date    BRAIN SURGERY      IMMED FOLLOWING STROKE IN 2013     SECTION         Social History:   Social History     Socioeconomic History    Marital status: /Civil Union     Spouse name: None    Number of children: None    Years of education: None    Highest education level: Some college, no degree   Occupational History    Occupation: Disabled   Social Needs    Financial resource strain: Not hard at all   [place] insecurity     Worry: None     Inability: None    Transportation needs     Medical: None     Non-medical: None   Tobacco Use    Smoking status: Never Smoker    Smokeless tobacco: Never Used   Substance and Sexual Activity    Alcohol use: No     Frequency: Never     Binge frequency: Never     Comment: doesnt drink   Drug use: No    Sexual activity: Not Currently     Partners: Male   Lifestyle    Physical activity     Days per week: 6 days     Minutes per session: None    Stress: To some extent   Relationships    Social connections     Talks on phone: None     Gets together: None     Attends [employer] service: None     Active member of club or organization: None     Attends meetings of clubs or organizations: None     Relationship status:     Intimate partner violence     Fear of current or ex partner: No     Emotionally abused:  No Physically abused: No     Forced sexual activity: No   Other Topics Concern    None   Social History Narrative    Daily caffeine consumption       Family History:   Family History   Problem Relation Age of Onset    Aortic aneurysm Mother     No Known Problems Father     No Known Problems Daughter     No Known Problems Son     Psychiatric Illness Neg Hx        Review of Systems   Musculoskeletal: Positive for arthralgias  All other systems reviewed and are negative  Physical Exam   Constitutional: Awake and Alert  Well-developed and well-nourished  No distress  HENT: PERR,EOMI, conjunctiva normal  Head: Normocephalic and atraumatic  Mouth/Throat: Oropharynx is clear and moist     Eyes: Conjunctivae and EOM are normal  Pupils are equal, round, and reactive to light  Right and left eye exhibits no discharge  Neck: Neck supple  No tracheal deviation present  No thyromegaly present  Cardiovascular: Normal rate, regular rhythm and normal heart sounds  Exam reveals no friction rub  No murmur heard  Pulmonary/Chest: Effort normal and breath sounds normal  No respiratory distress  No wheezes  Abdominal: Soft  Bowel sounds are normal  No distension  No tenderness  No rebound tenderness and no guarding  Neurological: Cranial Nerves grossly intact  No sensory deficit  Coordination normal    Musculoskeletal:   Nontender spine  Skin: Skin is warm and dry  No rash noted  No diaphoresis  No erythema  No edema  No cyanosis  Assessment     Juan Ronquillo is a(n) 56 y o  female with MDD      1  DJD  Patient may receive Tylenol as needed  2  History of epilepsy  Stable since right temporal lobectomy in March 2013  3  Elevated TSH  Patient's TSH was found to be 4 040 on 01/06/2021  She is asymptomatic  Patient's TSH on 03/05/2021 was within normal limits at 2 780   4  History of Vitamin D deficiency  Patient has not been taking vitamin-D supplements    I will order vitamin-D level before deciding on treatment  5  MDD  Patient is being managed by psych  Prognosis: Fair  Discharge Plan: In progress  Advanced Directives: I have discussed in detail with the patient the advanced directives  The patient's emergency contact is her , Luis Garza ([phone])  When discussing cardiac and pulmonary resuscitation efforts with the patient, the patient wishes to be full code  I have spent more than 50 minutes gathering data, doing physical examination, and discussing the advanced directives, which was witnessed by caring staff  The patient was discussed with Dr Asa Santillan and he is in agreement with the above note

## 2021-03-06 NOTE — NURSING NOTE
[phone]  Pt found to be resting quietly on most of authors rounds  No acute behavioral issues noted  Q 7 min checks maintained  Fall protocol in place

## 2021-03-06 NOTE — PROGRESS NOTES
Patient came with the followin pr green pants  1 black top  1 pr black leggings  ` pr white socks  6 pr of underwear  1 blue top  1 pink top w/cows  1 pr black shorts (pj type)  1 black pants  1 pink brush      Contraband    1 navy blue bra with wire  1 paisly color shoulder bag  1 purple hand bag  1 pr sneakers    Placed in safe [facility]#[id number]    5 $20 dollar bills total $100  1 AAA card  1 johan master card  1 home dep[employer] card  1 [place] drivers lic  2 capital blue med cards  1 raf master card  1 cabalas master card  1 kohls reward card  1 [employer] debit card visa card    Patient signed for all the above

## 2021-03-06 NOTE — NURSING NOTE
Pt observed in the community throughout the morning  Selectively social  Pleasant and cooperative  Medication compliant  Denies pain  Rates anxiety and depression an 8/10  Denies SI/HI/AH/VH  Will CTM  Q7 minute safety checks in progress

## 2021-03-06 NOTE — H&P
Psychiatric Evaluation - Behavioral Health     Identification Data:Martha MIGEL [facility] 56 y o  female MRN: [id number]  Unit/Bed#: [facility] 209-02 Encounter: [id number]    Chief Complaint: depression, anxiety and suicidal ideation    History of Present Illness     Martha LAINEZ [facility] is a 56 y o  female with a history of Major Depressive Disorder and anxiety who was admitted to the inpatient older adult psychiatric unit on a voluntary 201 commitment basis due to depression, anxiety and suicidal ideation  Patient presented to ED voluntarily because of worsening of depression, anxiety and suicidal ideation  She is known to the unit team from her previous admission in January 2021  On evaluation in the inpatient psychiatric unit Martha reports that she has been feeling increasingly depressed, anxious, isolative, poor sleep with increased suicide ideation with plan to overdose on pill or by cutting her risk with kitchen knife  She denies any active plan or intent to hurt herself and she is able to contract for safety in the unit  Patient denies any current paranoia, hallucination but presents limited historian with delayed speech, mild thought blocking with word-finding difficulty  Patient states that she has been compliant with medication and agrees with the treatment plan in the unit      Psychiatric Review Of Systems:    Sleep changes: decreased  Appetite changes:no  Weight changes: no  Energy: decreased  Interest/pleasure/: yes  Anhedonia: yes  Anxiety: yes  Court: no  Guilt:  no  Hopeless:  no  Self injurious behavior/risky behavior: yes  Suicidal ideation: yes, plan to cut self or overdose on medications  Homicidal ideation: no  Auditory hallucinations: no  Visual hallucinations: no  Delusional thinking: no  Eating disorder history: no  Obsessive/compulsive symptoms: no    Historical Information     Past Psychiatric History:     Past Inpatient Psychiatric Treatment:   Several past inpatient psychiatric admissions  Past Outpatient Psychiatric Treatment:    Currently in outpatient psychiatric treatment with a psychiatrist  Past Suicide Attempts: denies  Past Violent Behavior:no  Past Psychiatric Medication Trials: Celexa and Buspar     Substance Abuse History:    Social History     Tobacco History     Smoking Status  Never Smoker    Smokeless Tobacco Use  Never Used          Alcohol History     Alcohol Use Status  No Comment  doesnt drink  Drug Use     Drug Use Status  No          Sexual Activity     Sexually Active  Not Currently Partners  Male          Activities of Daily Living    Not Asked               Additional Substance Use Detail     Questions Responses    Problems Due to Past Use of Alcohol? No    Problems Due to Past Use of Substances?  No    Substance Use Assessment Denies substance use within the past 12 months    Alcohol Use Frequency Denies use in past 12 months    Cannabis frequency Never used    Comment: Denies use in past 12 months ->Never used on 1/21/2021     Heroin Frequency Denies use in past 12 months    Cocaine frequency Never used    Comment: Denies past use in past 12 months ->Never used on 1/21/2021     Crack Cocaine Frequency Denies use in past 12 months    Methamphetamine Frequency Denies use in past 12 months    Narcotic Frequency Denies use in past 12 months    Benzodiazepine Frequency Denies use in past 12 months    Amphetamine frequency Never used    Comment: Denies use in past 12 months ->Never used on 1/21/2021     Barbituate Frequency Denies use use in past 12 months    Inhalant frequency Never used    Comment: Denies use in past 12 months ->Never used on 1/21/2021     Hallucinogen frequency Never used    Comment: Denies use in past 12 months ->Never used on 1/21/2021     Ecstasy frequency Never used    Comment: Denies use past 12 months ->Never used on 1/21/2021     Other drug frequency Never used    Comment: Denies use in past 12 months ->Never used on 1/21/2021     Opiate frequency Denies use in past 12 months    Last reviewed by Chris Goodson PA-C on 3/6/2021        I have assessed this patient for substance use within the past 12 months    Alcohol use: denies use  Recreational drug use: denies    Family Psychiatric History:  Brother with history of bipolar disorder    Social History:    Education: some college  Marital History:   Children: 2 adult sons  Living Arrangement: lives in home with   Occupational History: disabled due to brain injury/surgery  Functioning Relationships: good support system  Legal History: none   History: None    Traumatic History:   None    Past Medical History:      Past Medical History:   Diagnosis Date    Anxiety     Concussion     Last assessed 2017    Depression     DJD (degenerative joint disease)     Gait abnormality     Head injury     ISCHEMIC 2013     ISCHEMIC 2013    Seizures [facility])      Past Surgical History:   Procedure Laterality Date   [address]      IMMED FOLLOWING STROKE IN 2013     SECTION         Medical Review Of Systems:    Pertinent items are noted in HPI  Allergies:    No Known Allergies    Medications: All current active medications have been reviewed      OBJECTIVE:    Vital signs in last 24 hours:    Temp:  [97 3 °F (36 3 °C)-97 7 °F (36 5 °C)] 97 7 °F (36 5 °C)  HR:  [77-78] 78  Resp:  [16-17] 17  BP: (124-126)/(77-80) 126/80    No intake or output data in the 24 hours ending 21 1508     Mental Status Evaluation:    Appearance:  age appropriate   Behavior:  psychomotor retardation   Speech:  decreased rate, slow   Mood:  depressed   Affect:  constricted   Language: naming objects   Thought Process:  slowing of thoughts   Associations: concrete associations   Thought Content:  no overt delusions   Perceptual Disturbances: none   Risk Potential: Suicidal ideation - Yes, without plan  Homicidal ideation - None at present  Potential for aggression - No   Sensorium:  oriented to person, place and time/date   Memory:  recent memory mildly impaired   Consciousness:  alert and awake   Attention: attention span and concentration appear shorter than expected for age   Intellect: below average   Fund of Knowledge: awareness of current events: limited   Insight:  poor   Judgment: poor   Muscle Strength Muscle Tone: normal  normal   Gait/Station: slow gait   Motor Activity: no abnormal movements       Laboratory Results:   I have personally reviewed all pertinent laboratory/tests results  Most Recent Labs:   Lab Results   Component Value Date    WBC 9 65 03/05/2021    RBC 5 21 (H) 03/05/2021    HGB 15 4 03/05/2021    HCT 47 4 (H) 03/05/2021     03/05/2021    RDW 14 1 03/05/2021    NEUTROABS 7 49 03/05/2021    SODIUM 137 03/05/2021    K 4 1 03/05/2021     03/05/2021    CO2 26 03/05/2021    BUN 11 03/05/2021    CREATININE 0 93 03/05/2021    GLUC 142 (H) 03/05/2021    GLUF 117 (H) 08/08/2017    CALCIUM 9 3 03/05/2021    AST 17 03/05/2021    ALT 34 03/05/2021    ALKPHOS 75 03/05/2021    TP 7 3 03/05/2021    ALB 3 7 03/05/2021    TBILI 0 52 03/05/2021    CHOLESTEROL 197 01/27/2021    HDL 60 01/27/2021    TRIG 94 01/27/2021    LDLCALC 117 (H) 01/27/2021    CARBAMAZEPIN 6 0 06/06/2016    AMMONIA 30 06/06/2016    KOI5FKCQMIEA 2 780 03/05/2021    FREET4 1 07 06/06/2016    RPR Non-Reactive 09/15/2017    HGBA1C 6 1 (H) 01/27/2021     01/27/2021    EAG 7 1 01/27/2021       Imaging Studies:   Mammo Screening Bilateral W 3d & Cad    Result Date: 2/9/2021  Narrative: DIAGNOSIS: Encounter for screening mammogram for malignant neoplasm of breast TECHNIQUE: Digital screening mammography was performed  Computer Aided Detection (CAD) analyzed all applicable images  COMPARISONS: Prior breast imaging dated: 07/20/2017 RELEVANT HISTORY: Family Breast Cancer History: No known family history of breast cancer  Family Medical History: No known relevant family medical history   Personal History: Hormone history includes combination hormone replacement therapy  No known relevant surgical history  No known relevant medical history  The patient is scheduled in a reminder system for screening mammography  8-10% of cancers will be missed on mammography  Management of a palpable abnormality must be based on clinical grounds  Patients will be notified of their results via letter from our facility  Accredited by [employer] of [employer] and FDA  RISK ASSESSMENT: 5 Year Tyrer-Cuzick: 0 95 % 10 Year Tyrer-Cuzick: 1 83 % Lifetime Tyrer-Cuzick: 4 29 % TISSUE DENSITY: The breasts are almost entirely fatty  INDICATION: Joann Geiger is a 56 y o  female presenting for screening mammography  FINDINGS: Bilateral There are no suspicious masses, grouped microcalcifications or areas of architectural distortion  The skin and nipple areolar complex are unremarkable  Tissue asymmetry in the medial left breast appears unchanged from 2017  Impression: No mammographic evidence of malignancy  ASSESSMENT/BI-RADS CATEGORY: Left: 2 - Benign Right: 2 - Benign Overall: 2 - Benign RECOMMENDATION:      - Routine screening mammogram in 1 year for both breasts  Workstation ID: [id number]       Code Status: Level 1 - Full Code  Advance Directive and Living Will: <no information>    Assessment/Plan   Principal Problem:    Severe episode of recurrent major depressive disorder, without psychotic features ([facility] )  Active Problems:    Hashimoto's thyroiditis    Idiopathic insomnia    Generalized anxiety disorder    DJD (degenerative joint disease)      Patient Strengths: cooperative, negotiates basic needs, patient is on a voluntary commitment     Patient Barriers: difficulty adapting, limited insight, poor physical health    Treatment Plan:     Planned Treatment and Medication Changes:     All current active medications have been reviewed  Encourage group therapy, milieu therapy and occupational therapy  Behavioral Health checks every 7 minutes  Begin Buspar, Celexa and Melatonin  Risks / Benefits of Treatment:    Risks, benefits, and possible side effects of medications explained to patient and patient verbalizes understanding and agreement for treatment  Counseling / Coordination of Care:    Patient's presentation on admission and proposed treatment plan discussed with treatment team   Diagnosis, medication changes and treatment plan reviewed with patient  Events leading to admission reviewed with patient      Inpatient Psychiatric Certification:    Estimated length of stay: 10 midnights      Davin Jaimes MD 03/06/21

## 2021-03-06 NOTE — PLAN OF CARE
Problem: Nutrition/Hydration-ADULT  Goal: Nutrient/Hydration intake appropriate for improving, restoring or maintaining nutritional needs  Description: Monitor and assess patient's nutrition/hydration status for malnutrition  Collaborate with interdisciplinary team and initiate plan and interventions as ordered  Monitor patient's weight and dietary intake as ordered or per policy  Utilize nutrition screening tool and intervene as necessary  Determine patient's food preferences and provide high-protein, high-caloric foods as appropriate       INTERVENTIONS:  - Monitor oral intake, urinary output, labs, and treatment plans  - Assess nutrition and hydration status and recommend course of action  - Evaluate amount of meals eaten  - Assist patient with eating if necessary   - Allow adequate time for meals  - Recommend/ encourage appropriate diets, oral nutritional supplements, and vitamin/mineral supplements  - Order, calculate, and assess calorie counts as needed  - Recommend, monitor, and adjust tube feedings and TPN/PPN based on assessed needs  - Assess need for intravenous fluids  - Provide specific nutrition/hydration education as appropriate  - Include patient/family/caregiver in decisions related to nutrition  Outcome: Progressing     Problem: SELF HARM/SUICIDALITY  Goal: Will have no self-injury during hospital stay  Description: INTERVENTIONS:  - Q 15 MINUTES: Routine safety checks  - Q WAKING SHIFT & PRN: Assess risk to determine if routine checks are adequate to maintain patient safety  - Encourage patient to participate actively in care by formulating a plan to combat response to suicidal ideation, identify supports and resources  Outcome: Progressing     Problem: DEPRESSION  Goal: Will be euthymic at discharge  Description: INTERVENTIONS:  - Administer medication as ordered  - Provide emotional support via 1:1 interaction with staff  - Encourage involvement in milieu/groups/activities  - Monitor for social isolation  Outcome: Progressing     Problem: ANXIETY  Goal: Will report anxiety at manageable levels  Description: INTERVENTIONS:  - Administer medication as ordered  - Teach and encourage coping skills  - Provide emotional support  - Assess patient/family for anxiety and ability to cope  Outcome: Progressing  Goal: By discharge: Patient will verbalize 2 strategies to deal with anxiety  Description: Interventions:  - Identify any obvious source/trigger to anxiety  - Staff will assist patient in applying identified coping technique/skills  - Encourage attendance of scheduled groups and activities  Outcome: Progressing     Problem: SLEEP DISTURBANCE  Goal: Will exhibit normal sleeping pattern  Description: Interventions:  -  Assess the patients sleep pattern, noting recent changes  - Administer medication as ordered  - Decrease environmental stimuli, including noise, as appropriate during the night  - Encourage the patient to actively participate in unit groups and or exercise during the day to enhance ability to achieve adequate sleep at night  - Assess the patient, in the morning, encouraging a description of sleep experience  Outcome: Progressing     Problem: Alteration in Thoughts and Perception  Goal: Treatment Goal: Gain control of psychotic behaviors/thinking, reduce/eliminate presenting symptoms and demonstrate improved reality functioning upon discharge  Outcome: Progressing  Goal: Verbalize thoughts and feelings  Description: Interventions:  - Promote a nonjudgmental and trusting relationship with the patient through active listening and therapeutic communication  - Assess patient's level of functioning, behavior and potential for risk  - Engage patient in 1 on 1 interactions  - Encourage patient to express fears, feelings, frustrations, and discuss symptoms    - [place] patient to reality, help patient recognize reality-based thinking   - Administer medications as ordered and assess for potential side effects  - Provide the patient education related to the signs and symptoms of the illness and desired effects of prescribed medications  Outcome: Progressing  Goal: Refrain from acting on delusional thinking/internal stimuli  Description: Interventions:  - Monitor patient closely, per order   - Utilize least restrictive measures   - Set reasonable limits, give positive feedback for acceptable   - Administer medications as ordered and monitor of potential side effects  Outcome: Progressing  Goal: Agree to be compliant with medication regime, as prescribed and report medication side effects  Description: Interventions:  - Offer appropriate PRN medication and supervise ingestion; conduct AIMS, as needed   Outcome: Progressing  Goal: Attend and participate in unit activities, including therapeutic, recreational, and educational groups  Description: Interventions:  -Encourage Visitation and family involvement in care  Outcome: Progressing  Goal: Recognize dysfunctional thoughts, communicate reality-based thoughts at the time of discharge  Description: Interventions:  - Provide medication and psycho-education to assist patient in compliance and developing insight into his/her illness   Outcome: Progressing  Goal: Complete daily ADLs, including personal hygiene independently, as able  Description: Interventions:  - Observe, teach, and assist patient with ADLS  - Monitor and promote a balance of rest/activity, with adequate nutrition and elimination   Outcome: Progressing     Problem: PAIN - ADULT  Goal: Verbalizes/displays adequate comfort level or baseline comfort level  Description: Interventions:  - Encourage patient to monitor pain and request assistance  - Assess pain using appropriate pain scale  - Administer analgesics based on type and severity of pain and evaluate response  - Implement non-pharmacological measures as appropriate and evaluate response  - Consider cultural and social influences on pain and pain management  - Notify physician/advanced practitioner if interventions unsuccessful or patient reports new pain  Outcome: Progressing     Problem: Ineffective Coping  Goal: Cooperates with admission process  Description: Interventions:   - Complete admission process  Outcome: Progressing  Goal: Identifies ineffective coping skills  Outcome: Progressing  Goal: Identifies healthy coping skills  Outcome: Progressing  Goal: Demonstrates healthy coping skills  Outcome: Progressing  Goal: Participates in unit activities  Description: Interventions:  - Provide therapeutic environment   - Provide required programming   - Redirect inappropriate behaviors   Outcome: Progressing  Goal: Patient/Family participate in treatment and DC plans  Description: Interventions:  - Provide therapeutic environment  Outcome: Progressing  Goal: Patient/Family verbalizes awareness of resources  Outcome: Progressing  Goal: Understands least restrictive measures  Description: Interventions:  - Utilize least restrictive behavior  Outcome: Progressing  Goal: Free from restraint events  Description: - Utilize least restrictive measures   - Provide behavioral interventions   - Redirect inappropriate behaviors   Outcome: Progressing

## 2021-03-07 LAB
25(OH)D3 SERPL-MCNC: 25.1 NG/ML (ref 30–100)
VIT B12 SERPL-MCNC: 386 PG/ML (ref 100–900)

## 2021-03-07 PROCEDURE — 99232 SBSQ HOSP IP/OBS MODERATE 35: CPT | Performed by: PSYCHIATRY & NEUROLOGY

## 2021-03-07 RX ORDER — CITALOPRAM 20 MG/1
20 TABLET ORAL DAILY
Status: DISCONTINUED | OUTPATIENT
Start: 2021-03-08 | End: 2021-03-16 | Stop reason: HOSPADM

## 2021-03-07 RX ADMIN — CITALOPRAM HYDROBROMIDE 10 MG: 10 TABLET ORAL at 08:15

## 2021-03-07 RX ADMIN — BUSPIRONE HYDROCHLORIDE 10 MG: 5 TABLET ORAL at 21:20

## 2021-03-07 RX ADMIN — BUSPIRONE HYDROCHLORIDE 10 MG: 5 TABLET ORAL at 08:15

## 2021-03-07 RX ADMIN — MELATONIN 3 MG: at 21:20

## 2021-03-07 RX ADMIN — BUSPIRONE HYDROCHLORIDE 10 MG: 5 TABLET ORAL at 16:55

## 2021-03-07 NOTE — PROGRESS NOTES
Progress Note - Deniz Clayton 56 y o  female MRN: [id number]    Unit/Bed#[facility]-02 Encounter: [id number]        Subjective:   Patient seen and examined at bedside after reviewing the chart and discussing the case with the caring staff  Patient examined at bedside  Patient has no acute concerns  Physical Exam   Vitals: Blood pressure 131/69, pulse 83, temperature (!) 97 3 °F (36 3 °C), temperature source Temporal, resp  rate 18, height 5' (1 524 m), weight 85 8 kg (189 lb 2 5 oz), SpO2 98 %  ,Body mass index is 36 94 kg/m²  Constitutional: Patient appears well-developed  HEENT: PERR, EOMI, MMM  Cardiovascular: Normal rate and regular rhythm  Pulmonary/Chest: Effort normal and breath sounds normal    Abdomen: Soft, + BS, NT    Assessment/Plan:  Deniz Clayton is a(n) 56 y o  female with MDD      1  DJD  Patient may receive Tylenol as needed  2  History of epilepsy  Stable since right temporal lobectomy in March 2013  3  Elevated TSH  Patient's TSH was found to be 4 040 on 01/06/2021  She is asymptomatic   Patient's TSH on 03/05/2021 was within normal limits at 2 780   4  History of Vitamin D deficiency   Patient has not been taking vitamin-D supplements  I will order vitamin-D level before deciding on treatment  The patient was discussed with Dr Joelle Fletcher and he is in agreement with the above note

## 2021-03-07 NOTE — PROGRESS NOTES
Progress Note - Hans Humphrey 484 56 y o  female MRN: [id number]  Unit/Bed#: [facility] 209-02 Encounter: [id number]    Assessment/Plan   Principal Problem:    Severe episode of recurrent major depressive disorder, [facility]  Active Problems:    Hashimoto's thyroiditis    Idiopathic insomnia    Generalized anxiety disorder    DJD (degenerative joint disease)      Behavior over the last 24 hours:  unchanged  Sleep: slept better  Appetite: fair  Medication side effects: No  ROS: no complaints, all other systems are negative for acute changes    Mental Status Evaluation:  Appearance:  age appropriate and overweight   Behavior:  guarded   Speech:  delayed   Mood:  constricted and decreased range   Affect:  mood-congruent   Thought Process:  blocked and concrete   Thought Content:  no overt delusions   Perceptual Disturbances: None   Risk Potential: Suicidal Ideations none  Homicidal Ideations none  Potential for Aggression No   Sensorium:  person and place   Memory:  recent memory mildly impaired   Consciousness:  alert and awake    Attention: attention span and concentration are shorter for stated age   Insight:  poor   Judgment: limited   Gait/Station: slow   Motor Activity: no abnormal movements     Progress Toward Goals: Patient remains withdrawal, isolated and still voices passive suicidal ideation without any active plan or intent to hurt herself  She continues with poor interaction with peer and staff member  Calorie intake and medication compliant has been stable  Recommended Treatment: Continue with group therapy, milieu therapy and occupational therapy  Risks, benefits and possible side effects of Medications:   Patient does not verbalize understanding at this time and will require further explanation  Medications: all current active meds have been reviewed  Increase Celexa to 20 mg po daily  Labs:  I have personally reviewed all pertinent laboratory/tests results  Most Recent Labs:   Lab Results   Component Value Date    WBC 9 65 03/05/2021    RBC 5 21 (H) 03/05/2021    HGB 15 4 03/05/2021    HCT 47 4 (H) 03/05/2021     03/05/2021    RDW 14 1 03/05/2021    NEUTROABS 7 49 03/05/2021    SODIUM 137 03/05/2021    K 4 1 03/05/2021     03/05/2021    CO2 26 03/05/2021    BUN 11 03/05/2021    CREATININE 0 93 03/05/2021    GLUC 142 (H) 03/05/2021    GLUF 117 (H) 08/08/2017    CALCIUM 9 3 03/05/2021    AST 17 03/05/2021    ALT 34 03/05/2021    ALKPHOS 75 03/05/2021    TP 7 3 03/05/2021    ALB 3 7 03/05/2021    TBILI 0 52 03/05/2021    CHOLESTEROL 197 01/27/2021    HDL 60 01/27/2021    TRIG 94 01/27/2021    LDLCALC 117 (H) 01/27/2021    CARBAMAZEPIN 6 0 06/06/2016    AMMONIA 30 06/06/2016    MHT4FDACBFKY 2 780 03/05/2021    FREET4 1 07 06/06/2016    RPR Non-Reactive 09/15/2017    HGBA1C 6 1 (H) 01/27/2021     01/27/2021    EAG 7 1 01/27/2021       Counseling / Coordination of Care  Total floor / unit time spent today 20 minutes  Greater than 50% of total time was spent with the patient and / or family counseling and / or coordination of care

## 2021-03-07 NOTE — NURSING NOTE
[phone]  Pt found to be resting quietly on most of authors rounds  No acute behavioral issues noted  Q 7 min checks maintained  Fall protocol in place

## 2021-03-07 NOTE — NURSING NOTE
Pt present in the milieu; affect bright on approach  Mood is depressed   Denies SI or HI  No acute behavioral issues noted  Q 7 min checks ongoing

## 2021-03-07 NOTE — PLAN OF CARE
Problem: Nutrition/Hydration-ADULT  Goal: Nutrient/Hydration intake appropriate for improving, restoring or maintaining nutritional needs  Description: Monitor and assess patient's nutrition/hydration status for malnutrition  Collaborate with interdisciplinary team and initiate plan and interventions as ordered  Monitor patient's weight and dietary intake as ordered or per policy  Utilize nutrition screening tool and intervene as necessary  Determine patient's food preferences and provide high-protein, high-caloric foods as appropriate       INTERVENTIONS:  - Monitor oral intake, urinary output, labs, and treatment plans  - Assess nutrition and hydration status and recommend course of action  - Evaluate amount of meals eaten  - Assist patient with eating if necessary   - Allow adequate time for meals  - Recommend/ encourage appropriate diets, oral nutritional supplements, and vitamin/mineral supplements  - Order, calculate, and assess calorie counts as needed  - Recommend, monitor, and adjust tube feedings and TPN/PPN based on assessed needs  - Assess need for intravenous fluids  - Provide specific nutrition/hydration education as appropriate  - Include patient/family/caregiver in decisions related to nutrition  Outcome: Progressing     Problem: SELF HARM/SUICIDALITY  Goal: Will have no self-injury during hospital stay  Description: INTERVENTIONS:  - Q 15 MINUTES: Routine safety checks  - Q WAKING SHIFT & PRN: Assess risk to determine if routine checks are adequate to maintain patient safety  - Encourage patient to participate actively in care by formulating a plan to combat response to suicidal ideation, identify supports and resources  Outcome: Progressing     Problem: DEPRESSION  Goal: Will be euthymic at discharge  Description: INTERVENTIONS:  - Administer medication as ordered  - Provide emotional support via 1:1 interaction with staff  - Encourage involvement in milieu/groups/activities  - Monitor for social isolation  Outcome: Progressing     Problem: ANXIETY  Goal: Will report anxiety at manageable levels  Description: INTERVENTIONS:  - Administer medication as ordered  - Teach and encourage coping skills  - Provide emotional support  - Assess patient/family for anxiety and ability to cope  Outcome: Progressing  Goal: By discharge: Patient will verbalize 2 strategies to deal with anxiety  Description: Interventions:  - Identify any obvious source/trigger to anxiety  - Staff will assist patient in applying identified coping technique/skills  - Encourage attendance of scheduled groups and activities  Outcome: Progressing     Problem: SLEEP DISTURBANCE  Goal: Will exhibit normal sleeping pattern  Description: Interventions:  -  Assess the patients sleep pattern, noting recent changes  - Administer medication as ordered  - Decrease environmental stimuli, including noise, as appropriate during the night  - Encourage the patient to actively participate in unit groups and or exercise during the day to enhance ability to achieve adequate sleep at night  - Assess the patient, in the morning, encouraging a description of sleep experience  Outcome: Progressing     Problem: Alteration in Thoughts and Perception  Goal: Treatment Goal: Gain control of psychotic behaviors/thinking, reduce/eliminate presenting symptoms and demonstrate improved reality functioning upon discharge  Outcome: Progressing  Goal: Verbalize thoughts and feelings  Description: Interventions:  - Promote a nonjudgmental and trusting relationship with the patient through active listening and therapeutic communication  - Assess patient's level of functioning, behavior and potential for risk  - Engage patient in 1 on 1 interactions  - Encourage patient to express fears, feelings, frustrations, and discuss symptoms    - [place] patient to reality, help patient recognize reality-based thinking   - Administer medications as ordered and assess for potential side effects  - Provide the patient education related to the signs and symptoms of the illness and desired effects of prescribed medications  Outcome: Progressing  Goal: Refrain from acting on delusional thinking/internal stimuli  Description: Interventions:  - Monitor patient closely, per order   - Utilize least restrictive measures   - Set reasonable limits, give positive feedback for acceptable   - Administer medications as ordered and monitor of potential side effects  Outcome: Progressing  Goal: Agree to be compliant with medication regime, as prescribed and report medication side effects  Description: Interventions:  - Offer appropriate PRN medication and supervise ingestion; conduct AIMS, as needed   Outcome: Progressing  Goal: Attend and participate in unit activities, including therapeutic, recreational, and educational groups  Description: Interventions:  -Encourage Visitation and family involvement in care  Outcome: Progressing  Goal: Recognize dysfunctional thoughts, communicate reality-based thoughts at the time of discharge  Description: Interventions:  - Provide medication and psycho-education to assist patient in compliance and developing insight into his/her illness   Outcome: Progressing  Goal: Complete daily ADLs, including personal hygiene independently, as able  Description: Interventions:  - Observe, teach, and assist patient with ADLS  - Monitor and promote a balance of rest/activity, with adequate nutrition and elimination   Outcome: Progressing     Problem: PAIN - ADULT  Goal: Verbalizes/displays adequate comfort level or baseline comfort level  Description: Interventions:  - Encourage patient to monitor pain and request assistance  - Assess pain using appropriate pain scale  - Administer analgesics based on type and severity of pain and evaluate response  - Implement non-pharmacological measures as appropriate and evaluate response  - Consider cultural and social influences on pain and pain management  - Notify physician/advanced practitioner if interventions unsuccessful or patient reports new pain  Outcome: Progressing     Problem: Ineffective Coping  Goal: Cooperates with admission process  Description: Interventions:   - Complete admission process  Outcome: Progressing  Goal: Identifies ineffective coping skills  Outcome: Progressing  Goal: Identifies healthy coping skills  Outcome: Progressing  Goal: Demonstrates healthy coping skills  Outcome: Progressing  Goal: Participates in unit activities  Description: Interventions:  - Provide therapeutic environment   - Provide required programming   - Redirect inappropriate behaviors   Outcome: Progressing  Goal: Patient/Family participate in treatment and DC plans  Description: Interventions:  - Provide therapeutic environment  Outcome: Progressing  Goal: Patient/Family verbalizes awareness of resources  Outcome: Progressing  Goal: Understands least restrictive measures  Description: Interventions:  - Utilize least restrictive behavior  Outcome: Progressing  Goal: Free from restraint events  Description: - Utilize least restrictive measures   - Provide behavioral interventions   - Redirect inappropriate behaviors   Outcome: Progressing

## 2021-03-07 NOTE — NURSING NOTE
Patient observed in the community for meals  Pt withdrawn to self today  Brightens upon approach but does not offer much information other than "yes and no"  Pleasant  Compliant with medications  Denies anxiety and depression  Denies SI/HI/AH/VH  Will CTM  Q7 minute safety checks in progress

## 2021-03-08 ENCOUNTER — VBI (OUTPATIENT)
Dept: ADMINISTRATIVE | Facility: OTHER | Age: 61
End: 2021-03-08

## 2021-03-08 PROCEDURE — 99233 SBSQ HOSP IP/OBS HIGH 50: CPT | Performed by: PSYCHIATRY & NEUROLOGY

## 2021-03-08 RX ORDER — BUSPIRONE HYDROCHLORIDE 15 MG/1
15 TABLET ORAL 3 TIMES DAILY
Status: DISCONTINUED | OUTPATIENT
Start: 2021-03-08 | End: 2021-03-16 | Stop reason: HOSPADM

## 2021-03-08 RX ORDER — MELATONIN
1000 DAILY
Status: DISCONTINUED | OUTPATIENT
Start: 2021-04-26 | End: 2021-03-16 | Stop reason: HOSPADM

## 2021-03-08 RX ORDER — ERGOCALCIFEROL 1.25 MG/1
50000 CAPSULE ORAL WEEKLY
Status: DISCONTINUED | OUTPATIENT
Start: 2021-03-08 | End: 2021-03-16 | Stop reason: HOSPADM

## 2021-03-08 RX ORDER — CYANOCOBALAMIN 1000 UG/ML
1000 INJECTION INTRAMUSCULAR; SUBCUTANEOUS
Status: DISCONTINUED | OUTPATIENT
Start: 2021-03-08 | End: 2021-03-16 | Stop reason: HOSPADM

## 2021-03-08 RX ADMIN — MELATONIN 3 MG: at 21:23

## 2021-03-08 RX ADMIN — CYANOCOBALAMIN 1000 MCG: 1000 INJECTION, SOLUTION INTRAMUSCULAR at 11:37

## 2021-03-08 RX ADMIN — BUSPIRONE HYDROCHLORIDE 15 MG: 15 TABLET ORAL at 16:33

## 2021-03-08 RX ADMIN — BUSPIRONE HYDROCHLORIDE 15 MG: 15 TABLET ORAL at 21:23

## 2021-03-08 RX ADMIN — BUSPIRONE HYDROCHLORIDE 10 MG: 5 TABLET ORAL at 08:22

## 2021-03-08 RX ADMIN — ERGOCALCIFEROL 50000 UNITS: 1.25 CAPSULE, LIQUID FILLED ORAL at 11:37

## 2021-03-08 RX ADMIN — CITALOPRAM HYDROBROMIDE 20 MG: 20 TABLET ORAL at 08:22

## 2021-03-08 NOTE — PROGRESS NOTES
Progress Note - Hans Humphrey 484 56 y o  female MRN: [id number]  Unit/Bed#: [facility] 209-02 Encounter: [id number]    Assessment/Plan   Principal Problem:    Severe episode of recurrent major depressive disorder, without psychotic features [facility]  Active Problems:    Hashimoto's thyroiditis    Idiopathic insomnia    Generalized anxiety disorder    DJD (degenerative joint disease)      Behavior over the last 24 hours:  unchanged  Sleep: normal  Appetite: normal  Medication side effects: No  ROS: no complaints, all other systems are negative for acute changes    Mental Status Evaluation:  Appearance:  age appropriate and casually dressed   Behavior:  cooperative, patient in hallways   Speech:  delayed and soft   Mood:  constricted   Affect:  mood-congruent   Thought Process:  blocked and concrete   Thought Content:  no overt delusions   Perceptual Disturbances: None   Risk Potential: Suicidal Ideations vague without command  Homicidal Ideations none  Potential for Aggression No   Sensorium:  person, place and situation   Memory:  recent memory mildly impaired   Consciousness:  alert and awake    Attention: attention span and concentration were age appropriate   Insight:  poor   Judgment: limited   Gait/Station: normal gait/station   Motor Activity: no abnormal movements     Progress Toward Goals: Kat  states that she is feeling mildly better with reduced  depressed mood and anxiety  She says that she is eating and sleeping better  She did seem to be restless and was pacing the hallways throughout the conversation  She still voices vague suicidal ideation but denies any active plan or intent to hurt herself  She has been compliant with medication and denies any current side effects  Recommended Treatment: Continue with group therapy, milieu therapy and occupational therapy        Risks, benefits and possible side effects of Medications:   Risks, benefits, and possible side effects of medications explained to patient and patient verbalizes understanding  Medications: all current active meds have been reviewed  Increase Buspar to 15 mg po tid    Labs: I have personally reviewed all pertinent laboratory/tests results  Most Recent Labs:   Lab Results   Component Value Date    WBC 9 65 03/05/2021    RBC 5 21 (H) 03/05/2021    HGB 15 4 03/05/2021    HCT 47 4 (H) 03/05/2021     03/05/2021    RDW 14 1 03/05/2021    NEUTROABS 7 49 03/05/2021    SODIUM 137 03/05/2021    K 4 1 03/05/2021     03/05/2021    CO2 26 03/05/2021    BUN 11 03/05/2021    CREATININE 0 93 03/05/2021    GLUC 142 (H) 03/05/2021    GLUF 117 (H) 08/08/2017    CALCIUM 9 3 03/05/2021    AST 17 03/05/2021    ALT 34 03/05/2021    ALKPHOS 75 03/05/2021    TP 7 3 03/05/2021    ALB 3 7 03/05/2021    TBILI 0 52 03/05/2021    CHOLESTEROL 197 01/27/2021    HDL 60 01/27/2021    TRIG 94 01/27/2021    LDLCALC 117 (H) 01/27/2021    CARBAMAZEPIN 6 0 06/06/2016    AMMONIA 30 06/06/2016    [id number] 2 780 03/05/2021    FREET4 1 07 06/06/2016    RPR Non-Reactive 09/15/2017    HGBA1C 6 1 (H) 01/27/2021     01/27/2021    EAG 7 1 01/27/2021       Counseling / Coordination of Care  Total floor / unit time spent today 20 minutes  Greater than 50% of total time was spent with the patient and / or family counseling and / or coordination of care

## 2021-03-08 NOTE — PROGRESS NOTES
03/08/21 1405   [address] Admission Notification   Notification of Admission Provided to: Family;Psychiatrist;PCP   Family Notified via: Phone call  (, Sofia Smith - [phone])   PCP Notified via: Phone call  (Dr Botello [facility] - [phone])   Psychiatrist Notified via: Phone call  ([address])     SAVANAH placed phone call to pt , Sofia Smith; pt  states that patient was doing well while in the [facility] program but shortly after that ended, her depression returned and she stopped eating, sleeping and taking care of herself; pt  states that he found pt at the kitchen table on Feb 24th with all of her pills in a cup with a cup of milk - pt  asked pt what she was planning to do with all of her pills - pt replied "I'm going to take them all"; pt  able to de-esculate; pt son took pt to [place] for a "change of scenery" but shortly after getting there, pt requested to return home and go to the hospital; pt  concerned that once patient returns home that "this cycle is just going to keep happening"; pt  requests phone call from psych provider to discuss this concern; no additional questions or concerns for SW at this time; call mutually ended     SW placed phone call to pt PCP - phone busy - will attempt again     SAVANAH placed phone call to pt psych provider, [address], [phone] - not a current patient - was seen by OSMAN Farrell during [facility] program but not scheduled for follow up - SAVANAH placed phone call to [facility] program to obtain follow up provider information - left message on voiceamil; SAVANAH also sent message via [facility]

## 2021-03-08 NOTE — NURSING NOTE
[phone]     Pt more present in the milieu this evening compared to passed  Reports improvement in mood  No SI or hi voiced  No acute behavioral outburst noted  Q 7 min checks ongoing

## 2021-03-08 NOTE — NURSING NOTE
[phone]  Pt found to be resting quietly on most of authors rounds  No acute behavioral issues noted  Q 7 min checks maintained  Fall protocol in place

## 2021-03-08 NOTE — PROGRESS NOTES
SW and pt met in small consult room; pt appears flat, depressed, smiles appropriately at times; pt expressed that she did well on unit and then at [facility] but shortly after PHP ended, her depression returned and she couldn't sleep or eat; pt states that she feels lonely and isolated at home alone as her  went back to work; pt unable to express how she will cope with this after d/c     ROIs: psych, pcp,        03/08/21 1403   Patient Intake   Living Arrangement House;Lives with someone   Can patient return home? Yes   Address to be Discharge to: RUPA Cullen, [address] [place] [address]   Patient's Telephone Number [phone]   Access to Firearms No   Type of Work on disability   Work History Disabled  ($950/SDI)   School Grade/Year 12th  (h/s & some college)   Admission Status   Status of Admission [address] [place] [address] Aaron Shafer   Patient History   Treatment History hx AIP, O/P and PHP; last inpt 1/7-1/20/21   Currently in Treatment Yes   Legal Issues no current or hx   Substance Abuse No   Crisis Info   Release of Information Signed Yes  (, pcp, psych)     Behavioral Health Psychosocial    Current SI?  denies   Current HI? denies     AH/VH denies        Depression   8   /10    Anxiety   5   /10  Stressors/Triggers: isolation, lonely  Strengths:supportive family, cooperative, success with prev hospitalization, creative  Limitations: confidence, chronic anxiety  Coping Skills: being social, writing, walking dogs  Hx Mental Illness: MDD, SWAPNA   Past Hospitalizations? Dates?  [facility] 1/7-1/20/21  Hx Psych Meds: remeron, buspar, celexa  Current Med Compliance: admits  Hx Non Compliance: denies  Hx SA or SI in last 12 mons  : hx SI; no hx SA   Access to Firearms: denies  Hx Violence to self or others past 12 mons  : denies  Hx HI past 12 mons  : denies  Hx abuse: denies  Current psychological trauma: denies   Family hx mental illness: brother - bipolar  Family hx suicide/homicide: denies  Family hx substance abuse: denies  Family hx dementia: denies  Current Substance abuse: denies x 3   Other Addictions? Past or Present? denies  Hx Arrests, Probation or [place]? denies  Current Legal Problems? denies  Marital Status/Relationship Hx?  - Cleave Lasmello - 40 yrs - first and only  Sexual Preference? Heterosexual - no concerns  Children? Ages? Relationship? [place]; 1 grandchild  Are you currently responsible for any children? no  Pets? 2 dogs - Aleksandr and Togo  Parents? Relationships? Mother living - Jah Rattler; father   Are you a caregiver? no  Siblings? Relationships? Loraine FOLEY) - close; [place] - no relationship  Any other family supports? no  Current living situation? Able t return? Lives in own home with  - can return   Caregivers for pt - any stressors? Education Hx? H/s - some college  Employment Hx? On disability   Hx? none  Assistive Devices? Compliance? Physical barriers in the home? (Steps, bathroom/bedroom location?)   Congregation preferences? Spiritual   Would you like Orthodox notified? no  Cultural needs? none  How do you get to your appointments? Drives self or   Financial Resources:    SSI/SSDI      Ability to pay for meds: yes  Monthly Income:$950/SDI  Medical Insurance: [facility]  Power of ? none   If no, Info?  declined  POA for Mental Health? none   If no, Info? declined  Advanced directives?   none    If no, Info? declined  Guardian? none  Does someone provide financial assistance to you?   Current providers:   PCP? Dr Cris Mario - [phone]  Psych? Pt states [facility] - they do not have her as a current pt; awaiting response from [facility] program with pt follow up appt information  Therapist? none  Community Support Team (ACT/ICM/CM)none  Home Care Provider none  [facility]  Family notification? [address] meeting? declined  Aftercare needs? psych  Discharge disposition?  Home   Partial Referral? Recently discharged from [facility]

## 2021-03-08 NOTE — PROGRESS NOTES
03/08/21 1402   Team Meeting   Meeting Type Tx Team Meeting   Initial Conference Date 03/08/21   Next Conference Date 04/07/21   Team Members Present   Team Members Present Physician;Nurse;   Physician Team Member Dr Marysue Ahumada, MD   Nursing Team Member Heena Patton RN   Social Work Team Member SALEEM aDrden   Patient/Family Present   Patient Present Yes   Patient's Family Present No     Initial Conference     Patient and treatment team met and reviewed pt strengths, limitations, coping skills, treatment plan and goals; pt agreeable and signed; copy on chart

## 2021-03-08 NOTE — PROGRESS NOTES
03/08/21    Team Meeting   Meeting Type Daily Rounds   Team Members Present   Team Members Present Physician;Nurse;;Occupational Therapist   Physician Team Member Dr Vadim Martinez MD; [address]   Nursing Team Member Keri Burgos, PHILLY   Care Management Team Member MS Francisco, [facility]   OT Team Member Alida Nobles [place]   Patient/Family Present   Patient Present No   Patient's Family Present No   New admission 201  Readmit score 20  Last admission in January had followed up with partial  SI with plan strangle self with shirt  More withdrawn then prior admission  Depressed, quiet, PINEDA obtained for   Celexa 20 mg

## 2021-03-08 NOTE — PROGRESS NOTES
Patient compliant with medications and meals  Pleasant and cooperative  Denies any SI/HI  Rated her depression and anxiety 4 out of 10 this shift  Patient positive for groups this shift and social with selective peers  No other concerns or problems reported this shift  Q 7 mins checks in place for safety  Will continue to monitor for behaviors and changes

## 2021-03-08 NOTE — PLAN OF CARE
Problem: Nutrition/Hydration-ADULT  Goal: Nutrient/Hydration intake appropriate for improving, restoring or maintaining nutritional needs  Description: Monitor and assess patient's nutrition/hydration status for malnutrition  Collaborate with interdisciplinary team and initiate plan and interventions as ordered  Monitor patient's weight and dietary intake as ordered or per policy  Utilize nutrition screening tool and intervene as necessary  Determine patient's food preferences and provide high-protein, high-caloric foods as appropriate       INTERVENTIONS:  - Monitor oral intake, urinary output, labs, and treatment plans  - Assess nutrition and hydration status and recommend course of action  - Evaluate amount of meals eaten  - Assist patient with eating if necessary   - Allow adequate time for meals  - Recommend/ encourage appropriate diets, oral nutritional supplements, and vitamin/mineral supplements  - Order, calculate, and assess calorie counts as needed  - Recommend, monitor, and adjust tube feedings and TPN/PPN based on assessed needs  - Assess need for intravenous fluids  - Provide specific nutrition/hydration education as appropriate  - Include patient/family/caregiver in decisions related to nutrition  Outcome: Progressing     Problem: ANXIETY  Goal: Will report anxiety at manageable levels  Description: INTERVENTIONS:  - Administer medication as ordered  - Teach and encourage coping skills  - Provide emotional support  - Assess patient/family for anxiety and ability to cope  Outcome: Progressing  Goal: By discharge: Patient will verbalize 2 strategies to deal with anxiety  Description: Interventions:  - Identify any obvious source/trigger to anxiety  - Staff will assist patient in applying identified coping technique/skills  - Encourage attendance of scheduled groups and activities  Outcome: Progressing     Problem: Ineffective Coping  Goal: Identifies healthy coping skills  Outcome: Progressing  Goal: Participates in unit activities  Description: Interventions:  - Provide therapeutic environment   - Provide required programming   - Redirect inappropriate behaviors   Outcome: Progressing  Goal: Patient/Family participate in treatment and DC plans  Description: Interventions:  - Provide therapeutic environment  Outcome: Progressing  Goal: Patient/Family verbalizes awareness of resources  Outcome: Progressing     Problem: Ineffective Coping  Goal: Cooperates with admission process  Description: Interventions:   - Complete admission process  Outcome: Completed

## 2021-03-08 NOTE — ED NOTES
Insurance Authorization for admission:   Phone call placed to [employer] to [place]  3 days approved  Level of care: [facility]  Review on 3/7/21  Authorization #  [id number]

## 2021-03-08 NOTE — PROGRESS NOTES
Progress Note - Diamond July 56 y o  female MRN: [id number]    Unit/Bed#Bailey Ann 209-02 Encounter: [id number]        Subjective:   Patient seen and examined at bedside after reviewing the chart and discussing the case with the caring staff  Patient examined at bedside  Patient has no acute concerns  On review of patient's labs it was found that patient's vitamin B12 levels were low 386 and vitamin D levels were also low 25 1  Physical Exam   Vitals: Blood pressure 128/77, pulse 78, temperature (!) 96 9 °F (36 1 °C), temperature source Temporal, resp  rate 16, height 5' (1 524 m), weight 85 8 kg (189 lb 2 5 oz), SpO2 97 %  ,Body mass index is 36 94 kg/m²  Constitutional: Patient appears well-developed  HEENT: PERR, EOMI, MMM  Cardiovascular: Normal rate and regular rhythm  Pulmonary/Chest: Effort normal and breath sounds normal    Abdomen: Soft, + BS, NT    Assessment/Plan:  Diamond July is a(n) 56 y o  female with MDD      1  DJD  Patient may receive Tylenol as needed  2  History of epilepsy  Stable since right temporal lobectomy in March 2013  3  Elevated TSH  Patient's TSH was found to be 4 040 on 01/06/2021  She is asymptomatic   Patient's TSH on 03/05/2021 was within normal limits at 2 780   4  New Vitamin D deficiency   I will put the patient on vitamin-D bolus doses for 8 weeks followed by vitamin D3 1000 units daily  5  New vitamin B12 deficiency  I will put the patient on monthly B12 injections

## 2021-03-09 PROCEDURE — 99232 SBSQ HOSP IP/OBS MODERATE 35: CPT | Performed by: PSYCHIATRY & NEUROLOGY

## 2021-03-09 RX ADMIN — CITALOPRAM HYDROBROMIDE 20 MG: 20 TABLET ORAL at 08:37

## 2021-03-09 RX ADMIN — MELATONIN 3 MG: at 21:53

## 2021-03-09 RX ADMIN — BUSPIRONE HYDROCHLORIDE 15 MG: 15 TABLET ORAL at 16:17

## 2021-03-09 RX ADMIN — BUSPIRONE HYDROCHLORIDE 15 MG: 15 TABLET ORAL at 08:37

## 2021-03-09 RX ADMIN — BUSPIRONE HYDROCHLORIDE 15 MG: 15 TABLET ORAL at 21:53

## 2021-03-09 NOTE — PROGRESS NOTES
Progress Note - Jovan Main 56 y o  female MRN: [id number]    Unit/Bed#Floyd Barragan 209-02 Encounter: [id number]        Subjective:   Patient seen and examined at bedside after reviewing the chart and discussing the case with the caring staff  Patient examined at bedside  Patient has no acute concerns  On review of patient's labs it was found that patient's vitamin B12 levels were low 386 and vitamin D levels were also low 25 1  Physical Exam   Vitals: Blood pressure 154/72, pulse 84, temperature 98 °F (36 7 °C), temperature source Temporal, resp  rate 16, height 5' (1 524 m), weight 85 8 kg (189 lb 2 5 oz), SpO2 95 %  ,Body mass index is 36 94 kg/m²  Constitutional: Patient appears well-developed  HEENT: PERR, EOMI, MMM  Cardiovascular: Normal rate and regular rhythm  Pulmonary/Chest: Effort normal and breath sounds normal    Abdomen: Soft, + BS, NT    Assessment/Plan:  Jovan Main is a(n) 56 y o  female with MDD      1  DJD  Patient may receive Tylenol as needed  2  History of epilepsy  Stable since right temporal lobectomy in March 2013  3  Elevated TSH  Patient's TSH was found to be 4 040 on 01/06/2021  She is asymptomatic   Patient's TSH on 03/05/2021 was within normal limits at 2 780   4  New Vitamin D deficiency   I will put the patient on vitamin-D bolus doses for 8 weeks followed by vitamin D3 1000 units daily  5  New vitamin B12 deficiency  I will put the patient on monthly B12 injections

## 2021-03-09 NOTE — PROGRESS NOTES
Progress Note - Hans Humphrey [address] 56 y o  female MRN: [id number]  Unit/Bed#: George Hardin 209-02 Encounter: [id number]    Assessment/Plan   Principal Problem:    Severe episode of recurrent major depressive disorder, without psychotic features ([facility] [address] )  Active Problems:    Hashimoto's thyroiditis    Idiopathic insomnia    Generalized anxiety disorder    DJD (degenerative joint disease)      Behavior over the last 24 hours:  unchanged  Sleep:  Improving  Appetite: normal  Medication side effects: No  ROS: no complaints and All other systems negative for acute change     Martha was seen today for follow-up and case discussed with treatment team this morning  Marlynhenrimillyjaclyn Bumpers reports her mood is improving and rates her anxiety and depression as a 4/10  She was pleasant and cooperative throughout encounter  She denies any AVH/SI/HI  Patient slept well throughout the night and appetite remains adequate  Leocadia Bumpers is often visible in the milieu and social with her peers  She also attends and participates in group appropriately  Remains compliant with medication regimen and denies any side effects  Mental Status Evaluation:  Appearance:  age appropriate and casually dressed   Behavior:  Calm and cooperative   Speech:  Soft spoken, delayed at times   Mood:  "better     Affect:  constricted   Thought Process:  concrete   Thought Content:  No overt delusions   Perceptual Disturbances: None   Risk Potential: Suicidal Ideations none  Homicidal Ideations none  Potential for Aggression No   Sensorium:  person, place and time/date   Memory:  recent memory mildly impaired   Consciousness:  alert and awake    Attention: attention span and concentration were age appropriate   Insight:  limited   Judgment: limited   Gait/Station: normal gait/station and normal balance   Motor Activity: no abnormal movements     Progress Toward Goals:  Some improvement  Continue current psychotropic regimen    Will return to home upon stabilization  No discharge date at this time  Recommended Treatment: Continue with group therapy, milieu therapy and occupational therapy  Risks, benefits and possible side effects of Medications:   Risks, benefits, and possible side effects of medications explained to patient and patient verbalizes understanding  Medications: all current active meds have been reviewed and continue current psychiatric medications  Labs: I have personally reviewed all pertinent laboratory/tests results  Counseling / Coordination of Care  Total floor / unit time spent today 20 minutes  Greater than 50% of total time was spent with the patient and / or family counseling and / or coordination of care

## 2021-03-09 NOTE — PROGRESS NOTES
03/09/21   Team Meeting   Meeting Type Daily Rounds   Team Members Present   Team Members Present Physician;Nurse;;Occupational Therapist   Physician Team Member Dr Siva Harper MD; Magi Greene, [address] [place] [address]   Nursing Team Member Melly Fair, PHILLY   Care Management Team Member MS Francisco, [facility]   OT Team Member Juan Melo, [place]   Patient/Family Present   Patient Present No   Patient's Family Present No   PT will return home upon stabilization  More social, more visible, interacting with peers, rates 4/10 for anxiety and depression, no SI  From home with

## 2021-03-09 NOTE — SOCIAL WORK
SAVANAH received the following information re: pt outpt providers:     Psychiatrist: Appointment Date: 03/17/21   Dr Tawana Panchal   [address] [facility] [address]   [place] [place]       Therapist: Appointment Date: 02/04/21 @ 3:00pm   Nathanael   [phone] [id number] [facility], [address] [place] [address], [address] [place] [address]       Primary Care Physician: Seen on 1/27/21   Mortimer Boards, MD   [address] [facility]   [address] [place] [address] [address]   ABU: [phone]     SAVANAH met with patient and obtained ROIs for psych and therapist (PINEDA for pcp on chart)    SAVANAH placed phone call to Dr Tawana Panchal office to advise of admission - appointment kept at this time - will be rescheduled if pt still on unit on 3/17    SAVANAH placed phone call to nancy Little; left message on secure voicemail advising of inpt admission; requested return phone call for follow up appointment information

## 2021-03-09 NOTE — PROGRESS NOTES
No suicidal ideations or complaints of anxiety tonight  Pleasant during assessment  Out in the community and selectively social   Good understanding during medication education  Complaint with medications and snacks  Maintained on q 7 minute checks

## 2021-03-09 NOTE — PROGRESS NOTES
Patient visible in milieu, pleasant and cooperative in interaction  Social with staff and select peers, affect blunted  Patient describes her anxiety and depression as being "OK", denies SI/HI, hallucinations  Attended morning group  Remains medication compliant and on 7" checks for safety and behaviors

## 2021-03-09 NOTE — PLAN OF CARE
Problem: Nutrition/Hydration-ADULT  Goal: Nutrient/Hydration intake appropriate for improving, restoring or maintaining nutritional needs  Description: Monitor and assess patient's nutrition/hydration status for malnutrition  Collaborate with interdisciplinary team and initiate plan and interventions as ordered  Monitor patient's weight and dietary intake as ordered or per policy  Utilize nutrition screening tool and intervene as necessary  Determine patient's food preferences and provide high-protein, high-caloric foods as appropriate       INTERVENTIONS:  - Monitor oral intake, urinary output, labs, and treatment plans  - Assess nutrition and hydration status and recommend course of action  - Evaluate amount of meals eaten  - Assist patient with eating if necessary   - Allow adequate time for meals  - Recommend/ encourage appropriate diets, oral nutritional supplements, and vitamin/mineral supplements  - Order, calculate, and assess calorie counts as needed  - Recommend, monitor, and adjust tube feedings and TPN/PPN based on assessed needs  - Assess need for intravenous fluids  - Provide specific nutrition/hydration education as appropriate  - Include patient/family/caregiver in decisions related to nutrition  Outcome: Progressing     Problem: ANXIETY  Goal: Will report anxiety at manageable levels  Description: INTERVENTIONS:  - Administer medication as ordered  - Teach and encourage coping skills  - Provide emotional support  - Assess patient/family for anxiety and ability to cope  Outcome: Progressing  Goal: By discharge: Patient will verbalize 2 strategies to deal with anxiety  Description: Interventions:  - Identify any obvious source/trigger to anxiety  - Staff will assist patient in applying identified coping technique/skills  - Encourage attendance of scheduled groups and activities  Outcome: Progressing     Problem: SLEEP DISTURBANCE  Goal: Will exhibit normal sleeping pattern  Description: Interventions:  -  Assess the patients sleep pattern, noting recent changes  - Administer medication as ordered  - Decrease environmental stimuli, including noise, as appropriate during the night  - Encourage the patient to actively participate in unit groups and or exercise during the day to enhance ability to achieve adequate sleep at night  - Assess the patient, in the morning, encouraging a description of sleep experience  Outcome: Progressing     Problem: PAIN - ADULT  Goal: Verbalizes/displays adequate comfort level or baseline comfort level  Description: Interventions:  - Encourage patient to monitor pain and request assistance  - Assess pain using appropriate pain scale  - Administer analgesics based on type and severity of pain and evaluate response  - Implement non-pharmacological measures as appropriate and evaluate response  - Consider cultural and social influences on pain and pain management  - Notify physician/advanced practitioner if interventions unsuccessful or patient reports new pain  Outcome: Progressing     Problem: Ineffective Coping  Goal: Identifies healthy coping skills  Outcome: Progressing  Goal: Participates in unit activities  Description: Interventions:  - Provide therapeutic environment   - Provide required programming   - Redirect inappropriate behaviors   Outcome: Progressing  Goal: Patient/Family participate in treatment and DC plans  Description: Interventions:  - Provide therapeutic environment  Outcome: Progressing  Goal: Patient/Family verbalizes awareness of resources  Outcome: Progressing     Problem: Risk for Self Injury/Neglect  Goal: Treatment Goal: Remain safe during length of stay, learn and adopt new coping skills, and be free of self-injurious ideation, impulses and acts at the time of discharge  Outcome: Progressing  Goal: Refrain from harming self  Description: Interventions:  - Monitor patient closely, per order  - Develop a trusting relationship  - Supervise medication ingestion, monitor effects and side effects   Outcome: Progressing  Goal: Verbalize thoughts and feelings  Description: Interventions:  - Assess and re-assess patient's lethality and potential for self-injury  - Engage patient in 1:1 interactions, daily, for a minimum of 15 minutes  - Encourage patient to express feelings, fears, frustrations, hopes  - Establish rapport/trust with patient   Outcome: Progressing     Problem: Depression  Goal: Treatment Goal: Demonstrate behavioral control of depressive symptoms, verbalize feelings of improved mood/affect, and adopt new coping skills prior to discharge  Outcome: Progressing  Goal: Refrain from isolation  Description: Interventions:  - Develop a trusting relationship   - Encourage socialization   Outcome: Progressing  Goal: Refrain from self-neglect  Outcome: Progressing  Goal: Verbalize thoughts and feelings  Description: Interventions:  - Assess and re-assess patient's level of risk   - Engage patient in 1:1 interactions, daily, for a minimum of 15 minutes   - Encourage patient to express feelings, fears, frustrations, hopes   Outcome: Progressing  Goal: Complete daily ADLs, including personal hygiene independently, as able  Description: Interventions:  - Observe, teach, and assist patient with ADLS  -  Monitor and promote a balance of rest/activity, with adequate nutrition and elimination   Outcome: Progressing

## 2021-03-10 DIAGNOSIS — Z23 ENCOUNTER FOR IMMUNIZATION: ICD-10-CM

## 2021-03-10 PROCEDURE — 99232 SBSQ HOSP IP/OBS MODERATE 35: CPT | Performed by: PSYCHIATRY & NEUROLOGY

## 2021-03-10 RX ADMIN — MELATONIN 3 MG: at 21:27

## 2021-03-10 RX ADMIN — BUSPIRONE HYDROCHLORIDE 15 MG: 15 TABLET ORAL at 21:27

## 2021-03-10 RX ADMIN — BUSPIRONE HYDROCHLORIDE 15 MG: 15 TABLET ORAL at 08:16

## 2021-03-10 RX ADMIN — BUSPIRONE HYDROCHLORIDE 15 MG: 15 TABLET ORAL at 16:05

## 2021-03-10 RX ADMIN — CITALOPRAM HYDROBROMIDE 20 MG: 20 TABLET ORAL at 08:16

## 2021-03-10 NOTE — PROGRESS NOTES
No changes overnight  Slept well during most q 7 minute safety checks  No respiratory distress or changes in medical condition  Sleep has been sound and undisturbed  No suicidal ideations noted  Safety maintained via clutter-free environment, ID band, and given non-skid socks  Will continue to monitor

## 2021-03-10 NOTE — PROGRESS NOTES
Social with select peers and out in the community  Minimal depression and anxiety as per patient during assessment  Talked on phone and had a pleasant conversation with sister  No suicidal ideations noted  Compliant with medications and snacks  No aspiration risks noted  Fluids at bedside to promote hydration  Maintained on q 7 minute checks  Will continue to monitor

## 2021-03-10 NOTE — PROGRESS NOTES
Progress Note - Hans Humphrey 484 56 y o  female MRN: [id number]  Unit/Bed#: Cathryn Doe 209-02 Encounter: [id number]    Assessment/Plan   Principal Problem:    Severe episode of recurrent major depressive disorder, without psychotic features ([facility] [address] )  Active Problems:    Hashimoto's thyroiditis    Idiopathic insomnia    Generalized anxiety disorder    DJD (degenerative joint disease)      Behavior over the last 24 hours: Minimal improvement  Sleep: slept better  Appetite: normal  Medication side effects: No  ROS: no complaints, all other systems are negative for acute change    Mental Status Evaluation:  Appearance:  age appropriate   Behavior:  cooperative   Speech:  delayed and underproductive   Mood:  decreased range   Affect:  constricted   Thought Process:  concrete and goal directed   Thought Content:  no overt delusions   Perceptual Disturbances: None   Risk Potential: Suicidal Ideations vague without plan   Homicidal Ideations none  Potential for Aggression No   Sensorium:  person and place   Memory:  recent memory mildly impaired   Consciousness:  alert and awake    Attention: attention span appeared shorter than expected for age   Insight:  limited   Judgment: limited   Gait/Station: slow   Motor Activity: no abnormal movements     Progress Toward Goals: Patient reports some improvement in her sleep, appetite and interaction with peers in the unit  Denies any current active SI or plan to hurt herself  She has been compliant with medication and denies any current side effects  Spoke with patient  regarding patient current clinical presentation progress and medication adjustment  Recommended Treatment: Continue with group therapy, milieu therapy and occupational therapy  Risks, benefits and possible side effects of Medications:   Risks, benefits, and possible side effects of medications explained to patient and patient verbalizes understanding        Medications: all current active meds have been reviewed  Labs: I have personally reviewed all pertinent laboratory/tests results  Most Recent Labs:   Lab Results   Component Value Date    WBC 9 65 03/05/2021    RBC 5 21 (H) 03/05/2021    HGB 15 4 03/05/2021    HCT 47 4 (H) 03/05/2021     03/05/2021    RDW 14 1 03/05/2021    NEUTROABS 7 49 03/05/2021    SODIUM 137 03/05/2021    K 4 1 03/05/2021     03/05/2021    CO2 26 03/05/2021    BUN 11 03/05/2021    CREATININE 0 93 03/05/2021    GLUC 142 (H) 03/05/2021    GLUF 117 (H) 08/08/2017    CALCIUM 9 3 03/05/2021    AST 17 03/05/2021    ALT 34 03/05/2021    ALKPHOS 75 03/05/2021    TP 7 3 03/05/2021    ALB 3 7 03/05/2021    TBILI 0 52 03/05/2021    CHOLESTEROL 197 01/27/2021    HDL 60 01/27/2021    TRIG 94 01/27/2021    LDLCALC 117 (H) 01/27/2021    CARBAMAZEPIN 6 0 06/06/2016    AMMONIA 30 06/06/2016    YUR4YCXDEXSM 2 780 03/05/2021    FREET4 1 07 06/06/2016    RPR Non-Reactive 09/15/2017    HGBA1C 6 1 (H) 01/27/2021     01/27/2021    EAG 7 1 01/27/2021       Counseling / Coordination of Care  Total floor / unit time spent today 20 minutes  Greater than 50% of total time was spent with the patient and / or family counseling and / or coordination of care

## 2021-03-10 NOTE — PROGRESS NOTES
Progress Note - Deniz Clayton 56 y o  female MRN: [id number]    Unit/Bed#Chip Claros 209-02 Encounter: [id number]        Subjective:   Patient seen and examined at bedside after reviewing the chart and discussing the case with the caring staff  Patient examined at bedside  Patient has no acute concerns  On review of patient's labs it was found that patient's vitamin B12 levels were low 386 and vitamin D levels were also low 25 1  Physical Exam   Vitals: Blood pressure 139/85, pulse 79, temperature 98 °F (36 7 °C), temperature source Temporal, resp  rate 18, height 5' (1 524 m), weight 85 8 kg (189 lb 2 5 oz), SpO2 96 %  ,Body mass index is 36 94 kg/m²  Constitutional: Patient appears well-developed  HEENT: PERR, EOMI, MMM  Cardiovascular: Normal rate and regular rhythm  Pulmonary/Chest: Effort normal and breath sounds normal    Abdomen: Soft, + BS, NT    Assessment/Plan:  Deniz Clayton is a(n) 56 y o  female with MDD      1  DJD  Patient may receive Tylenol as needed  2  History of epilepsy  Stable since right temporal lobectomy in March 2013  3  Elevated TSH  Patient's TSH was found to be 4 040 on 01/06/2021  She is asymptomatic   Patient's TSH on 03/05/2021 was within normal limits at 2 780   4  Vitamin D deficiency   continue vitamin-D bolus doses for 8 weeks followed by vitamin D3 1000 units daily  5  Vitamin B12 deficiency  Continue monthly B12 injections  The patient was discussed with Dr Joelle Fletcher and he is in agreement with the above note

## 2021-03-10 NOTE — PROGRESS NOTES
03/10/21 0957   Team Meeting   Meeting Type Daily Rounds   Team Members Present   Team Members Present Physician;Nurse;; Occupational Therapist   Physician Team Member Dr Porfirio Gomez MD   Nursing Team Member Joe Hansen RN   Social Work Team Member Juan Contreras [facility]   OT Team Member Sera Bailey [place]   Patient/Family Present   Patient Present No   Patient's Family Present No     No DC date - will return home upon stabilization; psych follow up scheduled for 3/17; visible, social on unit; slept; endorses depression; minimizes symptoms; detailed exam

## 2021-03-10 NOTE — PROGRESS NOTES
Pt up ad eloisa & gait is steady  Pt denies any depression & c/o anxiety 4/10  Pt denies any hallucinations, suicidal or homicidal ideations  Q 7 min checks maintained to monitor pt's behavior & safety  Pt is pleasant & cooperative  Pt is cooperative & compliant with medications  Pt does attend Group & socializes with other patients

## 2021-03-11 PROCEDURE — 99232 SBSQ HOSP IP/OBS MODERATE 35: CPT | Performed by: PSYCHIATRY & NEUROLOGY

## 2021-03-11 RX ADMIN — MELATONIN 3 MG: at 21:36

## 2021-03-11 RX ADMIN — CITALOPRAM HYDROBROMIDE 20 MG: 20 TABLET ORAL at 08:35

## 2021-03-11 RX ADMIN — BUSPIRONE HYDROCHLORIDE 15 MG: 15 TABLET ORAL at 08:35

## 2021-03-11 RX ADMIN — BUSPIRONE HYDROCHLORIDE 15 MG: 15 TABLET ORAL at 17:12

## 2021-03-11 RX ADMIN — BUSPIRONE HYDROCHLORIDE 15 MG: 15 TABLET ORAL at 21:36

## 2021-03-11 NOTE — NURSING NOTE
Patient out in the milieu semi social with peers and staff  Ate HS snack and attended group  Upon approach patient's mood and affect is pleasant and bright  Patient endorses low anxiety and depression  Calm and cooperative  Denies SI/HI/AHVH/pain  Took HS medication without difficulty  Will continue to monitor safety and behaviors every 7 minutes

## 2021-03-11 NOTE — PROGRESS NOTES
Progress Note - [address] Fay Rodriguez 56 y o  female MRN: [id number]  Unit/Bed#: [facility] 209-02 Encounter: [id number]    Assessment/Plan   Principal Problem:    Severe episode of recurrent major depressive disorder, without psychotic features ([facility] [address] )  Active Problems:    Hashimoto's thyroiditis    Idiopathic insomnia    Generalized anxiety disorder    DJD (degenerative joint disease)      Behavior over the last 24 hours: unchanged  Sleep: slept better  Appetite: normal  Medication side effects: No  ROS: no complaints, all other systems are negative for acute change    Mental Status Evaluation:  Appearance:  age appropriate   Behavior:  cooperative   Speech:  delayed   Mood:  decreased range   Affect:  blunted   Thought Process:  concrete   Thought Content:  no overt delusions   Perceptual Disturbances: None   Risk Potential: Suicidal Ideations none  Homicidal Ideations none  Potential for Aggression No   Sensorium:  person and place   Memory:  recent memory mildly impaired   Consciousness:  alert and awake    Attention: attention span appeared shorter than expected for age   Insight:  limited   Judgment: limited   Gait/Station: normal gait/station   Motor Activity: no abnormal movements     Progress Toward Goals: Patient reports feeling less depressed and interacting well with selective peer in the unit  Still shows mild psychomotor retardation with limited insight into her cognitive function decline  Calorie intake and medication compliant has been stable  Recommended Treatment: Continue with group therapy, milieu therapy and occupational therapy  Risks, benefits and possible side effects of Medications:   Patient does not verbalize understanding at this time and will require further explanation  Medications: all current active meds have been reviewed  Labs: I have personally reviewed all pertinent laboratory/tests results     Most Recent Labs:   Lab Results   Component Value Date    WBC 9 65 03/05/2021    RBC 5 21 (H) 03/05/2021    HGB 15 4 03/05/2021    HCT 47 4 (H) 03/05/2021     03/05/2021    RDW 14 1 03/05/2021    NEUTROABS 7 49 03/05/2021    SODIUM 137 03/05/2021    K 4 1 03/05/2021     03/05/2021    CO2 26 03/05/2021    BUN 11 03/05/2021    CREATININE 0 93 03/05/2021    GLUC 142 (H) 03/05/2021    GLUF 117 (H) 08/08/2017    CALCIUM 9 3 03/05/2021    AST 17 03/05/2021    ALT 34 03/05/2021    ALKPHOS 75 03/05/2021    TP 7 3 03/05/2021    ALB 3 7 03/05/2021    TBILI 0 52 03/05/2021    CHOLESTEROL 197 01/27/2021    HDL 60 01/27/2021    TRIG 94 01/27/2021    LDLCALC 117 (H) 01/27/2021    CARBAMAZEPIN 6 0 06/06/2016    AMMONIA 30 06/06/2016    EQD0ERBRSPVZ 2 780 03/05/2021    FREET4 1 07 06/06/2016    RPR Non-Reactive 09/15/2017    HGBA1C 6 1 (H) 01/27/2021     01/27/2021    EAG 7 1 01/27/2021       Counseling / Coordination of Care  Total floor / unit time spent today 20 minutes  Greater than 50% of total time was spent with the patient and / or family counseling and / or coordination of care

## 2021-03-11 NOTE — PROGRESS NOTES
Progress Note - Robbin Flanagan 56 y o  female MRN: [id number]    Unit/Bed#Samm Rodríguez 209-02 Encounter: [id number]        Subjective:   Patient seen and examined at bedside after reviewing the chart and discussing the case with the caring staff  Patient examined at bedside  Patient has no acute concerns  Patient is a possible discharge for Tuesday, 03/16/2021  Physical Exam   Vitals: Blood pressure 150/72, pulse 83, temperature 97 5 °F (36 4 °C), temperature source Temporal, resp  rate 18, height 5' (1 524 m), weight 85 8 kg (189 lb 2 5 oz), SpO2 96 %  ,Body mass index is 36 94 kg/m²  Constitutional: Patient appears well-developed  HEENT: PERR, EOMI, MMM  Cardiovascular: Normal rate and regular rhythm  Pulmonary/Chest: Effort normal and breath sounds normal    Abdomen: Soft, + BS, NT    Assessment/Plan:  Robbin Flanagan is a(n) 56 y o  female with MDD      1  DJD  Patient may receive Tylenol as needed  2  History of epilepsy  Stable since right temporal lobectomy in March 2013  3  Elevated TSH  Patient's TSH was found to be 4 040 on 01/06/2021  She is asymptomatic   Patient's TSH on 03/05/2021 was within normal limits at 2 780   4  Vitamin D deficiency   continue vitamin-D bolus doses for 8 weeks followed by vitamin D3 1000 units daily  5  Vitamin B12 deficiency  Continue monthly B12 injections  The patient was discussed with Dr Turner Valle and he is in agreement with the above note

## 2021-03-11 NOTE — PROGRESS NOTES
03/11/21    Team Meeting   Meeting Type Daily Rounds   Team Members Present   Team Members Present Physician;Nurse;;Occupational Therapist   Physician Team Member Dr Mike Marques MD; OSMAN [place]   Nursing Team Member Taylor Curran RN   Care Management Team Member Emmy Singh 87, [facility]   OT Team Member Tara Hidalgo [place]   Patient/Family Present   Patient Present No   Patient's Family Present No   D/C Tuesday pending, follow up with outpatient psych on Wednesday Dr Yuliya Jarvis MD  Depression and anxiety 4/10

## 2021-03-11 NOTE — PROGRESS NOTES
Pt is pleasant & cooperative  Pt socializes with other patients  Pt c/o depression 4/10 & anxiety 4/10  Pt denies any hallucinations, suicidal or homicidal ideations  Q 7 min checks maintained to monitor pt's behavior & safety  Pt up ad eloisa & gait is steady  Pt is compliant with medications

## 2021-03-11 NOTE — SOCIAL WORK
SW and staff psych discussed options for patient upon dishcarge for socialization    SW researched open programs for socialization in [facility]; SW placed phone call to [facility] - spoke to [address] are still continuing in-person but on a limited basis of 10 participants - if pt wants to participate she would need be onsite prior to [address]    Patient can also participate in [facility]; "[employer] is a full-service cafe that employs people in recovery from mental health challenges   Clients prepare and serve quality coffee, tea, pastries and wholesome breakfasts and lunches in an environment that affirms self-determination and supports people who express a desire to work at jobs that make them feel productive and part of the community "     SAVANAH will offer both programs to patient to support her need for socialization and purpose

## 2021-03-12 PROCEDURE — 99232 SBSQ HOSP IP/OBS MODERATE 35: CPT | Performed by: NURSE PRACTITIONER

## 2021-03-12 RX ORDER — LANOLIN ALCOHOL/MO/W.PET/CERES
6 CREAM (GRAM) TOPICAL
Status: DISCONTINUED | OUTPATIENT
Start: 2021-03-12 | End: 2021-03-16 | Stop reason: HOSPADM

## 2021-03-12 RX ADMIN — BUSPIRONE HYDROCHLORIDE 15 MG: 15 TABLET ORAL at 08:15

## 2021-03-12 RX ADMIN — BUSPIRONE HYDROCHLORIDE 15 MG: 15 TABLET ORAL at 16:21

## 2021-03-12 RX ADMIN — TRAZODONE HYDROCHLORIDE 50 MG: 50 TABLET ORAL at 21:17

## 2021-03-12 RX ADMIN — MELATONIN 6 MG: at 21:14

## 2021-03-12 RX ADMIN — BUSPIRONE HYDROCHLORIDE 15 MG: 15 TABLET ORAL at 21:14

## 2021-03-12 RX ADMIN — CITALOPRAM HYDROBROMIDE 20 MG: 20 TABLET ORAL at 08:15

## 2021-03-12 NOTE — PROGRESS NOTES
Patient in dining room majority of shift  Selectively social with peers  Compliant with meds and meals  Anxiety and depression remains 4/10  Q 7 minute checks maintained  Will continue to monitor

## 2021-03-12 NOTE — PROGRESS NOTES
03/12/21 0949   Team Meeting   Meeting Type Daily Rounds   Team Members Present   Team Members Present Physician;Nurse;; Occupational Therapist   Physician Team Member Dr Terrence Boo MD; OSMAN [place]   Nursing Team Member Viviana Agosto RN   Social Work Team Member Donna Hughes [place]   OT Team Member [place]lyn [place], [place]   Patient/Family Present   Patient Present No   Patient's Family Present No     DC Tuesday - will return home; selectively social, anx/dep; withdrawn

## 2021-03-12 NOTE — PROGRESS NOTES
Pt up ad eloisa & gait is steady  Pt denies any depression or anxiety  Q 7 min checks maintained to monitor pt's behavior & safety  Pt is pleasant & cooperative

## 2021-03-12 NOTE — PROGRESS NOTES
Progress Note - Hans Humphrey 484 56 y o  female MRN: [id number]  Unit/Bed#: Zara Avilez 209-02 Encounter: [id number]    Assessment/Plan   Principal Problem:    Severe episode of recurrent major depressive disorder, without psychotic features ([address] )  Active Problems:    Hashimoto's thyroiditis    Idiopathic insomnia    Generalized anxiety disorder    DJD (degenerative joint disease)      Behavior over the last 24 hours:  unchanged  Sleep: some insomnia  Appetite: normal  Medication side effects: No  ROS: no complaints and All other systems negative for acute change     Jeffry Knight is seen today for follow-up and case discussed with treatment team this morning  Martha rates her anxiety as a 4/10 and denies any depression, however patient appears to be minimizing depressive symptoms  Her affect is blunted and she is often withdrawn to self  She reports she had some insomnia last night requested her melatonin be increased  She does report a healthy appetite and remains compliant with medication regimen  Jeffry Knight denies any AVH/SI/HI  We spoke about a crisis plan to utilize upon discharge and patient expressed interest to return to [facility]   For Buljaclyn Knight is often visible in the milieu and attends and participates in groups appropriately      Mental Status Evaluation:  Appearance:  age appropriate and casually dressed   Behavior:  Somewhat guarded, cooperative   Speech:  delayed and soft   Mood:  constricted   Affect:  mood-congruent   Thought Process:  circumstantial and concrete   Thought Content:  No overt delusion   Perceptual Disturbances: None   Risk Potential: Suicidal Ideations none  Homicidal Ideations none  Potential for Aggression No   Sensorium:  person, place and time/date   Memory:  recent memory mildly impaired   Consciousness:  alert and awake    Attention: attention span appeared shorter than expected for age   Insight:  limited   Judgment: limited   Gait/Station: normal gait/station and normal balance   Motor Activity: no abnormal movements     Progress Toward Goals:  Patient requesting melatonin be increase HS to assist with sleep  Otherwise, continue current psychotropic regimen  Plan is to discharge to home on Tuesday 03/16/2021  Recommended Treatment: Continue with group therapy, milieu therapy and occupational therapy  Risks, benefits and possible side effects of Medications:   Risks, benefits, and possible side effects of medications explained to patient and patient verbalizes understanding  Medications: all current active meds have been reviewed and planned medication changes: Increase Melatonin to 6mg PO QHS  Labs: I have personally reviewed all pertinent laboratory/tests results  Counseling / Coordination of Care  Total floor / unit time spent today 20 minutes  Greater than 50% of total time was spent with the patient and / or family counseling and / or coordination of care   A description of the counseling / coordination of care: **

## 2021-03-12 NOTE — PLAN OF CARE
Problem: ANXIETY  Goal: Will report anxiety at manageable levels  Description: INTERVENTIONS:  - Administer medication as ordered  - Teach and encourage coping skills  - Provide emotional support  - Assess patient/family for anxiety and ability to cope  Outcome: Progressing  Goal: By discharge: Patient will verbalize 2 strategies to deal with anxiety  Description: Interventions:  - Identify any obvious source/trigger to anxiety  - Staff will assist patient in applying identified coping technique/skills  - Encourage attendance of scheduled groups and activities  Outcome: Progressing

## 2021-03-12 NOTE — PROGRESS NOTES
Patient visible in the community  She attends groups and is engaging more with peers  She denies depression, anxiety, suicidal thoughts  No complaints of pain  Will continue monitoring

## 2021-03-13 PROCEDURE — 99232 SBSQ HOSP IP/OBS MODERATE 35: CPT | Performed by: PSYCHIATRY & NEUROLOGY

## 2021-03-13 RX ADMIN — BUSPIRONE HYDROCHLORIDE 15 MG: 15 TABLET ORAL at 21:56

## 2021-03-13 RX ADMIN — BUSPIRONE HYDROCHLORIDE 15 MG: 15 TABLET ORAL at 16:56

## 2021-03-13 RX ADMIN — CITALOPRAM HYDROBROMIDE 20 MG: 20 TABLET ORAL at 07:58

## 2021-03-13 RX ADMIN — MELATONIN 6 MG: at 21:56

## 2021-03-13 RX ADMIN — BUSPIRONE HYDROCHLORIDE 15 MG: 15 TABLET ORAL at 07:58

## 2021-03-13 NOTE — NURSING NOTE
[phone]  Pt found to be resting quietly on most of authors rounds  No acute behavioral issues noted  Q 7 min checks maintained  Fall protocol in place

## 2021-03-13 NOTE — NURSING NOTE
[id number] [phone]     Pt present in the milieu; affect bright on approach  Mood depressed  Denies SI or HI  No acute behavioral issues noted  Q 7 min checks ongoing

## 2021-03-13 NOTE — PROGRESS NOTES
Progress Note - Yanick Barkley 56 y o  female MRN: [id number]    Unit/Bed#[facility] 209-02 Encounter: [id number]        Subjective:   Patient seen and examined at bedside after reviewing the chart and discussing the case with the caring staff  Patient examined at bedside  Patient has no acute concerns  Patient is a possible discharge for Tuesday, 03/16/2021  Physical Exam   Vitals: Blood pressure 128/68, pulse 79, temperature 97 6 °F (36 4 °C), temperature source Temporal, resp  rate 20, height 5' (1 524 m), weight 85 8 kg (189 lb 2 5 oz), SpO2 99 %  ,Body mass index is 36 94 kg/m²  Constitutional: Patient appears well-developed  HEENT: PERR, EOMI, MMM  Cardiovascular: Normal rate and regular rhythm  Pulmonary/Chest: Effort normal and breath sounds normal    Abdomen: Soft, + BS, NT    Assessment/Plan:  Yanick Barkley is a(n) 56 y o  female with MDD      1  DJD  Patient may receive Tylenol as needed  2  History of epilepsy  Stable since right temporal lobectomy in March 2013  3  Elevated TSH  Patient's TSH was found to be 4 040 on 01/06/2021  She is asymptomatic   Patient's TSH on 03/05/2021 was within normal limits at 2 780   4  Vitamin D deficiency   continue vitamin-D bolus doses for 8 weeks followed by vitamin D3 1000 units daily  5  Vitamin B12 deficiency  Continue monthly B12 injections  The patient was discussed with Dr Roby Gastelum and he is in agreement with the above note

## 2021-03-13 NOTE — PROGRESS NOTES
Patient present in the community throughout the day  She attends groups  She reports feeling "optimistic" about upcoming discharge and is feeling hopeful  Denies depression, anxiety, suicidal thoughts  Will continue monitoring

## 2021-03-13 NOTE — PROGRESS NOTES
Progress Note - Jaiden Montiel 56 y o  female MRN: [id number]    Unit/Bed#: Lisa Mckeon 209-02 Encounter: [id number]      Assessment:  1  DJD  She with simple analgesics  2  History of epilepsy  Stable  3  Elevated TSH  TSH normal  4  Vitamin D deficiency   Supplemented  5  Vitamin B12 deficiency  supplemented    Plan:  See above    Subjective:   No subjective complaint    Objective:     Vitals: Blood pressure (!) 88/57, pulse 103, temperature 97 8 °F (36 6 °C), temperature source Temporal, resp  rate 19, height 5' (1 524 m), weight 88 kg (194 lb), SpO2 97 %  ,Body mass index is 37 89 kg/m²        Intake/Output Summary (Last 24 hours) at 3/13/2021 1857  Last data filed at 3/13/2021 1700  Gross per 24 hour   Intake 480 ml   Output --   Net 480 ml       Physical Exam: BP (!) 88/57 (BP Location: Left arm)   Pulse 103   Temp 97 8 °F (36 6 °C) (Temporal)   Resp 19   Ht 5' (1 524 m)   Wt 88 kg (194 lb)   SpO2 97%   BMI 37 89 kg/m²     General Appearance:    Alert, cooperative, no distress, appears stated age   Head:    Normocephalic, without obvious abnormality, atraumatic                   Neck:   Supple, symmetrical, trachea midline, no adenopathy;     thyroid:  no enlargement/tenderness/nodules; no carotid    bruit or JVD   Back:     Symmetric, no curvature, ROM normal, no CVA tenderness   Lungs:     Clear to auscultation bilaterally, respirations unlabored   Chest Wall:    No tenderness or deformity    Heart:    Regular rate and rhythm, S1 and S2 normal, no murmur, rub   or gallop       Abdomen:     Soft, non-tender, bowel sounds active all four quadrants,     no masses, no organomegaly           Extremities:   Extremities normal, atraumatic, no cyanosis or edema   Pulses:   2+ and symmetric all extremities   Skin:   Skin color, texture, turgor normal, no rashes or lesions   Lymph nodes:   Cervical, supraclavicular, and axillary nodes normal            Invasive Devices     None                 Lab, Imaging and other studies: I have personally reviewed pertinent reports

## 2021-03-13 NOTE — PROGRESS NOTES
Progress Note - Hans Humphrey 484 56 y o  female MRN: [id number]  Unit/Bed#: [facility] 209-02 Encounter: [id number]    Assessment/Plan   Principal Problem:    Severe episode of recurrent major depressive disorder, without psychotic features ([facility] [address] )  Active Problems:    Hashimoto's thyroiditis    Idiopathic insomnia    Generalized anxiety disorder    DJD (degenerative joint disease)      Behavior over the last 24 hours:  unchanged  Sleep: normal  Appetite: normal  Medication side effects: No  ROS: no complaints    Mental Status Evaluation:  Appearance:  casually dressed   Behavior:  psychomotor retardation   Speech:  soft   Mood:  depressed   Affect:  normal   Thought Process:  goal directed   Thought Content:  normal   Perceptual Disturbances: None   Risk Potential: Suicidal Ideations none  Homicidal Ideations none  Potential for Aggression No   Sensorium:  person, place and time/date   Memory:  recent and remote memory grossly intact   Consciousness:  awake    Attention: attention span and concentration were age appropriate   Insight:  fair   Judgment: fair   Gait/Station: normal gait/station   Motor Activity: no abnormal movements     Progress Toward Goals: Pt seen this afternoon,pleasant when engaged and self reports a modest improvement in her mood states that she is trying to stay positive and engaged in the unit milue  Pt denies sleep or appetite issues and remains medication compliant with no behavioral issues  Recommended Treatment:   1-Cont current treatment  2-Continue with group therapy, milieu therapy and occupational therapy  Risks, benefits and possible side effects of Medications:   Risks, benefits, and possible side effects of medications explained to patient and patient verbalizes understanding        Medications:   current meds:   Current Facility-Administered Medications   Medication Dose Route Frequency    acetaminophen (TYLENOL) tablet 650 mg  650 mg Oral Q4H PRN    acetaminophen (TYLENOL) tablet 650 mg  650 mg Oral Q4H PRN    acetaminophen (TYLENOL) tablet 975 mg  975 mg Oral Q6H PRN    aluminum-magnesium hydroxide-simethicone (MYLANTA) oral suspension 30 mL  30 mL Oral Q4H PRN    busPIRone (BUSPAR) tablet 15 mg  15 mg Oral TID    [START ON 4/26/2021] cholecalciferol (VITAMIN D3) tablet 1,000 Units  1,000 Units Oral Daily    citalopram (CeleXA) tablet 20 mg  20 mg Oral Daily    cyanocobalamin injection 1,000 mcg  1,000 mcg Intramuscular Q30 Days    ergocalciferol (VITAMIN D2) capsule 50,000 Units  50,000 Units Oral Weekly    hydrOXYzine HCL (ATARAX) tablet 25 mg  25 mg Oral Q6H PRN Max 4/day    LORazepam (ATIVAN) injection 1 mg  1 mg Intramuscular Q6H PRN Max 3/day    LORazepam (ATIVAN) tablet 0 5 mg  0 5 mg Oral Q6H PRN Max 4/day    LORazepam (ATIVAN) tablet 1 mg  1 mg Oral Q6H PRN Max 3/day    melatonin tablet 6 mg  6 mg Oral HS    OLANZapine (ZyPREXA) IM injection 5 mg  5 mg Intramuscular Q6H PRN Max 4/day    OLANZapine (ZyPREXA) tablet 2 5 mg  2 5 mg Oral Q6H PRN Max 4/day    OLANZapine (ZyPREXA) tablet 5 mg  5 mg Oral Q6H PRN Max 4/day    polyethylene glycol (MIRALAX) packet 17 g  17 g Oral Daily PRN    risperiDONE (RisperDAL) tablet 0 5 mg  0 5 mg Oral Q6H PRN Max 4/day    senna-docusate sodium (SENOKOT S) 8 6-50 mg per tablet 1 tablet  1 tablet Oral Daily PRN    traZODone (DESYREL) tablet 50 mg  50 mg Oral HS PRN     Labs: I have personally reviewed all pertinent laboratory/tests results     Most Recent Labs:   Lab Results   Component Value Date    WBC 9 65 03/05/2021    RBC 5 21 (H) 03/05/2021    HGB 15 4 03/05/2021    HCT 47 4 (H) 03/05/2021     03/05/2021    RDW 14 1 03/05/2021    NEUTROABS 7 49 03/05/2021    SODIUM 137 03/05/2021    K 4 1 03/05/2021     03/05/2021    CO2 26 03/05/2021    BUN 11 03/05/2021    CREATININE 0 93 03/05/2021    GLUC 142 (H) 03/05/2021    GLUF 117 (H) 08/08/2017    CALCIUM 9 3 03/05/2021    AST 17 03/05/2021    ALT 34 03/05/2021    ALKPHOS 75 03/05/2021    TP 7 3 03/05/2021    ALB 3 7 03/05/2021    TBILI 0 52 03/05/2021    CHOLESTEROL 197 01/27/2021    HDL 60 01/27/2021    TRIG 94 01/27/2021    LDLCALC 117 (H) 01/27/2021    CARBAMAZEPIN 6 0 06/06/2016    AMMONIA 30 06/06/2016    [id number] 2 780 03/05/2021    FREET4 1 07 06/06/2016    RPR Non-Reactive 09/15/2017    HGBA1C 6 1 (H) 01/27/2021     01/27/2021    EAG 7 1 01/27/2021       Counseling / Coordination of Care  Total floor / unit time spent today 25 minutes  Greater than 50% of total time was spent with the patient and / or family counseling and / or coordination of care   A description of the counseling / coordination of care:

## 2021-03-14 PROCEDURE — 99232 SBSQ HOSP IP/OBS MODERATE 35: CPT | Performed by: PSYCHIATRY & NEUROLOGY

## 2021-03-14 RX ADMIN — ALUMINUM HYDROXIDE, MAGNESIUM HYDROXIDE, AND SIMETHICONE 30 ML: 200; 200; 20 SUSPENSION ORAL at 08:14

## 2021-03-14 RX ADMIN — BUSPIRONE HYDROCHLORIDE 15 MG: 15 TABLET ORAL at 21:04

## 2021-03-14 RX ADMIN — BUSPIRONE HYDROCHLORIDE 15 MG: 15 TABLET ORAL at 08:14

## 2021-03-14 RX ADMIN — MELATONIN 6 MG: at 21:04

## 2021-03-14 RX ADMIN — BUSPIRONE HYDROCHLORIDE 15 MG: 15 TABLET ORAL at 16:15

## 2021-03-14 RX ADMIN — CITALOPRAM HYDROBROMIDE 20 MG: 20 TABLET ORAL at 08:14

## 2021-03-14 NOTE — PROGRESS NOTES
Progress Note - Eamon Conner 56 y o  female MRN: [id number]    Unit/Bed#: Brendan Headings 209-02 Encounter: [id number]      Assessment:  1  DJD  She with simple analgesics  2  History of epilepsy  Stable  3  Elevated TSH  TSH normal  4  Vitamin D deficiency   Supplemented  5  Vitamin B12 deficiency    supplemented    Plan:  As above    Subjective:   None    Objective:     Vitals: Blood pressure 122/71, pulse 82, temperature (!) 97 2 °F (36 2 °C), temperature source Temporal, resp  rate 18, height 5' (1 524 m), weight 88 kg (194 lb), SpO2 97 %  ,Body mass index is 37 89 kg/m²        Intake/Output Summary (Last 24 hours) at 3/14/2021 1743  Last data filed at 3/13/2021 2045  Gross per 24 hour   Intake 240 ml   Output --   Net 240 ml       Physical Exam: /71 (BP Location: Left arm)   Pulse 82   Temp (!) 97 2 °F (36 2 °C) (Temporal)   Resp 18   Ht 5' (1 524 m)   Wt 88 kg (194 lb)   SpO2 97%   BMI 37 89 kg/m²     General Appearance:    Alert, cooperative, no distress, appears stated age   Head:    Normocephalic, without obvious abnormality, atraumatic                   Neck:   Supple, symmetrical, trachea midline, no adenopathy;     thyroid:  no enlargement/tenderness/nodules; no carotid    bruit or JVD   Back:     Symmetric, no curvature, ROM normal, no CVA tenderness   Lungs:     Clear to auscultation bilaterally, respirations unlabored   Chest Wall:    No tenderness or deformity    Heart:    Regular rate and rhythm, S1 and S2 normal, no murmur, rub   or gallop       Abdomen:     Soft, non-tender, bowel sounds active all four quadrants,     no masses, no organomegaly           Extremities:   Extremities normal, atraumatic, no cyanosis or edema   Pulses:   2+ and symmetric all extremities   Skin:   Skin color, texture, turgor normal, no rashes or lesions   Lymph nodes:   Cervical, supraclavicular, and axillary nodes normal            Invasive Devices     None                 Lab, Imaging and other studies: I have personally reviewed pertinent reports

## 2021-03-14 NOTE — NURSING NOTE
[phone]  Pt found to be resting quietly on most of authors rounds  No acute behavioral issues noted  Q 7 min checks maintained  Fall protocol in place

## 2021-03-14 NOTE — PROGRESS NOTES
Progress Note - Hans Humphrey 484 56 y o  female MRN: [id number]  Unit/Bed#: [facility] 209-02 Encounter: [id number]    Assessment/Plan   Principal Problem:    Severe episode of recurrent major depressive disorder, without psychotic features ([facility] [address] )  Active Problems:    Hashimoto's thyroiditis    Idiopathic insomnia    Generalized anxiety disorder    DJD (degenerative joint disease)      Behavior over the last 24 hours:  unchanged  Sleep: normal  Appetite: normal  Medication side effects: No  ROS: no complaints    Mental Status Evaluation:  Appearance:  casually dressed   Behavior:  psychomotor retardation   Speech:  soft   Mood:  depressed   Affect:  normal   Thought Process:  goal directed   Thought Content:  normal   Perceptual Disturbances: None   Risk Potential: Suicidal Ideations none  Homicidal Ideations none  Potential for Aggression No   Sensorium:  person, place and time/date   Memory:  recent and remote memory grossly intact   Consciousness:  awake    Attention: attention span and concentration were age appropriate   Insight:  fair   Judgment: fair   Gait/Station: normal gait/station   Motor Activity: no abnormal movements     Progress Toward Goals: Pt seen out in the day area,nil complaints as she remains pleasant when engaged and cont to self report an improvement in her mood states that she is trying to stay positive and engaged in the unit milue  Pt denies sleep or appetite issues and remains medication compliant with no behavioral issues  Recommended Treatment:   1-Cont current treatment  2-Continue with group therapy, milieu therapy and occupational therapy  Risks, benefits and possible side effects of Medications:   Risks, benefits, and possible side effects of medications explained to patient and patient verbalizes understanding        Medications:   current meds:   Current Facility-Administered Medications   Medication Dose Route Frequency    acetaminophen (TYLENOL) tablet 650 mg  650 mg Oral Q4H PRN    acetaminophen (TYLENOL) tablet 650 mg  650 mg Oral Q4H PRN    acetaminophen (TYLENOL) tablet 975 mg  975 mg Oral Q6H PRN    aluminum-magnesium hydroxide-simethicone (MYLANTA) oral suspension 30 mL  30 mL Oral Q4H PRN    busPIRone (BUSPAR) tablet 15 mg  15 mg Oral TID    [START ON 4/26/2021] cholecalciferol (VITAMIN D3) tablet 1,000 Units  1,000 Units Oral Daily    citalopram (CeleXA) tablet 20 mg  20 mg Oral Daily    cyanocobalamin injection 1,000 mcg  1,000 mcg Intramuscular Q30 Days    ergocalciferol (VITAMIN D2) capsule 50,000 Units  50,000 Units Oral Weekly    hydrOXYzine HCL (ATARAX) tablet 25 mg  25 mg Oral Q6H PRN Max 4/day    LORazepam (ATIVAN) injection 1 mg  1 mg Intramuscular Q6H PRN Max 3/day    LORazepam (ATIVAN) tablet 0 5 mg  0 5 mg Oral Q6H PRN Max 4/day    LORazepam (ATIVAN) tablet 1 mg  1 mg Oral Q6H PRN Max 3/day    melatonin tablet 6 mg  6 mg Oral HS    OLANZapine (ZyPREXA) IM injection 5 mg  5 mg Intramuscular Q6H PRN Max 4/day    OLANZapine (ZyPREXA) tablet 2 5 mg  2 5 mg Oral Q6H PRN Max 4/day    OLANZapine (ZyPREXA) tablet 5 mg  5 mg Oral Q6H PRN Max 4/day    polyethylene glycol (MIRALAX) packet 17 g  17 g Oral Daily PRN    risperiDONE (RisperDAL) tablet 0 5 mg  0 5 mg Oral Q6H PRN Max 4/day    senna-docusate sodium (SENOKOT S) 8 6-50 mg per tablet 1 tablet  1 tablet Oral Daily PRN    traZODone (DESYREL) tablet 50 mg  50 mg Oral HS PRN     Labs: I have personally reviewed all pertinent laboratory/tests results     Most Recent Labs:   Lab Results   Component Value Date    WBC 9 65 03/05/2021    RBC 5 21 (H) 03/05/2021    HGB 15 4 03/05/2021    HCT 47 4 (H) 03/05/2021     03/05/2021    RDW 14 1 03/05/2021    NEUTROABS 7 49 03/05/2021    SODIUM 137 03/05/2021    K 4 1 03/05/2021     03/05/2021    CO2 26 03/05/2021    BUN 11 03/05/2021    CREATININE 0 93 03/05/2021    GLUC 142 (H) 03/05/2021    GLUF 117 (H) 08/08/2017    CALCIUM 9 3 03/05/2021    AST 17 03/05/2021    ALT 34 03/05/2021    ALKPHOS 75 03/05/2021    TP 7 3 03/05/2021    ALB 3 7 03/05/2021    TBILI 0 52 03/05/2021    CHOLESTEROL 197 01/27/2021    HDL 60 01/27/2021    TRIG 94 01/27/2021    LDLCALC 117 (H) 01/27/2021    CARBAMAZEPIN 6 0 06/06/2016    AMMONIA 30 06/06/2016    [id number] 2 780 03/05/2021    FREET4 1 07 06/06/2016    RPR Non-Reactive 09/15/2017    HGBA1C 6 1 (H) 01/27/2021     01/27/2021    EAG 7 1 01/27/2021       Counseling / Coordination of Care  Total floor / unit time spent today 25 minutes  Greater than 50% of total time was spent with the patient and / or family counseling and / or coordination of care   A description of the counseling / coordination of care:

## 2021-03-14 NOTE — PROGRESS NOTES
Patient is pleasant and bright  Denies depression, anxiety, suicidal thoughts  She stays visible in the community  Enjoys coloring  No complaints of pain  Will continue monitoring

## 2021-03-14 NOTE — NURSING NOTE
[id number] [phone]     Pt present in the milieu; affect brightens with interaction; continues to report improvement in mood  Denies SI or HI  No acute behavioral issues noted  Q 7 min checks ongoing

## 2021-03-15 PROCEDURE — 99232 SBSQ HOSP IP/OBS MODERATE 35: CPT | Performed by: NURSE PRACTITIONER

## 2021-03-15 RX ORDER — BUSPIRONE HYDROCHLORIDE 15 MG/1
15 TABLET ORAL 3 TIMES DAILY
Qty: 45 TABLET | Refills: 0 | Status: SHIPPED | OUTPATIENT
Start: 2021-03-15 | End: 2022-06-28

## 2021-03-15 RX ORDER — CITALOPRAM 20 MG/1
20 TABLET ORAL DAILY
Qty: 30 TABLET | Refills: 0 | Status: SHIPPED | OUTPATIENT
Start: 2021-03-16 | End: 2021-04-15

## 2021-03-15 RX ORDER — LANOLIN ALCOHOL/MO/W.PET/CERES
6 CREAM (GRAM) TOPICAL
Qty: 60 TABLET | Refills: 0 | Status: SHIPPED | OUTPATIENT
Start: 2021-03-15 | End: 2021-04-14

## 2021-03-15 RX ORDER — CYANOCOBALAMIN 1000 UG/ML
1000 INJECTION INTRAMUSCULAR; SUBCUTANEOUS
Qty: 1 ML | Refills: 0 | Status: SHIPPED | OUTPATIENT
Start: 2021-04-07 | End: 2021-03-16

## 2021-03-15 RX ADMIN — BUSPIRONE HYDROCHLORIDE 15 MG: 15 TABLET ORAL at 22:00

## 2021-03-15 RX ADMIN — BUSPIRONE HYDROCHLORIDE 15 MG: 15 TABLET ORAL at 16:16

## 2021-03-15 RX ADMIN — ERGOCALCIFEROL 50000 UNITS: 1.25 CAPSULE, LIQUID FILLED ORAL at 08:42

## 2021-03-15 RX ADMIN — CITALOPRAM HYDROBROMIDE 20 MG: 20 TABLET ORAL at 08:42

## 2021-03-15 RX ADMIN — BUSPIRONE HYDROCHLORIDE 15 MG: 15 TABLET ORAL at 08:42

## 2021-03-15 RX ADMIN — MELATONIN 6 MG: at 22:19

## 2021-03-15 NOTE — SOCIAL WORK
Sw met with patient for check in purposes and to discuss DC planning; pt appears brighter than in past interactions; pt expressed that she feels "ready to go home" before SW could ask; pt denies SI/HI/AH/VH, dep and anx; pt states that she feels that she "needs to get back to the real world"; SW and pt discussed patient crisis plan - pt identified several coping skills including walking her dog, creating a task list for each day, calling her friends for support; SW provided pt with a [facility] Crisis Planning worksheet and asked patient to complete it so she has a secure crisis plan when she gets home - pt agreeable; pt and SW discussed community programs open to patient including [employer] employment transition program and the [facility] Drop-In center; pt agreeable to both programs; pt signed ROIs for [facility] Intake and [facility] for referral purposes; No questions or concerns for SW at this time    SW completed [facility] program referral; SW contacted DS and completed I&R process; SAVANAH sent email to [employer] Davy Mandae to request any additional information he will need with referral - awaiting response     SW placed phone call to pt , Alma Vital, [phone]; SW provided DC plan information; will  at 1130a; SW provided information on community programs - pt  in agreement that patient should participate in a day program; pt  asked if patient had any new brain imaging since admission - SW advised no new imaging has been done; pt  plans to follow up with pt neurologist for updated scanning;  No additional questions or concerns at this time

## 2021-03-15 NOTE — NURSING NOTE
[phone]  Pt found to be resting quietly on most of authors rounds  No acute behavioral issues noted  Q 7 min checks maintained  Fall protocol in place

## 2021-03-15 NOTE — PROGRESS NOTES
Progress Note - Hans Humphrey 484 56 y o  female MRN: [id number]  Unit/Bed#: Niko Mcdowell 209-02 Encounter: [id number]    Assessment/Plan   Principal Problem:    Severe episode of recurrent major depressive disorder, without psychotic features ([facility] [address] )  Active Problems:    Hashimoto's thyroiditis    Idiopathic insomnia    Generalized anxiety disorder    DJD (degenerative joint disease)      Behavior over the last 24 hours:  unchanged  Sleep: normal  Appetite: normal  Medication side effects: No  ROS: no complaints and All other systems negative for acute change     Martha was seen today for follow-up and case discussed with treatment team this morning  Patient reports she slept on and off last night and reported she felt tense    She rates her anxiety as a 3/10 and denies any depression  Moncho Lorenzana expressed that she is ready to be discharged home tomorrow and voiced an adequate safety plan to utilize and time of crisis which includes telling her , or calling 911 and going to the nearest ER  She also spoke about how she is looking forward to going to [facility] which was suggested by case management  A referral was sent on [place] teens behalf by case management-awaiting response  Moncho Lorenzana denies any AVH/SI/HI  She spoke about how she was feeling suicidal prior to coming into the hospital but I told my  and I came in to get help  I do not have those thoughts anymore  Hyun Mcbride remains compliant with medication regimen and denies any side effects  She is often visible in the milieu and social with select peers  Her appetite remains adequate      Mental Status Evaluation:  Appearance:  age appropriate, casually dressed and overweight   Behavior:  psychomotor retardation, friendly    Speech:  soft   Mood:  constricted   Affect:  mood-congruent and redirectable   Thought Process:  goal directed   Thought Content:  normal and No overt delusions   Perceptual Disturbances: None   Risk Potential: Suicidal Ideations none  Homicidal Ideations none  Potential for Aggression No   Sensorium:  person, place, time/date and situation   Memory:  recent memory mildly impaired   Consciousness:  alert and awake    Attention: attention span appeared shorter than expected for age   Insight:  fair   Judgment: limited   Gait/Station: slow   Motor Activity: no abnormal movements     Progress Toward Goals:  Continue current psychotropic regimen  Patient to be discharged home tomorrow with outpatient services  Shaila Angel verbalize an adequate safety plan to utilize post discharge in time of crisis  Recommended Treatment: Continue with group therapy, milieu therapy and occupational therapy  Risks, benefits and possible side effects of Medications:   Risks, benefits, and possible side effects of medications explained to patient and patient verbalizes understanding  Medications: all current active meds have been reviewed and continue current psychiatric medications  Labs: I have personally reviewed all pertinent laboratory/tests results  Counseling / Coordination of Care  Total floor / unit time spent today 20 minutes  Greater than 50% of total time was spent with the patient and / or family counseling and / or coordination of care   A description of the counseling / coordination of care:  Treatment plan, disposition planning, supportive therapy

## 2021-03-15 NOTE — NURSING NOTE
[id number] [phone]     [facility]  Reports mood is significantly improved since admission  No acute behavioral issues noted  Q 7 min checks ongoing

## 2021-03-15 NOTE — PROGRESS NOTES
Pt denies any depression or anxiety  Pt denies any hallucinations, suicidal or homicidal ideations  Q 7 min checks maintained to monitor pt's behavior & safety  Pt up ad eloisa & gait is steady  Pt is pleasant & cooperative  Pt is compliant with medications

## 2021-03-15 NOTE — DISCHARGE INSTR - APPOINTMENTS
The treatment team recommends ongoing medication management upon discharge with your current psychiatric provider  A follow up appointment has been made on your behalf with Dr Annel Aguilar, [address] [facility], [address], [address], [place], Phone: [phone]  Your appointment is scheduled for Weds, March 17th at [phone] with Dr Justin Scott IN-PERSON  Please plan to arrive 15 minutes prior to your scheduled appointment and bring your insurance card(s), photo ID  You must wear a mask to this appointment  Your discharge will be faxed to this provider for continuity of care  You will continue your individual therapy services with Ayana Jones, Phone: [phone]  Please call her directly to schedule your next therapy appointment  A referral has been made on your behalf to the [facility] Transition program in [address]  [place], , [phone] ext [phone] will contact you within the next 7 days to schedule an intake appointment for the program  If you do not hear from him by Tuesday, March 23rd, please call him at the number listed above to discuss your referral      Information regarding social programming at the [facility], [address], [address]  [facility] [address] [place], Phone: [phone] has been provided to you  The program runs Monday through Friday 9:00 AM to 7:00 PM  Participation is limited to 10 persons at a time; therefore, if you want to participate, you must be in line prior to 9am to ensure a place  Your  has provided you with the March and April Calendar of Events

## 2021-03-15 NOTE — BH TRANSITION RECORD
Contact Information: If you have any questions, concerns, pended studies, tests and/or procedures, or emergencies regarding your inpatient behavioral health visit  Please contact [facility] adult behavioral health unit [phone] and ask to speak to a , nurse or physician  A contact is available 24 hours/ 7 days a week at this number  Summary of Procedures Performed During your Stay:  Below is a list of major procedures performed during your hospital stay and a summary of results:  - No major procedures performed  Pending Studies (From admission, onward)    None        If studies are pending at discharge, follow up with your PCP and/or referring provider

## 2021-03-15 NOTE — PROGRESS NOTES
Progress Note - Jovan Main 56 y o  female MRN: [id number]    Unit/Bed#[facility] 209-02 Encounter: [id number]        Subjective:   Patient seen and examined at bedside after reviewing the chart and discussing the case with the caring staff  Patient examined at bedside  Patient has no acute concerns  Patient is a possible discharge for Tuesday, 03/16/2021  Patient is requesting her prescriptions  I have reviewed and reconciled the patient's problem list and medications  Physical Exam   Vitals: Blood pressure 129/74, pulse 85, temperature (!) 97 2 °F (36 2 °C), temperature source Temporal, resp  rate 19, height 5' (1 524 m), weight 88 kg (194 lb), SpO2 96 %  ,Body mass index is 37 89 kg/m²  Constitutional: Patient appears well-developed  HEENT: PERR, EOMI, MMM  Cardiovascular: Normal rate and regular rhythm  Pulmonary/Chest: Effort normal and breath sounds normal    Abdomen: Soft, + BS, NT    Assessment/Plan:  Jovan Main is a(n) 56 y o  female with MDD  Medical clearance: Patient is medically cleared for discharge  All scripts have been written      1  DJD  Patient may receive Tylenol as needed  2  History of epilepsy  Stable since right temporal lobectomy in March 2013  3  Elevated TSH  Patient's TSH was found to be 4 040 on 01/06/2021  She is asymptomatic   Patient's TSH on 03/05/2021 was within normal limits at 2 780   4  Vitamin D deficiency   continue vitamin-D bolus doses for 8 weeks followed by vitamin D3 1000 units daily  5  Vitamin B12 deficiency  Continue monthly B12 injections  The patient was discussed with Dr Asa Santillan and he is in agreement with the above note

## 2021-03-15 NOTE — DISCHARGE SUMMARY
Discharge Summary - Hans Humphrey 484 56 y o  female MRN: [id number]  Unit/Bed#: Ren[facility] 209-02 Encounter: [id number]     Admission Date: 3/5/2021         Discharge Date: 3/16/21 @ [id number]    Attending Psychiatrist: Natan Puente MD    Reason for Admission/HPI: Major depressive disorder, single episode, unspecified [F32 9]  Major depressive disorder [F32 9]    According to H&P by Dr Charles Part 3/6/21:    History of Present Illness     Cayla Prather is a 56 y o  female with a history of Major Depressive Disorder and anxiety who was admitted to the inpatient older adult psychiatric unit on a voluntary 201 commitment basis due to depression, anxiety and suicidal ideation  Patient presented to ED voluntarily because of worsening of depression, anxiety and suicidal ideation  She is known to the unit team from her previous admission in January 2021  On evaluation in the inpatient psychiatric unit Martha reports that she has been feeling increasingly depressed, anxious, isolative, poor sleep with increased suicide ideation with plan to overdose on pill or by cutting her risk with kitchen knife  She denies any active plan or intent to hurt herself and she is able to contract for safety in the unit  Patient denies any current paranoia, hallucination but presents limited historian with delayed speech, mild thought blocking with word-finding difficulty  Patient states that she has been compliant with medication and agrees with the treatment plan in the unit  Hospital Course: The patient was admitted to the inpatient psychiatric unit and started on every 7 minutes precautions  During the hospitalization the patient was attending individual therapy, group therapy, milieu therapy and occupational therapy  Psychiatric medications were titrated over the hospital stay   To address depression, anxiety symptoms and suicidal ideation the patient was started on antidepressant Celexa, anxiolytic medication Buspar and hypnotic medication Melatonin  Medication doses were titrated during the hospital course  Prior to beginning of treatment medications risks and benefits and possible side effects including risk of suicidality and serotonin syndrome related to treatment with antidepressants and risk of impaired next-day mental alertness, complex sleep-related behavior and dependence related to treatment with hypnotic medications were reviewed with the patient  The patient verbalized understanding and agreement for treatment  Patient's symptoms improved gradually over the hospital course  At the end of treatment the patient was doing well  Mood was stable at the time of discharge  The patient denied suicidal ideation, intent or plan at the time of discharge and denied homicidal ideation, intent or plan at the time of discharge  There was no overt psychosis at the time of discharge  Sleep and appetite were improved  The patient was tolerating medications and was not reporting any significant side effects at the time of discharge  Since Jeffry Knight was doing well at the end of the hospitalization, treatment team felt that she could be safely discharged to outpatient care  We felt that at the end of the hospital stay Jeffry Knight was at baseline and was ready for discharge  Jeffry Knight also felt stable and ready for discharge at the end of the hospital stay  Jeffry Knight also verbalized an adequate safety plan to utilize in time of crisis which includes talking with her , calling 911, or going to the nearest emergency department  The outpatient follow up with Psychiatrist (Dr Deyanira Nash), PCP (Dr Berna King), and Therapist Abdias Chung) was arranged by the unit  upon discharge      Mental Status at time of Discharge:     Appearance:  age appropriate and casually dressed   Behavior:  pleasant, calm   Speech:  soft   Mood:  euthymic   Affect:  mood-congruent   Thought Process:  goal directed   Thought Content:  normal   Perceptual Disturbances: None   Risk Potential: Suicidal Ideations none, Homicidal Ideations none and Potential for Aggression No   Sensorium:  person, place, time/date and situation   Cognition:  recent and remote memory grossly intact   Consciousness:  alert and awake    Attention: attention span and concentration were age appropriate   Insight:  fair   Judgment: limited   Gait/Station: normal gait/station and normal balance   Motor Activity: no abnormal movements     Admission Diagnosis:Major depressive disorder, single episode, unspecified [F32 9]  Major depressive disorder [F32 9]    Discharge Diagnosis:   Principal Problem (Resolved):    Severe episode of recurrent major depressive disorder, without psychotic features ([place] [address] )  Active Problems:    Hashimoto's thyroiditis    DJD (degenerative joint disease)  Resolved Problems:    Idiopathic insomnia    Generalized anxiety disorder        Lab results:  Admission on 03/05/2021, Discharged on 03/16/2021   Component Date Value    Vitamin B-12 03/06/2021 386     Vit D, 25-Hydroxy 03/06/2021 25 1*       Discharge Medications:  Current Discharge Medication List      START taking these medications    Details   cyanocobalamin 1,000 mcg/mL Inject 1 mL (1,000 mcg total) into a muscle every 30 (thirty) days  Qty: 1 mL, Refills: 0    Associated Diagnoses: Vitamin B12 deficiency      melatonin 3 mg Take 2 tablets (6 mg total) by mouth daily at bedtime  Qty: 60 tablet, Refills: 0    Associated Diagnoses: Severe episode of recurrent major depressive disorder, without psychotic features ([place] [address] )            Current Discharge Medication List      STOP taking these medications       polyethylene glycol (MIRALAX) 17 g packet Comments:   Reason for Stopping:         senna-docusate sodium (SENOKOT S) 8 6-50 mg per tablet Comments:   Reason for Stopping:              Current Discharge Medication List      CONTINUE these medications which have CHANGED    Details   busPIRone (BUSPAR) 15 mg tablet Take 1 tablet (15 mg total) by mouth 3 (three) times a day for 15 days  Qty: 45 tablet, Refills: 0    Associated Diagnoses: Severe episode of recurrent major depressive disorder, without psychotic features ([facility] [address] ); Generalized anxiety disorder      citalopram (CeleXA) 20 mg tablet Take 1 tablet (20 mg total) by mouth daily  Qty: 30 tablet, Refills: 0    Associated Diagnoses: Severe episode of recurrent major depressive disorder, without psychotic features ([facility] [address] ); Generalized anxiety disorder            Current Discharge Medication List      CONTINUE these medications which have NOT CHANGED    Details   cholecalciferol (VITAMIN D3) 1,000 units tablet Take 1 tablet (1,000 Units total) by mouth daily  Qty: 30 tablet, Refills: 0    Associated Diagnoses: Vitamin D deficiency      ergocalciferol (VITAMIN D2) 50,000 units Take 1 capsule (50,000 Units total) by mouth once a week for 14 doses  Qty: 13 capsule, Refills: 0    Associated Diagnoses: Vitamin D deficiency      ibuprofen (MOTRIN) 400 mg tablet Take 1 tablet (400 mg total) by mouth every 8 (eight) hours as needed for moderate pain  Qty: 60 tablet, Refills: 0    Associated Diagnoses: DJD (degenerative joint disease)              Discharge instructions/Information to patient and family:   See after visit summary for information provided to patient and family  Provisions for Follow-Up Care:  See after visit summary for information related to follow-up care and any pertinent home health orders  Discharge Statement:    I spent 20 minutes discharging the patient  This time was spent on the day of discharge  I had direct contact with the patient on the day of discharge  Additional documentation is required if more than 30 minutes were spent on discharge:    I reviewed with Martha importance of compliance with medications and outpatient treatment after discharge    I discussed the medication regimen and possible side effects of the medications with Martha prior to discharge  At the time of discharge she was tolerating psychiatric medications  I discussed outpatient follow up with Scarlett Obrien  I reviewed with Martha crisis plan and safety plan upon discharge  Scarlett Obrien agreed to abstain from drug and alcohol use after discharge      OSMAN Estrada 03/16/21

## 2021-03-15 NOTE — DISCHARGE INSTR - OTHER ORDERS
You are being discharged to [address], [address] [address]; Phone: [phone]    Triggers you have identified during your hospitalization that led to your admission with suicidal ideation and distressed mood include social isolation and ineffective coping skills  Coping skills you have identified during your hospitalization include walking your dog, making task lists with small achievable goals and talking to friends on the phone  If you are unable to deal with your distressed mood alone please talk to your , Travon Lopez, or one of your psychiatric providers  If that is not effective and you continue to have suicidal ideation and/or distressed mood, please contact Doreen Rodriguez at [phone], dial [phone] or go to the nearest emergency center  *National Suicide Prevention Lifeline:  0-649.652.9048  *[facility] Mental Illness ([place]) HELPLINE: 322.869.3687/Website: www yasemin org  *Substance Abuse and Mental Health Services Administration([facility]) [employer], which is a confidential, free, 24-hour-a-day, 365-day-a-year, information service for individuals and family members facing mental health and/or substance use disorders  This service provides referrals to local treatment facilities, support groups, and community-based organizations  Callers can also order free publications and other information  Call 0-626.150.2574/Website: www [facility] gov  *[facility] 2-1-1: This is a toll free, confidential, 24-hour-a-day service which connects you to a community  in your area who can help you find services and resources that are available to you locally and provide critical services that can improve and save lives  Call: 80  /Website: https://[employer] net/    What you need to know aboutcoronavirus disease 2019 (COVID-19)     What is coronavirus disease 2019 (COVID-19)?    Coronavirus disease 2019 (COVID-19) is a respiratory illness that can spread from person to person  The virus that causes COVID-19 is a novel coronavirus that was first identified during an investigation into an outbreak in [place]  Can people in the [place]  get COVID-19? Yes  COVID-19 is spreading from person to person in parts of the [facility]  Risk of infection with COVID-19 is higher for people who are close contacts of someone known to have COVID-19, for example healthcare workers, or household members  Other people at higher risk for infection are those who live in or have recently been in an area with ongoing spread of COVID-19  Learn more about places with ongoing spread at   [facility]  html#geographic  Have there been cases of COVID-19 in the [place] ?   Yes  The first case of COVID-19 in the [place] was reported on January 21, 2020  The current count of cases of COVID-19 in the [place] is available on [employer] at [facility]  How does COVID-19 spread? The virus that causes COVID-19 probably emerged from an animal source, but is now spreading from person to person  The virus is thought to spread mainly between people who are in close contact with one another (within about 6 feet) through respiratory droplets produced when an infected person coughs or sneezes  It also may be possible that a person can get COVID-19 by touching a surface or object that has the virus on it and then touching their own mouth, nose, or possibly their eyes, but this is not thought to be the main way the virus spreads  Learn what is known about the spread of newly emerged coronaviruses at [facility]  What are the symptoms of COVID-19? Patients with COVID-19 have had mild to severe respiratory illness with symptoms of   fever   cough   shortness of breath  What are severe complications from this virus?    Some patients have pneumonia in both lungs, multi-organ failure and in some cases death  How can I help protect myself? People can help protect themselves from respiratory illness with everyday preventive actions  Avoid close contact with people who are sick  Avoid touching your eyes, nose, and mouth withunwashed hands  Wash your hands often with soap and water for at least 20 seconds  Use an alcohol-based hand  that contains at least 60% alcohol if soap and water are not available  If you are sick, to keep from spreading respiratory illness to others, you should   Stay home when you are sick  Cover your cough or sneeze with a tissue, then throw the tissue in the trash  Clean and disinfect frequently touched objectsand surfaces  What should I do if I recently traveled from an area with ongoing spread of COVID-19? If you have traveled from an affected area, there may be restrictions on your movements for up to 2 weeks  If you develop symptoms during that period (fever, cough, trouble breathing), seek medical advice  Call the office of your health care provider before you go, and tell them about your travel and your symptoms  They will give you instructions on how to get care without exposing other people to your illness  While sick, avoid contact with people, don't go out and delay any travel to reduce the possibility of spreading illness to others  Is there a treatment? There is no specific antiviral treatment for COVID-19  People with COVID-19 can seek medical care to helprelieve symptoms  For more information: www cdc gov/CFBDT76YH 246274-O 03/03/2020     What to do if you are sick withcoronavirus disease 2019 (COVID-19)     If you are sick with COVID-19 or suspect you are infected with the virus that causes COVID-19, follow the steps below to help prevent the disease from spreading to people in your home and community     Stay home except to get medical care   You should restrict activities outside your home, except for getting medical care  Do not go to work, school, or public areas  Avoid using public transportation, ride-sharing, or taxis  Separate yourself from other people and animals inyour home  People: As much as possible, you should stay in a specific room and away from other people in your home  Also, you should use a separate bathroom, if available  Animals: Do not handle pets or other animals while sick  See COVID-19 and Animals for more information  Call ahead before visiting your doctor   If you have a medical appointment, call the healthcare provider and tell them that you have or may have COVID-19  This will help the healthcare provider's office take steps to keep other people from getting infected or exposed  Wear a facemask  You should wear a facemask when you are around other people (e g , sharing a room or vehicle) or pets and before you enter a healthcare provider's office  If you are not able to wear a facemask (for example, because it causes trouble breathing), then people who live with you should not stay in the same room with you, or they should wear a facemask if they enteryour room  Cover your coughs and sneezes   Cover your mouth and nose with a tissue when you cough or sneeze  Throw used tissues in a lined trash can; immediately wash your hands with soap and water for at least 20 seconds or clean your hands with an alcohol-based hand  that contains at least 60 to 95% alcohol, covering all surfaces of your hands and rubbing them together until they feel dry  Soap and water should be used preferentially if hands are visibly dirty  Avoid sharing personal household items   You should not share dishes, drinking glasses, cups, eating utensils, towels, or bedding with other people or pets in your home  After using these items, they should be washed thoroughly with soap and water  Clean your hands often  Wash your hands often with soap and water for at least 20 seconds   If soap and water are not available, clean your hands with an alcohol-based hand  that contains at least 60% alcohol, covering all surfaces of your hands and rubbing them together until they feel dry  Soap and water should be used preferentially if hands are visibly dirty  Avoid touching your eyes, nose, and mouth with unwashed hands  Clean all "high-touch" surfaces every day  High touch surfaces include counters, tabletops, doorknobs, bathroom fixtures, toilets, phones, keyboards, tablets, and bedside tables  Also, clean any surfaces that may have blood, stool, or body fluids on them  Use a household cleaning spray or wipe, according to the label instructions  Labels contain instructions for safe and effective use of the cleaning product including precautions you should take when applying the product, such as wearing gloves and making sure you have good ventilation during use of the product  Monitor your symptoms  Seek prompt medical attention if your illness is worsening (e g , difficulty breathing)  Before seeking care, call your healthcare provider and tell them that you have, or are being evaluated for, COVID-19  Put on a facemask before you enter the facility  These steps will help the healthcare provider's office to keep other people in the office or waiting room from getting infectedor exposed  Ask your healthcare provider to call the local or state health department  Persons who are placed under active monitoring or facilitated self-monitoring should follow instructions provided by their local health department or occupational health professionals, as appropriate  If you have a medical emergency and need to call 911, notify the dispatch personnel that you have, or are being evaluated for COVID-19  If possible, put on a facemask before emergency medical services arrive    Discontinuing home isolation  Patients with confirmed COVID-19 should remain under home isolation precautions until the risk of secondary transmission to others is thought to be low  The decision to discontinue home isolation precautions should be made on a case-by-case basis, in consultation with healthcare providers and state and local health departments  For more information: www cdc gov/JKCDK46QX 224572-Z 02/24/2020     Stay home when you are sick,except to get medical care  Wash your hands often with soap and water for at least 20 seconds  Cover your cough or sneeze with a tissue, then throw the tissue in the trash  Clean and disinfect frequently touched objects and surfaces  Avoid touching your eyes, nose, and mouth  STOP THE SPREAD OF GERMS  For more information: www cdc gov/COVID19 Avoid close contact with people who are sick  Help prevent the spread of respiratory diseases like COVID-19  Team [place]   COVID-19 CRISIS COUNSELING PROGRAM   GET CONNECTED WITH A FREE CRISIS COUNSELOR   CALL [phone]   Do you feel    Stressed? Overwhelmed? Alone? Afraid? Anxiety? During these uncertain times, you are not alone  We are here to listen  Please call our [facility] and Referral Line   [phone] TTY: [phone]   There are trained professionals available 24/7 ready to help you navigate these unprecedented challenges  These services are FREE & CONFIDENTIAL  This publication was made possible by [employer]per Number [id number], in collaboration with the [address] [place] [address]

## 2021-03-15 NOTE — PROGRESS NOTES
03/15/21    Team Meeting   Meeting Type Daily Rounds   Team Members Present   Team Members Present Physician;Nurse;;Occupational Therapist   Physician Team Member Dr Ketan Myers MD; OSMAN Box   Nursing Team Member Willian Cullen RN   Care Management Team Member MS Francisco, [facility]   OT Team Member [place], [place]   Patient/Family Present   Patient Present No   Patient's Family Present No   D/C home tomorrow  Will follow up with outpatient Dr Margaret Lucero MD  Will explore social supports and options

## 2021-03-16 VITALS
BODY MASS INDEX: 38.09 KG/M2 | TEMPERATURE: 97.6 F | RESPIRATION RATE: 18 BRPM | HEIGHT: 60 IN | OXYGEN SATURATION: 95 % | SYSTOLIC BLOOD PRESSURE: 125 MMHG | HEART RATE: 81 BPM | WEIGHT: 194 LBS | DIASTOLIC BLOOD PRESSURE: 69 MMHG

## 2021-03-16 PROBLEM — F33.2 SEVERE EPISODE OF RECURRENT MAJOR DEPRESSIVE DISORDER, WITHOUT PSYCHOTIC FEATURES (HCC): Chronic | Status: RESOLVED | Noted: 2017-09-15 | Resolved: 2021-03-16

## 2021-03-16 PROBLEM — Z00.00 ANNUAL PHYSICAL EXAM: Status: RESOLVED | Noted: 2019-07-08 | Resolved: 2021-03-16

## 2021-03-16 PROBLEM — F41.1 GENERALIZED ANXIETY DISORDER: Status: RESOLVED | Noted: 2020-09-02 | Resolved: 2021-03-16

## 2021-03-16 PROBLEM — R41.3 MEMORY PROBLEM: Status: RESOLVED | Noted: 2020-03-02 | Resolved: 2021-03-16

## 2021-03-16 PROBLEM — F51.01 IDIOPATHIC INSOMNIA: Status: RESOLVED | Noted: 2017-04-17 | Resolved: 2021-03-16

## 2021-03-16 PROCEDURE — 99238 HOSP IP/OBS DSCHRG MGMT 30/<: CPT | Performed by: NURSE PRACTITIONER

## 2021-03-16 RX ORDER — CYANOCOBALAMIN 1000 UG/ML
1000 INJECTION INTRAMUSCULAR; SUBCUTANEOUS
Qty: 1 ML | Refills: 0 | Status: SHIPPED | OUTPATIENT
Start: 2021-04-07 | End: 2022-06-28

## 2021-03-16 RX ADMIN — CITALOPRAM HYDROBROMIDE 20 MG: 20 TABLET ORAL at 08:09

## 2021-03-16 RX ADMIN — BUSPIRONE HYDROCHLORIDE 15 MG: 15 TABLET ORAL at 08:09

## 2021-03-16 NOTE — PLAN OF CARE
Problem: DISCHARGE PLANNING  Goal: Discharge to home or other facility with appropriate resources  Description: INTERVENTIONS:  - Identify barriers to discharge w/patient and caregiver  - Arrange for needed discharge resources and transportation as appropriate  - Identify discharge learning needs (meds, wound care, etc )  - Arrange for interpretive services to assist at discharge as needed  - Refer to Case Management Department for coordinating discharge planning if the patient needs post-hospital services based on physician/advanced practitioner order or complex needs related to functional status, cognitive ability, or social support system  Outcome: Completed     Pt discharging today to home with outpt services arranged; goal met

## 2021-03-16 NOTE — NURSING NOTE
The patient discharged via ambulatory at this time accompanied by mht  Personal belongings returned to patient at time of discharge  Discharge instructions provided and reviewed with patient prior to discharge, patient verbalized positive understanding of instructions provided

## 2021-03-16 NOTE — PROGRESS NOTES
03/16/21 0958   Team Meeting   Meeting Type Daily Rounds   Team Members Present   Team Members Present Physician;Nurse;; Occupational Therapist   Physician Team Member Dr Erasmo Barnett MD; OSMAN Dutta   Nursing Team Member Zen Aceves RN   Social Work Team Member Rhoini Hanna [facility]   OT Team Member Shantelle Tadeo, [place]   Patient/Family Present   Patient Present No   Patient's Family Present No     DC today to home with outpt services at [phone]; denies everything this morning; feels ready for discharge

## 2021-03-16 NOTE — PROGRESS NOTES
Pt assessed in her room during the shift  Pt had been sleeping since snack  Pt brightened on approach and is excited about pending discharge  Pt reports no anxiety or depression stating that she is going to be seeking an outpatient referral to a program that is being researched and set up by her S/W  Pt is hopeful that her SI will not return upon discharge and the she will be better off this time  Pt reports no ah, vh, si, hi  Compliant with medications and had no acute concerns or complaints at this time  Continuous visual safety checks performed throughout the shift  Safety precautions maintained  Will continue to monitor

## 2021-03-16 NOTE — PLAN OF CARE
Problem: Nutrition/Hydration-ADULT  Goal: Nutrient/Hydration intake appropriate for improving, restoring or maintaining nutritional needs  Description: Monitor and assess patient's nutrition/hydration status for malnutrition  Collaborate with interdisciplinary team and initiate plan and interventions as ordered  Monitor patient's weight and dietary intake as ordered or per policy  Utilize nutrition screening tool and intervene as necessary  Determine patient's food preferences and provide high-protein, high-caloric foods as appropriate       INTERVENTIONS:  - Monitor oral intake, urinary output, labs, and treatment plans  - Assess nutrition and hydration status and recommend course of action  - Evaluate amount of meals eaten  - Assist patient with eating if necessary   - Allow adequate time for meals  - Recommend/ encourage appropriate diets, oral nutritional supplements, and vitamin/mineral supplements  - Order, calculate, and assess calorie counts as needed  - Recommend, monitor, and adjust tube feedings and TPN/PPN based on assessed needs  - Assess need for intravenous fluids  - Provide specific nutrition/hydration education as appropriate  - Include patient/family/caregiver in decisions related to nutrition  Outcome: Adequate for Discharge     Problem: ANXIETY  Goal: Will report anxiety at manageable levels  Description: INTERVENTIONS:  - Administer medication as ordered  - Teach and encourage coping skills  - Provide emotional support  - Assess patient/family for anxiety and ability to cope  Outcome: Adequate for Discharge  Goal: By discharge: Patient will verbalize 2 strategies to deal with anxiety  Description: Interventions:  - Identify any obvious source/trigger to anxiety  - Staff will assist patient in applying identified coping technique/skills  - Encourage attendance of scheduled groups and activities  Outcome: Adequate for Discharge     Problem: SLEEP DISTURBANCE  Goal: Will exhibit normal sleeping pattern  Description: Interventions:  -  Assess the patients sleep pattern, noting recent changes  - Administer medication as ordered  - Decrease environmental stimuli, including noise, as appropriate during the night  - Encourage the patient to actively participate in unit groups and or exercise during the day to enhance ability to achieve adequate sleep at night  - Assess the patient, in the morning, encouraging a description of sleep experience  Outcome: Adequate for Discharge     Problem: Ineffective Coping  Goal: Identifies healthy coping skills  Outcome: Adequate for Discharge  Goal: Patient/Family participate in treatment and DC plans  Description: Interventions:  - Provide therapeutic environment  Outcome: Adequate for Discharge  Goal: Patient/Family verbalizes awareness of resources  Outcome: Adequate for Discharge     Problem: Risk for Self Injury/Neglect  Goal: Treatment Goal: Remain safe during length of stay, learn and adopt new coping skills, and be free of self-injurious ideation, impulses and acts at the time of discharge  Outcome: Adequate for Discharge  Goal: Refrain from harming self  Description: Interventions:  - Monitor patient closely, per order  - Develop a trusting relationship  - Supervise medication ingestion, monitor effects and side effects   Outcome: Adequate for Discharge  Goal: Verbalize thoughts and feelings  Description: Interventions:  - Assess and re-assess patient's lethality and potential for self-injury  - Engage patient in 1:1 interactions, daily, for a minimum of 15 minutes  - Encourage patient to express feelings, fears, frustrations, hopes  - Establish rapport/trust with patient   Outcome: Adequate for Discharge     Problem: Depression  Goal: Treatment Goal: Demonstrate behavioral control of depressive symptoms, verbalize feelings of improved mood/affect, and adopt new coping skills prior to discharge  Outcome: Adequate for Discharge  Goal: Refrain from isolation  Description: Interventions:  - Develop a trusting relationship   - Encourage socialization   Outcome: Adequate for Discharge  Goal: Refrain from self-neglect  Outcome: Adequate for Discharge  Goal: Verbalize thoughts and feelings  Description: Interventions:  - Assess and re-assess patient's level of risk   - Engage patient in 1:1 interactions, daily, for a minimum of 15 minutes   - Encourage patient to express feelings, fears, frustrations, hopes   Outcome: Adequate for Discharge  Goal: Complete daily ADLs, including personal hygiene independently, as able  Description: Interventions:  - Observe, teach, and assist patient with ADLS  -  Monitor and promote a balance of rest/activity, with adequate nutrition and elimination   Outcome: Adequate for Discharge     Problem: PAIN - ADULT  Goal: Verbalizes/displays adequate comfort level or baseline comfort level  Description: Interventions:  - Encourage patient to monitor pain and request assistance  - Assess pain using appropriate pain scale  - Administer analgesics based on type and severity of pain and evaluate response  - Implement non-pharmacological measures as appropriate and evaluate response  - Consider cultural and social influences on pain and pain management  - Notify physician/advanced practitioner if interventions unsuccessful or patient reports new pain  Outcome: Adequate for Discharge

## 2021-03-16 NOTE — PROGRESS NOTES
The patient noted to be visible in the milieu with am coffee group and breakfast meal  The patient noted to be quiet, but pleasant upon approach and throughout conversation with RN  The patient denies anxiety and depression stating "I feel good about going home " The patient denies si, hi, and hallucinations  The patient compliant with meals and medications  The patient denies complaints of pain  Q 7 minute safety checks maintained

## 2021-03-16 NOTE — PLAN OF CARE
Problem: Nutrition/Hydration-ADULT  Goal: Nutrient/Hydration intake appropriate for improving, restoring or maintaining nutritional needs  Description: Monitor and assess patient's nutrition/hydration status for malnutrition  Collaborate with interdisciplinary team and initiate plan and interventions as ordered  Monitor patient's weight and dietary intake as ordered or per policy  Utilize nutrition screening tool and intervene as necessary  Determine patient's food preferences and provide high-protein, high-caloric foods as appropriate       INTERVENTIONS:  - Monitor oral intake, urinary output, labs, and treatment plans  - Assess nutrition and hydration status and recommend course of action  - Evaluate amount of meals eaten  - Assist patient with eating if necessary   - Allow adequate time for meals  - Recommend/ encourage appropriate diets, oral nutritional supplements, and vitamin/mineral supplements  - Order, calculate, and assess calorie counts as needed  - Recommend, monitor, and adjust tube feedings and TPN/PPN based on assessed needs  - Assess need for intravenous fluids  - Provide specific nutrition/hydration education as appropriate  - Include patient/family/caregiver in decisions related to nutrition  3/16/2021 0754 by Lesley Ho RN  Outcome: Completed  3/16/2021 0753 by Lesley Ho RN  Outcome: Adequate for Discharge     Problem: ANXIETY  Goal: Will report anxiety at manageable levels  Description: INTERVENTIONS:  - Administer medication as ordered  - Teach and encourage coping skills  - Provide emotional support  - Assess patient/family for anxiety and ability to cope  3/16/2021 0754 by Lesley Ho RN  Outcome: Completed  3/16/2021 0753 by Lesley Ho RN  Outcome: Adequate for Discharge  Goal: By discharge: Patient will verbalize 2 strategies to deal with anxiety  Description: Interventions:  - Identify any obvious source/trigger to anxiety  - Staff will assist patient in applying identified coping technique/skills  - Encourage attendance of scheduled groups and activities  3/16/2021 0754 by Jose Antonio Pacheco RN  Outcome: Completed  3/16/2021 0753 by Jose Antonio Pacheco RN  Outcome: Adequate for Discharge     Problem: SLEEP DISTURBANCE  Goal: Will exhibit normal sleeping pattern  Description: Interventions:  -  Assess the patients sleep pattern, noting recent changes  - Administer medication as ordered  - Decrease environmental stimuli, including noise, as appropriate during the night  - Encourage the patient to actively participate in unit groups and or exercise during the day to enhance ability to achieve adequate sleep at night  - Assess the patient, in the morning, encouraging a description of sleep experience  3/16/2021 0754 by Jose Antonio Pacheco RN  Outcome: Completed  3/16/2021 0753 by Jose Antonio Pacheco RN  Outcome: Adequate for Discharge     Problem: Ineffective Coping  Goal: Identifies healthy coping skills  3/16/2021 0754 by Jose Antonio Pacheco RN  Outcome: Completed  3/16/2021 0753 by Jose Antonio Pacheco RN  Outcome: Adequate for Discharge  Goal: Patient/Family participate in treatment and DC plans  Description: Interventions:  - Provide therapeutic environment  3/16/2021 0754 by Jose Antonio Pacheco RN  Outcome: Completed  3/16/2021 0753 by Jose Antonio Pacheco RN  Outcome: Adequate for Discharge  Goal: Patient/Family verbalizes awareness of resources  3/16/2021 0754 by Jose Antonio Pacheco RN  Outcome: Completed  3/16/2021 0753 by Jose Antonio Pacheco RN  Outcome: Adequate for Discharge     Problem: Risk for Self Injury/Neglect  Goal: Treatment Goal: Remain safe during length of stay, learn and adopt new coping skills, and be free of self-injurious ideation, impulses and acts at the time of discharge  3/16/2021 0754 by Jose Antonio Pacheco RN  Outcome: Completed  3/16/2021 0753 by Jose Antonio Pacheco RN  Outcome: Adequate for Discharge  Goal: Refrain from harming self  Description: Interventions:  - Monitor patient closely, per order  - Develop a trusting relationship  - Supervise medication ingestion, monitor effects and side effects   3/16/2021 0754 by Waqas Bray RN  Outcome: Completed  3/16/2021 0753 by Waqas Bray RN  Outcome: Adequate for Discharge  Goal: Verbalize thoughts and feelings  Description: Interventions:  - Assess and re-assess patient's lethality and potential for self-injury  - Engage patient in 1:1 interactions, daily, for a minimum of 15 minutes  - Encourage patient to express feelings, fears, frustrations, hopes  - Establish rapport/trust with patient   3/16/2021 0754 by Waqas Bray RN  Outcome: Completed  3/16/2021 0753 by Waqas Bray RN  Outcome: Adequate for Discharge     Problem: Depression  Goal: Treatment Goal: Demonstrate behavioral control of depressive symptoms, verbalize feelings of improved mood/affect, and adopt new coping skills prior to discharge  3/16/2021 0754 by Waqas Bray RN  Outcome: Completed  3/16/2021 0753 by Waqas Bray RN  Outcome: Adequate for Discharge  Goal: Refrain from isolation  Description: Interventions:  - Develop a trusting relationship   - Encourage socialization   3/16/2021 0754 by Waqas Bray RN  Outcome: Completed  3/16/2021 0753 by Waqas Bray RN  Outcome: Adequate for Discharge  Goal: Refrain from self-neglect  3/16/2021 0754 by Waqas Bray RN  Outcome: Completed  3/16/2021 0753 by Waqas Bray RN  Outcome: Adequate for Discharge  Goal: Verbalize thoughts and feelings  Description: Interventions:  - Assess and re-assess patient's level of risk   - Engage patient in 1:1 interactions, daily, for a minimum of 15 minutes   - Encourage patient to express feelings, fears, frustrations, hopes   3/16/2021 0754 by Waqas Bray RN  Outcome: Completed  3/16/2021 0753 by Waqas Bray RN  Outcome: Adequate for Discharge  Goal: Complete daily ADLs, including personal hygiene independently, as able  Description: Interventions:  - Observe, teach, and assist patient with ADLS  -  Monitor and promote a balance of rest/activity, with adequate nutrition and elimination   3/16/2021 0754 by Jon Jimenez RN  Outcome: Completed  3/16/2021 0753 by Jon Jimenez RN  Outcome: Adequate for Discharge     Problem: PAIN - ADULT  Goal: Verbalizes/displays adequate comfort level or baseline comfort level  Description: Interventions:  - Encourage patient to monitor pain and request assistance  - Assess pain using appropriate pain scale  - Administer analgesics based on type and severity of pain and evaluate response  - Implement non-pharmacological measures as appropriate and evaluate response  - Consider cultural and social influences on pain and pain management  - Notify physician/advanced practitioner if interventions unsuccessful or patient reports new pain  3/16/2021 0754 by Jon Jimenez RN  Outcome: Completed  3/16/2021 0753 by Jon Jimenez RN  Outcome: Adequate for Discharge

## 2021-03-16 NOTE — PROGRESS NOTES
Pt was discharged with the following belongings:  - eyeglasses with case  - green pants  - black leggings  - pr white socks  - 6 pr underwear  - blue shirt  - pink t-shirt  - black shorts  - pink brush  - navy blue bra  - pr sneakers  - purple handbag  - shoulder bag/wallet  - $[id number]  - [employer] card  - [employer]  - [employer] club card  - [employer] card  - [place] SARAH  - [facility] cards x2  - [address] [facility] [phone] [facility] reward card  - [employer] VISA

## 2021-03-16 NOTE — PROGRESS NOTES
Progress Note - Yuliet Bernabe 56 y o  female MRN: [id number]    Unit/Bed#[facility] 209-02 Encounter: [id number]        Subjective:   Patient seen and examined at bedside after reviewing the chart and discussing the case with the caring staff  Patient examined at bedside  Patient has no acute concerns  Patient is being discharged today, 03/16/2021  Patient is requesting her prescriptions  I have reviewed and reconciled the patient's problem list and medications  Physical Exam   Vitals: Blood pressure 125/69, pulse 81, temperature 97 6 °F (36 4 °C), temperature source Temporal, resp  rate 18, height 5' (1 524 m), weight 88 kg (194 lb), SpO2 95 %  ,Body mass index is 37 89 kg/m²  Constitutional: Patient appears well-developed  HEENT: PERR, EOMI, MMM  Cardiovascular: Normal rate and regular rhythm  Pulmonary/Chest: Effort normal and breath sounds normal    Abdomen: Soft, + BS, NT    Assessment/Plan:  Yuliet Bernabe is a(n) 56 y o  female with MDD  Medical clearance: Patient is medically cleared for discharge  All scripts have been written      1  DJD  Patient may receive Tylenol as needed  2  History of epilepsy  Stable since right temporal lobectomy in March 2013  3  Elevated TSH  Patient's TSH was found to be 4 040 on 01/06/2021  She is asymptomatic   Patient's TSH on 03/05/2021 was within normal limits at 2 780   4  Vitamin D deficiency   continue vitamin-D bolus doses for 8 weeks followed by vitamin D3 1000 units daily  5  Vitamin B12 deficiency  Continue monthly B12 injections  The patient was discussed with Dr Sammie Sanders and he is in agreement with the above note

## 2021-03-16 NOTE — DISCHARGE INSTRUCTIONS
Buspirone - (By mouth)   Why this medicine is used:   Treats anxiety  Contact a nurse or doctor right away if you have:  · Tremors, uncontrollable movements  · Seizures     Common side effects:  · Headache, dizziness, drowsiness  · Feeling restless or nervous, trouble sleeping  · Nausea  © Copyright [address] Information is for End User's use only and may not be sold, redistributed or otherwise used for commercial purposes  Citalopram (By mouth)   Citalopram ([id number])  Treats depression  Brand Name(s): CeleXA   There may be other brand names for this medicine  When This Medicine Should Not Be Used: This medicine is not right for everyone  Do not use it if you had an allergic reaction to citalopram   How to Use This Medicine:   Liquid, Tablet  · Take this medicine as directed  You may need to take it for a month or more before you feel better  Your dose may need to be changed to find out what works best for you  · Oral liquid: Measure the oral liquid medicine with a marked measuring spoon, oral syringe, or medicine cup  · This medicine should come with a Medication Guide  Ask your pharmacist for a copy if you do not have one  · Missed dose: Take a dose as soon as you remember  If it is almost time for your next dose, wait until then and take a regular dose  Do not take extra medicine to make up for a missed dose  · Store the medicine in a closed container at room temperature, away from heat, moisture, and direct light  Drugs and Foods to Avoid:   Ask your doctor or pharmacist before using any other medicine, including over-the-counter medicines, vitamins, and herbal products  · Do not use this medicine together with pimozide  Do not use this medicine and an MAO inhibitor (MAOI) within 14 days of each other  · Some medicines can affect how citalopram works  Tell your doctor if you are using the following:   ?  Buspirone, carbamazepine, chlorpromazine, cimetidine, fentanyl, gatifloxacin, levomethadyl, lithium, methadone, moxifloxacin, omeprazole, pentamidine, Christopher's wort, thioridazine, tramadol, or tryptophan supplements  ? Amphetamines  ? Blood thinner (including warfarin)  ? Diuretic (water pill)  ? Medicine for heart rhythm problems (including amiodarone, procainamide, quinidine, sotalol)  ? NSAID pain or arthritis medicine (including aspirin, celecoxib, diclofenac, ibuprofen, naproxen)  ? Triptan medicine to treat migraine headaches (including sumatriptan)  · Do not drink alcohol while you are using this medicine  Warnings While Using This Medicine:   · Tell your doctor if you are pregnant or breastfeeding, or if you have kidney disease, liver disease, bleeding problems, glaucoma, heart problems, or a seizure disorder  Tell your doctor if you or anyone in your family has a bipolar disorder, heart rhythm problem (such as QT prolongation or a slow heartbeat), or a recent heart attack  · This medicine may cause the following problems:   ? Heart rhythm problems  ? Serotonin syndrome (more likely when used with certain other medicines)  ? Increased risk of bleeding problems  ? Low sodium levels  ? Slow growth in children  · This medicine can increase thoughts of suicide  Tell your doctor right away if you start to feel depressed and have thoughts about hurting yourself  · This medicine may make you dizzy or drowsy  Do not drive or do anything that could be dangerous until you know how this medicine affects you  · Do not stop using this medicine suddenly  Your doctor will need to slowly decrease your dose before you stop it completely  · Your doctor will check your progress and the effects of this medicine at regular visits  Keep all appointments  · Keep all medicine out of the reach of children  Never share your medicine with anyone    Possible Side Effects While Using This Medicine:   Call your doctor right away if you notice any of these side effects:  · Allergic reaction: Itching or hives, swelling in your face or hands, swelling or tingling in your mouth or throat, chest tightness, trouble breathing  · Anxiety, restlessness, fever, sweating, muscle spasms, nausea, vomiting, diarrhea, seeing or hearing things that are not there  · Chest pain, trouble breathing  · Confusion, weakness, and muscle twitching  · Fast, pounding, or uneven heartbeat  · Feeling more excited or energetic than usual, trouble sleeping, racing thoughts  · Eye pain, vision changes, seeing halos around lights  · Lightheadedness, dizziness, fainting  · Painful, prolonged erection of your penis  · Thoughts of hurting yourself or others, unusual behavior  · Unusual bleeding or bruising  If you notice these less serious side effects, talk with your doctor:   · Decreased appetite, weight loss (in children)  · Dry mouth  · Sexual problems  · Sleepiness or drowsiness  If you notice other side effects that you think are caused by this medicine, tell your doctor  Call your doctor for medical advice about side effects  You may report side effects to FDA at 8-388-FDA-4423  © Copyright [address] [place] [address] Information is for End User's use only and may not be sold, redistributed or otherwise used for commercial purposes  The above information is an  only  It is not intended as medical advice for individual conditions or treatments  Talk to your doctor, nurse or pharmacist before following any medical regimen to see if it is safe and effective for you  Depression   WHAT YOU NEED TO KNOW:   What is depression? Depression is a medical condition that causes feelings of sadness or hopelessness that do not go away  Depression may cause you to lose interest in things you used to enjoy  These feelings may interfere with your daily life  What causes or increases my risk for depression? Depression may be caused by changes in brain chemicals that affect your mood   Your risk for depression may be higher if you have any of the following:  · Stressful events such as the death of a loved one, unemployment, or divorce    · A family history of depression    · A chronic medical condition, such as diabetes, heart disease, or cancer    · Drug or alcohol abuse    What are the signs and symptoms of depression? · Appetite changes, or weight gain or loss    · Trouble going to sleep or staying asleep, or sleeping too much    · Fatigue or lack of energy    · Feeling restless, irritable, or withdrawn    · Feeling worthless, hopeless, discouraged, or guilty    · Trouble concentrating, remembering things, doing daily tasks, or making decisions    · Thoughts about hurting or killing yourself    How is depression diagnosed? Your healthcare provider will ask about your symptoms and how long you have had them  He or she will ask if you have any family members with depression  Tell your healthcare provider about any stressful events in your life  He or she may ask about any other health conditions or medicines you take  How is depression treated? · Therapy  is often used together with medicine  Therapy is a way for you to talk about your feelings and anything that may be causing depression  Therapy can be done alone or in a group  It may also be done with family members or a significant other  · Antidepressant medicine  may be given to relieve depression  You may need to take this medicine for several weeks before you begin to feel better  Tell your healthcare provider about any side effects or problems you have with your medicine  The type or amount of medicine may need to be changed  How can I manage depression? · Get regular physical activity  Try to be active for 30 minutes, 3 to 5 days a week  Physical activity can help relieve depression  Work with your healthcare provider to develop a plan that you enjoy  It may help to ask someone to be active with you  · Create a regular sleep schedule    A routine can help you relax before bed  Listen to music, read, or do yoga  Try to go to bed and wake up at the same time every day  Sleep is important for emotional health  · Eat a variety of healthy foods  Healthy foods include fruits, vegetables, whole-grain breads, low-fat dairy products, lean meats, fish, and cooked beans  A healthy meal plan is low in fat, salt, and added sugar  · Do not drink alcohol or use drugs  Alcohol and drugs can make depression worse  Talk to your therapist or doctor if you need help quitting  The following resources are available at any time to help you, if needed:   · [address] [facility]: 9-991.737.6365 (0-871-379-ZVJR)     · Suicide Hotline: 2-195.950.5646 (6-589-ZHFWIIG)     · For a list of international numbers: https://save org/find-help/international-resources/    Call your local emergency number (911 in the [address] [facility]) if:   · You think about harming yourself or someone else  · You have done something on purpose to hurt yourself  When should I call my therapist or doctor? · Your symptoms do not improve  · You cannot make it to your next appointment  · You have new symptoms  · You have questions or concerns about your condition or care  CARE AGREEMENT:   You have the right to help plan your care  Learn about your health condition and how it may be treated  Discuss treatment options with your healthcare providers to decide what care you want to receive  You always have the right to refuse treatment  The above information is an  only  It is not intended as medical advice for individual conditions or treatments  Talk to your doctor, nurse or pharmacist before following any medical regimen to see if it is safe and effective for you  © Copyright [address] Information is for End User's use only and may not be sold, redistributed or otherwise used for commercial purposes   All illustrations and images included in CareNotes® are the copyrighted property of A D A M , Inc  or [address] [place], [place] [address]margaret [facility]

## 2021-03-26 ENCOUNTER — IMMUNIZATIONS (OUTPATIENT)
Dept: FAMILY MEDICINE CLINIC | Facility: HOSPITAL | Age: 61
End: 2021-03-26

## 2021-03-26 DIAGNOSIS — Z23 ENCOUNTER FOR IMMUNIZATION: Primary | ICD-10-CM

## 2021-03-26 PROCEDURE — 0001A SARS-COV-2 / COVID-19 MRNA VACCINE (PFIZER-BIONTECH) 30 MCG: CPT

## 2021-03-26 PROCEDURE — 91300 SARS-COV-2 / COVID-19 MRNA VACCINE (PFIZER-BIONTECH) 30 MCG: CPT

## 2021-04-17 ENCOUNTER — IMMUNIZATIONS (OUTPATIENT)
Dept: FAMILY MEDICINE CLINIC | Facility: HOSPITAL | Age: 61
End: 2021-04-17

## 2021-04-17 DIAGNOSIS — Z23 ENCOUNTER FOR IMMUNIZATION: Primary | ICD-10-CM

## 2021-04-17 PROCEDURE — 0002A SARS-COV-2 / COVID-19 MRNA VACCINE (PFIZER-BIONTECH) 30 MCG: CPT

## 2021-04-17 PROCEDURE — 91300 SARS-COV-2 / COVID-19 MRNA VACCINE (PFIZER-BIONTECH) 30 MCG: CPT

## 2021-06-16 ENCOUNTER — VBI (OUTPATIENT)
Dept: ADMINISTRATIVE | Facility: OTHER | Age: 61
End: 2021-06-16

## 2021-07-29 ENCOUNTER — VBI (OUTPATIENT)
Dept: ADMINISTRATIVE | Facility: OTHER | Age: 61
End: 2021-07-29

## 2021-10-01 ENCOUNTER — VBI (OUTPATIENT)
Dept: ADMINISTRATIVE | Facility: OTHER | Age: 61
End: 2021-10-01

## 2021-11-20 NOTE — PROGRESS NOTES
REASON FOR FOLLOWUP/CHIEF COMPLAINT:    Esophageal cancer  HISTORY OF PRESENT ILLNESS:   No new events overnight still has lots of difficulty swallowing.  PET scan was performed.  Awaiting final reading.    Past Medical History, Past Surgical History, Social History, Family History have been reviewed and are without significant changes except as mentioned.    Review of Systems   Review of Systems   Constitutional: Negative for activity change.   HENT: Positive for trouble swallowing. Negative for nosebleeds.    Respiratory: Negative for shortness of breath and wheezing.    Cardiovascular: Negative for chest pain and palpitations.   Gastrointestinal: Negative for constipation, diarrhea and nausea.   Genitourinary: Negative for dysuria and hematuria.   Musculoskeletal: Negative for arthralgias and myalgias.   Skin: Negative for rash and wound.   Neurological: Negative for seizures and syncope.   Hematological: Negative for adenopathy. Does not bruise/bleed easily.   Psychiatric/Behavioral: Negative for confusion.       Medications:  The current medication list was reviewed in the EMR    ALLERGIES:    Allergies   Allergen Reactions   • Rosuvastatin Other (See Comments)              Vitals:    11/19/21 2123 11/20/21 0451 11/20/21 0510 11/20/21 0943   BP: 142/84  172/86 (!) 184/92   BP Location: Left arm  Left arm Right arm   Patient Position: Lying  Lying Sitting   Pulse: 76 64  66   Resp: 18 20  17   Temp:  98.1 °F (36.7 °C)  98 °F (36.7 °C)   TempSrc:  Oral  Oral   SpO2:  93%  93%   Weight:       Height:         Physical Exam    CONSTITUTIONAL:  Vital signs reviewed.  No distress, looks comfortable.  EYES:  Conjunctiva and lids unremarkable.  PERRLA  EARS,NOSE,MOUTH,THROAT:  Ears and nose appear unremarkable.  Lips, teeth, gums appear unremarkable.  RESPIRATORY:  Normal respiratory effort.  Lungs clear to auscultation bilaterally.  CARDIOVASCULAR:  Normal S1, S2.  No murmurs rubs or gallops.  No significant  Pt slept well overnight, awoke early but stated that she felt very well and rested  No sign of distress or labored breathing  Continuous visual checks performed q7 minutes throughout the night  Safety precautions maintained  Will continue to monitor  "lower extremity edema.  GASTROINTESTINAL: Abdomen appears unremarkable.  Nontender.  No hepatomegaly.  No splenomegaly.  NEURO: cranial nerves 2-12 grossly intact.  No focal deficits.  Appears to have equal strength all 4 extremities.  MUSCULOSKELETAL:  Unremarkable digits/nails.  No cyanosis or clubbing.  SKIN:  Warm.  No rashes.  PSYCHIATRIC:  Normal judgment and insight.  Normal mood and affect.        RECENT LABS:  WBC   Date Value Ref Range Status   11/18/2021 4.35 3.40 - 10.80 10*3/mm3 Final     Hemoglobin   Date Value Ref Range Status   11/18/2021 11.7 (L) 12.0 - 15.9 g/dL Final     Platelets   Date Value Ref Range Status   11/18/2021 174 140 - 450 10*3/mm3 Final       ASSESSMENT/PLAN:  Agustina Mendez [id number]       *Anal squamous cell carcinoma  · stage IIIa clinical T2 N1 M0 anal carcinoma treated [facility]  · Xeloda mitomycin and chemo.  Completed therapy at the [facility], 3/8/2019.  · Left [place] during the COVID-19 pandemic and came to [place] to establish care.  Saw Dr. Loyola at [facility] with CT reportedly unremarkable for metastasis.  · Subsequently established care with Dr. Brayan Morin, [facility] oncology.  · PET 8/27/2021, from PET 5/13/2021: Significant shrinkage and less activity of the residual anal mass.  Decrease in size and activity of some of the retroperitoneal nodes.  However, some of the right common iliac chain nodes stable or slightly more active.     *Esophageal poorly differentiated carcinoma  · Midesophagus circumferential surrounding soft tissue masslike thickening.  · PET 8/27/2021: 1.5 cm focus of activity at \"collapsed mid esophagus\".  · CT chest with contrast 11/14/2021: 2.7 x 2.7 cm masslike soft tissue density surrounding the mid esophagus.  Considerable enlargement since CT chest 5/14/2021 (not mentioned on that initial report but seen in hindsight).  · EGD, Dr. Eaton, 11/15/2021: Moderate sized area of circumferential extrinsic " compression in the middle third of the esophagus, about 28 cm from the incisors.  Resulting in some luminal narrowing but no problems passing the standard gastroscope.  Subtle mucosal abnormality but for the most part the mucosa was normal.    · Biopsies pending.  · Dr. Eaton notes if pathology is negative he will consider EUS.  · EUS, Dr. Eaton, 11/18/2021: Diffuse wall thickening visualized endosonographically, thoracic esophagus, at 28 cm from the incisors.  Could not advance the echoendoscope past the area due to luminal narrowing.  Wall thickening appeared to extend at least to the level of the muscularis propria (layer 4).  Esophageal wall measured up to 14 mm in total thickness.  He stated if malignancy is confirmed, this is T2, possibly T3.  Discussed with pathologist.  He awaits the slides.  · EUS FNA, 11/18/2021: Poorly differentiated carcinoma. Await IHC (but clinically suspect squamous cell).  · If this is squamous cell esophageal cancer, plan carboplatin AUC 2, Taxol 50 mg/m².  Weekly x5 weeks, concurrent with XRT, ideally with resection after completing chemo XRT.  · Thoracic surgery is planning G-tube versus J-tube depending on results of PET scan.     *Dysphagia x10 days, progressed to odynophagia x2 days at the time of EGD, 11/15/2021  ·  inability to eat anything due to pain.  · Planning a feeding tube.     Plan  · Await final EUS pathology (suspect this will be squamous cell esophageal cancer).  · She previously followed with Dr. Brayan Morin, [facility] [place] [facility], regarding the anal cancer. She now wants to follow with our practice regarding the esophageal cancer because it is such a long drive to [place].  · Await final reading on PET scan  · Await thoracic surgery evaluation today regarding G-tube versus J-tube.    Case discussed with Sury Fernandez, NP with thoracic surgery.

## 2021-12-11 ENCOUNTER — IMMUNIZATIONS (OUTPATIENT)
Dept: FAMILY MEDICINE CLINIC | Facility: HOSPITAL | Age: 61
End: 2021-12-11

## 2021-12-11 DIAGNOSIS — Z23 ENCOUNTER FOR IMMUNIZATION: Primary | ICD-10-CM

## 2021-12-11 PROCEDURE — 0001A COVID-19 PFIZER VACC 0.3 ML: CPT

## 2021-12-11 PROCEDURE — 91300 COVID-19 PFIZER VACC 0.3 ML: CPT

## 2021-12-17 ENCOUNTER — VBI (OUTPATIENT)
Dept: ADMINISTRATIVE | Facility: OTHER | Age: 61
End: 2021-12-17

## 2022-02-15 ENCOUNTER — OFFICE VISIT (OUTPATIENT)
Dept: FAMILY MEDICINE CLINIC | Facility: CLINIC | Age: 62
End: 2022-02-15
Payer: COMMERCIAL

## 2022-02-15 VITALS
DIASTOLIC BLOOD PRESSURE: 70 MMHG | WEIGHT: 192 LBS | BODY MASS INDEX: 37.69 KG/M2 | SYSTOLIC BLOOD PRESSURE: 110 MMHG | HEIGHT: 60 IN

## 2022-02-15 DIAGNOSIS — Z23 ENCOUNTER FOR IMMUNIZATION: ICD-10-CM

## 2022-02-15 DIAGNOSIS — Z13.220 SCREENING FOR LIPID DISORDERS: ICD-10-CM

## 2022-02-15 DIAGNOSIS — Z12.31 ENCOUNTER FOR SCREENING MAMMOGRAM FOR MALIGNANT NEOPLASM OF BREAST: ICD-10-CM

## 2022-02-15 DIAGNOSIS — R73.03 PREDIABETES: ICD-10-CM

## 2022-02-15 DIAGNOSIS — Z13.0 SCREENING, ANEMIA, DEFICIENCY, IRON: ICD-10-CM

## 2022-02-15 DIAGNOSIS — Z12.11 COLON CANCER SCREENING: ICD-10-CM

## 2022-02-15 DIAGNOSIS — Z00.00 ANNUAL PHYSICAL EXAM: Primary | ICD-10-CM

## 2022-02-15 PROCEDURE — 36415 COLL VENOUS BLD VENIPUNCTURE: CPT | Performed by: FAMILY MEDICINE

## 2022-02-15 PROCEDURE — 90471 IMMUNIZATION ADMIN: CPT

## 2022-02-15 PROCEDURE — 99396 PREV VISIT EST AGE 40-64: CPT | Performed by: FAMILY MEDICINE

## 2022-02-15 PROCEDURE — 90682 RIV4 VACC RECOMBINANT DNA IM: CPT

## 2022-02-15 PROCEDURE — 1036F TOBACCO NON-USER: CPT | Performed by: FAMILY MEDICINE

## 2022-02-15 PROCEDURE — 3008F BODY MASS INDEX DOCD: CPT | Performed by: FAMILY MEDICINE

## 2022-02-15 PROCEDURE — 3725F SCREEN DEPRESSION PERFORMED: CPT | Performed by: FAMILY MEDICINE

## 2022-02-15 RX ORDER — CITALOPRAM 40 MG/1
40 TABLET ORAL DAILY
COMMUNITY
Start: 2022-01-26

## 2022-02-15 NOTE — PATIENT INSTRUCTIONS

## 2022-02-15 NOTE — ASSESSMENT & PLAN NOTE
The patient is a 47-year-old female who presents today for annual physical examination  All components of the history and physical examination were completed  Anticipatory guidance in regard to diet exercise and recommended screenings were completed  We note that she has not had a colorectal cancer screening  She agrees to referral for colonoscopy which was placed  Additionally she agrees to mammography for breast cancer screening and this order was placed as well  She received influenza vaccine  She is fully vaccinated and boosted against COVID-19  We did note that she is overdue for cervical cancer screening and we encouraged her pursue this as well  Finally we asked her to go for fasting blood work to include CBC CMP and lipids  She agrees
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
## 2022-03-16 ENCOUNTER — VBI (OUTPATIENT)
Dept: ADMINISTRATIVE | Facility: OTHER | Age: 62
End: 2022-03-16

## 2022-05-01 ENCOUNTER — HOSPITAL ENCOUNTER (OUTPATIENT)
Dept: MAMMOGRAPHY | Facility: HOSPITAL | Age: 62
Discharge: HOME | End: 2022-05-01
Payer: COMMERCIAL

## 2022-05-01 DIAGNOSIS — Z12.31 ENCOUNTER FOR SCREENING MAMMOGRAM FOR MALIGNANT NEOPLASM OF BREAST: ICD-10-CM

## 2022-05-01 PROCEDURE — 77067 SCR MAMMO BI INCL CAD: CPT

## 2022-05-01 PROCEDURE — 77063 BREAST TOMOSYNTHESIS BI: CPT

## 2022-06-01 ENCOUNTER — VBI (OUTPATIENT)
Dept: ADMINISTRATIVE | Facility: OTHER | Age: 62
End: 2022-06-01

## 2022-06-27 NOTE — PROGRESS NOTES
Assessment/Plan:  Problem List Items Addressed This Visit        Endocrine    IFG (impaired fasting glucose)     Pending labs  To consider Metformin? Recommend lifestyle modifications  Relevant Orders    Comprehensive metabolic panel    Hemoglobin A1C       Other    Class 2 severe obesity with serious comorbidity and body mass index (BMI) of 37 0 to 37 9 in adult [facility])     Worsening  Recommend lifestyle modifications  Relevant Orders    TSH, 3rd generation with Free T4 reflex    Prediabetes     Pending labs  To consider Metformin? Recommend lifestyle modifications  Relevant Orders    Comprehensive metabolic panel    Hemoglobin A1C    Vitamin D deficiency     Pending labs  Relevant Orders    Comprehensive metabolic panel    Vitamin D 25 hydroxy      Other Visit Diagnoses     Chronic right shoulder pain    -  Primary    Relevant Orders    Ambulatory Referral to Physical Therapy    Suspect rotator cuff strain  Advise Motrin/Tylenol PRN  Handout given  Screening for cervical cancer        Relevant Orders    Ambulatory referral to Obstetrics / Gynecology    Screening for colorectal cancer        Relevant Orders    Cologuard    Encounter for immunization        Relevant Medications    tetanus-diphtheria-acellular pertussis (ADACEL) 5-2-15 5 LF-mcg/0 5 injection    Hyperlipidemia, unspecified hyperlipidemia type        Relevant Orders    CBC and differential    Comprehensive metabolic panel    Lipid panel    TSH, 3rd generation with Free T4 reflex    LDL cholesterol, direct           Return in about 6 weeks (around 8/9/2022) for WMC/ F/U IFG, Vit D Def, Labs  Future Appointments   Date Time Provider America Lackey   8/19/2022  9:20 AM Edison Roland DO [facility]        Subjective:     Yousuf Lozano is a 59 y o  female who presents today as a new patient for her medical conditions        New Patient    Previous PCP:  Dr Prabhakar Jackson at [place] [id number] [address] [facility]  Reason for Transfer:  PCP Retired  Last seen by previous PCP:  2/15/22  Last Labs:  3/6/21  Last Physical:  2/15/22  Medical Records Requested:  No      HPI:  Chief Complaint   Patient presents with    Follow-up     NP [facility], PCP Dori minaya provider  Right shoulder pain wants to start PT- pt was removing snow from car [employer]   Needs CRC- wants cologuard no hx in family  Pt sees pysch    Labs Only     N/A    Medication Refill     N/A    HM     Last CRC over 10 years  Tdap- declined   GYN- pap five years ago  Covid booster #2- did have done     -- Above per clinical staff and reviewed  --      HPI      Today:      Controlled Substance Review    [place]MP or [place]  reviewed: No red flags were identified; safe to proceed with prescription  Started Medicare 6/1/22  Right Shoulder Pain - Symptoms x 4 months after scraping ice off of [employer] 2/22  Mid humerus pain, dull, achy, sometimes sharp  Currently 4/10, Max 7/10  Worse c overhead reach  Pain lasts with for a couple sections  Using Ibuprofen 200mg twice weekly c benefit  Desires PT  Obesity - Watching diet  +Exercise - Walking dog 20 minutes, 3 days per week  IFG - No meds previously  Anxiety / Depression - Management per Psych Dr Greta Soler  Sees q3 months  Next appt 8/22  No counseling - last counseling 2021  On Celexa 40mg QD, Buspar 15mg TID  Vitamin D Deficiency - s/p Drisdol  Not taking MVI, but taking OTC Vitmain D 1000IU daily  H/O Seizures - Grand Mal / Complex Partial - Last occurred in 2013 - none since corrective surgery at [place] [id number] in 2013  Last saw Neurosurgery appt 2015 - F/U PRN  FamHx AAA - + Mother  Patient had normal screen 2012? [place]ooq Reviewed:  Labs 3/6/21    No Gyn                The following portions of the patient's history were reviewed and updated as appropriate: allergies, current medications, past family history, past medical history, past social history, past surgical history and problem list       Review of Systems   Constitutional: Negative for appetite change, chills, diaphoresis, fatigue, fever and unexpected weight change  Respiratory: Negative for chest tightness and shortness of breath  Cardiovascular: Negative for chest pain  Gastrointestinal: Negative for abdominal pain, blood in stool, diarrhea, nausea and vomiting  Genitourinary: Negative for dysuria  Musculoskeletal: Positive for arthralgias  Current Outpatient Medications   Medication Sig Dispense Refill    busPIRone (BUSPAR) 15 mg tablet Take 1 tablet (15 mg total) by mouth 3 (three) times a day for 15 days (Patient taking differently: Take 15 mg by mouth 3 (three) times a day Rx per Psych) 45 tablet 0    cholecalciferol (VITAMIN D3) 1,000 units tablet Take 1 tablet (1,000 Units total) by mouth daily 30 tablet 0    citalopram (CeleXA) 40 mg tablet Take 40 mg by mouth daily Rx per Psych      tetanus-diphtheria-acellular pertussis (ADACEL) 5-2-15 5 LF-mcg/0 5 injection Inject 0 5 mL into a muscle once for 1 dose 0 5 mL 0     No current facility-administered medications for this visit  Objective:  /62   Pulse 83   Temp 97 9 °F (36 6 °C)   Resp 16   Ht 5' 0 25" (1 53 m)   Wt 88 9 kg (196 lb)   LMP 01/01/2011   SpO2 98%   Breastfeeding No   BMI 37 96 kg/m²    Wt Readings from Last 3 Encounters:   06/28/22 88 9 kg (196 lb)   02/15/22 87 1 kg (192 lb)   03/13/21 88 kg (194 lb)      BP Readings from Last 3 Encounters:   06/28/22 118/62   02/15/22 110/70   03/16/21 125/69          Physical Exam  Vitals and nursing note reviewed  Constitutional:       Appearance: Normal appearance  She is well-developed  She is obese  HENT:      Head: Normocephalic and atraumatic  Eyes:      Conjunctiva/sclera: Conjunctivae normal    Neck:      Thyroid: No thyromegaly  Vascular: No carotid bruit     Cardiovascular:      Rate and Rhythm: Normal rate and regular rhythm  Pulses: Normal pulses  Heart sounds: Normal heart sounds  Pulmonary:      Effort: Pulmonary effort is normal       Breath sounds: Normal breath sounds  Abdominal:      General: Bowel sounds are normal  There is no distension  Palpations: Abdomen is soft  There is no mass  Tenderness: There is no abdominal tenderness  There is no guarding or rebound  Musculoskeletal:         General: No swelling  Right shoulder: Decreased range of motion (External rotation)  Normal strength  Normal pulse  Left shoulder: Normal  Normal strength  Normal pulse  Right upper arm: Tenderness (Mid-humerus) present  Left upper arm: Normal       Cervical back: Neck supple  Right lower leg: No edema  Left lower leg: No edema  Neurological:      General: No focal deficit present  Mental Status: She is alert  Psychiatric:         Mood and Affect: Mood normal          Lab Results:      Lab Results   Component Value Date    WBC 9 65 03/05/2021    HGB 15 4 03/05/2021    HCT 47 4 (H) 03/05/2021     03/05/2021    TRIG 94 01/27/2021    HDL 60 01/27/2021    ALT 34 03/05/2021    AST 17 03/05/2021    K 4 1 03/05/2021     03/05/2021    CREATININE 0 93 03/05/2021    BUN 11 03/05/2021    CO2 26 03/05/2021    GLUF 117 (H) 08/08/2017    HGBA1C 6 1 (H) 01/27/2021     No results found for: URICACID  Invalid input(s): BASENAME Vitamin D    No results found       POCT Labs

## 2022-06-28 ENCOUNTER — OFFICE VISIT (OUTPATIENT)
Dept: FAMILY MEDICINE CLINIC | Facility: CLINIC | Age: 62
End: 2022-06-28
Payer: MEDICARE

## 2022-06-28 VITALS
SYSTOLIC BLOOD PRESSURE: 118 MMHG | OXYGEN SATURATION: 98 % | WEIGHT: 196 LBS | HEIGHT: 60 IN | DIASTOLIC BLOOD PRESSURE: 62 MMHG | HEART RATE: 83 BPM | BODY MASS INDEX: 38.48 KG/M2 | RESPIRATION RATE: 16 BRPM | TEMPERATURE: 97.9 F

## 2022-06-28 DIAGNOSIS — Z12.12 SCREENING FOR COLORECTAL CANCER: ICD-10-CM

## 2022-06-28 DIAGNOSIS — Z23 ENCOUNTER FOR IMMUNIZATION: ICD-10-CM

## 2022-06-28 DIAGNOSIS — Z12.4 SCREENING FOR CERVICAL CANCER: ICD-10-CM

## 2022-06-28 DIAGNOSIS — G89.29 CHRONIC RIGHT SHOULDER PAIN: Primary | ICD-10-CM

## 2022-06-28 DIAGNOSIS — R73.03 PREDIABETES: ICD-10-CM

## 2022-06-28 DIAGNOSIS — E66.01 CLASS 2 SEVERE OBESITY WITH SERIOUS COMORBIDITY AND BODY MASS INDEX (BMI) OF 37.0 TO 37.9 IN ADULT, UNSPECIFIED OBESITY TYPE (HCC): ICD-10-CM

## 2022-06-28 DIAGNOSIS — R73.01 IFG (IMPAIRED FASTING GLUCOSE): ICD-10-CM

## 2022-06-28 DIAGNOSIS — Z12.11 SCREENING FOR COLORECTAL CANCER: ICD-10-CM

## 2022-06-28 DIAGNOSIS — M25.511 CHRONIC RIGHT SHOULDER PAIN: Primary | ICD-10-CM

## 2022-06-28 DIAGNOSIS — E78.5 HYPERLIPIDEMIA, UNSPECIFIED HYPERLIPIDEMIA TYPE: ICD-10-CM

## 2022-06-28 DIAGNOSIS — E55.9 VITAMIN D DEFICIENCY: ICD-10-CM

## 2022-06-28 PROBLEM — E06.3 HASHIMOTO'S THYROIDITIS: Status: RESOLVED | Noted: 2017-06-08 | Resolved: 2022-06-28

## 2022-06-28 PROBLEM — E66.812 CLASS 2 SEVERE OBESITY WITH SERIOUS COMORBIDITY AND BODY MASS INDEX (BMI) OF 37.0 TO 37.9 IN ADULT (HCC): Status: ACTIVE | Noted: 2017-06-08

## 2022-06-28 PROBLEM — M19.90 DJD (DEGENERATIVE JOINT DISEASE): Status: RESOLVED | Noted: 2021-03-06 | Resolved: 2022-06-28

## 2022-06-28 PROBLEM — R41.844 EXECUTIVE FUNCTION DEFICIT: Status: RESOLVED | Noted: 2020-09-02 | Resolved: 2022-06-28

## 2022-06-28 PROBLEM — Z00.00 ANNUAL PHYSICAL EXAM: Status: RESOLVED | Noted: 2019-07-08 | Resolved: 2022-06-28

## 2022-06-28 PROCEDURE — 99214 OFFICE O/P EST MOD 30 MIN: CPT | Performed by: FAMILY MEDICINE

## 2022-07-08 ENCOUNTER — LAB (OUTPATIENT)
Dept: LAB | Facility: CLINIC | Age: 62
End: 2022-07-08
Payer: MEDICARE

## 2022-07-08 DIAGNOSIS — E78.5 HYPERLIPIDEMIA, UNSPECIFIED HYPERLIPIDEMIA TYPE: ICD-10-CM

## 2022-07-08 DIAGNOSIS — R73.01 IFG (IMPAIRED FASTING GLUCOSE): ICD-10-CM

## 2022-07-08 DIAGNOSIS — R73.03 PREDIABETES: ICD-10-CM

## 2022-07-08 DIAGNOSIS — E66.01 CLASS 2 SEVERE OBESITY WITH SERIOUS COMORBIDITY AND BODY MASS INDEX (BMI) OF 37.0 TO 37.9 IN ADULT, UNSPECIFIED OBESITY TYPE (HCC): ICD-10-CM

## 2022-07-08 DIAGNOSIS — E55.9 VITAMIN D DEFICIENCY: ICD-10-CM

## 2022-07-08 LAB
25(OH)D3 SERPL-MCNC: 29.9 NG/ML (ref 30–100)
ALBUMIN SERPL BCP-MCNC: 4.1 G/DL (ref 3.5–5)
ALP SERPL-CCNC: 65 U/L (ref 34–104)
ALT SERPL W P-5'-P-CCNC: 10 U/L (ref 7–52)
ANION GAP SERPL CALCULATED.3IONS-SCNC: 5 MMOL/L (ref 4–13)
AST SERPL W P-5'-P-CCNC: 13 U/L (ref 13–39)
BASOPHILS # BLD AUTO: 0.03 THOUSANDS/ΜL (ref 0–0.1)
BASOPHILS NFR BLD AUTO: 0 % (ref 0–1)
BILIRUB SERPL-MCNC: 0.55 MG/DL (ref 0.2–1)
BUN SERPL-MCNC: 18 MG/DL (ref 5–25)
CALCIUM SERPL-MCNC: 9.3 MG/DL (ref 8.4–10.2)
CHLORIDE SERPL-SCNC: 103 MMOL/L (ref 96–108)
CHOLEST SERPL-MCNC: 185 MG/DL
CO2 SERPL-SCNC: 31 MMOL/L (ref 21–32)
CREAT SERPL-MCNC: 0.77 MG/DL (ref 0.6–1.3)
EOSINOPHIL # BLD AUTO: 0.08 THOUSAND/ΜL (ref 0–0.61)
EOSINOPHIL NFR BLD AUTO: 1 % (ref 0–6)
ERYTHROCYTE [DISTWIDTH] IN BLOOD BY AUTOMATED COUNT: 13.4 % (ref 11.6–15.1)
EST. AVERAGE GLUCOSE BLD GHB EST-MCNC: 131 MG/DL
GFR SERPL CREATININE-BSD FRML MDRD: 82 ML/MIN/1.73SQ M
GLUCOSE P FAST SERPL-MCNC: 113 MG/DL (ref 65–99)
HBA1C MFR BLD: 6.2 %
HCT VFR BLD AUTO: 43.1 % (ref 34.8–46.1)
HDLC SERPL-MCNC: 60 MG/DL
HGB BLD-MCNC: 14.5 G/DL (ref 11.5–15.4)
IMM GRANULOCYTES # BLD AUTO: 0.03 THOUSAND/UL (ref 0–0.2)
IMM GRANULOCYTES NFR BLD AUTO: 0 % (ref 0–2)
LDLC SERPL CALC-MCNC: 102 MG/DL (ref 0–100)
LDLC SERPL DIRECT ASSAY-MCNC: 100 MG/DL (ref 0–100)
LYMPHOCYTES # BLD AUTO: 1.72 THOUSANDS/ΜL (ref 0.6–4.47)
LYMPHOCYTES NFR BLD AUTO: 24 % (ref 14–44)
MCH RBC QN AUTO: 30.8 PG (ref 26.8–34.3)
MCHC RBC AUTO-ENTMCNC: 33.6 G/DL (ref 31.4–37.4)
MCV RBC AUTO: 92 FL (ref 82–98)
MONOCYTES # BLD AUTO: 0.7 THOUSAND/ΜL (ref 0.17–1.22)
MONOCYTES NFR BLD AUTO: 10 % (ref 4–12)
NEUTROPHILS # BLD AUTO: 4.58 THOUSANDS/ΜL (ref 1.85–7.62)
NEUTS SEG NFR BLD AUTO: 65 % (ref 43–75)
NONHDLC SERPL-MCNC: 125 MG/DL
NRBC BLD AUTO-RTO: 0 /100 WBCS
PLATELET # BLD AUTO: 254 THOUSANDS/UL (ref 149–390)
PMV BLD AUTO: 10.6 FL (ref 8.9–12.7)
POTASSIUM SERPL-SCNC: 4.2 MMOL/L (ref 3.5–5.3)
PROT SERPL-MCNC: 7.2 G/DL (ref 6.4–8.4)
RBC # BLD AUTO: 4.71 MILLION/UL (ref 3.81–5.12)
SODIUM SERPL-SCNC: 139 MMOL/L (ref 135–147)
TRIGL SERPL-MCNC: 113 MG/DL
TSH SERPL DL<=0.05 MIU/L-ACNC: 4.41 UIU/ML (ref 0.45–4.5)
WBC # BLD AUTO: 7.14 THOUSAND/UL (ref 4.31–10.16)

## 2022-07-08 PROCEDURE — 80061 LIPID PANEL: CPT

## 2022-07-08 PROCEDURE — 83721 ASSAY OF BLOOD LIPOPROTEIN: CPT

## 2022-07-08 PROCEDURE — 36415 COLL VENOUS BLD VENIPUNCTURE: CPT

## 2022-07-08 PROCEDURE — 84443 ASSAY THYROID STIM HORMONE: CPT

## 2022-07-08 PROCEDURE — 83036 HEMOGLOBIN GLYCOSYLATED A1C: CPT

## 2022-07-08 PROCEDURE — 80053 COMPREHEN METABOLIC PANEL: CPT

## 2022-07-08 PROCEDURE — 82306 VITAMIN D 25 HYDROXY: CPT

## 2022-07-08 PROCEDURE — 85025 COMPLETE CBC W/AUTO DIFF WBC: CPT

## 2022-07-10 NOTE — RESULT ENCOUNTER NOTE
Unstable labs - will review with patient at upcoming appointment  IFG - Worsening  To consider Metformin?

## 2022-08-18 NOTE — PROGRESS NOTES
Assessment/Plan:  Problem List Items Addressed This Visit        Endocrine    IFG (impaired fasting glucose)     Worsening  Patient declines starting Metformin  Recommend lifestyle modifications  Relevant Orders    Comprehensive metabolic panel    TSH, 3rd generation with Free T4 reflex    Hemoglobin A1C       Other    Class 2 severe obesity with serious comorbidity and body mass index (BMI) of 37 0 to 37 9 in adult [facility])     Improved  Recommend lifestyle modifications  Relevant Orders    TSH, 3rd generation with Free T4 reflex    Prediabetes     Worsening  Patient declines starting Metformin  Recommend lifestyle modifications  Relevant Orders    TSH, 3rd generation with Free T4 reflex    Vitamin D deficiency     Low vitamin D - Recommend start multivitamin and over-the-counter vitamin D3 2000 - 3000 International Units daily  Relevant Orders    Comprehensive metabolic panel    Vitamin D 25 hydroxy    Anxiety     Management per Psych  Current episode of major depressive disorder without prior episode     Management per Psych  Other Visit Diagnoses     Welcome to Medicare preventive visit    -  Primary    Obesity (BMI 35 0-39 9 without comorbidity)        Relevant Orders    TSH, 3rd generation with Free T4 reflex    Severe obesity (BMI 35 0-39  9) with comorbidity ([address] )        Relevant Orders    TSH, 3rd generation with Free T4 reflex    BMI 37 0-37 9, adult        Relevant Orders    TSH, 3rd generation with Free T4 reflex    Encounter for immunization        Relevant Medications    Zoster Vac Recomb Adjuvanted (Shingrix) 50 MCG/0 5ML SUSR           Return in 6 months (on 2/19/2023) for 6mo - IFG, Vit D Def, Labs        Future Appointments   Date Time Provider [place]   9/29/2022 10:30 AM Akira Marie MD  [facility]   2/20/2023  9:00 AM Inderjit Yeager DO [facility]        Subjective:     Albertina Cardenas is a 59 y o  female who presents today for a follow-up on her chronic medical conditions  HPI:  Chief Complaint   Patient presents with    Medicare Wellness Visit    HM     Has not scheduled CRC yet  -- Above per clinical staff and reviewed  --      HPI      Today:    Return in about 6 weeks (around 8/9/2022) for [facility]MC/ F/U IFG, Vit D Def, Labs         Started Medicare 6/1/22        Right Shoulder Pain - Referred to PT 6/28/22 - no appt yet as symptoms improved  Symptoms x 4 months after scraping ice off of [facility] 2/22  Mid humerus pain, dull, achy, sometimes sharp  Currently 4/10, Max 7/10  Worse c overhead reach  Pain lasts with for a couple sections  Using Ibuprofen 200mg twice weekly c benefit  [facility]        Obesity - Watching diet  +Exercise - Walking dog 20 minutes, 3 days per week        IFG - No meds previously  S/p Nutrition consult c DM  2022         Anxiety / Depression - Management per Psych Dr Cesar Lind  Sees q3 months  Next appt 10/22  No counseling - last counseling 2021  On Celexa 40mg QD, Buspar 15mg TID        Vitamin D Deficiency - s/p Drisdol  Not taking MVI, but taking OTC Vitmain D 1000IU daily  From previous note:    Controlled Substance Review     [place] PDMP or [place]  reviewed: No red flags were identified; safe to proceed with prescription       Started Medicare 6/1/22        Right Shoulder Pain - Symptoms x 4 months after scraping ice off of [facility] 2/22  Mid humerus pain, dull, achy, sometimes sharp  Currently 4/10, Max 7/10  Worse c overhead reach  Pain lasts with for a couple sections  Using Ibuprofen 200mg twice weekly c benefit  [facility]        Obesity - Watching diet  +Exercise - Walking dog 20 minutes, 3 days per week        IFG - No meds previously        Anxiety / Depression - Management per Psych Dr Cesar Lind  Sees q3 months  Next appt 8/22  No counseling - last counseling 2021  On Celexa 40mg QD, Buspar 15mg TID      PHQ-2/9 Depression Screening    Little interest or pleasure in doing things: 0 - not at all  Feeling down, depressed, or hopeless: 0 - not at all  Trouble falling or staying asleep, or sleeping too much: 0 - not at all  Feeling tired or having little energy: 0 - not at all  Poor appetite or overeatin - not at all  Feeling bad about yourself - or that you are a failure or have let yourself or your family down: 0 - not at all  Trouble concentrating on things, such as reading the newspaper or watching television: 0 - not at all  Moving or speaking so slowly that other people could have noticed  Or the opposite - being so fidgety or restless that you have been moving around a lot more than usual: 0 - not at all  Thoughts that you would be better off dead, or of hurting yourself in some way: 0 - not at all  PHQ-2 Score: 0  PHQ-2 Interpretation: Negative depression screen  PHQ-9 Score: 0   PHQ-9 Interpretation: No or Minimal depression          SWAPNA-7 Flowsheet Screening    Flowsheet Row Most Recent Value   Over the last 2 weeks, how often have you been bothered by any of the following problems? Feeling nervous, anxious, or on edge 0   Not being able to stop or control worrying 0   Worrying too much about different things 0   Trouble relaxing 0   Being so restless that it is hard to sit still 0   Becoming easily annoyed or irritable 0   Feeling afraid as if something awful might happen 0   SWAPNA-7 Total Score 0               Vitamin D Deficiency - s/p Drisdol  Not taking MVI, but taking OTC Vitmain D 1000IU daily        H/O Seizures - Grand Mal / Complex Partial - Last occurred in  - none since corrective surgery at [facility] in   Last saw Neurosurgery appt 2015 - F/U PRN        FamHx AAA - + Mother  Patient had normal screen ?         Reviewed:  Labs 22     No Gyn  Pending appt c Gyn Dr Linda Gamboa at [facility]        The following portions of the patient's history were reviewed and updated as appropriate: allergies, current medications, past family history, past medical history, past social history, past surgical history and problem list       Review of Systems   Constitutional: Negative for appetite change, chills, diaphoresis, fatigue and fever  Respiratory: Negative for chest tightness and shortness of breath  Cardiovascular: Negative for chest pain  Gastrointestinal: Negative for abdominal pain, blood in stool, diarrhea, nausea and vomiting  Genitourinary: Negative for dysuria  Current Outpatient Medications   Medication Sig Dispense Refill    busPIRone (BUSPAR) 15 mg tablet Take 1 tablet (15 mg total) by mouth 3 (three) times a day for 15 days (Patient taking differently: Take 15 mg by mouth 3 (three) times a day Rx per Psych) 45 tablet 0    cholecalciferol (VITAMIN D3) 1,000 units tablet Take 1 tablet (1,000 Units total) by mouth daily 30 tablet 0    citalopram (CeleXA) 40 mg tablet Take 40 mg by mouth daily Rx per Psych      Zoster Vac Recomb Adjuvanted (Shingrix) 50 MCG/0 5ML SUSR Inject 0 5 mL into a muscle once for 1 dose Due for #2 = repeat dose in 2 to 6 months 1 each 0     No current facility-administered medications for this visit  Objective:  /80   Pulse 61   Temp 97 7 °F (36 5 °C)   Ht 5' 0 25" (1 53 m)   Wt 87 2 kg (192 lb 3 2 oz)   LMP 01/01/2011   SpO2 99%   BMI 37 23 kg/m²    Wt Readings from Last 3 Encounters:   08/19/22 87 2 kg (192 lb 3 2 oz)   06/28/22 88 9 kg (196 lb)   02/15/22 87 1 kg (192 lb)      BP Readings from Last 3 Encounters:   08/19/22 126/80   06/28/22 118/62   02/15/22 110/70          Physical Exam  Vitals and nursing note reviewed  Constitutional:       Appearance: Normal appearance  She is well-developed  She is obese  HENT:      Head: Normocephalic and atraumatic  Eyes:      Conjunctiva/sclera: Conjunctivae normal    Neck:      Thyroid: No thyromegaly  Cardiovascular:      Rate and Rhythm: Normal rate and regular rhythm  Pulses: Normal pulses  Heart sounds: Normal heart sounds  Pulmonary:      Effort: Pulmonary effort is normal       Breath sounds: Normal breath sounds  Musculoskeletal:         General: No swelling or tenderness  Cervical back: Neck supple  Right lower leg: No edema  Left lower leg: No edema  Neurological:      General: No focal deficit present  Mental Status: She is alert and oriented to person, place, and time  Psychiatric:         Mood and Affect: Mood normal          Behavior: Behavior normal          Thought Content: Thought content normal          Judgment: Judgment normal          Lab Results:      Lab Results   Component Value Date    WBC 7 14 07/08/2022    HGB 14 5 07/08/2022    HCT 43 1 07/08/2022     07/08/2022    TRIG 113 07/08/2022    HDL 60 07/08/2022    LDLDIRECT 100 07/08/2022    ALT 10 07/08/2022    AST 13 07/08/2022    K 4 2 07/08/2022     07/08/2022    CREATININE 0 77 07/08/2022    BUN 18 07/08/2022    CO2 31 07/08/2022    GLUF 113 (H) 07/08/2022    HGBA1C 6 2 (H) 07/08/2022     No results found for: URICACID  Invalid input(s): BASENAME Vitamin D    No results found  POCT Labs        Depression Screening and Follow-up Plan: Patient was screened for depression during today's encounter  They screened negative with a PHQ-2 score of 0

## 2022-08-19 ENCOUNTER — OFFICE VISIT (OUTPATIENT)
Dept: FAMILY MEDICINE CLINIC | Facility: CLINIC | Age: 62
End: 2022-08-19
Payer: MEDICARE

## 2022-08-19 VITALS
OXYGEN SATURATION: 99 % | HEART RATE: 61 BPM | TEMPERATURE: 97.7 F | WEIGHT: 192.2 LBS | HEIGHT: 60 IN | SYSTOLIC BLOOD PRESSURE: 126 MMHG | BODY MASS INDEX: 37.73 KG/M2 | DIASTOLIC BLOOD PRESSURE: 80 MMHG

## 2022-08-19 DIAGNOSIS — E55.9 VITAMIN D DEFICIENCY: ICD-10-CM

## 2022-08-19 DIAGNOSIS — F32.9 CURRENT EPISODE OF MAJOR DEPRESSIVE DISORDER WITHOUT PRIOR EPISODE, UNSPECIFIED DEPRESSION EPISODE SEVERITY: ICD-10-CM

## 2022-08-19 DIAGNOSIS — E66.9 OBESITY (BMI 35.0-39.9 WITHOUT COMORBIDITY): ICD-10-CM

## 2022-08-19 DIAGNOSIS — E66.01 SEVERE OBESITY (BMI 35.0-39.9) WITH COMORBIDITY (HCC): ICD-10-CM

## 2022-08-19 DIAGNOSIS — E66.01 CLASS 2 SEVERE OBESITY WITH SERIOUS COMORBIDITY AND BODY MASS INDEX (BMI) OF 37.0 TO 37.9 IN ADULT, UNSPECIFIED OBESITY TYPE (HCC): ICD-10-CM

## 2022-08-19 DIAGNOSIS — Z00.00 WELCOME TO MEDICARE PREVENTIVE VISIT: Primary | ICD-10-CM

## 2022-08-19 DIAGNOSIS — R73.01 IFG (IMPAIRED FASTING GLUCOSE): ICD-10-CM

## 2022-08-19 DIAGNOSIS — Z23 ENCOUNTER FOR IMMUNIZATION: ICD-10-CM

## 2022-08-19 DIAGNOSIS — F41.9 ANXIETY: ICD-10-CM

## 2022-08-19 DIAGNOSIS — R73.03 PREDIABETES: ICD-10-CM

## 2022-08-19 PROCEDURE — G0402 INITIAL PREVENTIVE EXAM: HCPCS | Performed by: FAMILY MEDICINE

## 2022-08-19 PROCEDURE — 99214 OFFICE O/P EST MOD 30 MIN: CPT | Performed by: FAMILY MEDICINE

## 2022-08-19 RX ORDER — ZOSTER VACCINE RECOMBINANT, ADJUVANTED 50 MCG/0.5
0.5 KIT INTRAMUSCULAR ONCE
Qty: 1 EACH | Refills: 0 | Status: SHIPPED | OUTPATIENT
Start: 2022-08-19 | End: 2022-08-19

## 2022-08-19 NOTE — ASSESSMENT & PLAN NOTE
Low vitamin D - Recommend start multivitamin and over-the-counter vitamin D3 2000 - 3000 International Units daily

## 2022-08-19 NOTE — PATIENT INSTRUCTIONS
Low vitamin D - Recommend start multivitamin and over-the-counter vitamin D3 2000 - 3000 International Units daily  Medicare Preventive Visit Patient Instructions  Thank you for completing your Welcome to Medicare Visit or Medicare Annual Wellness Visit today  Your next wellness visit will be due in one year (8/20/2023)  The screening/preventive services that you may require over the next 5-10 years are detailed below  Some tests may not apply to you based off risk factors and/or age  Screening tests ordered at today's visit but not completed yet may show as past due  Also, please note that scanned in results may not display below  Preventive Screenings:  Service Recommendations Previous Testing/Comments   Colorectal Cancer Screening  * Colonoscopy    * Fecal Occult Blood Test (FOBT)/Fecal Immunochemical Test (FIT)  * Fecal DNA/Cologuard Test  * Flexible Sigmoidoscopy Age: 39-70 years old   Colonoscopy: every 10 years (may be performed more frequently if at higher risk)  OR  FOBT/FIT: every 1 year  OR  Cologuard: every 3 years  OR  Sigmoidoscopy: every 5 years  Screening may be recommended earlier than age 39 if at higher risk for colorectal cancer  Also, an individualized decision between you and your healthcare provider will decide whether screening between the ages of 74-80 would be appropriate  Colonoscopy: Not on file  FOBT/FIT: Not on file  Cologuard: Not on file  Sigmoidoscopy: Not on file          Breast Cancer Screening Age: 36 years old  Frequency: every 1-2 years  Not required if history of left and right mastectomy Mammogram: 05/01/2022        Cervical Cancer Screening Between the ages of 21-29, pap smear recommended once every 3 years  Between the ages of 33-67, can perform pap smear with HPV co-testing every 5 years     Recommendations may differ for women with a history of total hysterectomy, cervical cancer, or abnormal pap smears in past  Pap Smear: Not on file        Hepatitis C Screening Once for adults born between 1945 and 1965  More frequently in patients at high risk for Hepatitis C Hep C Antibody: 01/27/2021        Diabetes Screening 1-2 times per year if you're at risk for diabetes or have pre-diabetes Fasting glucose: 113 mg/dL (7/8/2022)  A1C: 6 2 % (7/8/2022)      Cholesterol Screening Once every 5 years if you don't have a lipid disorder  May order more often based on risk factors  Lipid panel: 07/08/2022          Other Preventive Screenings Covered by Medicare:  Abdominal Aortic Aneurysm (AAA) Screening: covered once if your at risk  You're considered to be at risk if you have a family history of AAA  Lung Cancer Screening: covers low dose CT scan once per year if you meet all of the following conditions: (1) Age 50-69; (2) No signs or symptoms of lung cancer; (3) Current smoker or have quit smoking within the last 15 years; (4) You have a tobacco smoking history of at least 20 pack years (packs per day multiplied by number of years you smoked); (5) You get a written order from a healthcare provider  Glaucoma Screening: covered annually if you're considered high risk: (1) You have diabetes OR (2) Family history of glaucoma OR (3)  aged 48 and older OR (3)  American aged 72 and older  Osteoporosis Screening: covered every 2 years if you meet one of the following conditions: (1) You're estrogen deficient and at risk for osteoporosis based off medical history and other findings; (2) Have a vertebral abnormality; (3) On glucocorticoid therapy for more than 3 months; (4) Have primary hyperparathyroidism; (5) On osteoporosis medications and need to assess response to drug therapy  Last bone density test (DXA Scan): Not on file  HIV Screening: covered annually if you're between the age of 12-76  Also covered annually if you are younger than 13 and older than 72 with risk factors for HIV infection   For pregnant patients, it is covered up to 3 times per pregnancy  Immunizations:  Immunization Recommendations   Influenza Vaccine Annual influenza vaccination during flu season is recommended for all persons aged >= 6 months who do not have contraindications   Pneumococcal Vaccine   * Pneumococcal conjugate vaccine = PCV13 (Prevnar 13), PCV15 (Vaxneuvance), PCV20 (Prevnar 20)  * Pneumococcal polysaccharide vaccine = PPSV23 (Pneumovax) Adults 25-60 years old: 1-3 doses may be recommended based on certain risk factors  Adults 72 years old: 1-2 doses may be recommended based off what pneumonia vaccine you previously received   Hepatitis B Vaccine 3 dose series if at intermediate or high risk (ex: diabetes, end stage renal disease, liver disease)   Tetanus (Td) Vaccine - COST NOT COVERED BY MEDICARE PART B Following completion of primary series, a booster dose should be given every 10 years to maintain immunity against tetanus  Td may also be given as tetanus wound prophylaxis  Tdap Vaccine - COST NOT COVERED BY MEDICARE PART B Recommended at least once for all adults  For pregnant patients, recommended with each pregnancy  Shingles Vaccine (Shingrix) - COST NOT COVERED BY MEDICARE PART B  2 shot series recommended in those aged 48 and above     Health Maintenance Due:      Topic Date Due    Cervical Cancer Screening  Never done    Colorectal Cancer Screening  Never done    Breast Cancer Screening: Mammogram  05/01/2023    HIV Screening  Completed    Hepatitis C Screening  Completed     Immunizations Due:      Topic Date Due    COVID-19 Vaccine (4 - Booster for Pfizer series) 04/11/2022    Influenza Vaccine (1) 09/01/2022     Advance Directives   What are advance directives? Advance directives are legal documents that state your wishes and plans for medical care  These plans are made ahead of time in case you lose your ability to make decisions for yourself   Advance directives can apply to any medical decision, such as the treatments you want, and if you want to donate organs  What are the types of advance directives? There are many types of advance directives, and each state has rules about how to use them  You may choose a combination of any of the following:  Living will: This is a written record of the treatment you want  You can also choose which treatments you do not want, which to limit, and which to stop at a certain time  This includes surgery, medicine, IV fluid, and tube feedings  [facility] power of  for [facility]): This is a written record that states who you want to make healthcare choices for you when you are unable to make them for yourself  This person, called a proxy, is usually a family member or a friend  You may choose more than 1 proxy  Do not resuscitate (DNR) order:  A DNR order is used in case your heart stops beating or you stop breathing  It is a request not to have certain forms of treatment, such as CPR  A DNR order may be included in other types of advance directives  Medical directive: This covers the care that you want if you are in a coma, near death, or unable to make decisions for yourself  You can list the treatments you want for each condition  Treatment may include pain medicine, surgery, blood transfusions, dialysis, IV or tube feedings, and a ventilator (breathing machine)  Values history: This document has questions about your views, beliefs, and how you feel and think about life  This information can help others choose the care that you would choose  Why are advance directives important? An advance directive helps you control your care  Although spoken wishes may be used, it is better to have your wishes written down  Spoken wishes can be misunderstood, or not followed  Treatments may be given even if you do not want them  An advance directive may make it easier for your family to make difficult choices about your care     Weight Management   Why it is important to manage your weight:  Being overweight increases your risk of health conditions such as heart disease, high blood pressure, type 2 diabetes, and certain types of cancer  It can also increase your risk for osteoarthritis, sleep apnea, and other respiratory problems  Aim for a slow, steady weight loss  Even a small amount of weight loss can lower your risk of health problems  How to lose weight safely:  A safe and healthy way to lose weight is to eat fewer calories and get regular exercise  You can lose up about 1 pound a week by decreasing the number of calories you eat by 500 calories each day  Healthy meal plan for weight management:  A healthy meal plan includes a variety of foods, contains fewer calories, and helps you stay healthy  A healthy meal plan includes the following:  Eat whole-grain foods more often  A healthy meal plan should contain fiber  Fiber is the part of grains, fruits, and vegetables that is not broken down by your body  Whole-grain foods are healthy and provide extra fiber in your diet  Some examples of whole-grain foods are whole-wheat breads and pastas, oatmeal, brown rice, and bulgur  Eat a variety of vegetables every day  Include dark, leafy greens such as spinach, kale, amanda greens, and mustard greens  Eat yellow and orange vegetables such as carrots, sweet potatoes, and winter squash  Eat a variety of fruits every day  Choose fresh or canned fruit (canned in its own juice or light syrup) instead of juice  Fruit juice has very little or no fiber  Eat low-fat dairy foods  Drink fat-free (skim) milk or 1% milk  Eat fat-free yogurt and low-fat cottage cheese  Try low-fat cheeses such as mozzarella and other reduced-fat cheeses  Choose meat and other protein foods that are low in fat  Choose beans or other legumes such as split peas or lentils  Choose fish, skinless poultry (chicken or turkey), or lean cuts of red meat (beef or pork)  Before you cook meat or poultry, cut off any visible fat  Use less fat and oil  Try baking foods instead of frying them  Add less fat, such as margarine, sour cream, regular salad dressing and mayonnaise to foods  Eat fewer high-fat foods  Some examples of high-fat foods include french fries, doughnuts, ice cream, and cakes  Eat fewer sweets  Limit foods and drinks that are high in sugar  This includes candy, cookies, regular soda, and sweetened drinks  Exercise:  Exercise at least 30 minutes per day on most days of the week  Some examples of exercise include walking, biking, dancing, and swimming  You can also fit in more physical activity by taking the stairs instead of the elevator or parking farther away from stores  Ask your healthcare provider about the best exercise plan for you  © Copyright [employer] 2018 Information is for End User's use only and may not be sold, redistributed or otherwise used for commercial purposes  All illustrations and images included in CareNotes® are the copyrighted property of [employer]  or [facility] Preventive Visit Patient Instructions  Thank you for completing your Welcome to Medicare Visit or Medicare Annual Wellness Visit today  Your next wellness visit will be due in one year (8/20/2023)  The screening/preventive services that you may require over the next 5-10 years are detailed below  Some tests may not apply to you based off risk factors and/or age  Screening tests ordered at today's visit but not completed yet may show as past due  Also, please note that scanned in results may not display below    Preventive Screenings:  Service Recommendations Previous Testing/Comments   Colorectal Cancer Screening  * Colonoscopy    * Fecal Occult Blood Test (FOBT)/Fecal Immunochemical Test (FIT)  * Fecal DNA/Cologuard Test  * Flexible Sigmoidoscopy Age: 39-70 years old   Colonoscopy: every 10 years (may be performed more frequently if at higher risk)  OR  FOBT/FIT: every 1 year  OR  Cologuard: every 3 years  OR  Sigmoidoscopy: every 5 years  Screening may be recommended earlier than age 39 if at higher risk for colorectal cancer  Also, an individualized decision between you and your healthcare provider will decide whether screening between the ages of 74-80 would be appropriate  Colonoscopy: Not on file  FOBT/FIT: Not on file  Cologuard: Not on file  Sigmoidoscopy: Not on file          Breast Cancer Screening Age: 36 years old  Frequency: every 1-2 years  Not required if history of left and right mastectomy Mammogram: 05/01/2022    Screening Current   Cervical Cancer Screening Between the ages of 21-29, pap smear recommended once every 3 years  Between the ages of 33-67, can perform pap smear with HPV co-testing every 5 years  Recommendations may differ for women with a history of total hysterectomy, cervical cancer, or abnormal pap smears in past  Pap Smear: Not on file        Hepatitis C Screening Once for adults born between 1945 and 1965  More frequently in patients at high risk for Hepatitis C Hep C Antibody: 01/27/2021    Screening Current   Diabetes Screening 1-2 times per year if you're at risk for diabetes or have pre-diabetes Fasting glucose: 113 mg/dL (7/8/2022)  A1C: 6 2 % (7/8/2022)  Screening Current   Cholesterol Screening Once every 5 years if you don't have a lipid disorder  May order more often based on risk factors  Lipid panel: 07/08/2022    Screening Not Indicated  History Lipid Disorder     Other Preventive Screenings Covered by Medicare:  Abdominal Aortic Aneurysm (AAA) Screening: covered once if your at risk  You're considered to be at risk if you have a family history of AAA    Lung Cancer Screening: covers low dose CT scan once per year if you meet all of the following conditions: (1) Age 50-69; (2) No signs or symptoms of lung cancer; (3) Current smoker or have quit smoking within the last 15 years; (4) You have a tobacco smoking history of at least 20 pack years (packs per day multiplied by number of years you smoked); (5) You get a written order from a healthcare provider  Glaucoma Screening: covered annually if you're considered high risk: (1) You have diabetes OR (2) Family history of glaucoma OR (3)  aged 48 and older OR (3)  American aged 72 and older  Osteoporosis Screening: covered every 2 years if you meet one of the following conditions: (1) You're estrogen deficient and at risk for osteoporosis based off medical history and other findings; (2) Have a vertebral abnormality; (3) On glucocorticoid therapy for more than 3 months; (4) Have primary hyperparathyroidism; (5) On osteoporosis medications and need to assess response to drug therapy  Last bone density test (DXA Scan): Not on file  HIV Screening: covered annually if you're between the age of 12-76  Also covered annually if you are younger than 13 and older than 72 with risk factors for HIV infection  For pregnant patients, it is covered up to 3 times per pregnancy  Immunizations:  Immunization Recommendations   Influenza Vaccine Annual influenza vaccination during flu season is recommended for all persons aged >= 6 months who do not have contraindications   Pneumococcal Vaccine   * Pneumococcal conjugate vaccine = PCV13 (Prevnar 13), PCV15 (Vaxneuvance), PCV20 (Prevnar 20)  * Pneumococcal polysaccharide vaccine = PPSV23 (Pneumovax) Adults 25-60 years old: 1-3 doses may be recommended based on certain risk factors  Adults 72 years old: 1-2 doses may be recommended based off what pneumonia vaccine you previously received   Hepatitis B Vaccine 3 dose series if at intermediate or high risk (ex: diabetes, end stage renal disease, liver disease)   Tetanus (Td) Vaccine - COST NOT COVERED BY MEDICARE PART B Following completion of primary series, a booster dose should be given every 10 years to maintain immunity against tetanus  Td may also be given as tetanus wound prophylaxis     Tdap Vaccine - COST NOT COVERED BY MEDICARE PART B Recommended at least once for all adults  For pregnant patients, recommended with each pregnancy  Shingles Vaccine (Shingrix) - COST NOT COVERED BY MEDICARE PART B  2 shot series recommended in those aged 48 and above     Health Maintenance Due:      Topic Date Due    Cervical Cancer Screening  Never done    Colorectal Cancer Screening  Never done    Breast Cancer Screening: Mammogram  05/01/2023    HIV Screening  Completed    Hepatitis C Screening  Completed     Immunizations Due:      Topic Date Due    COVID-19 Vaccine (4 - Booster for Pfizer series) 04/11/2022    Influenza Vaccine (1) 09/01/2022     Advance Directives   What are advance directives? Advance directives are legal documents that state your wishes and plans for medical care  These plans are made ahead of time in case you lose your ability to make decisions for yourself  Advance directives can apply to any medical decision, such as the treatments you want, and if you want to donate organs  What are the types of advance directives? There are many types of advance directives, and each state has rules about how to use them  You may choose a combination of any of the following:  Living will: This is a written record of the treatment you want  You can also choose which treatments you do not want, which to limit, and which to stop at a certain time  This includes surgery, medicine, IV fluid, and tube feedings  Durable power of  for healthcare [facility]): This is a written record that states who you want to make healthcare choices for you when you are unable to make them for yourself  This person, called a proxy, is usually a family member or a friend  You may choose more than 1 proxy  Do not resuscitate (DNR) order:  A DNR order is used in case your heart stops beating or you stop breathing  It is a request not to have certain forms of treatment, such as CPR  A DNR order may be included in other types of advance directives    Medical directive: This covers the care that you want if you are in a coma, near death, or unable to make decisions for yourself  You can list the treatments you want for each condition  Treatment may include pain medicine, surgery, blood transfusions, dialysis, IV or tube feedings, and a ventilator (breathing machine)  Values history: This document has questions about your views, beliefs, and how you feel and think about life  This information can help others choose the care that you would choose  Why are advance directives important? An advance directive helps you control your care  Although spoken wishes may be used, it is better to have your wishes written down  Spoken wishes can be misunderstood, or not followed  Treatments may be given even if you do not want them  An advance directive may make it easier for your family to make difficult choices about your care  Weight Management   Why it is important to manage your weight:  Being overweight increases your risk of health conditions such as heart disease, high blood pressure, type 2 diabetes, and certain types of cancer  It can also increase your risk for osteoarthritis, sleep apnea, and other respiratory problems  Aim for a slow, steady weight loss  Even a small amount of weight loss can lower your risk of health problems  How to lose weight safely:  A safe and healthy way to lose weight is to eat fewer calories and get regular exercise  You can lose up about 1 pound a week by decreasing the number of calories you eat by 500 calories each day  Healthy meal plan for weight management:  A healthy meal plan includes a variety of foods, contains fewer calories, and helps you stay healthy  A healthy meal plan includes the following:  Eat whole-grain foods more often  A healthy meal plan should contain fiber  Fiber is the part of grains, fruits, and vegetables that is not broken down by your body   Whole-grain foods are healthy and provide extra fiber in your diet  Some examples of whole-grain foods are whole-wheat breads and pastas, oatmeal, brown rice, and bulgur  Eat a variety of vegetables every day  Include dark, leafy greens such as spinach, kale, amanda greens, and mustard greens  Eat yellow and orange vegetables such as carrots, sweet potatoes, and winter squash  Eat a variety of fruits every day  Choose fresh or canned fruit (canned in its own juice or light syrup) instead of juice  Fruit juice has very little or no fiber  Eat low-fat dairy foods  Drink fat-free (skim) milk or 1% milk  Eat fat-free yogurt and low-fat cottage cheese  Try low-fat cheeses such as mozzarella and other reduced-fat cheeses  Choose meat and other protein foods that are low in fat  Choose beans or other legumes such as split peas or lentils  Choose fish, skinless poultry (chicken or turkey), or lean cuts of red meat (beef or pork)  Before you cook meat or poultry, cut off any visible fat  Use less fat and oil  Try baking foods instead of frying them  Add less fat, such as margarine, sour cream, regular salad dressing and mayonnaise to foods  Eat fewer high-fat foods  Some examples of high-fat foods include french fries, doughnuts, ice cream, and cakes  Eat fewer sweets  Limit foods and drinks that are high in sugar  This includes candy, cookies, regular soda, and sweetened drinks  Exercise:  Exercise at least 30 minutes per day on most days of the week  Some examples of exercise include walking, biking, dancing, and swimming  You can also fit in more physical activity by taking the stairs instead of the elevator or parking farther away from stores  Ask your healthcare provider about the best exercise plan for you  © Copyright [employer] 2018 Information is for End User's use only and may not be sold, redistributed or otherwise used for commercial purposes   All illustrations and images included in CareNotes® are the copyrighted property of [employer]  or [employer] [address]

## 2022-08-19 NOTE — PROGRESS NOTES
Assessment and Plan:     Problem List Items Addressed This Visit        Endocrine    IFG (impaired fasting glucose)     Worsening  Patient declines starting Metformin  Recommend lifestyle modifications  Relevant Orders    Comprehensive metabolic panel    TSH, 3rd generation with Free T4 reflex    Hemoglobin A1C       Other    Class 2 severe obesity with serious comorbidity and body mass index (BMI) of 37 0 to 37 9 in adult [facility])     Improved  Recommend lifestyle modifications  Relevant Orders    TSH, 3rd generation with Free T4 reflex    Prediabetes     Worsening  Patient declines starting Metformin  Recommend lifestyle modifications  Relevant Orders    TSH, 3rd generation with Free T4 reflex    Vitamin D deficiency     Low vitamin D - Recommend start multivitamin and over-the-counter vitamin D3 2000 - 3000 International Units daily  Relevant Orders    Comprehensive metabolic panel    Vitamin D 25 hydroxy    Anxiety     Management per Psych  Current episode of major depressive disorder without prior episode     Management per Psych  Other Visit Diagnoses     Welcome to Medicare preventive visit    -  Primary    Obesity (BMI 35 0-39 9 without comorbidity)        Relevant Orders    TSH, 3rd generation with Free T4 reflex    Severe obesity (BMI 35 0-39  9) with comorbidity ([address] )        Relevant Orders    TSH, 3rd generation with Free T4 reflex    BMI 37 0-37 9, adult        Relevant Orders    TSH, 3rd generation with Free T4 reflex    Encounter for immunization        Relevant Medications    Zoster Vac Recomb Adjuvanted (Shingrix) 50 MCG/0 5ML SUSR          Depression Screening and Follow-up Plan: Patient was screened for depression during today's encounter  They screened negative with a PHQ-2 score of 0        Preventive health issues were discussed with patient, and age appropriate screening tests were ordered as noted in patient's After Visit Summary  Personalized health advice and appropriate referrals for health education or preventive services given if needed, as noted in patient's After Visit Summary  History of Present Illness:     Patient presents for a Medicare Wellness Visit    HPI     Patient declines Welcome to Medicare EKG  Patient Care Team:  Lisa Blank DO as PCP - General (Family Medicine)  Bhavesh Levy MD as PCP - PCP-[facility] Attributed-Roster     Review of Systems:     Review of Systems     See other note           Problem List:     Patient Active Problem List   Diagnosis    Anxiety    Class 2 severe obesity with serious comorbidity and body mass index (BMI) of 37 0 to 37 9 in adult ([facility] [address]    Prediabetes    IFG (impaired fasting glucose)    Vitamin D deficiency    Current episode of major depressive disorder without prior episode      Past Medical and Surgical History:     Past Medical History:   Diagnosis Date    Anxiety     Class 2 severe obesity with serious comorbidity and body mass index (BMI) of 37 0 to 37 9 in adult [facility])     Concussion     Last assessed 2017    Depression     History of ischemic stroke without residual deficits     s/p Brain Surgery, s/p Meds and Rehab    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, 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 , s/p corrective Brain Surgery at [address] [place] [address]  IFG (impaired fasting glucose)     Vitamin D deficiency      Past Surgical History:   Procedure Laterality Date    BRAIN SURGERY      R Temporal Resection / Amyglahippocampectomy     SECTION      x 2    TUBAL LIGATION        Family History:     Family History   Problem Relation Age of Onset    Aortic aneurysm Mother 79        Abdominal Aortic Aneurysm, +Smoker    No Known Problems Father     No Known Problems Brother     No Known Problems Son     Cancer Maternal Grandmother         79    No Known Problems Half-Sister     No Known Problems Half-Sister     No Known Problems Half-Sister  No Known Problems Half-Sister     Psychiatric Illness Neg Hx       Social History:     Social History     Socioeconomic History    Marital status: /Civil Union     Spouse name: None    Number of children: 2    Years of education: None    Highest education level: Some college, no degree   Occupational History    Occupation: Disabled - Previous      Comment: s/p Brain Surgery / H/O Epilepsy   Tobacco Use    Smoking status: Never Smoker    Smokeless tobacco: Never Used   Vaping Use    Vaping Use: Never used   Substance and Sexual Activity    Alcohol use: No     Comment: doesnt drink   Drug use: Never    Sexual activity: Not Currently     Partners: Male     Birth control/protection: Post-menopausal, Female Sterilization     Comment: Tubal Ligation   Other Topics Concern    None   Social History Narrative        Lives with     2 Children - 2 Sons    Disabled s/p Brain Surgery / H/O Epilepsy - Previous      Social Determinants of Health     Financial Resource Strain: Low Risk     Difficulty of Paying Living Expenses: Not very hard   Food Insecurity: Not on file   Transportation Needs: No Transportation Needs    Lack of Transportation (Medical): No    Lack of Transportation (Non-Medical):  No   Physical Activity: Not on file   Stress: Not on file   Social Connections: Not on file   Intimate Partner Violence: Not on file   Housing Stability: Not on file      Medications and Allergies:     Current Outpatient Medications   Medication Sig Dispense Refill    busPIRone (BUSPAR) 15 mg tablet Take 1 tablet (15 mg total) by mouth 3 (three) times a day for 15 days (Patient taking differently: Take 15 mg by mouth 3 (three) times a day Rx per Psych) 45 tablet 0    cholecalciferol (VITAMIN D3) 1,000 units tablet Take 1 tablet (1,000 Units total) by mouth daily 30 tablet 0    citalopram (CeleXA) 40 mg tablet Take 40 mg by mouth daily Rx per Psych      Zoster Vac Recomb Adjuvanted (Shingrix) 50 MCG/0 5ML SUSR Inject 0 5 mL into a muscle once for 1 dose Due for #2 = repeat dose in 2 to 6 months 1 each 0     No current facility-administered medications for this visit  No Known Allergies   Immunizations:     Immunization History   Administered Date(s) Administered    COVID-19 PFIZER VACCINE 0 3 ML IM 03/26/2021, 04/17/2021, 12/11/2021    Influenza, recombinant, quadrivalent,injectable, preservative free 01/19/2021, 02/15/2022    Zoster Vaccine Recombinant 05/24/2022      Health Maintenance:         Topic Date Due    Cervical Cancer Screening  Never done    Colorectal Cancer Screening  Never done    Breast Cancer Screening: Mammogram  05/01/2023    HIV Screening  Completed    Hepatitis C Screening  Completed         Topic Date Due    Influenza Vaccine (1) 09/01/2022      Medicare Screening Tests and Risk Assessments:     Thalia Vance is here for her Welcome to Medicare visit  Last Medicare Wellness visit information reviewed, patient interviewed and updates made to the record today  Health Risk Assessment:   Patient rates overall health as good  Patient feels that their physical health rating is much better  Patient is satisfied with their life  Eyesight was rated as same  Hearing was rated as same  Patient feels that their emotional and mental health rating is slightly better  Patients states they are never, rarely angry  Patient states they are never, rarely unusually tired/fatigued  Pain experienced in the last 7 days has been none  Patient states that she has experienced no weight loss or gain in last 6 months  Depression Screening:   PHQ-2 Score: 0  PHQ-9 Score: 0      Fall Risk Screening: In the past year, patient has experienced: no history of falling in past year      Urinary Incontinence Screening:   Patient has not leaked urine accidently in the last six months  Home Safety:  Patient does not have trouble with stairs inside or outside of their home  Patient has working smoke alarms and has working carbon monoxide detector  Home safety hazards include: none  Nutrition:   Current diet is Regular  Medications:   Patient is currently taking over-the-counter supplements  OTC medications include: see medication list  Patient is able to manage medications  Activities of Daily Living (ADLs)/Instrumental Activities of Daily Living (IADLs):   Walk and transfer into and out of bed and chair?: Yes  Dress and groom yourself?: Yes    Bathe or shower yourself?: Yes    Feed yourself? Yes  Do your laundry/housekeeping?: Yes  Manage your money, pay your bills and track your expenses?: Yes  Make your own meals?: Yes    Do your own shopping?: Yes    Previous Hospitalizations:   Any hospitalizations or ED visits within the last 12 months?: No      Advance Care Planning:   Living will: No    Durable POA for healthcare: No    Advanced directive counseling given: Yes    Five wishes given: Yes    Patient declined ACP directive: No      Comments: Living Will, POLST, and Five Wishes given today        Cognitive Screening:   Provider or family/friend/caregiver concerned regarding cognition?: No    PREVENTIVE SCREENINGS      Cardiovascular Screening:    General: Screening Not Indicated and History Lipid Disorder      Diabetes Screening:     General: Screening Current      Colorectal Cancer Screening:     General: Risks and Benefits Discussed    Due for: Cologuard      Breast Cancer Screening:     General: Screening Current      Cervical Cancer Screening:    General: Risks and Benefits Discussed    Due for: Cervical Pap Smear      Abdominal Aortic Aneurysm (AAA) Screening:        General: Screening Not Indicated      Lung Cancer Screening:     General: Screening Not Indicated      Hepatitis C Screening:    General: Screening Current    Screening, Brief Intervention, and Referral to Treatment (SBIRT)    Screening  Typical number of drinks in a day: 0  Typical number of drinks in a week: 0  Interpretation: Low risk drinking behavior  Single Item Drug Screening:  How often have you used an illegal drug (including marijuana) or a prescription medication for non-medical reasons in the past year? never    Single Item Drug Screen Score: 0  Interpretation: Negative screen for possible drug use disorder    Other Counseling Topics:   Calcium and vitamin D intake and regular weightbearing exercise  Visual Acuity Screening    Right eye Left eye Both eyes   Without correction: 20/50 20/40 20/40   With correction:      Comments: patient wear corrective lense        Physical Exam:     /80   Pulse 61   Temp 97 7 °F (36 5 °C)   Ht 5' 0 25" (1 53 m)   Wt 87 2 kg (192 lb 3 2 oz)   LMP 01/01/2011   SpO2 99%   BMI 37 23 kg/m²     Physical Exam     Vitals and nursing note reviewed  Constitutional:       Appearance: Normal appearance  She is well-developed  She is obese  HENT:      Head: Normocephalic and atraumatic  Eyes:      Conjunctiva/sclera: Conjunctivae normal    Neck:      Thyroid: No thyromegaly  Cardiovascular:      Rate and Rhythm: Normal rate and regular rhythm  Pulses: Normal pulses  Heart sounds: Normal heart sounds  Pulmonary:      Effort: Pulmonary effort is normal       Breath sounds: Normal breath sounds  Musculoskeletal:         General: No swelling or tenderness  Cervical back: Neck supple  Right lower leg: No edema  Left lower leg: No edema  Neurological:      General: No focal deficit present  Mental Status: She is alert and oriented to person, place, and time  Psychiatric:         Mood and Affect: Mood normal          Behavior: Behavior normal          Thought Content:  Thought content normal          Judgment: Judgment normal      Brooklyn Cardona DO

## 2022-08-19 NOTE — PROGRESS NOTES
08/19/22 0582   Financial Resource Strain   How hard is it for you to pay for the very basics like food, housing, medical care, and heating? Not very   Transportation Needs   In the past 12 months, has lack of transportation kept you from medical appointments or from getting medications? no   In the past 12 months, has lack of transportation kept you from meetings, work, or from getting things needed for daily living?  No

## 2022-10-18 ENCOUNTER — OFFICE VISIT (OUTPATIENT)
Dept: URGENT CARE | Facility: CLINIC | Age: 62
End: 2022-10-18
Payer: MEDICARE

## 2022-10-18 VITALS
RESPIRATION RATE: 16 BRPM | TEMPERATURE: 97.4 F | HEART RATE: 69 BPM | DIASTOLIC BLOOD PRESSURE: 65 MMHG | HEIGHT: 60 IN | BODY MASS INDEX: 35.34 KG/M2 | OXYGEN SATURATION: 98 % | WEIGHT: 180 LBS | SYSTOLIC BLOOD PRESSURE: 145 MMHG

## 2022-10-18 DIAGNOSIS — S93.401A MODERATE ANKLE SPRAIN, RIGHT, INITIAL ENCOUNTER: Primary | ICD-10-CM

## 2022-10-18 PROCEDURE — 99214 OFFICE O/P EST MOD 30 MIN: CPT | Performed by: FAMILY MEDICINE

## 2022-10-18 PROCEDURE — G0463 HOSPITAL OUTPT CLINIC VISIT: HCPCS | Performed by: FAMILY MEDICINE

## 2022-10-18 NOTE — PATIENT INSTRUCTIONS
Ankle Sprain   AMBULATORY CARE:   An ankle sprain  happens when 1 or more ligaments in your ankle joint stretch or tear  Ligaments are tough tissues that connect bones  Ligaments support your joints and keep your bones in place  Common symptoms include the following:   Trouble moving your ankle or foot    Pain when you touch or put weight on your ankle    Bruised, swollen, or misshapen ankle    Seek care immediately if:   You have severe pain in your ankle  Your foot or toes are cold or numb  Your ankle becomes more weak or unstable (wobbly)  You are unable to put any weight on your ankle or foot  Your swelling has increased or returned  Call your doctor if:   Your pain does not go away, even after treatment  You have questions or concerns about your condition or care  Treatment:   Medicines:      NSAIDs , such as ibuprofen, help decrease swelling, pain, and fever  This medicine is available with or without a doctor's order  NSAIDs can cause stomach bleeding or kidney problems in certain people  If you take blood thinner medicine, always ask your healthcare provider if NSAIDs are safe for you  Always read the medicine label and follow directions  Acetaminophen  decreases pain and fever  It is available without a doctor's order  Ask how much to take and how often to take it  Follow directions  Read the labels of all other medicines you are using to see if they also contain acetaminophen, or ask your doctor or pharmacist  Acetaminophen can cause liver damage if not taken correctly  Do not use more than 4 grams (4,000 milligrams) total of acetaminophen in one day  Prescription pain medicine  may be given  Ask your healthcare provider how to take this medicine safely  Some prescription pain medicines contain acetaminophen  Do not take other medicines that contain acetaminophen without talking to your healthcare provider  Too much acetaminophen may cause liver damage   Prescription pain medicine may cause constipation  Ask your healthcare provider how to prevent or treat constipation  Surgery  may be needed to repair or replace a torn ligament if your sprain does not heal with other treatments  Your healthcare provider may use screws to attach the bones in your ankle together  The screws may help support your ankle and make it stable  Ask your healthcare provider for more information about surgery to treat your ankle sprain  Self-care:   Use support devices,  such as a brace, cast, or splint, to limit your movement and protect your joint  You may need to use crutches to decrease your pain as you move around  Go to physical therapy as directed  A physical therapist teaches you exercises to help improve movement and strength, and to decrease pain  Rest  your ankle so that it can heal  Return to normal activities as directed  Apply ice  on your ankle for 15 to 20 minutes every hour or as directed  Use an ice pack, or put crushed ice in a plastic bag  Cover it with a towel  Ice helps prevent tissue damage and decreases swelling and pain  Compress  your ankle  Ask if you should wrap an elastic bandage around your injured ligament  An elastic bandage provides support and helps decrease swelling and movement so your joint can heal  Wear as long as directed  Elevate  your ankle above the level of your heart as often as you can  This will help decrease swelling and pain  Prop your ankle on pillows or blankets to keep it elevated comfortably  Prevent another ankle sprain:   Let your ankle heal   Find out how long your ligament needs to heal  Do not do any physical activity until your healthcare provider says it is okay  If you start activity too soon, you may develop a more serious injury  Always warm up and stretch  before you exercise or play sports  Use the right equipment  Always wear shoes that fit well and are made for the activity that you are doing   You may also need ankle supports, elbow and knee pads, or braces  Follow up with your doctor as directed:  Write down your questions so you remember to ask them during your visits  © Copyright [employer] 2022 Information is for End User's use only and may not be sold, redistributed or otherwise used for commercial purposes  All illustrations and images included in CareNotes® are the copyrighted property of A D A M , Inc  or [facility] Kieran Mayers   The above information is an  only  It is not intended as medical advice for individual conditions or treatments  Talk to your doctor, nurse or pharmacist before following any medical regimen to see if it is safe and effective for you

## 2022-10-18 NOTE — PROGRESS NOTES
[facility]        NAME: Eliana Qiu is a 57 y o  female  : 1960    MRN: [id number]  DATE: 2022  TIME: 2:17 PM    Assessment and Plan   Moderate ankle sprain, right, initial encounter [S93 401A]  1  Moderate ankle sprain, right, initial encounter           Patient Instructions     Right ankle sprain  CAM boot and crutches provided  Negative xrays of the right ankle  Follow up with PCP in 3-5 days if no improvement  Proceed to  ER if symptoms worsen  Chief Complaint     Chief Complaint   Patient presents with   • Foot Pain     Pt  C/o right foot/ ankle pain x yesterday  No known injury  History of Present Illness       Ankle Pain   The incident occurred 2 days ago  The incident occurred at home  There was no injury mechanism  The pain is present in the right ankle  The pain is moderate  The pain has been fluctuating since onset  Associated symptoms include an inability to bear weight  Pertinent negatives include no numbness or tingling  She reports no foreign bodies present  The symptoms are aggravated by movement, palpation and weight bearing  She has tried ice and acetaminophen for the symptoms  The treatment provided mild relief  Review of Systems   Review of Systems   Constitutional: Negative for chills, fatigue and fever  HENT: Negative for postnasal drip and sore throat  Respiratory: Negative for cough and shortness of breath  Cardiovascular: Negative for chest pain and palpitations  Gastrointestinal: Negative for abdominal pain, nausea and vomiting  Genitourinary: Negative for dysuria  Musculoskeletal: Positive for gait problem and joint swelling  Skin: Negative for rash  Neurological: Negative for dizziness, tingling, syncope, light-headedness, numbness and headaches  Psychiatric/Behavioral: Negative for agitation and confusion  All other systems reviewed and are negative          Current Medications       Current Outpatient Medications:   •  cholecalciferol (VITAMIN D3) 1,000 units tablet, Take 1 tablet (1,000 Units total) by mouth daily, Disp: 30 tablet, Rfl: 0  •  citalopram (CeleXA) 40 mg tablet, Take 40 mg by mouth daily Rx per Psych, Disp: , Rfl:   •  busPIRone (BUSPAR) 15 mg tablet, Take 1 tablet (15 mg total) by mouth 3 (three) times a day for 15 days (Patient taking differently: Take 15 mg by mouth 3 (three) times a day Rx per Psych), Disp: 45 tablet, Rfl: 0    Current Allergies     Allergies as of 10/18/2022   • (No Known Allergies)            The following portions of the patient's history were reviewed and updated as appropriate: allergies, current medications, past family history, past medical history, past social history, past surgical history and problem list      Past Medical History:   Diagnosis Date   • Anxiety    • Class 2 severe obesity with serious comorbidity and body mass index (BMI) of 37 0 to 37 9 in [facility])    • Concussion     Last assessed 2017   • Depression    • History of ischemic stroke without residual deficits 2013    s/p Brain Surgery, s/p Meds and Rehab   • 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, 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 , s/p corrective Brain Surgery at [place] [address]   • IFG (impaired fasting glucose)    • Vitamin D deficiency        Past Surgical History:   Procedure Laterality Date   • BRAIN SURGERY      R Temporal Resection / Amyglahippocampectomy   •  SECTION      x 2   • TUBAL LIGATION         Family History   Problem Relation Age of Onset   • Aortic aneurysm Mother 79        Abdominal Aortic Aneurysm, +Smoker   • No Known Problems Father    • No Known Problems Brother    • No Known Problems Son    • Cancer Maternal Grandmother         79   • No Known Problems Half-Sister    • No Known Problems Half-Sister    • No Known Problems Half-Sister    • No Known Problems Half-Sister    • Psychiatric Illness Neg Hx          Medications have been verified          Objective   /65 Pulse 69   Temp (!) 97 4 °F (36 3 °C)   Resp 16   Ht 5' 0 25" (1 53 m)   Wt 81 6 kg (180 lb) Comment: pt reported  LMP 01/01/2011   SpO2 98%   BMI 34 86 kg/m²   Patient's last menstrual period was 01/01/2011  Physical Exam     Physical Exam  Vitals reviewed  Constitutional:       General: She is not in acute distress  Appearance: Normal appearance  She is not ill-appearing  HENT:      Head: Normocephalic and atraumatic  Eyes:      Extraocular Movements: Extraocular movements intact  Conjunctiva/sclera: Conjunctivae normal    Musculoskeletal:      Cervical back: Normal range of motion  Right ankle: Swelling present  No ecchymosis  Tenderness present over the ATF ligament  No lateral malleolus, medial malleolus or base of 5th metatarsal tenderness  Decreased range of motion  Anterior drawer test negative  Skin:     General: Skin is warm  Neurological:      General: No focal deficit present  Mental Status: She is alert     Psychiatric:         Mood and Affect: Mood normal          Behavior: Behavior normal          Judgment: Judgment normal      XR right foot: negative for bony abnormalities

## 2022-10-19 ENCOUNTER — OFFICE VISIT (OUTPATIENT)
Dept: OBGYN CLINIC | Facility: CLINIC | Age: 62
End: 2022-10-19
Payer: MEDICARE

## 2022-10-19 VITALS — DIASTOLIC BLOOD PRESSURE: 82 MMHG | BODY MASS INDEX: 35.15 KG/M2 | SYSTOLIC BLOOD PRESSURE: 138 MMHG | HEIGHT: 60 IN

## 2022-10-19 DIAGNOSIS — Z11.51 SCREENING FOR HPV (HUMAN PAPILLOMAVIRUS): ICD-10-CM

## 2022-10-19 DIAGNOSIS — Z12.4 SCREENING FOR CERVICAL CANCER: ICD-10-CM

## 2022-10-19 DIAGNOSIS — Z12.4 ENCOUNTER FOR SCREENING FOR MALIGNANT NEOPLASM OF CERVIX: Primary | ICD-10-CM

## 2022-10-19 DIAGNOSIS — Z01.419 WELL WOMAN EXAM WITH ROUTINE GYNECOLOGICAL EXAM: ICD-10-CM

## 2022-10-19 PROCEDURE — G0476 HPV COMBO ASSAY CA SCREEN: HCPCS | Performed by: OBSTETRICS & GYNECOLOGY

## 2022-10-19 PROCEDURE — G0101 CA SCREEN;PELVIC/BREAST EXAM: HCPCS | Performed by: OBSTETRICS & GYNECOLOGY

## 2022-10-19 PROCEDURE — G0145 SCR C/V CYTO,THINLAYER,RESCR: HCPCS | Performed by: OBSTETRICS & GYNECOLOGY

## 2022-10-19 NOTE — PROGRESS NOTES
ASSESSMENT & PLAN: Sindi Hewitt is a 57 y o  [id number] with normal gynecologic exam     1   Routine well woman exam done today  2    Pap and HPV:Pap with HPV was done today  Current ASCCP Guidelines reviewed  Last Pap  [unfilled] :  no abnormalities  3  Mammogram ordered  Recommend yearly mammography  4   Menopause- reviewed what to expect  5  The patient is not sexually active  6  The following were reviewed in today's visit: breast self exam and menopause  7  Patient to return to office in 12 months for WWE  All questions have been answered to her satisfaction  CC:  Annual Gynecologic Examination    HPI: Sindi Hewitt is a 57 y o  [id number] who presents for annual gynecologic examination  She has the following concerns:  None  Last gyn exam was 10-12 years ago  She has 2 adult children and 2 grandkids  Denies any known hx of cervical dysplasia  Denies any PMB, vaginal discharge, or pelvic pain  Health Maintenance:    She wears her seatbelt routinely  She does not perform regular monthly self breast exams  She feels safe at home  Past Medical History:   Diagnosis Date   • Anxiety    • Class 2 severe obesity with serious comorbidity and body mass index (BMI) of 37 0 to 37 9 in [facility])    • Concussion     Last assessed 2017   • Depression    • History of ischemic stroke without residual deficits 2013    s/p Brain Surgery, s/p Meds and Rehab   • 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, 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 , s/p corrective Brain Surgery at [place] [id number]   • IFG (impaired fasting glucose)    • Vitamin D deficiency        Past Surgical History:   Procedure Laterality Date   • BRAIN SURGERY      R Temporal Resection / Amyglahippocampectomy   •  SECTION      x 2   • TUBAL LIGATION         Past OB/Gyn History:   Patient's last menstrual period was 2011    Menstrual History:  OB History        2    Para   2    Term   2       0    AB Living   2       SAB        IAB        Ectopic        Multiple        Live Births   2                Patient's last menstrual period was 01/01/2011  History of sexually transmitted infection No  Patient is not currently sexually active  heterosexual Birth control: none  Last Pap  [unfilled] :  no abnormalities  Family History   Problem Relation Age of Onset   • Aortic aneurysm Mother 79        Abdominal Aortic Aneurysm, +Smoker   • No Known Problems Father    • No Known Problems Brother    • No Known Problems Son    • Cancer Maternal Grandmother         79   • No Known Problems Half-Sister    • No Known Problems Half-Sister    • No Known Problems Half-Sister    • No Known Problems Half-Sister    • Psychiatric Illness Neg Hx        Social History:  Social History     Socioeconomic History   • Marital status: /Civil Union     Spouse name: Not on file   • Number of children: 2   • Years of education: Not on file   • Highest education level: Some college, no degree   Occupational History   • Occupation: Disabled - Previous      Comment: s/p Brain Surgery / H/O Epilepsy   Tobacco Use   • Smoking status: Never Smoker   • Smokeless tobacco: Never Used   Vaping Use   • Vaping Use: Never used   Substance and Sexual Activity   • Alcohol use: No     Comment: doesnt drink  • Drug use: Never   • Sexual activity: Not Currently     Partners: Male     Birth control/protection: Post-menopausal, Female Sterilization     Comment: Tubal Ligation   Other Topics Concern   • Not on file   Social History Narrative        Lives with     2 Children - 2 Sons    Disabled s/p Brain Surgery / H/O Epilepsy - Previous      Social Determinants of Health     Financial Resource Strain: Low Risk    • Difficulty of Paying Living Expenses: Not very hard   Food Insecurity: Not on file   Transportation Needs: No Transportation Needs   • Lack of Transportation (Medical):  No   • Lack of Transportation (Non-Medical): No   Physical Activity: Not on file   Stress: Not on file   Social Connections: Not on file   Intimate Partner Violence: Not on file   Housing Stability: Not on file     Presently lives with spouse  Patient is   Patient is currently retired   No Known Allergies    Current Outpatient Medications:   •  busPIRone (BUSPAR) 15 mg tablet, Take 1 tablet (15 mg total) by mouth 3 (three) times a day for 15 days (Patient taking differently: Take 15 mg by mouth 3 (three) times a day Rx per Psych), Disp: 45 tablet, Rfl: 0  •  cholecalciferol (VITAMIN D3) 1,000 units tablet, Take 1 tablet (1,000 Units total) by mouth daily, Disp: 30 tablet, Rfl: 0  •  citalopram (CeleXA) 40 mg tablet, Take 40 mg by mouth daily Rx per Psych, Disp: , Rfl:     Review of Systems:  Review of Systems   Constitutional: Negative for activity change, chills, fever and unexpected weight change  HENT: Negative for congestion, ear pain, hearing loss and sore throat  Respiratory: Negative for cough, chest tightness and shortness of breath  Cardiovascular: Negative for chest pain and leg swelling  Gastrointestinal: Negative for abdominal pain, constipation, diarrhea, nausea and vomiting  Endocrine: Negative for polydipsia, polyphagia and polyuria  Genitourinary: Negative for difficulty urinating, dysuria, frequency, menstrual problem, pelvic pain, vaginal discharge and vaginal pain  Musculoskeletal: Positive for joint swelling  Right foot / lower leg in a boot    Skin: Negative for color change and rash  Neurological: Negative for dizziness, numbness and headaches  Psychiatric/Behavioral: Negative for agitation and confusion  Physical Exam:  /82 (BP Location: Right arm, Patient Position: Sitting, Cuff Size: Standard)   Ht 5' (1 524 m)   LMP 01/01/2011   BMI 35 15 kg/m²    Physical Exam  Constitutional:       General: She is not in acute distress  Appearance: Normal appearance  She is obese  Genitourinary:      Vulva, bladder, rectum and urethral meatus normal       No lesions in the vagina  Right Labia: No rash, tenderness or lesions  Left Labia: No tenderness, lesions or rash  No labial fusion noted  No vaginal discharge or tenderness  No vaginal prolapse present  Mild vaginal atrophy present  Right Adnexa: not tender, not full and no mass present  Left Adnexa: not tender, not full and no mass present  No cervical motion tenderness or friability  Uterus is not enlarged or prolapsed  Breasts:      Breasts are soft  Right: No inverted nipple, mass, nipple discharge or skin change  Left: No inverted nipple, mass, nipple discharge or skin change  HENT:      Head: Normocephalic and atraumatic  Nose: Nose normal    Eyes:      Conjunctiva/sclera: Conjunctivae normal       Pupils: Pupils are equal, round, and reactive to light  Cardiovascular:      Rate and Rhythm: Normal rate and regular rhythm  Pulses: Normal pulses  Heart sounds: Normal heart sounds  Pulmonary:      Effort: Pulmonary effort is normal  No respiratory distress  Breath sounds: Normal breath sounds  No wheezing  Abdominal:      General: Abdomen is flat  There is no distension  Palpations: Abdomen is soft  Tenderness: There is no abdominal tenderness  There is no guarding  Musculoskeletal:         General: Normal range of motion  Cervical back: Normal range of motion and neck supple  Neurological:      General: No focal deficit present  Mental Status: She is alert and oriented to person, place, and time  Mental status is at baseline  Skin:     General: Skin is warm and dry  Psychiatric:         Mood and Affect: Mood normal          Behavior: Behavior normal          Thought Content: Thought content normal          Judgment: Judgment normal    Vitals and nursing note reviewed

## 2022-10-21 LAB — COLOGUARD RESULT REPORTABLE: NEGATIVE

## 2022-10-22 LAB
HPV HR 12 DNA CVX QL NAA+PROBE: NEGATIVE
HPV16 DNA CVX QL NAA+PROBE: NEGATIVE
HPV18 DNA CVX QL NAA+PROBE: NEGATIVE

## 2022-10-26 LAB
LAB AP GYN PRIMARY INTERPRETATION: NORMAL
Lab: NORMAL

## 2022-12-29 ENCOUNTER — TELEPHONE (OUTPATIENT)
Dept: PSYCHIATRY | Facility: CLINIC | Age: 62
End: 2022-12-29

## 2022-12-29 NOTE — TELEPHONE ENCOUNTER
Zuly Coronel called looking to schedule a new patient appt  with Dr Kiki Crespo  I informed Zuly Coronel that Dr Kiki Crespo is not taking new pt at the moment  I did explain I can put her on the wait list and she is willing to go to [facility]

## 2023-01-03 ENCOUNTER — TELEPHONE (OUTPATIENT)
Dept: OTHER | Facility: OTHER | Age: 63
End: 2023-01-03

## 2023-01-03 DIAGNOSIS — Z23 NEED FOR VACCINATION: Primary | ICD-10-CM

## 2023-01-03 RX ORDER — ZOSTER VACCINE RECOMBINANT, ADJUVANTED 50 MCG/0.5
0.5 KIT INTRAMUSCULAR ONCE
Qty: 1 EACH | Refills: 1 | Status: SHIPPED | OUTPATIENT
Start: 2023-01-03 | End: 2023-01-03

## 2023-01-03 NOTE — TELEPHONE ENCOUNTER
Patient calling to see if she needs to schedule a 6 month f/u for this month  (Appt is already scheduled) Gave patient the appt information

## 2023-01-03 NOTE — TELEPHONE ENCOUNTER
Patient is requesting a prescription for a shingles vaccine at this time  If someone can call her back in regards to this

## 2023-01-03 NOTE — TELEPHONE ENCOUNTER
Los Snow MA  You 2 minutes ago (1:06 PM)     [address] for [place], last one   Shingrix eRX sent to retail pharmacy on file  She needs to complete one vaccine to complete the series of 2 Shingrix vaccines

## 2023-01-06 ENCOUNTER — TELEPHONE (OUTPATIENT)
Dept: PSYCHIATRY | Facility: CLINIC | Age: 63
End: 2023-01-06

## 2023-01-06 NOTE — TELEPHONE ENCOUNTER
Pt was added to referral wait list for both talk therapy and med mgmt      No preference on provider and any location but [place]

## 2023-01-27 ENCOUNTER — TELEPHONE (OUTPATIENT)
Dept: PSYCHIATRY | Facility: CLINIC | Age: 63
End: 2023-01-27

## 2023-01-27 NOTE — TELEPHONE ENCOUNTER
Received telephone call from pt requesting NP appt for medication management  Advised her she would need to contact our intake dept, which she did on 1/6/23  Also, advised, if any symptoms she feels she needs to go to emergency department not to hesitate  nformation on walk-in center and also advised her to contact her insurance company for further references

## 2023-02-16 ENCOUNTER — TELEPHONE (OUTPATIENT)
Dept: PSYCHIATRY | Facility: CLINIC | Age: 63
End: 2023-02-16

## 2023-02-20 ENCOUNTER — LAB (OUTPATIENT)
Dept: LAB | Facility: CLINIC | Age: 63
End: 2023-02-20

## 2023-02-20 ENCOUNTER — TELEPHONE (OUTPATIENT)
Dept: PSYCHIATRY | Facility: CLINIC | Age: 63
End: 2023-02-20

## 2023-02-20 ENCOUNTER — OFFICE VISIT (OUTPATIENT)
Dept: FAMILY MEDICINE CLINIC | Facility: CLINIC | Age: 63
End: 2023-02-20

## 2023-02-20 ENCOUNTER — TELEPHONE (OUTPATIENT)
Dept: ADMINISTRATIVE | Facility: OTHER | Age: 63
End: 2023-02-20

## 2023-02-20 VITALS
SYSTOLIC BLOOD PRESSURE: 120 MMHG | RESPIRATION RATE: 18 BRPM | OXYGEN SATURATION: 98 % | WEIGHT: 198.8 LBS | HEIGHT: 60 IN | DIASTOLIC BLOOD PRESSURE: 66 MMHG | TEMPERATURE: 97 F | HEART RATE: 86 BPM | BODY MASS INDEX: 39.03 KG/M2

## 2023-02-20 DIAGNOSIS — F32.9 CURRENT EPISODE OF MAJOR DEPRESSIVE DISORDER WITHOUT PRIOR EPISODE, UNSPECIFIED DEPRESSION EPISODE SEVERITY: ICD-10-CM

## 2023-02-20 DIAGNOSIS — R73.03 PREDIABETES: ICD-10-CM

## 2023-02-20 DIAGNOSIS — G89.29 CHRONIC PAIN OF RIGHT KNEE: ICD-10-CM

## 2023-02-20 DIAGNOSIS — E66.01 CLASS 2 SEVERE OBESITY WITH SERIOUS COMORBIDITY AND BODY MASS INDEX (BMI) OF 37.0 TO 37.9 IN ADULT, UNSPECIFIED OBESITY TYPE (HCC): ICD-10-CM

## 2023-02-20 DIAGNOSIS — M22.2X1 PATELLOFEMORAL PAIN SYNDROME OF RIGHT KNEE: ICD-10-CM

## 2023-02-20 DIAGNOSIS — E66.01 SEVERE OBESITY (BMI 35.0-39.9) WITH COMORBIDITY (HCC): ICD-10-CM

## 2023-02-20 DIAGNOSIS — R73.01 IFG (IMPAIRED FASTING GLUCOSE): Primary | ICD-10-CM

## 2023-02-20 DIAGNOSIS — R73.01 IFG (IMPAIRED FASTING GLUCOSE): ICD-10-CM

## 2023-02-20 DIAGNOSIS — E66.01 CLASS 2 SEVERE OBESITY WITH SERIOUS COMORBIDITY AND BODY MASS INDEX (BMI) OF 38.0 TO 38.9 IN ADULT, UNSPECIFIED OBESITY TYPE (HCC): ICD-10-CM

## 2023-02-20 DIAGNOSIS — M25.561 CHRONIC PAIN OF RIGHT KNEE: ICD-10-CM

## 2023-02-20 DIAGNOSIS — E66.9 OBESITY (BMI 35.0-39.9 WITHOUT COMORBIDITY): ICD-10-CM

## 2023-02-20 DIAGNOSIS — E55.9 VITAMIN D DEFICIENCY: ICD-10-CM

## 2023-02-20 DIAGNOSIS — E55.9 VITAMIN D DEFICIENCY: Primary | ICD-10-CM

## 2023-02-20 DIAGNOSIS — E78.5 HYPERLIPIDEMIA, UNSPECIFIED HYPERLIPIDEMIA TYPE: ICD-10-CM

## 2023-02-20 DIAGNOSIS — Z12.31 ENCOUNTER FOR SCREENING MAMMOGRAM FOR BREAST CANCER: ICD-10-CM

## 2023-02-20 DIAGNOSIS — F41.9 ANXIETY: ICD-10-CM

## 2023-02-20 LAB
25(OH)D3 SERPL-MCNC: 19.3 NG/ML (ref 30–100)
ALBUMIN SERPL BCP-MCNC: 4.1 G/DL (ref 3.5–5)
ALP SERPL-CCNC: 75 U/L (ref 34–104)
ALT SERPL W P-5'-P-CCNC: 11 U/L (ref 7–52)
ANION GAP SERPL CALCULATED.3IONS-SCNC: 5 MMOL/L (ref 4–13)
AST SERPL W P-5'-P-CCNC: 12 U/L (ref 13–39)
BILIRUB SERPL-MCNC: 0.45 MG/DL (ref 0.2–1)
BUN SERPL-MCNC: 16 MG/DL (ref 5–25)
CALCIUM SERPL-MCNC: 9.3 MG/DL (ref 8.4–10.2)
CHLORIDE SERPL-SCNC: 101 MMOL/L (ref 96–108)
CO2 SERPL-SCNC: 30 MMOL/L (ref 21–32)
CREAT SERPL-MCNC: 0.75 MG/DL (ref 0.6–1.3)
GFR SERPL CREATININE-BSD FRML MDRD: 84 ML/MIN/1.73SQ M
GLUCOSE P FAST SERPL-MCNC: 93 MG/DL (ref 65–99)
POTASSIUM SERPL-SCNC: 3.9 MMOL/L (ref 3.5–5.3)
PROT SERPL-MCNC: 7.4 G/DL (ref 6.4–8.4)
SODIUM SERPL-SCNC: 136 MMOL/L (ref 135–147)
TSH SERPL DL<=0.05 MIU/L-ACNC: 2.39 UIU/ML (ref 0.45–4.5)

## 2023-02-20 RX ORDER — ERGOCALCIFEROL 1.25 MG/1
50000 CAPSULE ORAL WEEKLY
Qty: 12 CAPSULE | Refills: 0 | Status: SHIPPED | OUTPATIENT
Start: 2023-02-20 | End: 2023-05-09

## 2023-02-20 NOTE — PATIENT INSTRUCTIONS
Obesity   AMBULATORY CARE:   Obesity  means your body mass index (BMI) is greater than 30  Your healthcare provider will use your age, height, and weight to measure your BMI  The risks of obesity include  many health problems, including injuries or physical disability  • Diabetes (high blood sugar level)    • High blood pressure or high cholesterol    • Heart disease    • Stroke    • Gallbladder or liver disease    • Cancer of the colon, breast, prostate, liver, or kidney    • Sleep apnea    • Arthritis or gout    Screening  is done to check for health conditions before you have signs or symptoms  If you are 28to 79years old, your blood sugar level may be checked every 3 years for signs of prediabetes or diabetes  Your healthcare provider will check your blood pressure at each visit  High blood pressure can lead to a stroke or other problems  Your provider may check for signs of heart disease, cancer, or other health problems  Seek care immediately if:   • You have a severe headache, confusion, or difficulty speaking  • You have weakness on one side of your body  • You have chest pain, sweating, or shortness of breath  Call your doctor if:   • You have symptoms of gallbladder or liver disease, such as pain in your upper abdomen  • You have knee or hip pain and discomfort while walking  • You have symptoms of diabetes, such as intense hunger and thirst, and frequent urination  • You have symptoms of sleep apnea, such as snoring or daytime sleepiness  • You have questions or concerns about your condition or care  Treatment for obesity  focuses on helping you lose weight to improve your health  Even a small decrease in BMI can reduce the risk for many health problems  Your healthcare provider will help you set a weight-loss goal   • Lifestyle changes  are the first step in treating obesity  These include making healthy food choices and getting regular physical activity   Your healthcare provider may suggest a weight-loss program that involves coaching, education, and therapy  • Medicine  may help you lose weight when it is used with a healthy foods and physical activity  • Surgery  can help you lose weight if you have obesity along with other health problems  Several types of weight-loss surgery are available  Ask your healthcare provider for more information  Tips for safe weight loss:   • Set small, realistic goals  An example of a small goal is to walk for 20 minutes 5 days a week  Anther goal is to lose 5% of your body weight  • Ask for support  Tell friends, family members, and coworkers about your goals  Ask someone to lose weight with you  You may also want to join a weight-loss support group  • Identify foods or triggers that may cause you to overeat  Remove tempting high-calorie foods from your home and workplace  Place a bowl of fresh fruit on your kitchen counter  If stress causes you to eat, find other ways to cope with stress  A counselor or therapist may be able to help you  • Track your daily calories and activity  Write down what you eat and drink  Also write down how many minutes of physical activity you do each day  • Track your weekly weight  Weigh yourself in the morning, before you eat or drink anything but after you use the bathroom  Use the same scale, in the same place, and in similar clothing each time  Only weigh yourself 1 to 2 times each week, or as directed  You may become discouraged if you weigh yourself every day  Eating changes: You will need to eat 500 to 1,000 fewer calories each day than you currently eat to lose 1 to 2 pounds a week  The following changes will help you cut calories:  • Eat smaller portions  Use small plates, no larger than 9 inches in diameter  Fill your plate half full of fruits and vegetables  Measure your food using measuring cups until you know what a serving size looks like           • Eat 3 meals and 1 or 2 snacks each day  Plan your meals in advance  Terryl Mcburney and eat at home most of the time  Eat slowly  Do not skip meals  Skipping meals can lead to overeating later in the day  This can make it harder for you to lose weight  Talk with a dietitian to help you make a meal plan and schedule that is right for you  • Eat fruits and vegetables at every meal   They are low in calories and high in fiber, which makes you feel full  Do not add butter, margarine, or cream sauce to vegetables  Use herbs to season steamed vegetables  • Eat less fat and fewer fried foods  Eat more baked or grilled chicken and fish  These protein sources are lower in calories and fat than red meat  Limit fast food  Dress your salads with olive oil and vinegar instead of bottled dressing  • Limit the amount of sugar you eat  Do not drink sugary beverages  Limit alcohol  Activity changes:  Physical activity is good for your body in many ways  It helps you burn calories and build strong muscles  It decreases stress and depression, and improves your mood  It can also help you sleep better  Talk to your healthcare provider before you begin an exercise program   • Exercise for at least 30 minutes 5 days a week  Start slowly  Set aside time each day for physical activity that you enjoy and that is convenient for you  It is best to do both weight training and an activity that increases your heart rate, such as walking, bicycling, or swimming  • Find ways to be more active  Do yard work and housecleaning  Walk up the stairs instead of using elevators  Spend your leisure time going to events that require walking, such as outdoor festivals or fairs  This extra physical activity can help you lose weight and keep it off  Follow up with your doctor as directed: You may need to meet with a dietitian  Write down your questions so you remember to ask them during your visits    © Copyright [employer] 2022 Information is for End User's use only and may not be sold, redistributed or otherwise used for commercial purposes  The above information is an  only  It is not intended as medical advice for individual conditions or treatments  Talk to your doctor, nurse or pharmacist before following any medical regimen to see if it is safe and effective for you

## 2023-02-20 NOTE — RESULT ENCOUNTER NOTE
Resulted labs are stable  Continue plan of care        Message sent to patient via [employer] patient portal

## 2023-02-20 NOTE — TELEPHONE ENCOUNTER
Upon review of the In Basket request we found that once the [facility] pharmacy responded to my call, I was told that patient had vaccine 1-23-23, but that the patient needs to go in and request document to be given to doctors office    Any additional questions or concerns should be emailed to the Practice Liaisons via the appropriate education email address, please do not reply via In Basket      Thank you  Yasmin Trimble MA

## 2023-02-20 NOTE — TELEPHONE ENCOUNTER
----- Message from Newton Gambino DO sent at 2/20/2023  9:09 AM EST -----  Regarding: Shingrix #[facility] [address] [facility] 2023 02/20/23 9:09 AM    Hello, our patient Kayleigh Lake has had Immunization(s) completed/performed  Please assist in updating the patient chart by making an External outreach to  facility located in   The date of service is         Shingrix [facility] [address] [facility] 2023    Thank you,  Jacqueline Pinon DO  [facility]  Tamiko Pearce

## 2023-02-20 NOTE — PROGRESS NOTES
Assessment/Plan:  Problem List Items Addressed This Visit        Endocrine    IFG (impaired fasting glucose) - Primary     Pending labs  Previously, patient declined starting Metformin  Recommend lifestyle modifications  Relevant Orders    CBC and differential    Comprehensive metabolic panel    Hemoglobin A1C       Other    Class 2 severe obesity with serious comorbidity and body mass index (BMI) of 38 0 to 38 9 in adult [facility])     Worsening  Recommend lifestyle modifications  Relevant Orders    CBC and differential    TSH, 3rd generation with Free T4 reflex    Prediabetes     Pending labs  Previously, patient declined starting Metformin  Recommend lifestyle modifications  Relevant Orders    CBC and differential    Comprehensive metabolic panel    Hemoglobin A1C    Vitamin D deficiency     Pending labs  Relevant Orders    Comprehensive metabolic panel    Vitamin D 25 hydroxy    Anxiety     Management per Psych  Relevant Orders    CBC and differential    Comprehensive metabolic panel    TSH, 3rd generation with Free T4 reflex    Current episode of major depressive disorder without prior episode     Management per Psych  Relevant Orders    Comprehensive metabolic panel    TSH, 3rd generation with Free T4 reflex   Other Visit Diagnoses     Obesity (BMI 35 0-39 9 without comorbidity)        Relevant Orders    CBC and differential    TSH, 3rd generation with Free T4 reflex    Severe obesity (BMI 35 0-39  9) with comorbidity ([address] )        Relevant Orders    CBC and differential    TSH, 3rd generation with Free T4 reflex    BMI 38 0-38 9,adult        Relevant Orders    CBC and differential    TSH, 3rd generation with Free T4 reflex    Encounter for screening mammogram for breast cancer        Relevant Orders    Mammo screening bilateral w 3d & cad    Chronic pain of right knee        Relevant Orders    Ambulatory Referral to Physical Therapy    Ambulatory Referral to Sports Medicine    Suspect patellofemoral syndrome  Home exercise program given  Advise Motrin/Tylenol PRN  To PT, then to sports medicine if no improvement  Patellofemoral pain syndrome of right knee        Relevant Orders    Ambulatory Referral to Physical Therapy    Ambulatory Referral to Sports Medicine    See above  Hyperlipidemia, unspecified hyperlipidemia type        Relevant Orders    Lipid panel    LDL cholesterol, direct           Return in about 6 months (around 8/20/2023) for AWV / 6mo - IFG, Vit D Def, Labs  Future Appointments   Date Time Provider [place]   3/20/2023 10:00 AM Keesha Saravia, PhD [facility]   8/23/2023  8:40 AM Lisa Blank DO [facility]   10/25/2023  1:00 PM Becky Rayo MD [facility]        Subjective:     Lashanda Brunson is a 57 y o  female who presents today for a follow-up on her chronic medical conditions  HPI:  Chief Complaint   Patient presents with   • Follow-up     6mo - IFG, Vit D Def  Labs not done  • Knee Pain     R knee pain for the last 2-3 months  Pt would like to know if she can have a referral to PT  Average pain is 8/10  Pt uses Lidocaine patches and ibuprofen for pain management  Worse with weight bearing       -- Above per clinical staff and reviewed  --      HPI      Today:      Return in 6 months (on 2/19/2023) for 6mo - IFG, Vit D Def, Labs  6mo OV    Patient not complete labs 8/19/2022  Right Knee Pain - Symptoms x 8 months  Occurred after carrying a case upstairs  Medial joint line  Sharp upon standing, then dull and achy most of the time  Non-radiating  Denies locking, popping, giving way  Currently 6/10, Max 8/10  Using Ibuprofen 200mg 1-2 pills QHS PRN 2-3 times per week s benefit  Denies H/O trauma          Obesity - Watching diet   No Exercise - Previously Walking dog 20 minutes, 3 days per week    She will inquire about Silver Sneakers with her insurance        IFG - No meds previously  S/p Nutrition consult c DM           Anxiety / Depression - Management per Psych Dr Rocio Denney   Sees q3 months  Last seen for appt 10/22  Due to insurance change, she will be seeing Psych Dr Darrius Lin 3/1/23 and 3/29/23  No counseling - last counseling    On Celexa 40mg QD, Buspar 15mg TID        PHQ-2/9 Depression Screening    Little interest or pleasure in doing things: 0 - not at all  Feeling down, depressed, or hopeless: 0 - not at all  Trouble falling or staying asleep, or sleeping too much: 1 - several days  Feeling tired or having little energy: 0 - not at all  Poor appetite or overeatin - not at all  Feeling bad about yourself - or that you are a failure or have let yourself or your family down: 0 - not at all  Trouble concentrating on things, such as reading the newspaper or watching television: 1 - several days  Moving or speaking so slowly that other people could have noticed  Or the opposite - being so fidgety or restless that you have been moving around a lot more than usual: 0 - not at all  Thoughts that you would be better off dead, or of hurting yourself in some way: 0 - not at all  PHQ-9 Score: 2   PHQ-9 Interpretation: No or Minimal depression             Vitamin D Deficiency - s/p Drisdol   Not taking MVI, but taking OTC Vitmain D 1000IU daily        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         Reviewed:  Labs 22, Gyn 10/19/22  Jitendra Prieto at [facility] yearly  Next appt             The following portions of the patient's history were reviewed and updated as appropriate: allergies, current medications, past family history, past medical history, past social history, past surgical history and problem list       Review of Systems   Constitutional: Negative for appetite change, chills, diaphoresis, fatigue and fever  Respiratory: Negative for chest tightness and shortness of breath  Cardiovascular: Negative for chest pain  Gastrointestinal: Negative for abdominal pain, blood in stool, diarrhea, nausea and vomiting  Genitourinary: Negative for dysuria  Musculoskeletal: Positive for arthralgias and back pain  Current Outpatient Medications   Medication Sig Dispense Refill   • busPIRone (BUSPAR) 15 mg tablet Take 1 tablet (15 mg total) by mouth 3 (three) times a day for 15 days (Patient taking differently: Take 15 mg by mouth 3 (three) times a day Rx per Psych) 45 tablet 0   • cholecalciferol (VITAMIN D3) 1,000 units tablet Take 1 tablet (1,000 Units total) by mouth daily 30 tablet 0   • citalopram (CeleXA) 40 mg tablet Take 40 mg by mouth daily Rx per Psych       No current facility-administered medications for this visit  Objective:  /66   Pulse 86   Temp (!) 97 °F (36 1 °C)   Resp 18   Ht 5' (1 524 m)   Wt 90 2 kg (198 lb 12 8 oz)   LMP 01/01/2011   SpO2 98%   BMI 38 83 kg/m²    Wt Readings from Last 3 Encounters:   02/20/23 90 2 kg (198 lb 12 8 oz)   10/18/22 81 6 kg (180 lb)   08/19/22 87 2 kg (192 lb 3 2 oz)      BP Readings from Last 3 Encounters:   02/20/23 120/66   10/19/22 138/82   10/18/22 145/65          Physical Exam  Vitals and nursing note reviewed  Constitutional:       Appearance: Normal appearance  She is well-developed  She is obese  HENT:      Head: Normocephalic and atraumatic  Eyes:      Conjunctiva/sclera: Conjunctivae normal    Neck:      Thyroid: No thyromegaly  Vascular: No carotid bruit  Cardiovascular:      Rate and Rhythm: Normal rate and regular rhythm  Pulses: Normal pulses  Heart sounds: Normal heart sounds  Pulmonary:      Effort: Pulmonary effort is normal       Breath sounds: Normal breath sounds     Abdominal:      General: Bowel sounds are normal  There is no distension  Palpations: Abdomen is soft  There is no mass  Tenderness: There is no abdominal tenderness  There is no guarding or rebound  Musculoskeletal:         General: Tenderness present  No swelling  Injury: Right medial knee  Cervical back: Neck supple  Right knee: Tenderness present over the medial joint line  Instability Tests: Anterior drawer test negative  Posterior drawer test negative  Anterior Lachman test negative  Medial Kristine test negative and lateral Kristine test negative  Left knee: Normal       Instability Tests: Anterior drawer test negative  Posterior drawer test negative  Anterior Lachman test negative  Medial Kristine test negative and lateral Kristine test negative  Right lower leg: No edema  Left lower leg: No edema  Neurological:      General: No focal deficit present  Mental Status: She is alert and oriented to person, place, and time  Psychiatric:         Mood and Affect: Mood normal          Behavior: Behavior normal          Thought Content: Thought content normal          Judgment: Judgment normal          Lab Results:      Lab Results   Component Value Date    WBC 7 14 07/08/2022    HGB 14 5 07/08/2022    HCT 43 1 07/08/2022     07/08/2022    TRIG 113 07/08/2022    HDL 60 07/08/2022    LDLDIRECT 100 07/08/2022    ALT 11 02/20/2023    AST 12 (L) 02/20/2023    K 3 9 02/20/2023     02/20/2023    CREATININE 0 75 02/20/2023    BUN 16 02/20/2023    CO2 30 02/20/2023    GLUF 93 02/20/2023    HGBA1C 6 2 (H) 07/08/2022     No results found for: URICACID  Invalid input(s): BASENAME Vitamin D    No results found  POCT Labs      BMI Counseling: Body mass index is 38 83 kg/m²  The BMI is above normal  Nutrition recommendations include encouraging healthy choices of fruits and vegetables  Exercise recommendations include exercising 3-5 times per week  No pharmacotherapy was ordered   Rationale for BMI follow-up plan is due to patient being overweight or obese  BMI Counseling: Body mass index is 38 83 kg/m²  The BMI is above normal  Nutrition recommendations include 3-5 servings of fruits/vegetables daily  Exercise recommendations include exercising 3-5 times per week

## 2023-02-20 NOTE — RESULT ENCOUNTER NOTE
Vitamin D Deficiency - Recurrent  Recommend start multivitamin and prescription vitamin D (Drisdol)  When 12 weeks of Drisdol completed, continue multivitamin and start over-the-counter Vitamin D3 1,000-3,000 International Units daily  Check vitamin D level 1 week afterwards completing Drisdol  eRx sent  Nurse to see lab Rx  Message sent to patient via [employer] patient portal     Nurse to also call patient

## 2023-02-22 LAB
EST. AVERAGE GLUCOSE BLD GHB EST-MCNC: 126 MG/DL
HBA1C MFR BLD: 6 %

## 2023-02-22 NOTE — RESULT ENCOUNTER NOTE
Prediabetes - Improved HgA1C  Continue plan of care        Message sent to patient via [employer] patient portal

## 2023-03-20 ENCOUNTER — OFFICE VISIT (OUTPATIENT)
Dept: PSYCHIATRY | Facility: CLINIC | Age: 63
End: 2023-03-20

## 2023-03-20 VITALS
WEIGHT: 193 LBS | DIASTOLIC BLOOD PRESSURE: 75 MMHG | HEIGHT: 60 IN | HEART RATE: 62 BPM | BODY MASS INDEX: 37.89 KG/M2 | SYSTOLIC BLOOD PRESSURE: 132 MMHG

## 2023-03-20 DIAGNOSIS — F41.1 GENERALIZED ANXIETY DISORDER: ICD-10-CM

## 2023-03-20 DIAGNOSIS — F31.62 BIPOLAR DISORDER, CURRENT EPISODE MIXED, MODERATE (HCC): ICD-10-CM

## 2023-03-20 DIAGNOSIS — F41.1 GAD (GENERALIZED ANXIETY DISORDER): ICD-10-CM

## 2023-03-20 DIAGNOSIS — F32.1 CURRENT MODERATE EPISODE OF MAJOR DEPRESSIVE DISORDER WITHOUT PRIOR EPISODE (HCC): Primary | ICD-10-CM

## 2023-03-20 DIAGNOSIS — F33.2 SEVERE EPISODE OF RECURRENT MAJOR DEPRESSIVE DISORDER, WITHOUT PSYCHOTIC FEATURES (HCC): Chronic | ICD-10-CM

## 2023-03-20 RX ORDER — BUSPIRONE HYDROCHLORIDE 15 MG/1
15 TABLET ORAL 3 TIMES DAILY
Qty: 270 TABLET | Refills: 0 | Status: SHIPPED | OUTPATIENT
Start: 2023-03-20 | End: 2023-04-04

## 2023-03-20 RX ORDER — CITALOPRAM 40 MG/1
40 TABLET ORAL DAILY
Qty: 90 TABLET | Refills: 0 | Status: SHIPPED | OUTPATIENT
Start: 2023-03-20

## 2023-03-20 RX ORDER — LAMOTRIGINE 25 MG/1
TABLET ORAL
Qty: 42 TABLET | Refills: 0 | Status: SHIPPED | OUTPATIENT
Start: 2023-03-20

## 2023-03-20 NOTE — BH TREATMENT PLAN
TREATMENT PLAN (Medication Management Only)        [facility]    Name and Date of Birth:  Jason Cramer 57 y o  1960  Date of Treatment Plan: March 20, 2023  Diagnosis/Diagnoses:    1  Current moderate episode of major depressive disorder without prior episode ([facility] [address] )    2  SWAPNA (generalized anxiety disorder)      Strengths/Personal Resources for Self-Care: supportive family, taking medications as prescribed, ability to communicate needs  Area/Areas of need (in own words): mood instability  1  Long Term Goal: improve mood stability  Target Date:6 months - 9/20/2023  Person/Persons responsible for completion of goal: Lu Ceja, provider,   2  Short Term Objective (s) - How will we reach this goal?:   A  Provider new recommended medication/dosage changes and/or continue medication(s): continue current medications as prescribed  B  structure  C  sleeping adequately, diet nutrition, self care     Target Date:6 months - 9/20/2023  Person/Persons Responsible for Completion of Goal: Martha provider,   Progress Towards Goals: initiating treatment  Treatment Modality: medication management every 1-3  months  Review due 180 days from date of this plan: 6 months - 9/20/2023  Expected length of service: maintenance  My Physician/PA/NP and I have developed this plan together and I agree to work on the goals and objectives  I understand the treatment goals that were developed for my treatment

## 2023-03-20 NOTE — PSYCH
Psychiatric Evaluation     Identification Data:Martha Serna 57 y o  female MRN: [id number]  Unit/Bed#:  Encounter: [id number]      Chief Complaint: Periodic episodes of cecy as reported from patient and   Status post temporal lobe resection in 2013 for uncontrollable seizures  History of Present Illness   Patient is a 57 y o  female    Primary complaints include: mood swings, cecy approximately twice a year in spring and winter, and difficulty sleeping  Onset of symptoms was abrupt starting 10 years ago with unchanged course since that time  Psychosocial Stressors: taniaies  Earle Martinez and her  attended the session, her  reported her history: 1980 she had her first seizure, seizures did not respond to medication and she suffered with them for 20 years  In 2013 she had a temporal lobe resection and after the brain surgery, she had a CVA  She recovered from these conditions and began with mood changes   reports multiple hospitalizations for mental health issues, primarily cecy and suicidal ideas  They deny any suicidal attempts in the past   Her cecy begins by lack of sleep, irritability and increased activity  When she is depressed she sleeps "a lot"  In late March 2021, she had a manic episode, went to visit her son in [place] thinking that this would help her mood  It did not and she returned back home  By the summer 2021 she was better   retired from his job in May 2022 and has Parkinson's  They report Earle Martinez has depression in the fall after the holidays and then more cecy in January, February and beginning of March 2023  Her mood then normalized  Earle Martinez tried to attend college in the past to become an ; however, her medical conditions have not made that possible; she is on disability  They report a good marriage, and have 2 adult children  One son lives in [place] and the other lives in [place]    They have several grandkids from the son in [place]; however, due to [place] they have not seen them too often  Iggy Avila reports her moods are stable at the present time, she was pleasant at this session and denied depression or anxiety  She reports her appetite is good and she is sleeping  They could not remember all the medication that was tried in the past   She was under the care of Dr Debra Adam from 2016 to the present time  PHQ-9 score of 2 indicates no or minimal depression denies suicidal thoughts or self-harm urges  MDQ score of 1 is negative for possible bipolar disorder; however,  and Iggy Avila report approximately every 3 months manic episodes  Psychiatric Review Of Systems:  sleep: Presently she is sleeping well  appetite changes: no  weight changes: no  energy/anergy: Has adequate energy  interest/pleasure/anhedonia: Most days she has interest and pleasure  somatic symptoms: no  anxiety/panic: yes  cecy: History of manic episodes occurring approximately twice a year  guilty/hopeless: no  self injurious behavior/risky behavior: no    Historical Information     Past Psychiatric History:   Therapy, Out Patient yes  Currently in treatment with Ni Verduzco  Past Suicide attempts: Denies  Past Violent behavior: Denies  Past Psychiatric medication trial: Reports came remember all medications she has tried presently she is on Celexa and buspirone    Substance Abuse History:  Social History     Tobacco History     Smoking Status  Never    Smokeless Tobacco Use  Never          Alcohol History     Alcohol Use Status  No Comment  doesnt drink  Drug Use     Drug Use Status  Never          Sexual Activity     Sexually Active  Not Currently Partners  Male Birth Control/Protection  Post-menopausal, Female Sterilization Comment  Tubal Ligation          Activities of Daily Living    Not Asked                 Family Psychiatric History:    They deny family history of mental illness    Social History:  Education: some college  Learning Disabilities: from CVA and brain surgery  Marital history:   Living arrangement, social support: Support systems: Family  Occupational History: Disability  Functioning Relationships: good support system  Other Pertinent History: Brain surgery      Traumatic History:   Abuse: Denies  Other Traumatic Events: Denies    Past Medical History:   Diagnosis Date   • Anxiety    • Class 2 severe obesity with serious comorbidity and body mass index (BMI) of 37 0 to 37 9 in adult [facility])    • Concussion     Last assessed 9/21/2017   • Depression    • History of ischemic stroke without residual deficits 2013    s/p Brain Surgery, s/p Meds and Rehab   • 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, 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 2013, s/p corrective Brain Surgery at [place] [id number]   • IFG (impaired fasting glucose)    • Vitamin D deficiency        Medical Review Of Systems:  Pertinent items are noted in HPI  Meds/Allergies     No Known Allergies    Objective   Vital signs in last 24 hours:  [unfilled]    [unfilled]    Mental Status Evaluation:    Appearance:  age appropriate, casually dressed and overweight   Behavior:  normal   Speech:  normal pitch, normal volume and sparce   Mood:  euthymic   Affect:  mood-congruent   Language: naming objects   Thought Process:  normal   Thought Content:  normal   Perceptual Disturbances: None   Risk Potential: Suicidal Ideations none   Sensorium:  person and place   Cognition:  recent and remote memory grossly intact   Consciousness:  alert    Attention: attention span and concentration were age appropriate   Intellect: within normal limits   Fund of Knowledge: awareness of current events: and issues   Insight:  age appropriate   Judgment: age appropriate   Muscle Strength and Tone: denies problems   Gait/Station: normal gait/station   Motor Activity: no abnormal movements         Assessment/Plan   Active Problems:    * No active hospital problems   *    Plan:   Risks, benefits and possible side effects of Medications: Lamictal 25 mg titrate up every 2 weeks by 25 mg  Return on the fourth week  Continue Celexa and Buspar  Counseling / Coordination of Care  Total floor / unit time spent today 75 min  Greater than 50% of total time was spent with the patient and / or family counseling and / or coordination of care  A description of the counseling / coordination of care: Structured interview, review of history, lab results were reviewed, supportive therapy, medication education  Treatment plan developed

## 2023-03-21 NOTE — PATIENT INSTRUCTIONS
Continue medications  Call with problems or concerns   Lamictal 25 mg 1 tablet for 2 weeks, Then increase to 2 tablets for 2 weeks

## 2023-04-04 DIAGNOSIS — F31.62 BIPOLAR DISORDER, CURRENT EPISODE MIXED, MODERATE (HCC): ICD-10-CM

## 2023-04-04 RX ORDER — LAMOTRIGINE 25 MG/1
TABLET ORAL
Qty: 98 TABLET | Refills: 0 | Status: SHIPPED | OUTPATIENT
Start: 2023-04-04 | End: 2023-04-19 | Stop reason: SDUPTHER

## 2023-04-04 NOTE — TELEPHONE ENCOUNTER
Medication Refill Request     Name of Medication  lamoTRIgine (LaMICtal) 25 mg tablet  Dose/Frequency Take one tablet (25mg) po daily for two weeks, then increase to two tablets (50 mg) po daily for two weeks  Quantity 42  Verified pharmacy   [x]  Verified ordering Provider   [x]  Does patient have enough for the next 3 days? Yes [x] No []  Does patient have a follow-up appointment scheduled? Yes [x] No []   If so when is appointment: 04/19/23 @ 10 AM

## 2023-04-19 PROBLEM — F31.9 BIPOLAR DISORDER (HCC): Status: ACTIVE | Noted: 2023-04-19

## 2023-05-16 ENCOUNTER — TELEPHONE (OUTPATIENT)
Dept: PSYCHIATRY | Facility: CLINIC | Age: 63
End: 2023-05-16

## 2023-05-17 ENCOUNTER — TELEPHONE (OUTPATIENT)
Dept: PSYCHIATRY | Facility: CLINIC | Age: 63
End: 2023-05-17

## 2023-05-17 NOTE — TELEPHONE ENCOUNTER
Patient returned call to office and stated she was no longer interested in counseling    Patient removed from the wait list

## 2023-05-17 NOTE — TELEPHONE ENCOUNTER
Called and lvm asking patient to call our office to schedule her np apt for therapy with Andree Never

## 2023-05-23 DIAGNOSIS — F41.1 GAD (GENERALIZED ANXIETY DISORDER): ICD-10-CM

## 2023-05-23 DIAGNOSIS — F33.2 SEVERE EPISODE OF RECURRENT MAJOR DEPRESSIVE DISORDER, WITHOUT PSYCHOTIC FEATURES (HCC): Chronic | ICD-10-CM

## 2023-05-24 ENCOUNTER — OFFICE VISIT (OUTPATIENT)
Dept: PSYCHIATRY | Facility: CLINIC | Age: 63
End: 2023-05-24

## 2023-05-24 DIAGNOSIS — F31.61 BIPOLAR DISORDER, CURRENT EPISODE MIXED, MILD (HCC): Primary | ICD-10-CM

## 2023-05-24 DIAGNOSIS — F41.1 GAD (GENERALIZED ANXIETY DISORDER): ICD-10-CM

## 2023-05-24 DIAGNOSIS — F32.1 CURRENT MODERATE EPISODE OF MAJOR DEPRESSIVE DISORDER WITHOUT PRIOR EPISODE (HCC): ICD-10-CM

## 2023-05-24 RX ORDER — LAMOTRIGINE 100 MG/1
100 TABLET ORAL DAILY
Qty: 30 TABLET | Refills: 3 | Status: SHIPPED | OUTPATIENT
Start: 2023-05-24

## 2023-05-24 RX ORDER — CITALOPRAM 40 MG/1
TABLET ORAL
Qty: 90 TABLET | Refills: 3 | Status: SHIPPED | OUTPATIENT
Start: 2023-05-24

## 2023-05-27 VITALS
WEIGHT: 196 LBS | BODY MASS INDEX: 38.48 KG/M2 | DIASTOLIC BLOOD PRESSURE: 95 MMHG | HEIGHT: 60 IN | SYSTOLIC BLOOD PRESSURE: 151 MMHG | HEART RATE: 64 BPM

## 2023-05-27 NOTE — PSYCH
Visit Time    Visit Start Time: 8:30 AM  Visit Stop Time: 9:00 AM  Total Visit Duration: 30 minutes    Subjective:     Patient ID: Carolyn Aldridge is a 57 y o  female history of bipolar disorder, anxiety, and depression seen for medication management and mood assessment  Jacquerisa Platt reports that the medication has helped her mood remains stable  Her appetite and sleep patterns are good  She reports being happy  Takes medication as prescribed family are supportive    HPI ROS Appetite Changes and Sleep: normal appetite and normal energy level    Review Of Systems:     Mood Anxiety and Depression   Behavior Normal    Thought Content Normal   General Normal    Personality Normal   Other Psych Symptoms Normal   Constitutional As Noted in HPI   ENT As Noted in HPI   Cardiovascular As Noted in HPI   Respiratory As Noted in HPI   Gastrointestinal As Noted in HPI   Genitourinary As Noted in HPI   Musculoskeletal As Noted in HPI   Integumentary As Noted in HPI   Neurological As Noted in HPI   Endocrine Normal    Other Symptoms Normal      Substance Abuse History:  Social History     Substance and Sexual Activity   Drug Use Never       Family Psychiatric History:   Family History   Problem Relation Age of Onset   • Aortic aneurysm Mother 79        Abdominal Aortic Aneurysm, +Smoker   • No Known Problems Father    • No Known Problems Brother    • No Known Problems Son    • Cancer Maternal Grandmother         79   • No Known Problems Half-Sister    • No Known Problems Half-Sister    • No Known Problems Half-Sister    • No Known Problems Half-Sister    • Psychiatric Illness Neg Hx        The following portions of the patient's history were reviewed and updated as appropriate: allergies, current medications, past family history, past medical history, past social history, past surgical history and problem list     Social History     Socioeconomic History   • Marital status: /Civil Union     Spouse name: Not on file   • Number of children: 2   • Years of education: Not on file   • Highest education level: Some college, no degree   Occupational History   • Occupation: Disabled - Previous      Comment: s/p Brain Surgery / H/O Epilepsy   Tobacco Use   • Smoking status: Never   • Smokeless tobacco: Never   Vaping Use   • Vaping Use: Never used   Substance and Sexual Activity   • Alcohol use: No     Comment: doesnt drink  • Drug use: Never   • Sexual activity: Not Currently     Partners: Male     Birth control/protection: Post-menopausal, Female Sterilization     Comment: Tubal Ligation   Other Topics Concern   • Not on file   Social History Narrative        Lives with     2 Children - 2 Sons    Disabled s/p Brain Surgery / H/O Epilepsy - Previous      Social Determinants of Health     Financial Resource Strain: Low Risk  (8/19/2022)    Overall Financial Resource Strain (CARDIA)    • Difficulty of Paying Living Expenses: Not very hard   Food Insecurity: Not on file   Transportation Needs: No Transportation Needs (8/19/2022)    PRAPARE - Transportation    • Lack of Transportation (Medical): No    • Lack of Transportation (Non-Medical): No   Physical Activity: Not on file   Stress: Stress Concern Present (1/21/2021)    [address] [place]    • Feeling of Stress :  To some extent   Social Connections: Not on file   Intimate Partner Violence: Not on file   Housing Stability: Not on file     Social History     Social History Narrative        Lives with     2 Children - 2 Sons    Disabled s/p Brain Surgery / H/O Epilepsy - Previous        Objective:       Mental status:  Appearance calm and cooperative , adequate hygiene and grooming and good eye contact    Mood euthymic   Affect affect appropriate    Speech a normal rate   Thought Processes normal thought processes   Hallucinations no hallucinations present    Thought Content no delusions   Abnormal Thoughts no suicidal thoughts  and no homicidal thoughts    Orientation  oriented to person and place and time   Remote Memory short term memory intact and long term memory intact   Attention Span concentration intact   Intellect Appears to be of Average Intelligence   Insight Insight intact   Judgement judgment was intact   Muscle Strength Muscle strength and tone were normal   Language no difficulty naming common objects   Fund of Knowledge displays adequate knowledge of current events   Pain none   Pain Scale 0       Assessment/Plan:       Diagnoses and all orders for this visit:    Bipolar disorder, current episode mixed, mild (HCC)  -     lamoTRIgine (LaMICtal) 100 mg tablet; Take 1 tablet (100 mg total) by mouth daily    Current moderate episode of major depressive disorder without prior episode (HCC)    SWAPNA (generalized anxiety disorder)            Treatment Recommendations- Risks Benefits      Immediate Medical/Psychiatric/Psychotherapy Treatments and Any Precautions:  reviewed medication continue with treatment plan    Risks, Benefits And Possible Side Effects Of Medications:  {PSYCH RISK, BENEFITS AND POSSIBLE SIDE EFFECTS (Optional):42857       Psychotherapy Provided: 30  min Individual psychotherapy provided     Supportive therapy  Medication evaluation  Mood assessment    Goals discussed in session: Continued stable mood with treatment    Treatment  Plan not due

## 2023-06-07 ENCOUNTER — HOSPITAL ENCOUNTER (OUTPATIENT)
Dept: MAMMOGRAPHY | Facility: HOSPITAL | Age: 63
Discharge: HOME | End: 2023-06-07
Attending: FAMILY MEDICINE
Payer: COMMERCIAL

## 2023-06-07 VITALS — HEIGHT: 60 IN | BODY MASS INDEX: 38.48 KG/M2 | WEIGHT: 196 LBS

## 2023-06-07 DIAGNOSIS — Z12.31 ENCOUNTER FOR SCREENING MAMMOGRAM FOR BREAST CANCER: ICD-10-CM

## 2023-06-07 PROCEDURE — 77067 SCR MAMMO BI INCL CAD: CPT

## 2023-06-07 PROCEDURE — 77063 BREAST TOMOSYNTHESIS BI: CPT

## 2023-06-07 NOTE — RESULT ENCOUNTER NOTE
Normal mammogram   Advised monthly self-breast exams  Repeat in 1 year      Message sent to patient via [facility] patient portal

## 2023-07-30 ENCOUNTER — HOSPITAL ENCOUNTER (EMERGENCY)
Facility: HOSPITAL | Age: 63
Discharge: HOME | End: 2023-07-30
Attending: EMERGENCY MEDICINE
Payer: COMMERCIAL

## 2023-07-30 VITALS
SYSTOLIC BLOOD PRESSURE: 128 MMHG | BODY MASS INDEX: 39.3 KG/M2 | RESPIRATION RATE: 18 BRPM | DIASTOLIC BLOOD PRESSURE: 63 MMHG | HEIGHT: 60 IN | OXYGEN SATURATION: 97 % | WEIGHT: 200.18 LBS | TEMPERATURE: 98.6 F | HEART RATE: 71 BPM

## 2023-07-30 DIAGNOSIS — S61.412A LACERATION OF LEFT HAND WITHOUT FOREIGN BODY, INITIAL ENCOUNTER: Primary | ICD-10-CM

## 2023-07-30 PROCEDURE — 99283 EMERGENCY DEPT VISIT LOW MDM: CPT

## 2023-07-30 PROCEDURE — 12001 RPR S/N/AX/GEN/TRNK 2.5CM/<: CPT | Performed by: PHYSICIAN ASSISTANT

## 2023-07-30 PROCEDURE — 99284 EMERGENCY DEPT VISIT MOD MDM: CPT | Performed by: PHYSICIAN ASSISTANT

## 2023-07-30 PROCEDURE — 90715 TDAP VACCINE 7 YRS/> IM: CPT | Performed by: PHYSICIAN ASSISTANT

## 2023-07-30 PROCEDURE — 90471 IMMUNIZATION ADMIN: CPT

## 2023-07-30 RX ADMIN — TETANUS TOXOID, REDUCED DIPHTHERIA TOXOID AND ACELLULAR PERTUSSIS VACCINE, ADSORBED 0.5 ML: 5; 2.5; 8; 8; 2.5 SUSPENSION INTRAMUSCULAR at 19:19

## 2023-07-30 NOTE — DISCHARGE INSTRUCTIONS
Use Tylenol 650 mg every 4 hours or Motrin 600 mg every 6 hours; you can alternate the 2 medications taking something every 3 hours for pain as needed. Suture removal in 7-10 days. Remove dressing in 24 hours, then wash gently with soap and water pat drying keep covered with antibiotic ointment and dressing.

## 2023-07-30 NOTE — ED PROVIDER NOTES
History  Chief Complaint   Patient presents with   • Laceration     Reports cut L hand with a broken plate PTA, thinks UTD on tetanus     Past Medical History: Anxiety, Obesity, Depression, CVA s/p Brain Surgery, s/p Meds and Rehab, seizures, Vitamin D deficiency   Past Surgical History: BRAIN SURGERY - R Temporal Resection / Amyglahippocampectomy,  SECTION x 2, TUBAL LIGATION      Pt presents to ED  for further evaluation and treatment of left hand laceration that she sustained immediate prior to arrival when a plate dropped and broke, patient tried to grab it and it cut her on the left hand. No other injuries or complaints. Unknown last tetanus. Prior to Admission Medications   Prescriptions Last Dose Informant Patient Reported?  Taking?   busPIRone (BUSPAR) 15 mg tablet 2023  No Yes   Sig: Take 1 tablet (15 mg total) by mouth 3 (three) times a day for 15 days   cholecalciferol (VITAMIN D3) 1,000 units tablet  Self No No   Sig: Take 1 tablet (1,000 Units total) by mouth daily   citalopram (CeleXA) 40 mg tablet 2023  No Yes   Sig: TAKE 1 TABLET BY MOUTH DAILY   ergocalciferol (ERGOCALCIFEROL) 1.25 MG (47904 UT) capsule   No No   Sig: Take 1 capsule (50,000 Units total) by mouth once a week for 12 doses   lamoTRIgine (LaMICtal) 100 mg tablet 2023  No Yes   Sig: Take 1 tablet (100 mg total) by mouth daily      Facility-Administered Medications: None       Past Medical History:   Diagnosis Date   • Anxiety    • Class 2 severe obesity with serious comorbidity and body mass index (BMI) of 37.0 to 37.9 in [facility])    • Concussion     Last assessed 2017   • Depression    • History of ischemic stroke without residual deficits     s/p Brain Surgery, s/p Meds and Rehab   • 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, 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 , s/p corrective Brain Surgery at [address] [place] [address]   • IFG (impaired fasting glucose)    • Vitamin D deficiency        Past Surgical History:   Procedure Laterality Date   • BRAIN SURGERY  2013    R Temporal Resection / Amyglahippocampectomy   •  SECTION      x 2   • TUBAL LIGATION         Family History   Problem Relation Age of Onset   • Aortic aneurysm Mother 79        Abdominal Aortic Aneurysm, +Smoker   • No Known Problems Father    • Cancer Maternal Grandmother         79   • No Known Problems Brother    • No Known Problems Son    • No Known Problems Half-Sister    • No Known Problems Half-Sister    • No Known Problems Half-Sister    • No Known Problems Half-Sister    • No Known Problems Maternal Aunt    • Psychiatric Illness Neg Hx      I have reviewed and agree with the history as documented. E-Cigarette/Vaping   • E-Cigarette Use Never User      E-Cigarette/Vaping Substances   • Nicotine No    • THC No    • CBD No    • Flavoring No    • Other No    • Unknown No      Social History     Tobacco Use   • Smoking status: Never   • Smokeless tobacco: Never   Vaping Use   • Vaping Use: Never used   Substance Use Topics   • Alcohol use: No     Comment: doesnt drink. • Drug use: Never       Review of Systems   Constitutional: Negative for fever. Musculoskeletal: Positive for myalgias. Skin: Positive for wound. Neurological: Negative for numbness. All other systems reviewed and are negative. Physical Exam  Physical Exam  Vitals and nursing note reviewed. Constitutional:       General: She is not in acute distress. Appearance: She is well-developed. HENT:      Head: Normocephalic and atraumatic. Right Ear: External ear normal.      Left Ear: External ear normal.      Nose: Nose normal.      Mouth/Throat:      Mouth: Mucous membranes are moist.      Pharynx: Oropharynx is clear. Eyes:      Conjunctiva/sclera: Conjunctivae normal.   Cardiovascular:      Rate and Rhythm: Normal rate. Pulmonary:      Effort: Pulmonary effort is normal. No respiratory distress.    Musculoskeletal:         General: Tenderness and signs of injury present. Normal range of motion. Cervical back: Normal range of motion. Skin:     General: Skin is warm and dry. Comments: + 2 cm, linear, subcutaneous laceration noted to dorsal, lateral aspect of left hand, no deep structure involvement, no active bleeding, no foreign body, full range of motion maintained, distal vascular intact   Neurological:      Mental Status: She is alert. Psychiatric:         Behavior: Behavior normal.         Vital Signs  ED Triage Vitals [07/30/23 1850]   Temperature Pulse Respirations Blood Pressure SpO2   98.6 °F (37 °C) 71 18 128/63 97 %      Temp Source Heart Rate Source Patient Position - Orthostatic VS BP Location FiO2 (%)   Oral Monitor Sitting Right arm --      Pain Score       6           Vitals:    07/30/23 1850   BP: 128/63   Pulse: 71   Patient Position - Orthostatic VS: Sitting         Visual Acuity      ED Medications  Medications   tetanus-diphtheria-acellular pertussis (BOOSTRIX) IM injection 0.5 mL (has no administration in time range)       Diagnostic Studies  Results Reviewed     None                 No orders to display              Procedures  Universal Protocol:  Consent: Verbal consent obtained. Risks and benefits: risks, benefits and alternatives were discussed  Consent given by: patient  Time out: Immediately prior to procedure a "time out" was called to verify the correct patient, procedure, equipment, support staff and site/side marked as required. Patient understanding: patient states understanding of the procedure being performed  Patient identity confirmed: verbally with patient    Laceration repair    Date/Time: 7/30/2023 7:13 PM    Performed by: Jeremiah Khan PA-C  Authorized by: Jeremiah Khan PA-C  Location: left hand.   Laceration length: 2 cm  Foreign bodies: no foreign bodies  Tendon involvement: none  Nerve involvement: none  Vascular damage: no  Anesthesia: local infiltration    Anesthesia:  Local Anesthetic: lidocaine 1% with epinephrine  Anesthetic total: 1.5 mL    Sedation:  Patient sedated: no      Wound Dehiscence:  Superficial Wound Dehiscence: simple closure      Procedure Details:  Preparation: Patient was prepped and draped in the usual sterile fashion. Irrigation solution: saline  Irrigation method: syringe  Amount of cleaning: standard  Skin closure: 5-0 nylon  Number of sutures: 4  Technique: simple  Approximation: close  Approximation difficulty: simple  Dressing: xeroform bulky gauze dressing. Patient tolerance: patient tolerated the procedure well with no immediate complications               ED Course                                             MDM    Disposition  Final diagnoses:   None     ED Disposition     None      Follow-up Information    None         Patient's Medications   Discharge Prescriptions    No medications on file       No discharge procedures on file.     PDMP Review       Value Time User    PDMP Reviewed  Yes 6/28/2022  6:23 PM Ted Hodgson DO          ED Provider  Electronically Signed by           Devora Diaz PA-C  07/30/23 6982

## 2023-07-31 ENCOUNTER — OFFICE VISIT (OUTPATIENT)
Dept: PSYCHIATRY | Facility: CLINIC | Age: 63
End: 2023-07-31
Payer: COMMERCIAL

## 2023-07-31 VITALS
BODY MASS INDEX: 39.09 KG/M2 | SYSTOLIC BLOOD PRESSURE: 118 MMHG | HEART RATE: 68 BPM | HEIGHT: 60 IN | DIASTOLIC BLOOD PRESSURE: 64 MMHG

## 2023-07-31 DIAGNOSIS — F41.1 GAD (GENERALIZED ANXIETY DISORDER): ICD-10-CM

## 2023-07-31 DIAGNOSIS — F32.1 CURRENT MODERATE EPISODE OF MAJOR DEPRESSIVE DISORDER WITHOUT PRIOR EPISODE (HCC): ICD-10-CM

## 2023-07-31 DIAGNOSIS — F31.61 BIPOLAR DISORDER, CURRENT EPISODE MIXED, MILD (HCC): Primary | ICD-10-CM

## 2023-07-31 PROCEDURE — 99214 OFFICE O/P EST MOD 30 MIN: CPT | Performed by: NURSE PRACTITIONER

## 2023-07-31 PROCEDURE — 90833 PSYTX W PT W E/M 30 MIN: CPT | Performed by: NURSE PRACTITIONER

## 2023-07-31 NOTE — BH CRISIS PLAN
Client Name: Roly Bennett       Client YOB: 1960  : 1960    Treatment Team (include name and contact information):     Psychotherapist: none    Psychiatrist: undersigned       Healthcare Provider  Pura Casey DO  [address] [address]  [phone]    Type of Plan   * Child plans (children 15 yo and younger) must be completed and signed by the child's legal guardian   * Plans for all individuals 15 yo and above must be signed by the client. Plan Type: adolescent/adult (15 and over) Initial      My Personal Strengths are (in the client's own words):  "kind and happy, can express needs"  The stressors and triggers that may put me at risk are:  winter and Holidays    Coping skills I can use to keep myself calm and safe: Take a shower, Call a friend or family member and Other (describe) getting outside    Coping skills/supports I can use to maintain abstinence from substance use:   Not Applicable    The people that provide me with help and support: (Include name, contact, and how they can help)   Support person #1:     * Phone number: lives with her    * How can they help me?  Support redirect   Support person #2:AJ (son)    * Phone number: in cell phone    * How can they help me? support     Support person #3: mother    * Phone number: in cell phone    * How can they help me? support    In the past, the following has helped me in times of crisis:    Being with other people, Taking medications, Talking to a professional on the telephone, Calling a friend and Calling a family member      If it is an emergency and you need immediate help, call     If there is a possibility of danger to yourself or others, call the following crisis hotline resources:     Adult Crisis Numbers  Suicide Prevention Hotline - Dial   [facility]: [address]: [address]: [address]: [phone]  [place]: [phone]  [facility]: [address]: [address]: [phone] (daytime). [phone] (after hours, weekends, holidays)     Child/Adolescent Crisis Numbers   [facility]: [address]: [phone]   [place]: [phone]   [facility]: [phone]    Please note: Some [facility] do not have a separate number for Child/Adolescent specific crisis. If your county is not listed under Child/Adolescent, please call the adult number for your county     National Talk to Text Line   [employer] - [phone]    In the event your feelings become unmanageable, and you cannot reach your support system, you will call 911 immediately or go to the nearest hospital emergency room.

## 2023-07-31 NOTE — PSYCH
Visit Time    Visit Start Time: 10:30 AM  Visit Stop Time: 11:00 AM  Total Visit Duration: 30 minutes    Subjective:     Patient ID: Montell Siemens is a 57 y.o. female. History of bipolar disorder depression and anxiety seen for medication management and mood assessment. Nadia Shipley reports her moods are stable, Lamictal was the best addition to her regime. She reports getting along with her , gardening and cooking. Denies side effects from BuSpar, citalopram, or Lamictal.  Reports cutting her left posterior wrist while doing dishes and received 5 stiches. Denies any suicidal ideation or depression. Reports she is eating and sleeping. She plans to go visit her grandchildren [place]. Takes medication as prescribed. Requests monthly visits.     HPI ROS Appetite Changes and Sleep: normal appetite and normal energy level    Review Of Systems:     Mood Emotional Lability   Behavior Normal    Thought Content Normal   General Emotional Problems   Personality Normal   Other Psych Symptoms Normal   Constitutional As Noted in HPI   ENT As Noted in HPI   Cardiovascular As Noted in HPI   Respiratory As Noted in HPI   Gastrointestinal As Noted in HPI   Genitourinary As Noted in HPI   Musculoskeletal As Noted in HPI   Integumentary As Noted in HPI   Neurological As Noted in HPI   Endocrine prediabetes   Other Symptoms Normal        Substance Abuse History:  Social History     Substance and Sexual Activity   Drug Use Never       Family Psychiatric History:   Family History   Problem Relation Age of Onset   • Aortic aneurysm Mother 79        Abdominal Aortic Aneurysm, +Smoker   • No Known Problems Father    • Cancer Maternal Grandmother         79   • No Known Problems Brother    • No Known Problems Son    • No Known Problems Half-Sister    • No Known Problems Half-Sister    • No Known Problems Half-Sister    • No Known Problems Half-Sister    • No Known Problems Maternal Aunt    • Psychiatric Illness Neg Hx        The following portions of the patient's history were reviewed and updated as appropriate: allergies, current medications, past family history, past medical history, past social history, past surgical history and problem list.    Social History     Socioeconomic History   • Marital status: /Civil Union     Spouse name: Not on file   • Number of children: 2   • Years of education: Not on file   • Highest education level: Some college, no degree   Occupational History   • Occupation: Disabled - Previous      Comment: s/p Brain Surgery / H/O Epilepsy   Tobacco Use   • Smoking status: Never   • Smokeless tobacco: Never   Vaping Use   • Vaping Use: Never used   Substance and Sexual Activity   • Alcohol use: No     Comment: doesnt drink. • Drug use: Never   • Sexual activity: Not Currently     Partners: Male     Birth control/protection: Post-menopausal, Female Sterilization     Comment: Tubal Ligation   Other Topics Concern   • Not on file   Social History Narrative        Lives with     2 Children - 2 Sons    Disabled s/p Brain Surgery / H/O Epilepsy - Previous      Social Determinants of Health     Financial Resource Strain: Low Risk  (8/19/2022)    Overall Financial Resource Strain (CARDIA)    • Difficulty of Paying Living Expenses: Not very hard   Food Insecurity: Not on file   Transportation Needs: No Transportation Needs (8/19/2022)    PRAPARE - Transportation    • Lack of Transportation (Medical): No    • Lack of Transportation (Non-Medical): No   Physical Activity: Unknown (1/21/2021)    Exercise Vital Sign    • Days of Exercise per Week: 6 days    • Minutes of Exercise per Session: Not on file   Stress: Stress Concern Present (1/21/2021)    [address] [facility]    • Feeling of Stress :  To some extent   Social Connections: Unknown (1/21/2021)    Social Connection and Isolation Panel [NHANES]    • Frequency of Communication with Friends and Family: Not on file    • Frequency of Social Gatherings with Friends and Family: Not on file    • Attends Ep[employer] Services: Not on file    • Active Member of Clubs or Organizations: Not on file    • Attends Club or Organization Meetings: Not on file    • Marital Status:    Intimate Partner Violence: Not At Risk (1/21/2021)    Humiliation, Afraid, Rape, and Kick questionnaire    • Fear of Current or Ex-Partner: No    • Emotionally Abused: No    • Physically Abused: No    • Sexually Abused: No   Housing Stability: Not on file     Social History     Social History Narrative        Lives with     2 Children - 2 Sons    Disabled s/p Brain Surgery / H/O Epilepsy - Previous        Objective:       Mental status:  Appearance calm and cooperative , adequate hygiene and grooming and good eye contact    Mood anxious   Affect affect appropriate    Speech a normal rate   Thought Processes normal thought processes   Hallucinations no hallucinations present    Thought Content no delusions   Abnormal Thoughts no suicidal thoughts  and no homicidal thoughts    Orientation  oriented to person and place and time   Remote Memory short term memory intact and long term memory intact   Attention Span concentration intact   Intellect Appears to be of Average Intelligence   Insight Insight intact   Judgement judgment was intact   Muscle Strength Muscle strength and tone were normal   Language no difficulty naming common objects   Fund of Knowledge displays adequate knowledge of current events   Pain none   Pain Scale 0       Assessment/Plan:       There are no diagnoses linked to this encounter.         Treatment Recommendations- Risks Benefits      Immediate Medical/Psychiatric/Psychotherapy Treatments and Any Precautions:  reviewed medication continue with treatment plan    Risks, Benefits And Possible Side Effects Of Medications:  {PSYCH RISK, BENEFITS AND POSSIBLE SIDE EFFECTS (Optional):23962       Psychotherapy Provided: 30 min Individual psychotherapy provided.    Supportive therapy  Medication management  Medication evaluation  Mood assessment    Goals discussed in session: Maintain stable mood

## 2023-08-23 ENCOUNTER — OFFICE VISIT (OUTPATIENT)
Dept: FAMILY MEDICINE CLINIC | Facility: CLINIC | Age: 63
End: 2023-08-23
Payer: COMMERCIAL

## 2023-08-23 VITALS
BODY MASS INDEX: 39.58 KG/M2 | TEMPERATURE: 97.4 F | OXYGEN SATURATION: 96 % | RESPIRATION RATE: 18 BRPM | WEIGHT: 201.6 LBS | HEIGHT: 60 IN | DIASTOLIC BLOOD PRESSURE: 66 MMHG | SYSTOLIC BLOOD PRESSURE: 112 MMHG | HEART RATE: 74 BPM

## 2023-08-23 DIAGNOSIS — E66.01 CLASS 2 SEVERE OBESITY WITH SERIOUS COMORBIDITY AND BODY MASS INDEX (BMI) OF 39.0 TO 39.9 IN ADULT, UNSPECIFIED OBESITY TYPE (HCC): ICD-10-CM

## 2023-08-23 DIAGNOSIS — E66.9 OBESITY (BMI 35.0-39.9 WITHOUT COMORBIDITY): ICD-10-CM

## 2023-08-23 DIAGNOSIS — Z00.00 MEDICARE ANNUAL WELLNESS VISIT, SUBSEQUENT: Primary | ICD-10-CM

## 2023-08-23 DIAGNOSIS — F31.61 BIPOLAR DISORDER, CURRENT EPISODE MIXED, MILD (HCC): ICD-10-CM

## 2023-08-23 DIAGNOSIS — R73.01 IFG (IMPAIRED FASTING GLUCOSE): ICD-10-CM

## 2023-08-23 DIAGNOSIS — E55.9 VITAMIN D DEFICIENCY: ICD-10-CM

## 2023-08-23 DIAGNOSIS — R73.03 PREDIABETES: ICD-10-CM

## 2023-08-23 DIAGNOSIS — F32.1 CURRENT MODERATE EPISODE OF MAJOR DEPRESSIVE DISORDER WITHOUT PRIOR EPISODE (HCC): ICD-10-CM

## 2023-08-23 DIAGNOSIS — F41.1 GAD (GENERALIZED ANXIETY DISORDER): ICD-10-CM

## 2023-08-23 DIAGNOSIS — E03.9 HYPOTHYROIDISM, UNSPECIFIED TYPE: ICD-10-CM

## 2023-08-23 DIAGNOSIS — Z78.0 ASYMPTOMATIC POSTMENOPAUSAL STATE: ICD-10-CM

## 2023-08-23 DIAGNOSIS — N39.3 PRIMARY STRESS URINARY INCONTINENCE: ICD-10-CM

## 2023-08-23 PROCEDURE — G0442 ANNUAL ALCOHOL SCREEN 15 MIN: HCPCS | Performed by: FAMILY MEDICINE

## 2023-08-23 PROCEDURE — 99214 OFFICE O/P EST MOD 30 MIN: CPT | Performed by: FAMILY MEDICINE

## 2023-08-23 PROCEDURE — G0439 PPPS, SUBSEQ VISIT: HCPCS | Performed by: FAMILY MEDICINE

## 2023-08-23 RX ORDER — MULTIVITAMIN
1 TABLET ORAL DAILY
COMMUNITY

## 2023-08-23 NOTE — PROGRESS NOTES
Assessment/Plan:  Problem List Items Addressed This Visit        Endocrine    IFG (impaired fasting glucose)     Pending labs. Previously, patient declined starting Metformin. Recommend lifestyle modifications. Relevant Orders    Hemoglobin A1C       Other    Class 2 severe obesity with serious comorbidity and body mass index (BMI) of 39.0 to 39.9 in adult ([facility])     Stable. Recommend lifestyle modifications. Relevant Orders    TSH, 3rd generation with Free T4 reflex    Prediabetes     Pending labs. Previously, patient declined starting Metformin. Recommend lifestyle modifications. Relevant Orders    Hemoglobin A1C    Vitamin D deficiency     Pending labs. Continue MVI and OTC Vitamin D 1,000IU daily. Relevant Orders    Comprehensive metabolic panel    Vitamin D 25 hydroxy    Primary stress urinary incontinence     Stable. Kegel exercises Handout given. Relevant Orders    Ambulatory Referral to Physical Therapy    SWAPNA (generalized anxiety disorder)     Management per Psych. Relevant Orders    TSH, 3rd generation with Free T4 reflex    Current episode of major depressive disorder without prior episode     Management per Psych. Relevant Orders    TSH, 3rd generation with Free T4 reflex    Bipolar disorder ([address])     Management per Psych. Relevant Orders    TSH, 3rd generation with Free T4 reflex   Other Visit Diagnoses     Medicare annual wellness visit, subsequent    -  Primary    Obesity (BMI 35.0-39.9 without comorbidity)        Relevant Orders    TSH, 3rd generation with Free T4 reflex    BMI 39.0-39.9,adult        Asymptomatic postmenopausal state        Relevant Orders    DXA bone density spine hip and pelvis    Hypothyroidism, unspecified type        Relevant Orders    Thyroid Antibodies Panel    TSH, 3rd generation with Free T4 reflex    Pending labs. Patient gives unclear history of Hashimoto's.   If Abs are positive, will need Thyroid U/S. Return in about 6 months (around 2/23/2024) for 6mo - IFG, Vit D Def, Labs. Future Appointments   Date Time Provider [address]   9/13/2023 10:30 AM Britta Rodriguez, PhD [facility]   10/25/2023  1:00 PM Juan Luis Roberson MD [facility]   2/23/2024  8:20 AM Alexia Campbell,  [facility]        Subjective:     Benito Abdul is a 57 y.o. female who presents today for a follow-up on her chronic medical conditions. HPI:  Chief Complaint   Patient presents with   • Medicare Wellness Visit     AWV / 6mo - IFG, Vit D Def, Labs. No new problems or concerns at this time. Labs not done. -- Above per clinical staff and reviewed. --      HPI      Today:      Return in about 6 months (around 8/20/2023) for AWV / 6mo - IFG, Vit D Def, Labs.     6mo OV     Patient not complete labs 2/20/23. .       Obesity - Watching diet - trying to cut back on sugars and carbs.  +Exercise - Gym for 1 - 1.5 hours, 3 times per week, Walking dog 20 minutes, 1-2 days per week.     IFG - No meds previously.  S/p Nutrition consult c DM  2022.        Anxiety / Depression - Management per Psych Dr. Don Bose. [place] q2 months - Next appt 9/23. No counseling - last counseling 2021.  On Celexa 40mg QD, Buspar 15mg TID, Lamictal 100mg QD.        Vitamin D Deficiency - s/p Drisdol.  Not taking MVI, but taking OTC Vitmain D 1000IU daily.        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  Last saw Neurosurgery appt 2015 - F/U PRN.       FamHx AAA - + Mother. [facility] had normal screen 2012? .       Reviewed:  Labs 2/20/23, Gyn 10/19/22  Carolin Fenton at [facility] yearly.   Next appt 10/23.                The following portions of the patient's history were reviewed and updated as appropriate: allergies, current medications, past family history, past medical history, past social history, past surgical history and problem list.      Review of Systems   Constitutional: Negative for appetite change, chills, diaphoresis, fatigue and fever. Respiratory: Negative for chest tightness and shortness of breath. Cardiovascular: Negative for chest pain. Gastrointestinal: Negative for abdominal pain, blood in stool, constipation, diarrhea, nausea and vomiting. Genitourinary: Negative for dysuria. Current Outpatient Medications   Medication Sig Dispense Refill   • busPIRone (BUSPAR) 15 mg tablet Take 1 tablet (15 mg total) by mouth 3 (three) times a day for 15 days 270 tablet 0   • cholecalciferol (VITAMIN D3) 1,000 units tablet Take 1 tablet (1,000 Units total) by mouth daily 30 tablet 0   • citalopram (CeleXA) 40 mg tablet TAKE 1 TABLET BY MOUTH DAILY 90 tablet 3   • lamoTRIgine (LaMICtal) 100 mg tablet Take 1 tablet (100 mg total) by mouth daily 30 tablet 3   • Multiple Vitamin (multivitamin) tablet Take 1 tablet by mouth daily       No current facility-administered medications for this visit. Objective:  /66   Pulse 74   Temp (!) 97.4 °F (36.3 °C)   Resp 18   Ht 5' (1.524 m)   Wt 91.4 kg (201 lb 9.6 oz)   LMP 01/01/2011   SpO2 96%   BMI 39.37 kg/m²    Wt Readings from Last 3 Encounters:   08/23/23 91.4 kg (201 lb 9.6 oz)   07/30/23 90.8 kg (200 lb 2.8 oz)   06/07/23 88.9 kg (196 lb)      BP Readings from Last 3 Encounters:   08/23/23 112/66   07/31/23 118/64   07/30/23 128/63          Physical Exam  Vitals and nursing note reviewed. Constitutional:       Appearance: Normal appearance. She is well-developed. She is obese. HENT:      Head: Normocephalic and atraumatic. Eyes:      Conjunctiva/sclera: Conjunctivae normal.   Neck:      Thyroid: No thyromegaly. Vascular: No carotid bruit. Cardiovascular:      Rate and Rhythm: Normal rate and regular rhythm. Pulses: Normal pulses. Heart sounds: Normal heart sounds.    Pulmonary:      Effort: Pulmonary effort is normal. Breath sounds: Normal breath sounds. Abdominal:      General: Bowel sounds are normal. There is no distension. Palpations: Abdomen is soft. There is no mass. Tenderness: There is no abdominal tenderness. There is no guarding or rebound. Musculoskeletal:         General: No swelling or tenderness. Cervical back: Neck supple. Right lower leg: No edema. Left lower leg: No edema. Lymphadenopathy:      Cervical: No cervical adenopathy. Neurological:      General: No focal deficit present. Mental Status: She is alert and oriented to person, place, and time. Psychiatric:         Mood and Affect: Mood normal.         Behavior: Behavior normal.         Thought Content: Thought content normal.         Judgment: Judgment normal.         Lab Results:      Lab Results   Component Value Date    WBC 7.14 07/08/2022    HGB 14.5 07/08/2022    HCT 43.1 07/08/2022     07/08/2022    TRIG 113 07/08/2022    HDL 60 07/08/2022    LDLDIRECT 100 07/08/2022    ALT 11 02/20/2023    AST 12 (L) 02/20/2023    K 3.9 02/20/2023     02/20/2023    CREATININE 0.75 02/20/2023    BUN 16 02/20/2023    CO2 30 02/20/2023    GLUF 93 02/20/2023    HGBA1C 6.0 (H) 02/20/2023     No results found for: "URICACID"  Invalid input(s): "BASENAME" Vitamin D    No results found. POCT Labs                       BMI Counseling: Body mass index is 39.37 kg/m². The BMI is above normal. Nutrition recommendations include 3-5 servings of fruits/vegetables daily. Exercise recommendations include exercising 3-5 times per week.

## 2023-08-23 NOTE — PATIENT INSTRUCTIONS
Medicare Preventive Visit Patient Instructions  Thank you for completing your Welcome to Medicare Visit or Medicare Annual Wellness Visit today. Your next wellness visit will be due in one year (8/23/2024). The screening/preventive services that you may require over the next 5-10 years are detailed below. Some tests may not apply to you based off risk factors and/or age. Screening tests ordered at today's visit but not completed yet may show as past due. Also, please note that scanned in results may not display below. Preventive Screenings:  Service Recommendations Previous Testing/Comments   Colorectal Cancer Screening  * Colonoscopy    * Fecal Occult Blood Test (FOBT)/Fecal Immunochemical Test (FIT)  * Fecal DNA/Cologuard Test  * Flexible Sigmoidoscopy Age: 43-73 years old   Colonoscopy: every 10 years (may be performed more frequently if at higher risk)  OR  FOBT/FIT: every 1 year  OR  Cologuard: every 3 years  OR  Sigmoidoscopy: every 5 years  Screening may be recommended earlier than age 39 if at higher risk for colorectal cancer. Also, an individualized decision between you and your healthcare provider will decide whether screening between the ages of 77-80 would be appropriate. Colonoscopy: 10/13/2022  FOBT/FIT: Not on file  Cologuard: 10/14/2022  Sigmoidoscopy: Not on file    Screening Current     Breast Cancer Screening Age: 36 years old  Frequency: every 1-2 years  Not required if history of left and right mastectomy Mammogram: 06/07/2023    Screening Current   Cervical Cancer Screening Between the ages of 21-29, pap smear recommended once every 3 years. Between the ages of 32-69, can perform pap smear with HPV co-testing every 5 years.    Recommendations may differ for women with a history of total hysterectomy, cervical cancer, or abnormal pap smears in past. Pap Smear: 10/19/2022    Screening Current   Hepatitis C Screening Once for adults born between 1945 and 1965  More frequently in patients at high risk for Hepatitis C Hep C Antibody: 01/27/2021    Screening Current   Diabetes Screening 1-2 times per year if you're at risk for diabetes or have pre-diabetes Fasting glucose: 93 mg/dL (2/20/2023)  A1C: 6.0 % (2/20/2023)  Screening Current   Cholesterol Screening Once every 5 years if you don't have a lipid disorder. May order more often based on risk factors. Lipid panel: 07/08/2022    Screening Not Indicated  History Lipid Disorder     Other Preventive Screenings Covered by Medicare:  1. Abdominal Aortic Aneurysm (AAA) Screening: covered once if your at risk. You're considered to be at risk if you have a family history of AAA. 2. Lung Cancer Screening: covers low dose CT scan once per year if you meet all of the following conditions: (1) Age 48-67; (2) No signs or symptoms of lung cancer; (3) Current smoker or have quit smoking within the last 15 years; (4) You have a tobacco smoking history of at least 20 pack years (packs per day multiplied by number of years you smoked); (5) You get a written order from a healthcare provider. 3. Glaucoma Screening: covered annually if you're considered high risk: (1) You have diabetes OR (2) Family history of glaucoma OR (3)  aged 48 and older OR (3)  American aged 72 and older  3. Osteoporosis Screening: covered every 2 years if you meet one of the following conditions: (1) You're estrogen deficient and at risk for osteoporosis based off medical history and other findings; (2) Have a vertebral abnormality; (3) On glucocorticoid therapy for more than 3 months; (4) Have primary hyperparathyroidism; (5) On osteoporosis medications and need to assess response to drug therapy. · Last bone density test (DXA Scan): Not on file. 5. HIV Screening: covered annually if you're between the age of 14-79. Also covered annually if you are younger than 13 and older than 72 with risk factors for HIV infection.  For pregnant patients, it is covered up to 3 times per pregnancy. Immunizations:  Immunization Recommendations   Influenza Vaccine Annual influenza vaccination during flu season is recommended for all persons aged >= 6 months who do not have contraindications   Pneumococcal Vaccine   * Pneumococcal conjugate vaccine = PCV13 (Prevnar 13), PCV15 (Vaxneuvance), PCV20 (Prevnar 20)  * Pneumococcal polysaccharide vaccine = PPSV23 (Pneumovax) Adults 20-63 years old: 1-3 doses may be recommended based on certain risk factors  Adults 72 years old: 1-2 doses may be recommended based off what pneumonia vaccine you previously received   Hepatitis B Vaccine 3 dose series if at intermediate or high risk (ex: diabetes, end stage renal disease, liver disease)   Tetanus (Td) Vaccine - COST NOT COVERED BY MEDICARE PART B Following completion of primary series, a booster dose should be given every 10 years to maintain immunity against tetanus. Td may also be given as tetanus wound prophylaxis. Tdap Vaccine - COST NOT COVERED BY MEDICARE PART B Recommended at least once for all adults. For pregnant patients, recommended with each pregnancy. Shingles Vaccine (Shingrix) - COST NOT COVERED BY MEDICARE PART B  2 shot series recommended in those aged 48 and above     Health Maintenance Due:      Topic Date Due   • Breast Cancer Screening: Mammogram  06/07/2024   • Colorectal Cancer Screening  10/14/2025   • Cervical Cancer Screening  10/19/2025   • HIV Screening  Completed   • Hepatitis C Screening  Completed     Immunizations Due:      Topic Date Due   • Influenza Vaccine (1) 09/01/2023     Advance Directives   What are advance directives? Advance directives are legal documents that state your wishes and plans for medical care. These plans are made ahead of time in case you lose your ability to make decisions for yourself. Advance directives can apply to any medical decision, such as the treatments you want, and if you want to donate organs.    What are the types of advance directives? There are many types of advance directives, and each state has rules about how to use them. You may choose a combination of any of the following:  · Living will: This is a written record of the treatment you want. You can also choose which treatments you do not want, which to limit, and which to stop at a certain time. This includes surgery, medicine, IV fluid, and tube feedings. · Durable power of  for healthcare [facility]): This is a written record that states who you want to make healthcare choices for you when you are unable to make them for yourself. This person, called a proxy, is usually a family member or a friend. You may choose more than 1 proxy. · Do not resuscitate (DNR) order:  A DNR order is used in case your heart stops beating or you stop breathing. It is a request not to have certain forms of treatment, such as CPR. A DNR order may be included in other types of advance directives. · Medical directive: This covers the care that you want if you are in a coma, near death, or unable to make decisions for yourself. You can list the treatments you want for each condition. Treatment may include pain medicine, surgery, blood transfusions, dialysis, IV or tube feedings, and a ventilator (breathing machine). · Values history: This document has questions about your views, beliefs, and how you feel and think about life. This information can help others choose the care that you would choose. Why are advance directives important? An advance directive helps you control your care. Although spoken wishes may be used, it is better to have your wishes written down. Spoken wishes can be misunderstood, or not followed. Treatments may be given even if you do not want them. An advance directive may make it easier for your family to make difficult choices about your care. Urinary Incontinence   Urinary incontinence (UI)  is when you lose control of your bladder.  UI develops because your bladder cannot store or empty urine properly. The 3 most common types of UI are stress incontinence, urge incontinence, or both. Medicines:   · May be given to help strengthen your bladder control. Report any side effects of medication to your healthcare provider. Do pelvic muscle exercises often:  Your pelvic muscles help you stop urinating. Squeeze these muscles tight for 5 seconds, then relax for 5 seconds. Gradually work up to squeezing for 10 seconds. Do 3 sets of 15 repetitions a day, or as directed. This will help strengthen your pelvic muscles and improve bladder control. Train your bladder:  Go to the bathroom at set times, such as every 2 hours, even if you do not feel the urge to go. You can also try to hold your urine when you feel the urge to go. For example, hold your urine for 5 minutes when you feel the urge to go. As that becomes easier, hold your urine for 10 minutes. Self-care:   · Keep a UI record. Write down how often you leak urine and how much you leak. Make a note of what you were doing when you leaked urine. · Drink liquids as directed. You may need to limit the amount of liquid you drink to help control your urine leakage. Do not drink any liquid right before you go to bed. Limit or do not have drinks that contain caffeine or alcohol. · Prevent constipation. Eat a variety of high-fiber foods. Good examples are high-fiber cereals, beans, vegetables, and whole-grain breads. Walking is the best way to trigger your intestines to have a bowel movement. · Exercise regularly and maintain a healthy weight. Weight loss and exercise will decrease pressure on your bladder and help you control your leakage. · Use a catheter as directed  to help empty your bladder. A catheter is a tiny, plastic tube that is put into your bladder to drain your urine. · Go to behavior therapy as directed. Behavior therapy may be used to help you learn to control your urge to urinate.     Weight Management Why it is important to manage your weight:  Being overweight increases your risk of health conditions such as heart disease, high blood pressure, type 2 diabetes, and certain types of cancer. It can also increase your risk for osteoarthritis, sleep apnea, and other respiratory problems. Aim for a slow, steady weight loss. Even a small amount of weight loss can lower your risk of health problems. How to lose weight safely:  A safe and healthy way to lose weight is to eat fewer calories and get regular exercise. You can lose up about 1 pound a week by decreasing the number of calories you eat by 500 calories each day. Healthy meal plan for weight management:  A healthy meal plan includes a variety of foods, contains fewer calories, and helps you stay healthy. A healthy meal plan includes the following:  · Eat whole-grain foods more often. A healthy meal plan should contain fiber. Fiber is the part of grains, fruits, and vegetables that is not broken down by your body. Whole-grain foods are healthy and provide extra fiber in your diet. Some examples of whole-grain foods are whole-wheat breads and pastas, oatmeal, brown rice, and bulgur. · Eat a variety of vegetables every day. Include dark, leafy greens such as spinach, kale, amanda greens, and mustard greens. Eat yellow and orange vegetables such as carrots, sweet potatoes, and winter squash. · Eat a variety of fruits every day. Choose fresh or canned fruit (canned in its own juice or light syrup) instead of juice. Fruit juice has very little or no fiber. · Eat low-fat dairy foods. Drink fat-free (skim) milk or 1% milk. Eat fat-free yogurt and low-fat cottage cheese. Try low-fat cheeses such as mozzarella and other reduced-fat cheeses. · Choose meat and other protein foods that are low in fat. Choose beans or other legumes such as split peas or lentils. Choose fish, skinless poultry (chicken or turkey), or lean cuts of red meat (beef or pork).  Before you cook meat or poultry, cut off any visible fat. · Use less fat and oil. Try baking foods instead of frying them. Add less fat, such as margarine, sour cream, regular salad dressing and mayonnaise to foods. Eat fewer high-fat foods. Some examples of high-fat foods include french fries, doughnuts, ice cream, and cakes. · Eat fewer sweets. Limit foods and drinks that are high in sugar. This includes candy, cookies, regular soda, and sweetened drinks. Exercise:  Exercise at least 30 minutes per day on most days of the week. Some examples of exercise include walking, biking, dancing, and swimming. You can also fit in more physical activity by taking the stairs instead of the elevator or parking farther away from stores. Ask your healthcare provider about the best exercise plan for you. © Copyright [address] 2018 Information is for End User's use only and may not be sold, redistributed or otherwise used for commercial purposes.  All illustrations and images included in CareNotes® are the copyrighted property of [employer] or [address] [place] [address]

## 2023-08-23 NOTE — PROGRESS NOTES
Assessment and Plan:     Problem List Items Addressed This Visit        Endocrine    IFG (impaired fasting glucose)     Pending labs. Previously, patient declined starting Metformin. Recommend lifestyle modifications. Relevant Orders    Hemoglobin A1C       Other    Class 2 severe obesity with serious comorbidity and body mass index (BMI) of 39.0 to 39.9 in adult (HCC)     Stable. Recommend lifestyle modifications. Relevant Orders    TSH, 3rd generation with Free T4 reflex    Prediabetes     Pending labs. Previously, patient declined starting Metformin. Recommend lifestyle modifications. Relevant Orders    Hemoglobin A1C    Vitamin D deficiency     Pending labs. Continue MVI and OTC Vitamin D 1,000IU daily. Relevant Orders    Comprehensive metabolic panel    Vitamin D 25 hydroxy    Primary stress urinary incontinence     Stable. Kegel exercises Handout given. Relevant Orders    Ambulatory Referral to Physical Therapy    SWAPNA (generalized anxiety disorder)     Management per Psych. Relevant Orders    TSH, 3rd generation with Free T4 reflex    Current episode of major depressive disorder without prior episode     Management per Psych. Relevant Orders    TSH, 3rd generation with Free T4 reflex    Bipolar disorder ([address])     Management per Psych.           Relevant Orders    TSH, 3rd generation with Free T4 reflex   Other Visit Diagnoses     Medicare annual wellness visit, subsequent    -  Primary    Obesity (BMI 35.0-39.9 without comorbidity)        Relevant Orders    TSH, 3rd generation with Free T4 reflex    BMI 39.0-39.9,adult        Asymptomatic postmenopausal state        Relevant Orders    DXA bone density spine hip and pelvis    Hypothyroidism, unspecified type        Relevant Orders    Thyroid Antibodies Panel    TSH, 3rd generation with Free T4 reflex           Preventive health issues were discussed with patient, and age appropriate screening tests were ordered as noted in patient's After Visit Summary. Personalized health advice and appropriate referrals for health education or preventive services given if needed, as noted in patient's After Visit Summary. History of Present Illness:     Patient presents for a Medicare Wellness Visit    HPI   Patient Care Team:  Clari Padilla DO as PCP - General (Family Medicine)  Yuly Wright MD as PCP - [facility]CP-[facility]  [facility]     Review of Systems:     Review of Systems     See other note.          Problem List:     Patient Active Problem List   Diagnosis   • SWAPNA (generalized anxiety disorder)   • Class 2 severe obesity with serious comorbidity and body mass index (BMI) of 39.0 to 39.9 in [facility])   • Prediabetes   • IFG (impaired fasting glucose)   • Vitamin D deficiency   • Current episode of major depressive disorder without prior episode   • Bipolar disorder ([address])   • Primary stress urinary incontinence      Past Medical and Surgical History:     Past Medical History:   Diagnosis Date   • Anxiety    • Bipolar disorder ([address]) 2023   • Class 2 severe obesity with serious comorbidity and body mass index (BMI) of 37.0 to 37.9 in [facility])    • Concussion     Last assessed 2017   • Depression    • History of ischemic stroke without residual deficits 2013    s/p Brain Surgery, s/p Meds and Rehab   • 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, 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 , s/p corrective Brain Surgery at [facility]   • IFG (impaired fasting glucose)    • Primary stress urinary incontinence 2023   • Vitamin D deficiency      Past Surgical History:   Procedure Laterality Date   • BRAIN SURGERY      R Temporal Resection / Amyglahippocampectomy   •  SECTION      x 2   • TUBAL LIGATION        Family History:     Family History   Problem Relation Age of Onset   • Aortic aneurysm Mother 79        Abdominal Aortic Aneurysm, +Smoker   • No Known Problems Father    • Cancer Maternal Grandmother         79   • No Known Problems Brother    • No Known Problems Son    • No Known Problems Half-Sister    • No Known Problems Half-Sister    • No Known Problems Half-Sister    • No Known Problems Half-Sister    • No Known Problems Maternal Aunt    • Psychiatric Illness Neg Hx       Social History:     Social History     Socioeconomic History   • Marital status: /Civil Union     Spouse name: None   • Number of children: 2   • Years of education: None   • Highest education level: Some college, no degree   Occupational History   • Occupation: Disabled - Previous      Comment: s/p Brain Surgery / H/O Epilepsy   Tobacco Use   • Smoking status: Never   • Smokeless tobacco: Never   Vaping Use   • Vaping Use: Never used   Substance and Sexual Activity   • Alcohol use: No     Comment: doesnt drink. • Drug use: Never   • Sexual activity: Not Currently     Partners: Male     Birth control/protection: Post-menopausal, Female Sterilization     Comment: Tubal Ligation   Other Topics Concern   • None   Social History Narrative        Lives with     2 Children - 2 Sons    Disabled s/p Brain Surgery / H/O Epilepsy - Previous      Social Determinants of Health     Financial Resource Strain: Low Risk  (8/22/2023)    Overall Financial Resource Strain (CARDIA)    • Difficulty of Paying Living Expenses: Not hard at all   Food Insecurity: Not on file   Transportation Needs: No Transportation Needs (8/22/2023)    PRAPARE - Transportation    • Lack of Transportation (Medical): No    • Lack of Transportation (Non-Medical):  No   Physical Activity: Unknown (1/21/2021)    Exercise Vital Sign    • Days of Exercise per Week: 6 days    • Minutes of Exercise per Session: Not on file   Stress: Stress Concern Present (1/21/2021)    [address] [place] [address]    • Feeling of Stress : To some extent   Social Connections: Unknown (1/21/2021)    Social Connection and Isolation Panel [NHANES]    • Frequency of Communication with Friends and Family: Not on file    • Frequency of Social Gatherings with Friends and Family: Not on file    • Attends Christian Services: Not on file    • Active Member of Clubs or Organizations: Not on file    • Attends Club or Organization Meetings: Not on file    • Marital Status:    Intimate Partner Violence: Not At Risk (1/21/2021)    Humiliation, Afraid, Rape, and Kick questionnaire    • Fear of Current or Ex-Partner: No    • Emotionally Abused: No    • Physically Abused: No    • Sexually Abused: No   Housing Stability: Not on file      Medications and Allergies:     Current Outpatient Medications   Medication Sig Dispense Refill   • busPIRone (BUSPAR) 15 mg tablet Take 1 tablet (15 mg total) by mouth 3 (three) times a day for 15 days 270 tablet 0   • cholecalciferol (VITAMIN D3) 1,000 units tablet Take 1 tablet (1,000 Units total) by mouth daily 30 tablet 0   • citalopram (CeleXA) 40 mg tablet TAKE 1 TABLET BY MOUTH DAILY 90 tablet 3   • lamoTRIgine (LaMICtal) 100 mg tablet Take 1 tablet (100 mg total) by mouth daily 30 tablet 3   • Multiple Vitamin (multivitamin) tablet Take 1 tablet by mouth daily       No current facility-administered medications for this visit.      No Known Allergies   Immunizations:     Immunization History   Administered Date(s) Administered   • COVID-19 PFIZER VACCINE 0.3 ML IM 03/26/2021, 04/17/2021, 12/11/2021   • INFLUENZA 11/22/2022   • Influenza, recombinant, quadrivalent,injectable, preservative free 01/19/2021, 02/15/2022   • Tdap 07/30/2023   • Zoster Vaccine Recombinant 05/24/2022      Health Maintenance:         Topic Date Due   • Breast Cancer Screening: Mammogram  06/07/2024   • Colorectal Cancer Screening  10/14/2025   • Cervical Cancer Screening  10/19/2025   • HIV Screening  Completed   • Hepatitis C Screening Completed         Topic Date Due   • Influenza Vaccine (1) 09/01/2023      Medicare Screening Tests and Risk Assessments:     Lesly Messina is here for her Subsequent Wellness visit. Health Risk Assessment:   Patient rates overall health as good. Patient feels that their physical health rating is slightly better. Patient is very satisfied with their life. Eyesight was rated as same. Hearing was rated as same. Patient feels that their emotional and mental health rating is much better. Patients states they are never, rarely angry. Patient states they are sometimes unusually tired/fatigued. Pain experienced in the last 7 days has been none. Patient states that she has experienced no weight loss or gain in last 6 months. Now going to [address] 3x week    Fall Risk Screening: In the past year, patient has experienced: no history of falling in past year      Urinary Incontinence Screening:   Patient has leaked urine accidently in the last six months. stress incontinence    Home Safety:  Patient does not have trouble with stairs inside or outside of their home. Patient has working smoke alarms and has no working carbon monoxide detector. Home safety hazards include: none. None of the above. Good lighting and night lights good jenna no slip on rugs. bathroom safe, hand holds on shower doors. Medications all work, none cause fatique. I do not smoke. or take drugs. Nutrition:   Current diet is Regular. Lowered carbs, still have a problem with some treats. Im eating a fairly colorful diet, ruffage and greens. Medications:   Patient is not currently taking any over-the-counter supplements. Patient is able to manage medications. Activities of Daily Living (ADLs)/Instrumental Activities of Daily Living (IADLs):   Walk and transfer into and out of bed and chair?: Yes  Dress and groom yourself?: Yes    Bathe or shower yourself?: Yes    Feed yourself?  Yes  Do your laundry/housekeeping?: Yes  Manage your money, pay your bills and track your expenses?: Yes  Make your own meals?: Yes    Do your own shopping?: Yes    Previous Hospitalizations:   Any hospitalizations or ED visits within the last 12 months?: No      Hospitalization Comments: EMERGENCY out patient (five stitchs needed in left hand). In kitchen cooking and cleaning dish fell from cubbard and cut my hand. Advance Care Planning:   Living will: No    Durable POA for healthcare: No    Advanced directive: No      Cognitive Screening:   Provider or family/friend/caregiver concerned regarding cognition?: No    PREVENTIVE SCREENINGS      Cardiovascular Screening:    General: Screening Not Indicated and History Lipid Disorder      Diabetes Screening:     General: Screening Current      Colorectal Cancer Screening:     General: Screening Current      Breast Cancer Screening:     General: Screening Current      Cervical Cancer Screening:    General: Screening Current      Osteoporosis Screening:    General: Risks and Benefits Discussed    Due for: DXA Axial      Abdominal Aortic Aneurysm (AAA) Screening:        General: Screening Current      Lung Cancer Screening:     General: Screening Not Indicated      Hepatitis C Screening:    General: Screening Current    Screening, Brief Intervention, and Referral to Treatment (SBIRT)    Screening  Typical number of drinks in a day: 1  Typical number of drinks in a week: 3  Interpretation: Low risk drinking behavior. AUDIT-C Screenin) How often did you have a drink containing alcohol in the past year? monthly or less  2) How many drinks did you have on a typical day when you were drinking in the past year?  1 to 2  3) How often did you have 6 or more drinks on one occasion in the past year? never    AUDIT-C Score: 1  Interpretation: Score 0-2 (female): Negative screen for alcohol misuse    Single Item Drug Screening:  How often have you used an illegal drug (including marijuana) or a prescription medication for non-medical reasons in the past year? never    Single Item Drug Screen Score: 0  Interpretation: Negative screen for possible drug use disorder    Time Spent  Time spent screening/evaluating the patient for alcohol misuse: 5 minutes. Other Counseling Topics:   Calcium and vitamin D intake and regular weightbearing exercise. No results found. Physical Exam:     /66   Pulse 74   Temp (!) 97.4 °F (36.3 °C)   Resp 18   Ht 5' (1.524 m)   Wt 91.4 kg (201 lb 9.6 oz)   LMP 01/01/2011   SpO2 96%   BMI 39.37 kg/m²     Physical Exam     See other note.         Farheen Aparicio, DO

## 2023-08-26 ENCOUNTER — LAB (OUTPATIENT)
Dept: LAB | Facility: CLINIC | Age: 63
End: 2023-08-26
Payer: COMMERCIAL

## 2023-08-26 DIAGNOSIS — E78.5 HYPERLIPIDEMIA, UNSPECIFIED HYPERLIPIDEMIA TYPE: ICD-10-CM

## 2023-08-26 DIAGNOSIS — E66.01 CLASS 2 SEVERE OBESITY WITH SERIOUS COMORBIDITY AND BODY MASS INDEX (BMI) OF 38.0 TO 38.9 IN ADULT, UNSPECIFIED OBESITY TYPE (HCC): ICD-10-CM

## 2023-08-26 DIAGNOSIS — E66.9 OBESITY (BMI 35.0-39.9 WITHOUT COMORBIDITY): ICD-10-CM

## 2023-08-26 DIAGNOSIS — E55.9 VITAMIN D DEFICIENCY: ICD-10-CM

## 2023-08-26 DIAGNOSIS — R73.03 PREDIABETES: ICD-10-CM

## 2023-08-26 DIAGNOSIS — F41.9 ANXIETY: ICD-10-CM

## 2023-08-26 DIAGNOSIS — R73.01 IFG (IMPAIRED FASTING GLUCOSE): ICD-10-CM

## 2023-08-26 DIAGNOSIS — E66.01 SEVERE OBESITY (BMI 35.0-39.9) WITH COMORBIDITY (HCC): ICD-10-CM

## 2023-08-26 DIAGNOSIS — F32.9 CURRENT EPISODE OF MAJOR DEPRESSIVE DISORDER WITHOUT PRIOR EPISODE, UNSPECIFIED DEPRESSION EPISODE SEVERITY: ICD-10-CM

## 2023-08-26 LAB
25(OH)D3 SERPL-MCNC: 39.1 NG/ML (ref 30–100)
ALBUMIN SERPL BCP-MCNC: 4.4 G/DL (ref 3.5–5)
ALP SERPL-CCNC: 58 U/L (ref 34–104)
ALT SERPL W P-5'-P-CCNC: 14 U/L (ref 7–52)
ANION GAP SERPL CALCULATED.3IONS-SCNC: 8 MMOL/L
AST SERPL W P-5'-P-CCNC: 15 U/L (ref 13–39)
BASOPHILS # BLD AUTO: 0.04 THOUSANDS/ÂΜL (ref 0–0.1)
BASOPHILS NFR BLD AUTO: 1 % (ref 0–1)
BILIRUB SERPL-MCNC: 0.6 MG/DL (ref 0.2–1)
BUN SERPL-MCNC: 13 MG/DL (ref 5–25)
CALCIUM SERPL-MCNC: 9.5 MG/DL (ref 8.4–10.2)
CHLORIDE SERPL-SCNC: 99 MMOL/L (ref 96–108)
CHOLEST SERPL-MCNC: 190 MG/DL
CO2 SERPL-SCNC: 28 MMOL/L (ref 21–32)
CREAT SERPL-MCNC: 0.71 MG/DL (ref 0.6–1.3)
EOSINOPHIL # BLD AUTO: 0.05 THOUSAND/ÂΜL (ref 0–0.61)
EOSINOPHIL NFR BLD AUTO: 1 % (ref 0–6)
ERYTHROCYTE [DISTWIDTH] IN BLOOD BY AUTOMATED COUNT: 13.6 % (ref 11.6–15.1)
EST. AVERAGE GLUCOSE BLD GHB EST-MCNC: 134 MG/DL
GFR SERPL CREATININE-BSD FRML MDRD: 90 ML/MIN/1.73SQ M
GLUCOSE P FAST SERPL-MCNC: 102 MG/DL (ref 65–99)
HBA1C MFR BLD: 6.3 %
HCT VFR BLD AUTO: 45.1 % (ref 34.8–46.1)
HDLC SERPL-MCNC: 66 MG/DL
HGB BLD-MCNC: 14.4 G/DL (ref 11.5–15.4)
IMM GRANULOCYTES # BLD AUTO: 0.02 THOUSAND/UL (ref 0–0.2)
IMM GRANULOCYTES NFR BLD AUTO: 0 % (ref 0–2)
LDLC SERPL CALC-MCNC: 107 MG/DL (ref 0–100)
LDLC SERPL DIRECT ASSAY-MCNC: 112 MG/DL (ref 0–100)
LYMPHOCYTES # BLD AUTO: 1.53 THOUSANDS/ÂΜL (ref 0.6–4.47)
LYMPHOCYTES NFR BLD AUTO: 18 % (ref 14–44)
MCH RBC QN AUTO: 29.9 PG (ref 26.8–34.3)
MCHC RBC AUTO-ENTMCNC: 31.9 G/DL (ref 31.4–37.4)
MCV RBC AUTO: 94 FL (ref 82–98)
MONOCYTES # BLD AUTO: 0.77 THOUSAND/ÂΜL (ref 0.17–1.22)
MONOCYTES NFR BLD AUTO: 9 % (ref 4–12)
NEUTROPHILS # BLD AUTO: 6.21 THOUSANDS/ÂΜL (ref 1.85–7.62)
NEUTS SEG NFR BLD AUTO: 71 % (ref 43–75)
NONHDLC SERPL-MCNC: 124 MG/DL
NRBC BLD AUTO-RTO: 0 /100 WBCS
PLATELET # BLD AUTO: 274 THOUSANDS/UL (ref 149–390)
PMV BLD AUTO: 9.9 FL (ref 8.9–12.7)
POTASSIUM SERPL-SCNC: 4.2 MMOL/L (ref 3.5–5.3)
PROT SERPL-MCNC: 7.6 G/DL (ref 6.4–8.4)
RBC # BLD AUTO: 4.81 MILLION/UL (ref 3.81–5.12)
SODIUM SERPL-SCNC: 135 MMOL/L (ref 135–147)
TRIGL SERPL-MCNC: 83 MG/DL
TSH SERPL DL<=0.05 MIU/L-ACNC: 2.82 UIU/ML (ref 0.45–4.5)
WBC # BLD AUTO: 8.62 THOUSAND/UL (ref 4.31–10.16)

## 2023-08-26 PROCEDURE — 85025 COMPLETE CBC W/AUTO DIFF WBC: CPT

## 2023-08-26 PROCEDURE — 80053 COMPREHEN METABOLIC PANEL: CPT

## 2023-08-26 PROCEDURE — 36415 COLL VENOUS BLD VENIPUNCTURE: CPT

## 2023-08-26 PROCEDURE — 84443 ASSAY THYROID STIM HORMONE: CPT

## 2023-08-26 PROCEDURE — 80061 LIPID PANEL: CPT

## 2023-08-26 PROCEDURE — 83721 ASSAY OF BLOOD LIPOPROTEIN: CPT

## 2023-08-26 PROCEDURE — 82306 VITAMIN D 25 HYDROXY: CPT

## 2023-08-26 PROCEDURE — 83036 HEMOGLOBIN GLYCOSYLATED A1C: CPT

## 2023-08-28 PROBLEM — E78.5 HYPERLIPIDEMIA: Status: ACTIVE | Noted: 2023-08-28

## 2023-08-28 NOTE — RESULT ENCOUNTER NOTE
Prediabetes - Worsening. Previously, patient declined starting Metformin. Recommend lifestyle modifications - low sugar, low carb diet, exercise, weight loss. Hyperlipidemia-elevated LDL (bad) cholesterol and HDL (good) cholesterol - Recommend lifestyle modifications - low fat, low cholesterol diet, exercise, and weight loss.       Other resulted labs are normal.      Message sent to patient via [employer] patient portal.

## 2023-09-05 DIAGNOSIS — F31.61 BIPOLAR DISORDER, CURRENT EPISODE MIXED, MILD (HCC): ICD-10-CM

## 2023-09-07 RX ORDER — LAMOTRIGINE 100 MG/1
100 TABLET ORAL DAILY
Qty: 60 TABLET | Refills: 5 | Status: SHIPPED | OUTPATIENT
Start: 2023-09-07

## 2023-09-13 ENCOUNTER — OFFICE VISIT (OUTPATIENT)
Dept: PSYCHIATRY | Facility: CLINIC | Age: 63
End: 2023-09-13
Payer: COMMERCIAL

## 2023-09-13 VITALS
HEART RATE: 68 BPM | HEIGHT: 60 IN | SYSTOLIC BLOOD PRESSURE: 134 MMHG | BODY MASS INDEX: 37.89 KG/M2 | DIASTOLIC BLOOD PRESSURE: 88 MMHG | WEIGHT: 193 LBS

## 2023-09-13 DIAGNOSIS — F31.61 BIPOLAR DISORDER, CURRENT EPISODE MIXED, MILD (HCC): Primary | ICD-10-CM

## 2023-09-13 DIAGNOSIS — F41.1 GAD (GENERALIZED ANXIETY DISORDER): ICD-10-CM

## 2023-09-13 DIAGNOSIS — F32.1 CURRENT MODERATE EPISODE OF MAJOR DEPRESSIVE DISORDER WITHOUT PRIOR EPISODE (HCC): ICD-10-CM

## 2023-09-13 PROCEDURE — 90833 PSYTX W PT W E/M 30 MIN: CPT | Performed by: NURSE PRACTITIONER

## 2023-09-13 PROCEDURE — 99214 OFFICE O/P EST MOD 30 MIN: CPT | Performed by: NURSE PRACTITIONER

## 2023-09-16 NOTE — BH TREATMENT PLAN
TREATMENT PLAN (Medication Management Only)         [facility]     Name and Date of Birth:  Bc Mott 57 y.o. 1960  Date of Treatment Plan: March 20, 2023, September 13, 2023  Diagnosis/Diagnoses:    1. Current moderate episode of major depressive disorder without prior episode ([address] [facility])    2. SWAPNA (generalized anxiety disorder)       Strengths/Personal Resources for Self-Care: supportive family, taking medications as prescribed, ability to communicate needs. Area/Areas of need (in own words): mood instability  1. Long Term Goal: improve mood stability. Target Date:6 months - 3/13/2024  Person/Persons responsible for completion of goal: Luiza Banks, provider,   2. Short Term Objective (s) - How will we reach this goal?:   A. Provider new recommended medication/dosage changes and/or continue medication(s): continue current medications as prescribed. B. structure. C. sleeping adequately, diet nutrition, self care. .  Target Date:6 months - 3/13/2024  Person/Persons Responsible for Completion of Goal: maryam Thomas,   Progress Towards Goals: initiating treatment  Treatment Modality: medication management every 1-3  months  Review due 180 days from date of this plan: 6 months - 3/13/2024  Expected length of service: maintenance  My Physician/PA/NP and I have developed this plan together and I agree to work on the goals and objectives.  I understand the treatment goals that were developed for my treatment.

## 2023-09-16 NOTE — PSYCH
Visit Time    Visit Start Time: 2370 AM  Visit Stop Time: 11:00 AM  Total Visit Duration: 30 minutes    Subjective:     Patient ID: Bc Mott is a 57 y.o. female history of bipolar depression, anxiety seen for medication management and mood assessment. Luiza Banks reports medication has helped her moods stabilize. She reports eating and sleeping well. She has been trying to go to the gym with her  to work out and become healthy. She denies depression or anxiety. Takes medication as prescribed family are supportive.     HPI ROS Appetite Changes and Sleep: normal appetite and normal energy level    Review Of Systems:     Mood Normal   Behavior Normal    Thought Content Normal   General Normal    Personality Normal   Other Psych Symptoms Normal   Constitutional As Noted in HPI   ENT As Noted in HPI   Cardiovascular As Noted in HPI   Respiratory As Noted in HPI   Gastrointestinal As Noted in HPI   Genitourinary As Noted in HPI   Musculoskeletal As Noted in HPI   Integumentary As Noted in HPI   Neurological As Noted in HPI   Endocrine Normal    Other Symptoms Normal        Substance Abuse History:  Social History     Substance and Sexual Activity   Drug Use Never       Family Psychiatric History:   Family History   Problem Relation Age of Onset   • Aortic aneurysm Mother 79        Abdominal Aortic Aneurysm, +Smoker   • No Known Problems Father    • Cancer Maternal Grandmother         79   • No Known Problems Brother    • No Known Problems Son    • No Known Problems Half-Sister    • No Known Problems Half-Sister    • No Known Problems Half-Sister    • No Known Problems Half-Sister    • No Known Problems Maternal Aunt    • Psychiatric Illness Neg Hx        The following portions of the patient's history were reviewed and updated as appropriate: allergies, current medications, past family history, past medical history, past social history, past surgical history and problem list.    Social History Socioeconomic History   • Marital status: /Civil Union     Spouse name: Not on file   • Number of children: 2   • Years of education: Not on file   • Highest education level: Some college, no degree   Occupational History   • Occupation: Disabled - Previous      Comment: s/p Brain Surgery / H/O Epilepsy   Tobacco Use   • Smoking status: Never   • Smokeless tobacco: Never   Vaping Use   • Vaping Use: Never used   Substance and Sexual Activity   • Alcohol use: No     Comment: doesnt drink. • Drug use: Never   • Sexual activity: Not Currently     Partners: Male     Birth control/protection: Post-menopausal, Female Sterilization     Comment: Tubal Ligation   Other Topics Concern   • Not on file   Social History Narrative        Lives with     2 Children - 2 Sons    Disabled s/p Brain Surgery / H/O Epilepsy - Previous      Social Determinants of Health     Financial Resource Strain: Low Risk  (8/22/2023)    Overall Financial Resource Strain (CARDIA)    • Difficulty of Paying Living Expenses: Not hard at all   Food Insecurity: Not on file   Transportation Needs: No Transportation Needs (8/22/2023)    PRAPARE - Transportation    • Lack of Transportation (Medical): No    • Lack of Transportation (Non-Medical): No   Physical Activity: Unknown (1/21/2021)    Exercise Vital Sign    • Days of Exercise per Week: 6 days    • Minutes of Exercise per Session: Not on file   Stress: Stress Concern Present (1/21/2021)    [facility]    • Feeling of Stress :  To some extent   Social Connections: Unknown (1/21/2021)    Social Connection and Isolation Panel [NHANES]    • Frequency of Communication with Friends and Family: Not on file    • Frequency of Social Gatherings with Friends and Family: Not on file    • Attends Caodaism Services: Not on file    • Active Member of Clubs or Organizations: Not on file    • Attends Club or Organization Meetings: Not on file    • Marital Status:    Intimate Partner Violence: Not At Risk (1/21/2021)    Humiliation, Afraid, Rape, and Kick questionnaire    • Fear of Current or Ex-Partner: No    • Emotionally Abused: No    • Physically Abused: No    • Sexually Abused: No   Housing Stability: Not on file     Social History     Social History Narrative        Lives with     2 Children - 2 Sons    Disabled s/p Brain Surgery / H/O Epilepsy - Previous        Objective:       Mental status:  Appearance calm and cooperative , adequate hygiene and grooming and good eye contact    Mood euthymic   Affect affect appropriate    Speech a normal rate   Thought Processes normal thought processes   Hallucinations no hallucinations present    Thought Content no delusions   Abnormal Thoughts no suicidal thoughts  and no homicidal thoughts    Orientation  oriented to person and place and time   Remote Memory short term memory intact and long term memory intact   Attention Span concentration intact   Intellect Appears to be of Average Intelligence   Insight Insight intact   Judgement judgment was intact   Muscle Strength Muscle strength and tone were normal   Language no difficulty naming common objects   Fund of Knowledge displays adequate knowledge of current events   Pain none   Pain Scale 0       Assessment/Plan:       Diagnoses and all orders for this visit:    Bipolar disorder, current episode mixed, mild (HCC)    SWAPNA (generalized anxiety disorder)            Treatment Recommendations- Risks Benefits      Immediate Medical/Psychiatric/Psychotherapy Treatments and Any Precautions:  reviewed medication continue with treatment plan    Risks, Benefits And Possible Side Effects Of Medications:  {PSYCH RISK, BENEFITS AND POSSIBLE SIDE EFFECTS (Optional):57307     Psychotherapy Provided:30 min Individual psychotherapy provided.    Supportive therapy  Medication evaluation  Mood assessment    Goals discussed in session: Maintain stable mood    And plan updated

## 2023-10-05 DIAGNOSIS — F31.61 BIPOLAR DISORDER, CURRENT EPISODE MIXED, MILD (HCC): ICD-10-CM

## 2023-10-05 RX ORDER — LAMOTRIGINE 100 MG/1
100 TABLET ORAL DAILY
Qty: 60 TABLET | Refills: 5 | Status: SHIPPED | OUTPATIENT
Start: 2023-10-05

## 2023-10-05 NOTE — TELEPHONE ENCOUNTER
Medication Refill Request     Name of Medication lamoTRIgine (LaMICtal) 100 mg tablet  Dose/Frequency TAKE 1 TABLET BY MOUTH DAILY  Quantity 61  Verified pharmacy   [x]  Verified ordering Provider   [x]  Does patient have enough for the next 3 days? Yes [] No [x]  Does patient have a follow-up appointment scheduled?  Yes [x] No []   If so when is appointment: 11/08/23 @ 9:30 AM

## 2023-10-25 ENCOUNTER — ANNUAL EXAM (OUTPATIENT)
Dept: OBGYN CLINIC | Facility: CLINIC | Age: 63
End: 2023-10-25

## 2023-10-25 VITALS
HEIGHT: 60 IN | DIASTOLIC BLOOD PRESSURE: 70 MMHG | WEIGHT: 195.4 LBS | SYSTOLIC BLOOD PRESSURE: 114 MMHG | BODY MASS INDEX: 38.36 KG/M2

## 2023-10-25 DIAGNOSIS — L30.9 VULVAR DERMATITIS: Primary | ICD-10-CM

## 2023-10-25 DIAGNOSIS — Z12.31 ENCOUNTER FOR SCREENING MAMMOGRAM FOR MALIGNANT NEOPLASM OF BREAST: ICD-10-CM

## 2023-10-25 DIAGNOSIS — Z01.419 ENCOUNTER FOR GYNECOLOGICAL EXAMINATION (GENERAL) (ROUTINE) WITHOUT ABNORMAL FINDINGS: ICD-10-CM

## 2023-10-25 RX ORDER — CLOTRIMAZOLE 1 G/ML
SOLUTION TOPICAL
Qty: 30 ML | Refills: 0 | Status: SHIPPED | OUTPATIENT
Start: 2023-10-25

## 2023-10-25 NOTE — PROGRESS NOTES
ASSESSMENT & PLAN:         The following were reviewed in today's visit: Current ASCCP Guidelines, breast self exam, mammography screening ordered, menopause, and vulvar dermatitis. Will send antifungal for vulva. Patient to return to office yearly for annual exam.   All questions have been answered to her satisfaction. CC:  Annual Gynecologic Examination    HPI: Bc Mott is a 70 y.o. [id number] who presents for annual gynecologic examination. She has the following concerns:  Denies PMB. Health Maintenance:     She wears her seatbelt routinely. She does perform regular monthly self breast exams. She feels safe at home. 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, 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 , s/p corrective Brain Surgery at [facility]    Hyperlipidemia 2023    IFG (impaired fasting glucose)     Primary stress urinary incontinence 2023    Vitamin D deficiency        Past Surgical History:   Procedure Laterality Date    BRAIN SURGERY      R Temporal Resection / Amyglahippocampectomy     SECTION      x 2    TUBAL LIGATION         Past OB/Gyn History:  OB History          2    Para   2    Term   2       0    AB        Living   2         SAB        IAB        Ectopic        Multiple        Live Births   2               Menstrual history: Patient is post menopausal.   History of abnormal pap smears:  No per pt     Patient is not currently sexually active.        Family History   Problem Relation Age of Onset    Aortic aneurysm Mother 79        Abdominal Aortic Aneurysm, +Smoker    No Known Problems Father     Cancer Maternal Grandmother         79    No Known Problems Brother     No Known Problems Son     No Known Problems Half-Sister     No Known Problems Half-Sister     No Known Problems Half-Sister     No Known Problems Half-Sister     No Known Problems Maternal Aunt     Psychiatric Illness Neg Hx        Social History:  Social History     Socioeconomic History    Marital status: /Civil Union     Spouse name: Not on file    Number of children: 2    Years of education: Not on file    Highest education level: Some college, no degree   Occupational History    Occupation: Disabled - Previous      Comment: s/p Brain Surgery / H/O Epilepsy   Tobacco Use    Smoking status: Never    Smokeless tobacco: Never   Vaping Use    Vaping Use: Never used   Substance and Sexual Activity    Alcohol use: No     Comment: doesnt drink. Drug use: Never    Sexual activity: Not Currently     Partners: Male     Birth control/protection: Post-menopausal, Female Sterilization     Comment: Tubal Ligation   Other Topics Concern    Not on file   Social History Narrative        Lives with     2 Children - 2 Sons    Disabled s/p Brain Surgery / H/O Epilepsy - Previous      Social Determinants of Health     Financial Resource Strain: Low Risk  (8/22/2023)    Overall Financial Resource Strain (CARDIA)     Difficulty of Paying Living Expenses: Not hard at all   Food Insecurity: Not on file   Transportation Needs: No Transportation Needs (8/22/2023)    PRAPARE - Transportation     Lack of Transportation (Medical): No     Lack of Transportation (Non-Medical): No   Physical Activity: Unknown (1/21/2021)    Exercise Vital Sign     Days of Exercise per Week: 6 days     Minutes of Exercise per Session: Not on file   Stress: Stress Concern Present (1/21/2021)    [facility]     Feeling of Stress :  To some extent   Social Connections: Unknown (1/21/2021)    Social Connection and Isolation Panel [NHANES]     Frequency of Communication with Friends and Family: Not on file     Frequency of Social Gatherings with Friends and Family: Not on file     Attends Muslim Services: Not on file     Active Member of Clubs or Organizations: Not on file     Attends Club or Organization Meetings: Not on file     Marital Status:    Intimate Partner Violence: Not At Risk (1/21/2021)    Humiliation, Afraid, Rape, and Kick questionnaire     Fear of Current or Ex-Partner: No     Emotionally Abused: No     Physically Abused: No     Sexually Abused: No   Housing Stability: Not on file     Presently lives with spouse. Patient is . No Known Allergies      Current Outpatient Medications:     busPIRone (BUSPAR) 15 mg tablet, Take 1 tablet (15 mg total) by mouth 3 (three) times a day for 15 days, Disp: 270 tablet, Rfl: 0    cholecalciferol (VITAMIN D3) 1,000 units tablet, Take 1 tablet (1,000 Units total) by mouth daily, Disp: 30 tablet, Rfl: 0    citalopram (CeleXA) 40 mg tablet, TAKE 1 TABLET BY MOUTH DAILY, Disp: 90 tablet, Rfl: 3    clotrimazole 1 % external solution, Apply small amount to affected area twice daily. , Disp: 30 mL, Rfl: 0    lamoTRIgine (LaMICtal) 100 mg tablet, Take 1 tablet (100 mg total) by mouth daily, Disp: 60 tablet, Rfl: 5    Multiple Vitamin (multivitamin) tablet, Take 1 tablet by mouth daily, Disp: , Rfl:     Review of Systems:  Review of Systems   Constitutional:  Negative for activity change, chills, fever and unexpected weight change. HENT:  Negative for congestion, ear pain, hearing loss and sore throat. Respiratory:  Negative for cough, chest tightness and shortness of breath. Cardiovascular:  Negative for chest pain and leg swelling. Gastrointestinal:  Negative for abdominal pain, constipation, diarrhea, nausea and vomiting.    Genitourinary:  Negative for difficulty urinating, dysuria, frequency, menstrual problem, pelvic pain, vaginal discharge and vaginal pain. Skin:  Negative for color change and rash. Neurological:  Negative for dizziness, numbness and headaches. Psychiatric/Behavioral:  Negative for agitation and confusion. Physical Exam:  /70 (BP Location: Right arm, Patient Position: Sitting, Cuff Size: Standard)   Ht 5' (1.524 m)   Wt 88.6 kg (195 lb 6.4 oz)   LMP 01/01/2011   BMI 38.16 kg/m²    Physical Exam  Constitutional:       General: She is not in acute distress. Appearance: Normal appearance. She is obese. Genitourinary:      Vulva, bladder, rectum and urethral meatus normal.      No lesions in the vagina. Right Labia: No rash, tenderness or lesions. Left Labia: No tenderness, lesions or rash. No labial fusion noted. Vulva exam comments: Erythema of b/l labia majora and inguinal region. No vaginal discharge or tenderness. No vaginal prolapse present. No vaginal atrophy present. Right Adnexa: not tender, not full and no mass present. Left Adnexa: not tender, not full and no mass present. No cervical motion tenderness or friability. Uterus is not enlarged or prolapsed. Breasts:     Breasts are soft. Right: No inverted nipple, mass, nipple discharge or skin change. Left: No inverted nipple, mass, nipple discharge or skin change. HENT:      Head: Normocephalic and atraumatic. Nose: Nose normal.   Eyes:      Conjunctiva/sclera: Conjunctivae normal.      Pupils: Pupils are equal, round, and reactive to light. Cardiovascular:      Rate and Rhythm: Normal rate and regular rhythm. Pulses: Normal pulses. Heart sounds: Normal heart sounds. Pulmonary:      Effort: Pulmonary effort is normal. No respiratory distress. Breath sounds: Normal breath sounds. No wheezing. Abdominal:      General: Abdomen is flat. There is no distension. Palpations: Abdomen is soft. Tenderness: There is no abdominal tenderness. There is no guarding. Musculoskeletal:         General: Normal range of motion. Cervical back: Normal range of motion and neck supple. Neurological:      General: No focal deficit present. Mental Status: She is alert and oriented to person, place, and time. Mental status is at baseline. Skin:     General: Skin is warm and dry. Psychiatric:         Mood and Affect: Mood normal.         Behavior: Behavior normal.         Thought Content: Thought content normal.         Judgment: Judgment normal.   Vitals and nursing note reviewed.

## 2023-11-08 ENCOUNTER — OFFICE VISIT (OUTPATIENT)
Dept: PSYCHIATRY | Facility: CLINIC | Age: 63
End: 2023-11-08
Payer: COMMERCIAL

## 2023-11-08 VITALS
SYSTOLIC BLOOD PRESSURE: 159 MMHG | HEART RATE: 63 BPM | HEIGHT: 60 IN | BODY MASS INDEX: 38.28 KG/M2 | WEIGHT: 195 LBS | DIASTOLIC BLOOD PRESSURE: 90 MMHG

## 2023-11-08 DIAGNOSIS — F31.61 BIPOLAR DISORDER, CURRENT EPISODE MIXED, MILD (HCC): ICD-10-CM

## 2023-11-08 DIAGNOSIS — F32.1 CURRENT MODERATE EPISODE OF MAJOR DEPRESSIVE DISORDER WITHOUT PRIOR EPISODE (HCC): ICD-10-CM

## 2023-11-08 DIAGNOSIS — F41.1 GAD (GENERALIZED ANXIETY DISORDER): Primary | ICD-10-CM

## 2023-11-08 PROCEDURE — 99214 OFFICE O/P EST MOD 30 MIN: CPT | Performed by: NURSE PRACTITIONER

## 2023-11-08 PROCEDURE — 90833 PSYTX W PT W E/M 30 MIN: CPT | Performed by: NURSE PRACTITIONER

## 2023-11-08 NOTE — PSYCH
Visit Time    Visit Start Time: 9:30  Visit Stop Time: 10:00  Total Visit Duration:  30 minutes    Subjective:     Patient ID: Hugh Schlatter is a 70 y.o. female history of SWAPNA, depression, bipolar disorder seen for medication management and mood assessment. Maria C Arciniega reports that she is happy, trying to get to the gym and work out with her . She reports being social and friendly, denies cecy or depression. Denies suicidal ideation PHQ-9 score of 1 indicates no depression. She reports appetite is good and she has some trouble sleeping; however, does get her rest.  She reports medication is effective in managing a stable mood, especially the lamotrigine. Takes medication as prescribed. Reports  and family are supportive.     HPI ROS Appetite Changes and Sleep: normal appetite and normal energy level    Review Of Systems:     Mood Normal   Behavior Normal    Thought Content Normal   General Emotional Problems and Sleep Disturbances   Personality Normal   Other Psych Symptoms Normal   Constitutional As Noted in HPI   ENT As Noted in HPI   Cardiovascular As Noted in HPI   Respiratory As Noted in HPI   Gastrointestinal As Noted in HPI   Genitourinary As Noted in HPI   Musculoskeletal As Noted in HPI   Integumentary As Noted in HPI   Neurological As Noted in HPI   Endocrine Normal    Other Symptoms Normal        Laboratory Results:     Substance Abuse History:  Social History     Substance and Sexual Activity   Drug Use Never       Family Psychiatric History:   Family History   Problem Relation Age of Onset    Aortic aneurysm Mother 79        Abdominal Aortic Aneurysm, +Smoker    No Known Problems Father     Cancer Maternal Grandmother         79    No Known Problems Brother     No Known Problems Son     No Known Problems Half-Sister     No Known Problems Half-Sister     No Known Problems Half-Sister     No Known Problems Half-Sister     No Known Problems Maternal Aunt     Psychiatric Illness Neg Hx The following portions of the patient's history were reviewed and updated as appropriate: allergies, current medications, past family history, past medical history, past social history, past surgical history, and problem list.    Social History     Socioeconomic History    Marital status: /Civil Union     Spouse name: Not on file    Number of children: 2    Years of education: Not on file    Highest education level: Some college, no degree   Occupational History    Occupation: Disabled - Previous      Comment: s/p Brain Surgery / H/O Epilepsy   Tobacco Use    Smoking status: Never    Smokeless tobacco: Never   Vaping Use    Vaping Use: Never used   Substance and Sexual Activity    Alcohol use: No     Comment: doesnt drink. Drug use: Never    Sexual activity: Not Currently     Partners: Male     Birth control/protection: Post-menopausal, Female Sterilization     Comment: Tubal Ligation   Other Topics Concern    Not on file   Social History Narrative        Lives with     2 Children - 2 Sons    Disabled s/p Brain Surgery / H/O Epilepsy - Previous      Social Determinants of Health     Financial Resource Strain: Low Risk  (8/22/2023)    Overall Financial Resource Strain (CARDIA)     Difficulty of Paying Living Expenses: Not hard at all   Food Insecurity: Not on file   Transportation Needs: No Transportation Needs (8/22/2023)    PRAPARE - Transportation     Lack of Transportation (Medical): No     Lack of Transportation (Non-Medical): No   Physical Activity: Unknown (1/21/2021)    Exercise Vital Sign     Days of Exercise per Week: 6 days     Minutes of Exercise per Session: Not on file   Stress: Stress Concern Present (1/21/2021)    [address] [facility]     Feeling of Stress :  To some extent   Social Connections: Unknown (1/21/2021)    Social Connection and Isolation Panel [NHANES]     Frequency of Communication with Friends and Family: Not on file     Frequency of Social Gatherings with Friends and Family: Not on file     Attends Temple Services: Not on file     Active Member of Clubs or Organizations: Not on file     Attends Club or Organization Meetings: Not on file     Marital Status:    Intimate Partner Violence: Not At Risk (1/21/2021)    Humiliation, Afraid, Rape, and Kick questionnaire     Fear of Current or Ex-Partner: No     Emotionally Abused: No     Physically Abused: No     Sexually Abused: No   Housing Stability: Not on file     Social History     Social History Narrative        Lives with     2 Children - 2 Sons    Disabled s/p Brain Surgery / H/O Epilepsy - Previous        Objective:       Mental status:  Appearance adequate hygiene and grooming, restless and fidgety, and good eye contact    Mood euthymic and mood appropriate   Affect affect appropriate    Speech a normal rate   Thought Processes normal thought processes   Hallucinations no hallucinations present    Thought Content no delusions   Abnormal Thoughts no suicidal thoughts  and no homicidal thoughts    Orientation  oriented to person and place and time   Remote Memory short term memory intact and long term memory intact   Attention Span concentration intact   Intellect Appears to be of Average Intelligence   Insight Insight intact   Judgement judgment was intact   Muscle Strength Muscle strength and tone were normal   Language no difficulty naming common objects   Fund of Knowledge displays adequate knowledge of current events   Pain none   Pain Scale 0       Assessment/Plan:       Diagnoses and all orders for this visit:    SWAPNA (generalized anxiety disorder)    Current moderate episode of major depressive disorder without prior episode ([address])    Bipolar disorder, current episode mixed, mild ([address])            Treatment Recommendations- Risks Benefits      Immediate Medical/Psychiatric/Psychotherapy Treatments and Any Precautions: Continue treatment plan, Mattie Monet would like to come monthly during the winter months just to check in to make sure her moods are stable    Risks, Benefits And Possible Side Effects Of Medications:  {PSYCH RISK, BENEFITS AND POSSIBLE SIDE EFFECTS (Optional):58063     Psychotherapy Provided: 30 minutes individual psychotherapy provided.    Supportive therapy  Medication evaluation  Mood assessment  Survey review    Goals discussed in session: Maintain stable mood

## 2023-12-11 ENCOUNTER — OFFICE VISIT (OUTPATIENT)
Dept: PSYCHIATRY | Facility: CLINIC | Age: 63
End: 2023-12-11
Payer: COMMERCIAL

## 2023-12-11 DIAGNOSIS — F31.61 BIPOLAR DISORDER, CURRENT EPISODE MIXED, MILD (HCC): Primary | ICD-10-CM

## 2023-12-11 DIAGNOSIS — F41.1 GAD (GENERALIZED ANXIETY DISORDER): ICD-10-CM

## 2023-12-11 PROCEDURE — 99212 OFFICE O/P EST SF 10 MIN: CPT | Performed by: NURSE PRACTITIONER
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
## 2024-01-01 NOTE — DISCHARGE INSTRUCTIONS
Depression in Older Adults   WHAT YOU NEED TO KNOW:   What do I need to know about depression in older adults? Depression is a condition that causes feelings of sadness or hopelessness that do not go away  The person may lose interest in things he or she used to enjoy  Depression is common in older adults, but it is not a normal part of aging  Treatment is very important and can help improve the person's daily life  You can help support the person by encouraging him or her to work with healthcare providers to manage depression  What causes or increases the risk for depression in older adults? Depression may be caused by changes in brain chemicals that affect the person's mood  His or her risk for depression may be higher if he or she has any of the following:  · Stressful events such as the death of a loved one, residential, or the need to move into a care facility    · A family history of depression    · A chronic medical condition, such as heart disease or cancer    · Loss of physical strength or mobility    · Drug or alcohol abuse    What are the signs and symptoms of depression in older adults? · Appetite changes, or weight gain or loss    · Trouble going to sleep or staying asleep, or sleeping too much    · Fatigue or lack of energy    · Feeling restless, irritable, or withdrawn    · Hallucinations or delusions    · Feeling worthless, hopeless, discouraged, or guilty    · Trouble concentrating, remembering things, doing daily tasks, or making decisions    · Statements about wanting to hurt or kill himself or herself    How is depression diagnosed? The person's healthcare provider will ask about symptoms and how long the person has had them  He or she will ask if the person has any family members with depression  The provider may ask you or someone close to the person to describe any symptoms if the person is not able   Tell the person's provider about any stressful events you know about in his or her life  He or she may ask about any other health conditions or medicines the person takes  The person may also need tests to rule out other conditions that can look like depression  Examples include dementia or Alzheimer disease  How is depression treated? · Therapy  is often used together with medicine  Therapy is a way for the person to talk about his or her feelings and anything that may be causing depression  Therapy can be done alone or in a group  It may also be done with family members or a significant other  · Antidepressant medicine  may be given to relieve depression  The person may need to take this medicine for several weeks before he or she begins to feel better  It is important for healthcare providers to know about all medicines the person is taking  This will help providers know which medicines to recommend for the person  He or she may also need help setting up reminders to take the medicine each day  What can I do to help the person manage depression? · Call, visit, or send postcards to the person often  Check on him or her after the loss of a spouse, longtime friend, or pet  Holidays, birthdays, and anniversaries can be difficult for a person after a loss  The loss of a spouse can be painful and lonely for older adults who were  a long time  · Help the person connect with others  Encourage him or her to become involved in the community  Some examples include tutoring a young student or volunteering at a local organization  The person may need help setting up a computer or creating an e-mail account to help him or her remain connected to others  You may also be able to help set up a visit for the person with his or her Zoroastrianism or spiritual leader  · Encourage the person to try new things  This can help the person find new interests or meet new people  It can also help prevent him or her from focusing on depression      · Help the person get equipment that will increase his or her comfort and mobility  Examples are hearing aids, glasses, large print books, and walkers  These can help him or her enjoy activities and feel more independent  · Encourage the person to continue taking medicine and going to therapy  Medicine and therapy can help improve his or her mental health  · Help the person exercise safely  Exercise can lift his or her mood, increase energy, and make it easier to sleep  If possible, offer to exercise with the person  For example, you may want to schedule walks with the person  He or she may enjoy going to an event, such as an art exhibit or a museum  If the person is not able to walk, he or she may enjoy an exercise program done in a chair  · Encourage the person to seek help for drug or alcohol abuse, if needed  Drugs and alcohol can increase suicidal thoughts and make the person more likely to act on them  Where can I go for more help if I think the person is considering suicide? The following are available at any time:  · [address] [place] [address]: [phone] ([phone])     · Suicide Hotline: 7-644.782.8816 (3-751-SLUXGWT)     · For a list of international numbers: https://save org/find-help/international-resources/    Call your local emergency number [phone]) [phone] in the [facility]) if:   · You hear the person talk about harming himself, herself, or someone else  · The person has done something on purpose to hurt himself or herself  When should I call the person's therapist or doctor? · You think the person's symptoms are not improving  · You notice new signs, or the person tells you he or she is having new symptoms  · You have questions or concerns about the person's condition or care  CARE AGREEMENT:   The person has the right to help plan his or her care  You can help the person learn about depression and how it may be treated   Discuss treatment options with the person and healthcare providers so he or she can decide what care to receive  The person always has the right to refuse treatment  The above information is an  only  It is not intended as medical advice for individual conditions or treatments  Talk to your doctor, nurse or pharmacist before following any medical regimen to see if it is safe and effective for you  © Copyright [address] Information is for End User's use only and may not be sold, redistributed or otherwise used for commercial purposes  All illustrations and images included in CareNotes® are the copyrighted property of [employer]  or [facility] Kieran Mayers       Buspirone (By mouth)   Buspirone ([id number])  Treats anxiety  Brand Name(s):   There may be other brand names for this medicine  When This Medicine Should Not Be Used: You should not use this medicine if you have had an allergic reaction to buspirone  How to Use This Medicine:   Tablet  · Your doctor will tell you how much of this medicine to take and how often  Do not take more medicine or take it more often than your doctor tells you to  · You may take this medicine with or without food, but take it the same way each time  · You may need to take this medicine for 1 or 2 weeks before you begin to feel better  If a dose is missed:   · If you miss a dose or forget to take your medicine, take it as soon as you can  If it is almost time for your next dose, wait until then to take the medicine and skip the missed dose  · Do not use extra medicine to make up for a missed dose  How to Store and Dispose of This Medicine:   · Store the medicine at room temperature, away from heat, moisture, and direct light  · Keep all medicine out of the reach of children and never share your medicine with anyone  Drugs and Foods to Avoid:   Ask your doctor or pharmacist before using any other medicine, including over-the-counter medicines, vitamins, and herbal products    · You should not use buspirone when you are also using an MAO inhibitor (such as Eldepryl®, Marplan®, Nardil®, Parnate®)  · Do not eat grapefruit, drink grapefruit juice, or drink alcohol while you are using buspirone  · Make sure your doctor knows if you are also using cimetidine (Ely Tami), dexamethasone (Decadron®), diltiazem (Floyd Senate), erythromycin (Erythro-Tab®), itraconazole (Sporanox®), nefazodone (Serzone®), rifampin (Rifadin®, Rifamate®, Rifater®), verapamil (Chante Nutley), or medicine for seizures (such as Dilantin®, Luminal®, Tegretol®)  · Make sure your doctor knows if you are using any medicines that make you sleepy (such as sleeping pills, cold and allergy medicine, narcotic pain relievers, or sedatives)  Warnings While Using This Medicine:   · Make sure your doctor knows if you are pregnant or breastfeeding, or if you have kidney or liver disease  · Do not stop using this medicine suddenly without asking your doctor  You may need to slowly decrease your dose before stopping it completely  · This medicine may make you dizzy or drowsy  Avoid driving, using machines, or doing anything else that could be dangerous if you are not alert  Possible Side Effects While Using This Medicine:   Call your doctor right away if you notice any of these side effects:  · Fast or pounding heartbeat  · Numbness or tingling feeling  · Tremors or shaking  If you notice these less serious side effects, talk with your doctor:   · Drowsiness or weakness  · Dry mouth  · Feeling restless or nervous, trouble sleeping  · Headache  · Nausea, constipation, upset stomach  If you notice other side effects that you think are caused by this medicine, tell your doctor  Call your doctor for medical advice about side effects  You may report side effects to FDA at 6-896-FDA-6115  © Copyright [address] Information is for End User's use only and may not be sold, redistributed or otherwise used for commercial purposes    The above information is an  only  It is not intended as medical advice for individual conditions or treatments  Talk to your doctor, nurse or pharmacist before following any medical regimen to see if it is safe and effective for you  Citalopram (By mouth)   Citalopram ([id number])  Treats depression  Brand Name(s): CeleXA   There may be other brand names for this medicine  When This Medicine Should Not Be Used: This medicine is not right for everyone  Do not use it if you had an allergic reaction to citalopram   How to Use This Medicine:   Liquid, Tablet  · Take this medicine as directed  You may need to take it for a month or more before you feel better  Your dose may need to be changed to find out what works best for you  · Oral liquid: Measure the oral liquid medicine with a marked measuring spoon, oral syringe, or medicine cup  · This medicine should come with a Medication Guide  Ask your pharmacist for a copy if you do not have one  · Missed dose: Take a dose as soon as you remember  If it is almost time for your next dose, wait until then and take a regular dose  Do not take extra medicine to make up for a missed dose  · Store the medicine in a closed container at room temperature, away from heat, moisture, and direct light  Drugs and Foods to Avoid:   Ask your doctor or pharmacist before using any other medicine, including over-the-counter medicines, vitamins, and herbal products  · Do not use this medicine together with pimozide  Do not use this medicine and an MAO inhibitor (MAOI) within 14 days of each other  · Some medicines can affect how citalopram works  Tell your doctor if you are using the following:   ? Buspirone, carbamazepine, chlorpromazine, cimetidine, fentanyl, gatifloxacin, levomethadyl, lithium, methadone, moxifloxacin, omeprazole, pentamidine, Christopher's wort, thioridazine, tramadol, or tryptophan supplements  ? Amphetamines  ? Blood thinner (including warfarin)  ?  Diuretic (water pill)  ? Medicine for heart rhythm problems (including amiodarone, procainamide, quinidine, sotalol)  ? NSAID pain or arthritis medicine (including aspirin, celecoxib, diclofenac, ibuprofen, naproxen)  ? Triptan medicine to treat migraine headaches (including sumatriptan)  · Do not drink alcohol while you are using this medicine  Warnings While Using This Medicine:   · Tell your doctor if you are pregnant or breastfeeding, or if you have kidney disease, liver disease, bleeding problems, glaucoma, heart problems, or a seizure disorder  Tell your doctor if you or anyone in your family has a bipolar disorder, heart rhythm problem (such as QT prolongation or a slow heartbeat), or a recent heart attack  · This medicine may cause the following problems:   ? Heart rhythm problems  ? Serotonin syndrome (more likely when used with certain other medicines)  ? Increased risk of bleeding problems  ? Low sodium levels  ? Slow growth in children  · This medicine can increase thoughts of suicide  Tell your doctor right away if you start to feel depressed and have thoughts about hurting yourself  · This medicine may make you dizzy or drowsy  Do not drive or do anything that could be dangerous until you know how this medicine affects you  · Do not stop using this medicine suddenly  Your doctor will need to slowly decrease your dose before you stop it completely  · Your doctor will check your progress and the effects of this medicine at regular visits  Keep all appointments  · Keep all medicine out of the reach of children  Never share your medicine with anyone    Possible Side Effects While Using This Medicine:   Call your doctor right away if you notice any of these side effects:  · Allergic reaction: Itching or hives, swelling in your face or hands, swelling or tingling in your mouth or throat, chest tightness, trouble breathing  · Anxiety, restlessness, fever, sweating, muscle spasms, nausea, vomiting, diarrhea, seeing or hearing things that are not there  · Chest pain, trouble breathing  · Confusion, weakness, and muscle twitching  · Fast, pounding, or uneven heartbeat  · Feeling more excited or energetic than usual, trouble sleeping, racing thoughts  · Eye pain, vision changes, seeing halos around lights  · Lightheadedness, dizziness, fainting  · Painful, prolonged erection of your penis  · Thoughts of hurting yourself or others, unusual behavior  · Unusual bleeding or bruising  If you notice these less serious side effects, talk with your doctor:   · Decreased appetite, weight loss (in children)  · Dry mouth  · Sexual problems  · Sleepiness or drowsiness  If you notice other side effects that you think are caused by this medicine, tell your doctor  Call your doctor for medical advice about side effects  You may report side effects to FDA at 1-631-FDA-6271  © Copyright [facility] [place] [address] Information is for End User's use only and may not be sold, redistributed or otherwise used for commercial purposes  The above information is an  only  It is not intended as medical advice for individual conditions or treatments  Talk to your doctor, nurse or pharmacist before following any medical regimen to see if it is safe and effective for you  Melatonin (By mouth)   Melatonin ([id number])  Treats insomnia  Brand Name(s): Advanced Sleep Melatonin, Basic's Melatonin, [facility] Melatonin, Nature's Blend Melatonin, Optimum Melatonin, PharmAssure Melatonin   There may be other brand names for this medicine  When This Medicine Should Not Be Used: You should not use this medicine if you have had an allergic reaction to melatonin  How to Use This Medicine:   Capsule, Long Acting Capsule, Liquid, Tablet, Long Acting Tablet  · Your doctor will tell you how much medicine to use  Do not use more than directed    · Follow the instructions on the medicine label if you are using this medicine without a prescription  · Take your dose 20 minutes before your bedtime  You may take this medicine with or without food  · The liquid may be taken directly or combined with water or juice  If a dose is missed:   · If you miss a dose or forget to use your medicine, call your doctor or pharmacist for instructions  How to Store and Dispose of This Medicine:   · Store the medicine in a closed container at room temperature, away from heat, moisture, and direct light  · Keep all medicine out of the reach of children  Never share your medicine with anyone  · Ask your pharmacist, doctor, or health caregiver about the best way to dispose of any outdated medicine or medicine no longer needed  Drugs and Foods to Avoid:   Ask your doctor or pharmacist before using any other medicine, including over-the-counter medicines, vitamins, and herbal products  · Make sure your doctor knows if you are also using any tranquilizer medicines, or if you are also using any sedative medicines  Warnings While Using This Medicine:   · Make sure your doctor knows if you are pregnant or breast feeding, or if you have an autoimmune condition  Make sure your doctor knows if you are feeling sad or depressed  · This medicine may make you drowsy  Avoid driving, using machines, or doing anything else that might be dangerous if you are not alert  Possible Side Effects While Using This Medicine: If you notice these less serious side effects, talk with your doctor:  · Feeling sluggish or tired in the morning  · Headache  If you notice other side effects that you think are caused by this medicine, tell your doctor  Call your doctor for medical advice about side effects  You may report side effects to FDA at 9-530-FDA-2412  © Copyright [address] Information is for End User's use only and may not be sold, redistributed or otherwise used for commercial purposes  The above information is an  only   It is not intended as medical advice for individual conditions or treatments  Talk to your doctor, nurse or pharmacist before following any medical regimen to see if it is safe and effective for you  large

## 2024-01-08 ENCOUNTER — TELEPHONE (OUTPATIENT)
Dept: FAMILY MEDICINE CLINIC | Facility: CLINIC | Age: 64
End: 2024-01-08

## 2024-01-08 NOTE — TELEPHONE ENCOUNTER
----- Message from Daisy Mcmanus sent at 1/8/2024 11:06 AM EST -----  Regarding: blood work  Patient is coming on 2/23 to see you for an appointment and was wondering you could put in new labs for her to have completed before her appointment.  Please reach out to patient when in system and she will check her my chart.  Thank you.

## 2024-01-18 ENCOUNTER — TELEPHONE (OUTPATIENT)
Age: 64
End: 2024-01-18

## 2024-01-18 NOTE — TELEPHONE ENCOUNTER
Pt. Has an appt. On 2/23 and she wanted to know if all the labs the doctor wants done are in the computer. She wants to go have them done.  Can you please look at her labs and add anything that needs to be added. Ty

## 2024-01-22 ENCOUNTER — TELEPHONE (OUTPATIENT)
Dept: PSYCHIATRY | Facility: CLINIC | Age: 64
End: 2024-01-22

## 2024-01-22 NOTE — TELEPHONE ENCOUNTER
Patient called and last week and said she needed an earlier appt. Writer did call and left pt a message she was scheduled for 1/25/2024. Patient then left another message stating she is fine her  is fine, she does not need to go to the hospital she just needs a med change. Writer left another message stating the new appt and time.

## 2024-01-24 NOTE — TELEPHONE ENCOUNTER
Pt  called and informed us he had the police to the house to take his wife Martha to the hospital. Martha is very manic and is refusing help. Spoke with Dr. Varela and her recommendation was to have her  call crisis to come and evaluate her. Writer looked up the crisis number for their area [phone] and gave it to Martha's  and he will call to keep Dr. Varela updated

## 2024-01-25 ENCOUNTER — HOSPITAL ENCOUNTER (EMERGENCY)
Facility: HOSPITAL | Age: 64
Discharge: NURSING, REHAB OR LONG-TERM CARE | End: 2024-01-31
Attending: EMERGENCY MEDICINE
Payer: COMMERCIAL

## 2024-01-25 ENCOUNTER — OFFICE VISIT (OUTPATIENT)
Dept: PSYCHIATRY | Facility: CLINIC | Age: 64
End: 2024-01-25
Payer: COMMERCIAL

## 2024-01-25 DIAGNOSIS — F31.2 BIPOLAR AFFECTIVE DISORDER, CURRENTLY MANIC, SEVERE, WITH PSYCHOTIC FEATURES (HCC): ICD-10-CM

## 2024-01-25 DIAGNOSIS — F41.1 GAD (GENERALIZED ANXIETY DISORDER): Primary | ICD-10-CM

## 2024-01-25 DIAGNOSIS — F25.9 SCHIZOAFFECTIVE DISORDER (HCC): Primary | ICD-10-CM

## 2024-01-25 LAB
ALBUMIN SERPL BCP-MCNC: 4.7 G/DL (ref 3.5–5)
ALP SERPL-CCNC: 69 U/L (ref 34–104)
ALT SERPL W P-5'-P-CCNC: 18 U/L (ref 7–52)
AMPHETAMINES SERPL QL SCN: NEGATIVE
ANION GAP SERPL CALCULATED.3IONS-SCNC: 9 MMOL/L
APAP SERPL-MCNC: <2 UG/ML (ref 10–20)
AST SERPL W P-5'-P-CCNC: 20 U/L (ref 13–39)
BACTERIA UR QL AUTO: ABNORMAL /HPF
BARBITURATES UR QL: NEGATIVE
BASOPHILS # BLD AUTO: 0.03 THOUSANDS/ÂΜL (ref 0–0.1)
BASOPHILS NFR BLD AUTO: 0 % (ref 0–1)
BENZODIAZ UR QL: NEGATIVE
BILIRUB SERPL-MCNC: 0.54 MG/DL (ref 0.2–1)
BILIRUB UR QL STRIP: NEGATIVE
BUN SERPL-MCNC: 15 MG/DL (ref 5–25)
CALCIUM SERPL-MCNC: 10.1 MG/DL (ref 8.4–10.2)
CHLORIDE SERPL-SCNC: 104 MMOL/L (ref 96–108)
CLARITY UR: ABNORMAL
CO2 SERPL-SCNC: 25 MMOL/L (ref 21–32)
COCAINE UR QL: NEGATIVE
COLOR UR: YELLOW
CREAT SERPL-MCNC: 0.75 MG/DL (ref 0.6–1.3)
EOSINOPHIL # BLD AUTO: 0.05 THOUSAND/ÂΜL (ref 0–0.61)
EOSINOPHIL NFR BLD AUTO: 1 % (ref 0–6)
ERYTHROCYTE [DISTWIDTH] IN BLOOD BY AUTOMATED COUNT: 13.6 % (ref 11.6–15.1)
ETHANOL SERPL-MCNC: <10 MG/DL
GFR SERPL CREATININE-BSD FRML MDRD: 83 ML/MIN/1.73SQ M
GLUCOSE SERPL-MCNC: 132 MG/DL (ref 65–140)
GLUCOSE UR STRIP-MCNC: NEGATIVE MG/DL
HCT VFR BLD AUTO: 46.1 % (ref 34.8–46.1)
HGB BLD-MCNC: 15 G/DL (ref 11.5–15.4)
HGB UR QL STRIP.AUTO: NEGATIVE
IMM GRANULOCYTES # BLD AUTO: 0.03 THOUSAND/UL (ref 0–0.2)
IMM GRANULOCYTES NFR BLD AUTO: 0 % (ref 0–2)
KETONES UR STRIP-MCNC: NEGATIVE MG/DL
LEUKOCYTE ESTERASE UR QL STRIP: ABNORMAL
LYMPHOCYTES # BLD AUTO: 1.19 THOUSANDS/ÂΜL (ref 0.6–4.47)
LYMPHOCYTES NFR BLD AUTO: 12 % (ref 14–44)
MCH RBC QN AUTO: 30.5 PG (ref 26.8–34.3)
MCHC RBC AUTO-ENTMCNC: 32.5 G/DL (ref 31.4–37.4)
MCV RBC AUTO: 94 FL (ref 82–98)
METHADONE UR QL: NEGATIVE
MONOCYTES # BLD AUTO: 0.83 THOUSAND/ÂΜL (ref 0.17–1.22)
MONOCYTES NFR BLD AUTO: 8 % (ref 4–12)
NEUTROPHILS # BLD AUTO: 7.75 THOUSANDS/ÂΜL (ref 1.85–7.62)
NEUTS SEG NFR BLD AUTO: 79 % (ref 43–75)
NITRITE UR QL STRIP: NEGATIVE
NON-SQ EPI CELLS URNS QL MICRO: ABNORMAL /HPF
NRBC BLD AUTO-RTO: 0 /100 WBCS
OPIATES UR QL SCN: NEGATIVE
OXYCODONE+OXYMORPHONE UR QL SCN: NEGATIVE
PCP UR QL: NEGATIVE
PH UR STRIP.AUTO: 6.5 [PH]
PLATELET # BLD AUTO: 259 THOUSANDS/UL (ref 149–390)
PMV BLD AUTO: 10.6 FL (ref 8.9–12.7)
POTASSIUM SERPL-SCNC: 4.2 MMOL/L (ref 3.5–5.3)
PROT SERPL-MCNC: 7.9 G/DL (ref 6.4–8.4)
PROT UR STRIP-MCNC: NEGATIVE MG/DL
RBC # BLD AUTO: 4.92 MILLION/UL (ref 3.81–5.12)
RBC #/AREA URNS AUTO: ABNORMAL /HPF
SALICYLATES SERPL-MCNC: <5 MG/DL (ref 3–20)
SODIUM SERPL-SCNC: 138 MMOL/L (ref 135–147)
SP GR UR STRIP.AUTO: 1.02 (ref 1–1.03)
THC UR QL: NEGATIVE
UROBILINOGEN UR QL STRIP.AUTO: 0.2 E.U./DL
WBC # BLD AUTO: 9.88 THOUSAND/UL (ref 4.31–10.16)
WBC #/AREA URNS AUTO: ABNORMAL /HPF

## 2024-01-25 PROCEDURE — 80053 COMPREHEN METABOLIC PANEL: CPT | Performed by: EMERGENCY MEDICINE

## 2024-01-25 PROCEDURE — 81001 URINALYSIS AUTO W/SCOPE: CPT | Performed by: EMERGENCY MEDICINE

## 2024-01-25 PROCEDURE — 99285 EMERGENCY DEPT VISIT HI MDM: CPT | Performed by: EMERGENCY MEDICINE

## 2024-01-25 PROCEDURE — 96372 THER/PROPH/DIAG INJ SC/IM: CPT

## 2024-01-25 PROCEDURE — 36415 COLL VENOUS BLD VENIPUNCTURE: CPT | Performed by: EMERGENCY MEDICINE

## 2024-01-25 PROCEDURE — 85025 COMPLETE CBC W/AUTO DIFF WBC: CPT | Performed by: EMERGENCY MEDICINE

## 2024-01-25 PROCEDURE — 82077 ASSAY SPEC XCP UR&BREATH IA: CPT | Performed by: EMERGENCY MEDICINE

## 2024-01-25 PROCEDURE — 80143 DRUG ASSAY ACETAMINOPHEN: CPT | Performed by: EMERGENCY MEDICINE

## 2024-01-25 PROCEDURE — 80179 DRUG ASSAY SALICYLATE: CPT | Performed by: EMERGENCY MEDICINE

## 2024-01-25 PROCEDURE — 90839 PSYTX CRISIS INITIAL 60 MIN: CPT | Performed by: NURSE PRACTITIONER

## 2024-01-25 PROCEDURE — 80307 DRUG TEST PRSMV CHEM ANLYZR: CPT | Performed by: EMERGENCY MEDICINE

## 2024-01-25 PROCEDURE — 99285 EMERGENCY DEPT VISIT HI MDM: CPT

## 2024-01-25 RX ORDER — LORAZEPAM 2 MG/ML
1 INJECTION INTRAMUSCULAR ONCE
Status: COMPLETED | OUTPATIENT
Start: 2024-01-25 | End: 2024-01-25

## 2024-01-25 RX ORDER — LAMOTRIGINE 100 MG/1
100 TABLET ORAL DAILY
Status: DISCONTINUED | OUTPATIENT
Start: 2024-01-26 | End: 2024-01-31 | Stop reason: HOSPADM

## 2024-01-25 RX ORDER — LORAZEPAM 2 MG/ML
1 INJECTION INTRAMUSCULAR ONCE
Status: DISCONTINUED | OUTPATIENT
Start: 2024-01-25 | End: 2024-01-25

## 2024-01-25 RX ORDER — CITALOPRAM HYDROBROMIDE 10 MG/1
40 TABLET ORAL DAILY
Status: DISCONTINUED | OUTPATIENT
Start: 2024-01-26 | End: 2024-01-31 | Stop reason: HOSPADM

## 2024-01-25 RX ORDER — LORAZEPAM 1 MG/1
1 TABLET ORAL ONCE
Status: COMPLETED | OUTPATIENT
Start: 2024-01-25 | End: 2024-01-25

## 2024-01-25 RX ADMIN — LORAZEPAM 1 MG: 2 INJECTION INTRAMUSCULAR; INTRAVENOUS at 15:50

## 2024-01-25 RX ADMIN — LORAZEPAM 1 MG: 1 TABLET ORAL at 22:27

## 2024-01-25 NOTE — ED NOTES
"1/25/24 @ 1205:  PES met with patient and introduced self.  Patient saying things that made no sense and had flight of ideas.  PES wasn't able to help patient focus on the current issue and continually said things that weren't pertinent to any questions.  For example, patient, for no reason, began saying that she \"spoke Romanian, but not too much; I know sign language, this is a J.\"  At no time did PES ask anything that would elicit that response.  Patient flailing her arms like she's speaking in sign language.  Patient was sent to ED by Dr. Varela for similar presentation at her office this morning.  PES will continue to monitor, but may require screening for commitment due to level of psychosis.  PES mentioned hospitalization and patient refused, saying, \"I want to go home.\"    [place], [place]    150: PES called [facility] for screening; patient has been banging on the door; Chandler reports that Jake will come to ED asap.  [place], [place]  "

## 2024-01-25 NOTE — ED PROVIDER NOTES
History  Chief Complaint   Patient presents with    Psychiatric Evaluation     Pt. Was sent by dr. Varela  for manic behavior. Pt.  Has  a flight of ideas and  is  stating she is hearing voices at this.      63-year-old female sent by Dr. Varela from psychiatry stating that patient has a history of bipolar and is manic at this point.  States she is talking about her  who apparently has been  for a while.  Patient denies any fevers chills nausea vomiting diarrhea states that she is afraid we will going to keep her and send her away or kill her.        Prior to Admission Medications   Prescriptions Last Dose Informant Patient Reported? Taking?   Multiple Vitamin (multivitamin) tablet Not Taking Self Yes No   Sig: Take 1 tablet by mouth daily   Patient not taking: Reported on 2024   busPIRone (BUSPAR) 15 mg tablet 2024 Self No Yes   Sig: Take 1 tablet (15 mg total) by mouth 3 (three) times a day for 15 days   cholecalciferol (VITAMIN D3) 1,000 units tablet Not Taking Self No No   Sig: Take 1 tablet (1,000 Units total) by mouth daily   Patient not taking: Reported on 2024   citalopram (CeleXA) 40 mg tablet 2024 Self No Yes   Sig: TAKE 1 TABLET BY MOUTH DAILY   clotrimazole 1 % external solution Not Taking  No No   Sig: Apply small amount to affected area twice daily.   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 , s/p  corrective Brain Surgery at [facility]    Hyperlipidemia 2023    IFG (impaired fasting glucose)     Primary stress urinary incontinence 2023    Vitamin D deficiency        Past Surgical History:   Procedure Laterality Date    BRAIN SURGERY  2013    R Temporal Resection / Amyglahippocampectomy     SECTION      x 2    TUBAL LIGATION         Family History   Problem Relation Age of Onset    Aortic aneurysm Mother 70        Abdominal Aortic Aneurysm, +Smoker    No Known Problems Father     Cancer Maternal Grandmother         70    No Known Problems Brother     No Known Problems Son     No Known Problems Half-Sister     No Known Problems Half-Sister     No Known Problems Half-Sister     No Known Problems Half-Sister     No Known Problems Maternal Aunt     Psychiatric Illness Neg Hx      I have reviewed and agree with the history as documented.    E-Cigarette/Vaping    E-Cigarette Use Never User      E-Cigarette/Vaping Substances    Nicotine No     THC No     CBD No     Flavoring No     Other No     Unknown No      Social History     Tobacco Use    Smoking status: Never    Smokeless tobacco: Never   Vaping Use    Vaping status: Never Used   Substance Use Topics    Alcohol use: No     Comment: doesnt drink.    Drug use: Never       Review of Systems   Constitutional:  Negative for activity change, chills, diaphoresis and fever.   HENT:  Negative for congestion, ear pain, nosebleeds, sore throat, trouble swallowing and voice change.    Eyes:  Negative for pain, discharge and redness.   Respiratory:  Negative for apnea, cough, choking, shortness of breath, wheezing and stridor.    Cardiovascular:  Negative for chest pain and palpitations.   Gastrointestinal:  Negative for abdominal distention, abdominal pain, constipation, diarrhea, nausea and vomiting.   Endocrine: Negative for polydipsia.   Genitourinary:  Negative for difficulty urinating, dysuria, flank pain, frequency, hematuria and urgency.    Musculoskeletal:  Negative for back pain, gait problem, joint swelling, myalgias, neck pain and neck stiffness.   Skin:  Negative for pallor and rash.   Neurological:  Negative for dizziness, tremors, syncope, speech difficulty, weakness, numbness and headaches.   Hematological:  Negative for adenopathy.   Psychiatric/Behavioral:  Positive for agitation and hallucinations. Negative for confusion, self-injury and suicidal ideas. The patient is nervous/anxious.        Physical Exam  Physical Exam  Vitals and nursing note reviewed.   Constitutional:       General: She is not in acute distress.     Appearance: She is well-developed. She is not diaphoretic.   HENT:      Head: Normocephalic and atraumatic.      Right Ear: External ear normal.      Left Ear: External ear normal.      Nose: Nose normal.   Eyes:      Conjunctiva/sclera: Conjunctivae normal.      Pupils: Pupils are equal, round, and reactive to light.   Cardiovascular:      Rate and Rhythm: Normal rate and regular rhythm.      Heart sounds: Normal heart sounds.   Pulmonary:      Effort: Pulmonary effort is normal.      Breath sounds: Normal breath sounds.   Abdominal:      General: Bowel sounds are normal.      Palpations: Abdomen is soft.      Tenderness: There is abdominal tenderness.      Comments: Diffuse tenderness   Musculoskeletal:         General: Normal range of motion.      Cervical back: Normal range of motion and neck supple.   Skin:     General: Skin is warm and dry.   Neurological:      Mental Status: She is alert and oriented to person, place, and time.      Deep Tendon Reflexes: Reflexes are normal and symmetric.   Psychiatric:         Behavior: Behavior is cooperative.         Vital Signs  ED Triage Vitals   Temperature Pulse Respirations Blood Pressure SpO2   01/25/24 1022 01/25/24 1022 01/25/24 1022 01/25/24 1022 01/25/24 1022   99.5 °F (37.5 °C) 83 18 167/94 98 %      Temp Source Heart Rate Source Patient Position - Orthostatic VS BP  Location FiO2 (%)   01/25/24 1022 01/26/24 0642 01/26/24 0642 01/26/24 0642 --   Tympanic Monitor Sitting Left arm       Pain Score       01/25/24 1022       No Pain           Vitals:    01/28/24 0249 01/28/24 0606 01/29/24 0400 01/29/24 0750   BP: 122/72 109/70 110/72 137/84   Pulse: 62 84 87 92   Patient Position - Orthostatic VS: Sitting  Sitting Sitting         Visual Acuity      ED Medications  Medications   citalopram (CeleXA) tablet 40 mg (40 mg Oral Given 1/29/24 0824)   lamoTRIgine (LaMICtal) tablet 100 mg (100 mg Oral Given 1/29/24 0824)   LORazepam (ATIVAN) tablet 2 mg (2 mg Oral Given 1/29/24 1137)   LORazepam (ATIVAN) injection 1 mg (1 mg Intramuscular Given 1/25/24 1550)   LORazepam (ATIVAN) tablet 1 mg (1 mg Oral Given 1/25/24 2227)   OLANZapine (ZyPREXA ZYDIS) dispersible tablet 10 mg (10 mg Oral Given 1/26/24 1201)   LORazepam (ATIVAN) tablet 1 mg (1 mg Oral Given 1/26/24 1201)   OLANZapine (ZyPREXA ZYDIS) dispersible tablet 10 mg (10 mg Oral Given 1/27/24 0237)   midazolam (VERSED) injection 2 mg (2 mg Intramuscular Given 1/27/24 0516)   diphenhydrAMINE (BENADRYL) injection 50 mg (50 mg Intramuscular Given 1/27/24 0717)   ketamine (KETALAR) 175 mg (175 mg Intramuscular Given 1/27/24 1131)   ziprasidone (GEODON) IM injection 20 mg (20 mg Intramuscular Given 1/27/24 1633)   LORazepam (ATIVAN) injection 2 mg (2 mg Intramuscular Given 1/27/24 1633)   OLANZapine (ZyPREXA ZYDIS) dispersible tablet 10 mg (10 mg Oral Given 1/28/24 0025)   LORazepam (ATIVAN) tablet 2 mg (2 mg Oral Given 1/28/24 0025)       Diagnostic Studies  Results Reviewed       Procedure Component Value Units Date/Time    COVID only [[id number]]  (Normal) Collected: 01/26/24 1716    Lab Status: Final result Specimen: Nares from Nose Updated: 01/26/24 1801     SARS-CoV-2 Negative    Narrative:      FOR PEDIATRIC PATIENTS - copy/paste COVID Guidelines URL to browser:  [email or web address]    SARS-CoV-2 assay is a Nucleic Acid Amplification assay intended for the  qualitative detection of nucleic acid from SARS-CoV-2 in nasopharyngeal  swabs. Results are for the presumptive identification of SARS-CoV-2 RNA.    Positive results are indicative of infection with SARS-CoV-2, the virus  causing COVID-19, but do not rule out bacterial infection or co-infection  with other viruses. Laboratories within the [place] and its  territories are required to report all positive results to the appropriate  public health authorities. Negative results do not preclude SARS-CoV-2  infection and should not be used as the sole basis for treatment or other  patient management decisions. Negative results must be combined with  clinical observations, patient history, and epidemiological information.  This test has not been FDA cleared or approved.    This test has been authorized by FDA under an Emergency Use Authorization  (EUA). This test is only authorized for the duration of time the  declaration that circumstances exist justifying the authorization of the  emergency use of an in vitro diagnostic tests for detection of SARS-CoV-2  virus and/or diagnosis of COVID-19 infection under section 564(b)(1) of  the Act, 21 U.S.C. 360bbb-3(b)(1), unless the authorization is terminated  or revoked sooner. The test has been validated but independent review by FDA  and CLIA is pending.    Test performed using [employer] GeneXpert: This RT-PCR assay targets N2,  a region unique to SARS-CoV-2. A conserved region in the E-gene was chosen  for pan-Sarbecovirus detection which includes SARS-CoV-2.    According to CMS-2020-01-R, this platform meets the definition of high-throughput technology.    Rapid drug screen, urine [[id number]]  (Normal) Collected: 01/25/24 1031    Lab Status: Final result Specimen: Urine, Clean Catch Updated: 01/25/24 1137     Amph/Meth UR  Negative     Barbiturate Ur Negative     Benzodiazepine Urine Negative     Cocaine Urine Negative     Methadone Urine Negative     Opiate Urine Negative     PCP Ur Negative     THC Urine Negative     Oxycodone Urine Negative    Narrative:      FOR MEDICAL PURPOSES ONLY.   IF CONFIRMATION NEEDED PLEASE CONTACT THE LAB WITHIN 5 DAYS.    Drug Screen Cutoff Levels:  AMPHETAMINE/METHAMPHETAMINES  1000 ng/mL  BARBITURATES     200 ng/mL  BENZODIAZEPINES     200 ng/mL  COCAINE      300 ng/mL  METHADONE      300 ng/mL  OPIATES      300 ng/mL  PHENCYCLIDINE     25 ng/mL  THC       50 ng/mL  OXYCODONE      100 ng/mL    Comprehensive metabolic panel [[id number]] Collected: 01/25/24 1031    Lab Status: Final result Specimen: Blood from Arm, Left Updated: 01/25/24 1106     Sodium 138 mmol/L      Potassium 4.2 mmol/L      Chloride 104 mmol/L      CO2 25 mmol/L      ANION GAP 9 mmol/L      BUN 15 mg/dL      Creatinine 0.75 mg/dL      Glucose 132 mg/dL      Calcium 10.1 mg/dL      AST 20 U/L      ALT 18 U/L      Alkaline Phosphatase 69 U/L      Total Protein 7.9 g/dL      Albumin 4.7 g/dL      Total Bilirubin 0.54 mg/dL      eGFR 83 ml/min/1.73sq m     Narrative:      National Kidney Disease Foundation guidelines for Chronic Kidney Disease (CKD):     Stage 1 with normal or high GFR (GFR > 90 mL/min/1.73 square meters)    Stage 2 Mild CKD (GFR = 60-89 mL/min/1.73 square meters)    Stage 3A Moderate CKD (GFR = 45-59 mL/min/1.73 square meters)    Stage 3B Moderate CKD (GFR = 30-44 mL/min/1.73 square meters)    Stage 4 Severe CKD (GFR = 15-29 mL/min/1.73 square meters)    Stage 5 End Stage CKD (GFR <15 mL/min/1.73 square meters)  Note: GFR calculation is accurate only with a steady state creatinine    Salicylate level [[id number]]  (Normal) Collected: 01/25/24 1031    Lab Status: Final result Specimen: Blood from Arm, Left Updated: 01/25/24 1106     Salicylate Lvl <5 mg/dL     Acetaminophen level-If concentration is detectable, please  discuss with medical  on call. [[id number]]  (Abnormal) Collected: 01/25/24 1031    Lab Status: Final result Specimen: Blood from Arm, Left Updated: 01/25/24 1106     Acetaminophen Level <2 ug/mL     Ethanol [[id number]]  (Normal) Collected: 01/25/24 1031    Lab Status: Final result Specimen: Blood from Arm, Left Updated: 01/25/24 1058     Ethanol Lvl <10 mg/dL     Urine Microscopic [[id number]]  (Abnormal) Collected: 01/25/24 1031    Lab Status: Final result Specimen: Urine, Clean Catch Updated: 01/25/24 1048     RBC, UA 0-1 /hpf      WBC, UA 4-10 /hpf      Epithelial Cells Moderate /hpf      Bacteria, UA Moderate /hpf     UA (URINE) with reflex to Scope [[id number]]  (Abnormal) Collected: 01/25/24 1031    Lab Status: Final result Specimen: Urine, Clean Catch Updated: 01/25/24 1040     Color, UA Yellow     Clarity, UA Cloudy     Specific Gravity, UA 1.020     pH, UA 6.5     Leukocytes, UA Small     Nitrite, UA Negative     Protein, UA Negative mg/dl      Glucose, UA Negative mg/dl      Ketones, UA Negative mg/dl      Urobilinogen, UA 0.2 E.U./dl      Bilirubin, UA Negative     Occult Blood, UA Negative    CBC and differential [[id number]]  (Abnormal) Collected: 01/25/24 1031    Lab Status: Final result Specimen: Blood from Arm, Left Updated: 01/25/24 1039     WBC 9.88 Thousand/uL      RBC 4.92 Million/uL      Hemoglobin 15.0 g/dL      Hematocrit 46.1 %      MCV 94 fL      MCH 30.5 pg      MCHC 32.5 g/dL      RDW 13.6 %      MPV 10.6 fL      Platelets 259 Thousands/uL      nRBC 0 /100 WBCs      Neutrophils Relative 79 %      Immat GRANS % 0 %      Lymphocytes Relative 12 %      Monocytes Relative 8 %      Eosinophils Relative 1 %      Basophils Relative 0 %      Neutrophils Absolute 7.75 Thousands/µL      Immature Grans Absolute 0.03 Thousand/uL      Lymphocytes Absolute 1.19 Thousands/µL      Monocytes Absolute 0.83 Thousand/µL      Eosinophils Absolute 0.05 Thousand/µL      Basophils Absolute 0.03  Thousands/µL                    XR chest 1 view portable   Final Result by Prasanth Cutler MD (01/26 1031)      No radiographic evidence of acute intrathoracic process.                  Workstation performed: [id number]                    Procedures  Procedures         ED Course                               SBIRT 20yo+      Flowsheet Row Most Recent Value   Initial Alcohol Screen: US AUDIT-C     1. How often do you have a drink containing alcohol? 0 Filed at: 01/25/2024 1025   2. How many drinks containing alcohol do you have on a typical day you are drinking?  0 Filed at: 01/25/2024 1025   3a. Male UNDER 65: How often do you have five or more drinks on one occasion? 0 Filed at: 01/25/2024 1025   3b. FEMALE Any Age, or MALE 65+: How often do you have 4 or more drinks on one occassion? 0 Filed at: 01/25/2024 1025   Audit-C Score 0 Filed at: 01/25/2024 1025   YUMI: How many times in the past year have you...    Used an illegal drug or used a prescription medication for non-medical reasons? Never Filed at: 01/25/2024 1025                      Medical Decision Making  Patient is medically clear for psychiatric treatment and/or transport    Amount and/or Complexity of Data Reviewed  Labs: ordered.  Radiology: ordered.    Risk  Prescription drug management.             Disposition  Final diagnoses:   None     ED Disposition       None          Follow-up Information    None         Patient's Medications   Discharge Prescriptions    No medications on file       No discharge procedures on file.    PDMP Review         Value Time User    PDMP Reviewed  Yes 6/28/2022  6:23 PM Teresa Torres DO            ED Provider  Electronically Signed by             Charanjit Smiley DO  01/29/24 1508       Charanjit Smiley DO  01/30/24 2321

## 2024-01-25 NOTE — PSYCH
"Visit Time    Visit Start Time: 8:30  Visit Stop Time: 9:30  Total Visit Duration:  60 minutes    Subjective:     Patient ID: Martha Chauhan is a 63 y.o. female history of bipolar 1 disorder with psychotic features anxiety, depression, seen for crisis visit.  Martha came to the appointment at 830 which was scheduled for 230.  Her speech and thought process were manic, not making sense, \"my  sleeping I left the house, I believe in God not too many people do, we are all [place], stop stop stop, 666, as the devil, want to go home and have tea\" Martha could not tell me if something triggered this crisis, she kept speaking in a disorganized fashion, getting up talking to patients in the waiting room, she was cooperative to coming into undersigned's office and discuss options for her care.  Her  was called 3 times and each time no answer and message was left for him to call back.  Martha did say her son and  were upset with her because of her mood.  Discussed safety with Martha and she voluntarily gave her car keys.  At 1 point she left the office and walked around the building and came back.  I stressed with her that she needed to be evaluated in the emergency room.  She agreed and she was informed police would be called for her safety transport.  She agreed.  2 officers attended the session.  At that time her  called the office and spoke to the ; however, the connection was interrupted.  Matrha was demonstrating signs of increased fear and it was determined that the officers would transport her.  She was calm and cooperative going to the police car and got in the back seat on her own.  Telephone call was attempted to ER mental health staff, no answer message left to call undersigned for any needed information.    HPI ROS Appetite Changes and Sleep: not assessed.    Review Of Systems:     Mood Emotional Lability and manic   Behavior Impulsive Behavior and Unusual " Behavior   Thought Content Disturbing Thoughts, Feelings, Unreasonalbe or Irrational Fears, and disorganized   General Relationship Problems, Emotional Problems, and Sleep Disturbances   Personality Change in Personality   Other Psych Symptoms Normal   Constitutional As Noted in HPI   ENT As Noted in HPI   Cardiovascular As Noted in HPI   Respiratory As Noted in HPI   Gastrointestinal As Noted in HPI   Genitourinary As Noted in HPI   Musculoskeletal As Noted in HPI   Integumentary As Noted in HPI   Neurological As Noted in HPI   Endocrine DM   Other Symptoms Normal        Laboratory Results:     Substance Abuse History:  Social History     Substance and Sexual Activity   Drug Use Never       Family Psychiatric History:   Family History   Problem Relation Age of Onset    Aortic aneurysm Mother 70        Abdominal Aortic Aneurysm, +Smoker    No Known Problems Father     Cancer Maternal Grandmother         70    No Known Problems Brother     No Known Problems Son     No Known Problems Half-Sister     No Known Problems Half-Sister     No Known Problems Half-Sister     No Known Problems Half-Sister     No Known Problems Maternal Aunt     Psychiatric Illness Neg Hx        The following portions of the patient's history were reviewed and updated as appropriate: allergies, current medications, past family history, past medical history, past social history, past surgical history, and problem list.    Social History     Socioeconomic History    Marital status: /Civil Union     Spouse name: Not on file    Number of children: 2    Years of education: Not on file    Highest education level: Some college, no degree   Occupational History    Occupation: Disabled - Previous      Comment: s/p Brain Surgery / H/O Epilepsy   Tobacco Use    Smoking status: Never    Smokeless tobacco: Never   Vaping Use    Vaping status: Never Used   Substance and Sexual Activity    Alcohol use: No     Comment: doesnt drink.    Drug  use: Never    Sexual activity: Not Currently     Partners: Male     Birth control/protection: Post-menopausal, Female Sterilization     Comment: Tubal Ligation   Other Topics Concern    Not on file   Social History Narrative        Lives with     2 Children - 2 Sons    Disabled s/p Brain Surgery / H/O Epilepsy - Previous      Social Determinants of Health     Financial Resource Strain: Low Risk  (8/22/2023)    Overall Financial Resource Strain (CARDIA)     Difficulty of Paying Living Expenses: Not hard at all   Food Insecurity: Not on file   Transportation Needs: No Transportation Needs (8/22/2023)    PRAPARE - Transportation     Lack of Transportation (Medical): No     Lack of Transportation (Non-Medical): No   Physical Activity: Unknown (1/21/2021)    Exercise Vital Sign     Days of Exercise per Week: 6 days     Minutes of Exercise per Session: Not on file   Stress: Stress Concern Present (1/21/2021)    [place] [employer] - Occupational Stress Questionnaire     Feeling of Stress : To some extent   Social Connections: Unknown (1/21/2021)    Social Connection and Isolation Panel [NHANES]     Frequency of Communication with Friends and Family: Not on file     Frequency of Social Gatherings with Friends and Family: Not on file     Attends [employer] Services: Not on file     Active Member of Clubs or Organizations: Not on file     Attends Club or Organization Meetings: Not on file     Marital Status:    Intimate Partner Violence: Not At Risk (1/21/2021)    Humiliation, Afraid, Rape, and Kick questionnaire     Fear of Current or Ex-Partner: No     Emotionally Abused: No     Physically Abused: No     Sexually Abused: No   Housing Stability: Not on file     Social History     Social History Narrative        Lives with     2 Children - 2 Sons    Disabled s/p Brain Surgery / H/O Epilepsy - Previous        Objective:       Mental  status:  Appearance restless and fidgety and good eye contact    Mood elevated and anxious   Affect affect appropriate    Speech Expansive   Thought Processes disorganized and flight of ideas   Hallucinations auditory hallucinations   Thought Content no delusions; however unable to determine due to poor thought process   Abnormal Thoughts no suicidal thoughts  and no homicidal thoughts    Orientation  oriented to person   Remote Memory Unable to assess   Attention Span concentration impaired   Intellect Not Formally Assessed   Insight Poor insight    Judgement judgment was impaired   Muscle Strength Normal gait    Language Not assessed   Fund of Knowledge Thoughts disorganized unable to assess   Pain none   Pain Scale 0       Assessment/Plan:       There are no diagnoses linked to this encounter.        Treatment Recommendations- Risks Benefits      Immediate Medical/Psychiatric/Psychotherapy Treatments and Any Precautions: Crisis intervention, police notified to transport patient safely to ER    Risks, Benefits And Possible Side Effects Of Medications:  {PSYCH RISK, BENEFITS AND POSSIBLE SIDE EFFECTS (Optional):99277       Psychotherapy Provided: 60 minutes individual psychotherapy provided.   Safe de-escalation and safe transport to ER  Support provided to patient at all times    Goals discussed in session: Safe transportation to ER and improve mental functioning

## 2024-01-25 NOTE — ED NOTES
16:10 - CFS/Jake arrived in ER to screen patient.    178:15 -  in ER inquiring about keys and wallet - neither with patient's belongings; patient did not know there whereabouts -  informed.    20:30 - original screening document and intake dropped off in ER by JENNIFER/Jake - placed on chart.    21:50 - tele-psych completed with Rebecca and Dr Chong - patient was committed.

## 2024-01-25 NOTE — ED NOTES
"Chief Complaint  Menstrual Problem (Pt complains of horrible cramps and pain mostly on the left that radiates to back/-she has tried motrin, tylenol and a heating pad/-pain started Thursday /-period started 12/25 and ended 12/19)    Subjective        Letitia Hernandez presents to [facility]  History of Present Illness  25-year-old female with history of recurrent vasovagal episodes, chronic abdominal pain who presents for worsening left lower quadrant pain.    She had menstrual cycle which ended last Thursday.  She began to notice following completion of her menstrual cycle that she was having increased left lower quadrant pain.  She states that she has had fluctuations of diarrhea and constipation.  Associated with nausea.    She felt like it may be related to menstrual cramping so she took Tylenol and ibuprofen which helped but was not long-lasting.  She does not like to take regular medications especially with her history of gastritis.    She has had ovarian cysts and is concerned for a new cyst.  She is also concerned for endometriosis.    No fevers or vomiting.      Objective   Vital Signs:  /60 (BP Location: Right arm, Patient Position: Sitting, Cuff Size: Adult)   Pulse 95   Temp 97.5 °F (36.4 °C) (Infrared)   Ht 165.1 cm (65\")   Wt 55.8 kg (123 lb)   SpO2 99%   BMI 20.47 kg/m²   Estimated body mass index is 20.47 kg/m² as calculated from the following:    Height as of this encounter: 165.1 cm (65\").    Weight as of this encounter: 55.8 kg (123 lb).       BMI is within normal parameters. No other follow-up for BMI required.      Physical Exam  Constitutional:       General: She is not in acute distress.  Eyes:      Conjunctiva/sclera: Conjunctivae normal.   Cardiovascular:      Rate and Rhythm: Normal rate and regular rhythm.   Pulmonary:      Effort: Pulmonary effort is normal. No respiratory distress.      Breath sounds: Normal breath sounds.   Abdominal:      " Patient highly agitated and physically aggressive at this time. Patient not directable. Patient asked to stop banging on the door and entering other patients rooms. Patient refusing to stop after verbal redirection. Medication ordered. Security present.     Ria Heart RN  01/25/24 3656     General: Abdomen is flat. Bowel sounds are normal. There is distension.      Tenderness: There is abdominal tenderness. There is no guarding or rebound.   Skin:     General: Skin is warm and dry.   Neurological:      Mental Status: She is alert and oriented to person, place, and time.   Psychiatric:         Mood and Affect: Mood normal.         Behavior: Behavior normal.        Result Review :  The following data was reviewed by: Jocelin Gordon MD on 12/26/2023:  Common labs          2/21/2023    15:20 10/18/2023    11:58 12/28/2023    13:41   Common Labs   Glucose  91  102    BUN  14  13    Creatinine  0.76  0.71    Sodium  142  140    Potassium  4.0  3.8    Chloride  103  103    Calcium  10.0  9.7    Total Protein  7.6     Albumin  5.0  4.9    Total Bilirubin  0.6  0.3    Alkaline Phosphatase  64  61    AST (SGOT)  21  17    ALT (SGPT)  12  10    WBC 5.8  7.3  5.08    Hemoglobin 12.5  13.4  13.1    Hematocrit 36.5  39.3  40.8    Platelets 202  223  198                   Assessment and Plan   Diagnoses and all orders for this visit:    1. Constipation, unspecified constipation type (Primary)  -     polyethylene glycol (MIRALAX) 17 g packet; Take 17 g by mouth Daily.  Dispense: 24 each; Refill: 0    2. Need for vaccination  -     Fluzone >6 Months ([id number])  -     Tdap Vaccine => 8yo IM (BOOSTRIX)    3. Dysmenorrhea  -     meloxicam (MOBIC) 15 MG tablet; Take 1 tablet by mouth Daily.  Dispense: 30 tablet; Refill: 0  -     Ambulatory Referral to Gynecology    I think constipation is likely etiology of LLQ pain. Ovarian cyst is possible. I would not expect dysmenorrhea to be contributing as menstrual cycle has just ended. She is mildly tender on exam but no guarding or peritoneal signs. I would recommend starting miralax daily for next 2-3days, then as needed use after that. Recommend to use if >3days without a BM. If she notices blood or melena, recommend she let me know. If large volume blood or abd pain severe,  recommend urgent eval at ER.          Follow Up   No follow-ups on file.  Patient was given instructions and counseling regarding her condition or for health maintenance advice. Please see specific information pulled into the AVS if appropriate.

## 2024-01-25 NOTE — ED NOTES
Patient refusing to enter secured holding area, and is refusing to change into paper BH scrubs. Patient was safely carried into the [facility] with security. Patient once again given the option to change into scrubs or have staff change the patient. The patient became calm and cooperative and put herself into paper scrubs with help of PCA. Patient denies complaints at this time. Respiration even and unlabored.     Sitter in place for observation.     Ria Heart RN  01/25/24 7418

## 2024-01-26 ENCOUNTER — APPOINTMENT (EMERGENCY)
Dept: RADIOLOGY | Facility: HOSPITAL | Age: 64
End: 2024-01-26
Payer: COMMERCIAL

## 2024-01-26 LAB — SARS-COV-2 RNA RESP QL NAA+PROBE: NEGATIVE

## 2024-01-26 PROCEDURE — 71045 X-RAY EXAM CHEST 1 VIEW: CPT

## 2024-01-26 PROCEDURE — 87635 SARS-COV-2 COVID-19 AMP PRB: CPT | Performed by: EMERGENCY MEDICINE

## 2024-01-26 PROCEDURE — 93005 ELECTROCARDIOGRAM TRACING: CPT

## 2024-01-26 RX ORDER — OLANZAPINE 5 MG/1
10 TABLET, ORALLY DISINTEGRATING ORAL ONCE
Status: COMPLETED | OUTPATIENT
Start: 2024-01-26 | End: 2024-01-26

## 2024-01-26 RX ORDER — LORAZEPAM 1 MG/1
1 TABLET ORAL ONCE
Status: COMPLETED | OUTPATIENT
Start: 2024-01-26 | End: 2024-01-26

## 2024-01-26 RX ADMIN — CITALOPRAM HYDROBROMIDE 40 MG: 10 TABLET ORAL at 08:11

## 2024-01-26 RX ADMIN — OLANZAPINE 10 MG: 5 TABLET, ORALLY DISINTEGRATING ORAL at 12:01

## 2024-01-26 RX ADMIN — LAMOTRIGINE 100 MG: 100 TABLET ORAL at 08:11

## 2024-01-26 RX ADMIN — LORAZEPAM 1 MG: 1 TABLET ORAL at 12:01

## 2024-01-26 NOTE — ED NOTES
15:20 -  called - STARR called back on a hospital phone and gave it to the patient to speak with him.    17:00 - CFS/Jake called [facility] has a bed and needs: updated vitals; chest x-ray and covid - PES faxed the 1st 2 items immediately and requested the covid - called CFS and spoke with Geovany.    18:10 - covid results faxed to CFS/Geovany.    21:40 - CFS/Geovany called [facility] wants: height/weight; Rx list given; asked about an ultrasound for the the stomach (which was not done) - is the patient using the bathroom - yes; faxed over requested information and a 2nd Dr note which was written today regarding the patient not having a UTI.  Geovany said they will review the chart tomorrow.

## 2024-01-26 NOTE — ED CARE HANDOFF
Emergency Department Sign Out Note        Sign out and transfer of care from previous provider. See Separate Emergency Department note.     The patient, Martha Chauhan, was evaluated by the previous provider for psychiatric evaluation.    Workup Completed:  Medically cleared.  Specifically, urinalysis was reviewed.  Patient has leukocytes without nitrites or other sign of UTI from this clean-catch specimen with epithelial cells and bacteria present.  No treatment necessary      ED Course / Workup Pending (followup):  Patient has been committed.  Bed search                                     ECG 12 Lead Documentation Only    Date/Time: 1/26/2024 6:35 AM    Performed by: Prince Gaines MD  Authorized by: Prince Gaines MD    Indications / Diagnosis:  Medical clearance  ECG reviewed by me, the ED Provider: yes    Patient location:  ED  Interpretation:     Interpretation: normal    Rate:     ECG rate:  92    ECG rate assessment: normal    Rhythm:     Rhythm: sinus rhythm    Ectopy:     Ectopy: none    QRS:     QRS axis:  Normal    QRS intervals:  Normal  Conduction:     Conduction: normal    ST segments:     ST segments:  Normal  T waves:     T waves: normal      Medical Decision Making  Amount and/or Complexity of Data Reviewed  Labs: ordered.  Radiology: ordered.    Risk  Prescription drug management.            Disposition  Final diagnoses:   None     ED Disposition       None          Follow-up Information    None       Patient's Medications   Discharge Prescriptions    No medications on file     No discharge procedures on file.       ED Provider  Electronically Signed by     Prince Gaines MD  01/26/24 3027       Prince Gaines MD  01/26/24 3271

## 2024-01-26 NOTE — ED NOTES
Patient given sleeping pill at her request, provided with water and new scrubs as she had ripped her previous pair. Also provided with new bed linens. Patient thankful, calm and cooperative at this time. Still with flight of ideas and responding to internal stimuli            Prince Blanca RN  01/25/24 9261

## 2024-01-26 NOTE — ED NOTES
1/26/24 @ 1315:  At CFS request, PES forwarded EKG and ED MD note stating that patient didn't require any treatment for her abnormal UA.  Skyler MS

## 2024-01-27 PROCEDURE — 96372 THER/PROPH/DIAG INJ SC/IM: CPT

## 2024-01-27 RX ORDER — KETAMINE HYDROCHLORIDE 50 MG/ML
2 INJECTION, SOLUTION INTRAMUSCULAR; INTRAVENOUS ONCE
Status: COMPLETED | OUTPATIENT
Start: 2024-01-27 | End: 2024-01-27

## 2024-01-27 RX ORDER — MIDAZOLAM HYDROCHLORIDE 2 MG/2ML
2 INJECTION, SOLUTION INTRAMUSCULAR; INTRAVENOUS ONCE
Status: DISCONTINUED | OUTPATIENT
Start: 2024-01-27 | End: 2024-01-27

## 2024-01-27 RX ORDER — DIPHENHYDRAMINE HYDROCHLORIDE 50 MG/ML
50 INJECTION INTRAMUSCULAR; INTRAVENOUS ONCE
Status: COMPLETED | OUTPATIENT
Start: 2024-01-27 | End: 2024-01-27

## 2024-01-27 RX ORDER — ZIPRASIDONE MESYLATE 20 MG/ML
20 INJECTION, POWDER, LYOPHILIZED, FOR SOLUTION INTRAMUSCULAR ONCE
Status: COMPLETED | OUTPATIENT
Start: 2024-01-27 | End: 2024-01-27

## 2024-01-27 RX ORDER — MIDAZOLAM HYDROCHLORIDE 2 MG/2ML
2 INJECTION, SOLUTION INTRAMUSCULAR; INTRAVENOUS ONCE
Status: COMPLETED | OUTPATIENT
Start: 2024-01-27 | End: 2024-01-27

## 2024-01-27 RX ORDER — OLANZAPINE 5 MG/1
10 TABLET, ORALLY DISINTEGRATING ORAL ONCE
Status: COMPLETED | OUTPATIENT
Start: 2024-01-27 | End: 2024-01-27

## 2024-01-27 RX ORDER — LORAZEPAM 2 MG/ML
2 INJECTION INTRAMUSCULAR ONCE
Status: COMPLETED | OUTPATIENT
Start: 2024-01-27 | End: 2024-01-27

## 2024-01-27 RX ADMIN — DIPHENHYDRAMINE HYDROCHLORIDE 50 MG: 50 INJECTION, SOLUTION INTRAMUSCULAR; INTRAVENOUS at 07:17

## 2024-01-27 RX ADMIN — OLANZAPINE 10 MG: 5 TABLET, ORALLY DISINTEGRATING ORAL at 02:37

## 2024-01-27 RX ADMIN — MIDAZOLAM 2 MG: 1 INJECTION INTRAMUSCULAR; INTRAVENOUS at 05:16

## 2024-01-27 RX ADMIN — LAMOTRIGINE 100 MG: 100 TABLET ORAL at 09:23

## 2024-01-27 RX ADMIN — KETAMINE HYDROCHLORIDE 175 MG: 50 INJECTION INTRAMUSCULAR; INTRAVENOUS at 11:31

## 2024-01-27 RX ADMIN — LORAZEPAM 2 MG: 2 INJECTION INTRAMUSCULAR; INTRAVENOUS at 16:33

## 2024-01-27 RX ADMIN — CITALOPRAM HYDROBROMIDE 40 MG: 10 TABLET ORAL at 09:23

## 2024-01-27 RX ADMIN — ZIPRASIDONE MESYLATE 20 MG: 20 INJECTION, POWDER, LYOPHILIZED, FOR SOLUTION INTRAMUSCULAR at 16:33

## 2024-01-27 NOTE — ED CARE HANDOFF
Emergency Department Sign Out Note        Sign out and transfer of care from Dr Pendleton. See Separate Emergency Department note.     The patient, Martha Chauhan, was evaluated by the previous provider for psych.    Workup Completed:  Medical clearance and commitment.  ED Course / Workup Pending (followup):  Patient received in signout, pending bed placement.  Patient was initially noted to have been accepted at [facility], however patient is currently being reviewed and has not been accepted.  Patient is manic in the ED, has been medicated multiple times for her safety.    Patient placed in seclusion for her safety.                              ED Course as of 01/28/24 1706   Sat Jan 27, 2024   0706 Accepted at [facility].  Pickup time is pending.   1122 Patient is manic in the ED, very difficult to redirect.  Despite multiple administrations of IM medication, patient continues to go in and out of multiple rooms, ripped out her clothing, attempt to strangle herself with the paper pants scrubs, dark on multiple doors and needs constant redirection.  Will administer IM ketamine.  Pickup time is pending.     Procedures  Medical Decision Making  Amount and/or Complexity of Data Reviewed  Labs: ordered.  Radiology: ordered.    Risk  Prescription drug management.            Disposition  Final diagnoses:   None     ED Disposition       None          Follow-up Information    None       Patient's Medications   Discharge Prescriptions    No medications on file     No discharge procedures on file.       ED Provider  Electronically Signed by     Stew Singh DO  01/28/24 9519

## 2024-01-27 NOTE — ED NOTES
Patient took medications without difficulty. Requested that this RN 'look her in the eyes' when speaking to her so she could hear this RN. Informed it was 3 am and the patient stated she would attempt to sleep.      Amarilys Puckett RN  01/27/24 0252

## 2024-01-27 NOTE — ED NOTES
"Pt pacing and speaking gibberish. Pulling \" badge readers off of the wall\" made aware that this is unacceptable. Pt agrees to stop.      Caren Scott RN  01/27/24 0931    "

## 2024-01-27 NOTE — ED NOTES
Patient had laid down earlier and had gone to sleep for approximately 45 minutes following earlier medication administration. Patient has been awake since and she started to take her pillow and bang it on the walls and then she started to bang on the door to the  unit. The patient was offered the IM versed that was ordered earlier to help her sleep and she was agreeable to it. Patient kind and understanding during medication administration. No issues at this time. Patient currently laying on bed.             Amarilys Puckett RN  01/27/24 0670

## 2024-01-27 NOTE — ED NOTES
Pt. Continues to remove clothing, stand on top of the bed and yell. Unable to redirect . Medications ordered by provider.      Caren Scott RN  01/27/24 3271

## 2024-01-27 NOTE — ED NOTES
STARR spoke with Geovany at [facility]. Geovany stated the doctor at [facility] came in at 3pm and will be reviewing the patient's chart       Jessica DIXON

## 2024-01-27 NOTE — ED NOTES
"Patient found to be sitting in the common area ripping up her styrofoam cup spelling out \"Sol\" in the middle of the room surrounded by pieces of her pants. She also unrolled a roll of toilet paper and put a pillow in the corner reporting that it is a \"peace offering.\" Patient remained calm and cooperative with staff. Requesting her peace offering remain in the middle of the room. Asked patient if she would like something to help her sleep which she states she would like. MD to be made aware.     Bathroom locked at this point since patient was noted to be in the bathroom wetting her hair and playing with toilet paper. Patient verbalizes an understanding of this.        Amarilys Puckett RN  01/27/24 0246    "

## 2024-01-27 NOTE — ED NOTES
STARR received a call from [facility] with an update on the patient. The patient was denied at [facility] due to [facility] having to move things around and not having a female bed available now. [facility] stated they will continue the bed search for the patient       Jessica VERONIKA DIXON

## 2024-01-27 NOTE — ED NOTES
Patient sitting on floor in common area, ripping pants and putting them on the floor in a pattern     Magalis Landrum RN  01/27/24 1605

## 2024-01-28 LAB
ATRIAL RATE: 92 BPM
P AXIS: 42 DEGREES
PR INTERVAL: 116 MS
QRS AXIS: 20 DEGREES
QRSD INTERVAL: 72 MS
QT INTERVAL: 362 MS
QTC INTERVAL: 447 MS
T WAVE AXIS: 21 DEGREES
VENTRICULAR RATE: 92 BPM

## 2024-01-28 RX ORDER — LORAZEPAM 1 MG/1
2 TABLET ORAL ONCE
Status: COMPLETED | OUTPATIENT
Start: 2024-01-28 | End: 2024-01-28

## 2024-01-28 RX ORDER — OLANZAPINE 5 MG/1
10 TABLET, ORALLY DISINTEGRATING ORAL ONCE
Status: COMPLETED | OUTPATIENT
Start: 2024-01-28 | End: 2024-01-28

## 2024-01-28 RX ORDER — LORAZEPAM 1 MG/1
2 TABLET ORAL EVERY 8 HOURS PRN
Status: DISCONTINUED | OUTPATIENT
Start: 2024-01-28 | End: 2024-01-31 | Stop reason: HOSPADM

## 2024-01-28 RX ADMIN — LORAZEPAM 2 MG: 1 TABLET ORAL at 00:25

## 2024-01-28 RX ADMIN — LAMOTRIGINE 100 MG: 100 TABLET ORAL at 08:48

## 2024-01-28 RX ADMIN — OLANZAPINE 10 MG: 5 TABLET, ORALLY DISINTEGRATING ORAL at 00:25

## 2024-01-28 RX ADMIN — CITALOPRAM HYDROBROMIDE 40 MG: 10 TABLET ORAL at 08:48

## 2024-01-28 RX ADMIN — LORAZEPAM 2 MG: 1 TABLET ORAL at 08:48

## 2024-01-28 RX ADMIN — LORAZEPAM 2 MG: 1 TABLET ORAL at 18:43

## 2024-01-28 NOTE — ED NOTES
Pt.  came to ER  requesting to see his wife and take her home. I informed  him that patient was involuntary committed  and he could not visit with her at this time.   He was very upset and requested that  we show him the papers. He also requested a copy but was told he has  to go  through  medical records.  Security was a witness.    Pt. Is too unstable a this time  to have visitors will cause more   distress to patient and risk for elopement.  pt. Visitor behavior is to hostile with staff at this time to allow him in.      Tami Greenfield RN  01/28/24 1220       Tami Greenfield RN  01/28/24 1911

## 2024-01-28 NOTE — ED NOTES
Pt found to be ripping the silicone off of the bottom of the bed, patient asked to stop doing so and would not stop. MD notified and medications to be ordered.      Amarilys Puckett RN  01/28/24 0032

## 2024-01-28 NOTE — ED NOTES
Patient brought clean and new pants as she has continually ripped away at the ones she was wearing earlier. Pt brought into her room and it was explained to her that she had to stop wandering into other rooms as there would be an additional patient back in the SH area now. Pt verbalized understanding but unclear if patient actually understood or not.      Amarilys Puckett RN  01/27/24 0557

## 2024-01-28 NOTE — ED NOTES
Pt. Will not stop going into the other patients door and room. She continues to take her mattress of the bed. She tried to cover the  window with  her mattress was  redirected and cooperative at this time.  Medications ordered for patient at this time.  Now patient    is in room 21 and was told to stay in her room and ask permission  to use bathroom to avoid any more conflict with  other patients.        Tami Greenfield RN  01/28/24 0860

## 2024-01-28 NOTE — ED CARE HANDOFF
Emergency Department Sign Out Note        Sign out and transfer of care from Dr Pendleton. See Separate Emergency Department note.     The patient, Martha Chauhan, was evaluated by the previous provider for psych.    Workup Completed:  Medical clearance    ED Course / Workup Pending (followup):  Placement pending.  Patient treated once with p.o. Ativan x 1, slept and is cooperative today.  Patient be signed out to oncoming physician.                          ED Course as of 01/28/24 1705   Sat Jan 27, 2024   0706 Accepted at [facility].  Pickup time is pending.   1122 Patient is manic in the ED, very difficult to redirect.  Despite multiple administrations of IM medication, patient continues to go in and out of multiple rooms, ripped out her clothing, attempt to strangle herself with the paper pants scrubs, dark on multiple doors and needs constant redirection.  Will administer IM ketamine.  Pickup time is pending.     Procedures  Medical Decision Making  Amount and/or Complexity of Data Reviewed  Labs: ordered.  Radiology: ordered.    Risk  Prescription drug management.            Disposition  Final diagnoses:   None     ED Disposition       None          Follow-up Information    None       Patient's Medications   Discharge Prescriptions    No medications on file     No discharge procedures on file.       ED Provider  Electronically Signed by     Stew Singh DO  01/28/24 6281

## 2024-01-28 NOTE — ED NOTES
Pt fell asleep approx 45 minutes ago, woke asking for blankets. Patient noted to be back asleep.      Amarilys Puckett RN  01/28/24 8080

## 2024-01-29 VITALS
HEIGHT: 60 IN | BODY MASS INDEX: 39.09 KG/M2 | DIASTOLIC BLOOD PRESSURE: 88 MMHG | HEART RATE: 88 BPM | WEIGHT: 199.1 LBS | SYSTOLIC BLOOD PRESSURE: 150 MMHG

## 2024-01-29 RX ADMIN — CITALOPRAM HYDROBROMIDE 40 MG: 10 TABLET ORAL at 08:24

## 2024-01-29 RX ADMIN — LORAZEPAM 2 MG: 1 TABLET ORAL at 11:37

## 2024-01-29 RX ADMIN — LORAZEPAM 2 MG: 1 TABLET ORAL at 23:21

## 2024-01-29 RX ADMIN — LAMOTRIGINE 100 MG: 100 TABLET ORAL at 08:24

## 2024-01-29 NOTE — ED NOTES
Pt broke cover off RFID by edit door. Pt scooping water out of toilet and playing with the water. Pt is returned to her room     Derek Johanson, RN  01/29/24 9006

## 2024-01-29 NOTE — ED NOTES
Pt observed prying at RFID lock to door repeatedly and instructed to not touch that. Pt cooperative at this time     Derek Johanson, RN  01/29/24 6644

## 2024-01-29 NOTE — ED NOTES
Pt argumentative and making a mess in room and secure holding. Pt told to stop and appears cooperative at this time     Derek Johanson, RN  01/29/24 8190

## 2024-01-29 NOTE — ED NOTES
Update from [facility], per Yury, patient will be tele psych in the morning.     Lisa Tejada RN  01/28/24 6783

## 2024-01-29 NOTE — ED NOTES
Pt offered back mattress and bed clothing if she felt like she could leave them in place. Pt responded that she didn't feel like she could handle them at this time     Derek Johanson, RN  01/29/24 7364

## 2024-01-29 NOTE — ED CARE HANDOFF
Emergency Department Sign Out Note        Sign out and transfer of care from previous provider. See Separate Emergency Department note.     The patient, Martha Chauhan, was evaluated by the previous provider for psychiatric evaluation.    Workup Completed:  Medical cleared    ED Course / Workup Pending (followup):  Patient 72-hour commitment will be  today.  Rescreening planned                                     Procedures  Medical Decision Making  Amount and/or Complexity of Data Reviewed  Labs: ordered.  Radiology: ordered.    Risk  Prescription drug management.            Disposition  Final diagnoses:   None     ED Disposition       None          Follow-up Information    None       Patient's Medications   Discharge Prescriptions    No medications on file     No discharge procedures on file.       ED Provider  Electronically Signed by     Prince Gaines MD  24 0873

## 2024-01-29 NOTE — ED NOTES
Pt repeatedly calling asking if she will be persecuted since she has found [facility] and to complain that she called 5 times over the course of the night to have meals delivered and was not fed. Informed pt that hot meals are not available during the night after 6pm. Pt stated that this is the worst restaurant she has ever slept at     Derek Johanson, RN  01/29/24 0704

## 2024-01-29 NOTE — ED NOTES
1/29/24 @ 1125:  PES called CFS for update on screening.  Nory reports that patient is on schedule for telepsych.  MS Skyler    3606: Patient re-screened and committed.  MS Skyler

## 2024-01-29 NOTE — ED NOTES
Pt ambulated to restroom with security present. Pt asked to have extra blankets, this rn provided extra blankets to pt.     Mary Singh RN  01/28/24 3599     TRIAGE CALL:    Patient has the following symptoms:  Pain has pain on left side of hip area and abdominal     The symptoms have been present for: yesterday Nov. 11     Emergent, warm transferred to Triage Nurse: No    Preferred Pharmacy:    [facility]    Contact: patient call [phone]      Patient notified if PCP out of office: NA      If the patient is calling after 4:45pm, contact the Triage Nurse line directly at ext [phone].

## 2024-01-29 NOTE — ED NOTES
Pt. Was cooperative with nurse at this time   and took ativan. She agreed to put a shirt on. Nurse discovered a pile of ripped scrubs in back corner  with pee.  Pt. Had ripped paper tray p and nurse disposed of it.  Pt. States she will now try to sleep new sheet placed on mattress  and given a blanket.      Tami Greenfield RN  01/28/24 1923

## 2024-01-29 NOTE — ED NOTES
Pt ambulated to restroom by security. Pt given warm blankets and asked if needed anything, pt denied needing anything else at this time. Pt has blankets over shoulders pacing in room banging on door. Pt then sits back into bed.     Mary Singh RN  01/29/24 8770

## 2024-01-29 NOTE — ED NOTES
Pt mattress and bed clothing removed for pt safety. Pt instructed one this behavior stops she will be returned these items     Derek Johanson, RN  01/29/24 0258

## 2024-01-29 NOTE — ED NOTES
Carli from [facility] here to see the patient. Carli stated the patient will need to be tele psyched. Mal Church is reviewing. Carli will let us know when the tele psych will happen       Jessica DIXON

## 2024-01-29 NOTE — ED NOTES
16:35 - JENNIFER/Aggie called - [facility] looking at referreal - re-faxed the H&P - note of medical clearance to CFS @ this time.    17:45 - JENNIFER/Jake called - also being reviewed @ [facility] - faxed insurance cards at this time.    19:00 - PC faxed to [facility] - placed on chart; copy made for Epic.    20:55 - new screening documents dropped off in ER by JENNIFER/Jake - placed on chart and copy made.

## 2024-01-29 NOTE — ED NOTES
Call your child’s healthcare provider right away if any of the following happens:  Wet or soft splint or cast  A bad smell comes from the cast  The cast becomes loose  Splint or cast is too tight  Increased swelling or pain  Fingers of the hand on the injured arm are cold, blue, numb, or tingly  Child can’t move the fingers of the hand on the injured arm   Pt began barricading door with mattress, blankets and sheets. Pt argumentative and refusing to not remove bedding and mattress. Pt states she doesn't want to hear anyone     Derek Johanson, RN  01/29/24 7165

## 2024-01-30 VITALS
SYSTOLIC BLOOD PRESSURE: 138 MMHG | OXYGEN SATURATION: 99 % | DIASTOLIC BLOOD PRESSURE: 70 MMHG | RESPIRATION RATE: 18 BRPM | TEMPERATURE: 96.8 F | HEART RATE: 75 BPM

## 2024-01-30 RX ADMIN — CITALOPRAM HYDROBROMIDE 40 MG: 10 TABLET ORAL at 09:49

## 2024-01-30 RX ADMIN — LORAZEPAM 2 MG: 1 TABLET ORAL at 22:35

## 2024-01-30 RX ADMIN — LAMOTRIGINE 100 MG: 100 TABLET ORAL at 09:50

## 2024-01-30 RX ADMIN — LORAZEPAM 2 MG: 1 TABLET ORAL at 09:50

## 2024-01-30 NOTE — ED NOTES
Patient ambulated to the bathroom with a steady gait and back to the room     Magalis Landrum RN  01/30/24 3797

## 2024-01-30 NOTE — ED NOTES
"1/30/24 @ 1044:  PES faxed copy of facesheet and insurance card to [facility].   MS Skyler    1359: STARR faxed EKG to [facility].  Patient is being reviewed by [facility].  MS Skyler    1442: Chandler from [facility] reports that patient has been \"accepted to the wait list at [facility].\"  PES will continue to monitor.  MS Skyler  "

## 2024-01-30 NOTE — ED NOTES
Patient requesting prn ativan, blanket and pillow provided as requested     Magalis Landrum RN  01/30/24 0105

## 2024-01-30 NOTE — ED NOTES
16:00 - met with patient's  in [facility] for about 10 minutes to provide update -  reported the patient's mental illness occurred after brain surgery for epilepsy - typically the lack of sleep starts the cycle of cecy.    Patient did not have a good shift - for example - went into another room with a broke lock - took off the sheet and attempted to flush it down the toilet.  Patient needed to be re-directed by staff continually throughout the entire shift.    22:00 - CFS referred to [facility] and [facility] this shift.

## 2024-01-30 NOTE — ED CARE HANDOFF
Emergency Department Sign Out Note        Sign out and transfer of care from previous provider. See Separate Emergency Department note.     The patient, Martha Chauhan, was evaluated by the previous provider for psychiatric evaluation.    Workup Completed:  Medically cleared    ED Course / Workup Pending (followup):  Patient is committed.  Bed search                                     Procedures  Medical Decision Making  Amount and/or Complexity of Data Reviewed  Labs: ordered.  Radiology: ordered.    Risk  Prescription drug management.            Disposition  Final diagnoses:   None     ED Disposition       None          Follow-up Information    None       Patient's Medications   Discharge Prescriptions    No medications on file     No discharge procedures on file.       ED Provider  Electronically Signed by     Prince Gaines MD  01/30/24 0836

## 2024-01-31 RX ADMIN — LORAZEPAM 2 MG: 1 TABLET ORAL at 16:50

## 2024-01-31 RX ADMIN — LORAZEPAM 2 MG: 1 TABLET ORAL at 08:06

## 2024-01-31 RX ADMIN — LAMOTRIGINE 100 MG: 100 TABLET ORAL at 08:06

## 2024-01-31 RX ADMIN — CITALOPRAM HYDROBROMIDE 40 MG: 10 TABLET ORAL at 08:06

## 2024-01-31 NOTE — ED NOTES
"Patient took mattress out of room 20, kicking it all over common area, requested to leave it alone, patient said \"it looks much better there\"      Magalis Landrum, RN  01/30/24 2028    "

## 2024-01-31 NOTE — ED NOTES
15:00 - Rn called [facility] and [facility] not able to accept patient at scheduled time.  Round-trip rescheduled and [facility] notified by phone.    [facility] @ 17:40 - CFS/cece and ER Rn Javon notified of the time change.    Report to call [phone] - Unit: 4-Main; bed 4081.    18:20 - called [facility] for a transport update. - delayed in traffic - should arrive in 15 minutes - Rn advised.    18:35 -  notified of transfer and given unit phone #.    19:00 - Patient left ER without incident - CFS/Jose Guadalupe advised of the late departure.

## 2024-01-31 NOTE — ED NOTES
1/31/24 @ 0857:  Chandler from [facility] reports that patient is being reviewed by [facility] and that they requested the last 48 hours of nursing notes, which Chandler will print.  Skyler MS    1115: Patient is accepted at [facility]  Patient is accepted by Dr. MUNIZ per MCKINLEY  Nurse report is to be called to ** prior to patient transfer.    Transportation is arranged with [facility].     Transportation is scheduled for 1520 WITH [facility]  PES NOTIFIED ED STAFF AND [facility].    Skyler MS    1331: PES called for update on precert; not yet completed; might have to change transport time.  PES will try back later.  Skyler MS

## 2024-01-31 NOTE — ED NOTES
Pt seen tampering with RFID to secure holding exit door. Re-directed pt and ensure device is secure     Derek Johanson, RN  01/31/24 8283

## 2024-01-31 NOTE — ED NOTES
Pt continues to disturb other pt by flicking light and knocking on door resulting in unsafe conditions. Pt repeatedly drags mattress and linens into common area and barricades door. Pt attempts to flush linens and clothing down toilet. Pt is not redirectable     Derek Johanson, RN  01/31/24 0898

## 2024-01-31 NOTE — ED NOTES
This writer received information from Johnathan from [facility] regarding the transfer of patient to [facility] as an involuntary patient.  Patient is going to 4M, bed is 4081 and the Nurse to Nurse report is [phone].  These information was relayed by this writer to Samir FRAGA, assigned RN for the patient.     Ashley Aguilera RN  01/31/24 9317

## 2024-01-31 NOTE — ED NOTES
Pt told that she is not permitted to remove mattress from room or to interact in any way with other pt. Pt verbalized understanding and was able to teach back conditions to seclusion ending      Derek Johanson, RN  01/31/24 7595

## 2024-01-31 NOTE — ED NOTES
"Patient provided new paper scrubs after urinating into pillow case, also requested her phone and bag, and was unsure why she was denied her \"resolutions\", educated and reminded may not have those items in the secured holding     Magalis Landrum RN  01/31/24 0523    "

## 2024-01-31 NOTE — RESTRAINT FACE TO FACE
Restraint Face to Face   Martha Chauhan 63 y.o. female MRN: [id number]  Unit/Bed#: ED 22 Encounter: [id number]      Physical Evaluation Agitated  Purpose for Restraints/ Seclusion High risk for self harm, High risk for causing significant disruption of treatment environment , High risk for harm to others, and high risk for flight  Patient's reaction to the intervention   Patient's medical condition stable  Patient's Behavioral condition agitated  Restraints to be Continued

## 2024-01-31 NOTE — EMTALA/ACUTE CARE TRANSFER
[facility] EMERGENCY DEPARTMENT  [address] [address]  Dept: [phone]      EMTALA TRANSFER CONSENT    NAME Martha Chauhan                                         1960                              MRN [id number]    I have been informed of my rights regarding examination, treatment, and transfer   by Dr. Juan Luis Chávez DO    Benefits: Specialized equipment and/or services available at the receiving facility (Include comment)________________________    Risks: Potential for delay in receiving treatment, Potential deterioration of medical condition, Increased discomfort during transfer, Possible worsening of condition or death during transfer      Consent for Transfer:  I acknowledge that my medical condition has been evaluated and explained to me by the emergency department physician or other qualified medical person and/or my attending physician, who has recommended that I be transferred to the service of  Accepting Physician: Dr. Gonzalez at Accepting Facility Name, City & State : [facility]. The above potential benefits of such transfer, the potential risks associated with such transfer, and the probable risks of not being transferred have been explained to me, and I fully understand them.  The doctor has explained that, in my case, the benefits of transfer outweigh the risks.  I agree to be transferred.    I authorize the performance of emergency medical procedures and treatments upon me in both transit and upon arrival at the receiving facility.  Additionally, I authorize the release of any and all medical records to the receiving facility and request they be transported with me, if possible.  I understand that the safest mode of transportation during a medical emergency is an ambulance and that the Hospital advocates the use of this mode of transport. Risks of traveling to the receiving facility by car, including absence of medical control, life sustaining  equipment, such as oxygen, and medical personnel has been explained to me and I fully understand them.    (OLEGARIO CORRECT BOX BELOW)  [  ]  I consent to the stated transfer and to be transported by ambulance/helicopter.  [  ]  I consent to the stated transfer, but refuse transportation by ambulance and accept full responsibility for my transportation by car.  I understand the risks of non-ambulance transfers and I exonerate the Hospital and its staff from any deterioration in my condition that results from this refusal.    X___________________________________________    DATE  24  TIME________  Signature of patient or legally responsible individual signing on patient behalf           RELATIONSHIP TO PATIENT_________________________          Provider Certification    NAME Martha Chauhan                                         1960                              MRN [id number]    A medical screening exam was performed on the above named patient.  Based on the examination:    Condition Necessitating Transfer The encounter diagnosis was Schizoaffective disorder (HCC).    Patient Condition: The patient has been stabilized such that within reasonable medical probability, no material deterioration of the patient condition or the condition of the unborn child(katherine) is likely to result from the transfer    Reason for Transfer: Level of Care needed not available at this facility    Transfer Requirements: Facility [facility]   Space available and qualified personnel available for treatment as acknowledged by    Agreed to accept transfer and to provide appropriate medical treatment as acknowledged by       Dr. Gonzalez  Appropriate medical records of the examination and treatment of the patient are provided at the time of transfer   STAFF INITIAL WHEN COMPLETED _______  Transfer will be performed by qualified personnel from    and appropriate transfer equipment as required, including the use of necessary and  appropriate life support measures.    Provider Certification: I have examined the patient and explained the following risks and benefits of being transferred/refusing transfer to the patient/family:  General risk, such as traffic hazards, adverse weather conditions, rough terrain or turbulence, possible failure of equipment (including vehicle or aircraft), or consequences of actions of persons outside the control of the transport personnel      Based on these reasonable risks and benefits to the patient and/or the unborn child(katherine), and based upon the information available at the time of the patient’s examination, I certify that the medical benefits reasonably to be expected from the provision of appropriate medical treatments at another medical facility outweigh the increasing risks, if any, to the individual’s medical condition, and in the case of labor to the unborn child, from effecting the transfer.    X____________________________________________ DATE 01/31/24        TIME_______      ORIGINAL - SEND TO MEDICAL RECORDS   COPY - SEND WITH PATIENT DURING TRANSFER

## 2024-01-31 NOTE — ED NOTES
Pt shredding cloths and wandering around entering other rooms. Pt difficult to redirect     Derek Johanson, RN  01/31/24 4256

## 2024-02-15 ENCOUNTER — OFFICE VISIT (OUTPATIENT)
Dept: PSYCHIATRY | Facility: CLINIC | Age: 64
End: 2024-02-15
Payer: COMMERCIAL

## 2024-02-15 VITALS
DIASTOLIC BLOOD PRESSURE: 72 MMHG | HEART RATE: 67 BPM | WEIGHT: 199.2 LBS | SYSTOLIC BLOOD PRESSURE: 114 MMHG | HEIGHT: 60 IN | BODY MASS INDEX: 39.11 KG/M2

## 2024-02-15 DIAGNOSIS — F41.1 GAD (GENERALIZED ANXIETY DISORDER): ICD-10-CM

## 2024-02-15 DIAGNOSIS — F31.2 BIPOLAR AFFECTIVE DISORDER, CURRENTLY MANIC, SEVERE, WITH PSYCHOTIC FEATURES (HCC): Primary | ICD-10-CM

## 2024-02-15 DIAGNOSIS — F25.0 SCHIZOAFFECTIVE DISORDER, BIPOLAR TYPE (HCC): ICD-10-CM

## 2024-02-15 PROCEDURE — 99215 OFFICE O/P EST HI 40 MIN: CPT | Performed by: NURSE PRACTITIONER

## 2024-02-15 PROCEDURE — 90833 PSYTX W PT W E/M 30 MIN: CPT | Performed by: NURSE PRACTITIONER

## 2024-02-15 RX ORDER — OLANZAPINE 10 MG/1
TABLET ORAL
COMMUNITY
Start: 2024-02-13 | End: 2024-02-15 | Stop reason: SDUPTHER

## 2024-02-15 RX ORDER — OLANZAPINE 10 MG/1
10 TABLET ORAL 2 TIMES DAILY
Qty: 180 TABLET | Refills: 0 | Status: SHIPPED | OUTPATIENT
Start: 2024-02-15

## 2024-02-15 RX ORDER — CITALOPRAM HYDROBROMIDE 10 MG/1
TABLET ORAL
COMMUNITY
Start: 2024-02-13

## 2024-02-15 NOTE — PSYCH
Visit Time    Visit Start Time: 8:30  Visit Stop Time: 9:10    Total Visit Duration:  30 minutes    Subjective:     Patient ID: Martha Chauhan is a 63 y.o. female.  History of bipolar 2 disorder with psychotic features, anxiety, depression seen for medication management and mood assessment.  Martha and her  attended the session reporting that she is feeling much better.  She stated that [facility] facility and reports that they took good care of her.  Started olanzapine which is working and her thought processes are clearer.  She reports eating and sleeping well.  We discussed a pattern of her manic episodes after the holidays which could be triggered by her sons lack of communication.  They intend to try to get in touch with her sons and smoothed out any problems that they may have.  She will be attending [facility], go to a new [employer] and look for community support groups.  Martha will call and confirm olanzapine dose.  Takes medications as prescribed and  seems supportive.    HPI ROS Appetite Changes and Sleep: normal appetite and normal energy level    Review Of Systems:     Mood Anxiety mild   Behavior Normal    Thought Content Normal   General Emotional Problems   Personality Normal   Other Psych Symptoms Normal   Constitutional As Noted in HPI   ENT As Noted in HPI   Cardiovascular As Noted in HPI   Respiratory As Noted in HPI   Gastrointestinal As Noted in HPI   Genitourinary As Noted in HPI   Musculoskeletal As Noted in HPI   Integumentary As Noted in HPI   Neurological As Noted in HPI   Endocrine Normal    Other Symptoms Normal        Laboratory Results:     Substance Abuse History:  Social History     Substance and Sexual Activity   Drug Use Never       Family Psychiatric History:   Family History   Problem Relation Age of Onset    Aortic aneurysm Mother 70        Abdominal Aortic Aneurysm, +Smoker    No Known Problems Father     Cancer Maternal Grandmother         70     No Known Problems Brother     No Known Problems Son     No Known Problems Half-Sister     No Known Problems Half-Sister     No Known Problems Half-Sister     No Known Problems Half-Sister     No Known Problems Maternal Aunt     Psychiatric Illness Neg Hx        The following portions of the patient's history were reviewed and updated as appropriate: allergies, current medications, past family history, past medical history, past social history, past surgical history, and problem list.    Social History     Socioeconomic History    Marital status: /Civil Union     Spouse name: Not on file    Number of children: 2    Years of education: Not on file    Highest education level: Some college, no degree   Occupational History    Occupation: Disabled - Previous      Comment: s/p Brain Surgery / H/O Epilepsy   Tobacco Use    Smoking status: Never    Smokeless tobacco: Never   Vaping Use    Vaping status: Never Used   Substance and Sexual Activity    Alcohol use: No     Comment: doesnt drink.    Drug use: Never    Sexual activity: Not Currently     Partners: Male     Birth control/protection: Post-menopausal, Female Sterilization     Comment: Tubal Ligation   Other Topics Concern    Not on file   Social History Narrative        Lives with     2 Children - 2 Sons    Disabled s/p Brain Surgery / H/O Epilepsy - Previous      Social Determinants of Health     Financial Resource Strain: Low Risk  (8/22/2023)    Overall Financial Resource Strain (CARDIA)     Difficulty of Paying Living Expenses: Not hard at all   Food Insecurity: Not on file   Transportation Needs: No Transportation Needs (8/22/2023)    PRAPARE - Transportation     Lack of Transportation (Medical): No     Lack of Transportation (Non-Medical): No   Physical Activity: Unknown (1/21/2021)    Exercise Vital Sign     Days of Exercise per Week: 6 days     Minutes of Exercise per Session: Not on file   Stress: Stress Concern  Present (1/21/2021)    [employer] [place] [employer] - Occupational Stress Questionnaire     Feeling of Stress : To some extent   Social Connections: Unknown (1/21/2021)    Social Connection and Isolation Panel [NHANES]     Frequency of Communication with Friends and Family: Not on file     Frequency of Social Gatherings with Friends and Family: Not on file     Attends Yarsani Services: Not on file     Active Member of Clubs or Organizations: Not on file     Attends Club or Organization Meetings: Not on file     Marital Status:    Intimate Partner Violence: Not At Risk (1/21/2021)    Humiliation, Afraid, Rape, and Kick questionnaire     Fear of Current or Ex-Partner: No     Emotionally Abused: No     Physically Abused: No     Sexually Abused: No   Housing Stability: Not on file     Social History     Social History Narrative        Lives with     2 Children - 2 Sons    Disabled s/p Brain Surgery / H/O Epilepsy - Previous        Objective:       Mental status:  Appearance calm and cooperative , poor hygiene /disheveled, and good eye contact    Mood anxious   Affect affect appropriate    Speech a normal rate   Thought Processes normal thought processes   Hallucinations no hallucinations present    Thought Content no delusions   Abnormal Thoughts no suicidal thoughts  and no homicidal thoughts    Orientation  oriented to person and place and time   Remote Memory short term memory intact and long term memory intact   Attention Span concentration intact   Intellect Appears to be of Average Intelligence   Insight Insight intact   Judgement judgment was intact   Muscle Strength Muscle strength and tone were normal   Language no difficulty naming common objects   Fund of Knowledge displays adequate knowledge of current events   Pain none   Pain Scale 0       Assessment/Plan:       Diagnoses and all orders for this visit:    Bipolar affective disorder, currently manic, severe,  with psychotic features (HCC)  -     OLANZapine (ZyPREXA) 10 mg tablet; Take 1 tablet (10 mg total) by mouth 2 (two) times a day    SWAPNA (generalized anxiety disorder)    Schizoaffective disorder, bipolar type (HCC)    Other orders  -     Discontinue: OLANZapine (ZyPREXA) 10 mg tablet  -     citalopram (CeleXA) 10 mg tablet            Treatment Recommendations- Risks Benefits      Immediate Medical/Psychiatric/Psychotherapy Treatments and Any Precautions: Continue treatment plan, clarify olanzapine's dose    Risks, Benefits And Possible Side Effects Of Medications:  {PSYCH RISK, BENEFITS AND POSSIBLE SIDE EFFECTS (Optional):28302       Psychotherapy Provided: 30 minutes individual psychotherapy provided.   Supportive therapy  Medication evaluation  Mood assessment  Safety plan developed  Treatment plan updated  Crisis number reviewed and to call if energy level starts elevating    Goals discussed in session: Stable mood

## 2024-02-15 NOTE — BH CRISIS PLAN
Client Name: Martha Chauhan       Client YOB: 1960    [facility] Safety Plan      Creation Date: 2/15/24 Update Date: 2/15/24   Created By: Briana Varela, PhD       Step 1: Warning Signs:   Warning Signs   increased energy, insomnia            Step 2: Internal Coping Strategies:   Internal Coping Strategies   none            Step 3: People and social settings that provide distraction:   Name Contact Information   friend cell    Places   none           Step 4: People whom I can ask for help during a crisis:      Name Contact Information    friends cell    hotline       Step 5: Professionals or agencies I can contact during a crisis:      Clinican/Agency Name Phone Emergency Contact    [phone] Dr Nichole Cali        [facility] Emergency Department Emergency Department Phone Emergency Department Address    [place]          Crisis Phone Numbers:   Suicide Prevention Lifeline: Call or Text  501 Crisis Text Line: Text HOME to 443-784   Please note: Some [facility] do not have a separate number for Child/Adolescent specific crisis. If your county is not listed under Child/Adolescent, please call the adult number for your county      Adult Crisis Numbers: Child/Adolescent Crisis Numbers   [facility]: [phone] [facility]: [phone]   [facility]: [phone] [facility]: [phone]   [facility]: [phone] [place]: [phone]   [facility]: [phone] [facility]: [phone]   [facility]: [phone]   [facility]: [phone]   [facility]: [phone]   [facility]: [phone] (daytime) [phone] (after hours, weekends, holidays)      Step 6: Making the environment safer (plan for lethal means safety):   Plan: All locked up     Optional: What is most important to me and worth living for?   Self care,  and grandchildren and children Full of life     [facility] Safety  Plan. Toyin Sweeney and Te Sol. Used with permission of the authors.

## 2024-02-15 NOTE — PATIENT INSTRUCTIONS
Continue medications  Call with problems or concerns  Attend partial hospital program  Looking for community supports

## 2024-02-15 NOTE — BH TREATMENT PLAN
TREATMENT PLAN (Medication Management Only)         [facility]     Name and Date of Birth:  Marthakarlie Chauhan 63 y.o. 1960  Date of Treatment Plan: March 20, 2023, September 13, 2023, February 15, 2024  Diagnosis/Diagnoses:    1. Current moderate episode of major depressive disorder without prior episode (HCC)    2. SWAPNA (generalized anxiety disorder)       Strengths/Personal Resources for Self-Care: supportive family, taking medications as prescribed, ability to communicate needs.  Area/Areas of need (in own words): mood instability  1.         Long Term Goal: improve mood stability.  Target Date:6 months - 8/13/2024  Person/Persons responsible for completion of goal: maryam Thomas,   2.         Short Term Objective (s) - How will we reach this goal?:   A. Provider new recommended medication/dosage changes and/or continue medication(s): continue current medications as prescribed.  B. structure.  C. sleeping adequately, diet nutrition, self care..  Target Date:6 months - 8/13/2024  Person/Persons Responsible for Completion of Goal: maryam Thomas,   Progress Towards Goals: initiating treatment  Treatment Modality: medication management every 1-3  months  Review due 180 days from date of this plan: 6 months - 8/13/2024  Expected length of service: maintenance  My Physician/PA/NP and I have developed this plan together and I agree to work on the goals and objectives. I understand the treatment goals that were developed for my treatment.

## 2024-02-18 ENCOUNTER — RA CDI HCC (OUTPATIENT)
Dept: OTHER | Facility: HOSPITAL | Age: 64
End: 2024-02-18

## 2024-02-22 NOTE — PROGRESS NOTES
Assessment/Plan:  Problem List Items Addressed This Visit        Endocrine    IFG (impaired fasting glucose) - Primary     Pending labs.  Previously, patient declined starting Metformin.  Recommend lifestyle modifications.         Relevant Orders    Comprehensive metabolic panel    Hemoglobin A1C       Other    Class 3 severe obesity with serious comorbidity and body mass index (BMI) of 40.0 to 44.9 in adult (HCC)     Worsening.  Recommend lifestyle modifications.         Prediabetes     Pending labs.  Previously, patient declined starting Metformin.  Recommend lifestyle modifications.         Vitamin D deficiency     Pending labs.  Continue MVI and OTC Vitamin D 3,000IU daily.           Relevant Orders    Comprehensive metabolic panel    Vitamin D 25 hydroxy    Primary stress urinary incontinence     Stable.  Kegel exercises Handout given.  Patient declines Pelvic floor PT.               Hyperlipidemia     Pending labs.  Previously stable s statin.  Recommend lifestyle modifications.    The 10-year ASCVD risk score (Lucho SEXTON, et al., 2019) is: 3.9%    Values used to calculate the score:      Age: 63 years      Sex: Female      Is Non- : No      Diabetic: No      Tobacco smoker: No      Systolic Blood Pressure: 112 mmHg      Is BP treated: No      HDL Cholesterol: 66 mg/dL      Total Cholesterol: 190 mg/dL           Relevant Orders    Comprehensive metabolic panel    TSH, 3rd generation with Free T4 reflex    LDL cholesterol, direct    SWAPNA (generalized anxiety disorder)     Management per Psych.          Relevant Orders    Ambulatory Referral to [facility] or Partial Psych Program    Current episode of major depressive disorder without prior episode     Management per Psych.          Relevant Orders    Ambulatory Referral to [facility] or [facility] Psych Program    TSH, 3rd generation with Free T4 reflex    Schizoaffective disorder, bipolar type (HCC)     Management per Psych.           Relevant Orders    Ambulatory Referral to [facility] or [facility] Psych Program    TSH, 3rd generation with Free T4 reflex   Other Visit Diagnoses     Morbid obesity with BMI of 40.0-44.9, adult ([facility])        Encounter for immunization        Relevant Orders    influenza vaccine, high-dose, PF 0.7 mL (FLUZONE HIGH-DOSE) (Completed)    Pneumococcal Conjugate Vaccine 20-valent (Pcv20) (Completed)           Return in about 4 months (around 6/23/2024) for 4mo - IFG, Vit D Def, Labs.      Future Appointments   Date Time Provider Department Center   3/7/2024  9:30 AM Briana Varela, PhD [facility]   6/25/2024  9:00 AM Teresa Torres,  [facility]   10/28/2024  1:00 PM Ariana Buitrago MD [facility]        Subjective:     Martha is a 63 y.o. female who presents today for a follow-up on her chronic medical conditions.        HPI:  Chief Complaint   Patient presents with   • Follow-up     6mo - IFG, Vit D Def, Labs. No new problems or concerns at this time.   • HM     No additional covid vaccines. Flu and PCV20 today, ordered.      -- Above per clinical staff and reviewed. --      HPI      Today:      Return in about 6 months (around 2/23/2024) for 6mo - IFG, Vit D Def, Labs.     6mo OV     Patient not complete labs 8/23/23.        Obesity - Watching diet - trying to cut back on sugars and carbs.  No Exercise - Previously, Gym for 1-1.5 hours, 3 times per week, Walking dog 30 minutes, 0-1 days per week.     IFG - No meds previously.  She declined Metformin previously.  S/p Nutrition consult c DM  2022.        Bipolar / Anxiety / Depression - Management per Psych Dr. Varela.  Sees q2 months - Next appt 3/24.  No counseling - last counseling 2021.  On Celexa 40mg QD, Buspar 15mg TID, Lamictal 100mg QD.  She is awaiting partial intake.      Vitamin D Deficiency - s/p Drisdol.  Taking MVI, but taking OTC Vitmain D 3000IU daily.       H/O Seizures - Grand Mal / Complex Partial -  Last occurred in 2013 - none since corrective surgery at [facility] in 2013.  Last saw Neurosurgery appt 2015 - F/U PRN.       FamHx AAA - + Mother.  Patient had normal screen 2012?.       Reviewed:  Labs 1/25/24, Gyn 10/25/23     Se[facility] Gyn Dr. Cutler at [facility] yearly.  Next appt 10/24.      The following portions of the patient's history were reviewed and updated as appropriate: allergies, current medications, past family history, past medical history, past social history, past surgical history and problem list.      Review of Systems   Constitutional:  Negative for appetite change, chills, diaphoresis, fatigue and fever.   Respiratory:  Negative for chest tightness and shortness of breath.    Cardiovascular:  Negative for chest pain.   Gastrointestinal:  Negative for abdominal pain, blood in stool, diarrhea, nausea and vomiting.   Genitourinary:  Negative for dysuria.        Current Outpatient Medications   Medication Sig Dispense Refill   • Cholecalciferol (Vitamin D3) 1000 units CAPS Take 3 capsules by mouth in the morning     • citalopram (CeleXA) 10 mg tablet      • citalopram (CeleXA) 40 mg tablet TAKE 1 TABLET BY MOUTH DAILY 90 tablet 3   • lamoTRIgine (LaMICtal) 100 mg tablet TAKE 1 TABLET BY MOUTH EVERY DAY 90 tablet 4   • Magnesium 400 MG TABS Take 1 tablet by mouth in the morning     • Multiple Vitamins-Minerals (Multivitamin Adults) TABS Take 1 tablet by mouth in the morning     • OLANZapine (ZyPREXA) 10 mg tablet Take 1 tablet (10 mg total) by mouth 2 (two) times a day 180 tablet 0   • Red Yeast Rice 600 MG CAPS Take 1 capsule by mouth in the morning       No current facility-administered medications for this visit.       Objective:  /64   Pulse 78   Temp 97.7 °F (36.5 °C)   Resp 16   Ht 5' (1.524 m)   Wt 93.7 kg (206 lb 9.6 oz)   LMP 01/01/2011   SpO2 97%   BMI 40.35 kg/m²    Wt Readings from Last 3 Encounters:   02/23/24 93.7 kg (206 lb 9.6 oz)   02/15/24 90.4 kg (199 lb 3.2 oz)  "  01/25/24 90.3 kg (199 lb 1.6 oz)      BP Readings from Last 3 Encounters:   02/23/24 112/64   02/15/24 114/72   01/30/24 138/70          Physical Exam  Vitals and nursing note reviewed.   Constitutional:       Appearance: Normal appearance. She is well-developed. She is obese.   HENT:      Head: Normocephalic and atraumatic.   Eyes:      Conjunctiva/sclera: Conjunctivae normal.   Neck:      Thyroid: No thyromegaly.      Vascular: No carotid bruit.   Cardiovascular:      Rate and Rhythm: Normal rate and regular rhythm.      Pulses: Normal pulses.      Heart sounds: Normal heart sounds.   Pulmonary:      Effort: Pulmonary effort is normal.      Breath sounds: Normal breath sounds.   Abdominal:      General: Bowel sounds are normal. There is no distension.      Palpations: Abdomen is soft. There is no mass.      Tenderness: There is no abdominal tenderness. There is no guarding or rebound.   Musculoskeletal:         General: No swelling or tenderness.      Cervical back: Neck supple.      Right lower leg: No edema.      Left lower leg: No edema.   Lymphadenopathy:      Cervical: No cervical adenopathy.   Neurological:      General: No focal deficit present.      Mental Status: She is alert and oriented to person, place, and time.   Psychiatric:         Mood and Affect: Mood normal.         Behavior: Behavior normal.         Thought Content: Thought content normal.         Judgment: Judgment normal.         Lab Results:      Lab Results   Component Value Date    WBC 9.88 01/25/2024    HGB 15.0 01/25/2024    HCT 46.1 01/25/2024     01/25/2024    TRIG 83 08/26/2023    HDL 66 08/26/2023    LDLDIRECT 112 (H) 08/26/2023    ALT 16 02/23/2024    AST 14 02/23/2024    K 4.0 02/23/2024     02/23/2024    CREATININE 0.71 02/23/2024    BUN 15 02/23/2024    CO2 29 02/23/2024    GLUF 102 (H) 08/26/2023    HGBA1C 6.3 (H) 08/26/2023     No results found for: \"URICACID\"  Invalid input(s): \"BASENAME\" Vitamin D    XR chest 1 " view portable    Result Date: 1/26/2024  Narrative: CHEST INDICATION:   per protocol for facility. COMPARISON: Two-view chest 6/6/2016 EXAM PERFORMED/VIEWS:  XR CHEST PORTABLE FINDINGS: Cardiomediastinal silhouette appears unremarkable. No airspace consolidation, pneumothorax, pulmonary edema, or pleural effusion. Osseous structures appear within normal limits for patient age.     Impression: No radiographic evidence of acute intrathoracic process. Workstation performed: [id number]        POCT Labs      BMI Counseling: Body mass index is 40.35 kg/m². The BMI is above normal. Exercise recommendations include exercising 3-5 times per week.     Urinary Incontinence Plan of Care: counseling topics discussed: practice Kegel (pelvic floor strengthening) exercises and weight loss.                   BMI Counseling: Body mass index is 40.35 kg/m². The BMI is above normal. Nutrition recommendations include 3-5 servings of fruits/vegetables daily. Exercise recommendations include exercising 3-5 times per week.

## 2024-02-23 ENCOUNTER — OFFICE VISIT (OUTPATIENT)
Dept: FAMILY MEDICINE CLINIC | Facility: CLINIC | Age: 64
End: 2024-02-23
Payer: COMMERCIAL

## 2024-02-23 ENCOUNTER — LAB (OUTPATIENT)
Dept: LAB | Facility: CLINIC | Age: 64
End: 2024-02-23
Payer: COMMERCIAL

## 2024-02-23 VITALS
BODY MASS INDEX: 40.56 KG/M2 | DIASTOLIC BLOOD PRESSURE: 64 MMHG | HEART RATE: 78 BPM | OXYGEN SATURATION: 97 % | HEIGHT: 60 IN | TEMPERATURE: 97.7 F | SYSTOLIC BLOOD PRESSURE: 112 MMHG | WEIGHT: 206.6 LBS | RESPIRATION RATE: 16 BRPM

## 2024-02-23 DIAGNOSIS — R73.01 IFG (IMPAIRED FASTING GLUCOSE): ICD-10-CM

## 2024-02-23 DIAGNOSIS — E55.9 VITAMIN D DEFICIENCY: ICD-10-CM

## 2024-02-23 DIAGNOSIS — F25.0 SCHIZOAFFECTIVE DISORDER, BIPOLAR TYPE (HCC): ICD-10-CM

## 2024-02-23 DIAGNOSIS — F41.1 GAD (GENERALIZED ANXIETY DISORDER): ICD-10-CM

## 2024-02-23 DIAGNOSIS — R73.03 PREDIABETES: ICD-10-CM

## 2024-02-23 DIAGNOSIS — E66.01 CLASS 3 SEVERE OBESITY WITH SERIOUS COMORBIDITY AND BODY MASS INDEX (BMI) OF 40.0 TO 44.9 IN ADULT, UNSPECIFIED OBESITY TYPE (HCC): ICD-10-CM

## 2024-02-23 DIAGNOSIS — E66.01 CLASS 2 SEVERE OBESITY WITH SERIOUS COMORBIDITY AND BODY MASS INDEX (BMI) OF 39.0 TO 39.9 IN ADULT, UNSPECIFIED OBESITY TYPE: ICD-10-CM

## 2024-02-23 DIAGNOSIS — E66.01 MORBID OBESITY WITH BMI OF 40.0-44.9, ADULT (HCC): ICD-10-CM

## 2024-02-23 DIAGNOSIS — F32.1 CURRENT MODERATE EPISODE OF MAJOR DEPRESSIVE DISORDER WITHOUT PRIOR EPISODE (HCC): ICD-10-CM

## 2024-02-23 DIAGNOSIS — F31.61 BIPOLAR DISORDER, CURRENT EPISODE MIXED, MILD (HCC): ICD-10-CM

## 2024-02-23 DIAGNOSIS — R73.01 IFG (IMPAIRED FASTING GLUCOSE): Primary | ICD-10-CM

## 2024-02-23 DIAGNOSIS — E78.5 HYPERLIPIDEMIA, UNSPECIFIED HYPERLIPIDEMIA TYPE: ICD-10-CM

## 2024-02-23 DIAGNOSIS — Z23 ENCOUNTER FOR IMMUNIZATION: ICD-10-CM

## 2024-02-23 DIAGNOSIS — E66.9 OBESITY (BMI 35.0-39.9 WITHOUT COMORBIDITY): ICD-10-CM

## 2024-02-23 DIAGNOSIS — N39.3 PRIMARY STRESS URINARY INCONTINENCE: ICD-10-CM

## 2024-02-23 DIAGNOSIS — E03.9 HYPOTHYROIDISM, UNSPECIFIED TYPE: ICD-10-CM

## 2024-02-23 PROBLEM — E66.813 CLASS 3 SEVERE OBESITY WITH SERIOUS COMORBIDITY AND BODY MASS INDEX (BMI) OF 40.0 TO 44.9 IN ADULT (HCC): Status: ACTIVE | Noted: 2017-06-08

## 2024-02-23 LAB
25(OH)D3 SERPL-MCNC: 29.2 NG/ML (ref 30–100)
ALBUMIN SERPL BCP-MCNC: 3.9 G/DL (ref 3.5–5)
ALP SERPL-CCNC: 86 U/L (ref 34–104)
ALT SERPL W P-5'-P-CCNC: 16 U/L (ref 7–52)
ANION GAP SERPL CALCULATED.3IONS-SCNC: 7 MMOL/L
AST SERPL W P-5'-P-CCNC: 14 U/L (ref 13–39)
BILIRUB SERPL-MCNC: 0.33 MG/DL (ref 0.2–1)
BUN SERPL-MCNC: 15 MG/DL (ref 5–25)
CALCIUM SERPL-MCNC: 9 MG/DL (ref 8.4–10.2)
CHLORIDE SERPL-SCNC: 102 MMOL/L (ref 96–108)
CO2 SERPL-SCNC: 29 MMOL/L (ref 21–32)
CREAT SERPL-MCNC: 0.71 MG/DL (ref 0.6–1.3)
EST. AVERAGE GLUCOSE BLD GHB EST-MCNC: 146 MG/DL
GFR SERPL CREATININE-BSD FRML MDRD: 89 ML/MIN/1.73SQ M
GLUCOSE SERPL-MCNC: 173 MG/DL (ref 65–140)
HBA1C MFR BLD: 6.7 %
POTASSIUM SERPL-SCNC: 4 MMOL/L (ref 3.5–5.3)
PROT SERPL-MCNC: 7 G/DL (ref 6.4–8.4)
SODIUM SERPL-SCNC: 138 MMOL/L (ref 135–147)
TSH SERPL DL<=0.05 MIU/L-ACNC: 3.08 UIU/ML (ref 0.45–4.5)

## 2024-02-23 PROCEDURE — G0008 ADMIN INFLUENZA VIRUS VAC: HCPCS

## 2024-02-23 PROCEDURE — G0009 ADMIN PNEUMOCOCCAL VACCINE: HCPCS

## 2024-02-23 PROCEDURE — 90677 PCV20 VACCINE IM: CPT

## 2024-02-23 PROCEDURE — 80053 COMPREHEN METABOLIC PANEL: CPT

## 2024-02-23 PROCEDURE — 83036 HEMOGLOBIN GLYCOSYLATED A1C: CPT

## 2024-02-23 PROCEDURE — 99214 OFFICE O/P EST MOD 30 MIN: CPT | Performed by: FAMILY MEDICINE

## 2024-02-23 PROCEDURE — 86800 THYROGLOBULIN ANTIBODY: CPT

## 2024-02-23 PROCEDURE — 90662 IIV NO PRSV INCREASED AG IM: CPT

## 2024-02-23 PROCEDURE — 86376 MICROSOMAL ANTIBODY EACH: CPT

## 2024-02-23 PROCEDURE — 84443 ASSAY THYROID STIM HORMONE: CPT

## 2024-02-23 PROCEDURE — 36415 COLL VENOUS BLD VENIPUNCTURE: CPT

## 2024-02-23 PROCEDURE — 82306 VITAMIN D 25 HYDROXY: CPT

## 2024-02-23 RX ORDER — CALCIUM CARBONATE 300MG(750)
1 TABLET,CHEWABLE ORAL DAILY
COMMUNITY

## 2024-02-23 RX ORDER — VITAMIN K2 90 MCG
3 CAPSULE ORAL DAILY
COMMUNITY

## 2024-02-23 RX ORDER — AMPICILLIN TRIHYDRATE 250 MG
1 CAPSULE ORAL DAILY
COMMUNITY

## 2024-02-23 NOTE — RESULT ENCOUNTER NOTE
Stable CMP.  Random blood sugar 173 is ok if not fasting.  Recommend lifestyle modifications - low sugar, low carb diet, exercise, and weight loss.      Message sent to patient via [employer] [facility] patient portal.

## 2024-02-23 NOTE — ASSESSMENT & PLAN NOTE
Pending labs.  Previously, patient declined starting Metformin.  Recommend lifestyle modifications.

## 2024-02-23 NOTE — PATIENT INSTRUCTIONS
Kegel Exercises for Women   AMBULATORY CARE:   Kegel exercises  help strengthen your pelvic muscles. Pelvic muscles hold your pelvic organs, such as your bladder and uterus, in place. Kegel exercises help prevent or control certain conditions, such as urine incontinence (leakage) or uterine prolapse.       Call your doctor or physical therapist if:   You cannot feel your muscles tighten or relax.    You continue to leak urine.    You have questions or concerns about your condition or care.    Use the correct muscles:  Pelvic muscles are the muscles you use to control urine flow. To target these muscles, stop and start the flow of urine several times. This will help you become familiar with how it feels to tighten and relax these muscles.  How to do Kegel exercises:   Get into a comfortable position.  You may lie down, stand up, or sit down to do these exercises. When you first try to do these exercises, it may be easier if you lie down.    Tighten or squeeze your pelvic muscles slowly.  It may feel like you are trying to hold back urine or gas. Hold this position for 3 seconds. Relax for 3 seconds. Repeat this cycle 10 times. Do not hold your breath when you do Kegel exercises. Keep your stomach, back, and leg muscles relaxed.    Do 10 sets of Kegel exercises, at least 3 times a day.  When you know how to do Kegel exercises, use different positions. This will help to strengthen your pelvic muscles as much as possible. You can do these exercises while you lie on the floor, watch TV, or while you stand. Tighten your pelvic muscles before you sneeze, cough, or lift to prevent urine leakage. You may notice improved bladder control within about 6 weeks.    Follow up with your doctor or physical therapist as directed:  Write down your questions so you remember to ask them during your visits.  © Copyright [employer] 2023 Information is for End User's use only and may not be sold, redistributed or otherwise used for  commercial purposes.  The above information is an  only. It is not intended as medical advice for individual conditions or treatments. Talk to your doctor, nurse or pharmacist before following any medical regimen to see if it is safe and effective for you.      Obesity   AMBULATORY CARE:   Obesity  means your body mass index (BMI) is greater than 30. Your healthcare provider will use your age, height, and weight to measure your BMI.  The risks of obesity include  many health problems, including injuries or physical disability.  Diabetes (high blood sugar level)    High blood pressure or high cholesterol    Heart disease or heart failure    Stroke    Gallbladder or liver disease    Cancer of the colon, breast, prostate, liver, or kidney    Sleep apnea    Arthritis or gout    Screening  is done to check for health conditions before you have signs or symptoms. If you are 35 to 70 years old, your blood sugar level may be checked every 3 years for signs of prediabetes or diabetes. Your healthcare provider will check your blood pressure at each visit. High blood pressure can lead to a stroke or other problems. Your provider may check for signs of heart disease, cancer, or other health problems.  Seek care immediately if:   You have a severe headache, confusion, or difficulty speaking.    You have weakness on one side of your body.    You have chest pain, sweating, or shortness of breath.    Call your doctor if:   You have symptoms of gallbladder or liver disease, such as pain in your upper abdomen.    You have knee or hip pain and discomfort while walking.    You have symptoms of diabetes, such as intense hunger and thirst, and frequent urination.    You have symptoms of sleep apnea, such as snoring or daytime sleepiness.    You have questions or concerns about your condition or care.    Treatment for obesity  focuses on helping you lose weight to improve your health. Even a small decrease in BMI can reduce  the risk for many health problems. Your healthcare provider will help you set a weight-loss goal.  Lifestyle changes  are the first step in treating obesity. These include making healthy food choices and getting regular physical activity. Your healthcare provider may suggest a weight-loss program that involves coaching, education, and therapy.    Medicine  may help you lose weight when it is used with a healthy foods and physical activity.    Surgery  can help you lose weight if you have obesity along with other health problems. Several types of weight-loss surgery are available. Ask your healthcare provider for more information.    Tips for safe weight loss:   Set small, realistic goals.  An example of a small goal is to walk for 20 minutes 5 days a week. Anther goal is to lose 5% of your body weight.    Ask for support.  Tell friends, family members, and coworkers about your goals. Ask someone to lose weight with you. You may also want to join a weight-loss support group.    Identify foods or triggers that may cause you to overeat.  Remove tempting high-calorie foods from your home and workplace. Place a bowl of fresh fruit on your kitchen counter. If stress causes you to eat, find other ways to cope with stress. A counselor or therapist may be able to help you.    Track your daily calories and activity.  Write down what you eat and drink. Also write down how many minutes of physical activity you do each day.     Track your weekly weight.  Weigh yourself in the morning, before you eat or drink anything but after you use the bathroom. Use the same scale, in the same place, and in similar clothing each time. Only weigh yourself 1 to 2 times each week, or as directed. You may become discouraged if you weigh yourself every day.    Eating changes:  You will need to eat 500 to 1,000 fewer calories each day than you currently eat to lose 1 to 2 pounds a week. The following changes will help you cut calories:  Eat smaller  portions.  Use small plates, no larger than 9 inches in diameter. Fill your plate half full of fruits and vegetables. Measure your food using measuring cups until you know what a serving size looks like.         Eat 3 meals and 1 or 2 snacks each day.  Plan your meals in advance. Cook and eat at home most of the time. Eat slowly. Do not skip meals. Skipping meals can lead to overeating later in the day. This can make it harder for you to lose weight. Talk with a dietitian to help you make a meal plan and schedule that is right for you.    Eat fruits and vegetables at every meal.  They are low in calories and high in fiber, which makes you feel full. Do not add butter, margarine, or cream sauce to vegetables. Use herbs to season steamed vegetables.    Eat less fat and fewer fried foods.  Eat more baked or grilled chicken and fish. These protein sources are lower in calories and fat than red meat. Limit fast food. Dress your salads with olive oil and vinegar instead of bottled dressing.    Limit the amount of sugar you eat.  Do not drink sugary beverages. Limit alcohol.       Activity changes:  Physical activity is good for your body in many ways. It helps you burn calories and build strong muscles. It decreases stress and depression, and improves your mood. It can also help you sleep better. Talk to your healthcare provider before you begin an exercise program.  Exercise for at least 30 minutes 5 days a week.  Start slowly. Set aside time each day for physical activity that you enjoy and that is convenient for you. It is best to do both weight training and an activity that increases your heart rate, such as walking, bicycling, or swimming.            Find ways to be more active.  Do yard work and housecleaning. Walk up the stairs instead of using elevators. Spend your leisure time going to events that require walking, such as outdoor festivals or fairs. This extra physical activity can help you lose weight and keep  it off.       Follow up with your doctor as directed:  You may need to meet with a dietitian. Write down your questions so you remember to ask them during your visits.  © Copyright [employer] 2023 Information is for End User's use only and may not be sold, redistributed or otherwise used for commercial purposes.  The above information is an  only. It is not intended as medical advice for individual conditions or treatments. Talk to your doctor, nurse or pharmacist before following any medical regimen to see if it is safe and effective for you.

## 2024-02-23 NOTE — ASSESSMENT & PLAN NOTE
Pending labs.  Previously stable s statin.  Recommend lifestyle modifications.    The 10-year ASCVD risk score (Lucho SEXTON, et al., 2019) is: 3.9%    Values used to calculate the score:      Age: 63 years      Sex: Female      Is Non- : No      Diabetic: No      Tobacco smoker: No      Systolic Blood Pressure: 112 mmHg      Is BP treated: No      HDL Cholesterol: 66 mg/dL      Total Cholesterol: 190 mg/dL

## 2024-02-24 LAB
THYROGLOB AB SERPL-ACNC: <1 IU/ML (ref 0–0.9)
THYROPEROXIDASE AB SERPL-ACNC: 22 IU/ML (ref 0–34)

## 2024-02-26 ENCOUNTER — TELEPHONE (OUTPATIENT)
Dept: PSYCHOLOGY | Facility: CLINIC | Age: 64
End: 2024-02-26

## 2024-02-26 ENCOUNTER — TELEPHONE (OUTPATIENT)
Dept: FAMILY MEDICINE CLINIC | Facility: CLINIC | Age: 64
End: 2024-02-26

## 2024-02-26 PROBLEM — E11.9 DIABETES TYPE 2, CONTROLLED (HCC): Status: ACTIVE | Noted: 2024-02-26

## 2024-02-27 ENCOUNTER — TELEPHONE (OUTPATIENT)
Dept: PSYCHOLOGY | Facility: CLINIC | Age: 64
End: 2024-02-27

## 2024-02-27 NOTE — TELEPHONE ENCOUNTER
Patient's labs 2/23/24 reveal that she is diabetic instead of pre-diabetic.  Please schedule non-urgent Diabetes appointment to discuss further management.  Keep separate appt 6/25/24.

## 2024-02-27 NOTE — RESULT ENCOUNTER NOTE
Unstable labs - will review with patient at upcoming appointment.    DM2 - New Dx.  To consider Metformin XR, GLP1a or SGLT2i?    Low vitamin D - Recommend start multivitamin and over-the-counter vitamin D3 1000 - 3000 International Units daily.

## 2024-03-06 ENCOUNTER — OFFICE VISIT (OUTPATIENT)
Dept: FAMILY MEDICINE CLINIC | Facility: CLINIC | Age: 64
End: 2024-03-06
Payer: COMMERCIAL

## 2024-03-06 VITALS
OXYGEN SATURATION: 99 % | HEART RATE: 76 BPM | HEIGHT: 60 IN | DIASTOLIC BLOOD PRESSURE: 68 MMHG | WEIGHT: 209.6 LBS | BODY MASS INDEX: 41.15 KG/M2 | SYSTOLIC BLOOD PRESSURE: 122 MMHG | RESPIRATION RATE: 18 BRPM

## 2024-03-06 DIAGNOSIS — E78.5 HYPERLIPIDEMIA, UNSPECIFIED HYPERLIPIDEMIA TYPE: ICD-10-CM

## 2024-03-06 DIAGNOSIS — E66.01 CLASS 3 SEVERE OBESITY WITH SERIOUS COMORBIDITY AND BODY MASS INDEX (BMI) OF 40.0 TO 44.9 IN ADULT, UNSPECIFIED OBESITY TYPE (HCC): ICD-10-CM

## 2024-03-06 DIAGNOSIS — E11.9 CONTROLLED TYPE 2 DIABETES MELLITUS WITHOUT COMPLICATION, WITHOUT LONG-TERM CURRENT USE OF INSULIN (HCC): Primary | ICD-10-CM

## 2024-03-06 DIAGNOSIS — F41.1 GAD (GENERALIZED ANXIETY DISORDER): ICD-10-CM

## 2024-03-06 DIAGNOSIS — F25.0 SCHIZOAFFECTIVE DISORDER, BIPOLAR TYPE (HCC): ICD-10-CM

## 2024-03-06 DIAGNOSIS — F32.1 CURRENT MODERATE EPISODE OF MAJOR DEPRESSIVE DISORDER WITHOUT PRIOR EPISODE (HCC): ICD-10-CM

## 2024-03-06 PROBLEM — R73.01 IFG (IMPAIRED FASTING GLUCOSE): Status: RESOLVED | Noted: 2022-06-28 | Resolved: 2024-03-06

## 2024-03-06 PROBLEM — R73.03 PREDIABETES: Status: RESOLVED | Noted: 2017-09-13 | Resolved: 2024-03-06

## 2024-03-06 PROCEDURE — G2211 COMPLEX E/M VISIT ADD ON: HCPCS | Performed by: FAMILY MEDICINE

## 2024-03-06 PROCEDURE — 99214 OFFICE O/P EST MOD 30 MIN: CPT | Performed by: FAMILY MEDICINE

## 2024-03-06 RX ORDER — METFORMIN HYDROCHLORIDE 500 MG/1
TABLET, EXTENDED RELEASE ORAL
Qty: 90 TABLET | Refills: 1 | Status: SHIPPED | OUTPATIENT
Start: 2024-03-06

## 2024-03-06 NOTE — ASSESSMENT & PLAN NOTE
Patient declines statin despite increased ASCVD risk.  She prefers to continue Red Yeast Rice 600mg QD - AMA.  Recommend lifestyle modifications.    The 10-year ASCVD risk score (Lucho SEXTON, et al., 2019) is: 8.7%    Values used to calculate the score:      Age: 63 years      Sex: Female      Is Non- : No      Diabetic: Yes      Tobacco smoker: No      Systolic Blood Pressure: 122 mmHg      Is BP treated: No      HDL Cholesterol: 66 mg/dL      Total Cholesterol: 190 mg/dL

## 2024-03-06 NOTE — ASSESSMENT & PLAN NOTE
Lab Results   Component Value Date    HGBA1C 6.7 (H) 02/23/2024     New.  Controlled.  Start Metformin XR 500mg 3 pills daily.  Patient declines statin, glucometer Rx.      Call Insurance to Ask About Diabetes Med Coverage -      GLP-1 Agonists - Pill - Rybelsus, etc., Injectable - Zepbound, Ozempic, Victoza, Byetta, etc.  SGLT-2 Inhibitor - Pill - Jardiance, etc.      Please let us know of your choice.

## 2024-03-06 NOTE — PROGRESS NOTES
Diabetic Foot Exam    Patient's shoes and socks removed.    Right Foot/Ankle   Right Foot Inspection  Skin Exam: skin normal, skin intact and dry skin. No warmth, no callus, no erythema, no maceration, no abnormal color, no pre-ulcer, no ulcer and no callus.     Toe Exam: ROM and strength within normal limits. No swelling, no tenderness, erythema and  no right toe deformity    Sensory   Monofilament testing: intact    Vascular  Capillary refills: < 3 seconds  The right PT pulse is 2+.     Left Foot/Ankle  Left Foot Inspection  Skin Exam: skin normal, skin intact and dry skin. No warmth, no erythema, no maceration, normal color, no pre-ulcer, no ulcer and no callus.     Toe Exam: ROM and strength within normal limits. No swelling, no tenderness, no erythema and no left toe deformity.     Sensory   Monofilament testing: intact    Vascular  Capillary refills: < 3 seconds  The left PT pulse is 2+.     Assign Risk Category  No deformity present  No loss of protective sensation  No weak pulses  Risk: 0

## 2024-03-06 NOTE — PATIENT INSTRUCTIONS
Low vitamin D - Recommend start multivitamin and over-the-counter vitamin D3 4000 International Units daily.    Call Insurance to Ask About Diabetes Med Coverage -      GLP-1 Agonists - Pill - Rybelsus, etc., Injectable - Zepbound, Ozempic, Victoza, Byetta, etc.  SGLT-2 Inhibitor - Pill - Jardiance, etc.      Please let us know of your choice.          Type 2 Diabetes Management for Adults   AMBULATORY CARE:   Type 2 diabetes  is a disease that affects how your body uses glucose (sugar). Either your body cannot make enough insulin, or it cannot use the insulin correctly. It is important to keep diabetes controlled to prevent damage to your heart, blood vessels, and other organs. Management will help you feel well and enjoy your daily activities. Your diabetes care team providers can help you make a plan to fit diabetes care into your schedule. Your plan can change over time to fit your needs and your family's needs.       Have someone call your local emergency number (911 in the [place]) if:   You cannot be woken.    You have signs of diabetic ketoacidosis:     confusion, fatigue    vomiting    rapid heartbeat    fruity smelling breath    extreme thirst    dry mouth and skin    You have any of the following signs of a heart attack:      Squeezing, pressure, or pain in your chest    You may  also have any of the following:     Discomfort or pain in your back, neck, jaw, stomach, or arm    Shortness of breath    Nausea or vomiting    Lightheadedness or a sudden cold sweat    You have any of the following signs of a stroke:      Numbness or drooping on one side of your face     Weakness in an arm or leg    Confusion or difficulty speaking    Dizziness, a severe headache, or vision loss    Call your doctor or diabetes care team provider if:   You have a sore or wound that will not heal.    You have a change in the amount you urinate.    Your blood sugar levels are higher than your target goals.    You often have lower blood  sugar levels than your target goals.    Your skin is red, dry, warm, or swollen.    You have trouble coping with diabetes, or you feel anxious or depressed.    You have trouble following any part of your care plan, such as your meal plan.    You have questions or concerns about your condition or care.    What you need to know about high blood sugar levels:  High blood sugar levels may not cause any symptoms. You may feel more thirsty or urinate more often than usual. Over time, high blood sugar levels can damage your nerves, blood vessels, tissues, and organs. The following can increase your blood sugar levels:  Large meals or large amounts of carbohydrates at one time    Less physical activity    Stress    Illness    A lower dose of diabetes medicine or insulin, or a late dose    What you need to know about low blood sugar levels:  Symptoms include feeling shaky, dizzy, irritable, or confused. You can prevent symptoms by keeping your blood sugar levels from going too low.  Treat a low blood sugar level right away:      Drink 4 ounces of juice or have 1 tube of glucose gel.    Check your blood sugar level again 10 to 15 minutes later.    When the level goes back to normal, eat a meal or snack to prevent another decrease.       Keep glucose gel, raisins, or hard candy with you at all times to treat a low blood sugar level.     Your blood sugar level can get too low if you take diabetes medicine or insulin and do not eat enough food.     If you use insulin, check your blood sugar level before you exercise.      If your blood sugar level is below 100 mg/dL, eat 4 crackers or 2 ounces of raisins, or drink 4 ounces of juice.    Check your level every 30 minutes if you exercise longer than 1 hour.    You may need a snack during or after exercise.    What you can do to manage your blood sugar levels:   Check your blood sugar levels as directed and as needed.  Several items are available to use to check your levels. You may  need to check by testing a drop of blood in a glucose monitor. You may instead be given a continuous glucose monitoring (CGM) device. The device is worn at all times. The CGM checks your blood sugar level every 5 minutes. It sends results to an electronic device such as a smart phone. A CGM can be used with or without an insulin pump. You and your diabetes care team providers will decide on the best method for you. The goal for blood sugar levels before meals  is between 80 and 130 mg/dL and 2 hours after eating  is lower than 180 mg/dL.            Make healthy food choices.  Work with a dietitian to create a meal plan that works for you and your schedule. A dietitian can help you learn how to eat the right amount of carbohydrates (sugar and starchy foods) during your meals and snacks. Examples of carbohydrates are breads, cereals, rice, pasta, fruit, low-fat dairy, and sweets. Carbohydrates can raise your blood sugar level if you eat too many at one time.         Eat high-fiber foods as directed.  Fiber helps improve blood sugar levels. Fiber also lowers your risk for heart disease and other problems diabetes can cause. Examples of high-fiber foods include vegetables, whole-grain bread, and beans such as barragan beans. Your dietitian can tell you how much fiber to have each day.         Get regular physical activity.  Physical activity can help you get to your target blood sugar level goal and manage your weight. Get at least 150 minutes of moderate to vigorous aerobic physical activity each week. Resistance training, such as lifting weights, should be done 3 times each week. Do not miss more than 2 days of physical activity in a row. Do not sit longer than 30 minutes at a time. Your healthcare provider can help you create an activity plan. The plan can include the best activities for you and can help you build your strength and endurance.            Maintain a healthy weight.  Ask your team what a healthy weight is  for you. A healthy weight can help you control diabetes and prevent heart disease. Ask your team to help you create a weight-loss plan, if needed. Even a loss of 3% to 7% of your excess body weight can help make a difference in managing diabetes. Your team will help you set a weight-loss goal, such as 10 to 15 pounds, or 5% of your extra weight. Together you and your team can set manageable weight-loss goals.    Take your diabetes medicine or insulin as directed.  You may need diabetes medicine, insulin, or both to help control your blood sugar levels. Your healthcare provider will teach you how and when to take your diabetes medicine or insulin. You will also be taught about side effects oral diabetes medicine can cause. Insulin may be injected or given through a pump or pen. You and your providers will decide on the best method for you:    An insulin pump  is an implanted device that gives your insulin 24 hours a day. An insulin pump prevents the need for multiple insulin injections in a day.         An insulin pen  is a device prefilled with the right amount of insulin.         You and your family members will be taught how to draw up and give insulin  if this is the best method for you. Your providers will also teach you how to dispose of needles and syringes.    You will learn how much insulin you need  and when to give it. You will be taught when not to give insulin. You will also be taught what to do if your blood sugar level drops too low. This may happen if you take insulin and do not eat the right amount of carbohydrates.    More ways to manage type 2 diabetes:   Wear medical alert identification.  Wear medical alert jewelry or carry a card that says you have diabetes. Ask your provider where to get these items.         Do not smoke.  Nicotine and other chemicals in cigarettes and cigars can cause lung and blood vessel damage. It also makes it more difficult to manage your diabetes. Ask your provider for  information if you currently smoke and need help to quit. Do not use e-cigarettes or smokeless tobacco in place of cigarettes or to help you quit. They still contain nicotine.    Check your feet each day for cuts, scratches, calluses, or other wounds.  Look for redness and swelling, and feel for warmth. Wear shoes that fit well. Check your shoes for rocks or other objects that can hurt your feet. Do not walk barefoot or wear shoes without socks. Wear cotton socks to help keep your feet dry.         Ask about vaccines you may need.  You have a higher risk for serious illness if you get the flu, pneumonia, COVID-19, or hepatitis. Ask your provider if you should get vaccines to prevent these or other diseases, and when to get the vaccines.    Talk to your provider if you become stressed about diabetes care.  Sometimes being able to fit diabetes care into your life can cause increased stress. The stress can cause you not to take care of yourself properly. Your provider can help by offering tips about self-care. A mental health provider can listen and offer help with self-care issues. Other types of counseling can help you make nutrition or physical activity changes.    Have your A1c checked as directed.  Your provider may check your A1c every 3 months, or 2 times each year if your diabetes is controlled. An A1c test shows the average amount of sugar in your blood over the past 2 to 3 months. Your provider will tell you what your A1c level should be.    Have screening tests as directed.  Your provider may recommend screening for complications of diabetes and other conditions that may develop. Some screenings may begin right away and some may happen within the first 5 years of diagnosis:    Examples of diabetes complications  include kidney problems, high cholesterol, high blood pressure, blood vessel problems, eye problems, and sleep apnea.    You may be screened for a low vitamin B level  if you take oral diabetes  medicine for a long time.    You may be screened for polycystic ovarian syndrome (PCOS)  if you are of childbearing age.    Follow up with your doctor or diabetes care team providers as directed:  You may need to have blood tests done before your follow-up visit. The test results will show if changes need to be made in your treatment or self-care. Talk to your provider if you cannot afford your medicine. Write down your questions so you remember to ask them during your visits.  © Copyright [employer] 2023 Information is for End User's use only and may not be sold, redistributed or otherwise used for commercial purposes.  The above information is an  only. It is not intended as medical advice for individual conditions or treatments. Talk to your doctor, nurse or pharmacist before following any medical regimen to see if it is safe and effective for you.    Type 2 Diabetes in Adults: New Diagnosis   AMBULATORY CARE:   Type 2 diabetes  is a disease that affects how your body uses glucose (sugar). Normally, when the blood sugar level increases, the pancreas makes more insulin. Insulin helps move sugar out of the blood so it can be used for energy. Type 2 diabetes develops because either the body cannot make enough insulin, or it cannot use the insulin correctly. Type 2 diabetes can be controlled to prevent damage to your heart, blood vessels, and other organs.       Common symptoms include the following:   Numbness in your fingers or toes    Blurred vision    Urinating often    More hunger or thirst than usual    Have someone call your local emergency number (911 in the [place]) if:   You have any of the following signs of a stroke:      Numbness or drooping on one side of your face     Weakness in an arm or leg    Confusion or difficulty speaking    Dizziness, a severe headache, or vision loss    You have any of the following signs of a heart attack:      Squeezing, pressure, or pain in your chest    You may   also have any of the following:     Discomfort or pain in your back, neck, jaw, stomach, or arm    Shortness of breath    Nausea or vomiting    Lightheadedness or a sudden cold sweat    You have trouble breathing.    Seek care immediately if:   You have severe abdominal pain.    You vomit for more than 2 hours.    You have trouble staying awake or focusing.    You are shaking or sweating.    You feel weak or more tired than usual.    You have blurred or double vision.    Your breath has a fruity, sweet smell.    Call your doctor or diabetes care team provider if:   Your arms and legs are swollen.    You have an upset stomach and cannot eat the foods on your meal plan.    You feel dizzy, have headaches, or are easily irritated.    Your skin is red, warm, dry, or swollen.    You have a wound that does not heal.    You have numbness in your arms or legs.    You have trouble coping with your illness, or you feel anxious or depressed.    You have trouble following any part of your care plan, such as your meal plan.    You have questions or concerns about your condition or care.    Treatment for type 2 diabetes  helps prevent or delay complications, including heart and kidney disease. You must eat healthy meals and do regular physical activity. Your diabetes providers may ask about your home life so they can create a care plan that works best for you. Your plan may  include any of the following:  Diabetes medicines or insulin  may be given to decrease the amount of sugar in your blood.    Blood pressure medicine  may be given to lower your blood pressure.    Cholesterol-lowering medicine  may be given to prevent heart disease.    Antiplatelets , such as aspirin, help prevent blood clots. Take your antiplatelet medicine exactly as directed. These medicines make it more likely for you to bleed or bruise. If you are told to take aspirin, do not take acetaminophen or ibuprofen instead.    Take your medicine as directed.   Contact your healthcare provider if you think your medicine is not helping or if you have side effects. Tell your provider if you are allergic to any medicine. Keep a list of the medicines, vitamins, and herbs you take. Include the amounts, and when and why you take them. Bring the list or the pill bottles to follow-up visits. Carry your medicine list with you in case of an emergency.    Tests  may be used to check for type 2 diabetes starting at age 35 or sooner if you have 1 or more risk factors. Any of the following may be used to diagnose diabetes or check that it is well controlled:  An A1c test  shows the average amount of sugar in your blood over the past 2 to 3 months. Your diabetes care team provider will tell you the A1c level that is right for you.    A fasting plasma glucose test  is when your blood sugar level is tested after you have not eaten for 8 hours.    A 2-hour plasma glucose test  starts with a blood sugar level check after you have not eaten for 8 hours. You are then given a glucose drink. Your blood sugar level is checked after 2 hours.    A random glucose test  may be done any time of day, no matter how long ago you ate.    Diabetes education:  Diabetes education will start right away. Diabetes education may also happen later to refresh your memory. Your diabetes care team may include physicians, nurse practitioners, community health providers, and physician assistants. It may also include nurses, dietitians, exercise specialists, pharmacists, dentists, and podiatrists. Family members, or others who are close to you, may also be part of the team. You and your team will make goals and plans to manage diabetes and other health problems. The plans and goals will be specific to your needs. Members of your diabetes care team will teach you the following:  About nutrition:  A dietitian will help you make a meal plan to keep your blood sugar level steady. You will learn how food affects your blood  sugar levels. You will also learn to keep track of carbohydrates (sugar and starchy foods). Do not skip meals. Your blood sugar level may drop too low if you have taken diabetes medicine and do not eat. You may be taught to use the plate method for portion control. With the plate method, ½ of your plate contains non-starchy vegetables. The other half is divided so ¼ contains protein, and ¼ contains carbohydrates. Ask your care team for more information about meal planning.         About physical activity and diabetes:  You will learn why physical activity, such as walking, is important. You and your care team provider will make a plan for your activity.            About a healthy body weight:  You will learn how a healthy weight can help you control diabetes and prevent heart disease. Ask your team what a healthy weight is for you. Ask your team to help you create a weight-loss plan, if needed. Even a loss of 3% to 7% of your excess body weight can help make a difference in managing diabetes. Your team will help you set manageable weight-loss goals, such as 10 to 15 pounds, or 5% of your extra weight.    About your blood sugar level:  You will learn what your blood sugar level should be. You will be given information on when and how to check your blood sugar level. You may need to check by testing a drop of blood in a glucose monitor. You may instead be given a continuous glucose monitoring (CGM) device. A sensor is placed in your abdomen or on your arm. You put a transmitter on the sensor to get a reading that shows up on the monitor. You will learn what to do if your level is too high or too low. Write down the times of your checks and your levels. Take them to all follow-up appointments.            About diabetes medicine:  You may need oral diabetes medicine, insulin, or both to help control your blood sugar levels. Your healthcare provider will teach you how and when to take oral diabetes medicine. You will  also be taught about side effects oral diabetes medicine can cause. Insulin may be added if oral diabetes medicine becomes less effective over time. Insulin may be injected, or given through a pump or pen. You and your care team will discuss which method is best for you.    An insulin pump  is an implanted device that gives your insulin 24 hours a day. An insulin pump prevents the need for multiple insulin injections in a day.         An insulin pen  is a device prefilled with the right amount of insulin.         You and your family members will be taught how to draw up and give insulin  if this is the best method for you. Your education team will also teach you how to dispose of needles and syringes.    You will learn how much insulin you need  and when to give it. You will be taught when not to give insulin. You will also be taught what to do if your blood sugar level drops too low. This may happen if you take insulin and do not eat the right amount of carbohydrates.    Other ways to help manage type 2 diabetes:   Talk to your care team providers if you have increased stress about your diagnosis.  Stress about your diagnosis can keep you from taking care of yourself properly. Your team can help by offering tips about self-care. Your team may suggest you talk to a mental health provider who can listen and offer help with self-care issues. Other types of counseling can help you make nutrition or physical activity changes.    Check your feet each day for sores.  Wear shoes and socks that fit correctly. Do not trim your toenails. Go to a podiatrist. Ask your care team for more information about foot care.         Do not smoke.  Nicotine and other chemicals in cigarettes and cigars can cause lung damage and make diabetes harder to manage. Ask your care team provider for information if you currently smoke and need help to quit. E-cigarettes or smokeless tobacco still contain nicotine. Talk to your care team provider  before you use these products.    Drink water instead of sugary drinks such as soft drinks and fruit juices.  Sugary drinks increase your blood sugar level and weight. Your healthcare provider may tell you that diet drinks do not help with weight loss.    Know the risks if you choose to drink alcohol.  Alcohol can cause your blood sugar levels to be low if you use insulin. Alcohol can cause high blood sugar levels and weight gain if you drink too much. A drink of alcohol is 12 ounces of beer, 5 ounces of wine, or 1½ ounces of liquor. Your care team can tell you how many drinks are okay to have in 24 hours and in 1 week.    Check your blood pressure as directed.  If you have high blood pressure (BP), talk to your care team about your BP goals. Together you can create a plan to lower your BP if needed and keep it in a healthy range. The plan may include lifestyle changes or medicines. A normal BP is 119/79 or lower. A normal blood pressure can help prevent or delay certain complications from diabetes. Examples include retinopathy (eye damage) and kidney damage.         Wear medical alert identification.  Wear medical alert jewelry or carry a card that says you have diabetes. Ask your care team provider where to get these items.         Ask about vaccines you may need.  You have a higher risk for serious illness if you get the flu, pneumonia, COVID-19, or hepatitis. Ask your provider if you should get vaccines to prevent these or other diseases, and when to get the vaccines.    Risks of type 2 diabetes:  It is important to learn to keep your diabetes in control. Uncontrolled diabetes can damage your nerves, veins, and arteries. Your risk for dementia increases faster the longer your diabetes is not controlled. High blood sugar levels may damage other body tissues and organs over time. Damage to arteries may increase your risk for heart attack and stroke. Nerve damage may also lead to other heart, stomach, and nerve  problems. Diabetes can become life-threatening if it is not controlled.  Follow up with your care team providers as directed:  You may need to return to have your A1c checked every 3 months. You will need to have your feet checked during at least 1 visit each year. You will need an eye exam 1 time each year to check for retinopathy. You will also need tests to check for kidney or heart disease, and high blood pressure. Write down your questions so you remember to ask them during your visits.  © Copyright [employer] 2023 Information is for End User's use only and may not be sold, redistributed or otherwise used for commercial purposes.  The above information is an  only. It is not intended as medical advice for individual conditions or treatments. Talk to your doctor, nurse or pharmacist before following any medical regimen to see if it is safe and effective for you.    Basic Carbohydrate Counting   AMBULATORY CARE:   Carbohydrate counting  is a way to plan your meals by counting the amount of carbohydrate in foods. Carbohydrates are the sugars, starches, and fiber found in fruit, grains, vegetables, and milk products. Carbohydrates increase your blood sugar levels. Carbohydrate counting can help you eat the right amount of carbohydrate to keep your blood sugar levels under control.   What you need to know about planning meals using carbohydrate counting:  A dietitian or healthcare provider will help you develop a healthy meal plan that works best for you. You will be taught how much carbohydrate to eat or drink for each meal and snack. Your meal plan will be based on your age, weight, usual food intake, and physical activity level. If you have diabetes, it will also include your blood sugar levels and diabetes medicine. Once you know how much carbohydrate you should eat, you can decide what type of food you want to eat.    You will need to know what foods contain carbohydrate and how much they contain.  Keep track of the amount of carbohydrate in meals and snacks in order to follow your meal plan. Do not avoid carbohydrates or skip meals. Your blood sugar may fall too low if you do not eat enough carbohydrate or you skip meals.    Foods that contain carbohydrate:   Breads:  Each serving of food listed below contains about 15 g of carbohydrate .    1 slice of bread (1 ounce) or 1 flour or corn tortilla (6 inch)    ½ of a hamburger bun or ¼ of a large bagel (about 1 ounce)    1 pancake (about 4 inches across and ¼ inch thick)    Cereals and grains:  Serving sizes of ready-to-eat cereals vary. Look at the serving size and the total carbohydrate amount listed on the food label. Each serving of food listed below contains about 15 g of carbohydrate .    ¾ cup of dry, unsweetened, ready-to-eat cereal or ¼ cup of low-fat granola     ½ cup of oatmeal or other cooked cereal     ? cup of cooked rice or pasta    Starchy vegetables and beans:  Each serving of food listed below contains about 15 g of carbohydrate .    ½ cup of corn, green peas, sweet potatoes, or mashed potatoes    ¼ of a large baked potato    ½ cup of beans, lentils, and peas (garbanzo, barragan, kidney, white, split, black-eyed)    Crackers and snacks:  Each serving of food listed below contains about 15 g of carbohydrate .    3 ronald cracker squares or 8 animal crackers     6 saltine-type crackers    3 cups of popcorn or ¾ ounce of pretzels, potato chips, or tortilla chips    Fruit:  Each serving of food listed below contains about 15 g of carbohydrate .    1 small (4 ounce) piece of fresh fruit or ¾ to 1 cup of fresh fruit    ½ cup of canned or frozen fruit, packed in natural juice    ½ cup (4 ounces) of unsweetened fruit juice    2 tablespoons of dried fruit    Desserts or sugary foods:  Each serving of food listed below contains about 15 g of carbohydrate .    2-inch square unfrosted cake or brownie     2 small cookies    ½ cup of ice cream, frozen yogurt,  or nondairy frozen yogurt    ¼ cup of sherbet or sorbet    1 tablespoon of regular syrup, jam, or jelly    2 tablespoons of light syrup    Milk and yogurt:  Foods from the milk group contain about 12 g of carbohydrate per serving.    1 cup of fat-free or low-fat milk    1 cup of soy milk    ? cup of fat-free, yogurt sweetened with artificial sweetener    Non-starchy vegetables:  Each serving contains about 5 g of carbohydrate . Three servings of non-starch vegetables count as 1 carbohydrate serving.     ½ cup of cooked vegetables or 1 cup of raw vegetables. This includes beets, broccoli, cabbage, cauliflower, cucumber, mushrooms, tomatoes, and zucchini    ½ cup of vegetable juice    How to use carbohydrate counting to plan meals:   Count carbohydrate amounts using serving sizes:      Pasta dinner example:  You plan to have pasta, tossed salad, and an 8-ounce glass of milk. Your healthcare provider tells you that you may have 4 carbohydrate servings for dinner. One carbohydrate serving of pasta is ? cup. One cup of pasta will equal 3 carbohydrate servings. An 8-ounce glass of milk will count as 1 carbohydrate serving. These amounts of food would equal 4 carbohydrate servings. One cup of tossed salad does not count toward your carbohydrate servings.       Count carbohydrate amounts using food labels:  Find the total amount of carbohydrate in a packaged food by reading the food label. Food labels tell you the serving size of the food and the total carbohydrate amount in each serving. Find the serving size on the food label and then decide how many servings you will eat. Multiply the number of servings you plan to eat by the carbohydrate amount per serving.     Granola bar snack example:  Your meal plan allows you to have 2 carbohydrate servings (30 grams) of carbohydrate for a snack. You plan to eat 1 package of granola bars, which contains 2 bars. According to the food label, the serving size of food in this package  is 1 bar. Each serving (1 bar) contains 25 grams of carbohydrate. The total amount of carbohydrate in this package of granola bars would be 50 g. Based on your meal plan, you should eat only 1 bar.      Follow up with your doctor as directed:  Write down your questions so you remember to ask them during your visits.  © Copyright [employer] 2023 Information is for End User's use only and may not be sold, redistributed or otherwise used for commercial purposes.  The above information is an  only. It is not intended as medical advice for individual conditions or treatments. Talk to your doctor, nurse or pharmacist before following any medical regimen to see if it is safe and effective for you.    Foot Care for People with Diabetes   AMBULATORY CARE:   What you need to know about foot care:  Long-term high blood sugar levels can damage the blood vessels and nerves in your legs and feet. This damage makes it hard to feel pressure, pain, temperature, and touch. You may not be able to feel a cut or sore, or shoes that are too tight. Foot care is needed to prevent serious problems, such as an infection or amputation. Diabetes may cause your toes to become crooked or curved under. These changes may affect the way you walk and can lead to increased pressure on your foot. The pressure can decrease blood flow to your feet. Lack of blood flow increases your risk for a foot ulcer.  Call your care team provider if:   Your feet become numb, weak, or hard to move.    You have pus draining from a sore on your foot.    You have a wound on your foot that gets bigger, deeper, or does not heal.    You see blisters, cuts, scratches, calluses, or sores on your foot.    You have a fever, and your feet become red, warm, and swollen.    Your toenails become thick, curled, or yellow.    You find it hard to check your feet because your vision is poor.    You have questions or concerns about your condition or care.    How to care  for your feet:   Check your feet each day.  Look at your whole foot, including the bottom, and between and under your toes. Check for wounds, corns, and calluses. Use a mirror to see the bottom of your feet. The skin on your feet may be shiny, tight, or darker than normal. Your feet may also be cold and pale. Feel your feet by running your hands along the tops, bottoms, sides, and between your toes. Redness, swelling, and warmth are signs of blood flow problems that can lead to a foot ulcer. Do not try to remove corns or calluses yourself. Do not ignore small problems, such as dry skin or small wounds. These can become life-threatening over time without proper care.         Wash your feet each day with soap and warm water.  Do not use hot water, because this can injure your foot. Dry your feet gently with a towel after you wash them. Dry between and under your toes.    Apply lotion or a moisturizer on your dry feet.  Ask your care team provider what lotions are best to use. Do not put lotion or moisturizer between your toes. Moisture between your toes could lead to skin breakdown.    Cut your toenails correctly.  File or cut your toenails straight across. Use a soft brush to clean around your toenails. If your toenails are very thick, you may need to have a care team provider or specialist cut them.     Protect your feet.  Do not walk barefoot or wear your shoes without socks. Check your shoes for rocks or other objects that can hurt your feet. Wear cotton socks to help keep your feet dry. Wear socks without toe seams, or wear them with the seams inside out. Change your socks each day. Do not wear socks that are dirty or damp.    Wear shoes that fit well.  Wear shoes that do not rub against any area of your feet. Your shoes should be ½ to ¾ inch (1 to 2 centimeters) longer than your feet. Your shoes should also have extra space around the widest part of your feet. Walking or athletic shoes with laces or straps that  adjust are best. Ask your care team provider for help to choose shoes that fit you best. Ask your provider if you need to wear an insert, orthotic, or bandage on your feet.         Go to your follow-up visits.  Your care team provider will do a foot exam at least 1 time each year. You may need a foot exam more often if you have nerve damage, foot deformities, or ulcers. Your provider will check for nerve damage and how well you can feel your feet. Your provider will check your shoes to see if they fit well.    Do not smoke.  Smoking can damage your blood vessels and put you at increased risk for foot ulcers. Ask your care team provider for information if you currently smoke and need help to quit. E-cigarettes or smokeless tobacco still contain nicotine. Talk to your care team provider before you use these products.    Follow up with your diabetes care team provider or foot specialist as directed:  You will need to have your feet checked at least 1 time each year. You may need a foot exam more often if you have nerve damage, foot deformities, or ulcers. Write down your questions so you remember to ask them during your visits.  © Copyright [employer] 2023 Information is for End User's use only and may not be sold, redistributed or otherwise used for commercial purposes.  The above information is an  only. It is not intended as medical advice for individual conditions or treatments. Talk to your doctor, nurse or pharmacist before following any medical regimen to see if it is safe and effective for you.    What to Do if Your Blood Sugar is Low   AMBULATORY CARE:   Low blood sugar levels  (hypoglycemia) can happen with Type 1 and Type 2 diabetes. Low levels are more likely to happen if you use insulin. Hypoglycemia can cause you to have falls, accidents, and injuries. A blood sugar level that gets too low can lead to seizures, coma, and death. Learn to recognize the symptoms early so you can get treatment  quickly.  When your blood sugar is low you may feel:  Sweaty    Nervous or shaky    Anxious or irritable    Confused    A fast, pounding heartbeat    Extremely hungry    Have someone call your local emergency number (911 in the [place]) if:   You cannot be woken.    You have a seizure.    Call your doctor if:   You have symptoms of a low blood sugar level, such as trouble thinking, sweating, or a pounding heartbeat.    Your blood sugar level is lower than normal and it does not improve with treatment.     You often have lower blood sugar levels than your target goals.    You have trouble coping with your illness, or you feel anxious or depressed.     You have questions or concerns about your condition or care.    What to do if you have symptoms of low blood sugar:   Check your blood sugar level, if possible.  Your blood sugar level is too low if it is at or below 70 mg/dL.     Eat or drink 15 grams of fast-acting carbohydrate. Fast-acting carbohydrates will raise your blood sugar level quickly. Examples of 15 grams of fast-acting carbohydrates:     4 ounces (½ cup) of fruit juice     4 ounces of regular soda    2 tablespoons of raisins     1 tube of glucose gel or 3 to 4 glucose tablets       Check your blood sugar level 15 minutes later.  If the level is still low (less than 100 mg/dL), eat another 15 grams of carbohydrate. When the level returns to 100 mg/dL, eat a snack or meal that contains carbohydrates. This will help prevent another drop in blood sugar.    Teach people close to you how to use your glucagon kit.  Your blood sugar may be too low for you to be awake. People need to know when and how to use your kit.    Prevent low blood sugar levels:  Prevent low blood sugar by knowing what increases your risk. Ask your healthcare provider for ways to prevent low blood sugar levels. Any of the following can increase your risk of low blood sugar:  Fasting for tests or procedures    During or after intense  exercise    Late or postponed meals    Sleeping (you may need a bedtime snack)     Drinking alcohol if you use insulin or insulin releasing pills    Follow up with your doctor as directed:  Write down your questions so you remember to ask them during your visits.  © Copyright [employer] 2023 Information is for End User's use only and may not be sold, redistributed or otherwise used for commercial purposes.  The above information is an  only. It is not intended as medical advice for individual conditions or treatments. Talk to your doctor, nurse or pharmacist before following any medical regimen to see if it is safe and effective for you.

## 2024-03-06 NOTE — PROGRESS NOTES
Assessment/Plan:  Problem List Items Addressed This Visit        Endocrine    Diabetes type 2, controlled (HCC) - Primary       Lab Results   Component Value Date    HGBA1C 6.7 (H) 02/23/2024     New.  Controlled.  Start Metformin XR 500mg 3 pills daily.  Patient declines statin, glucometer Rx.      Call Insurance to Ask About Diabetes Med Coverage -      GLP-1 Agonists - Pill - Rybelsus, etc., Injectable - Zepbound, Ozempic, Victoza, Byetta, etc.  SGLT-2 Inhibitor - Pill - Jardiance, etc.      Please let us know of your choice.           Relevant Medications    metFORMIN (GLUCOPHAGE-XR) 500 mg 24 hr tablet    Other Relevant Orders    Comprehensive metabolic panel    Magnesium    Albumin / creatinine urine ratio    Ambulatory referral to Ophthalmology    Ambulatory referral to Diabetic Education - use to refer for diabetes group classes, individual diabetes education, medical nutrition therapy, device training       Other    Class 3 severe obesity with serious comorbidity and body mass index (BMI) of 40.0 to 44.9 in adult (HCC)     Worsening.  Recommend lifestyle modifications.         Hyperlipidemia     Patient declines statin despite increased ASCVD risk.  She prefers to continue Red Yeast Rice 600mg QD - AMA.  Recommend lifestyle modifications.    The 10-year ASCVD risk score (Lucho SEXTON, et al., 2019) is: 8.7%    Values used to calculate the score:      Age: 63 years      Sex: Female      Is Non- : No      Diabetic: Yes      Tobacco smoker: No      Systolic Blood Pressure: 122 mmHg      Is BP treated: No      HDL Cholesterol: 66 mg/dL      Total Cholesterol: 190 mg/dL           SWAPNA (generalized anxiety disorder)     Management per Psych.  Pending Partial Psych intake 3/12/24.         Current episode of major depressive disorder without prior episode     Management per Psych.  Pending Partial Psych intake 3/12/24.         Schizoaffective disorder, bipolar type (HCC)     Management per  Psych.  Pending Partial Psych intake 3/12/24.             Return in about 4 months (around 6/25/2024) for 4mo - DM2, Vit D Def, Labs.      Future Appointments   Date Time Provider Department Center   3/7/2024  9:30 AM Briana Varela, PhD PS[facility]   3/12/2024  8:30 AM Aida Sanches MD [facility]   3/12/2024  9:00 AM  [facility]   3/13/2024  9:00 AM  [facility]   3/14/2024  9:00 AM  [facility]   3/15/2024  9:00 AM  [facility]   3/18/2024  9:00 AM  [facility]   3/19/2024  9:00 AM  [facility]   3/20/2024  9:00 AM  [facility]   3/21/2024  9:00 AM  [facility]   3/22/2024  9:00 AM  [facility]   6/25/2024  9:00 AM Teresa Torres DO [facility]   10/28/2024  1:00 PM Ariana Buitrago MD  [facility]        Subjective:     Martha is a 63 y.o. female who presents today for a follow-up on her chronic medical conditions.        HPI:  Chief Complaint   Patient presents with   • Results     labs   • Diabetes     New onset     -- Above per clinical staff and reviewed. --      Diabetes  She presents for her initial diabetic visit. She has type 2 diabetes mellitus. There are no hypoglycemic associated symptoms. Pertinent negatives for diabetes include no chest pain and no fatigue. Risk factors for coronary artery disease include diabetes mellitus, post-menopausal, obesity and dyslipidemia. Current diabetic treatment includes diet. Her weight is increasing steadily. She is following a generally healthy diet. She participates in exercise three times a week. (Does not check BS at home.  ) An ACE inhibitor/angiotensin II  receptor blocker is not being taken. She does not see a podiatrist.Eye exam is not current.         Today:        Return in about 4 months (around 6/23/2024) for 4mo - IFG, Vit D Def, Labs     DM2        Obesity - Watching diet - trying to cut back on sugars and carbs.  +Exercise - Gym for 0.75 - 1 hour, 3 times per week, Walking dog 30 minutes, 0-1 days per week.     DM2 - No meds previously.  She declined Metformin previously.  S/p Nutrition consult c DM  2022.  Sees Optho - Dr. Rios - Next appt?  No Podiatry.    Hyperlipidemia - No statin previously.       Bipolar / Anxiety / Depression - Management per Psych Dr. Varela.  Sees q2 months - Next appt 3/24.  No counseling - last counseling 2021.  On Celexa 40mg QD, Buspar 15mg TID, Lamictal 100mg QD.  Pending partial intake 3/12/24.      Vitamin D Deficiency - s/p Drisdol.  Taking MVI, but taking OTC Vitmain D 3000IU daily.             From previous note:    Return in about 6 months (around 2/23/2024) for 6mo - IFG, Vit D Def, Labs.      6mo OV     Patient not complete labs 8/23/23.        Obesity - Watching diet - trying to cut back on sugars and carbs.  No Exercise - Previously, Gym for 1-1.5 hours, 3 times per week, Walking dog 30 minutes, 0-1 days per week.     IFG - No meds previously.  She declined Metformin previously.  S/p Nutrition consult c DM  2022.        Bipolar / Anxiety / Depression - Management per Psych Dr. Varela.  Sees q2 months - Next appt 3/24.  No counseling - last counseling 2021.  On Celexa 40mg QD, Buspar 15mg TID, Lamictal 100mg QD.  She is awaiting partial intake.      Vitamin D Deficiency - s/p Drisdol.  Taking MVI, but taking OTC Vitmain D 3000IU daily.       H/O Seizures - Grand Mal / Complex Partial - Last occurred in 2013 - none since corrective surgery at [facility] in 2013.  Last saw Neurosurgery appt 2015 - F/U PRN.       FamHx AAA - + Mother.  Patient had normal screen 2012?.       Reviewed:  Labs 2/23/24, Gyn  10/25/23     Sees Gyn Dr. Cutler at [facility] yearly.  Next appt 10/24.          The following portions of the patient's history were reviewed and updated as appropriate: allergies, current medications, past family history, past medical history, past social history, past surgical history and problem list.      Review of Systems   Constitutional:  Negative for appetite change, chills, diaphoresis, fatigue and fever.   Respiratory:  Negative for chest tightness and shortness of breath.    Cardiovascular:  Negative for chest pain.   Gastrointestinal:  Negative for abdominal pain, blood in stool, diarrhea, nausea and vomiting.   Genitourinary:  Negative for dysuria.        Current Outpatient Medications   Medication Sig Dispense Refill   • Cholecalciferol (Vitamin D3) 1000 units CAPS Take 3 capsules by mouth in the morning     • citalopram (CeleXA) 10 mg tablet Take 10 mg by mouth daily     • citalopram (CeleXA) 40 mg tablet TAKE 1 TABLET BY MOUTH DAILY 90 tablet 3   • lamoTRIgine (LaMICtal) 100 mg tablet TAKE 1 TABLET BY MOUTH EVERY DAY 90 tablet 4   • Magnesium 400 MG TABS Take 1 tablet by mouth in the morning     • metFORMIN (GLUCOPHAGE-XR) 500 mg 24 hr tablet Increase every 3 days stomach tolerates:  1 pill in AM; 2 pills in AM; 3 pills in AM.  Take with food. 90 tablet 1   • Multiple Vitamins-Minerals (Multivitamin Adults) TABS Take 1 tablet by mouth in the morning     • OLANZapine (ZyPREXA) 10 mg tablet Take 1 tablet (10 mg total) by mouth 2 (two) times a day 180 tablet 0   • Red Yeast Rice 600 MG CAPS Take 1 capsule by mouth in the morning       No current facility-administered medications for this visit.       Objective:  /68   Pulse 76   Resp 18   Ht 5' (1.524 m)   Wt 95.1 kg (209 lb 9.6 oz)   LMP 01/01/2011   SpO2 99%   BMI 40.93 kg/m²    Wt Readings from Last 3 Encounters:   03/06/24 95.1 kg (209 lb 9.6 oz)   02/23/24 93.7 kg (206 lb 9.6 oz)   02/15/24 90.4 kg (199 lb 3.2 oz)      BP  "Readings from Last 3 Encounters:   03/06/24 122/68   02/23/24 112/64   02/15/24 114/72          Physical Exam  Vitals and nursing note reviewed.   Constitutional:       Appearance: Normal appearance. She is well-developed. She is obese.   HENT:      Head: Normocephalic and atraumatic.   Eyes:      Conjunctiva/sclera: Conjunctivae normal.   Neck:      Thyroid: No thyromegaly.   Cardiovascular:      Rate and Rhythm: Normal rate and regular rhythm.      Pulses: Normal pulses.      Heart sounds: Normal heart sounds.   Pulmonary:      Effort: Pulmonary effort is normal.      Breath sounds: Normal breath sounds.   Musculoskeletal:         General: No swelling or tenderness.      Cervical back: Neck supple.      Right lower leg: No edema.      Left lower leg: No edema.   Lymphadenopathy:      Cervical: No cervical adenopathy.   Neurological:      General: No focal deficit present.      Mental Status: She is alert and oriented to person, place, and time.   Psychiatric:         Mood and Affect: Mood normal.         Behavior: Behavior normal.         Thought Content: Thought content normal.         Judgment: Judgment normal.         Lab Results:      Lab Results   Component Value Date    WBC 9.88 01/25/2024    HGB 15.0 01/25/2024    HCT 46.1 01/25/2024     01/25/2024    TRIG 83 08/26/2023    HDL 66 08/26/2023    LDLDIRECT 112 (H) 08/26/2023    ALT 16 02/23/2024    AST 14 02/23/2024    K 4.0 02/23/2024     02/23/2024    CREATININE 0.71 02/23/2024    BUN 15 02/23/2024    CO2 29 02/23/2024    GLUF 102 (H) 08/26/2023    HGBA1C 6.7 (H) 02/23/2024     No results found for: \"URICACID\"  Invalid input(s): \"BASENAME\" Vitamin D    No results found.     POCT Labs                       "

## 2024-03-07 ENCOUNTER — OFFICE VISIT (OUTPATIENT)
Dept: PSYCHIATRY | Facility: CLINIC | Age: 64
End: 2024-03-07
Payer: COMMERCIAL

## 2024-03-07 VITALS
DIASTOLIC BLOOD PRESSURE: 68 MMHG | SYSTOLIC BLOOD PRESSURE: 118 MMHG | HEIGHT: 60 IN | HEART RATE: 69 BPM | BODY MASS INDEX: 41.03 KG/M2 | WEIGHT: 209 LBS

## 2024-03-07 DIAGNOSIS — F25.0 SCHIZOAFFECTIVE DISORDER, BIPOLAR TYPE (HCC): ICD-10-CM

## 2024-03-07 DIAGNOSIS — F32.1 CURRENT MODERATE EPISODE OF MAJOR DEPRESSIVE DISORDER WITHOUT PRIOR EPISODE (HCC): ICD-10-CM

## 2024-03-07 DIAGNOSIS — F31.81 BIPOLAR II DISORDER (HCC): Primary | ICD-10-CM

## 2024-03-07 DIAGNOSIS — F41.1 GAD (GENERALIZED ANXIETY DISORDER): ICD-10-CM

## 2024-03-07 PROCEDURE — 90833 PSYTX W PT W E/M 30 MIN: CPT | Performed by: NURSE PRACTITIONER

## 2024-03-07 PROCEDURE — 99214 OFFICE O/P EST MOD 30 MIN: CPT | Performed by: NURSE PRACTITIONER

## 2024-03-11 ENCOUNTER — APPOINTMENT (OUTPATIENT)
Dept: PSYCHOLOGY | Facility: CLINIC | Age: 64
End: 2024-03-11
Payer: COMMERCIAL

## 2024-03-11 NOTE — PLAN OF CARE
Pt progressing; DC canceled - rescheduled for possible DC Weds next week - will follow up at [facility]

## 2024-03-12 ENCOUNTER — TELEPHONE (OUTPATIENT)
Dept: PSYCHIATRY | Facility: CLINIC | Age: 64
End: 2024-03-12

## 2024-03-12 ENCOUNTER — OFFICE VISIT (OUTPATIENT)
Dept: PSYCHOLOGY | Facility: CLINIC | Age: 64
End: 2024-03-12
Payer: COMMERCIAL

## 2024-03-12 ENCOUNTER — OFFICE VISIT (OUTPATIENT)
Dept: PSYCHIATRY | Facility: CLINIC | Age: 64
End: 2024-03-12
Payer: COMMERCIAL

## 2024-03-12 VITALS
DIASTOLIC BLOOD PRESSURE: 79 MMHG | BODY MASS INDEX: 41.78 KG/M2 | HEIGHT: 60 IN | RESPIRATION RATE: 18 BRPM | WEIGHT: 212.8 LBS | SYSTOLIC BLOOD PRESSURE: 129 MMHG | HEART RATE: 73 BPM

## 2024-03-12 DIAGNOSIS — F41.1 GAD (GENERALIZED ANXIETY DISORDER): Primary | ICD-10-CM

## 2024-03-12 DIAGNOSIS — F41.1 GAD (GENERALIZED ANXIETY DISORDER): ICD-10-CM

## 2024-03-12 DIAGNOSIS — F31.81 BIPOLAR II DISORDER (HCC): Primary | ICD-10-CM

## 2024-03-12 DIAGNOSIS — F31.81 BIPOLAR II DISORDER (HCC): ICD-10-CM

## 2024-03-12 DIAGNOSIS — F33.2 SEVERE EPISODE OF RECURRENT MAJOR DEPRESSIVE DISORDER, WITHOUT PSYCHOTIC FEATURES (HCC): Chronic | ICD-10-CM

## 2024-03-12 PROCEDURE — 90791 PSYCH DIAGNOSTIC EVALUATION: CPT

## 2024-03-12 PROCEDURE — G0176 OPPS/PHP;ACTIVITY THERAPY: HCPCS

## 2024-03-12 PROCEDURE — G0410 GRP PSYCH PARTIAL HOSP 45-50: HCPCS

## 2024-03-12 PROCEDURE — 90832 PSYTX W PT 30 MINUTES: CPT

## 2024-03-12 PROCEDURE — 90792 PSYCH DIAG EVAL W/MED SRVCS: CPT | Performed by: PSYCHIATRY & NEUROLOGY

## 2024-03-12 RX ORDER — FLUOXETINE HYDROCHLORIDE 20 MG/1
20 CAPSULE ORAL DAILY
Qty: 30 CAPSULE | Refills: 3 | Status: SHIPPED | OUTPATIENT
Start: 2024-03-12

## 2024-03-12 NOTE — PSYCH
"Subjective:    Patient ID: Martha Chauhan is a 63 y.o. female      [facility] Clinical Progress Notes      Specialized Services Documentation  Therapist must complete separate progress note for each specific clinical activity in which the individual participated during the day.       Allied Therapy ([id number]) Martha Chauhan \"Keith\" attended second half of group on Part II of Wellness Recovery Action Plan (WRAP) after completing intake process. Today, members focused on WRAP's subsections and were encouraged to fill in coping skills from their toolbox for planned responses:   Early warning signs  When things are breaking down and or getting worse  Crisis Plan  Post crisis plan   Max actively shared pieces of information from these sections and identified their importance.  encouraged the members of group to continue utilizing the packet to develop plans inside and outside of program. Positive initial effort towards treatment plan goals which were displayed through participation and engagement in topic. Tx Plan Objective: 1.1, 1.2, 1.3, 1.4, Therapist:  MICHAEL Gonzáles  "

## 2024-03-12 NOTE — PSYCH
Assessment/Plan:      There are no diagnoses linked to this encounter.        1. SWAPNA (generalized anxiety disorder)        2. Bipolar II disorder (HCC)             Subjective:    HPI:     Per Dr. Aida Sanches MD: Martha Chauhan is a 63 y.o. female with Bipolar disorder, generalized anxiety disorder, history of ischemic without residual deficit, hyperlipidemia, primary stress urinary incontinence, vitamin D deficiency diabetes mellitus type 2, complex partial epilepsy. referred by primary physician because she has mood swings, euphoria, was recently discharge from a psychiatric unit. Onset of symptoms was  a few months ago with gradually worsening course since that time. Psychosocial Stressors: mental health . She had a long history of mental illness, she had been treated for depression but in January she had an episode of euphoria, little sleep, irrational, talkative, and was admitted to an impatient psychiatric unit in [place]. She was started on Zyprexa and her mood has some improvement. She states has a good support from her  and always has been compliance with her treatment.  .TodayMartha Chauhan feels anxious, she is circumstantial, talkative, feels euphoric, focus in her diagnosis. She still has sleep disturbances, changes in energy level. She denies any suicidal or homicidal ideas , plan or intent, she does not has any active hallucinations or paranoid thoughts. She wants to learn coping skills and feels better.      Per this writer: Martha Chauhan is a 63 y.o. female referred by her PCP due to mood swings, euphoria, was recently discharge from a psychiatric unit. . Martha Chauhan is domiciled at home with her . She has two sons - one who lives in [place] and the other lives in [place]. She reports that her  has Autism Spectrum Disorder and Parkinsons and he is very regimented. He reports that he is supportive. Martha Chauhan currently does not work and is retired. 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 During the interview, Martha was difficult to follow as she often was contradictory in her statements. For example, she shared that she has no contact with her family and then reports how close she is to them. She was a bit vague regarding symptoms and reported only that since 2013 after her Right Temporal Lobe surgery she was not the same. She did not share about her [facility] in patient stay as she reports it was \"hazy.\" She reports that she feels she can be \"too aggressive,\" \"too nervous,\" and \"I talk too much.\" Psychosocial Stressors: Health and mental health. Martha Chauhan reports she eats too much and her sleep is \"rough.\" Martha is good with her ADLs. Regarding family history: her mother lives in [facility] and her father passed away 25 years ago from cancer. She has a half sister, Hailey who is 13 1/2 years younger than her and an older brother, Tejas. Regarding trauma history: denies.. Their symptoms include: anxious, she is circumstantial, talkative, and feels euphoric.. Martha Chauhan denies any current SI without plan or intent. Denies HI, AH/VH and does not have any active paranoid thoughts or hallucinations.     Per Martha Chauhan: \"I'm somewhat impulsive, but I think about it,\" \"I have filter,\" \"I have to remind myself don't isolate,\" \"I don't sleep at night,\" \"I just want to shrink\"    Strengths: Writing and being a mom     Reason for evaluation and partial hospitalization as an alternative to inpatient hospitalization PHP is medically necessary to prevent hospitalization as outpatient care has been unable to stabilize Martha Chauhan and a greater intensity of treatment is indicated. Milieu therapy to monitor for medication needs, provide wellness tools education and offer opportunity to share and connect to others. Group therapy, case management, psychiatric medication management, family contact and UR as indicated. " "DUNCAN 10 treatment days.      Previous Psychiatric/psychological treatment/year:   Past Psychiatric History:       Past Inpatient Psychiatric Treatment:   In Patient she had prior inpatient psych admissions including at [facility], [facility], and recently in [place]  Past Outpatient Psychiatric Treatment:    She follows with Dr. Varela  Past Suicide Attempts:              no  Past Violent Behavior:              no  Past Psychiatric Medication Trials:              Zoloft, Celexa, Remeron, Lamictal, Zyprexa, and Buspar      Outpatient and/or Partial and Other Community Resources Used (CTT, ICM, VNA): Partial 2017      PSYCHIATRIST:  MICHELLE Varela, PhD  [address]  [address]  [place] [address]  PH: [phone]  F: [phone]    THERAPIST:  None       Problem Assessment:     SOCIAL/VOCATION:  Family Constellation (include parents, relationship with each and pertinent Psych/Medical History):     Family History   Problem Relation Age of Onset    Aortic aneurysm Mother 70        Abdominal Aortic Aneurysm, +Smoker    No Known Problems Father     Bipolar disorder Brother     No Known Problems Maternal Aunt     Cancer Maternal Grandmother         70    No Known Problems Son     No Known Problems Half-Sister     No Known Problems Half-Sister     No Known Problems Half-Sister     No Known Problems Half-Sister     Alcohol abuse Neg Hx     Drug abuse Neg Hx     Completed Suicide  Neg Hx           Mother: Radha [facility] \"Alive and well\"   Father:  - 25 years ago cancer  Close with parents growing up  Spouse: Francisco, ASD and Parkinsons   Sibling: Ashutosh - 16 months older   Sibling: Midge - 13 1/2 years younger (half sister)   Children: Vinny - lives in [place]   Children: AJ - lives in [place]   Other: Dog    Who is the person you relate to best My mom.  Martha Chauhan lives with .   Legal Guardian (for individuals under 18): n/a  Family Factors " impacting discharge planning (for individuals under 18): n/a    Domestic Violence: denies    Trauma/Abuse History: denies     Additional Comments related to family/relationships/peer support: Signed an Emergency PINEDA for Spouse.     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 Preferred language is English.Ethnic considerations are . Religions affiliations and level of involvement Agnostic .     FUNCTIONAL STATUS: There has been a recent change in the patient's ability to do the following: does not need van service    Level of Assistance Needed/By Whom?: n/a    Martha Simmonsman learns best by  reading, listening, and demonstration    SUBSTANCE ABUSE ASSESSMENT: no substance abuse    Do you currently smoke? Does not smoke Offered smoking cessation? N/a    Substance/Route/Age/Amount/Frequency/Last Use: n/a    DETOX HISTORY: none    Previous detox/rehab treatment: none    HEALTH ASSESSMENT: no nausea, no vomiting, and no referral to PCP needed    Primary Care Physician:   Teresa Torres DO  [address] [facility] [facility] [address] [place] [id number] [phone]    If None on file providers offered: On file  Date of Last Physical: under a year if not within the last year, one has been recommended: n/a    NUTRITION SCREENING:  Do you have any food allergies: No Diabetic   Weight loss or gain of 10 pounds or more in the last 3 months: No  Decrease in appetite and/or food intake: No  Dental issues impacting nutrition: No  Binging or restricting patterns: No  Past treatment for an eating disorder: No  Level  of nutrition needs: Yes = 1 point; No = 0   Total: None  none (0)- low (1-3) - moderate (4) - severe (5)   Action plan if moderate to severe: Referral to:N\A      LEGAL: No Mental Health Advance Directive or Power of  on file    Risk Assessment:   The following ratings are based on my interview(s) with Martha over the last 30 minutes     Risk of Harm to Self:   Demographic risk factors include   Historical Risk Factors include  MH diagnoses, inpatient and PHP admissions  Recent Specific Risk Factors include  recent IP stay in [facility]    Risk of Harm to Others:   Demographic Risk Factors include  retired  Historical Risk Factors include  n/a  Recent Specific Risk Factors include multiple stressors    Access to Weapons:   Martha Chauhan has access to the following weapons: NONE. The following steps have been taken to ensure weapons are properly secured: n/a    Based on the above information, the client presents the following risk of harm to self or others:  low    The following interventions are recommended:   no intervention changes    Notes regarding this Risk Assessment: Provided local Crisis Number to [facility] and Peer/Warm line to [facility]. 988 Crisis Hotline number also provided.     Review Of Systems:     Mood Euphoria   Behavior Normal    Thought Content Normal   General Sleep Disturbances   Personality Normal   Other Psych Symptoms Normal   Constitutional Negative   ENT Negative   Cardiovascular Negative   Respiratory Negative   Gastrointestinal Negative   Genitourinary Negative   Musculoskeletal Negative   Integumentary Negative   Neurological Negative   Endocrine Normal    Other Symptoms Normal        Mental status:  Appearance calm and cooperative , adequate hygiene and grooming, and good eye contact    Mood dysphoric   Affect affect appropriate    Speech a normal rate   Thought Processes circumstantial   Hallucinations no hallucinations present    Thought  Content no delusions   Abnormal Thoughts no suicidal thoughts  and no homicidal thoughts    Orientation  oriented to person and place and time   Remote Memory short term memory intact and long term memory intact   Attention Span concentration impaired   Intellect Appears to be of Average Intelligence   Fund of Knowledge displays adequate knowledge of current events, adequate fund of knowledge regarding past history, and adequate fund of knowledge regarding vocabulary    Insight Fair    Judgement fair   Muscle Strength Muscle strength and tone were normal and Normal gait    Language no difficulty naming common objects, no difficulty repeating a phrase , and no difficulty writing a sentence    Pain none   Pain Scale 0        DSM:     1. SWAPNA (generalized anxiety disorder)        2. Bipolar II disorder (HCC)              Plan:  Admit to PHP. Group Therapy, Case Management, Med Management, UR and family contact as indicated. ELOS 10 treatment Days. Refer to OP psychiatry and therapy, if needed.     Anticipated aftercare plan: OP Care - continue with medication management. May be interested in therapy.

## 2024-03-12 NOTE — PSYCH
Subjective:     Patient ID: Martha Chauhan is a 63 y.o. female     [facility] Clinical Progress Notes      Specialized Services Documentation  Therapist must complete separate progress note for each specific clinical activity in which the individual participated during the day.     Other:     Pre-Authorization: Spoke with Alba at [facility]  - phone: [phone].  used NPI [id number] for address [address] [place] [address]. [address] [place] [address]. Insurance Verification Sheet states precert required, however, per [facility] No pre-certification required for PHP LOC. Reference Call #: [id number]. SALEEM Olivas.     Case Management Note    SALEEM Donaldson     Current suicide risk : low    ([id number]) Met with Martha Chauhan at [facility]. Reviewed program, initial paperwork reviewed: Consent for Treatment, PHP handbook, HIPPA, General Consent, Client Bill of Rights, and Smoking/Drug and Alcohol Policy. Release of Information obtained for emergency contact - Francisco Chauhan, spouse - [phone]  and PCP and Health Care Coordination Form. Martha Chauhan declined hard copies of all paperwork and verbally gave consent. Reviewed and given on call number. PCP notified of admission and health care coordination form sent. Completed initial psycho-social evaluation and initial treatment goals discussed.    Medications changes/added/denied? No - See Dr. Aida Sanches's Note     Treatment session number: 1    Individual Case Management Visit provided today? yes    [facility] follow up physician's orders: Admit to PHP - See Dr. Aida Freire's note

## 2024-03-12 NOTE — TELEPHONE ENCOUNTER
Pharmacy called and wanted to let you know that there is a drug interaction with the OLANZapine (ZyPREXA) 10 mg tablet  and citalopram (CeleXA) 40 mg tablet . I can call them back [phone] Ref [id number].

## 2024-03-12 NOTE — PSYCH
Subjective:     Patient ID: Martha Chauhan 63 y.o.     [facility] Clinical Progress Notes      Specialized Services Documentation  Therapist must complete separate progress note for each specific clinical activity in which the individual participated during the day.     Education Therapy     [id number] Martha Chauhan  was in the intake process at this time    2325-0318 Martha Chauhan engaged throughout the treatment day. Was engaged in learning related to Illness, Medication, Aftercare and Wellness Tools.  Staff utilized verbal, written, and demonstration teaching methods. Martha Chauhan shared area of learning and set a goal for outside of program to clean up her kitchen.     Tx Plan Objective: 1.1, 1.2, 1.4, Therapist: Latanya MENDES Ed.    Group Psychotherapy  Group Therapy    Subjective:     Patient ID: Martha Chauhan 63 y.o.     This group was facilitated in a private office.  ([id number])Martha Chauhan actively engaged in psychoeducational group about coping with loneliness. The skill helps to identify way to decrease feeling of loneliness and behavior change focused toward a specified goal. Group explored the identifying factors that create loneliness, this process can help them to take care of emotional and intellectual moments of loneliness on a short term and long term basis. The group talked about understanding the meaning of loneliness in regards to physical, intellectual, and emotional expression, regulation , and recognition, and how it affects themselves and others. Teaching on the emphasis of self monitoring and relapse, the group explored who they can go to for help was brought up as well. Group was encouraged to ask questions in an open forum at the end of group. Positive progress displayed through engagement in topic.Martha Chauhan will continue to engage in psychotherapy to encourage positive self realization.  Treatment Plan Objective 1.1, 1.2, 1.3, 1.4 Therapist: Wilian MENDES  Ed.

## 2024-03-12 NOTE — BH TREATMENT PLAN
"Subjective:      Patient ID: Martha Chauhan  is a 63 y.o. female    Assessment/Plan:        Diagnoses and all orders for this visit:    SWAPNA (generalized anxiety disorder)    Bipolar II disorder (HCC)        [facility] Treatment Plan     AREAS OF NEED: Martha Chauhan is experiencing symptoms related to her diagnoses of SWAPNA and Bipolar II as evidenced by feelings of euphoria, hyper-verbal, and anxious due to mental health stressors .    Date Initiated: 03/12/24    Strengths: \"Writing, Painting, and Gardening\" and \"being mom.\"      LONG TERM GOAL:   Date Initiated: 03/12/24  1.0 While at [facility], I will gain new skills/techniques/education/support/insights which I can use daily to decrease my symptoms and increase my quality of life.  Target Date: 04/09/2024  Completion Date:       SHORT TERM OBJECTIVES:     Date Initiated: 03/12/24  1.1 I will identify and practice three new coping skills I can utilize daily outside of program, included but not limited to (mindfulness, acting intentionally, reducing vulnerability to emotional mind, reality checking, opposite action, etc.,) in order to enhance my wellness and reduce secondary symptoms such as anxiety.   Revision Date:   Target Date: 03/21/2024  Completion Date:     Date Initiated: 03/12/24  1.2 I will increase my emotional vocabulary and practice “I” statements daily when experiencing challenging emotions.   Revision Date:   Target Date: 03/21/2024  Completion Date:    Date Initiated: 03/12/24  1.3 I will take medications as prescribed and share questions and concerns if arise.    Revision Date:  Target Date: 03/21/2024  Completion Date:     Date Initiated: 03/12/24  1.4 I will identify 3 ways my supports can assist in my wellness journey and use them if/when needed.    Revision Date:  Target Date: 03/21/2024   Completion Date:          7 DAY REVISION:    Date Initiated:  Revision Date:   Target Date:   Completion Date:      PSYCHIATRY:  Date Initiated:  " 03/12/24  Medication Management and Education      Revision Date:       The person(s) responsible for carrying out the plan is Dr. Aida Sanches MD & OSMAN Maki     NURSING/SYMPTOM EDUCATION:  Date Initiated: 03/12/24       1.1, 1.2. 1.3, 1.4 Provide wellness/symptoms and skill education groups three to five days weekly to educate Martha Chauhan on signs and symptoms of diagnoses, skills to manage stressors, and medication questions that will be addressed by the treatment team.        Revision date:       The person(s) responsible for carrying out the plan is Sanjay Aggarwal MS & David Pizano     PSYCHOLOGY:   Date Initiated: 03/12/24       1.1, 1.2, 1.4 Provide psychotherapy group 5 times per week to allow opportunity for Martha Chauhan  to explore stressors and ways of coping.   Revision Date:   The person(s) responsible for carrying out the plan is SALEEM Olivas     ALLIED THERAPY:   Date Initiated: 03/12/24  1.1,1.2 Engage Martha Chauhan in AT group 5 times daily to encourage development and use of wellness tools to decrease symptoms and promote recovery through meaningful activity.  Revision Date:       The person(s) responsible for carrying out the plan is KATHIE Falcon and MICHAEL Gonzáles    CASE MANAGEMENT:   Date Initiated: 03/12/24      1.0 This  will meet with Martha Chauhan  3-4 times weekly to assess treatment progress, discharge planning, connection to community    supports and UR as indicated.  Revision Date:   The person(s) responsible for carrying out the plan is SALEEM Donaldson     TREATMENT REVIEW/COMMENTS:     DISCHARGE CRITERIA: Identify 3 signs of progress and complete relapse prevention plan.    DISCHARGE PLAN: Connect with identified outpatient providers.   Estimated Length of Stay: 10 treatment days      CLIENT COMMENTS / Please share your thoughts, feelings, need and/or experiences regarding your treatment plan with  Staff.  Please see follow up note with comments.      Signatures can be found on [facility] Treatment plan consent form.

## 2024-03-12 NOTE — TELEPHONE ENCOUNTER
Received call from patient stating that she called pharmacy and they did not fill the [facility] [phone]  Drug interaction between the citalopram and olanzapine 10 mg and needs provider's approval.  Please call [facility].

## 2024-03-12 NOTE — PSYCH
Subjective:     Patient ID: Martha Chauhan is a 63 y.o. female.    [facility] Clinical Progress Notes      Specialized Services Documentation  Therapist must complete separate progress note for each specific clinical activity in which the individual participated during the day.       Group Psychotherapy   [id number]    Martha Chauhan actively shared in psychotherapy group focused on DBT mindfulness strategies “what” skills.  Group tasks and discussions explored practice of “what” skills through observing, describing and participating.  Martha appeared engaged during progressive muscle relaxation. She identified they could practice mindfulness today through cleaning. She was able to stop herself from blurting out questions and thoughts several times.   Some beginning effort noted toward treatment goal.  Continue psychotherapy to encourage the development and practice of mindfulness strategies to alleviate symptoms and support wellness.   Tx Plan Objective: 1.1, Therapist:  Laurence VÁZQUEZ

## 2024-03-12 NOTE — PSYCH
This note was not shared with the patient due to reasonable likelihood of causing patient harm     Visit Time    Visit Start Time: 8:40    Visit Stop Time: 9:25  Total Visit Duration:  45 minutes    Reason for visit:   Chief Complaint   Patient presents with    Depression    Anxiety       HPI     Martha Chauhan is a 63 y.o. female with Bipolar disorder, generalized anxiety disorder, history of ischemic without residual deficit, hyperlipidemia, primary stress urinary incontinence, vitamin D deficiency diabetes mellitus type 2, complex partial epilepsy. referred by primary physician because she has mood swings, euphoria, was recently discharge from a psychiatric unit. Onset of symptoms was  a few months ago with gradually worsening course since that time. Psychosocial Stressors: mental health . She had a long history of mental illness, she had been treated for depression but in January she had an episode of euphoria, little sleep, irrational, talkative, and was admitted to an impatient psychiatric unit in [place]. She was started on Zyprexa and her mood has some improvement. She states has a good support from her  and always has been compliance with her treatment.  .TodayMartha Chauhan feels anxious, she is circumstantial, talkative, feels euphoric, focus in her diagnosis. She still has sleep disturbances, changes in energy level. She denies any suicidal or homicidal ideas , plan or intent, she does not has any active hallucinations or paranoid thoughts. She wants to learn coping skills and feels better.       Review Of Systems:     Mood Euphoria   Behavior Normal    Thought Content Normal   General Sleep Disturbances   Personality Normal   Other Psych Symptoms Normal   Constitutional Negative   ENT Negative   Cardiovascular Negative   Respiratory Negative   Gastrointestinal Negative   Genitourinary Negative   Musculoskeletal Negative   Integumentary Negative   Neurological Negative   Endocrine Normal     Other Symptoms Normal        Past Psychiatric History:      Past Inpatient Psychiatric Treatment:   In Patient she had prior inpatient psych admissions including at [facility], [facility], and recently in [place] at [facility].   Past Outpatient Psychiatric Treatment:    She follows with Dr. Varela  Past Suicide Attempts:    no  Past Violent Behavior:    no  Past Psychiatric Medication Trials:    Zoloft, Celexa, Remeron, Lamictal, Zyprexa, and Buspar    Family Psychiatric History:   Family History   Problem Relation Age of Onset    Aortic aneurysm Mother 70        Abdominal Aortic Aneurysm, +Smoker    No Known Problems Father     Bipolar disorder Brother     No Known Problems Maternal Aunt     Cancer Maternal Grandmother         70    No Known Problems Son     No Known Problems Half-Sister     No Known Problems Half-Sister     No Known Problems Half-Sister     No Known Problems Half-Sister     Alcohol abuse Neg Hx     Drug abuse Neg Hx     Completed Suicide  Neg Hx        Social History:    Education: some college  Learning Disabilities:  None  Marital history:   Living arrangement, social support:  She lives with her .  Occupational History: on permanent disability  Functioning Relationships: good support system.  Other Pertinent History:  No legal or  history    Social History     Substance and Sexual Activity   Drug Use Never       Traumatic History:       Abuse:  None  Other Traumatic Events:  None    No history of head injury  She has seizures      The following portions of the patient's history were reviewed and updated as appropriate: She  has a past medical history of Anxiety, Bipolar disorder ([facility]) (04/19/2023), Class 2 severe obesity with serious comorbidity and body mass index (BMI) of 37.0 to 37.9 in adult, Concussion, Depression, Diabetes type 2, controlled ([facility]) (02/26/2024), History of ischemic stroke without residual deficits (2013), History of seizures,  Hyperlipidemia (2023), Primary stress urinary incontinence (2023), Seizures ([facility]), and Vitamin D deficiency.  She   Patient Active Problem List    Diagnosis Date Noted    Diabetes type 2, controlled ([facility]) 2024    Hyperlipidemia 2023    Primary stress urinary incontinence 2023    Schizoaffective disorder, bipolar type ([facility]) 2023    Current episode of major depressive disorder without prior episode 2022    Vitamin D deficiency 2022    Class 3 severe obesity with serious comorbidity and body mass index (BMI) of 40.0 to 44.9 in adult ([facility]) 2017    SWAPNA (generalized anxiety disorder) 2015     She  has a past surgical history that includes  section; Brain surgery (); and Tubal ligation.  Her family history includes Aortic aneurysm (age of onset: 70) in her mother; Bipolar disorder in her brother; Cancer in her maternal grandmother; No Known Problems in her father, half-sister, half-sister, half-sister, half-sister, maternal aunt, and son.  She  reports that she has never smoked. She has never used smokeless tobacco. She reports that she does not drink alcohol and does not use drugs.  Current Outpatient Medications   Medication Sig Dispense Refill    Cholecalciferol (Vitamin D3) 1000 units CAPS Take 3 capsules by mouth in the morning      citalopram (CeleXA) 40 mg tablet TAKE 1 TABLET BY MOUTH DAILY 90 tablet 3    lamoTRIgine (LaMICtal) 100 mg tablet TAKE 1 TABLET BY MOUTH EVERY DAY 90 tablet 4    metFORMIN (GLUCOPHAGE-XR) 500 mg 24 hr tablet Increase every 3 days stomach tolerates:  1 pill in AM; 2 pills in AM; 3 pills in AM.  Take with food. 90 tablet 1    Multiple Vitamins-Minerals (Multivitamin Adults) TABS Take 1 tablet by mouth in the morning      OLANZapine (ZyPREXA) 10 mg tablet Take 1 tablet (10 mg total) by mouth 2 (two) times a day 180 tablet 0    Red Yeast Rice 600 MG CAPS Take 1 capsule by mouth in the morning       No current  facility-administered medications for this visit.     She has No Known Allergies..       Mental status:  Appearance calm and cooperative , adequate hygiene and grooming, and good eye contact    Mood dysphoric   Affect affect appropriate    Speech a normal rate   Thought Processes circumstantial   Hallucinations no hallucinations present    Thought Content no delusions   Abnormal Thoughts no suicidal thoughts  and no homicidal thoughts    Orientation  oriented to person and place and time   Remote Memory short term memory intact and long term memory intact   Attention Span concentration impaired   Intellect Appears to be of Average Intelligence   Fund of Knowledge displays adequate knowledge of current events, adequate fund of knowledge regarding past history, and adequate fund of knowledge regarding vocabulary    Insight Insight intact   Judgement judgment was intact   Muscle Strength Muscle strength and tone were normal and Normal gait    Language no difficulty naming common objects, no difficulty repeating a phrase , and no difficulty writing a sentence    Pain none   Pain Scale 0         Laboratory Results: No results found for this or any previous visit.    Lab Results   Component Value Date    WBC 9.88 01/25/2024    HGB 15.0 01/25/2024    HCT 46.1 01/25/2024    MCV 94 01/25/2024     01/25/2024     Lab Results   Component Value Date    SODIUM 138 02/23/2024    K 4.0 02/23/2024     02/23/2024    CO2 29 02/23/2024    AGAP 7 02/23/2024    BUN 15 02/23/2024    CREATININE 0.71 02/23/2024    GLUC 173 (H) 02/23/2024    GLUF 102 (H) 08/26/2023    CALCIUM 9.0 02/23/2024    AST 14 02/23/2024    ALT 16 02/23/2024    ALKPHOS 86 02/23/2024    TP 7.0 02/23/2024    TBILI 0.33 02/23/2024    EGFR 89 02/23/2024     Lab Results   Component Value Date    CHOLESTEROL 190 08/26/2023    CHOLESTEROL 185 07/08/2022    CHOLESTEROL 197 01/27/2021     Lab Results   Component Value Date    HDL 66 08/26/2023    HDL 60 07/08/2022     HDL 60 01/27/2021     Lab Results   Component Value Date    TRIG 83 08/26/2023    TRIG 113 07/08/2022    TRIG 94 01/27/2021     Lab Results   Component Value Date    NONHDLC 124 08/26/2023    NONHDLC 125 07/08/2022         Assessment/Plan:      Diagnoses and all orders for this visit:    Bipolar II disorder (HCC)    SWAPNA (generalized anxiety disorder)          Treatment Recommendations- Risks Benefits         Immediate Medical/Psychiatric/Psychotherapy Treatments and Any Precautions:     Admit to [facility], medication management and group therapy    Continue current psychotropic medications    Risks, Benefits And Possible Side Effects Of Medications:  Risks, benefits, and possible side effects of medications explained to patient and patient verbalizes understanding    Controlled Medication Discussion: No records found for controlled prescriptions according to [place] Prescription Drug Monitoring Program.       [facility] Physician's Orders     Admit to: Partial Hospitalization, 5 x per week, for 15 days.   Vital signs routine.   Diet diabetic diet.   Group Psychotherapy 9 x per week.   Allied Therapy Group 6 x per week.   Diagnosis:   1. Bipolar II disorder (HCC)        2. SWPANA (generalized anxiety disorder)          Medications:   Current Outpatient Medications:     Cholecalciferol (Vitamin D3) 1000 units CAPS, Take 3 capsules by mouth in the morning, Disp: , Rfl:     citalopram (CeleXA) 40 mg tablet, TAKE 1 TABLET BY MOUTH DAILY, Disp: 90 tablet, Rfl: 3    lamoTRIgine (LaMICtal) 100 mg tablet, TAKE 1 TABLET BY MOUTH EVERY DAY, Disp: 90 tablet, Rfl: 4    metFORMIN (GLUCOPHAGE-XR) 500 mg 24 hr tablet, Increase every 3 days stomach tolerates:  1 pill in AM; 2 pills in AM; 3 pills in AM.  Take with food., Disp: 90 tablet, Rfl: 1    Multiple Vitamins-Minerals (Multivitamin Adults) TABS, Take 1 tablet by mouth in the morning, Disp: , Rfl:     OLANZapine (ZyPREXA) 10 mg tablet, Take 1 tablet (10 mg total) by  mouth 2 (two) times a day, Disp: 180 tablet, Rfl: 0    Red Yeast Rice 600 MG CAPS, Take 1 capsule by mouth in the morning, Disp: , Rfl:     “I certify that the continuation of Partial Hospitalization services is medically necessary to improve and/or maintain the patient’s condition and functional level, and to prevent relapse or hospitalization, and that this could not be done at a less intensive level of care.”       Aida Sanches MD

## 2024-03-13 ENCOUNTER — OFFICE VISIT (OUTPATIENT)
Dept: PSYCHOLOGY | Facility: CLINIC | Age: 64
End: 2024-03-13
Payer: COMMERCIAL

## 2024-03-13 DIAGNOSIS — F41.1 GAD (GENERALIZED ANXIETY DISORDER): ICD-10-CM

## 2024-03-13 DIAGNOSIS — F31.81 BIPOLAR II DISORDER (HCC): Primary | ICD-10-CM

## 2024-03-13 PROCEDURE — G0410 GRP PSYCH PARTIAL HOSP 45-50: HCPCS

## 2024-03-13 PROCEDURE — G0177 OPPS/PHP; TRAIN & EDUC SERV: HCPCS

## 2024-03-13 PROCEDURE — G0176 OPPS/PHP;ACTIVITY THERAPY: HCPCS

## 2024-03-13 NOTE — TELEPHONE ENCOUNTER
Received call from patient and spouse returning Dr Briana Varela's call clarifying medication citalopram is to be stopped and to take the Prozac and Olanzapine 10 mg once patient receives it.

## 2024-03-13 NOTE — PSYCH
Subjective:     Patient ID: Martha Chauhan 63 y.o.     [facility] Clinical Progress Notes      Specialized Services Documentation  Therapist must complete separate progress note for each specific clinical activity in which the individual participated during the day.     Education Therapy     [id number] Martha Chauhan  actively shared in morning assessment and goal review. Presented as Receptive related to readiness to learn.  Martha Chauhan Did complete goal from last treatment day identifying she cleaned her kitchen, gaining support. did not present with any barriers to learning.     [id number] Martha Chauhan engaged throughout the treatment day. Was engaged in learning related to Illness, Medication, Aftercare and Wellness Tools.  Staff utilized verbal, written, and demonstration teaching methods. Martha Chauhan shared area of learning and set a goal for outside of program to take the dog for a walk.     Tx Plan Objective: 1.1, 1.2, 1.4, Therapist: Latanya MENDES Ed.    Group Psychotherapy  Subjective:     Patient ID: Martha Chauhan 63 y.o.     [facility] Clinical Progress Notes      This group was facilitated in a private office.  ([id number])Martha Chauhan actively  engaged in psychoeducational group about distress tolerances. The skills introduced help to maintain and regulate emotional fear and distress. Group discovered strategies that help them to manage emotional fear and distress on a short term and long term basis. The group talked about understanding the purpose, and meaning of emotional fear and distress in intellectual and emotional expression, regulation , and recognition, and how it affects themselves and others. Teaching on the skill process of ACCEPTS and DBT self-care, members of the group are able to go  for help or how to manage situations were discussed. Group was encouraged to ask questions in an open forum at the end of group. Positive progress displayed through engagement  in topic. Martha Chauhan will continue to engage in psychotherapy to encourage positive self realization.  Treatment Plan Objective 1.1, 1.2, 1.3, 1.4 Therapist: Wilian MENDES Ed.

## 2024-03-13 NOTE — PSYCH
"Subjective:     Patient ID: Martha Chauhan 63 y.o. Female    [facility] Clinical Progress Notes      Specialized Services Documentation  Therapist must complete separate progress note for each specific clinical activity in which the individual participated during the day.     Group Psychotherapy - Window of Tolerance     [id number] Martha Chauhan actively participated in a psychoeducational focused on Window of Tolerance. The group was led through a discussion about what the window of tolerance is and identifying their  ideal emotinal zone that a person needs for optimal functioning. In addition, they learned how to recognize ways when they step outside of their optimal zone and are hyper or hypoaroused which engages ones' fight/flight/freeze/face response. Group participants engaged in and completed a worksheet where they learned how to become more aware of their window of tolerance through identifying symptoms they experience when they are hyper and hyparoused. The group also learned about the “happiness chemicals”:   DOSE:  Dopamine (motivation/pleasure)   Oxytocin (cuddle hormone)  Serotonin (feeling important)  Endorphins (brief euphoria)     They engaged in discussion about what chemical they feel they needed to focus on the most and what type of activity they want to engage in. Examples included,  meditation, creativity, cold shower, sunlight, massage, laughter/crying, eating spicy foods, and exercising/stretching. Lastly, the group discussed self regulation and staying within the Window of Tolerance and using the body. Such examples were:   Centering exercise   Grounding exercise   4 x 4 x 4 breathing   Pushing against a wall with arms fully extended to focus on awareness   Mantras: \"I can be present and watch the waves of energy go by without getting caught in the story.\"   Other examples discussed:   Gratitude  Self care  High vibrational music   Healthy diet   Aromatherapy   Affirmations  Group " leader used the following structure to support and lead the group worksheets to identify the severity of their hyper and hypoarousal states, open discussion/processing, peer support, and psychoeducation.  beginning progress as evidenced by contributed to discussion by sharing about the topic discussed, relating to group members, and allowing self to be open/vulnerable. Martha shared some about how she gets hyper and hypo aroused and recognizing when she is outside of her WOT. Continue with psychoeducation to further find their optimal zone in their window of tolerance through a sense of groundedness, flexibility, openness, curiosity, presence, an ability to be emotionally regulated, and a capacity to tolerate life’s stressors.    Tx Plan Objective 1.1, 1.2, 1.4 Therapist: SALEEM Donaldson.      Case Management    ([id number]) Spoke with Martha Chauhan for case management. Reviewed and signed tx plan. Provided hard copy.  Martha Chauhan is aware of next scheduled 1:1 meeting.     Current suicide risk : Low      Medications changes/added/denied? n/a     Treatment session number: 2     Individual Case Management Visit provided today? Yes      [facility] follow up physician's orders: Continue to follow orders.   Therapist: SALEEM Donaldson

## 2024-03-13 NOTE — PSYCH
Subjective:     Patient ID: Martha Chauhan 63 y.o. Female    [facility] Clinical Progress Notes      Specialized Services Documentation  Therapist must complete separate progress note for each specific clinical activity in which the individual participated during the day.     Case Management    ([id number]) Spoke with Martha Chauhan for case management. Martha shared that overall she is doing well and is gradually improving. She did report she had trouble staying awake in the last group with the lights low, but was trying to stay awake. She reported she knows she needs to continue to work on her sleep but did sleep well last night. She is hopeful it will continue to improve. She has been engaging with her  more and spent time gardening yesterday. Martha Chauhan is aware of next scheduled 1:1 meeting.     Current suicide risk : Low      Medications changes/added/denied? n/a     Treatment session number: 3     Individual Case Management Visit provided today? Yes      [facility] follow up physician's orders: Continue to follow orders.

## 2024-03-13 NOTE — PSYCH
Group Psychotherapy  9:30-10:30 Martha Chauhan participated actively in this group on patience. Participants began with a mindful stretching exercise.  Participants were led in discussion about how patience relates to mental health and well -being. Participants discussed different types of patience such as interpersonal patience, life hardship patience and daily hassles patience, discussing examples in their lives where patience has been difficult. Patience learned about the gains of practicing patience in these different areas. Participants discussed ways to cope with impatience and provided their own examples of when patience has paid off in their lives.  Martha  was attentive and followed along with the discussion and shared examples of how her impatience and anxiety impact her life in her relationship with her , tasks at home in in daily life. She took notes and expressed intent to follow through one some strategies to better manage these areas.  Continue to note progress towards goals. Continue with psychotherapy to meet treatment goals.    Tx Plan Objective 1.1, 1.2, 1.4 Therapist: SALEEM Acevedo

## 2024-03-14 ENCOUNTER — OFFICE VISIT (OUTPATIENT)
Dept: PSYCHOLOGY | Facility: CLINIC | Age: 64
End: 2024-03-14
Payer: COMMERCIAL

## 2024-03-14 DIAGNOSIS — F31.81 BIPOLAR II DISORDER (HCC): Primary | ICD-10-CM

## 2024-03-14 DIAGNOSIS — F41.1 GAD (GENERALIZED ANXIETY DISORDER): ICD-10-CM

## 2024-03-14 PROCEDURE — G0177 OPPS/PHP; TRAIN & EDUC SERV: HCPCS

## 2024-03-14 PROCEDURE — G0176 OPPS/PHP;ACTIVITY THERAPY: HCPCS

## 2024-03-14 PROCEDURE — G0410 GRP PSYCH PARTIAL HOSP 45-50: HCPCS

## 2024-03-14 NOTE — PSYCH
Visit Time    Visit Start Time: 9:30  Visit Stop Time: 10:00  Total Visit Duration:  30 minutes    Subjective:     Patient ID: Martha Chauhan is a 63 y.o. female history of schizoaffective disorder, anxiety, depression seen for medication management and mood assessment. Martha and her  attended the session reporting she is feeling better. Reports she was inpatient and felt it helped. She attended [facility] and enjoyed the sessions. She reports eating and sleeping well. Thought process is clear and focused. She denies depression or SI, no delusions present. Discussed sleep hygiene and coping with anxiety. Takes medication as prescribed and  is supportive.     HPI ROS Appetite Changes and Sleep: normal appetite and normal energy level    Review Of Systems:     Mood Anxiety   Behavior Normal    Thought Content Normal   General Emotional Problems and Sleep Disturbances   Personality Normal   Other Psych Symptoms Normal   Constitutional As Noted in HPI   ENT As Noted in HPI   Cardiovascular As Noted in HPI   Respiratory As Noted in HPI   Gastrointestinal As Noted in HPI   Genitourinary As Noted in HPI   Musculoskeletal As Noted in HPI   Integumentary As Noted in HPI   Neurological As Noted in HPI   Endocrine DM   Other Symptoms Normal        Laboratory Results:     Substance Abuse History:  Social History     Substance and Sexual Activity   Drug Use Never       Family Psychiatric History:   Family History   Problem Relation Age of Onset    Aortic aneurysm Mother 70        Abdominal Aortic Aneurysm, +Smoker    No Known Problems Father     Bipolar disorder Brother     No Known Problems Maternal Aunt     Cancer Maternal Grandmother         70    No Known Problems Son     No Known Problems Half-Sister     No Known Problems Half-Sister     No Known Problems Half-Sister     No Known Problems Half-Sister     Alcohol abuse Neg Hx     Drug abuse Neg Hx     Completed Suicide  Neg Hx        The following  portions of the patient's history were reviewed and updated as appropriate: allergies, current medications, past family history, past medical history, past social history, past surgical history, and problem list.    Social History     Socioeconomic History    Marital status: /Civil Union     Spouse name: Not on file    Number of children: 2    Years of education: Not on file    Highest education level: Some college, no degree   Occupational History    Occupation: Disabled - Previous      Comment: s/p Brain Surgery / H/O Epilepsy   Tobacco Use    Smoking status: Never    Smokeless tobacco: Never   Vaping Use    Vaping status: Never Used   Substance and Sexual Activity    Alcohol use: No     Comment: doesnt drink.    Drug use: Never    Sexual activity: Not Currently     Partners: Male     Birth control/protection: Post-menopausal, Female Sterilization     Comment: Tubal Ligation   Other Topics Concern    Not on file   Social History Narrative        Lives with     2 Children - 2 Sons    Disabled s/p Brain Surgery / H/O Epilepsy - Previous      Social Determinants of Health     Financial Resource Strain: Low Risk  (8/22/2023)    Overall Financial Resource Strain (CARDIA)     Difficulty of Paying Living Expenses: Not hard at all   Food Insecurity: Not on file   Transportation Needs: No Transportation Needs (8/22/2023)    PRAPARE - Transportation     Lack of Transportation (Medical): No     Lack of Transportation (Non-Medical): No   Physical Activity: Unknown (1/21/2021)    Exercise Vital Sign     Days of Exercise per Week: 6 days     Minutes of Exercise per Session: Not on file   Stress: Stress Concern Present (1/21/2021)    [employer] - Occupational Stress Questionnaire     Feeling of Stress : To some extent   Social Connections: Unknown (1/21/2021)    Social Connection and Isolation Panel [NHANES]     Frequency of Communication with Friends and  Family: Not on file     Frequency of Social Gatherings with Friends and Family: Not on file     Attends [employer] Services: Not on file     Active Member of Clubs or Organizations: Not on file     Attends Club or Organization Meetings: Not on file     Marital Status:    Intimate Partner Violence: Not At Risk (1/21/2021)    Humiliation, Afraid, Rape, and Kick questionnaire     Fear of Current or Ex-Partner: No     Emotionally Abused: No     Physically Abused: No     Sexually Abused: No   Housing Stability: Not on file     Social History     Social History Narrative        Lives with     2 Children - 2 Sons    Disabled s/p Brain Surgery / H/O Epilepsy - Previous        Objective:       Mental status:  Appearance calm and cooperative , adequate hygiene and grooming, and good eye contact    Mood anxious   Affect affect appropriate    Speech a normal rate   Thought Processes normal thought processes   Hallucinations no hallucinations present    Thought Content no delusions   Abnormal Thoughts no suicidal thoughts  and no homicidal thoughts    Orientation  oriented to person and place and time   Remote Memory short term memory intact and long term memory intact   Attention Span concentration intact   Intellect Appears to be of Average Intelligence   Insight Insight intact   Judgement judgment was intact   Muscle Strength Muscle strength and tone were normal   Language no difficulty naming common objects   Fund of Knowledge displays adequate knowledge of current events   Pain none   Pain Scale 0       Assessment/Plan:       Diagnoses and all orders for this visit:    Bipolar II disorder (HCC)    Current moderate episode of major depressive disorder without prior episode (HCC)    SWAPNA (generalized anxiety disorder)    Schizoaffective disorder, bipolar type (HCC)            Treatment Recommendations- Risks Benefits      Immediate Medical/Psychiatric/Psychotherapy Treatments and Any Precautions:  Continue treatment plan    Risks, Benefits And Possible Side Effects Of Medications:  {PSYCH RISK, BENEFITS AND POSSIBLE SIDE EFFECTS (Optional):22778     Psychotherapy Provided: 30 minutes individual psychotherapy provided.   Supportive therapy  Medication evaluation  Mood assessment  Medication education  Sleep hygiene discussed  Goals discussed in session: Maintain stable mood and sleep patterns

## 2024-03-14 NOTE — PSYCH
"Subjective:     Patient ID: Martha Chauhan is a 63 y.o. female.    [facility] Progress Notes      Specialized Services Documentation  Therapist must complete separate progress note for each specific clinical activity in which the individual participated during the day.     Group Psychotherapy ([id number])    The group focused on expressing their feelings through the mediums of creative writing and art. Group began with each person choosing a number from 1-100. Each number was assigned a different prompt (such as \"I hope..\", \"I am..\", \"Recovery is..\", \"I forgive..\", etc) and then roughly 5-7 minutes was allotted for each member to write a poem, creative writing, paragraph, story, etc based on their assigned phrase. The group shared each of their writings and took a moment to explain/give each other positive feedback. The group was then opened up to allow any member to share their own creative writing from outside of program. The second exercise was as follows: the group was presented with a CBT Thought Record and the group was directed to work together to break down an example situation and determine if the automatic thought needed reframing. This allowed each member to gain a full understanding of how a CBT thought record can be used for journaling. Group members were also provided with multiple blank structured journal pages and time was set aside to explain each one. Martha Chauhan contributed to the group by supporting discussion, completing every exercise, and sharing each writing without prompting. Martha  is actively working towards goals. Continue psychotherapy groups to encourage further exploration of needs, personal awareness, and skills.    Tx Plan Objective: 1.1,1.2, 1.4   Therapist: Sanjay Aggarwal MS    "

## 2024-03-14 NOTE — PSYCH
"Subjective:    Patient ID: Martha Chauhan is a 63 y.o. female      [facility] Progress Notes      Specialized Services Documentation  Therapist must complete separate progress note for each specific clinical activity in which the individual participated during the day.       Allied Therapy ([id number]) Martha Chauhan \"Keith\" attended group focused on recognizing accomplishments in group psychotherapy today. During group we discussed the accomplishments that they achieved today. We explored the reasons why it is so difficult to acknowledge those accomplishments. Group members watched a video, \"To achieve success, start detecting your small wins.\" Reviewed the action steps discussed in the video. Encouraged group members to look at the accomplishments that they are proud of and the small steps they took to achieve their goal. Discussed creating smaller realistic achievable goals. We discussed why it is important to recognize the accomplishments no matter how big or small they seem. Keith struggled to stay awake due to lower lighting in this group room. Keith reported she accomplished a project in the music/art group. Keith displayed beginning work towards goals through verbal participation in group; to accomplish long term goals continue to utilize skills learned in programming. Continue with psychotherapy to educate and encourage use of wellness tools. Tx Plan Objective: 1.1, 1.2, 1.3, 1.4, Therapist:  MICHAEL Gonzáles  "

## 2024-03-14 NOTE — PSYCH
"  Subjective:     Patient ID: Martha Chauhan is a 63 y.o. y.o.female.    [facility] Progress Notes      Specialized Services Documentation  Therapist must complete separate progress note for each specific clinical activity in which the individual participated during the day.       Group Psychotherapy [id number]  Martha Chauhan actively engaged in group focused on perception and self validation.  Group explored perception through painting exercise and song related to dialectics. DBT bio-social therapy explored related to perception development.  Personal ways to begin to self validate reviewed. Keith identified aspects to her understanding of emotion and self talk. Some beginning progress demonstrated toward goal. Continue group to increase personal awareness and provide safe environment to share understanding and practice strategies. Tx Plan Objective: 1.1, Therapist:  Laurence VÁZQUEZ      Education Therapy   [id number] Martha Chauhan actively shared in morning assessment and goal review. Presented as Receptive related to readiness to learn.  Martha Chauhan did complete goal from last treatment day identifying gaining support. did not present with any barriers to learning.     [id number] Martha Chauhan engaged throughout the treatment day. Was engaged in learning related to Illness and Wellness Tools. Staff utilized Verbal, Written, A/V, and Demonstration teaching methods.  Martha Chauhan shared area of learning and set a goal for outside of program to \"spoil herself\" and take a bubble bath.      Tx Plan Objective: 1.1, Therapist:  Laurence VÁZQUEZ    "

## 2024-03-15 ENCOUNTER — OFFICE VISIT (OUTPATIENT)
Dept: PSYCHOLOGY | Facility: CLINIC | Age: 64
End: 2024-03-15
Payer: COMMERCIAL

## 2024-03-15 DIAGNOSIS — F41.1 GAD (GENERALIZED ANXIETY DISORDER): ICD-10-CM

## 2024-03-15 DIAGNOSIS — F31.81 BIPOLAR II DISORDER (HCC): Primary | ICD-10-CM

## 2024-03-15 PROCEDURE — G0177 OPPS/PHP; TRAIN & EDUC SERV: HCPCS

## 2024-03-15 PROCEDURE — G0176 OPPS/PHP;ACTIVITY THERAPY: HCPCS

## 2024-03-15 PROCEDURE — G0410 GRP PSYCH PARTIAL HOSP 45-50: HCPCS

## 2024-03-15 NOTE — PSYCH
Psychotherapy Group   [id number] Martha Chauhan participated actively in this group on gratitude. Participants began with a brief gratitude meditation. Participants received psycho education related to the benefits of gratitude on mental health. Participants were led in open discussion about events in their life that were difficult but in reflecting back on there is something to be grateful about. Participants were then broken up in to small groups to respond to the following prompts with one another; identify three things that you feel grateful for and appreciate in your life, (including things you might take for granted but actually appreciate) identify three things that you appreciate about yourself,  and identify three people who had a significant positive experience in you life. Participants were then given time to write/journal a reflection on the prompt; if you were to create a thank you message to one of these people what would it say?    Martha Chauhan shared in the small group activity regarding things she is grateful for and with the larger group shared she likes herself when she is able to be calm. This facilitator prompted her in discussion about things that help make her feel calm. Continue to note progress towards goals. Continue with psychotherapy to meet treatment goals.    Tx Plan Objective 1.1, 1.2, 1.4 Therapist: SALEEM Acevedo

## 2024-03-15 NOTE — PSYCH
Subjective:     Patient ID: Martha Chauhan 63 y.o. Female    [facility] Notes      Specialized Services Documentation  Therapist must complete separate progress note for each specific clinical activity in which the individual participated during the day.     Case Management Note    SALEEM Donaldson     Current suicide risk : Low     A case management session is not scheduled today with Martha Chauhan ; additionally, they did not request a CM meeting.  Martha Chauhan is aware of next scheduled 1:1.    Medications changes/added/denied? N/a     Treatment session number: 4    Individual Case Management Visit provided today? No    [facility] follow up physician's orders: None at this time

## 2024-03-15 NOTE — PSYCH
Subjective:     Patient ID: Martha Chauhan is 63 y.o. y.o. female.    [facility] Progress Notes      Specialized Services Documentation  Therapist must complete separate progress note for each specific clinical activity in which the individual participated during the day.       Group Psychotherapy   [id number]  Martha Chauhan actively shared in psychotherapy group exploring facets of wellness through the weekly wellness inventory.   Keith shared in the exploration of physical, emotional, cognitive, personal development, social, spiritual, and activities of daily living growth. Participants were asked to assess their engagement in several active ways to live these areas of wellness. She engaged in a stretching exercise, object meditation, and GLAD journaling. offered tools including the Voxel (Internap)enhower Matrix and Emotions Journal strategies. She offered feedback and identified she would like to work on emotions. Some beginning progress toward goal. Continue group to explore strategies and awareness for active engagement in one's wellness journey.    Tx Plan Objective: 1.1, Therapist:  Laurence VÁZQUEZ        Education Therapy   [id number] Martha Chauhan actively shared in morning assessment and goal review. Presented as Receptive related to readiness to learn. Martha Chauhan did complete goal from last treatment day identifying gaining support. did not present with any barriers to learning.     [id number] Martha Chauhan engaged throughout the treatment day. Was engaged in learning related to Illness and Wellness Tools. Staff utilized Verbal, Written, and Demonstration teaching methods.  Martha Chauhan shared area of learning and set a goal for outside of program to take a bath.      Tx Plan Objective: 1.1, Therapist:  Laurence VÁZQUEZ

## 2024-03-15 NOTE — PSYCH
"Subjective:    Patient ID: Martha Chauhan is a 63 y.o. female      [facility]      Specialized Services Documentation  Therapist must complete separate progress note for each specific clinical activity in which the individual participated during the day.       Allied Therapy ([id number]) Martha Chauhan \"Max\" actively shared in life skills group focused on positive affirmations. Keith was observed to be engaged in therapist led card game as well as practicing writing three affirmations about themselves. Group discussed the value of affirmations and how they can play a part in our lives. MAx identified \"I am confident and self-assured\" as a positive affirmation. Some positive effort noted toward treatment goals. Continue AT to encourage development and practice of positive affirmations.  Tx Plan Objective: 1.1, 1.2, 1.4, Therapist:  CARMELINA Gonzáles/LEYLA  "

## 2024-03-15 NOTE — PSYCH
Subjective:     Patient ID: Martha Chauhan 63 y.o. Female    [facility] Notes      Specialized Services Documentation  Therapist must complete separate progress note for each specific clinical activity in which the individual participated during the day.     GROUP PSYCHOTHERAPY - SELF ESTEEM / OPEN PROCESS     ([id number]) Martha Chauhan actively and attentively participated in a part open process and part psychoeducational group about self-esteem. The group first participated in an activity where they assessed their self-esteem to open the group for conversation. They were then led through a discussion of what self esteem is, how it affects our mental health, and what causes low self-esteem. Next, the group was divided into three groups and each group was assigned a question to reflect upon.     The questions were as followed:   Are there any situations or memories you have that impacted your self-esteem?  What steps can you take today to improve your tomorrow?   How frequently do you doubt your capabilities? What about when it comes to other people?     After, the groups were brought back to the larger group and reflected on their answers, getting feedback, support, and challenging their sense of self through the process. Group members were encouraged to share their experiences and begin to create emotional corrective experiences. Martha Chauhan remained quiet during the larger group, but was observed sharing in her smaller group. She in particular shared about how not having her biological father in her life impacted her and negative thoughts about self.  positive progress noted towards goals. Continue with group therapy to continue self-evaluation both internal and external to increase overall personal worth and value, gaining confidence in abilities and attributes.     Therapist: SALEEM Donaldson  Tx Plan Objective 1.1, 1.2, 1.4      Case Management    ([id number]) Spoke with  Martha Chauhan for case management. Martha shared that she is doing well and is getting better with sleeping. She did share that since beginning her Metformin she is having diarrhea. Suggested having peptobismal on hand to help. Martha would like to discharge on Wednesday 3/20/2024.   Martha Chauhan is aware of next scheduled 1:1 meeting.     Current suicide risk : Low      Medications changes/added/denied? n/a - next medication check scheduled for Wednesday 3/20/2024 with OSMAN Maki       Treatment session number: 5     Individual Case Management Visit provided today? Yes      [facility] follow up physician's orders: Continue to follow orders.   Therapist: SALEEM Donaldson

## 2024-03-18 ENCOUNTER — OFFICE VISIT (OUTPATIENT)
Dept: PSYCHOLOGY | Facility: CLINIC | Age: 64
End: 2024-03-18
Payer: COMMERCIAL

## 2024-03-18 DIAGNOSIS — F41.1 GAD (GENERALIZED ANXIETY DISORDER): ICD-10-CM

## 2024-03-18 DIAGNOSIS — F31.81 BIPOLAR II DISORDER (HCC): Primary | ICD-10-CM

## 2024-03-18 PROCEDURE — G0176 OPPS/PHP;ACTIVITY THERAPY: HCPCS

## 2024-03-18 PROCEDURE — G0177 OPPS/PHP; TRAIN & EDUC SERV: HCPCS

## 2024-03-18 PROCEDURE — G0410 GRP PSYCH PARTIAL HOSP 45-50: HCPCS

## 2024-03-18 NOTE — PSYCH
Subjective:     Patient ID: Martha Chauhan is a 63 y.o. female.    [facility] Progress Notes      Specialized Services Documentation  Therapist must complete separate progress note for each specific clinical activity in which the individual participated during the day.       Education Therapy     [id number] Martha Chauhan engaged throughout the treatment day. Was engaged in learning related to Illness, Medication, Aftercare, and Wellness Tools. Staff utilized Verbal, Written, A/V, and Demonstration teaching methods.  Martha Chauhan shared area of learning and set a goal for outside of program to go for a walk.      Tx Plan Objective: 1.1,1.2, 1.4   Therapist: Sanjay Aggarwal MS

## 2024-03-18 NOTE — PSYCH
Group Psychotherapy    Subjective:     Patient ID: Martha Chauhan 63 y.o.     This group was facilitated in a private office.  ([id number])Martha Chauhan actively engaged in psychoeducational group about the Cycle of Change. The skill helps to mange the cycle of recovery and behavior change focused toward a specified goal. Group explored the cycle of change that can help them to take care of physical and emotional well being on a short term and long term basis. The group talked about understanding the meaning of relapse in regards to physical, intellectual, and emotional expression, regulation , and recognition, and how it affects themselves and others. Teaching on the emphasis of self monitoring and relapse,  the group explored who they can go to for help was brought up as well. Group was encouraged to ask questions in an open forum at the end of group. Minimal progress displayed through engagement in topic.Martha Chauhan will continue to engage in psychotherapy to encourage positive self realization.  Treatment Plan Objective 1.1, 1.2, 1.3, 1.4 Therapist: Wilian MENDES Ed.

## 2024-03-18 NOTE — PSYCH
[facility] Discharge Instructions:     Disposition: home  Address: [address] [address].   Diagnosis:    Diagnosis ICD-10-CM Associated Orders   1. Bipolar II disorder (HCC)  F31.81       2. SWAPNA (generalized anxiety disorder)  F41.1         Allergies (Drug/Food): No Known Allergies    Activity:  No Activity Changes  Diet:no recommendations  Smoking Cessation:not a smoker   Diagnostic/Laboratory Orders: n/a  Vaccines: If you received a vaccine, please notify your family physician on your next visit. For more information, please call [phone].     Follow-up appointments/Referrals:     Continue with PCP as needed:   Teresa Torres, DO  [address]  [facility] [id number] [phone]    PSYCHIATRIST:  MICHELLE Varela, PhD  [address] [place] [address] 8  [place] [address]  PH: [phone]  F: [phone]  WHEN: 4/22/2024 @ 12:15 PM     If interested in pursuing therapy:   Consult with  [facility]   or   Call your insurance to provide you with a list of providers.   Insurance Phone #: [phone]    [facility] Support Groups    [facility] Family Support Group   3rd Monday of the month   7-8:30 p.m.   [facility], [address] [place] [address]   Contact: [phone]   [facility]   4th Tuesday of the month   7 - 8:30 p.m. [address] [place] [address]   Contact: [phone]   [facility]   1st Monday of the month   7 - 8:30 p.m.   [place] [address]   Contact: [phone]   [facility] Support Group   3rd Monday of the month   7 - 8:30 p.m.   [address] [place] [address]   Contact: [phone]   [facility] Recovery Support Group   1st Monday of the month   7-8:30 p.m.   [facility] [address]  "[facility] [address]   Contact: [phone]     DBSA - [place]   [email or web address]  [facility] Meetings  When: Every Thursday, 6:30 PM to 8:30 PM  Where: [facility],   [address] [place] [address]    [place] - [place]   Depression, Bipolar, Support [place]  [email or web address]  [facility], [facility] [place], [place]  In Person:    Every Wednesday 7:00 PM to 8:30 PM  [employer] : Rm. 115  Please contact our [place] & , Silvina Tai,  at [email or web address] to be put on  email list to stay up to date about DBSA.      [facility]: [address] [facility] [address] / [phone]. - Your  was SALEEM Donaldson and the  is Breanne Feliciano     Intake/Referral/Evaluation (Non-Emergency) *NON INSURED FOR FUNDING: [facility]: [phone], [facility]: [phone], [facility]: [phone], [place]: [phone] and [facility]: [phone].     Crisis Intervention (Emergency) County Service: [facility]: [phone], [place]: [phone], [place]: [phone], [facility]): [phone], [facility]: [phone],  [place]: [phone] and [facility]: [phone]. __________________________________________________________________    National Crisis Textline: text \"HOME\" to 471414  [facility] Helpline [phone] ([phone]-[facility]) or Text \"helpline\" to 76767  National Suicide Crisis Hotline: 988 Call or Text     I, the undersigned, have received and understand the above instructions.        Patient/Rep Signature: __________________________________       Date/Time: ______________       Physician Signature: ____________________________________      Date/Time: ______________             Signature: ________________________________       Date/Time: ______________     "

## 2024-03-18 NOTE — PSYCH
Subjective:     Patient ID: Martha Chauhan 63 y.o.  Female    [facility] Clinical Progress Notes      Specialized Services Documentation  Therapist must complete separate progress note for each specific clinical activity in which the individual participated during the day.     Education Therapy     [id number]  Martha Chauhan was unable to participate in morning assessment due to not arriving on time. Martha Chauhan was requested to complete case management and goal sheet upon arrival.    [id number] Martha Chauhan engaged throughout the treatment day. Was engaged in learning related to Illness, Medication, Aftercare, and Wellness Tools. Staff utilized Verbal, Written, A/V, and Demonstration teaching methods. Martah Chauhan shared area of learning and set a goal for outside of program to engage in self care by taking a hot bath.     Tx Plan Objective: 1.1, 1.2, 1.4, Therapist:  SALEEM Donaldson     Case Management Note    SALEEM Donaldson     Current suicide risk : Low     A case management session is not scheduled today with Martha Chauhan ; additionally, they did not request a CM meeting.  Martha Chauhan is aware of next scheduled 1:1.    Medications changes/added/denied? N/a - medication check scheduled for Wednesday 3/20/2024 with OSMAN Maki      Treatment session number: 6    Individual Case Management Visit provided today? No    [facility] follow up physician's orders: None at this time

## 2024-03-18 NOTE — PSYCH
Subjective:    Patient ID: Martha Chauhan is a 63 y.o. female      [facility] Clinical Progress Notes      Specialized Services Documentation  Therapist must complete separate progress note for each specific clinical activity in which the individual participated during the day.       Allied Therapy (0930-1030) Martha Chauhan attended group focused on identifying where one is in the process of making changes.  explained that an important part of recovery is the process of making changes which can be broken down into five major steps (awareness, determination, action, maintenance, and relapse). Group discussed the value in each stage and how it prepares us for the next. Keith was observed completing worksheet to identify a change that are currently working on, what stage of process they are at, and who can assist them to move forward? Keith actively shared she has been working on socializing and liking herself better. Positive effort noted toward treatment plan goals displayed through participation, personal disclosure, and attentiveness. Continue to involve in AT groups to increase motivation to persevere through awareness of various benefits of making changes.  Tx Plan Objective: 1.1, 1.2, 1.4, Therapist:  MICHAEL Gonzáles    Education Therapy   [id number] Martha Chauhan actively shared in check in and goal review. Presented as Receptive related to readiness to learn.  Martha Chauhan  did not complete goal from last treatment day identifying hoping to gain responsibility and hope. did not present with any barriers to learning.     Tx Plan Objective: 1.1, 1.2, 1.4, Therapist:  MICHAEL Gonzáles

## 2024-03-18 NOTE — PSYCH
Group Psychotherapy    This group was facilitated in a private office.  ([id number])Martha Chauhan actively engaged in psychoeducational group about challenging automatic negative thoughts (ANTs). The skill helps to identify negative thoughts and cognitive distortions. The outcome being that negative thoughts are challenged in the thought process and regulation of emotion. Group discovered strategies that help them to manage negative thoughts and rethink the negative thoughts when faced with a negative challenge. The group talked about understanding the purpose of challenging negative thoughts on a personal and social level and how it affects themselves and others.. Teaching on the emphasis of self monitoring and self-care, the group focus is geared how togo to for help when the negative thoughts become overly intrusive. Group was encouraged to ask questions in an open forum at the end of session. Positive progress displayed through engagement in topic. Martha Simmonsman will continue to engage in psychotherapy to encourage positive self realization.  Treatment Plan Objective 1.1, 1.2, 1.3, 1.4 Therapist: Wilian MENDES Ed.

## 2024-03-19 ENCOUNTER — OFFICE VISIT (OUTPATIENT)
Dept: PSYCHOLOGY | Facility: CLINIC | Age: 64
End: 2024-03-19
Payer: COMMERCIAL

## 2024-03-19 DIAGNOSIS — F31.81 BIPOLAR II DISORDER (HCC): Primary | ICD-10-CM

## 2024-03-19 DIAGNOSIS — F41.1 GAD (GENERALIZED ANXIETY DISORDER): ICD-10-CM

## 2024-03-19 PROCEDURE — G0176 OPPS/PHP;ACTIVITY THERAPY: HCPCS

## 2024-03-19 PROCEDURE — G0410 GRP PSYCH PARTIAL HOSP 45-50: HCPCS

## 2024-03-19 NOTE — PSYCH
Group Psychotherapy    This group was facilitated in a private office.  ([id number])Martha Chauhan actively  engaged in psychoeducational group about emotional regulation. The skill helps to maintain and regulate emotional expression/regulation. Group discovered strategies that help them to manage emotional well being on a short term and long term basis. The group talked about understanding the purpose, and meaning of emotional regulation in intellectual and emotional expression, regulation , and recognition, and how it affects themselves and others.. Teaching on the emphasis of self monitoring and self-care, who the group can go to for help was brought up as well. Group was encouraged to ask questions in an open forum at the end of group. Positive progress displayed through engagement in topic. Martha Chauhan will continue to engage in psychotherapy to encourage positive self realization.  Treatment Plan Objective 1.1, 1.2, 1.3, 1.4 Therapist: Wilian MENDES Ed.

## 2024-03-19 NOTE — PSYCH
"Subjective:      Patient ID: Martha Chauhan  is a 63 y.o. female     Assessment/Plan:         Diagnoses and all orders for this visit:     SWAPNA (generalized anxiety disorder)     Bipolar II disorder (HCC)           [facility] Treatment Plan      AREAS OF NEED: Martha Chauhan is experiencing symptoms related to her diagnoses of SWAPNA and Bipolar II as evidenced by feelings of euphoria, hyper-verbal, and anxious due to mental health stressors .     Date Initiated: 03/12/24     Strengths: \"Writing, Painting, and Gardening\" and \"being mom.\"             LONG TERM GOAL:   Date Initiated: 03/12/24  1.0 While at [facility], I will gain new skills/techniques/education/support/insights which I can use daily to decrease my symptoms and increase my quality of life.  Target Date: 04/09/2024  Completion Date: 03/20/2024        SHORT TERM OBJECTIVES:      Date Initiated: 03/12/24  1.1 I will identify and practice three new coping skills I can utilize daily outside of program, included but not limited to (mindfulness, acting intentionally, reducing vulnerability to emotional mind, reality checking, opposite action, etc.,) in order to enhance my wellness and reduce secondary symptoms such as anxiety.   Revision Date: N/A- Routine Discharge   Target Date: 03/21/2024  Completion Date:03/20/2024     Date Initiated: 03/12/24  1.2 I will increase my emotional vocabulary and practice “I” statements daily when experiencing challenging emotions.   Revision Date: N/A- Routine Discharge   Target Date: 03/21/2024  Completion Date:03/20/2024     Date Initiated: 03/12/24  1.3 I will take medications as prescribed and share questions and concerns if arise.    Revision Date:N/A - Routine Discharge   Target Date: 03/21/2024  Completion Date: 03/20/2024     Date Initiated: 03/12/24  1.4 I will identify 3 ways my supports can assist in my wellness journey and use them if/when needed.    Revision Date:N/A- Routine Discharge   Target Date: " 03/21/2024   Completion Date:03/20/2024            7 DAY REVISION:     Date Initiated:N/A- Routine Discharge   Revision Date:   Target Date:   Completion Date:        PSYCHIATRY:  Date Initiated:  03/12/24  Medication Management and Education      Revision Date: N/A - ROUTINE DISCHARGE       The person(s) responsible for carrying out the plan is Dr. Aida Sanches MD & OSMAN Maki      NURSING/SYMPTOM EDUCATION:  Date Initiated: 03/12/24       1.1, 1.2. 1.3, 1.4 Provide wellness/symptoms and skill education groups three to five days weekly to educate Martha Chauhan on signs and symptoms of diagnoses, skills to manage stressors, and medication questions that will be addressed by the treatment team.        Revision date:N/A - ROUTINE DISCHARGE        The person(s) responsible for carrying out the plan is Sanjay Aggarwal MS & David Pizano      PSYCHOLOGY:   Date Initiated: 03/12/24       1.1, 1.2, 1.4 Provide psychotherapy group 5 times per week to allow opportunity for Martha Chauhan  to explore stressors and ways of coping.   Revision Date: N/A - ROUTINE DISCHARGE   The person(s) responsible for carrying out the plan is SALEEM Olivas      ALLIED THERAPY:   Date Initiated: 03/12/24  1.1,1.2 Engage Martha Chauhan in AT group 5 times daily to encourage development and use of wellness tools to decrease symptoms and promote recovery through meaningful activity.  Revision Date:       The person(s) responsible for carrying out the plan is KATHIE Falcon and MICHAEL Gonzáles     CASE MANAGEMENT:   Date Initiated: 03/12/24      1.0 This  will meet with Martha Chauhan  3-4 times weekly to assess treatment progress, discharge planning, connection to community    supports and UR as indicated.  Revision Date: N/A - ROUTINE DISCHARGE   The person(s) responsible for carrying out the plan is SALEEM Donaldson      TREATMENT REVIEW/COMMENTS: TX PLAN COMPLETED ON  3/20/2024 DUE TO DISCHARGE ON 3/20/2024.      DISCHARGE CRITERIA: Identify 3 signs of progress and complete relapse prevention plan.    DISCHARGE PLAN: Connect with identified outpatient providers.   Estimated Length of Stay: 10 treatment days       CLIENT COMMENTS / Please share your thoughts, feelings, need and/or experiences regarding your treatment plan with Staff.  Please see follow up note with comments.        Signatures can be found on [facility] Treatment plan consent form.

## 2024-03-19 NOTE — PSYCH
Subjective:     Patient ID: Martha Chauhan 63 y.o. Female    [facility] Clinical Progress Notes      Specialized Services Documentation  Therapist must complete separate progress note for each specific clinical activity in which the individual participated during the day.     Education Therapy     [id number] Martha Chauhan engaged throughout the treatment day. Was engaged in learning related to Illness, Medication, Aftercare and Wellness Tools.  Staff utilized verbal, written, and demonstration teaching methods. Martha Chauhan shared area of learning and set a goal for outside of program to work on cleaning her kictchen. Proceed with d/c from [facility] on Wednesday 3/20/2024.     Tx Plan Objective: 1.1, 1.2, 1.4, Therapist:  SALEEM Donaldson     Group Psychotherapy - Open Process    [id number] Martha Chauhan  participated in an open processing group on increasing empowerment and having an opportunity to be heard when one might feel isolated in sharing their experiences.  spent time engaging group participants with open ended questions, reflective questions, and encouragement from other group members. In addition, it is important for the group to be able to receive multiple perspectives and feedback from other group members in a safe environment.  provided space for members to share as they felt comfortable, active listening, encouragement, and offered support to group when warranted. This structure helped support the group in feeling cathartic, gain some interpersonal learning, group cohesiveness, altruism, and instilling hope. Martha Chauhan engaged nonverbally as evidenced by taking notes, maintaining eye contact, open body language, and overall attentiveness Martha Chauhan shared once during this group in the beginning, but continued to be attentive and maintain eye contact.  some  progress noted towards goal. Continue with discharge from [facility] on Wednesday 3/20/2024    Tx  "Plan Objective 1.1, 1.2, 1.4 Therapist: SALEEM Donaldson.       Case Management    ([id number]) Met with Martha Chauhan. Reviewed relapse prevention plan, aftercare plan, and medication list (copies provided). Martha Chauhan shared improvement through \"I'm getting out and doing things, I'm moving.\" She also shared how much she took away from the HEARS acronym (Hope, Education, Advocacy, Responsibility, and Support) and how that has been helpful in her daily life. She reported that in order to maintain her wellness after program, she will call her Aunt Eda and if she's feeling down she will go for a walk. Her PHQ-9 went from a 9 to a 2.  Denied SI, HI, and psychosis. Aftercare providers to receive summary.     Current suicide risk : Low     Medications changes/added/denied? No     Treatment session number: 7     Individual Case Management Visit provided today? Yes     [facility] follow up physician's orders: Proceed with discharge          "

## 2024-03-19 NOTE — PSYCH
"Visit Time    Visit Start Time: 0930  Visit Stop Time: 1030  Total Visit Duration: 60 minutes    Subjective:     Patient ID: Martha Chauhan is a 63 y.o. y.o. female.    [facility] Clinical Progress Notes      Specialized Services Documentation  Therapist must complete separate progress note for each specific clinical activity in which the individual participated during the day.       Group Psychotherapy   Martha Chauhan engaged in psychotherapy group focused on self awareness and beginning non-judgmental.  Martha Chauhan was asked to work on the \"Tree of Life\" creative arts exercise and answer reflective questions. Group explored the value of self awareness and explore value of mindfulness skill \"non-judgmentally\" view self in order to make effective change. Martha Chauhan shared aspects of self and those who she finds nurturing in her life. Positive progress shared toward 1.1. Continue psychotherapy to promote personal growth, awareness, and use of supports.    Tx Plan Objective: 1.1, Therapist:  Laurence VÁZQUEZ    "

## 2024-03-19 NOTE — PSYCH
"Subjective:    Patient ID: Martha Chauhan is a 63 y.o. female      [facility] Clinical Progress Notes      Specialized Services Documentation  Therapist must complete separate progress note for each specific clinical activity in which the individual participated during the day.       Allied Therapy ([id number]) Martha Chauhan \"Max\" was engaged in life skills group to promote healthy living by facilitating regular exercise and movement. Group brainstormed benefits of exercise before engaging in a low impact exercise. Keith actively participated in 20-minute movement of walking. Keith reported better mood and more energy after exercising. Positive work noted towards treatment plan. Continue life skills group to increase movement, explore different types of exercise, and establish healthy routines.  Tx Plan Objective: 1.1, 1.2, 1.4, Therapist:  MICHAEL Gonzáles  "

## 2024-03-20 ENCOUNTER — OFFICE VISIT (OUTPATIENT)
Dept: PSYCHOLOGY | Facility: CLINIC | Age: 64
End: 2024-03-20
Payer: COMMERCIAL

## 2024-03-20 ENCOUNTER — OFFICE VISIT (OUTPATIENT)
Dept: PSYCHIATRY | Facility: CLINIC | Age: 64
End: 2024-03-20
Payer: COMMERCIAL

## 2024-03-20 DIAGNOSIS — F31.81 BIPOLAR II DISORDER (HCC): Primary | ICD-10-CM

## 2024-03-20 DIAGNOSIS — F41.1 GAD (GENERALIZED ANXIETY DISORDER): ICD-10-CM

## 2024-03-20 PROCEDURE — 99214 OFFICE O/P EST MOD 30 MIN: CPT | Performed by: NURSE PRACTITIONER

## 2024-03-20 PROCEDURE — 90832 PSYTX W PT 30 MINUTES: CPT

## 2024-03-20 PROCEDURE — G0176 OPPS/PHP;ACTIVITY THERAPY: HCPCS

## 2024-03-20 PROCEDURE — G0410 GRP PSYCH PARTIAL HOSP 45-50: HCPCS

## 2024-03-20 NOTE — PSYCH
PHP MEDICATION MANAGEMENT NOTE        [facility]    Name and Date of Birth:  Martha Chauhan 63 y.o. 1960 MRN: [id number]    Date of Visit: March 20, 2024    Visit Time    Visit Start Time: 1100   Visit Stop Time: 1120  Total Visit Duration:  30 minutes     The total visit duration detailed above includes: patient engagement, medication management, psychotherapy/counseling, discussion regarding treatment goals, and coordination of care.      Note Share Disclaimer:     This note was not shared with the patient due to reasonable likelihood of causing patient harm    No Known Allergies  SUBJECTIVE:    Martha is seen today for a follow up for Bipolar Disorder and Generalized Anxiety Disorder. She continues to improve gradually since beginning PHP.  She reports ex-significant improvement in mood and anxiety since starting the partial program.  She specifies to me all the things she has learned here at the partial program to help manage her symptoms.  Mood is bright, no overt symptoms of cecy.  No overt psychosis.  Denying depression.  Denying any suicidal thoughts or passive death wish.  She has follow-up this week with her outpatient provider and does not require any refills at this time.    She denies any side effects from current psychiatric medications.    PLAN:  Continue psychiatric medications the same as noted below  Aware of 24 hour and weekend coverage for urgent situations accessed by calling [facility] main practice number  Continue partial hospitalization program    Diagnoses and all orders for this visit:    Bipolar II disorder (HCC)    SWAPNA (generalized anxiety disorder)        Current Outpatient Medications on File Prior to Visit   Medication Sig Dispense Refill    Cholecalciferol (Vitamin D3) 1000 units CAPS Take 3 capsules by mouth in the morning      FLUoxetine (PROzac) 20 mg capsule Take 1 capsule (20 mg total) by mouth  daily 30 capsule 3    lamoTRIgine (LaMICtal) 100 mg tablet TAKE 1 TABLET BY MOUTH EVERY DAY 90 tablet 4    metFORMIN (GLUCOPHAGE-XR) 500 mg 24 hr tablet Increase every 3 days stomach tolerates:  1 pill in AM; 2 pills in AM; 3 pills in AM.  Take with food. 90 tablet 1    Multiple Vitamins-Minerals (Multivitamin Adults) TABS Take 1 tablet by mouth in the morning      OLANZapine (ZyPREXA) 10 mg tablet Take 1 tablet (10 mg total) by mouth 2 (two) times a day 180 tablet 0    Red Yeast Rice 600 MG CAPS Take 1 capsule by mouth in the morning       No current facility-administered medications on file prior to visit.       HPI ROS Appetite Changes and Sleep:     She reports fluctuating sleep pattern, fluctuating appetite, fluctuating energy levels. Denies homicidal ideation, denies suicidal ideation    Review Of Systems:   no complaints, all other systems are negative         Mental Status Evaluation:    Appearance:  age appropriate   Behavior:  pleasant, cooperative   Speech:  normal rate, normal volume   Mood:  improved   Affect:  brighter   Thought Process:  circumstantial   Associations: intact associations   Thought Content:  no overt delusions   Perceptual Disturbances: none   Risk Potential: Suicidal ideation - None  Homicidal ideation - None  Potential for aggression - No   Sensorium:  oriented to person, place, and time/date   Memory:  recent and remote memory grossly intact   Consciousness:  alert and awake   Attention: decreased concentration and decreased attention span   Insight:  limited   Judgment: limited   Gait/Station: normal gait/station, normal balance   Motor Activity: no abnormal movements       History Review:The following portions of the patient's history were reviewed and updated as appropriate: psychiatric history, trauma history allergies, current medications, past family history, past medical history, past social history, past surgical history, and problem list   OBJECTIVE:     Vital signs in last  24 hours:    There were no vitals filed for this visit.  Laboratory Results: I have personally reviewed all pertinent laboratory/tests results.    Medications Risks/Benefits:      Risks, Benefits And Possible Side Effects Of Medications:    Discussed risks and benefits of treatment with patient including risk of suicidality, serotonin syndrome, increased QTc interval and SIADH related to treatment with antidepressants; Risk of induction of manic symptoms in certain patient populations, risk of parkinsonian symptoms, metabolic syndrome, tardive dyskinesia and neuroleptic malignant syndrome related to treatment with antipsychotic medications, and risk of rash related to treatment with Lamictal     Controlled Medication Discussion:     Not applicable - controlled prescriptions are not prescribed by this practice    OSMAN Maki 03/20/24

## 2024-03-20 NOTE — PSYCH
Subjective:     Patient ID: Martha Chauhan is a 63 y.o. female.    [facility] Discharge Summary:     Admission Date: 03/12/2024  Patient was referred by her PCP, Teresa Torres, DO   Discharge Date: 03/20/2024  Was this a routine discharge? Yes, per patient request     Diagnosis: Axis I:   1. Bipolar II disorder (HCC)        2. SWAPNA (generalized anxiety disorder)             Treating Physician: Dr. Aida Sanches MD      Treatment Complications: no treatment complications     Presenting Need:     Per Dr. Aida Sanches MD: Martha Chauhan is a 63 y.o. female with bipolar disorder, generalized anxiety disorder, history of ischemic without residual deficit, hyperlipidemia, primary stress urinary incontinence, vitamin D deficiency diabetes mellitus type 2, complex partial epilepsy. referred by primary physician because she has mood swings, euphoria, was recently discharge from a psychiatric unit. Onset of symptoms was a few months ago with gradually worsening course since that time. Psychosocial Stressors: mental health. She had a long history of mental illness, she had been treated for depression but in January she had an episode of euphoria, little sleep, irrational, talkative, and was admitted to an impatient psychiatric unit in [place]. She was started on Zyprexa and her mood has some improvement. She states has a good support from her  and always has been compliance with her treatment.  .Today Martha Chauhan feels anxious, she is circumstantial, talkative, feels euphoric, focus in her diagnosis. She still has sleep disturbances, changes in energy level. She denies any suicidal or homicidal ideas, plan or intent, she does not has any active hallucinations or paranoid thoughts. She wants to learn coping skills and feels better.       Per this writer: Martha Chauhan is a 63 y.o. female referred by her PCP due to mood swings, euphoria, was recently discharge from a psychiatric unit. .  "Martha Chauhan is domiciled at home with her . She has two sons - one who lives in [place] and the other lives in [place]. She reports that her  has Autism Spectrum Disorder and Parkinsons and he is very regimented. He reports that he is supportive. Martha Chauhan currently does not work and is retired. 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 During the interview, Martha was difficult to follow as she often was contradictory in her statements. For example, she shared that she has no contact with her family and then reports how close she is to them. She was a bit vague regarding symptoms and reported only that since 2013 after her Right Temporal Lobe surgery she was not the same. She did not share about her [place] in patient stay as she reports it was \"hazy.\" She reports that she feels she can be \"too aggressive,\" \"too nervous,\" and \"I talk too much.\" Psychosocial Stressors: Health and mental health. Martha Chauhan reports she eats too much and her sleep is \"rough.\" Martha is good with her ADLs. Regarding family history: her mother lives in [facility] and her father passed away 25 years ago from cancer. She has a half sister, Hailey who is 13 1/2 years younger than her and an older brother, Tejas. Regarding trauma history: denies.. Their symptoms include: anxious, she is circumstantial, talkative, and feels euphoric. Martha Chauhan denies any current SI without plan or intent. Denies HI, AH/VH and does not have any active paranoid thoughts or hallucinations.      Per Martha Chauhan: \"I'm somewhat impulsive, but I think about it,\" \"I have filter,\" \"I have to remind myself don't isolate,\" \"I don't sleep at night,\" \"I just want to shrink\"     Strengths: Writing and being a mom     Course of treatment includes:    group counseling, medication management, individual case management, allied therapy, psychoeducation, and psychiatric evaluation    Treatment " "Progress: Martha Chauhan attended 7 days in PHP in which Martha challenged negative thoughts, engaging in healthy coping strategies and learned ways to express herself through being assertive.  and Martha addressed the following treatment objectives in case management: I will identify and practice three new coping skills I can utilize daily outside of program, included but not limited to (mindfulness, acting intentionally, reducing vulnerability to emotional mind, reality checking, opposite action, etc.,) in order to enhance my wellness and reduce secondary symptoms such as anxiety, I will increase my emotional vocabulary and practice “I” statements daily when experiencing challenging emotions, I will take medications as prescribed and share questions and concerns if arise, and I will identify 3 ways my supports can assist in my wellness journey and use them if/when needed. While Martha was relatively quieter in case management, expressing minimal need, she was an overall engaged group member in program. Their response to treatment was positive as evidence by attentiveness and participation in groups, follow through with goals, and continued effort in her wellness. Throughout their time in PHP Martha Chauhan had groups on DBT Mindfulness \"WHAT\" skills, Coping with Loneliness, Wellness Recovery Action Plan (WRAP), Patience, Window of Tolerance, Distress Tolerance Skills (ACCEPTS), CBT Thought Records, Dialectics, Accomplishments, Gratitude, Weekly Wellness Assessment, Affirmations, Cycle/Process of Change, Self-Esteem and Open Processing, Exercise and Movement, Challenging Automatic Negative thoughts, and Boundaries.  Martha actively worked on suggestions and practiced coping skills. Her thought processes were clear, and she was goal directed. She reported immediate improvement in sleep, engagement with her , and spent time in her garden.  They presented as eager to learn, be open to change, " "demonstrated a willingness to be challenged, and improve upon their insight/judgment. Martha also attended medication reviews. No changes regarding medication. Martha shared improvement through I'm getting out and doing things, I'm moving.\" She also shared how much she took away from the HEARS acronym (Hope, Education, Advocacy, Responsibility, and Support) and how that has been helpful in her daily life. She reported that in order to maintain her wellness after program, she will call her Aunt Eda and if she's feeling down she will go for a walk. Upon intake Martha's PHQ-9 was a 9 and at discharge their PHQ-9 was a 2. Denied SI, HI, and psychosis. Aftercare providers to receive summary.      Aftercare recommendations include:     Continue with PCP as needed:   Teresa Torres, DO  [address]  [facility] [address] [phone]     PSYCHIATRIST:  MICHELLE Varela, PhD  [address] [place] [address] [address]  [place] [address]  PH: [phone]  F: [phone]  WHEN: 4/22/2024 @ 12:15 PM      If interested in pursuing therapy:   Consult with [facility]   or   Call your insurance to provide you with a list of providers.   Insurance Phone #: [phone]     Consider [facility] for support groups or DBSA.     Discharge Medications include:  Current Outpatient Medications:     Cholecalciferol (Vitamin D3) 1000 units CAPS, Take 3 capsules by mouth in the morning, Disp: , Rfl:     FLUoxetine (PROzac) 20 mg capsule, Take 1 capsule (20 mg total) by mouth daily, Disp: 30 capsule, Rfl: 3    lamoTRIgine (LaMICtal) 100 mg tablet, TAKE 1 TABLET BY MOUTH EVERY DAY, Disp: 90 tablet, Rfl: 4    metFORMIN (GLUCOPHAGE-XR) 500 mg 24 hr tablet, Increase every 3 days stomach tolerates:  1 pill in AM; 2 pills in AM; 3 pills in AM.  Take with food., Disp: 90 tablet, Rfl: 1    Multiple Vitamins-Minerals (Multivitamin Adults) TABS, Take 1 tablet by mouth in the morning, " Disp: , Rfl:     OLANZapine (ZyPREXA) 10 mg tablet, Take 1 tablet (10 mg total) by mouth 2 (two) times a day, Disp: 180 tablet, Rfl: 0    Red Yeast Rice 600 MG CAPS, Take 1 capsule by mouth in the morning, Disp: , Rfl:

## 2024-03-20 NOTE — PSYCH
[facility] Clinical Progress Notes      Specialized Services Documentation  Therapist must complete separate progress note for each specific clinical activity in which the individual participated during the day.       [facility] follow up physician's orders:   Date: 3/20/2024  Time: 9:58  DISCHARGE TODAY  Aida Sanches MD

## 2024-03-20 NOTE — BH CRISIS PLAN
Client Name: Martha Chauhan       Client YOB: 1960    Patel[facility]benezer Safety Plan      Creation Date: 2/15/24 Update Date: 3/20/24   Created By: Briana Varela, PhD Last Updated By: Maria T Hill      Step 1: Warning Signs:   Warning Signs   increased energy, insomnia            Step 2: Internal Coping Strategies:   Internal Coping Strategies   none            Step 3: People and social settings that provide distraction:   Name Contact Information   friend cell    Places   none           Step 4: People whom I can ask for help during a crisis:      Name Contact Information    Friends cell    [facility] [phone]    [facility] [phone]      Step 5: Professionals or agencies I can contact during a crisis:      Clinican/Agency Name Phone Emergency Contact    [facility] [phone] Dr Varela    [facility] Teresa Torres [phone]       Local Emergency Department Emergency Department Phone Emergency Department Address    [facility] [phone] [address] [place] [address] Suicide Hotline - 988          Crisis Phone Numbers:   Suicide Prevention Lifeline: Call or Text  988 Crisis Text Line: Text HOME to 009-540   Please note: Some [facility] do not have a separate number for Child/Adolescent specific crisis. If your county is not listed under Child/Adolescent, please call the adult number for your county      Adult Crisis Numbers: Child/Adolescent Crisis Numbers   [facility]: [phone] [facility]: [phone]   [facility]: [phone] [facility]: [phone]   [facility]: [phone] [place]: [phone]   [facility]: [phone] [place]: [phone]   [place] [facility]: [phone]   [facility]: [phone]   [facility]: [phone]   [facility]: [phone] (daytime) [phone] (after hours, weekends, holidays)      Step 6: Making the environment safer (plan for  lethal means safety):   Plan: All locked up     Optional: What is most important to me and worth living for?   Self care,  and grandchildren and children Full of life     Randi Safety Plan. Toyin Sweeney and Te Sol. Used with permission of the authors.

## 2024-03-20 NOTE — PSYCH
Group Psychotherapy  [id number] Martha Chauhan participated quietly in  this group on boundaries. Participants discussed why boundaries are important to mental health, how they are formed and influenced and the negative repercussions of not knowing one's boundaries. Participants read together an overview about boundaries and the different types of boundaries; physical, intellectual, emotional, sexual, material and time. Participants discussed boundary negotiations, and tips on setting boundaries and then practiced responses to different situations. Participants reviewed and received material to identify their own boundaries in the realms of relationships, family and career/life purpose.    Martha Chauhan followed along with group discussion and content but did not talk openly in the group.  Continue to note progress towards goals. Encouraged upon discharge to with psychotherapy to maintain mental health.    Tx Plan Objective 1.1, 1.2, 1.4 Therapist: SALEEM Acevedo

## 2024-03-20 NOTE — PSYCH
Subjective:    Patient ID: Martha Chauhan is a 63 y.o. female      [facility] Clinical Progress Notes      Specialized Services Documentation  Therapist must complete separate progress note for each specific clinical activity in which the individual participated during the day.     Education Therapy   [id number] Martha Chauhan actively shared in check in and goal review. Presented as Receptive related to readiness to learn.  Martha Chauhan  did complete goal from last treatment day identifying gaining hope and responsibility. did not present with any barriers to learning.     Tx Plan Objective: 1.1, 1.2, 1.4, Therapist:  MICHAEL Gonzáles

## 2024-03-21 ENCOUNTER — APPOINTMENT (OUTPATIENT)
Dept: PSYCHOLOGY | Facility: CLINIC | Age: 64
End: 2024-03-21
Payer: COMMERCIAL

## 2024-03-22 ENCOUNTER — APPOINTMENT (OUTPATIENT)
Dept: PSYCHOLOGY | Facility: CLINIC | Age: 64
End: 2024-03-22
Payer: COMMERCIAL

## 2024-03-27 ENCOUNTER — DOCUMENTATION (OUTPATIENT)
Dept: PSYCHOLOGY | Facility: CLINIC | Age: 64
End: 2024-03-27

## 2024-03-27 NOTE — PROGRESS NOTES
Zero Suicide Follow Up Action    This writer attempted to speak with Martha Chauhan today - 7 days post discharge from [facility].   Reviewed crisis number on message and requested return call.    Jinny Cortes

## 2024-04-01 ENCOUNTER — DOCUMENTATION (OUTPATIENT)
Dept: PSYCHOLOGY | Facility: CLINIC | Age: 64
End: 2024-04-01

## 2024-04-01 NOTE — PROGRESS NOTES
Zero Suicide Follow Up Action    This writer attempted to speak with Martha Chauhan today - post discharge from [facility].   Reviewed crisis number on message and requested return call.    Jinny Cortes

## 2024-04-04 DIAGNOSIS — F33.2 SEVERE EPISODE OF RECURRENT MAJOR DEPRESSIVE DISORDER, WITHOUT PSYCHOTIC FEATURES (HCC): Chronic | ICD-10-CM

## 2024-04-04 DIAGNOSIS — F41.1 GAD (GENERALIZED ANXIETY DISORDER): ICD-10-CM

## 2024-04-04 RX ORDER — FLUOXETINE HYDROCHLORIDE 20 MG/1
20 CAPSULE ORAL DAILY
Qty: 90 CAPSULE | Refills: 2 | Status: SHIPPED | OUTPATIENT
Start: 2024-04-04

## 2024-04-22 ENCOUNTER — OFFICE VISIT (OUTPATIENT)
Dept: PSYCHIATRY | Facility: CLINIC | Age: 64
End: 2024-04-22
Payer: COMMERCIAL

## 2024-04-22 VITALS
HEART RATE: 73 BPM | BODY MASS INDEX: 40.84 KG/M2 | DIASTOLIC BLOOD PRESSURE: 80 MMHG | WEIGHT: 208 LBS | HEIGHT: 60 IN | SYSTOLIC BLOOD PRESSURE: 129 MMHG

## 2024-04-22 DIAGNOSIS — F41.1 GAD (GENERALIZED ANXIETY DISORDER): Primary | ICD-10-CM

## 2024-04-22 DIAGNOSIS — F31.81 BIPOLAR II DISORDER (HCC): ICD-10-CM

## 2024-04-22 PROCEDURE — 90833 PSYTX W PT W E/M 30 MIN: CPT | Performed by: NURSE PRACTITIONER

## 2024-04-22 PROCEDURE — 99214 OFFICE O/P EST MOD 30 MIN: CPT | Performed by: NURSE PRACTITIONER

## 2024-04-22 NOTE — PSYCH
Visit Time    Visit Start Time: 12:15  Visit Stop Time: 12:45  Total Visit Duration:  30 minutes    Subjective:     Patient ID: Martha Chauhan is a 63 y.o. female.  History of bipolar 2 with psychotic symptoms, anxiety and depression seen for medication management and mood assessment.  Martha and her  attended the session reporting that her moods have been stable.  Her appetite and sleep patterns are good and she is working out at the gym and has lost 4 pounds.  Thought process is clear.  She is alert and oriented x 3.  Reports depression and anxiety are at a level 4 on a scale of 0-10.  Denies suicidal ideation takes medications as prescribed  is supportive. Occasionally has trouble sleeping.    HPI ROS Appetite Changes and Sleep: normal appetite and normal energy level    Review Of Systems:     Mood Anxiety and Depression mild   Behavior Normal    Thought Content Normal   General Emotional Problems   Personality Normal   Other Psych Symptoms Normal   Constitutional As Noted in HPI   ENT As Noted in HPI   Cardiovascular As Noted in HPI   Respiratory As Noted in HPI   Gastrointestinal As Noted in HPI   Genitourinary Normal    Musculoskeletal As Noted in HPI   Integumentary As Noted in HPI   Neurological As Noted in HPI   Endocrine Hashimoto's   Other Symptoms Normal        Laboratory Results:     Substance Abuse History:  Social History     Substance and Sexual Activity   Drug Use Never       Family Psychiatric History:   Family History   Problem Relation Age of Onset    Aortic aneurysm Mother 70        Abdominal Aortic Aneurysm, +Smoker    No Known Problems Father     Bipolar disorder Brother     No Known Problems Maternal Aunt     Cancer Maternal Grandmother         70    No Known Problems Son     No Known Problems Half-Sister     No Known Problems Half-Sister     No Known Problems Half-Sister     No Known Problems Half-Sister     Alcohol abuse Neg Hx     Drug abuse Neg Hx     Completed Suicide   Neg Hx        The following portions of the patient's history were reviewed and updated as appropriate: allergies, current medications, past family history, past medical history, past social history, past surgical history, and problem list.    Social History     Socioeconomic History    Marital status: /Civil Union     Spouse name: Not on file    Number of children: 2    Years of education: Not on file    Highest education level: Some college, no degree   Occupational History    Occupation: Disabled - Previous      Comment: s/p Brain Surgery / H/O Epilepsy   Tobacco Use    Smoking status: Never    Smokeless tobacco: Never   Vaping Use    Vaping status: Never Used   Substance and Sexual Activity    Alcohol use: No     Comment: doesnt drink.    Drug use: Never    Sexual activity: Not Currently     Partners: Male     Birth control/protection: Post-menopausal, Female Sterilization     Comment: Tubal Ligation   Other Topics Concern    Not on file   Social History Narrative        Lives with     2 Children - 2 Sons    Disabled s/p Brain Surgery / H/O Epilepsy - Previous      Social Determinants of Health     Financial Resource Strain: Low Risk  (8/22/2023)    Overall Financial Resource Strain (CARDIA)     Difficulty of Paying Living Expenses: Not hard at all   Food Insecurity: Not on file   Transportation Needs: No Transportation Needs (8/22/2023)    PRAPARE - Transportation     Lack of Transportation (Medical): No     Lack of Transportation (Non-Medical): No   Physical Activity: Unknown (1/21/2021)    Exercise Vital Sign     Days of Exercise per Week: 6 days     Minutes of Exercise per Session: Not on file   Stress: Stress Concern Present (1/21/2021)    [employer] - Occupational Stress Questionnaire     Feeling of Stress : To some extent   Social Connections: Unknown (1/21/2021)    Social Connection and Isolation Panel [NHANES]     Frequency of  Communication with Friends and Family: Not on file     Frequency of Social Gatherings with Friends and Family: Not on file     Attends Church Services: Not on file     Active Member of Clubs or Organizations: Not on file     Attends Club or Organization Meetings: Not on file     Marital Status:    Intimate Partner Violence: Not At Risk (1/21/2021)    Humiliation, Afraid, Rape, and Kick questionnaire     Fear of Current or Ex-Partner: No     Emotionally Abused: No     Physically Abused: No     Sexually Abused: No   Housing Stability: Not on file     Social History     Social History Narrative        Lives with     2 Children - 2 Sons    Disabled s/p Brain Surgery / H/O Epilepsy - Previous        Objective:       Mental status:  Appearance calm and cooperative , adequate hygiene and grooming, and good eye contact    Mood anxious   Affect affect appropriate    Speech a normal rate   Thought Processes normal thought processes   Hallucinations no hallucinations present    Thought Content no delusions   Abnormal Thoughts no suicidal thoughts  and no homicidal thoughts    Orientation  oriented to person and place and time   Remote Memory short term memory intact and long term memory intact   Attention Span concentration intact   Intellect Appears to be of Average Intelligence   Insight Insight intact   Judgement judgment was intact   Muscle Strength Muscle strength and tone were normal   Language no difficulty naming common objects   Fund of Knowledge displays adequate knowledge of current events   Pain none   Pain Scale 0       Assessment/Plan:       Diagnoses and all orders for this visit:    SWAPNA (generalized anxiety disorder)    Bipolar II disorder (HCC)            Treatment Recommendations- Risks Benefits      Immediate Medical/Psychiatric/Psychotherapy Treatments and Any Precautions: Continue treatment plan    Risks, Benefits And Possible Side Effects Of Medications:  {PSYCH RISK,  BENEFITS AND POSSIBLE SIDE EFFECTS (Optional):32252    Controlled Medication Discussion: She is aware of safe use and storage of medication    Psychotherapy Provided: 30 minutes individual psychotherapy provided.   Supportive therapy  Medication evaluation  Mood assessment  Coping skills discussed  Discussed importance of sleep  Aims negative    Goals discussed in session: Continued stable mood

## 2024-04-26 DIAGNOSIS — E11.9 CONTROLLED TYPE 2 DIABETES MELLITUS WITHOUT COMPLICATION, WITHOUT LONG-TERM CURRENT USE OF INSULIN (HCC): ICD-10-CM

## 2024-04-27 RX ORDER — METFORMIN HYDROCHLORIDE 500 MG/1
TABLET, EXTENDED RELEASE ORAL
Qty: 270 TABLET | Refills: 1 | Status: SHIPPED | OUTPATIENT
Start: 2024-04-27

## 2024-05-14 DIAGNOSIS — F31.2 BIPOLAR AFFECTIVE DISORDER, CURRENTLY MANIC, SEVERE, WITH PSYCHOTIC FEATURES (HCC): ICD-10-CM

## 2024-05-14 RX ORDER — OLANZAPINE 10 MG/1
10 TABLET ORAL 2 TIMES DAILY
Qty: 180 TABLET | Refills: 3 | Status: SHIPPED | OUTPATIENT
Start: 2024-05-14

## 2024-06-03 DIAGNOSIS — F33.2 SEVERE EPISODE OF RECURRENT MAJOR DEPRESSIVE DISORDER, WITHOUT PSYCHOTIC FEATURES (HCC): Chronic | ICD-10-CM

## 2024-06-03 DIAGNOSIS — F41.1 GAD (GENERALIZED ANXIETY DISORDER): ICD-10-CM

## 2024-06-03 RX ORDER — FLUOXETINE HYDROCHLORIDE 20 MG/1
20 CAPSULE ORAL EVERY MORNING
Qty: 90 CAPSULE | Refills: 3 | Status: SHIPPED | OUTPATIENT
Start: 2024-06-03

## 2024-06-06 ENCOUNTER — VBI (OUTPATIENT)
Dept: ADMINISTRATIVE | Facility: OTHER | Age: 64
End: 2024-06-06

## 2024-06-06 LAB
LEFT EYE DIABETIC RETINOPATHY: NORMAL
RIGHT EYE DIABETIC RETINOPATHY: NORMAL
SEVERITY (EYE EXAM): NORMAL

## 2024-06-20 ENCOUNTER — TELEPHONE (OUTPATIENT)
Dept: ADMINISTRATIVE | Facility: OTHER | Age: 64
End: 2024-06-20

## 2024-06-20 NOTE — TELEPHONE ENCOUNTER
06/20/24 12:37 PM    Patient contacted to bring Advance Directive, POLST, or Living Will document to next scheduled pcp visit.[facility] Department left message.    Thank you.  Hao Chawla  [facility]

## 2024-06-25 ENCOUNTER — OFFICE VISIT (OUTPATIENT)
Dept: FAMILY MEDICINE CLINIC | Facility: CLINIC | Age: 64
End: 2024-06-25
Payer: COMMERCIAL

## 2024-06-25 VITALS
HEIGHT: 60 IN | HEART RATE: 72 BPM | WEIGHT: 201 LBS | DIASTOLIC BLOOD PRESSURE: 68 MMHG | TEMPERATURE: 98 F | SYSTOLIC BLOOD PRESSURE: 112 MMHG | OXYGEN SATURATION: 97 % | BODY MASS INDEX: 39.46 KG/M2 | RESPIRATION RATE: 16 BRPM

## 2024-06-25 DIAGNOSIS — E55.9 VITAMIN D DEFICIENCY: ICD-10-CM

## 2024-06-25 DIAGNOSIS — E11.9 CONTROLLED TYPE 2 DIABETES MELLITUS WITHOUT COMPLICATION, WITHOUT LONG-TERM CURRENT USE OF INSULIN (HCC): Primary | ICD-10-CM

## 2024-06-25 DIAGNOSIS — E78.5 HYPERLIPIDEMIA, UNSPECIFIED HYPERLIPIDEMIA TYPE: ICD-10-CM

## 2024-06-25 DIAGNOSIS — F31.81 BIPOLAR II DISORDER (HCC): ICD-10-CM

## 2024-06-25 DIAGNOSIS — N39.3 PRIMARY STRESS URINARY INCONTINENCE: ICD-10-CM

## 2024-06-25 DIAGNOSIS — F41.1 GAD (GENERALIZED ANXIETY DISORDER): ICD-10-CM

## 2024-06-25 DIAGNOSIS — E66.01 CLASS 2 SEVERE OBESITY WITH SERIOUS COMORBIDITY AND BODY MASS INDEX (BMI) OF 39.0 TO 39.9 IN ADULT, UNSPECIFIED OBESITY TYPE (HCC): ICD-10-CM

## 2024-06-25 PROCEDURE — 99214 OFFICE O/P EST MOD 30 MIN: CPT | Performed by: FAMILY MEDICINE

## 2024-06-25 PROCEDURE — G2211 COMPLEX E/M VISIT ADD ON: HCPCS | Performed by: FAMILY MEDICINE

## 2024-06-25 NOTE — ASSESSMENT & PLAN NOTE
Pending labs.  Patient declines statin despite increased ASCVD risk.  She prefers to continue Red Yeast Rice 600mg QD - AMA.  Recommend lifestyle modifications.    The 10-year ASCVD risk score (Lucho SEXTON, et al., 2019) is: 7.4%    Values used to calculate the score:      Age: 63 years      Sex: Female      Is Non- : No      Diabetic: Yes      Tobacco smoker: No      Systolic Blood Pressure: 112 mmHg      Is BP treated: No      HDL Cholesterol: 66 mg/dL      Total Cholesterol: 190 mg/dL

## 2024-06-25 NOTE — PROGRESS NOTES
Assessment/Plan:  Problem List Items Addressed This Visit        Endocrine    Diabetes type 2, controlled (HCC) - Primary       Lab Results   Component Value Date    HGBA1C 6.7 (H) 02/23/2024     Pending labs.  Continue Metformin XR 500mg 3 pills daily.  Patient declines statin, glucometer Rx.        Call Insurance to Ask About Diabetes Med Coverage -      GLP-1 Agonists - Pill - Rybelsus, etc., Injectable - Ozempic, Mounjaro, Trulicity, Victoza, Byetta, etc.  SGLT-2 Inhibitor - Pill - Jardiance, Farxiga, etc.       Please let us know about your medication selection and availability at pharmacy of your choice.               Relevant Orders    Comprehensive metabolic panel    Hemoglobin A1C       Urinary    Primary stress urinary incontinence     Stable.  Kegel exercises Handout given previously.  Patient declines Pelvic floor PT previously.                  Behavioral Health    SWAPNA (generalized anxiety disorder)     Management per Psych.           Bipolar II disorder (HCC)     Management per Psych.              Other    Class 2 severe obesity with serious comorbidity and body mass index (BMI) of 39.0 to 39.9 in adult ([facility])     Improved.  Recommend lifestyle modifications.         Vitamin D deficiency     Pending labs.  Continue MVI and OTC Vitamin D 3,000IU daily.           Relevant Orders    Comprehensive metabolic panel    Vitamin D 25 hydroxy    Hyperlipidemia     Pending labs.  Patient declines statin despite increased ASCVD risk.  She prefers to continue Red Yeast Rice 600mg QD - AMA.  Recommend lifestyle modifications.    The 10-year ASCVD risk score (Lucho SEXTON, et al., 2019) is: 7.4%    Values used to calculate the score:      Age: 63 years      Sex: Female      Is Non- : No      Diabetic: Yes      Tobacco smoker: No      Systolic Blood Pressure: 112 mmHg      Is BP treated: No      HDL Cholesterol: 66 mg/dL      Total Cholesterol: 190 mg/dL           Relevant Orders    CBC and  differential    Comprehensive metabolic panel    Lipid panel    TSH, 3rd generation with Free T4 reflex    LDL cholesterol, direct        Return in about 4 months (around 10/25/2024) for AWV / 4mo - DM2, HL, BPD/Anx/Dep, Vit D Def, Labs.      Future Appointments   Date Time Provider Department Center   7/22/2024  9:30 AM Briana Varela, PhD [facility]   10/25/2024  8:00 AM Teresa Torres,  [facility]   10/28/2024  1:00 PM Ariana Sevilla-Ana Buitrago MD [facility]        Subjective:     Martha is a 63 y.o. female who presents today for a follow-up on her chronic medical conditions.        HPI:  Chief Complaint   Patient presents with   • Follow-up     4mo - DM2, Vit D Def, Labs. No new problems or concerns at this time.    • HM     No additional covid boosters and RSV.      -- Above per clinical staff and reviewed. --      Diabetes  She presents for her follow-up diabetic visit. She has type 2 diabetes mellitus. There are no hypoglycemic associated symptoms. Pertinent negatives for diabetes include no chest pain and no fatigue. Risk factors for coronary artery disease include diabetes mellitus, obesity and post-menopausal. Current diabetic treatment includes diet and oral agent (monotherapy). Her weight is decreasing steadily. She is following a generally healthy diet. She participates in exercise three times a week. (Not checking BS at home.  ) An ACE inhibitor/angiotensin II receptor blocker is not being taken. She does not see a podiatrist.Eye exam is current.         Today:      Return in about 4 months (around 6/25/2024) for 4mo - DM2, Vit D Def, Labs.     4mo OV    Patient not complete labs 3/6/24, 2/23/24.    Obesity - Watching diet - trying to cut back on sugars and carbs.  +Exercise - Gym for 0.75 - 1 hour, 3 times per week, Walking dog 30 minutes, 0-1 days per week.     DM2 - On Metformin XR 500mg 3 pills daily x 3 months.  No other DM meds previously.  S/p Nutrition  consult c DM  2022.  Sees Optho - Dr. Feliciano - Next appt 6/25.  No Podiatry.     Hyperlipidemia - No statin previously.       Bipolar / Anxiety / Depression - Management per Psych Dr. Varela.  Sees q2 months - Next appt 7/24.  No counseling - last counseling 2021.  On Prozac 20mg QD, Zyprexa 10mg BID, Lamictal 100mg QD.  s/p partial inpatient Psych admission 3/12/24.      Vitamin D Deficiency - s/p Drisdol.  Taking MVI and OTC Vitmain D 3000IU daily.       H/O Seizures - Grand Mal / Complex Partial - Last occurred in 2013 - none since corrective surgery at [facility] in 2013.  Last saw Neurosurgery appt 2015 - F/U PRN.       FamHx AAA - + Mother.  Patient had normal screen 2012?.       Reviewed:  Labs 2/23/24, Gyn 10/25/23     Sees Gyn Dr. Buit[facility]ago at [facility] yearly.  Next appt 10/24.      The following portions of the patient's history were reviewed and updated as appropriate: allergies, current medications, past family history, past medical history, past social history, past surgical history and problem list.      Review of Systems   Constitutional:  Negative for appetite change, chills, diaphoresis, fatigue and fever.   HENT:  Positive for congestion (Nasal).    Respiratory:  Negative for chest tightness and shortness of breath.    Cardiovascular:  Negative for chest pain.   Gastrointestinal:  Positive for nausea (Due to nasal congestion). Negative for abdominal pain, blood in stool, diarrhea and vomiting.   Genitourinary:  Negative for dysuria.        Current Outpatient Medications   Medication Sig Dispense Refill   • Cholecalciferol (Vitamin D3) 1000 units CAPS Take 3 capsules by mouth in the morning     • FLUoxetine (PROzac) 20 mg capsule TAKE 1 CAPSULE BY MOUTH DAILY IN THE MORNING 90 capsule 3   • lamoTRIgine (LaMICtal) 100 mg tablet TAKE 1 TABLET BY MOUTH EVERY DAY 90 tablet 4   • metFORMIN (GLUCOPHAGE-XR) 500 mg 24 hr tablet INCREASE EVERY 3 DAYS AS STOMACH TOLERATES: 1 TABLET IN AM FOR 3 DAYS THEN  2 TABS IN AM FOR 3 DAYS THEN 3 TABS IN AM WITH FOOD (Patient taking differently: Take 1,500 mg by mouth daily with dinner) 270 tablet 1   • Multiple Vitamins-Minerals (Multivitamin Adults) TABS Take 1 tablet by mouth in the morning     • OLANZapine (ZyPREXA) 10 mg tablet TAKE 1 TABLET BY MOUTH TWICE  DAILY 180 tablet 3   • Red Yeast Rice 600 MG CAPS Take 1 capsule by mouth in the morning       No current facility-administered medications for this visit.       Objective:  /68   Pulse 72   Temp 98 °F (36.7 °C)   Resp 16   Ht 5' (1.524 m)   Wt 91.2 kg (201 lb)   LMP 01/01/2011   SpO2 97%   BMI 39.26 kg/m²    Wt Readings from Last 3 Encounters:   06/25/24 91.2 kg (201 lb)   04/22/24 94.3 kg (208 lb)   03/12/24 96.5 kg (212 lb 12.8 oz)      BP Readings from Last 3 Encounters:   06/25/24 112/68   04/22/24 129/80   03/12/24 129/79          Physical Exam  Vitals and nursing note reviewed.   Constitutional:       Appearance: Normal appearance. She is well-developed. She is obese.   HENT:      Head: Normocephalic and atraumatic.   Eyes:      Conjunctiva/sclera: Conjunctivae normal.   Neck:      Thyroid: No thyromegaly.      Vascular: No carotid bruit.   Cardiovascular:      Rate and Rhythm: Normal rate and regular rhythm.      Pulses: Normal pulses.      Heart sounds: Normal heart sounds.   Pulmonary:      Effort: Pulmonary effort is normal.      Breath sounds: Normal breath sounds.   Abdominal:      General: Bowel sounds are normal. There is no distension.      Palpations: Abdomen is soft. There is no mass.      Tenderness: There is no abdominal tenderness. There is no guarding or rebound.   Musculoskeletal:         General: No swelling or tenderness.      Cervical back: Neck supple.      Right lower leg: No edema.      Left lower leg: No edema.   Lymphadenopathy:      Cervical: No cervical adenopathy.   Neurological:      General: No focal deficit present.      Mental Status: She is alert and oriented to  "person, place, and time.   Psychiatric:         Mood and Affect: Mood normal.         Behavior: Behavior normal.         Thought Content: Thought content normal.         Judgment: Judgment normal.         Lab Results:      Lab Results   Component Value Date    WBC 9.88 01/25/2024    HGB 15.0 01/25/2024    HCT 46.1 01/25/2024     01/25/2024    TRIG 83 08/26/2023    HDL 66 08/26/2023    LDLDIRECT 112 (H) 08/26/2023    ALT 16 02/23/2024    AST 14 02/23/2024    K 4.0 02/23/2024     02/23/2024    CREATININE 0.71 02/23/2024    BUN 15 02/23/2024    CO2 29 02/23/2024    GLUF 102 (H) 08/26/2023    HGBA1C 6.7 (H) 02/23/2024     No results found for: \"URICACID\"  Invalid input(s): \"BASENAME\" Vitamin D    No results found.     POCT Labs                       "

## 2024-06-25 NOTE — PATIENT INSTRUCTIONS
Call Insurance to Ask About Diabetes Med Coverage -      GLP-1 Agonists - Pill - Rybelsus, etc., Injectable - Ozempic, Mounjaro, Trulicity, Victoza, Byetta, etc.  SGLT-2 Inhibitor - Pill - Jardiance, Farxiga, etc.       Please let us know about your medication selection and availability at pharmacy of your choice.

## 2024-06-25 NOTE — ASSESSMENT & PLAN NOTE
Stable.  Kegel exercises Handout given previously.  Patient declines Pelvic floor PT previously.

## 2024-06-25 NOTE — ASSESSMENT & PLAN NOTE
Lab Results   Component Value Date    HGBA1C 6.7 (H) 02/23/2024     Pending labs.  Continue Metformin XR 500mg 3 pills daily.  Patient declines statin, glucometer Rx.        Call Insurance to Ask About Diabetes Med Coverage -      GLP-1 Agonists - Pill - Rybelsus, etc., Injectable - Ozempic, Mounjaro, Trulicity, Victoza, Byetta, etc.  SGLT-2 Inhibitor - Pill - Jardiance, Farxiga, etc.       Please let us know about your medication selection and availability at pharmacy of your choice.

## 2024-06-28 DIAGNOSIS — F31.2 BIPOLAR AFFECTIVE DISORDER, CURRENTLY MANIC, SEVERE, WITH PSYCHOTIC FEATURES (HCC): ICD-10-CM

## 2024-07-01 RX ORDER — OLANZAPINE 10 MG/1
10 TABLET ORAL 2 TIMES DAILY
Qty: 180 TABLET | Refills: 3 | Status: SHIPPED | OUTPATIENT
Start: 2024-07-01

## 2024-07-22 ENCOUNTER — OFFICE VISIT (OUTPATIENT)
Dept: PSYCHIATRY | Facility: CLINIC | Age: 64
End: 2024-07-22
Payer: COMMERCIAL

## 2024-07-22 VITALS
DIASTOLIC BLOOD PRESSURE: 97 MMHG | SYSTOLIC BLOOD PRESSURE: 150 MMHG | HEART RATE: 80 BPM | BODY MASS INDEX: 38.87 KG/M2 | HEIGHT: 60 IN | WEIGHT: 198 LBS

## 2024-07-22 DIAGNOSIS — F31.81 BIPOLAR II DISORDER (HCC): ICD-10-CM

## 2024-07-22 DIAGNOSIS — F41.1 GAD (GENERALIZED ANXIETY DISORDER): Primary | ICD-10-CM

## 2024-07-22 DIAGNOSIS — F31.2 BIPOLAR AFFECTIVE DISORDER, CURRENTLY MANIC, SEVERE, WITH PSYCHOTIC FEATURES (HCC): ICD-10-CM

## 2024-07-22 PROCEDURE — 99214 OFFICE O/P EST MOD 30 MIN: CPT | Performed by: NURSE PRACTITIONER

## 2024-07-22 PROCEDURE — 90833 PSYTX W PT W E/M 30 MIN: CPT | Performed by: NURSE PRACTITIONER

## 2024-07-22 RX ORDER — OLANZAPINE 10 MG/1
TABLET ORAL
Qty: 180 TABLET | Refills: 3 | Status: SHIPPED | OUTPATIENT
Start: 2024-07-22

## 2024-07-23 NOTE — PATIENT INSTRUCTIONS
Continue medications  Call with problems or concerns  Take 10 mg of olanzapine in the morning and a half a tab at night

## 2024-07-23 NOTE — PSYCH
Visit Time    Visit Start Time: 945  Visit Stop Time: 1015  Total Visit Duration:  30 minutes    Subjective:     Patient ID: Martha Chauhan is a 63 y.o. female.  History of bipolar 2, anxiety, depression seen for medication management and mood assessment.  Martha attended the session with her .  Reporting her moods are controlled with medication and on a scale of 0-10 depression and anxiety are a level 5.  Denies any suicidal ideation.  Reports appetite is good and she is sleeping; however, very drowsy in the morning.  We discussed reducing olanzapine to 5 mg at night and keeping 10 mg in the morning they agreed and will call with any problems or concerns.  She is trying to work out at the gym, and we discussed triggers for the bipolar.  Support was provided,  is aware of signs of increased cecy and will call with problems or concerns.  She takes medication as prescribed and  is supportive.    HPI ROS Appetite Changes and Sleep: normal appetite and decreased energy    Review Of Systems:     Mood Anxiety and Depression mild   Behavior Normal    Thought Content Normal   General Emotional Problems and Sleep Disturbances   Personality Normal   Other Psych Symptoms Normal   Constitutional As Noted in HPI   ENT As Noted in HPI   Cardiovascular As Noted in HPI   Respiratory As Noted in HPI   Gastrointestinal As Noted in HPI   Genitourinary As Noted in HPI   Musculoskeletal As Noted in HPI   Integumentary As Noted in HPI   Neurological As Noted in HPI   Endocrine Diabetes   Other Symptoms Normal        Laboratory Results:     Substance Abuse History:  Social History     Substance and Sexual Activity   Drug Use Never       Family Psychiatric History:   Family History   Problem Relation Age of Onset    Aortic aneurysm Mother 70        Abdominal Aortic Aneurysm, +Smoker    No Known Problems Father     Bipolar disorder Brother     No Known Problems Maternal Aunt     Cancer Maternal Grandmother          70    No Known Problems Son     No Known Problems Half-Sister     No Known Problems Half-Sister     No Known Problems Half-Sister     No Known Problems Half-Sister     Alcohol abuse Neg Hx     Drug abuse Neg Hx     Completed Suicide  Neg Hx        The following portions of the patient's history were reviewed and updated as appropriate: allergies, current medications, past family history, past medical history, past social history, past surgical history, and problem list.    Social History     Socioeconomic History    Marital status: /Civil Union     Spouse name: Not on file    Number of children: 2    Years of education: Not on file    Highest education level: Some college, no degree   Occupational History    Occupation: Disabled - Previous      Comment: s/p Brain Surgery / H/O Epilepsy   Tobacco Use    Smoking status: Never     Passive exposure: Current    Smokeless tobacco: Never   Vaping Use    Vaping status: Never Used   Substance and Sexual Activity    Alcohol use: No     Comment: doesnt drink.    Drug use: Never    Sexual activity: Not Currently     Partners: Male     Birth control/protection: Post-menopausal, Female Sterilization     Comment: Tubal Ligation   Other Topics Concern    Not on file   Social History Narrative        Lives with     2 Children - 2 Sons    Disabled s/p Brain Surgery / H/O Epilepsy - Previous      Social Determinants of Health     Financial Resource Strain: Low Risk  (8/22/2023)    Overall Financial Resource Strain (CARDIA)     Difficulty of Paying Living Expenses: Not hard at all   Food Insecurity: Not on file   Transportation Needs: No Transportation Needs (8/22/2023)    PRAPARE - Transportation     Lack of Transportation (Medical): No     Lack of Transportation (Non-Medical): No   Physical Activity: Unknown (1/21/2021)    Exercise Vital Sign     Days of Exercise per Week: 6 days     Minutes of Exercise per Session: Not on file   Stress:  Stress Concern Present (1/21/2021)    [employer] [place] [employer] - Occupational Stress Questionnaire     Feeling of Stress : To some extent   Social Connections: Unknown (1/21/2021)    Social Connection and Isolation Panel [NHANES]     Frequency of Communication with Friends and Family: Not on file     Frequency of Social Gatherings with Friends and Family: Not on file     Attends Voodoo Services: Not on file     Active Member of Clubs or Organizations: Not on file     Attends Club or Organization Meetings: Not on file     Marital Status:    Intimate Partner Violence: Not At Risk (1/21/2021)    Humiliation, Afraid, Rape, and Kick questionnaire     Fear of Current or Ex-Partner: No     Emotionally Abused: No     Physically Abused: No     Sexually Abused: No   Housing Stability: Not on file     Social History     Social History Narrative        Lives with     2 Children - 2 Sons    Disabled s/p Brain Surgery / H/O Epilepsy - Previous        Objective:       Mental status:  Appearance calm and cooperative , adequate hygiene and grooming, and good eye contact    Mood euthymic   Affect affect appropriate    Speech a normal rate   Thought Processes normal thought processes   Hallucinations no hallucinations present    Thought Content no delusions   Abnormal Thoughts no suicidal thoughts  and no homicidal thoughts    Orientation  oriented to person and place and time   Remote Memory short term memory intact and long term memory intact   Attention Span concentration intact   Intellect Appears to be of Average Intelligence   Insight Insight intact   Judgement judgment was intact   Muscle Strength Muscle strength and tone were normal   Language no difficulty naming common objects   Fund of Knowledge displays adequate knowledge of current events   Pain none   Pain Scale 0       Assessment/Plan:       Diagnoses and all orders for this visit:    SWAPNA (generalized anxiety  "disorder)    Bipolar affective disorder, currently manic, severe, with psychotic features (HCC)  -     OLANZapine (ZyPREXA) 10 mg tablet; Take one tablet  (10 mg) in am po and 1/2 tablet at night (5 mg)    Bipolar II disorder (HCC)            Treatment Recommendations- Risks Benefits      Immediate Medical/Psychiatric/Psychotherapy Treatments and Any Precautions: Continue with treatment plan    Risks, Benefits And Possible Side Effects Of Medications:  {PSYCH RISK, BENEFITS AND POSSIBLE SIDE EFFECTS (Optional):41212       Psychotherapy Provided: 30 minutes individual psychotherapy provided.   Supportive therapy  Medication evaluation  Mood assessment  Medication education    Normalized and validated her feelings    Goals discussed in session: Maintain stable mood    \"Portions of the record may have been created with voice recognition software. Occasional wrong word or \"sound a like\" substitutions may have occurred due to the inherent limitations of voice recognition software. Read the chart carefully and recognize, using context, where substitutions have occurred. Please call if you have any questions. \"                                   "

## 2024-09-19 DIAGNOSIS — E11.9 CONTROLLED TYPE 2 DIABETES MELLITUS WITHOUT COMPLICATION, WITHOUT LONG-TERM CURRENT USE OF INSULIN (HCC): ICD-10-CM

## 2024-09-19 RX ORDER — METFORMIN HCL 500 MG
TABLET, EXTENDED RELEASE 24 HR ORAL
Qty: 90 TABLET | Refills: 0 | Status: SHIPPED | OUTPATIENT
Start: 2024-09-19

## 2024-10-22 ENCOUNTER — OFFICE VISIT (OUTPATIENT)
Dept: PSYCHIATRY | Facility: CLINIC | Age: 64
End: 2024-10-22
Payer: COMMERCIAL

## 2024-10-22 VITALS
HEIGHT: 60 IN | WEIGHT: 199 LBS | BODY MASS INDEX: 39.07 KG/M2 | SYSTOLIC BLOOD PRESSURE: 150 MMHG | HEART RATE: 65 BPM | DIASTOLIC BLOOD PRESSURE: 88 MMHG

## 2024-10-22 DIAGNOSIS — F33.41 RECURRENT MAJOR DEPRESSIVE DISORDER, IN PARTIAL REMISSION (HCC): Primary | ICD-10-CM

## 2024-10-22 DIAGNOSIS — F31.81 BIPOLAR II DISORDER (HCC): ICD-10-CM

## 2024-10-22 DIAGNOSIS — Z86.2 HISTORY OF ANEMIA: ICD-10-CM

## 2024-10-22 DIAGNOSIS — Z79.899 HIGH RISK MEDICATIONS (NOT ANTICOAGULANTS) LONG-TERM USE: ICD-10-CM

## 2024-10-22 DIAGNOSIS — F31.2 BIPOLAR AFFECTIVE DISORDER, CURRENTLY MANIC, SEVERE, WITH PSYCHOTIC FEATURES (HCC): ICD-10-CM

## 2024-10-22 PROCEDURE — 99214 OFFICE O/P EST MOD 30 MIN: CPT | Performed by: NURSE PRACTITIONER

## 2024-10-22 PROCEDURE — 90833 PSYTX W PT W E/M 30 MIN: CPT | Performed by: NURSE PRACTITIONER

## 2024-10-22 RX ORDER — FLUOXETINE 40 MG/1
40 CAPSULE ORAL DAILY
Qty: 30 CAPSULE | Refills: 3 | Status: SHIPPED | OUTPATIENT
Start: 2024-10-22 | End: 2024-11-14

## 2024-10-24 NOTE — PROGRESS NOTES
Assessment/Plan:  Assessment & Plan  Medicare annual wellness visit, subsequent         Controlled type 2 diabetes mellitus without complication, without long-term current use of insulin ([facility])    Lab Results   Component Value Date    HGBA1C 6.7 (H) 02/23/2024       Orders:    Comprehensive metabolic panel; Future    Hemoglobin A1C; Future    metFORMIN (GLUCOPHAGE-XR) 500 mg 24 hr tablet; Take 3 tablets (1,500 mg total) by mouth daily with breakfast      Pending labs.  Continue Metformin XR 500mg 3 pills daily.  Patient declines statin, glucometer Rx.          Call Insurance to Ask About Diabetes Med Coverage -      GLP-1 Agonists - Pill - Rybelsus, etc., Injectable - Ozempic, Mounjaro, Trulicity, Victoza, Byetta, etc.  SGLT-2 Inhibitor - Pill - Jardiance, Farxiga, etc.       Please let us know about your medication selection and availability at pharmacy of your choice.      Hyperlipidemia, unspecified hyperlipidemia type    Orders:    Comprehensive metabolic panel; Future    TSH, 3rd generation with Free T4 reflex; Future    LDL cholesterol, direct; Future    Pending labs. Patient declines statin despite increased ASCVD risk. She prefers to continue Red Yeast Rice 600mg QD - AMA. Recommend lifestyle modifications.     The 10-year ASCVD risk score (Lucho DK, et al., 2019) is: 8.9%    Values used to calculate the score:      Age: 64 years      Sex: Female      Is Non- : No      Diabetic: Yes      Tobacco smoker: No      Systolic Blood Pressure: 116 mmHg      Is BP treated: No      HDL Cholesterol: 66 mg/dL      Total Cholesterol: 190 mg/dL    Vitamin D deficiency    Orders:    Comprehensive metabolic panel; Future    Vitamin D 25 hydroxy; Future    Pending labs.  Continue MVI and OTC Vitamin D 3,000IU daily.    Bipolar II disorder (HCC)    Orders:    TSH, 3rd generation with Free T4 reflex; Future    Hemoglobin A1C; Future    Management per Psych.    SWAPNA (generalized anxiety  disorder)    Orders:    TSH, 3rd generation with Free T4 reflex; Future    Management per Psych.    Encounter for screening mammogram for breast cancer    Orders:    Mammo screening bilateral w 3d and cad; Future    Asymptomatic postmenopausal state    Orders:    DXA bone density spine hip and pelvis; Future    Obesity (BMI 35.0-39.9 without comorbidity)      Orders:    TSH, 3rd generation with Free T4 reflex; Future    Vitamin D 25 hydroxy; Future    Stable.  Recommend lifestyle modifications.  Patient is considering GLP-1A for DM2.        Severe obesity (BMI 35.0-39.9) with comorbidity (HCC)      Orders:    TSH, 3rd generation with Free T4 reflex; Future    Vitamin D 25 hydroxy; Future    Stable.  Recommend lifestyle modifications.  Patient is considering GLP-1A for DM2.    BMI 38.0-38.9,adult    Orders:    TSH, 3rd generation with Free T4 reflex; Future    Vitamin D 25 hydroxy; Future    Stable.  Recommend lifestyle modifications.  Patient is considering GLP-1A for DM2.    Primary stress urinary incontinence         Stable.  Kegel exercises Handout given previously.  Patient declines Pelvic floor PT previously.      Class 2 severe obesity with serious comorbidity and body mass index (BMI) of 38.0 to 38.9 in adult, unspecified obesity type (HCC)      Orders:    TSH, 3rd generation with Free T4 reflex; Future    Vitamin D 25 hydroxy; Future    Stable.  Recommend lifestyle modifications.  Patient is considering GLP-1A for DM2.          Return in about 4 months (around 2/25/2025) for 4mo - DM2, HL, BPD/Anx/Dep, Vit D Def, Labs.      Future Appointments   Date Time Provider Department Center   10/28/2024  1:00 PM Ariana Buitrago MD [facility]   12/4/2024  1:30 PM Briana Varela, PhD [facility]   12/24/2024 12:30 PM AN MAMMO 1 AN Mammo [facility]   2/25/2025 10:40 AM Teresa Torres DO [facility]        Subjective:     Martha is a 64 y.o. female who presents today for a  follow-up on her chronic medical conditions.        HPI:  Chief Complaint   Patient presents with    Medicare Wellness Visit     No concerns at this time.   Declines Covid booster  Declines osteoporosis   Declines flu     -- Above per clinical staff and reviewed. --      Diabetes  She presents for her follow-up diabetic visit. She has type 2 diabetes mellitus. There are no hypoglycemic associated symptoms. Pertinent negatives for diabetes include no chest pain and no fatigue. Risk factors for coronary artery disease include diabetes mellitus, dyslipidemia, obesity and post-menopausal. Current diabetic treatment includes diet and oral agent (monotherapy). She is following a generally healthy diet. She participates in exercise weekly. (Not checking BS at home.  ) An ACE inhibitor/angiotensin II receptor blocker is not being taken. She does not see a podiatrist.Eye exam is current.         Today:      Return in about 4 months (around 10/25/2024) for AWV / 4mo - DM2, HL, BPD/Anx/Dep, Vit D Def, Labs     4mo OV     Patient not complete labs 6/25/24, 3/6/24.     Obesity - Trying to watch diet - trying to cut back on sugars and carbs.  +Exercise - Previously Gym for 0.75 - 1 hour, 3 times per week, Walking dog 30 minutes, 1 days per week.     DM2 - On Metformin XR 500mg 3 pills daily.  No other DM meds previously.  S/p Nutrition consult c DM  2022.  Sees Optho - Dr. Feliciano - Next appt 6/25.  No Podiatry.     Hyperlipidemia - No statin previously.  Taking RYR.       Bipolar / Anxiety / Depression - Management per Psych Dr. Varela.  Sees q2 months - Next appt 12/24.  No counseling - last counseling 2021.  On Prozac 40mg QD, Zyprexa 10mg in AM, 5mg QHS, Lamictal 100mg QD.  s/p partial inpatient Psych admission 3/12/24.      Vitamin D Deficiency - s/p Drisdol.  Taking MVI and OTC Vitmain D 3000IU daily.       H/O Seizures - Grand Mal / Complex Partial - Last occurred in 2013 - none since corrective surgery at [facility] in  2013.  Last saw Neurosurgery appt 2015 - F/U PRN.       FamHx AAA - + Mother.  Patient had normal screen 2012?.       Reviewed:  Labs 2/23/24, Gyn 10/25/23     Se[facility] Gyn Dr. [facility]uit[facility]ago [facility].  Next appt 10/24.      The following portions of the patient's history were reviewed and updated as appropriate: allergies, current medications, past family history, past medical history, past social history, past surgical history and problem list.      Review of Systems   Constitutional:  Negative for appetite change, chills, diaphoresis, fatigue and fever.   Respiratory:  Negative for chest tightness and shortness of breath.    Cardiovascular:  Negative for chest pain.   Gastrointestinal:  Negative for abdominal pain, blood in stool, diarrhea, nausea and vomiting.   Genitourinary:  Negative for dysuria.        Current Outpatient Medications   Medication Sig Dispense Refill    amoxicillin (AMOXIL) 500 mg capsule Take 500 mg by mouth every 8 (eight) hours Rx per Dentist      Cholecalciferol (Vitamin D3) 1000 units CAPS Take 3 capsules by mouth in the morning      FLUoxetine (PROzac) 40 MG capsule Take 1 capsule (40 mg total) by mouth daily 30 capsule 3    lamoTRIgine (LaMICtal) 100 mg tablet TAKE 1 TABLET BY MOUTH EVERY DAY (Patient taking differently: Take 100 mg by mouth daily Rx per Psych) 90 tablet 4    metFORMIN (GLUCOPHAGE-XR) 500 mg 24 hr tablet Take 3 tablets (1,500 mg total) by mouth daily with breakfast 270 tablet 2    Multiple Vitamins-Minerals (Multivitamin Adults) TABS Take 1 tablet by mouth in the morning      OLANZapine (ZyPREXA) 10 mg tablet Take one tablet  (10 mg) in am po and 1/2 tablet at night (5 mg) (Patient taking differently: Take one tablet  (10 mg) in am po and 1/2 tablet at night (5 mg).  Rx per Psych) 180 tablet 3    Red Yeast Rice 600 MG CAPS Take 1 capsule by mouth in the morning       No current facility-administered medications for this visit.       Objective:  /76 (BP  Location: Left arm, Patient Position: Sitting, Cuff Size: Standard)   Pulse 72   Temp 98.2 °F (36.8 °C) (Temporal)   Resp 16   Ht 5' (1.524 m)   Wt 90.4 kg (199 lb 3.2 oz)   LMP 01/01/2011   SpO2 96%   BMI 38.90 kg/m²    Wt Readings from Last 3 Encounters:   10/25/24 90.4 kg (199 lb 3.2 oz)   10/22/24 90.3 kg (199 lb)   07/22/24 89.8 kg (198 lb)      BP Readings from Last 3 Encounters:   10/25/24 116/76   10/22/24 150/88   07/22/24 150/97          Physical Exam  Vitals and nursing note reviewed.   Constitutional:       Appearance: Normal appearance. She is well-developed. She is obese.   HENT:      Head: Normocephalic and atraumatic.   Eyes:      Conjunctiva/sclera: Conjunctivae normal.   Neck:      Thyroid: No thyromegaly.      Vascular: No carotid bruit.   Cardiovascular:      Rate and Rhythm: Normal rate and regular rhythm.      Pulses: Normal pulses.      Heart sounds: Normal heart sounds.   Pulmonary:      Effort: Pulmonary effort is normal.      Breath sounds: Normal breath sounds.   Abdominal:      General: Bowel sounds are normal. There is no distension.      Palpations: Abdomen is soft. There is no mass.      Tenderness: There is no abdominal tenderness. There is no guarding or rebound.   Musculoskeletal:         General: No swelling or tenderness.      Cervical back: Neck supple.      Right lower leg: No edema.      Left lower leg: No edema.   Lymphadenopathy:      Cervical: No cervical adenopathy.   Neurological:      General: No focal deficit present.      Mental Status: She is alert and oriented to person, place, and time. Mental status is at baseline.   Psychiatric:         Mood and Affect: Mood normal.         Lab Results:      Lab Results   Component Value Date    WBC 9.88 01/25/2024    HGB 15.0 01/25/2024    HCT 46.1 01/25/2024     01/25/2024    TRIG 83 08/26/2023    HDL 66 08/26/2023    LDLDIRECT 112 (H) 08/26/2023    ALT 16 02/23/2024    AST 14 02/23/2024    K 4.0 02/23/2024    CL  "102 02/23/2024    CREATININE 0.71 02/23/2024    BUN 15 02/23/2024    CO2 29 02/23/2024    GLUF 102 (H) 08/26/2023    HGBA1C 6.7 (H) 02/23/2024     No results found for: \"URICACID\"  Invalid input(s): \"BASENAME\" Vitamin D    No results found.     POCT Labs                       "

## 2024-10-25 ENCOUNTER — APPOINTMENT (OUTPATIENT)
Dept: LAB | Facility: CLINIC | Age: 64
End: 2024-10-25
Payer: COMMERCIAL

## 2024-10-25 ENCOUNTER — OFFICE VISIT (OUTPATIENT)
Dept: FAMILY MEDICINE CLINIC | Facility: CLINIC | Age: 64
End: 2024-10-25
Payer: COMMERCIAL

## 2024-10-25 VITALS
OXYGEN SATURATION: 96 % | HEART RATE: 72 BPM | RESPIRATION RATE: 16 BRPM | DIASTOLIC BLOOD PRESSURE: 76 MMHG | SYSTOLIC BLOOD PRESSURE: 116 MMHG | BODY MASS INDEX: 39.11 KG/M2 | TEMPERATURE: 98.2 F | HEIGHT: 60 IN | WEIGHT: 199.2 LBS

## 2024-10-25 DIAGNOSIS — N39.3 PRIMARY STRESS URINARY INCONTINENCE: ICD-10-CM

## 2024-10-25 DIAGNOSIS — Z00.00 MEDICARE ANNUAL WELLNESS VISIT, SUBSEQUENT: Primary | ICD-10-CM

## 2024-10-25 DIAGNOSIS — F31.81 BIPOLAR II DISORDER (HCC): ICD-10-CM

## 2024-10-25 DIAGNOSIS — E78.5 HYPERLIPIDEMIA, UNSPECIFIED HYPERLIPIDEMIA TYPE: ICD-10-CM

## 2024-10-25 DIAGNOSIS — Z78.0 ASYMPTOMATIC POSTMENOPAUSAL STATE: ICD-10-CM

## 2024-10-25 DIAGNOSIS — E66.01 CLASS 2 SEVERE OBESITY WITH SERIOUS COMORBIDITY AND BODY MASS INDEX (BMI) OF 38.0 TO 38.9 IN ADULT, UNSPECIFIED OBESITY TYPE (HCC): ICD-10-CM

## 2024-10-25 DIAGNOSIS — Z79.899 HIGH RISK MEDICATIONS (NOT ANTICOAGULANTS) LONG-TERM USE: ICD-10-CM

## 2024-10-25 DIAGNOSIS — Z12.31 ENCOUNTER FOR SCREENING MAMMOGRAM FOR BREAST CANCER: ICD-10-CM

## 2024-10-25 DIAGNOSIS — E55.9 VITAMIN D DEFICIENCY: ICD-10-CM

## 2024-10-25 DIAGNOSIS — F32.1 CURRENT MODERATE EPISODE OF MAJOR DEPRESSIVE DISORDER WITHOUT PRIOR EPISODE (HCC): ICD-10-CM

## 2024-10-25 DIAGNOSIS — E66.9 OBESITY (BMI 35.0-39.9 WITHOUT COMORBIDITY): ICD-10-CM

## 2024-10-25 DIAGNOSIS — E66.01 SEVERE OBESITY (BMI 35.0-39.9) WITH COMORBIDITY (HCC): ICD-10-CM

## 2024-10-25 DIAGNOSIS — E11.9 CONTROLLED TYPE 2 DIABETES MELLITUS WITHOUT COMPLICATION, WITHOUT LONG-TERM CURRENT USE OF INSULIN (HCC): ICD-10-CM

## 2024-10-25 DIAGNOSIS — Z86.2 HISTORY OF ANEMIA: ICD-10-CM

## 2024-10-25 DIAGNOSIS — E66.812 CLASS 2 SEVERE OBESITY WITH SERIOUS COMORBIDITY AND BODY MASS INDEX (BMI) OF 38.0 TO 38.9 IN ADULT, UNSPECIFIED OBESITY TYPE (HCC): ICD-10-CM

## 2024-10-25 DIAGNOSIS — R73.01 IFG (IMPAIRED FASTING GLUCOSE): ICD-10-CM

## 2024-10-25 DIAGNOSIS — F41.1 GAD (GENERALIZED ANXIETY DISORDER): ICD-10-CM

## 2024-10-25 DIAGNOSIS — F25.0 SCHIZOAFFECTIVE DISORDER, BIPOLAR TYPE (HCC): ICD-10-CM

## 2024-10-25 LAB
25(OH)D3 SERPL-MCNC: 25.3 NG/ML (ref 30–100)
ALBUMIN SERPL BCG-MCNC: 4.4 G/DL (ref 3.5–5)
ALP SERPL-CCNC: 64 U/L (ref 34–104)
ALT SERPL W P-5'-P-CCNC: 19 U/L (ref 7–52)
ANION GAP SERPL CALCULATED.3IONS-SCNC: 10 MMOL/L (ref 4–13)
AST SERPL W P-5'-P-CCNC: 17 U/L (ref 13–39)
BASOPHILS # BLD AUTO: 0.03 THOUSANDS/ΜL (ref 0–0.1)
BASOPHILS NFR BLD AUTO: 0 % (ref 0–1)
BILIRUB SERPL-MCNC: 0.67 MG/DL (ref 0.2–1)
BUN SERPL-MCNC: 10 MG/DL (ref 5–25)
CALCIUM SERPL-MCNC: 9.5 MG/DL (ref 8.4–10.2)
CHLORIDE SERPL-SCNC: 102 MMOL/L (ref 96–108)
CHOLEST SERPL-MCNC: 231 MG/DL
CO2 SERPL-SCNC: 27 MMOL/L (ref 21–32)
CREAT SERPL-MCNC: 0.74 MG/DL (ref 0.6–1.3)
CREAT UR-MCNC: 189.8 MG/DL
EOSINOPHIL # BLD AUTO: 0 THOUSAND/ΜL (ref 0–0.61)
EOSINOPHIL NFR BLD AUTO: 0 % (ref 0–6)
ERYTHROCYTE [DISTWIDTH] IN BLOOD BY AUTOMATED COUNT: 14.1 % (ref 11.6–15.1)
EST. AVERAGE GLUCOSE BLD GHB EST-MCNC: 131 MG/DL
FERRITIN SERPL-MCNC: 43 NG/ML (ref 11–307)
GFR SERPL CREATININE-BSD FRML MDRD: 84 ML/MIN/1.73SQ M
GLUCOSE P FAST SERPL-MCNC: 123 MG/DL (ref 65–99)
HBA1C MFR BLD: 6.2 %
HCT VFR BLD AUTO: 46.8 % (ref 34.8–46.1)
HDLC SERPL-MCNC: 60 MG/DL
HGB BLD-MCNC: 15.2 G/DL (ref 11.5–15.4)
IMM GRANULOCYTES # BLD AUTO: 0.06 THOUSAND/UL (ref 0–0.2)
IMM GRANULOCYTES NFR BLD AUTO: 1 % (ref 0–2)
IRON SATN MFR SERPL: 24 % (ref 15–50)
IRON SERPL-MCNC: 84 UG/DL (ref 50–212)
LDLC SERPL CALC-MCNC: 141 MG/DL (ref 0–100)
LDLC SERPL DIRECT ASSAY-MCNC: 143 MG/DL (ref 0–100)
LYMPHOCYTES # BLD AUTO: 1.17 THOUSANDS/ΜL (ref 0.6–4.47)
LYMPHOCYTES NFR BLD AUTO: 12 % (ref 14–44)
MAGNESIUM SERPL-MCNC: 1.8 MG/DL (ref 1.9–2.7)
MCH RBC QN AUTO: 29.7 PG (ref 26.8–34.3)
MCHC RBC AUTO-ENTMCNC: 32.5 G/DL (ref 31.4–37.4)
MCV RBC AUTO: 92 FL (ref 82–98)
MICROALBUMIN UR-MCNC: 29.1 MG/L
MICROALBUMIN/CREAT 24H UR: 15 MG/G CREATININE (ref 0–30)
MONOCYTES # BLD AUTO: 0.62 THOUSAND/ΜL (ref 0.17–1.22)
MONOCYTES NFR BLD AUTO: 6 % (ref 4–12)
NEUTROPHILS # BLD AUTO: 8.18 THOUSANDS/ΜL (ref 1.85–7.62)
NEUTS SEG NFR BLD AUTO: 81 % (ref 43–75)
NONHDLC SERPL-MCNC: 171 MG/DL
NRBC BLD AUTO-RTO: 0 /100 WBCS
PLATELET # BLD AUTO: 257 THOUSANDS/UL (ref 149–390)
PMV BLD AUTO: 10.6 FL (ref 8.9–12.7)
POTASSIUM SERPL-SCNC: 3.9 MMOL/L (ref 3.5–5.3)
PROT SERPL-MCNC: 7.9 G/DL (ref 6.4–8.4)
RBC # BLD AUTO: 5.11 MILLION/UL (ref 3.81–5.12)
SODIUM SERPL-SCNC: 139 MMOL/L (ref 135–147)
TIBC SERPL-MCNC: 343 UG/DL (ref 250–450)
TRIGL SERPL-MCNC: 150 MG/DL
TSH SERPL DL<=0.05 MIU/L-ACNC: 3.03 UIU/ML (ref 0.45–4.5)
UIBC SERPL-MCNC: 259 UG/DL (ref 155–355)
VIT B12 SERPL-MCNC: 279 PG/ML (ref 180–914)
WBC # BLD AUTO: 10.06 THOUSAND/UL (ref 4.31–10.16)

## 2024-10-25 PROCEDURE — 82570 ASSAY OF URINE CREATININE: CPT

## 2024-10-25 PROCEDURE — 82043 UR ALBUMIN QUANTITATIVE: CPT

## 2024-10-25 PROCEDURE — 83735 ASSAY OF MAGNESIUM: CPT

## 2024-10-25 PROCEDURE — 84443 ASSAY THYROID STIM HORMONE: CPT

## 2024-10-25 PROCEDURE — G0442 ANNUAL ALCOHOL SCREEN 15 MIN: HCPCS | Performed by: FAMILY MEDICINE

## 2024-10-25 PROCEDURE — 82607 VITAMIN B-12: CPT

## 2024-10-25 PROCEDURE — 83721 ASSAY OF BLOOD LIPOPROTEIN: CPT

## 2024-10-25 PROCEDURE — 85025 COMPLETE CBC W/AUTO DIFF WBC: CPT

## 2024-10-25 PROCEDURE — 80061 LIPID PANEL: CPT

## 2024-10-25 PROCEDURE — 83550 IRON BINDING TEST: CPT

## 2024-10-25 PROCEDURE — 83540 ASSAY OF IRON: CPT

## 2024-10-25 PROCEDURE — 83036 HEMOGLOBIN GLYCOSYLATED A1C: CPT

## 2024-10-25 PROCEDURE — G0439 PPPS, SUBSEQ VISIT: HCPCS | Performed by: FAMILY MEDICINE

## 2024-10-25 PROCEDURE — 80053 COMPREHEN METABOLIC PANEL: CPT

## 2024-10-25 PROCEDURE — 82728 ASSAY OF FERRITIN: CPT

## 2024-10-25 PROCEDURE — 99214 OFFICE O/P EST MOD 30 MIN: CPT | Performed by: FAMILY MEDICINE

## 2024-10-25 PROCEDURE — 36415 COLL VENOUS BLD VENIPUNCTURE: CPT

## 2024-10-25 PROCEDURE — 82306 VITAMIN D 25 HYDROXY: CPT

## 2024-10-25 RX ORDER — IBUPROFEN 800 MG/1
800 TABLET, FILM COATED ORAL EVERY 4 HOURS PRN
COMMUNITY
Start: 2024-10-23 | End: 2024-10-25

## 2024-10-25 RX ORDER — AMOXICILLIN 500 MG/1
500 CAPSULE ORAL EVERY 8 HOURS SCHEDULED
COMMUNITY
Start: 2024-10-23

## 2024-10-25 RX ORDER — METFORMIN HYDROCHLORIDE 500 MG/1
1500 TABLET, EXTENDED RELEASE ORAL
Qty: 270 TABLET | Refills: 2 | Status: SHIPPED | OUTPATIENT
Start: 2024-10-25

## 2024-10-25 NOTE — PATIENT INSTRUCTIONS
Call Insurance to Ask About Diabetes Med Coverage -      GLP-1 Agonists - Pill - Rybelsus, etc., Injectable - Ozempic, Mounjaro, Trulicity, Victoza, Byetta, etc.  SGLT-2 Inhibitor - Pill - Jardiance, Farxiga, etc.       Please let us know about your medication selection and availability at pharmacy of your choice.      Medicare Preventive Visit Patient Instructions  Thank you for completing your Welcome to Medicare Visit or Medicare Annual Wellness Visit today. Your next wellness visit will be due in one year (10/26/2025).  The screening/preventive services that you may require over the next 5-10 years are detailed below. Some tests may not apply to you based off risk factors and/or age. Screening tests ordered at today's visit but not completed yet may show as past due. Also, please note that scanned in results may not display below.  Preventive Screenings:  Service Recommendations Previous Testing/Comments   Colorectal Cancer Screening  * Colonoscopy    * Fecal Occult Blood Test (FOBT)/Fecal Immunochemical Test (FIT)  * Fecal DNA/Cologuard Test  * Flexible Sigmoidoscopy Age: 45-75 years old   Colonoscopy: every 10 years (may be performed more frequently if at higher risk)  OR  FOBT/FIT: every 1 year  OR  Cologuard: every 3 years  OR  Sigmoidoscopy: every 5 years  Screening may be recommended earlier than age 45 if at higher risk for colorectal cancer. Also, an individualized decision between you and your healthcare provider will decide whether screening between the ages of 76-85 would be appropriate. Colonoscopy: Not on file  FOBT/FIT: Not on file  Cologuard: 10/14/2022  Sigmoidoscopy: Not on file    Screening Current     Breast Cancer Screening Age: 40+ years old  Frequency: every 1-2 years  Not required if history of left and right mastectomy Mammogram: 06/07/2023    Screening Current   Cervical Cancer Screening Between the ages of 21-29, pap smear recommended once every 3 years.   Between the ages of 30-65, can  perform pap smear with HPV co-testing every 5 years.   Recommendations may differ for women with a history of total hysterectomy, cervical cancer, or abnormal pap smears in past. Pap Smear: 10/25/2023    Screening Not Indicated   Hepatitis C Screening Once for adults born between 1945 and 1965  More frequently in patients at high risk for Hepatitis C Hep C Antibody: 01/27/2021    Screening Current   Diabetes Screening 1-2 times per year if you're at risk for diabetes or have pre-diabetes Fasting glucose: 102 mg/dL (8/26/2023)  A1C: 6.7 % (2/23/2024)  Screening Not Indicated  History Diabetes   Cholesterol Screening Once every 5 years if you don't have a lipid disorder. May order more often based on risk factors. Lipid panel: 08/26/2023    Screening Not Indicated  History Lipid Disorder     Other Preventive Screenings Covered by Medicare:  Abdominal Aortic Aneurysm (AAA) Screening: covered once if your at risk. You're considered to be at risk if you have a family history of AAA.  Lung Cancer Screening: covers low dose CT scan once per year if you meet all of the following conditions: (1) Age 55-77; (2) No signs or symptoms of lung cancer; (3) Current smoker or have quit smoking within the last 15 years; (4) You have a tobacco smoking history of at least 20 pack years (packs per day multiplied by number of years you smoked); (5) You get a written order from a healthcare provider.  Glaucoma Screening: covered annually if you're considered high risk: (1) You have diabetes OR (2) Family history of glaucoma OR (3)  aged 50 and older OR (4)  American aged 65 and older  Osteoporosis Screening: covered every 2 years if you meet one of the following conditions: (1) You're estrogen deficient and at risk for osteoporosis based off medical history and other findings; (2) Have a vertebral abnormality; (3) On glucocorticoid therapy for more than 3 months; (4) Have primary hyperparathyroidism; (5) On  osteoporosis medications and need to assess response to drug therapy.   Last bone density test (DXA Scan): Not on file.  HIV Screening: covered annually if you're between the age of 15-65. Also covered annually if you are younger than 15 and older than 65 with risk factors for HIV infection. For pregnant patients, it is covered up to 3 times per pregnancy.    Immunizations:  Immunization Recommendations   Influenza Vaccine Annual influenza vaccination during flu season is recommended for all persons aged >= 6 months who do not have contraindications   Pneumococcal Vaccine   * Pneumococcal conjugate vaccine = PCV13 (Prevnar 13), PCV15 (Vaxneuvance), PCV20 (Prevnar 20)  * Pneumococcal polysaccharide vaccine = PPSV23 (Pneumovax) Adults 19-63 yo with certain risk factors or if 65+ yo  If never received any pneumonia vaccine: recommend Prevnar 20 (PCV20)  Give PCV20 if previously received 1 dose of PCV13 or PPSV23   Hepatitis B Vaccine 3 dose series if at intermediate or high risk (ex: diabetes, end stage renal disease, liver disease)   Respiratory syncytial virus (RSV) Vaccine - COVERED BY MEDICARE PART D  * RSVPreF3 (Arexvy) CDC recommends that adults 60 years of age and older may receive a single dose of RSV vaccine using shared clinical decision-making (SCDM)   Tetanus (Td) Vaccine - COST NOT COVERED BY MEDICARE PART B Following completion of primary series, a booster dose should be given every 10 years to maintain immunity against tetanus. Td may also be given as tetanus wound prophylaxis.   Tdap Vaccine - COST NOT COVERED BY MEDICARE PART B Recommended at least once for all adults. For pregnant patients, recommended with each pregnancy.   Shingles Vaccine (Shingrix) - COST NOT COVERED BY MEDICARE PART B  2 shot series recommended in those 19 years and older who have or will have weakened immune systems or those 50 years and older     Health Maintenance Due:      Topic Date Due    Breast Cancer Screening: Mammogram   06/07/2024    Colorectal Cancer Screening  10/14/2025    Hepatitis C Screening  Completed    Cervical Cancer Screening  Discontinued     Immunizations Due:  There are no preventive care reminders to display for this patient.    Advance Directives   What are advance directives?  Advance directives are legal documents that state your wishes and plans for medical care. These plans are made ahead of time in case you lose your ability to make decisions for yourself. Advance directives can apply to any medical decision, such as the treatments you want, and if you want to donate organs.   What are the types of advance directives?  There are many types of advance directives, and each state has rules about how to use them. You may choose a combination of any of the following:  Living will:  This is a written record of the treatment you want. You can also choose which treatments you do not want, which to limit, and which to stop at a certain time. This includes surgery, medicine, IV fluid, and tube feedings.   Durable power of  for healthcare (DPAHC):  This is a written record that states who you want to make healthcare choices for you when you are unable to make them for yourself. This person, called a proxy, is usually a family member or a friend. You may choose more than 1 proxy.  Do not resuscitate (DNR) order:  A DNR order is used in case your heart stops beating or you stop breathing. It is a request not to have certain forms of treatment, such as CPR. A DNR order may be included in other types of advance directives.  Medical directive:  This covers the care that you want if you are in a coma, near death, or unable to make decisions for yourself. You can list the treatments you want for each condition. Treatment may include pain medicine, surgery, blood transfusions, dialysis, IV or tube feedings, and a ventilator (breathing machine).  Values history:  This document has questions about your views, beliefs, and  how you feel and think about life. This information can help others choose the care that you would choose.  Why are advance directives important?  An advance directive helps you control your care. Although spoken wishes may be used, it is better to have your wishes written down. Spoken wishes can be misunderstood, or not followed. Treatments may be given even if you do not want them. An advance directive may make it easier for your family to make difficult choices about your care.   Urinary Incontinence   Urinary incontinence (UI)  is when you lose control of your bladder. UI develops because your bladder cannot store or empty urine properly. The 3 most common types of UI are stress incontinence, urge incontinence, or both.  Medicines:   May be given to help strengthen your bladder control. Report any side effects of medication to your healthcare provider.  Do pelvic muscle exercises often:  Your pelvic muscles help you stop urinating. Squeeze these muscles tight for 5 seconds, then relax for 5 seconds. Gradually work up to squeezing for 10 seconds. Do 3 sets of 15 repetitions a day, or as directed. This will help strengthen your pelvic muscles and improve bladder control.  Train your bladder:  Go to the bathroom at set times, such as every 2 hours, even if you do not feel the urge to go. You can also try to hold your urine when you feel the urge to go. For example, hold your urine for 5 minutes when you feel the urge to go. As that becomes easier, hold your urine for 10 minutes.   Self-care:   Keep a UI record.  Write down how often you leak urine and how much you leak. Make a note of what you were doing when you leaked urine.  Drink liquids as directed. You may need to limit the amount of liquid you drink to help control your urine leakage. Do not drink any liquid right before you go to bed. Limit or do not have drinks that contain caffeine or alcohol.   Prevent constipation.  Eat a variety of high-fiber foods.  Good examples are high-fiber cereals, beans, vegetables, and whole-grain breads. Walking is the best way to trigger your intestines to have a bowel movement.  Exercise regularly and maintain a healthy weight.  Weight loss and exercise will decrease pressure on your bladder and help you control your leakage.   Use a catheter as directed  to help empty your bladder. A catheter is a tiny, plastic tube that is put into your bladder to drain your urine.   Go to behavior therapy as directed.  Behavior therapy may be used to help you learn to control your urge to urinate.    Weight Management   Why it is important to manage your weight:  Being overweight increases your risk of health conditions such as heart disease, high blood pressure, type 2 diabetes, and certain types of cancer. It can also increase your risk for osteoarthritis, sleep apnea, and other respiratory problems. Aim for a slow, steady weight loss. Even a small amount of weight loss can lower your risk of health problems.  How to lose weight safely:  A safe and healthy way to lose weight is to eat fewer calories and get regular exercise. You can lose up about 1 pound a week by decreasing the number of calories you eat by 500 calories each day.   Healthy meal plan for weight management:  A healthy meal plan includes a variety of foods, contains fewer calories, and helps you stay healthy. A healthy meal plan includes the following:  Eat whole-grain foods more often.  A healthy meal plan should contain fiber. Fiber is the part of grains, fruits, and vegetables that is not broken down by your body. Whole-grain foods are healthy and provide extra fiber in your diet. Some examples of whole-grain foods are whole-wheat breads and pastas, oatmeal, brown rice, and bulgur.  Eat a variety of vegetables every day.  Include dark, leafy greens such as spinach, kale, amanda greens, and mustard greens. Eat yellow and orange vegetables such as carrots, sweet potatoes, and  winter squash.   Eat a variety of fruits every day.  Choose fresh or canned fruit (canned in its own juice or light syrup) instead of juice. Fruit juice has very little or no fiber.  Eat low-fat dairy foods.  Drink fat-free (skim) milk or 1% milk. Eat fat-free yogurt and low-fat cottage cheese. Try low-fat cheeses such as mozzarella and other reduced-fat cheeses.  Choose meat and other protein foods that are low in fat.  Choose beans or other legumes such as split peas or lentils. Choose fish, skinless poultry (chicken or turkey), or lean cuts of red meat (beef or pork). Before you cook meat or poultry, cut off any visible fat.   Use less fat and oil.  Try baking foods instead of frying them. Add less fat, such as margarine, sour cream, regular salad dressing and mayonnaise to foods. Eat fewer high-fat foods. Some examples of high-fat foods include french fries, doughnuts, ice cream, and cakes.  Eat fewer sweets.  Limit foods and drinks that are high in sugar. This includes candy, cookies, regular soda, and sweetened drinks.  Exercise:  Exercise at least 30 minutes per day on most days of the week. Some examples of exercise include walking, biking, dancing, and swimming. You can also fit in more physical activity by taking the stairs instead of the elevator or parking farther away from stores. Ask your healthcare provider about the best exercise plan for you.      © Copyright [employer] 2018 Information is for End User's use only and may not be sold, redistributed or otherwise used for commercial purposes. All illustrations and images included in CareNotes® are the copyrighted property of [employer] or [employer]      Medicare Preventive Visit Patient Instructions  Thank you for completing your Welcome to Medicare Visit or Medicare Annual Wellness Visit today. Your next wellness visit will be due in one year (10/26/2025).  The screening/preventive services that you may require over the next 5-10  years are detailed below. Some tests may not apply to you based off risk factors and/or age. Screening tests ordered at today's visit but not completed yet may show as past due. Also, please note that scanned in results may not display below.  Preventive Screenings:  Service Recommendations Previous Testing/Comments   Colorectal Cancer Screening  * Colonoscopy    * Fecal Occult Blood Test (FOBT)/Fecal Immunochemical Test (FIT)  * Fecal DNA/Cologuard Test  * Flexible Sigmoidoscopy Age: 45-75 years old   Colonoscopy: every 10 years (may be performed more frequently if at higher risk)  OR  FOBT/FIT: every 1 year  OR  Cologuard: every 3 years  OR  Sigmoidoscopy: every 5 years  Screening may be recommended earlier than age 45 if at higher risk for colorectal cancer. Also, an individualized decision between you and your healthcare provider will decide whether screening between the ages of 76-85 would be appropriate. Colonoscopy: Not on file  FOBT/FIT: Not on file  Cologuard: 10/14/2022  Sigmoidoscopy: Not on file    Screening Current     Breast Cancer Screening Age: 40+ years old  Frequency: every 1-2 years  Not required if history of left and right mastectomy Mammogram: 06/07/2023    Screening Current   Cervical Cancer Screening Between the ages of 21-29, pap smear recommended once every 3 years.   Between the ages of 30-65, can perform pap smear with HPV co-testing every 5 years.   Recommendations may differ for women with a history of total hysterectomy, cervical cancer, or abnormal pap smears in past. Pap Smear: 10/25/2023    Screening Not Indicated   Hepatitis C Screening Once for adults born between 1945 and 1965  More frequently in patients at high risk for Hepatitis C Hep C Antibody: 01/27/2021    Screening Current   Diabetes Screening 1-2 times per year if you're at risk for diabetes or have pre-diabetes Fasting glucose: 102 mg/dL (8/26/2023)  A1C: 6.7 % (2/23/2024)  Screening Not Indicated  History Diabetes    Cholesterol Screening Once every 5 years if you don't have a lipid disorder. May order more often based on risk factors. Lipid panel: 08/26/2023    Screening Not Indicated  History Lipid Disorder     Other Preventive Screenings Covered by Medicare:  Abdominal Aortic Aneurysm (AAA) Screening: covered once if your at risk. You're considered to be at risk if you have a family history of AAA.  Lung Cancer Screening: covers low dose CT scan once per year if you meet all of the following conditions: (1) Age 55-77; (2) No signs or symptoms of lung cancer; (3) Current smoker or have quit smoking within the last 15 years; (4) You have a tobacco smoking history of at least 20 pack years (packs per day multiplied by number of years you smoked); (5) You get a written order from a healthcare provider.  Glaucoma Screening: covered annually if you're considered high risk: (1) You have diabetes OR (2) Family history of glaucoma OR (3)  aged 50 and older OR (4)  American aged 65 and older  Osteoporosis Screening: covered every 2 years if you meet one of the following conditions: (1) You're estrogen deficient and at risk for osteoporosis based off medical history and other findings; (2) Have a vertebral abnormality; (3) On glucocorticoid therapy for more than 3 months; (4) Have primary hyperparathyroidism; (5) On osteoporosis medications and need to assess response to drug therapy.   Last bone density test (DXA Scan): Not on file.  HIV Screening: covered annually if you're between the age of 15-65. Also covered annually if you are younger than 15 and older than 65 with risk factors for HIV infection. For pregnant patients, it is covered up to 3 times per pregnancy.    Immunizations:  Immunization Recommendations   Influenza Vaccine Annual influenza vaccination during flu season is recommended for all persons aged >= 6 months who do not have contraindications   Pneumococcal Vaccine   * Pneumococcal conjugate  vaccine = PCV13 (Prevnar 13), PCV15 (Vaxneuvance), PCV20 (Prevnar 20)  * Pneumococcal polysaccharide vaccine = PPSV23 (Pneumovax) Adults 19-65 yo with certain risk factors or if 65+ yo  If never received any pneumonia vaccine: recommend Prevnar 20 (PCV20)  Give PCV20 if previously received 1 dose of PCV13 or PPSV23   Hepatitis B Vaccine 3 dose series if at intermediate or high risk (ex: diabetes, end stage renal disease, liver disease)   Respiratory syncytial virus (RSV) Vaccine - COVERED BY MEDICARE PART D  * RSVPreF3 (Arexvy) CDC recommends that adults 60 years of age and older may receive a single dose of RSV vaccine using shared clinical decision-making (SCDM)   Tetanus (Td) Vaccine - COST NOT COVERED BY MEDICARE PART B Following completion of primary series, a booster dose should be given every 10 years to maintain immunity against tetanus. Td may also be given as tetanus wound prophylaxis.   Tdap Vaccine - COST NOT COVERED BY MEDICARE PART B Recommended at least once for all adults. For pregnant patients, recommended with each pregnancy.   Shingles Vaccine (Shingrix) - COST NOT COVERED BY MEDICARE PART B  2 shot series recommended in those 19 years and older who have or will have weakened immune systems or those 50 years and older     Health Maintenance Due:      Topic Date Due    Breast Cancer Screening: Mammogram  06/07/2024    Colorectal Cancer Screening  10/14/2025    Hepatitis C Screening  Completed    Cervical Cancer Screening  Discontinued     Immunizations Due:  There are no preventive care reminders to display for this patient.  Advance Directives   What are advance directives?  Advance directives are legal documents that state your wishes and plans for medical care. These plans are made ahead of time in case you lose your ability to make decisions for yourself. Advance directives can apply to any medical decision, such as the treatments you want, and if you want to donate organs.   What are the types  of advance directives?  There are many types of advance directives, and each state has rules about how to use them. You may choose a combination of any of the following:  Living will:  This is a written record of the treatment you want. You can also choose which treatments you do not want, which to limit, and which to stop at a certain time. This includes surgery, medicine, IV fluid, and tube feedings.   Durable power of  for healthcare (DPAHC):  This is a written record that states who you want to make healthcare choices for you when you are unable to make them for yourself. This person, called a proxy, is usually a family member or a friend. You may choose more than 1 proxy.  Do not resuscitate (DNR) order:  A DNR order is used in case your heart stops beating or you stop breathing. It is a request not to have certain forms of treatment, such as CPR. A DNR order may be included in other types of advance directives.  Medical directive:  This covers the care that you want if you are in a coma, near death, or unable to make decisions for yourself. You can list the treatments you want for each condition. Treatment may include pain medicine, surgery, blood transfusions, dialysis, IV or tube feedings, and a ventilator (breathing machine).  Values history:  This document has questions about your views, beliefs, and how you feel and think about life. This information can help others choose the care that you would choose.  Why are advance directives important?  An advance directive helps you control your care. Although spoken wishes may be used, it is better to have your wishes written down. Spoken wishes can be misunderstood, or not followed. Treatments may be given even if you do not want them. An advance directive may make it easier for your family to make difficult choices about your care.   Urinary Incontinence   Urinary incontinence (UI)  is when you lose control of your bladder. UI develops because your  bladder cannot store or empty urine properly. The 3 most common types of UI are stress incontinence, urge incontinence, or both.  Medicines:   May be given to help strengthen your bladder control. Report any side effects of medication to your healthcare provider.  Do pelvic muscle exercises often:  Your pelvic muscles help you stop urinating. Squeeze these muscles tight for 5 seconds, then relax for 5 seconds. Gradually work up to squeezing for 10 seconds. Do 3 sets of 15 repetitions a day, or as directed. This will help strengthen your pelvic muscles and improve bladder control.  Train your bladder:  Go to the bathroom at set times, such as every 2 hours, even if you do not feel the urge to go. You can also try to hold your urine when you feel the urge to go. For example, hold your urine for 5 minutes when you feel the urge to go. As that becomes easier, hold your urine for 10 minutes.   Self-care:   Keep a UI record.  Write down how often you leak urine and how much you leak. Make a note of what you were doing when you leaked urine.  Drink liquids as directed. You may need to limit the amount of liquid you drink to help control your urine leakage. Do not drink any liquid right before you go to bed. Limit or do not have drinks that contain caffeine or alcohol.   Prevent constipation.  Eat a variety of high-fiber foods. Good examples are high-fiber cereals, beans, vegetables, and whole-grain breads. Walking is the best way to trigger your intestines to have a bowel movement.  Exercise regularly and maintain a healthy weight.  Weight loss and exercise will decrease pressure on your bladder and help you control your leakage.   Use a catheter as directed  to help empty your bladder. A catheter is a tiny, plastic tube that is put into your bladder to drain your urine.   Go to behavior therapy as directed.  Behavior therapy may be used to help you learn to control your urge to urinate.    Weight Management   Why it is  important to manage your weight:  Being overweight increases your risk of health conditions such as heart disease, high blood pressure, type 2 diabetes, and certain types of cancer. It can also increase your risk for osteoarthritis, sleep apnea, and other respiratory problems. Aim for a slow, steady weight loss. Even a small amount of weight loss can lower your risk of health problems.  How to lose weight safely:  A safe and healthy way to lose weight is to eat fewer calories and get regular exercise. You can lose up about 1 pound a week by decreasing the number of calories you eat by 500 calories each day.   Healthy meal plan for weight management:  A healthy meal plan includes a variety of foods, contains fewer calories, and helps you stay healthy. A healthy meal plan includes the following:  Eat whole-grain foods more often.  A healthy meal plan should contain fiber. Fiber is the part of grains, fruits, and vegetables that is not broken down by your body. Whole-grain foods are healthy and provide extra fiber in your diet. Some examples of whole-grain foods are whole-wheat breads and pastas, oatmeal, brown rice, and bulgur.  Eat a variety of vegetables every day.  Include dark, leafy greens such as spinach, kale, amanda greens, and mustard greens. Eat yellow and orange vegetables such as carrots, sweet potatoes, and winter squash.   Eat a variety of fruits every day.  Choose fresh or canned fruit (canned in its own juice or light syrup) instead of juice. Fruit juice has very little or no fiber.  Eat low-fat dairy foods.  Drink fat-free (skim) milk or 1% milk. Eat fat-free yogurt and low-fat cottage cheese. Try low-fat cheeses such as mozzarella and other reduced-fat cheeses.  Choose meat and other protein foods that are low in fat.  Choose beans or other legumes such as split peas or lentils. Choose fish, skinless poultry (chicken or turkey), or lean cuts of red meat (beef or pork). Before you cook meat or  poultry, cut off any visible fat.   Use less fat and oil.  Try baking foods instead of frying them. Add less fat, such as margarine, sour cream, regular salad dressing and mayonnaise to foods. Eat fewer high-fat foods. Some examples of high-fat foods include french fries, doughnuts, ice cream, and cakes.  Eat fewer sweets.  Limit foods and drinks that are high in sugar. This includes candy, cookies, regular soda, and sweetened drinks.  Exercise:  Exercise at least 30 minutes per day on most days of the week. Some examples of exercise include walking, biking, dancing, and swimming. You can also fit in more physical activity by taking the stairs instead of the elevator or parking farther away from stores. Ask your healthcare provider about the best exercise plan for you.      © Copyright [employer] 2018 Information is for End User's use only and may not be sold, redistributed or otherwise used for commercial purposes. All illustrations and images included in CareNotes® are the copyrighted property of [employer].A[employer]M., Inc. or [employer]

## 2024-10-25 NOTE — ASSESSMENT & PLAN NOTE
Orders:    TSH, 3rd generation with Free T4 reflex; Future    Hemoglobin A1C; Future    Management per Psych.

## 2024-10-25 NOTE — ASSESSMENT & PLAN NOTE
Orders:    Comprehensive metabolic panel; Future    Vitamin D 25 hydroxy; Future    Pending labs.  Continue MVI and OTC Vitamin D 3,000IU daily.

## 2024-10-25 NOTE — ASSESSMENT & PLAN NOTE
Stable.  Kegel exercises Handout given previously.  Patient declines Pelvic floor PT previously.

## 2024-10-25 NOTE — ASSESSMENT & PLAN NOTE
Lab Results   Component Value Date    HGBA1C 6.7 (H) 02/23/2024       Orders:    Comprehensive metabolic panel; Future    Hemoglobin A1C; Future    metFORMIN (GLUCOPHAGE-XR) 500 mg 24 hr tablet; Take 3 tablets (1,500 mg total) by mouth daily with breakfast      Pending labs.  Continue Metformin XR 500mg 3 pills daily.  Patient declines statin, glucometer Rx.          Call Insurance to Ask About Diabetes Med Coverage -      GLP-1 Agonists - Pill - Rybelsus, etc., Injectable - Ozempic, Mounjaro, Trulicity, Victoza, Byetta, etc.  SGLT-2 Inhibitor - Pill - Jardiance, Farxiga, etc.       Please let us know about your medication selection and availability at pharmacy of your choice.

## 2024-10-25 NOTE — PROGRESS NOTES
Ambulatory Visit  Name: Martha Chauhan      : 1960      MRN: [id number]  Encounter Provider: Teresa Torres DO  Encounter Date: 10/25/2024   Encounter department: [facility]  Medicare annual wellness visit, subsequent         Encounter for screening mammogram for breast cancer    Orders:    Mammo screening bilateral w 3d and cad; Future    Asymptomatic postmenopausal state    Orders:    DXA bone density spine hip and pelvis; Future    Obesity (BMI 35.0-39.9 without comorbidity)           Severe obesity (BMI 35.0-39.9) with comorbidity (HCC)           BMI 38.0-38.9,adult            Preventive health issues were discussed with patient, and age appropriate screening tests were ordered as noted in patient's After Visit Summary. Personalized health advice and appropriate referrals for health education or preventive services given if needed, as noted in patient's After Visit Summary.    History of Present Illness     HPI   Patient Care Team:  Teresa Torres DO as PCP - General (Family Medicine)  Narendra Doty MD as PCP - PCP-[facility] [place]  Teresa Torres DO as PCP - PCP-[facility]    Review of Systems    See other note.      Medical History Reviewed by provider this encounter:  Tobacco  Allergies  Meds  Problems  Med Hx  Surg Hx  Fam Hx       Annual Wellness Visit Questionnaire   Martha is here for her Subsequent Wellness visit.     Health Risk Assessment:   Patient rates overall health as good. Patient feels that their physical health rating is same. Patient is satisfied with their life. Eyesight was rated as same. Hearing was rated as same. Patient feels that their emotional and mental health rating is same. Patients states they are never, rarely angry. Patient states they are never, rarely unusually tired/fatigued. Pain experienced in the last 7 days has been none. Patient states that she has experienced  weight loss or gain in last 6 months. Gain weight    Depression Screening:   PHQ-2 Score: 2      Fall Risk Screening:   In the past year, patient has experienced: no history of falling in past year      Urinary Incontinence Screening:   Patient has leaked urine accidently in the last six months.     Home Safety:  Patient does not have trouble with stairs inside or outside of their home. Patient has working smoke alarms and has no working carbon monoxide detector. Home safety hazards include: none.     Nutrition:   Current diet is Regular.     Medications:   Patient is currently taking over-the-counter supplements. OTC medications include: see medication list. Patient is able to manage medications.     Activities of Daily Living (ADLs)/Instrumental Activities of Daily Living (IADLs):   Walk and transfer into and out of bed and chair?: Yes  Dress and groom yourself?: Yes    Bathe or shower yourself?: Yes    Feed yourself? Yes  Do your laundry/housekeeping?: Yes  Manage your money, pay your bills and track your expenses?: Yes  Make your own meals?: Yes    Do your own shopping?: Yes    Previous Hospitalizations:   Any hospitalizations or ED visits within the last 12 months?: No      Advance Care Planning:   Living will: No    Durable POA for healthcare: No    Advanced directive: No    Advanced directive counseling given: Yes    Five wishes given: No    Patient declined ACP directive: No      Comments: Advanced Directive, POLST, and Five Wishes given again today.      Cognitive Screening:   Provider or family/friend/caregiver concerned regarding cognition?: No    PREVENTIVE SCREENINGS      Cardiovascular Screening:    General: Screening Not Indicated and History Lipid Disorder      Diabetes Screening:     General: Screening Not Indicated and History Diabetes      Colorectal Cancer Screening:     General: Screening Current      Breast Cancer Screening:     General: Screening Current    Due for: Mammogram        Cervical  Cancer Screening:    General: Screening Not Indicated      Osteoporosis Screening:    General: Risks and Benefits Discussed    Due for: DXA Axial      Abdominal Aortic Aneurysm (AAA) Screening:        General: Screening Current      Lung Cancer Screening:     General: Screening Not Indicated      Hepatitis C Screening:    General: Screening Current    Screening, Brief Intervention, and Referral to Treatment (SBIRT)    Screening  Typical number of drinks in a day: 0  Typical number of drinks in a week: 0  Interpretation: Low risk drinking behavior.    Single Item Drug Screening:  How often have you used an illegal drug (including marijuana) or a prescription medication for non-medical reasons in the past year? never    Single Item Drug Screen Score: 0  Interpretation: Negative screen for possible drug use disorder    Brief Intervention  Alcohol & drug use screenings were reviewed. No concerns regarding substance use disorder identified.     Time Spent  Time spent screening/evaluating the patient for alcohol misuse: 5 minutes.     Annual Depression Screening  Time spent screening and evaluating the patient for depression during today's encounter was 5 minutes.    Other Counseling Topics:   Calcium and vitamin D intake and regular weightbearing exercise.     PHQ-2/9 Depression Screening    Little interest or pleasure in doing things: 1 - several days  Feeling down, depressed, or hopeless: 1 - several days  PHQ-2 Score: 2  PHQ-2 Interpretation: Negative depression screen           Social Determinants of Health     Financial Resource Strain: Low Risk  (8/22/2023)    Overall Financial Resource Strain (CARDIA)     Difficulty of Paying Living Expenses: Not hard at all   Food Insecurity: No Food Insecurity (10/25/2024)    Hunger Vital Sign     Worried About Running Out of Food in the Last Year: Never true     Ran Out of Food in the Last Year: Never true   Transportation Needs: No Transportation Needs (10/25/2024)    RODRIGO  - Transportation     Lack of Transportation (Medical): No     Lack of Transportation (Non-Medical): No   Housing Stability: Low Risk  (10/25/2024)    Housing Stability Vital Sign     Unable to Pay for Housing in the Last Year: No     Number of Times Moved in the Last Year: 1     Homeless in the Last Year: No   Utilities: Not At Risk (10/25/2024)    [facility] Utilities     Threatened with loss of utilities: No     No results found.    Objective     /76 (BP Location: Left arm, Patient Position: Sitting, Cuff Size: Standard)   Pulse 72   Temp 98.2 °F (36.8 °C) (Temporal)   Resp 16   Ht 5' (1.524 m)   Wt 90.4 kg (199 lb 3.2 oz)   LMP 01/01/2011   SpO2 96%   BMI 38.90 kg/m²     Physical Exam    See other note.

## 2024-10-25 NOTE — ASSESSMENT & PLAN NOTE
Orders:    TSH, 3rd generation with Free T4 reflex; Future    Vitamin D 25 hydroxy; Future    Stable.  Recommend lifestyle modifications.  Patient is considering GLP-1A for DM2.

## 2024-10-28 ENCOUNTER — ANNUAL EXAM (OUTPATIENT)
Dept: OBGYN CLINIC | Facility: CLINIC | Age: 64
End: 2024-10-28
Payer: COMMERCIAL

## 2024-10-28 VITALS
BODY MASS INDEX: 38.36 KG/M2 | HEIGHT: 60 IN | DIASTOLIC BLOOD PRESSURE: 78 MMHG | SYSTOLIC BLOOD PRESSURE: 120 MMHG | WEIGHT: 195.4 LBS

## 2024-10-28 DIAGNOSIS — Z01.419 WELL WOMAN EXAM WITH ROUTINE GYNECOLOGICAL EXAM: Primary | ICD-10-CM

## 2024-10-28 DIAGNOSIS — Z12.31 ENCOUNTER FOR SCREENING MAMMOGRAM FOR MALIGNANT NEOPLASM OF BREAST: ICD-10-CM

## 2024-10-28 PROCEDURE — G0101 CA SCREEN;PELVIC/BREAST EXAM: HCPCS | Performed by: OBSTETRICS & GYNECOLOGY

## 2024-10-28 NOTE — PROGRESS NOTES
ASSESSMENT & PLAN:   Diagnoses and all orders for this visit:    Well woman exam with routine gynecological exam    Encounter for screening mammogram for malignant neoplasm of breast          The following were reviewed in today's visit: ASCCP guidelines (Pap screen not indicated after age 65), STD testing breast self exam, mammography screening ordered, menopause, osteoporosis, adequate intake of calcium and vitamin D, exercise, healthy diet, DEXA ordered, and colon cancer screening reviewed. .    Patient to return to office in yearly for annual exam.     All questions have been answered to her satisfaction.        CC:  Annual Gynecologic Examination  Chief Complaint   Patient presents with    Gynecologic Exam       HPI: Martha Chauhan is a 64 y.o.  who presents for annual gynecologic examination.  She has the following concerns:  none    Reports stress incontinence.  Incontinent of urine when sneezing, laughing, coughing.  She denies any issues with leakage of urine without triggering event.  She denies any enuresis.  She denies any difficulty with starting a stream of urine.  We discussed different options for treatment including pelvic floor physical therapy and surgical options which would be done by her urogynecologist.  At this time she would like to continue to monitor and will reach out if she desires a referral to physical therapy.    Health Maintenance:    Exercise: several times per week, walking the dog daily.   Breast exams/breast awareness: yes  Last mammogram:  - BIRADS 2.   Colorectal cancer screenin Cologuard negative  DEXA: ordered by PCP.     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    Patient is menopausal.   Menopausal symptoms: vaginal dryness   Last Pap:  : no abnormalities; further pap screening not indicated  History of abnormal Pap smear: no    Patient is not currently sexually active.     Family History  Family History   Problem Relation Age of Onset    Aortic aneurysm Mother 70        Abdominal Aortic Aneurysm, +Smoker    No Known Problems Father     Bipolar disorder Brother     No Known Problems Maternal Aunt     Cancer Maternal Grandmother         70    No Known Problems Son     No Known Problems Half-Sister     No Known Problems Half-Sister     No Known Problems Half-Sister     No Known Problems Half-Sister     Alcohol abuse Neg Hx     Drug abuse Neg Hx     Completed Suicide  Neg Hx        Family history of uterine or ovarian cancer: no  Family history of breast cancer: no  Family history of colon cancer: no    Social History:  Social History     Socioeconomic History    Marital status: /Civil Union     Spouse name: Not on file    Number of children: 2    Years of education: Not on file    Highest education level: Some college, no degree   Occupational History    Occupation: Disabled - Previous      Comment: s/p Brain Surgery / H/O Epilepsy   Tobacco Use    Smoking status: Never     Passive exposure: Current    Smokeless tobacco: Never   Vaping Use    Vaping status: Never Used   Substance and Sexual Activity    Alcohol  use: No     Comment: doesnt drink.    Drug use: Never    Sexual activity: Not Currently     Partners: Male     Birth control/protection: Post-menopausal, Female Sterilization     Comment: Tubal Ligation   Other Topics Concern    Not on file   Social History Narrative        Lives with     2 Children - 2 Sons    Disabled s/p Brain Surgery / H/O Epilepsy - Previous      Social Determinants of Health     Financial Resource Strain: Low Risk  (8/22/2023)    Overall Financial Resource Strain (CARDIA)     Difficulty of Paying Living Expenses: Not hard at all   Food Insecurity: No Food Insecurity (10/25/2024)    Hunger Vital Sign     Worried About Running Out of Food in the Last Year: Never true     Ran Out of Food in the Last Year: Never true   Transportation Needs: No Transportation Needs (10/25/2024)    PRAPARE - Transportation     Lack of Transportation (Medical): No     Lack of Transportation (Non-Medical): No   Physical Activity: Unknown (1/21/2021)    Exercise Vital Sign     Days of Exercise per Week: 6 days     Minutes of Exercise per Session: Not on file   Stress: Stress Concern Present (1/21/2021)    [employer] - Occupational Stress Questionnaire     Feeling of Stress : To some extent   Social Connections: Unknown (1/21/2021)    Social Connection and Isolation Panel [NHANES]     Frequency of Communication with Friends and Family: Not on file     Frequency of Social Gatherings with Friends and Family: Not on file     Attends Spiritism Services: Not on file     Active Member of Clubs or Organizations: Not on file     Attends Club or Organization Meetings: Not on file     Marital Status:    Intimate Partner Violence: Not At Risk (1/21/2021)    Humiliation, Afraid, Rape, and Kick questionnaire     Fear of Current or Ex-Partner: No     Emotionally Abused: No     Physically Abused: No     Sexually Abused: No   Housing Stability: Low Risk  (10/25/2024)     Housing Stability Vital Sign     Unable to Pay for Housing in the Last Year: No     Number of Times Moved in the Last Year: 1     Homeless in the Last Year: No     Domestic violence screen: negative    Allergies:  No Known Allergies    Medications:    Current Outpatient Medications:     amoxicillin (AMOXIL) 500 mg capsule, Take 500 mg by mouth every 8 (eight) hours Rx per Dentist, Disp: , Rfl:     Cholecalciferol (Vitamin D3) 1000 units CAPS, Take 3 capsules by mouth in the morning, Disp: , Rfl:     FLUoxetine (PROzac) 40 MG capsule, Take 1 capsule (40 mg total) by mouth daily, Disp: 30 capsule, Rfl: 3    lamoTRIgine (LaMICtal) 100 mg tablet, TAKE 1 TABLET BY MOUTH EVERY DAY (Patient taking differently: Take 100 mg by mouth daily Rx per Psych), Disp: 90 tablet, Rfl: 4    metFORMIN (GLUCOPHAGE-XR) 500 mg 24 hr tablet, Take 3 tablets (1,500 mg total) by mouth daily with breakfast, Disp: 270 tablet, Rfl: 2    Multiple Vitamins-Minerals (Multivitamin Adults) TABS, Take 1 tablet by mouth in the morning, Disp: , Rfl:     OLANZapine (ZyPREXA) 10 mg tablet, Take one tablet  (10 mg) in am po and 1/2 tablet at night (5 mg) (Patient taking differently: Take one tablet  (10 mg) in am po and 1/2 tablet at night (5 mg).  Rx per Psych), Disp: 180 tablet, Rfl: 3    Red Yeast Rice 600 MG CAPS, Take 1 capsule by mouth in the morning, Disp: , Rfl:     Review of Systems:  Review of Systems   Constitutional:  Negative for activity change, appetite change and unexpected weight change.   Respiratory:  Negative for cough and shortness of breath.    Cardiovascular:  Negative for chest pain.   Gastrointestinal:  Positive for diarrhea (dietary related). Negative for abdominal pain, constipation, nausea and vomiting.   Genitourinary:  Negative for difficulty urinating, frequency, menstrual problem, pelvic pain, urgency, vaginal bleeding, vaginal discharge and vaginal pain.        Stress urinary incontinence   Musculoskeletal:  Negative for  back pain.   Skin: Negative.    Neurological:  Negative for dizziness, weakness, light-headedness and headaches.   Psychiatric/Behavioral: Negative.           Physical Exam:  /78   Ht 5' (1.524 m)   Wt 88.6 kg (195 lb 6.4 oz)   LMP 01/01/2011   BMI 38.16 kg/m²    Physical Exam  Constitutional:       General: She is not in acute distress.     Appearance: Normal appearance. She is well-developed. She is obese. She is not diaphoretic.   Genitourinary:      Vulva and bladder normal.      No lesions in the vagina.      Genitourinary Comments: Perineum normal in appearance, no lacerations, no ulcerations, no lesions visualized.      Right Labia: No rash, tenderness or lesions.     Left Labia: No tenderness, lesions or rash.     No inguinal adenopathy present in the right or left side.     No vaginal discharge, erythema, tenderness or bleeding.      No vaginal prolapse present.     No vaginal atrophy present.       Right Adnexa: not tender, not full and no mass present.     Left Adnexa: not tender, not full and no mass present.     Cervix is nulliparous.      No cervical motion tenderness, discharge, friability, lesion or polyp.      No parametrium nodularity or thickening present.     Uterus is not tender or prolapsed.      No uterine mass detected.     Uterus exam comments: Uterine size difficult to palpate secondary to body habitus. .      No urethral prolapse or mass present.      Bladder is not tender.       Pelvic exam was performed with patient in the lithotomy position.   Rectum:      No tenderness or external hemorrhoid.   Breasts:     Breasts are symmetrical.      Right: No swelling, bleeding, mass, skin change or tenderness.      Left: No swelling, bleeding, mass, skin change or tenderness.   HENT:      Head: Normocephalic and atraumatic.   Neck:      Thyroid: No thyromegaly or thyroid tenderness.   Cardiovascular:      Rate and Rhythm: Normal rate and regular rhythm.      Heart sounds: Normal heart  sounds. No murmur heard.     No friction rub.   Pulmonary:      Effort: Pulmonary effort is normal. No respiratory distress.      Breath sounds: Normal breath sounds. No wheezing or rales.   Abdominal:      Palpations: Abdomen is soft. There is no mass.      Tenderness: There is no abdominal tenderness. There is no guarding.   Musculoskeletal:         General: No tenderness. Normal range of motion.      Right lower leg: No edema.      Left lower leg: No edema.   Lymphadenopathy:      Lower Body: No right inguinal adenopathy. No left inguinal adenopathy.   Neurological:      Mental Status: She is alert and oriented to person, place, and time.   Skin:     General: Skin is warm and dry.      Coloration: Skin is not pale.      Findings: No erythema.   Psychiatric:         Mood and Affect: Mood normal.         Behavior: Behavior normal.         Thought Content: Thought content normal.         Judgment: Judgment normal.   Vitals and nursing note reviewed.

## 2024-10-28 NOTE — PROGRESS NOTES
No concerns, or issues. No bleeding or discharge  No bladder or breast concerns  Suffers from incontinence, ongoing

## 2024-10-28 NOTE — PATIENT INSTRUCTIONS
[email or web address]    Physical Therapy at [facility]’s team of trained female therapists offer a wide variety of services designed to address the special healthcare needs of women. Our specially trained clinicians work with you to address your concerns and come beside you to support and encourage you through the healing process. Our offices are designed to keep your sensitive treatments private at all times. We are here to ensure your comfort and mentor you through our pelvic floor therapy programs at your pace.    Our female team of experts treat the following conditions faced by women:      Urinary or bowel incontinence  Urinary urgency or frequency  Painful [place]  Painful Bladder Syndrome  Overactive Bladder  Constipation  Pelvic Girdle Pain             Sacroiliac Dysfunction   Gynecological Cancers    Pregnancy & Postpartum related discomfort  Buttock/Tailbone Pain                       Lumbar/Thoracic Pain  Hip Abnormalities/Pain    Scar Adhesions  Diastasis Recti  Osteoporosis          /Episiotomy Recovery  Vulvodynia  Pelvic Pain

## 2024-10-29 DIAGNOSIS — E83.42 HYPOMAGNESEMIA: Primary | ICD-10-CM

## 2024-10-29 NOTE — RESULT ENCOUNTER NOTE
Unstable labs - will review with patient at upcoming appointment.    Low vitamin D - Recommend start multivitamin and over-the-counter vitamin D3 4000 International Units daily.    Diabetes - Improved, controlled.      Call Insurance to Ask About Diabetes Med Coverage -    1.  GLP-1 Agonists - Pill - Rybelsus, etc., Injectable - Ozempic, Mounjaro, Trulicity, Victoza, Byetta, etc.  2. SGLT-2 Inhibitor - Pill - Jardiance, Farxiga, etc.      Please let us know about your medication selection and availability at pharmacy of your choice.          Hyperlipidemia - Elevated total, LDL (bad) cholesterol, goal for diabetes is less than 100; elevated triglycerides (fats in the blood).  Instead of Red Yeast Rice, would advise Lipitor 20mg at bedtime, which can also help decrease the change of heart attack and stroke.  Patient to advise if interested.      Low Magnesium - Start over the counter Magnesium glycinate 400mg 1 pill by mouth daily.  Check CMP and Magnesium in 1 week.  Nurse to see orders.      Other resulted labs are stable.      Message sent to patient via [employer] patient portal.    Nurse to also call patient.

## 2024-11-03 PROBLEM — F33.41 RECURRENT MAJOR DEPRESSIVE DISORDER, IN PARTIAL REMISSION (HCC): Status: ACTIVE | Noted: 2024-11-03

## 2024-11-03 RX ORDER — OLANZAPINE 10 MG/1
TABLET ORAL
Qty: 180 TABLET | Refills: 3 | Status: SHIPPED | OUTPATIENT
Start: 2024-11-03

## 2024-11-03 NOTE — PSYCH
Visit Time    Visit Start Time: 830  Visit Stop Time: 9:00  Total Visit Duration:  30 minutes minutes    Subjective:     Patient ID: Martha Chauhan is a 64 y.o. female.  History of bipolar 2, anxiety, depression seen for medication management and mood assessment  Assessment & Plan  Recurrent major depressive disorder, in partial remission (HCC)  Martha reports some symptoms of depression PHQ-9 score of 7 indicates mild depression without suicidal ideation Prozac increased to 40 mg.  Lab work ordered    Orders:    FLUoxetine (PROzac) 40 MG capsule; Take 1 capsule (40 mg total) by mouth daily    Bipolar II disorder (HCC)  Martha and her  attended the session reporting fatigue and lack of motivation.  Olanzapine changed to 5 mg in the morning and 10 mg at night.  Lamictal continue 100 mg a day.  Prozac increased to 40 mg and she will call with her response.  She reports appetite is good and she is sleeping.  Denies any side effects of medication.  Aims is negative.  Takes medication as prescribed and  is supportive.  She tries to go to the gym.  She is pleasant and cooperative.  Denies hallucinations or cecy.         High risk medications (not anticoagulants) long-term use    Orders:    Vitamin B12; Future    Iron Panel (Includes Ferritin, Iron Sat%, Iron, and TIBC); Future    History of anemia    Orders:    Iron Panel (Includes Ferritin, Iron Sat%, Iron, and TIBC); Future    Bipolar affective disorder, currently manic, severe, with psychotic features ([facility])    Orders:    OLANZapine (ZyPREXA) 10 mg tablet; Take one half tablet  (5 mg) in am po and one tablet at night (10 mg)       HPI ROS Appetite Changes and Sleep: normal appetite and decreased energy    Review Of Systems:     Mood Depression   Behavior Normal    Thought Content Normal   General Emotional Problems   Personality Normal   Other Psych Symptoms Normal   Constitutional As Noted in HPI   ENT As Noted in HPI   Cardiovascular As Noted in HPI    Respiratory As Noted in HPI   Gastrointestinal As Noted in HPI   Genitourinary As Noted in HPI   Musculoskeletal As Noted in HPI   Integumentary As Noted in HPI   Neurological As Noted in HPI   Endocrine Diabetes   Other Symptoms Normal        Laboratory Results:     Substance Abuse History:  Social History     Substance and Sexual Activity   Drug Use Never       Family Psychiatric History:   Family History   Problem Relation Age of Onset    Aortic aneurysm Mother 70        Abdominal Aortic Aneurysm, +Smoker    No Known Problems Father     Bipolar disorder Brother     No Known Problems Maternal Aunt     Cancer Maternal Grandmother         70    No Known Problems Son     No Known Problems Half-Sister     No Known Problems Half-Sister     No Known Problems Half-Sister     No Known Problems Half-Sister     Alcohol abuse Neg Hx     Drug abuse Neg Hx     Completed Suicide  Neg Hx        The following portions of the patient's history were reviewed and updated as appropriate: allergies, current medications, past family history, past medical history, past social history, past surgical history, and problem list.    Social History     Socioeconomic History    Marital status: /Civil Union     Spouse name: Not on file    Number of children: 2    Years of education: Not on file    Highest education level: Some college, no degree   Occupational History    Occupation: Disabled - Previous      Comment: s/p Brain Surgery / H/O Epilepsy   Tobacco Use    Smoking status: Never     Passive exposure: Current    Smokeless tobacco: Never   Vaping Use    Vaping status: Never Used   Substance and Sexual Activity    Alcohol use: No     Comment: doesnt drink.    Drug use: Never    Sexual activity: Not Currently     Partners: Male     Birth control/protection: Post-menopausal, Female Sterilization     Comment: Tubal Ligation   Other Topics Concern    Not on file   Social History Narrative        Lives with      2 Children - 2 Sons    Disabled s/p Brain Surgery / H/O Epilepsy - Previous      Social Determinants of Health     Financial Resource Strain: Low Risk  (8/22/2023)    Overall Financial Resource Strain (CARDIA)     Difficulty of Paying Living Expenses: Not hard at all   Food Insecurity: No Food Insecurity (10/25/2024)    Hunger Vital Sign     Worried About Running Out of Food in the Last Year: Never true     Ran Out of Food in the Last Year: Never true   Transportation Needs: No Transportation Needs (10/25/2024)    PRAPARE - Transportation     Lack of Transportation (Medical): No     Lack of Transportation (Non-Medical): No   Physical Activity: Unknown (1/21/2021)    Exercise Vital Sign     Days of Exercise per Week: 6 days     Minutes of Exercise per Session: Not on file   Stress: Stress Concern Present (1/21/2021)    [employer] [place] - Occupational Stress Questionnaire     Feeling of Stress : To some extent   Social Connections: Unknown (1/21/2021)    Social Connection and Isolation Panel [NHANES]     Frequency of Communication with Friends and Family: Not on file     Frequency of Social Gatherings with Friends and Family: Not on file     Attends Rastafarian Services: Not on file     Active Member of Clubs or Organizations: Not on file     Attends Club or Organization Meetings: Not on file     Marital Status:    Intimate Partner Violence: Not At Risk (1/21/2021)    Humiliation, Afraid, Rape, and Kick questionnaire     Fear of Current or Ex-Partner: No     Emotionally Abused: No     Physically Abused: No     Sexually Abused: No   Housing Stability: Low Risk  (10/25/2024)    Housing Stability Vital Sign     Unable to Pay for Housing in the Last Year: No     Number of Times Moved in the Last Year: 1     Homeless in the Last Year: No     Social History     Social History Narrative        Lives with     2 Children - 2 Sons    Disabled s/p Brain Surgery / H/O Epilepsy  - Previous        Objective:       Mental status:  Appearance calm and cooperative , adequate hygiene and grooming, and good eye contact    Mood depressed   Affect affect appropriate    Speech a normal rate   Thought Processes normal thought processes   Hallucinations no hallucinations present    Thought Content no delusions   Abnormal Thoughts no suicidal thoughts  and no homicidal thoughts    Orientation  oriented to person and place and time   Remote Memory short term memory intact and long term memory intact   Attention Span concentration intact   Intellect Appears to be of Average Intelligence   Insight Insight intact   Judgement judgment was intact   Muscle Strength Muscle strength and tone were normal   Language no difficulty naming common objects   Fund of Knowledge displays adequate knowledge of current events   Pain none   Pain Scale 0       Assessment/Plan:       Diagnoses and all orders for this visit:    Recurrent major depressive disorder, in partial remission (HCC)  -     FLUoxetine (PROzac) 40 MG capsule; Take 1 capsule (40 mg total) by mouth daily    Bipolar II disorder ([facility])    High risk medications (not anticoagulants) long-term use  -     Vitamin B12; Future  -     Iron Panel (Includes Ferritin, Iron Sat%, Iron, and TIBC); Future    History of anemia  -     Iron Panel (Includes Ferritin, Iron Sat%, Iron, and TIBC); Future    Bipolar affective disorder, currently manic, severe, with psychotic features ([facility])  -     OLANZapine (ZyPREXA) 10 mg tablet; Take one half tablet  (5 mg) in am po and one tablet at night (10 mg)            Treatment Recommendations- Risks Benefits       Assessment/Plan:       Diagnoses and all orders for this visit:    Recurrent major depressive disorder, in partial remission (HCC)  -     FLUoxetine (PROzac) 40 MG capsule; Take 1 capsule (40 mg total) by mouth daily    Bipolar II disorder (HCC)    High risk medications (not anticoagulants) long-term use  -      "Vitamin B12; Future  -     Iron Panel (Includes Ferritin, Iron Sat%, Iron, and TIBC); Future    History of anemia  -     Iron Panel (Includes Ferritin, Iron Sat%, Iron, and TIBC); Future    Bipolar affective disorder, currently manic, severe, with psychotic features (HCC)  -     OLANZapine (ZyPREXA) 10 mg tablet; Take one half tablet  (5 mg) in am po and one tablet at night (10 mg)            Treatment Recommendations- Risks Benefits      Immediate Medical/Psychiatric/Psychotherapy Treatments and Any Precautions: Continue treatment plan    Risks, Benefits And Possible Side Effects Of Medications:  {PSYCH RISK, BENEFITS AND POSSIBLE SIDE EFFECTS (Optional):47149         Psychotherapy Provided: 30 minutes individual psychotherapy provided.   Supportive therapy  Medication evaluation  Mood assessment  Surveys reviewed and confirmed  Normalized and validated her feelings  Labs ordered  Treatment plan updated  Goals discussed in session: Stabilize mood\"Portions of the record may have been created with voice recognition software. Occasional wrong word or \"sound a like\" substitutions may have occurred due to the inherent limitations of voice recognition software. Read the chart carefully and recognize, using context, where substitutions have occurred. Please call if you have any questions. \"                                        "

## 2024-11-03 NOTE — BH TREATMENT PLAN
TREATMENT PLAN (Medication Management Only)        [facility]    Name and Date of Birth:  Martha MIGEL Argyle 64 y.o. 1960  Date of Treatment Plan: October 22, 2024  Diagnosis/Diagnoses:    1. Recurrent major depressive disorder, in partial remission (HCC)    2. Bipolar II disorder (HCC)    3. High risk medications (not anticoagulants) long-term use    4. History of anemia    5. Bipolar affective disorder, currently manic, severe, with psychotic features (HCC)      Strengths/Personal Resources for Self-Care: supportive family, taking medications as prescribed, ability to communicate well.  Area/Areas of need (in own words): mood instability  1. Long Term Goal: improve mood stability.  Target Date:6 months - 5/3/2025  Person/Persons responsible for completion of goal: Martha, provider and therapist, family  2.  Short Term Objective (s) - How will we reach this goal?:   A. Provider new recommended medication/dosage changes and/or continue medication(s): continue current medications as prescribed.  B.  Self care adequate nutrition and sleep .  C.  Therapy .  Target Date:6 months - 5/3/2025  Person/Persons Responsible for Completion of Goal: Martha, provider and therapist, family  Progress Towards Goals: continuing treatment  Treatment Modality: medication management every 3 months  Review due 180 days from date of this plan: 6 months - 5/3/2025  Expected length of service: maintenance  My Physician/PA/NP and I have developed this plan together and I agree to work on the goals and objectives. I understand the treatment goals that were developed for my treatment.

## 2024-11-03 NOTE — ASSESSMENT & PLAN NOTE
Martha reports some symptoms of depression PHQ-9 score of 7 indicates mild depression without suicidal ideation Prozac increased to 40 mg.  Lab work ordered    Orders:    FLUoxetine (PROzac) 40 MG capsule; Take 1 capsule (40 mg total) by mouth daily

## 2024-11-03 NOTE — ASSESSMENT & PLAN NOTE
Martha and her  attended the session reporting fatigue and lack of motivation.  Olanzapine changed to 5 mg in the morning and 10 mg at night.  Lamictal continue 100 mg a day.  Prozac increased to 40 mg and she will call with her response.  She reports appetite is good and she is sleeping.  Denies any side effects of medication.  Aims is negative.  Takes medication as prescribed and  is supportive.  She tries to go to the gym.  She is pleasant and cooperative.  Denies hallucinations or cecy.

## 2024-11-03 NOTE — PATIENT INSTRUCTIONS
Continue medications  Call with problems or concerns  Take 5 mg of olanzapine in a.m. and 10 mg at night  Increase Prozac to 40 mg  Obtain lab work

## 2024-11-12 ENCOUNTER — TELEPHONE (OUTPATIENT)
Dept: FAMILY MEDICINE CLINIC | Facility: CLINIC | Age: 64
End: 2024-11-12

## 2024-11-12 NOTE — TELEPHONE ENCOUNTER
----- Message from Teresa Torres DO sent at 10/29/2024 12:23 AM EDT -----  Unstable labs - will review with patient at upcoming appointment.    Low vitamin D - Recommend start multivitamin and over-the-counter vitamin D3 4000 International Units daily.    Diabetes - Improved, controlled.      Call Insurance to Ask About Diabetes Med Coverage -     1.  GLP-1 Agonists - Pill - Rybelsus, etc., Injectable - Ozempic, Mounjaro, Trulicity, Victoza, Byetta, etc.  2. SGLT-2 Inhibitor - Pill - Jardiance, Farxiga, etc.       Please let us know about your medication selection and availability at pharmacy of your choice.          Hyperlipidemia - Elevated total, LDL (bad) cholesterol, goal for diabetes is less than 100; elevated triglycerides (fats in the blood).  Instead of Red Yeast Rice, would advise Lipitor 20mg at bedtime, which can also help decrease the change of heart attack and stroke.  Patient to advise if interested.      Low Magnesium - Start over the counter Magnesium glycinate 400mg 1 pill by mouth daily.  Check CMP and Magnesium in 1 week.  Nurse to see orders.      Other resulted labs are stable.      Message sent to patient via [employer] patient portal.    Nurse to also call patient.

## 2024-11-12 NOTE — TELEPHONE ENCOUNTER
LVM to pt requesting her to contact the office back to discuss her lab results. Provided office number.

## 2024-11-13 ENCOUNTER — TELEPHONE (OUTPATIENT)
Dept: PSYCHIATRY | Facility: CLINIC | Age: 64
End: 2024-11-13

## 2024-11-14 DIAGNOSIS — F33.41 RECURRENT MAJOR DEPRESSIVE DISORDER, IN PARTIAL REMISSION (HCC): ICD-10-CM

## 2024-11-14 RX ORDER — FLUOXETINE 40 MG/1
40 CAPSULE ORAL DAILY
Qty: 90 CAPSULE | Refills: 2 | Status: SHIPPED | OUTPATIENT
Start: 2024-11-14

## 2024-12-04 ENCOUNTER — OFFICE VISIT (OUTPATIENT)
Dept: PSYCHIATRY | Facility: CLINIC | Age: 64
End: 2024-12-04
Payer: COMMERCIAL

## 2024-12-04 VITALS
BODY MASS INDEX: 36.71 KG/M2 | DIASTOLIC BLOOD PRESSURE: 85 MMHG | SYSTOLIC BLOOD PRESSURE: 151 MMHG | WEIGHT: 187 LBS | HEIGHT: 60 IN

## 2024-12-04 DIAGNOSIS — F33.41 RECURRENT MAJOR DEPRESSIVE DISORDER, IN PARTIAL REMISSION (HCC): Primary | ICD-10-CM

## 2024-12-04 DIAGNOSIS — F31.81 BIPOLAR II DISORDER (HCC): ICD-10-CM

## 2024-12-04 DIAGNOSIS — F41.1 GAD (GENERALIZED ANXIETY DISORDER): ICD-10-CM

## 2024-12-04 PROCEDURE — 99214 OFFICE O/P EST MOD 30 MIN: CPT | Performed by: NURSE PRACTITIONER

## 2024-12-04 PROCEDURE — 90833 PSYTX W PT W E/M 30 MIN: CPT | Performed by: NURSE PRACTITIONER

## 2024-12-09 NOTE — ASSESSMENT & PLAN NOTE
Lamictal 100 mg daily Zyprexa 5 mg in am and 10 mg at hs    Moods have been stable, denies delusions or hallucinations. AIMS is negative. Denies cecy or hypomania.

## 2024-12-09 NOTE — PSYCH
Visit Time    Visit Start Time: 12:00  Visit Stop Time: 12:30  Total Visit Duration:  30 minutes    Subjective:     Patient ID: Martha Chauhan is a 64 y.o. female.Bipolar 2, anxiety, depression seen for medication management and mood assessment.  Assessment & Plan  Recurrent major depressive disorder, in partial remission (HCC)  Prozac 40 mg     Martha and her  attended the session reporting Prozac has improved depression; however, she is sad that son is not talking to her and not coming for the Holidays. She has not seen her grandchildren and is upset.  is also upset and they do not know why he is like this. She is eating and sleeping, no si.          SWAPNA (generalized anxiety disorder)  Denies anxiety other than the holidays and not seeing her son.          Bipolar II disorder (HCC)  Lamictal 100 mg daily Zyprexa 5 mg in am and 10 mg at hs    Moods have been stable, denies delusions or hallucinations. AIMS is negative. Denies cecy or hypomania.            HPI ROS Appetite Changes and Sleep: normal appetite and normal energy level    Review Of Systems:     Mood Anxiety and Depression   Behavior Normal    Thought Content Normal   General Relationship Problems, Emotional Problems, and Sleep Disturbances   Personality Normal   Other Psych Symptoms Normal   Constitutional As Noted in HPI   ENT As Noted in HPI   Cardiovascular As Noted in HPI   Respiratory As Noted in HPI   Gastrointestinal As Noted in HPI   Genitourinary As Noted in HPI   Musculoskeletal As Noted in HPI   Integumentary As Noted in HPI   Neurological As Noted in HPI   Endocrine Hashimoto's   Other Symptoms Normal       Laboratory Results:     Substance Abuse History:  Social History     Substance and Sexual Activity   Drug Use Never       Family Psychiatric History:   Family History   Problem Relation Age of Onset    Aortic aneurysm Mother 70        Abdominal Aortic Aneurysm, +Smoker    No Known Problems Father     Bipolar disorder  Brother     No Known Problems Maternal Aunt     Cancer Maternal Grandmother         70    No Known Problems Son     No Known Problems Half-Sister     No Known Problems Half-Sister     No Known Problems Half-Sister     No Known Problems Half-Sister     Alcohol abuse Neg Hx     Drug abuse Neg Hx     Completed Suicide  Neg Hx        The following portions of the patient's history were reviewed and updated as appropriate: allergies, current medications, past family history, past medical history, past social history, past surgical history, and problem list.    Social History     Socioeconomic History    Marital status: /Civil Union     Spouse name: Not on file    Number of children: 2    Years of education: Not on file    Highest education level: Some college, no degree   Occupational History    Occupation: Disabled - Previous      Comment: s/p Brain Surgery / H/O Epilepsy   Tobacco Use    Smoking status: Never     Passive exposure: Current    Smokeless tobacco: Never   Vaping Use    Vaping status: Never Used   Substance and Sexual Activity    Alcohol use: No     Comment: doesnt drink.    Drug use: Never    Sexual activity: Not Currently     Partners: Male     Birth control/protection: Post-menopausal, Female Sterilization     Comment: Tubal Ligation   Other Topics Concern    Not on file   Social History Narrative        Lives with     2 Children - 2 Sons    Disabled s/p Brain Surgery / H/O Epilepsy - Previous      Social Drivers of Health     Financial Resource Strain: Low Risk  (8/22/2023)    Overall Financial Resource Strain (CARDIA)     Difficulty of Paying Living Expenses: Not hard at all   Food Insecurity: No Food Insecurity (10/25/2024)    Hunger Vital Sign     Worried About Running Out of Food in the Last Year: Never true     Ran Out of Food in the Last Year: Never true   Transportation Needs: No Transportation Needs (10/25/2024)    PRAPARE - Transportation     Lack of  Transportation (Medical): No     Lack of Transportation (Non-Medical): No   Physical Activity: Unknown (1/21/2021)    Exercise Vital Sign     Days of Exercise per Week: 6 days     Minutes of Exercise per Session: Not on file   Stress: Stress Concern Present (1/21/2021)    [employer] - Occupational Stress Questionnaire     Feeling of Stress : To some extent   Social Connections: Unknown (1/21/2021)    Social Connection and Isolation Panel [NHANES]     Frequency of Communication with Friends and Family: Not on file     Frequency of Social Gatherings with Friends and Family: Not on file     Attends Christian Services: Not on file     Active Member of Clubs or Organizations: Not on file     Attends Club or Organization Meetings: Not on file     Marital Status:    Intimate Partner Violence: Not At Risk (1/21/2021)    Humiliation, Afraid, Rape, and Kick questionnaire     Fear of Current or Ex-Partner: No     Emotionally Abused: No     Physically Abused: No     Sexually Abused: No   Housing Stability: Low Risk  (10/25/2024)    Housing Stability Vital Sign     Unable to Pay for Housing in the Last Year: No     Number of Times Moved in the Last Year: 1     Homeless in the Last Year: No     Social History     Social History Narrative        Lives with     2 Children - 2 Sons    Disabled s/p Brain Surgery / H/O Epilepsy - Previous        Objective:       Mental status:  Appearance calm and cooperative , adequate hygiene and grooming, and good eye contact    Mood anxious   Affect affect appropriate    Speech a normal rate   Thought Processes normal thought processes   Hallucinations no hallucinations present    Thought Content no delusions   Abnormal Thoughts no suicidal thoughts  and no homicidal thoughts    Orientation  oriented to person and place and time   Remote Memory short term memory intact and long term memory intact   Attention Span concentration intact  "  Intellect Appears to be of Average Intelligence   Insight Insight intact   Judgement judgment was intact   Muscle Strength Muscle strength and tone were normal   Language no difficulty naming common objects   Fund of Knowledge displays adequate knowledge of current events   Pain none   Pain Scale 0       Assessment/Plan:       Diagnoses and all orders for this visit:    Recurrent major depressive disorder, in partial remission (HCC)    SWAPNA (generalized anxiety disorder)    Bipolar II disorder ([facility])            Treatment Recommendations- Risks Benefits      Immediate Medical/Psychiatric/Psychotherapy Treatments and Any Precautions: Continue treatment plan    Risks, Benefits And Possible Side Effects Of Medications:  {PSYCH RISK, BENEFITS AND POSSIBLE SIDE EFFECTS (Optional):17973     Psychotherapy Provided: 30 minutes individual psychotherapy provided.   Supportive therapy  Medication evaluation  Mood assessment  Surveys reviewed and confirmed  Normalized and validated her feelings  goals discussed in session: Maintain stable mood    \"Portions of the record may have been created with voice recognition software. Occasional wrong word or \"sound a like\" substitutions may have occurred due to the inherent limitations of voice recognition software. Read the chart carefully and recognize, using context, where substitutions have occurred. Please call if you have any questions. \"                                   "

## 2024-12-09 NOTE — ASSESSMENT & PLAN NOTE
Prozac 40 mg     Martha and her  attended the session reporting Prozac has improved depression; however, she is sad that son is not talking to her and not coming for the Holidays. She has not seen her grandchildren and is upset.  is also upset and they do not know why he is like this. She is eating and sleeping, no si.

## 2024-12-15 DIAGNOSIS — F31.61 BIPOLAR DISORDER, CURRENT EPISODE MIXED, MILD (HCC): ICD-10-CM

## 2024-12-16 RX ORDER — LAMOTRIGINE 100 MG/1
100 TABLET ORAL DAILY
Qty: 90 TABLET | Refills: 1 | Status: SHIPPED | OUTPATIENT
Start: 2024-12-16 | End: 2024-12-24 | Stop reason: SDUPTHER

## 2024-12-17 ENCOUNTER — HOSPITAL ENCOUNTER (OUTPATIENT)
Dept: MAMMOGRAPHY | Facility: HOSPITAL | Age: 64
Discharge: HOME | End: 2024-12-17
Attending: OBSTETRICS & GYNECOLOGY
Payer: COMMERCIAL

## 2024-12-17 VITALS — HEIGHT: 60 IN | WEIGHT: 186.95 LBS | BODY MASS INDEX: 36.7 KG/M2

## 2024-12-17 DIAGNOSIS — Z12.31 ENCOUNTER FOR SCREENING MAMMOGRAM FOR MALIGNANT NEOPLASM OF BREAST: ICD-10-CM

## 2024-12-17 PROCEDURE — 77063 BREAST TOMOSYNTHESIS BI: CPT

## 2024-12-17 PROCEDURE — 77067 SCR MAMMO BI INCL CAD: CPT

## 2024-12-19 ENCOUNTER — RESULTS FOLLOW-UP (OUTPATIENT)
Dept: OBGYN CLINIC | Facility: CLINIC | Age: 64
End: 2024-12-19

## 2024-12-19 DIAGNOSIS — F31.61 BIPOLAR DISORDER, CURRENT EPISODE MIXED, MILD (HCC): ICD-10-CM

## 2024-12-19 DIAGNOSIS — E11.9 CONTROLLED TYPE 2 DIABETES MELLITUS WITHOUT COMPLICATION, WITHOUT LONG-TERM CURRENT USE OF INSULIN (HCC): ICD-10-CM

## 2024-12-19 NOTE — TELEPHONE ENCOUNTER
[facility] Sanjay called requesting refill for lamotrigine. Sanjay made aware medication was refilled on 12.16.2024 for 90 with 1 refills to [facility] pharmacy. Sanjay advised he will document that this was ordered and sent to local pharmacy and reach out to patient to see if she wants these transferred..    [facility] called requesting refill for fluoxetine. Sanjay made aware medication was refilled on 11.14.2024 for 90 with 2 refills to [facility] pharmacy. Sanjay advised he will document that this was ordered and sent to local pharmacy and reach out to patient to see if she wants these transferred..

## 2024-12-19 NOTE — TELEPHONE ENCOUNTER
Reason for call:     Pharmacy change    [x] Refill   [] Prior Auth  [] Other:     Office:   [x] PCP/Provider - [facility]  [] Specialty/Provider -     Medication:   Metformin 500 mg, 3 tabs daily 270    Pharmacy:   [facility]    Does the patient have enough for 3 days?   [x] Yes   [] No - Send as HP to POD

## 2024-12-20 RX ORDER — METFORMIN HYDROCHLORIDE 500 MG/1
1500 TABLET, EXTENDED RELEASE ORAL
Qty: 270 TABLET | Refills: 1 | Status: SHIPPED | OUTPATIENT
Start: 2024-12-20

## 2024-12-24 DIAGNOSIS — F33.41 RECURRENT MAJOR DEPRESSIVE DISORDER, IN PARTIAL REMISSION (HCC): ICD-10-CM

## 2024-12-24 DIAGNOSIS — F31.61 BIPOLAR DISORDER, CURRENT EPISODE MIXED, MILD (HCC): ICD-10-CM

## 2024-12-24 RX ORDER — FLUOXETINE 40 MG/1
40 CAPSULE ORAL DAILY
Qty: 90 CAPSULE | Refills: 2 | Status: SHIPPED | OUTPATIENT
Start: 2024-12-24

## 2024-12-24 RX ORDER — LAMOTRIGINE 100 MG/1
100 TABLET ORAL DAILY
Qty: 90 TABLET | Refills: 1 | Status: SHIPPED | OUTPATIENT
Start: 2024-12-24

## 2024-12-24 NOTE — TELEPHONE ENCOUNTER
Pharmacy change not a duplicate   Reason for call:   [x] Refill   [] Prior Auth  [] Other:     Office:   [] PCP/Provider -   [x] Specialty/Provider - Briana Finch    Medication: Fluoxetine   Dose/Frequency: 40 mg Daily   Quantity: 90    Medication: Lamotrigine  Dose/Frequency: 100 mg Daily  Quantity: 90    Pharmacy: [facility] [place], [place] [address]     Does the patient have enough for 3 days?   [x] Yes   [] No - Send as HP to POD

## 2025-01-07 ENCOUNTER — TELEPHONE (OUTPATIENT)
Age: 65
End: 2025-01-07

## 2025-01-07 ENCOUNTER — TELEPHONE (OUTPATIENT)
Dept: OTHER | Facility: OTHER | Age: 65
End: 2025-01-07

## 2025-01-07 DIAGNOSIS — F31.2 BIPOLAR AFFECTIVE DISORDER, CURRENTLY MANIC, SEVERE, WITH PSYCHOTIC FEATURES (HCC): ICD-10-CM

## 2025-01-07 DIAGNOSIS — F31.61 BIPOLAR DISORDER, CURRENT EPISODE MIXED, MILD (HCC): ICD-10-CM

## 2025-01-07 DIAGNOSIS — G47.00 INSOMNIA, UNSPECIFIED TYPE: Primary | ICD-10-CM

## 2025-01-07 RX ORDER — TRAZODONE HYDROCHLORIDE 50 MG/1
50 TABLET, FILM COATED ORAL
Qty: 30 TABLET | Refills: 2 | Status: SHIPPED | OUTPATIENT
Start: 2025-01-07 | End: 2025-01-14 | Stop reason: DRUGHIGH

## 2025-01-07 RX ORDER — OLANZAPINE 10 MG/1
10 TABLET ORAL 2 TIMES DAILY
Qty: 180 TABLET | Refills: 1 | Status: SHIPPED | OUTPATIENT
Start: 2025-01-07 | End: 2025-01-09

## 2025-01-07 RX ORDER — LAMOTRIGINE 100 MG/1
150 TABLET ORAL DAILY
Qty: 135 TABLET | Refills: 1 | Status: SHIPPED | OUTPATIENT
Start: 2025-01-07

## 2025-01-07 NOTE — TELEPHONE ENCOUNTER
Patients  Francisco called and  requested a call back to discuss the patient. Francisco stated the patient is having a crisis , although she doesn't think she is. Writer offered to transfer to the nurses line for assistance,. Patients  declined,.    They can be reached at P# [phone].       Thank you.

## 2025-01-07 NOTE — TELEPHONE ENCOUNTER
Patient  returned call. Writer informed  the message would be sent. He can be reached at [phone]

## 2025-01-07 NOTE — TELEPHONE ENCOUNTER
Called Francisco. He said Martha is safe but she isn't sleeping and Dr. Varela told them to call when this happens so that we can intervene before she needs to be committed. He said it started about a week or so ago and she will occasionally nap but that's it. Forwarding to provider for review. Clinical will follow up as advised.

## 2025-01-08 ENCOUNTER — TELEPHONE (OUTPATIENT)
Dept: PSYCHIATRY | Facility: CLINIC | Age: 65
End: 2025-01-08

## 2025-01-08 NOTE — TELEPHONE ENCOUNTER
Martha called the office to request clarification on scripts.  Reviewed scripts that provider sent to pharmacy yesterday.  Names of medications and doses provided.  Martha will call the office if she has any further questions.

## 2025-01-08 NOTE — TELEPHONE ENCOUNTER
Pt returned call regarding med adjustments, writer contacted clinical staff for further assistance. Pt successfully warm transferred.

## 2025-01-09 DIAGNOSIS — F31.2 BIPOLAR AFFECTIVE DISORDER, CURRENTLY MANIC, SEVERE, WITH PSYCHOTIC FEATURES (HCC): ICD-10-CM

## 2025-01-09 RX ORDER — OLANZAPINE 10 MG/1
10 TABLET ORAL 2 TIMES DAILY
Qty: 180 TABLET | Refills: 1 | Status: SHIPPED | OUTPATIENT
Start: 2025-01-09

## 2025-01-09 NOTE — TELEPHONE ENCOUNTER
Called Martha and informed her that Olanzapine was changed to 10 MG BID.  She will update her notes to reflect that change and start taking that dose.

## 2025-01-14 ENCOUNTER — OFFICE VISIT (OUTPATIENT)
Dept: PSYCHIATRY | Facility: CLINIC | Age: 65
End: 2025-01-14
Payer: COMMERCIAL

## 2025-01-14 ENCOUNTER — TELEPHONE (OUTPATIENT)
Dept: FAMILY MEDICINE CLINIC | Facility: CLINIC | Age: 65
End: 2025-01-14

## 2025-01-14 VITALS
BODY MASS INDEX: 40.44 KG/M2 | HEIGHT: 60 IN | WEIGHT: 206 LBS | DIASTOLIC BLOOD PRESSURE: 62 MMHG | HEART RATE: 73 BPM | SYSTOLIC BLOOD PRESSURE: 130 MMHG

## 2025-01-14 DIAGNOSIS — F41.1 GAD (GENERALIZED ANXIETY DISORDER): ICD-10-CM

## 2025-01-14 DIAGNOSIS — F31.81 BIPOLAR II DISORDER (HCC): ICD-10-CM

## 2025-01-14 DIAGNOSIS — G47.00 INSOMNIA, UNSPECIFIED TYPE: Primary | ICD-10-CM

## 2025-01-14 DIAGNOSIS — F33.41 RECURRENT MAJOR DEPRESSIVE DISORDER, IN PARTIAL REMISSION (HCC): ICD-10-CM

## 2025-01-14 PROBLEM — F31.2 BIPOLAR AFFECTIVE DISORDER, CURRENTLY MANIC, SEVERE, WITH PSYCHOTIC FEATURES (HCC): Status: ACTIVE | Noted: 2025-01-14

## 2025-01-14 PROCEDURE — 99214 OFFICE O/P EST MOD 30 MIN: CPT | Performed by: NURSE PRACTITIONER

## 2025-01-14 PROCEDURE — 90833 PSYTX W PT W E/M 30 MIN: CPT | Performed by: NURSE PRACTITIONER

## 2025-01-14 RX ORDER — TRAZODONE HYDROCHLORIDE 100 MG/1
100 TABLET ORAL
Qty: 90 TABLET | Refills: 0 | Status: SHIPPED | OUTPATIENT
Start: 2025-01-14

## 2025-01-14 NOTE — ASSESSMENT & PLAN NOTE
Reports becomes anxious when sees things need to be done. Discussed anxiety and panic attacks. She is aware of coping skills and denies panic attacks.  is supportive.

## 2025-01-14 NOTE — ASSESSMENT & PLAN NOTE
60 mg Prozac,     Martha and her  report that she is sleeping a little more, takes medication as prescribed. Wishes to see grand children and  thinks she would feel better. Appetite is good, denies SI. Discussed importance of reducing caffeine intake. She agreed.

## 2025-01-14 NOTE — BH CRISIS PLAN
Client Name: Martha Chauhan       Client YOB: 1960    [facility] Safety Plan      Creation Date: 2/15/24 Update Date: 1/14/25   Created By: Briana Varela, PhD Last Updated By: Briana Varela, PhD      Step 1: Warning Signs:   Warning Signs   increased energy, insomnia            Step 2: Internal Coping Strategies:   Internal Coping Strategies   none            Step 3: People and social settings that provide distraction:   Name Contact Information   friend cell    Places   none           Step 4: People whom I can ask for help during a crisis:      Name Contact Information    Friends cell    Hotline - [place] [phone]    [facility] [place] - [place] [phone]      Step 5: Professionals or agencies I can contact during a crisis:      Clinican/Agency Name Phone Emergency Contact    [facility] [phone] Dr Varela    PCP Teresa Torres [phone]       Local Emergency Department Emergency Department Phone Emergency Department Address    [facility] [phone] [address] [place], [place] [id number] [place] [address] Suicide Hotline - 988          Crisis Phone Numbers:   Suicide Prevention Lifeline: Call or Text  988 Crisis Text Line: Text HOME to 831-656   Please note: Some [facility] do not have a separate number for Child/Adolescent specific crisis. If your county is not listed under Child/Adolescent, please call the adult number for your county      Adult Crisis Numbers: Child/Adolescent Crisis Numbers   [facility]: [phone] [facility]: [phone]   [facility]: [phone] [facility]: [phone]   [facility]: [phone] [place]: [phone]   [facility]: [phone] [place] [facility]: [phone]   [place] [facility]: [phone]   [facility]: [phone]   [facility]: [phone]   [facility]: [phone] (daytime) [phone] (after hours, weekends, holidays)      Step 6: Making the environment safer (plan  for lethal means safety):   Plan: All locked up     Optional: What is most important to me and worth living for?   Self care,  and grandchildren and children Full of life     Randi Safety Plan. Toyin Sweeney and Te Sol. Used with permission of the authors.

## 2025-01-14 NOTE — PSYCH
Visit Time    Visit Start Time: 10:45  Visit Stop Time: 11:15  Total Visit Duration:  30 minutes    Subjective:     Patient ID: Martha Chauhan is a 64 y.o. female.history Bipolar II, anxiety, depression seen for medication management and mood assessment.  Assessment & Plan  Bipolar II disorder (HCC)  Lamictal 150 mg daily, Zyprexa 10 mg BID, Trazodone 100 mg daily    Martha denies cecy, no evidence of delusions, sleep pattern is less. Stressed importance of sleep and taking increased dose of Zyprexa and increase trazodone to 100mg. They agreed and will call with problems or concerns.         SWAPNA (generalized anxiety disorder)  Reports becomes anxious when sees things need to be done. Discussed anxiety and panic attacks. She is aware of coping skills and denies panic attacks.  is supportive.            Recurrent major depressive disorder, in partial remission (HCC)  60 mg Prozac,     Martha and her  report that she is sleeping a little more, takes medication as prescribed. Wishes to see grand children and  thinks she would feel better. Appetite is good, denies SI. Discussed importance of reducing caffeine intake. She agreed.         Insomnia, unspecified type    Orders:    traZODone (DESYREL) 100 mg tablet; Take 1 tablet (100 mg total) by mouth daily at bedtime         HPI ROS Appetite Changes and Sleep: normal appetite and normal energy level    Review Of Systems:     Mood Normal and Anxiety   Behavior Normal    Thought Content Normal   General Relationship Problems, Emotional Problems, and Sleep Disturbances   Personality Normal   Other Psych Symptoms Normal   Constitutional As Noted in HPI   ENT As Noted in HPI   Cardiovascular As Noted in HPI   Respiratory As Noted in HPI   Gastrointestinal As Noted in HPI   Genitourinary As Noted in HPI   Musculoskeletal As Noted in HPI   Integumentary As Noted in HPI   Neurological As Noted in HPI   Endocrine DM   Other Symptoms Normal         Laboratory Results:     Substance Abuse History:  Social History     Substance and Sexual Activity   Drug Use Never       Family Psychiatric History:   Family History   Problem Relation Age of Onset    Aortic aneurysm Mother 70        Abdominal Aortic Aneurysm, +Smoker    No Known Problems Father     Bipolar disorder Brother     No Known Problems Maternal Aunt     Cancer Maternal Grandmother         70    No Known Problems Son     No Known Problems Half-Sister     No Known Problems Half-Sister     No Known Problems Half-Sister     No Known Problems Half-Sister     Alcohol abuse Neg Hx     Drug abuse Neg Hx     Completed Suicide  Neg Hx        The following portions of the patient's history were reviewed and updated as appropriate: allergies, current medications, past family history, past medical history, past social history, past surgical history, and problem list.    Social History     Socioeconomic History    Marital status: /Civil Union     Spouse name: Not on file    Number of children: 2    Years of education: Not on file    Highest education level: Some college, no degree   Occupational History    Occupation: Disabled - Previous      Comment: s/p Brain Surgery / H/O Epilepsy   Tobacco Use    Smoking status: Never     Passive exposure: Current    Smokeless tobacco: Never   Vaping Use    Vaping status: Never Used   Substance and Sexual Activity    Alcohol use: No     Comment: doesnt drink.    Drug use: Never    Sexual activity: Not Currently     Partners: Male     Birth control/protection: Post-menopausal, Female Sterilization     Comment: Tubal Ligation   Other Topics Concern    Not on file   Social History Narrative        Lives with     2 Children - 2 Sons    Disabled s/p Brain Surgery / H/O Epilepsy - Previous      Social Drivers of Health     Financial Resource Strain: Low Risk  (8/22/2023)    Overall Financial Resource Strain (CARDIA)     Difficulty of  Paying Living Expenses: Not hard at all   Food Insecurity: No Food Insecurity (10/25/2024)    Hunger Vital Sign     Worried About Running Out of Food in the Last Year: Never true     Ran Out of Food in the Last Year: Never true   Transportation Needs: No Transportation Needs (10/25/2024)    PRAPARE - Transportation     Lack of Transportation (Medical): No     Lack of Transportation (Non-Medical): No   Physical Activity: Unknown (1/21/2021)    Exercise Vital Sign     Days of Exercise per Week: 6 days     Minutes of Exercise per Session: Not on file   Stress: Stress Concern Present (1/21/2021)    [place] of [employer] - Occupational Stress Questionnaire     Feeling of Stress : To some extent   Social Connections: Unknown (1/21/2021)    Social Connection and Isolation Panel [NHANES]     Frequency of Communication with Friends and Family: Not on file     Frequency of Social Gatherings with Friends and Family: Not on file     Attends Gnosticism Services: Not on file     Active Member of Clubs or Organizations: Not on file     Attends Club or Organization Meetings: Not on file     Marital Status:    Intimate Partner Violence: Not At Risk (1/21/2021)    Humiliation, Afraid, Rape, and Kick questionnaire     Fear of Current or Ex-Partner: No     Emotionally Abused: No     Physically Abused: No     Sexually Abused: No   Housing Stability: Low Risk  (10/25/2024)    Housing Stability Vital Sign     Unable to Pay for Housing in the Last Year: No     Number of Times Moved in the Last Year: 1     Homeless in the Last Year: No     Social History     Social History Narrative        Lives with     2 Children - 2 Sons    Disabled s/p Brain Surgery / H/O Epilepsy - Previous        Objective:     Mental status:  Appearance calm and cooperative , adequate hygiene and grooming, and good eye contact    Mood anxious   Affect affect appropriate    Speech a normal rate   Thought Processes  normal thought processes   Hallucinations no hallucinations present    Thought Content no delusions   Abnormal Thoughts no suicidal thoughts  and no homicidal thoughts    Orientation  oriented to person and place and time   Remote Memory short term memory intact and long term memory intact   Attention Span concentration intact   Intellect Appears to be of Average Intelligence   Insight Insight intact   Judgement judgment was intact   Muscle Strength Muscle strength and tone were normal   Language no difficulty naming common objects   Fund of Knowledge displays adequate knowledge of current events   Pain none   Pain Scale 0       Assessment/Plan:       Diagnoses and all orders for this visit:    Insomnia, unspecified type  -     traZODone (DESYREL) 100 mg tablet; Take 1 tablet (100 mg total) by mouth daily at bedtime    Bipolar II disorder (HCC)    SWAPNA (generalized anxiety disorder)    Recurrent major depressive disorder, in partial remission (HCC)    Other orders  -     FLUoxetine (PROzac) 20 mg capsule; Take 1 capsule by mouth in the morning          Treatment Recommendations- Risks Benefits      Immediate Medical/Psychiatric/Psychotherapy Treatments and Any Precautions: continue treatment plan Zyprexia 10 mg BID, Trazodone 100 mg at hs    Risks, Benefits And Possible Side Effects Of Medications:  {PSYCH RISK, BENEFITS AND POSSIBLE SIDE EFFECTS (Optional):68974     Psychotherapy Provided:30 min Individual psychotherapy provided.   Supportive therapy  Medication evaluation  Mood assessment  Surveys reviewed and confirmed  Normalized and validated her feelings  Safety plan updated  Depression Follow-up Plan Completed: Not applicable    Goals discussed in session:maintain stable mood

## 2025-01-14 NOTE — ASSESSMENT & PLAN NOTE
Lamictal 150 mg daily, Zyprexa 10 mg BID, Trazodone 100 mg daily    Martha denies cecy, no evidence of delusions, sleep pattern is less. Stressed importance of sleep and taking increased dose of Zyprexa and increase trazodone to 100mg. They agreed and will call with problems or concerns.

## 2025-02-13 ENCOUNTER — OFFICE VISIT (OUTPATIENT)
Dept: PSYCHIATRY | Facility: CLINIC | Age: 65
End: 2025-02-13
Payer: COMMERCIAL

## 2025-02-13 VITALS
BODY MASS INDEX: 39.11 KG/M2 | HEIGHT: 60 IN | WEIGHT: 199.2 LBS | HEART RATE: 65 BPM | SYSTOLIC BLOOD PRESSURE: 144 MMHG | DIASTOLIC BLOOD PRESSURE: 79 MMHG

## 2025-02-13 DIAGNOSIS — F31.81 BIPOLAR II DISORDER (HCC): Primary | ICD-10-CM

## 2025-02-13 DIAGNOSIS — F33.41 RECURRENT MAJOR DEPRESSIVE DISORDER, IN PARTIAL REMISSION (HCC): ICD-10-CM

## 2025-02-13 DIAGNOSIS — F41.1 GAD (GENERALIZED ANXIETY DISORDER): ICD-10-CM

## 2025-02-13 PROCEDURE — 90833 PSYTX W PT W E/M 30 MIN: CPT | Performed by: NURSE PRACTITIONER

## 2025-02-13 PROCEDURE — 99214 OFFICE O/P EST MOD 30 MIN: CPT | Performed by: NURSE PRACTITIONER

## 2025-02-13 RX ORDER — AMOXICILLIN 500 MG/1
CAPSULE ORAL
COMMUNITY
Start: 2025-02-05 | End: 2025-02-25

## 2025-02-24 ENCOUNTER — TELEPHONE (OUTPATIENT)
Dept: ADMINISTRATIVE | Facility: OTHER | Age: 65
End: 2025-02-24

## 2025-02-24 NOTE — TELEPHONE ENCOUNTER
02/24/25 1:01 PM    Patient contacted to bring Advance Directive, POLST, or Living Will document to next scheduled pcp visit.[facility] Department left message.    Thank you.  Jennifer Wright MA  [facility]

## 2025-02-24 NOTE — PSYCH
MEDICATION MANAGEMENT NOTE        [facility]      Name and Date of Birth:  Martha Chauhan 64 y.o. 1960 MRN: [id number]    Insurance: Payor: LENA  REP / Plan: AARP MEDICARE COMPLETE  REP / Product Type: Medicare HMO /     Date of Visit: February 13, 2025    Reason for Visit:   Chief Complaint   Patient presents with    Bipolar 2    Depression    Anxiety    Follow-up       Assessment & Plan  Bipolar II disorder (HCC)  Martha and her  attended the session, reporting moods are stable and she is eating and sleeping. Denies delusions or cecy. Sleeping more and we discussed decreasing zyprexia when there is more day light. They agreed due to this is the time of year she usually goes into the hospital. Support was provided.     Recurrent major depressive disorder, in partial remission (HCC)  Denies depression PHQ-9 score of 5 indications mild depression and no SI. Denies depression wishes she could see grandchildren. Coping skills discussed.        SWAPNA (generalized anxiety disorder)  SWAPNA-7 score of 1 indicates no or minimal anxiety. Worries too much about different things. Discussed coping skills for worry.  was supportive.                 Risks/benefits/alternativies to treatment discussed, including potential adverse medication side effects, to which Martha voiced understanding and consented fully to treatment.  Also, patient is amenable to calling/contacting the outpatient office including this writer if any acute adverse effects of their medication regimen arise in addition to any comments or concerns pertaining to their psychiatric management.      Medications Risks/Benefits      Risks, Benefits And Possible Side Effects Of Medications: denies SE         Subjective      Martha Chauhan is a 64 y.o. female, visited for Bipolar 2, Depression, Anxiety, and Follow-up, who was personally seen and evaluated today at the [facility] for follow-up and medication management. Completes psychiatric assessment without difficulty. Appetite is reported as good and she is sleeping.     At previous outpatient psychiatric appointment with this writer, stable mood, medication effective.   She denies any current adverse medication side effects.      Overall, Martha  is responding to treatment.       Review Of Systems:  Pertinent items are noted in HPI; all others are negative; no recent changes in medications or health status reported.    PHQ-2/9 Depression Screening    Little interest or pleasure in doing things: 1 - several days  Feeling down, depressed, or hopeless: 0 - not at all  Trouble falling or staying asleep, or sleeping too much: 1 - several days  Feeling tired or having little energy: 3 - nearly every day  Poor appetite or overeatin - not at all  Feeling bad about yourself - or that you are a failure or have let yourself or your family down: 0 - not at all  Trouble concentrating on things, such as reading the newspaper or watching television: 0 - not at all  Moving or speaking so slowly that other people could have noticed. Or the opposite - being so fidgety or restless that you have been moving around a lot more than usual: 0 - not at all  Thoughts that you would be better off dead, or of hurting yourself in some way: 0 - not at all  PHQ-9 Score: 5  PHQ-9 Interpretation: Mild depression         SWAPNA-7 Flowsheet Screening      Flowsheet Row Most Recent Value   Over the last two weeks, how often have you been bothered by the following problems?     Feeling nervous, anxious, or on edge 0   Not being able to stop or control worrying 0   Worrying too much about different things 1   Trouble relaxing  0   Being so restless that it's hard to sit still 0   Becoming easily annoyed or irritable  0   Feeling afraid as if something awful might happen 0   How difficult have these problems made it for you to do your work, take  care of things at home, or get along with other people?  Not difficult at all   SWAPNA Score  1            Historical Information    Past Psychiatric History Update:   - No inpatient psychiatric admission since last encounter  - No SA or SIB since last encounter  - No incidence of violent behavior since last encounter    Past Trauma History Update:   - No new onset of abuse or traumatic events since last encounter     Past Medical History:    Past Medical History:   Diagnosis Date    Anxiety     Bipolar disorder ([facility]) 2023    Class 2 severe obesity with serious comorbidity and body mass index (BMI) of 37.0 to 37.9 in adult ([facility])     Concussion     Last assessed 2017    Depression     Diabetes type 2, controlled ([facility]) 2024    History of ischemic stroke without residual deficits     s/p Brain Surgery, s/p Meds and Rehab    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     Social History     Substance and Sexual Activity   Drug Use Never       Social History:    Social History     Socioeconomic History    Marital status: /Civil Union     Spouse name: Not on file    Number of children: 2    Years of education: Not on file    Highest education level: Some college, no degree   Occupational History    Occupation: Disabled - Previous      Comment: s/p Brain Surgery / H/O Epilepsy   Tobacco Use    Smoking status: Never     Passive exposure: Current    Smokeless tobacco: Never   Vaping Use     Vaping status: Never Used   Substance and Sexual Activity    Alcohol use: No     Comment: doesnt drink.    Drug use: Never    Sexual activity: Not Currently     Partners: Male     Birth control/protection: Post-menopausal, Female Sterilization     Comment: Tubal Ligation   Other Topics Concern    Not on file   Social History Narrative        Lives with     2 Children - 2 Sons    Disabled s/p Brain Surgery / H/O Epilepsy - Previous      Social Drivers of Health     Financial Resource Strain: Low Risk  (8/22/2023)    Overall Financial Resource Strain (CARDIA)     Difficulty of Paying Living Expenses: Not hard at all   Food Insecurity: No Food Insecurity (10/25/2024)    Hunger Vital Sign     Worried About Running Out of Food in the Last Year: Never true     Ran Out of Food in the Last Year: Never true   Transportation Needs: No Transportation Needs (10/25/2024)    PRAPARE - Transportation     Lack of Transportation (Medical): No     Lack of Transportation (Non-Medical): No   Physical Activity: Unknown (1/21/2021)    Exercise Vital Sign     Days of Exercise per Week: 6 days     Minutes of Exercise per Session: Not on file   Stress: Stress Concern Present (1/21/2021)    [employer] [place] of Occupational Health - Occupational Stress Questionnaire     Feeling of Stress : To some extent   Social Connections: Unknown (1/21/2021)    Social Connection and Isolation Panel [NHANES]     Frequency of Communication with Friends and Family: Not on file     Frequency of Social Gatherings with Friends and Family: Not on file     Attends Quaker Services: Not on file     Active Member of Clubs or Organizations: Not on file     Attends Club or Organization Meetings: Not on file     Marital Status:    Intimate Partner Violence: Not At Risk (1/21/2021)    Humiliation, Afraid, Rape, and Kick questionnaire     Fear of Current or Ex-Partner: No     Emotionally Abused: No     Physically Abused: No      Sexually Abused: No   Housing Stability: Low Risk  (10/25/2024)    Housing Stability Vital Sign     Unable to Pay for Housing in the Last Year: No     Number of Times Moved in the Last Year: 1     Homeless in the Last Year: No       Family Psychiatric History:     Family History   Problem Relation Age of Onset    Aortic aneurysm Mother 70        Abdominal Aortic Aneurysm, +Smoker    No Known Problems Father     Bipolar disorder Brother     No Known Problems Maternal Aunt     Cancer Maternal Grandmother         70    No Known Problems Son     No Known Problems Half-Sister     No Known Problems Half-Sister     No Known Problems Half-Sister     No Known Problems Half-Sister     Alcohol abuse Neg Hx     Drug abuse Neg Hx     Completed Suicide  Neg Hx        History Review: The following portions of the patient's history were reviewed and updated as appropriate: allergies, current medications, past family history, past medical history, past social history, past surgical history and problem list.           Objective      Vital signs in last 24 hours:  Vitals:    02/13/25 1046   BP: 144/79   Pulse: 65   Weight: 90.4 kg (199 lb 3.2 oz)   Height: 5' (1.524 m)       Mental Status Evaluation:    Appearance age appropriate, casually dressed   Behavior cooperative, calm   Speech normal rate, normal volume, normal pitch   Mood anxious   Affect normal range and intensity, appropriate   Thought Processes organized, goal directed   Associations intact associations   Thought Content no overt delusions   Perceptual Disturbances: no auditory hallucinations, no visual hallucinations   Abnormal Thoughts  Risk Potential Suicidal ideation - None  Homicidal ideation - None  Potential for aggression - No   Orientation oriented to: person, place, time/date, and situation   Memory recent and remote memory grossly intact   Consciousness alert and awake   Attention Span Concentration Span attention span and concentration are age appropriate    Intellect not formally assessed   Insight intact   Judgement intact   Muscle Strength and  Gait normal muscle strength and normal muscle tone, normal gait and normal balance   Motor activity no abnormal movements   Language no difficulty naming common objects   Fund of Knowledge adequate knowledge of current events   Pain none   Pain Scale 0             Current Outpatient Medications   Medication Sig Dispense Refill    amoxicillin (AMOXIL) 500 mg capsule TAKE 1 CAPSULE BY MOUTH EVERY 8 HOURS UNTIL ALL ARE TAKEN      Cholecalciferol (Vitamin D3) 1000 units CAPS Take 3 capsules by mouth in the morning      FLUoxetine (PROzac) 40 MG capsule Take 1 capsule (40 mg total) by mouth daily 90 capsule 2    lamoTRIgine (LaMICtal) 100 mg tablet Take 1.5 tablets (150 mg total) by mouth daily 135 tablet 1    metFORMIN (GLUCOPHAGE-XR) 500 mg 24 hr tablet Take 3 tablets (1,500 mg total) by mouth daily with breakfast 270 tablet 1    Multiple Vitamins-Minerals (Multivitamin Adults) TABS Take 1 tablet by mouth in the morning      OLANZapine (ZyPREXA) 10 mg tablet Take 1 tablet (10 mg total) by mouth 2 (two) times a day 180 tablet 1    Red Yeast Rice 600 MG CAPS Take 1 capsule by mouth in the morning      traZODone (DESYREL) 100 mg tablet Take 1 tablet (100 mg total) by mouth daily at bedtime 90 tablet 0     No current facility-administered medications for this visit.         Psychotherapy Provided:     Individual psychotherapy provided: No         Visit Time    Visit Start Time: 10:45   Visit Stop Time: 11:15  Total Visit Duration:  30 minutes    Briana Varela, PhD 02/24/25    This note was completed in part utilizing Dragon dictation Software. Grammatical, translation, syntax errors, random word insertions, spelling mistakes, and incomplete sentences may be an occasional consequence of this system secondary to software limitations with voice recognition, ambient noise, and hardware issues. If you have any questions or concerns  about the content, text, or information contained within the body of this dictation, please contact the provider for clarification.

## 2025-02-24 NOTE — ASSESSMENT & PLAN NOTE
SWAPNA-7 score of 1 indicates no or minimal anxiety. Worries too much about different things. Discussed coping skills for worry.  was supportive.

## 2025-02-24 NOTE — ASSESSMENT & PLAN NOTE
Martha and her  attended the session, reporting moods are stable and she is eating and sleeping. Denies delusions or cecy. Sleeping more and we discussed decreasing zyprexia when there is more day light. They agreed due to this is the time of year she usually goes into the hospital. Support was provided.

## 2025-02-24 NOTE — ASSESSMENT & PLAN NOTE
Denies depression PHQ-9 score of 5 indications mild depression and no SI. Denies depression wishes she could see grandchildren. Coping skills discussed.

## 2025-02-25 ENCOUNTER — OFFICE VISIT (OUTPATIENT)
Dept: FAMILY MEDICINE CLINIC | Facility: CLINIC | Age: 65
End: 2025-02-25
Payer: COMMERCIAL

## 2025-02-25 VITALS
RESPIRATION RATE: 16 BRPM | DIASTOLIC BLOOD PRESSURE: 80 MMHG | TEMPERATURE: 96.3 F | WEIGHT: 200 LBS | BODY MASS INDEX: 39.27 KG/M2 | SYSTOLIC BLOOD PRESSURE: 110 MMHG | OXYGEN SATURATION: 97 % | HEART RATE: 60 BPM | HEIGHT: 60 IN

## 2025-02-25 DIAGNOSIS — E55.9 VITAMIN D DEFICIENCY: ICD-10-CM

## 2025-02-25 DIAGNOSIS — M21.961 DEFORMITY OF RIGHT FOOT: ICD-10-CM

## 2025-02-25 DIAGNOSIS — E66.01 CLASS 2 SEVERE OBESITY WITH SERIOUS COMORBIDITY AND BODY MASS INDEX (BMI) OF 39.0 TO 39.9 IN ADULT, UNSPECIFIED OBESITY TYPE (HCC): ICD-10-CM

## 2025-02-25 DIAGNOSIS — F31.81 BIPOLAR II DISORDER (HCC): ICD-10-CM

## 2025-02-25 DIAGNOSIS — B35.1 TOENAIL FUNGUS: ICD-10-CM

## 2025-02-25 DIAGNOSIS — E11.9 CONTROLLED TYPE 2 DIABETES MELLITUS WITHOUT COMPLICATION, WITHOUT LONG-TERM CURRENT USE OF INSULIN (HCC): Primary | ICD-10-CM

## 2025-02-25 DIAGNOSIS — F41.1 GAD (GENERALIZED ANXIETY DISORDER): ICD-10-CM

## 2025-02-25 DIAGNOSIS — E78.5 HYPERLIPIDEMIA, UNSPECIFIED HYPERLIPIDEMIA TYPE: ICD-10-CM

## 2025-02-25 DIAGNOSIS — E66.812 CLASS 2 SEVERE OBESITY WITH SERIOUS COMORBIDITY AND BODY MASS INDEX (BMI) OF 39.0 TO 39.9 IN ADULT, UNSPECIFIED OBESITY TYPE (HCC): ICD-10-CM

## 2025-02-25 DIAGNOSIS — N39.3 PRIMARY STRESS URINARY INCONTINENCE: ICD-10-CM

## 2025-02-25 PROCEDURE — G2211 COMPLEX E/M VISIT ADD ON: HCPCS | Performed by: FAMILY MEDICINE

## 2025-02-25 PROCEDURE — 99215 OFFICE O/P EST HI 40 MIN: CPT | Performed by: FAMILY MEDICINE

## 2025-02-25 RX ORDER — METFORMIN HYDROCHLORIDE 500 MG/1
1500 TABLET, EXTENDED RELEASE ORAL
Qty: 270 TABLET | Refills: 2 | Status: SHIPPED | OUTPATIENT
Start: 2025-02-25

## 2025-02-25 RX ORDER — BLOOD-GLUCOSE METER
KIT MISCELLANEOUS
Qty: 1 KIT | Refills: 0 | Status: SHIPPED | OUTPATIENT
Start: 2025-02-25

## 2025-02-25 RX ORDER — ATORVASTATIN CALCIUM 20 MG/1
20 TABLET, FILM COATED ORAL EVERY EVENING
Qty: 30 TABLET | Refills: 8 | Status: SHIPPED | OUTPATIENT
Start: 2025-02-25

## 2025-02-25 RX ORDER — BLOOD SUGAR DIAGNOSTIC
STRIP MISCELLANEOUS
Qty: 100 EACH | Refills: 3 | Status: SHIPPED | OUTPATIENT
Start: 2025-02-25

## 2025-02-25 RX ORDER — LANCETS 33 GAUGE
EACH MISCELLANEOUS
Qty: 100 EACH | Refills: 3 | Status: SHIPPED | OUTPATIENT
Start: 2025-02-25

## 2025-02-25 NOTE — PROGRESS NOTES
Assessment/Plan:  Assessment & Plan  Controlled type 2 diabetes mellitus without complication, without long-term current use of insulin (HCC)    Pending labs.  Continue Metformin XR 500mg 3 pills daily.  Patient declines statin, glucometer Rx.          Call Insurance to Ask About Diabetes Med Coverage -      GLP-1 Agonists - Pill - Rybelsus, etc., Injectable - Ozempic, Mounjaro, Trulicity, Victoza, Byetta, etc.  SGLT-2 Inhibitor - Pill - Jardiance, Farxiga, etc.       Please let us know about your medication selection and availability at pharmacy of your choice.          Lab Results   Component Value Date    HGBA1C 6.2 (H) 10/25/2024     Orders:  •  Blood Glucose Monitoring Suppl (OneTouch Verio Reflect) w/Device KIT; Check blood sugars once daily. Please substitute with appropriate alternative as covered by patient's insurance. Dx: E11.65  •  glucose blood (OneTouch Verio) test strip; Check blood sugars once daily. Please substitute with appropriate alternative as covered by patient's insurance. Dx: E11.65  •  OneTouch Delica Lancets 33G MISC; Check blood sugars once daily. Please substitute with appropriate alternative as covered by patient's insurance. Dx: E11.65  •  Ambulatory referral to Podiatry; Future  •  Comprehensive metabolic panel; Future  •  Hemoglobin A1C; Future  •  metFORMIN (GLUCOPHAGE-XR) 500 mg 24 hr tablet; Take 3 tablets (1,500 mg total) by mouth daily with breakfast    Hyperlipidemia, unspecified hyperlipidemia type  Pending labs. Start Lipitor 20mg QHS. D/C Red Yeast Rice 600mg QD.  Recommend lifestyle modifications.     The 10-year ASCVD risk score (Lucho SEXTON, et al., 2019) is: 9.1%    Values used to calculate the score:      Age: 64 years      Sex: Female      Is Non- : No      Diabetic: Yes      Tobacco smoker: No      Systolic Blood Pressure: 110 mmHg      Is BP treated: No      HDL Cholesterol: 60 mg/dL      Total Cholesterol: 231 mg/dL      Orders:  •   atorvastatin (LIPITOR) 20 mg tablet; Take 1 tablet (20 mg total) by mouth every evening  •  Comprehensive metabolic panel; Future  •  TSH, 3rd generation with Free T4 reflex; Future  •  LDL cholesterol, direct; Future    Vitamin D deficiency  Pending labs.  Continue MVI and OTC Vitamin D 3,000IU daily.      Orders:  •  Vitamin D 25 hydroxy; Future    Primary stress urinary incontinence  Stable. Kegel exercises Handout given previously. Patient declines Pelvic floor PT previously.          SWAPNA (generalized anxiety disorder)  Management per Psych.     Orders:  •  TSH, 3rd generation with Free T4 reflex; Future  •  Magnesium; Future    Bipolar II disorder (HCC)  Management per Psych.     Orders:  •  TSH, 3rd generation with Free T4 reflex; Future    Class 2 severe obesity with serious comorbidity and body mass index (BMI) of 39.0 to 39.9 in adult, unspecified obesity type (HCC)  Stable.  Recommend lifestyle modifications.  Patient is considering GLP-1A for DM2.      Orders:  •  TSH, 3rd generation with Free T4 reflex; Future  •  Vitamin D 25 hydroxy; Future    Deformity of right foot    Orders:  •  Ambulatory referral to Podiatry; Future    Toenail fungus    Orders:  •  Ambulatory referral to Podiatry; Future          Return in about 4 months (around 6/25/2025) for 4mo - DM2, HL, BPD/Anx/Dep, Vit D Def, Labs.      Future Appointments   Date Time Provider Department Center   4/9/2025  9:45 AM Briana Varela, PhD [facility]   6/25/2025 10:00 AM Teresa Torres DO [facility]        Subjective:     Martha is a 64 y.o. female who presents today for a follow-up on her chronic medical conditions.        HPI:  Chief Complaint   Patient presents with   • Follow-up     4 mo     -- Above per clinical staff and reviewed. --      Diabetes  She presents for her follow-up diabetic visit. She has type 2 diabetes mellitus. Pertinent negatives for diabetes include no chest pain and no fatigue. There are no hypoglycemic  complications. Risk factors for coronary artery disease include diabetes mellitus, obesity, post-menopausal and dyslipidemia. Current diabetic treatment includes diet and oral agent (monotherapy). Her weight is decreasing steadily. She is following a generally healthy diet. She participates in exercise weekly. (Not checking BS at home.  ) An ACE inhibitor/angiotensin II receptor blocker is not being taken. She does not see a podiatrist.Eye exam is current.         Today:      Return in about 4 months (around 2/25/2025) for 4mo - DM2, HL, BPD/Anx/Dep, Vit D Def, Labs.       4mo OV     Patient not complete labs 10/25/24, 10/29/24.    Right Great Toenail - Sympotms x 20 years.  Used topical previously s benedift.       Obesity - Trying to watch diet - trying to cut back on sugars and carbs.  +Exercise - 45 minutes time per week, Previously Walking dog 30 minutes, 1 days per week.     DM2 - On Metformin XR 500mg 3 pills daily.  No other DM meds previously.  S/p Nutrition consult c DM  2022.  Sees Optho - Dr. Feliciano - Next appt 6/25.  No Podiatry.     Hyperlipidemia - No statin previously.  Taking RYR.       Bipolar / Anxiety / Depression - Management per Psych Dr. Varela.  Sees q2 months - Next appt 4/25.  No counseling - last counseling 2021.  On Prozac 40mg QD, Zyprexa 10mg BID, Lamictal 150mg QD.  s/p partial inpatient Psych admission 3/12/24.      Vitamin D Deficiency - s/p Drisdol.  Taking MVI and OTC Vitmain D 3000IU daily.       H/O Seizures - Grand Mal / Complex Partial - Last occurred in 2013 - none since corrective surgery at [facility] in 2013.  Last saw Neurosurgery appt 2015 - F/U PRN.       FamHx AAA - + Mother.  Patient had normal screen 2012?.       Reviewed:  Labs 10/25/24, Gyn 10/28/24     Sees Gyn Dr. Buitrago at [facility] yearly.  Next appt 10/25.        The following portions of the patient's history were reviewed and updated as appropriate: allergies, current medications, past family history,  past medical history, past social history, past surgical history and problem list.      Review of Systems   Constitutional:  Negative for appetite change, chills, diaphoresis, fatigue and fever.   Respiratory:  Negative for chest tightness and shortness of breath.    Cardiovascular:  Negative for chest pain.   Gastrointestinal:  Negative for abdominal pain, blood in stool, diarrhea, nausea and vomiting.   Genitourinary:  Negative for dysuria.        Current Outpatient Medications   Medication Sig Dispense Refill   • atorvastatin (LIPITOR) 20 mg tablet Take 1 tablet (20 mg total) by mouth every evening 30 tablet 8   • Blood Glucose Monitoring Suppl (OneTouch Verio Reflect) w/Device KIT Check blood sugars once daily. Please substitute with appropriate alternative as covered by patient's insurance. Dx: E11.65 1 kit 0   • Cholecalciferol (Vitamin D3) 1000 units CAPS Take 3 capsules by mouth in the morning     • FLUoxetine (PROzac) 40 MG capsule Take 1 capsule (40 mg total) by mouth daily 90 capsule 2   • glucose blood (OneTouch Verio) test strip Check blood sugars once daily. Please substitute with appropriate alternative as covered by patient's insurance. Dx: E11.65 100 each 3   • lamoTRIgine (LaMICtal) 100 mg tablet Take 1.5 tablets (150 mg total) by mouth daily 135 tablet 1   • metFORMIN (GLUCOPHAGE-XR) 500 mg 24 hr tablet Take 3 tablets (1,500 mg total) by mouth daily with breakfast 270 tablet 2   • Multiple Vitamins-Minerals (Multivitamin Adults) TABS Take 1 tablet by mouth in the morning     • OneTouch Delica Lancets 33G MISC Check blood sugars once daily. Please substitute with appropriate alternative as covered by patient's insurance. Dx: E11.65 100 each 3   • traZODone (DESYREL) 100 mg tablet Take 1 tablet (100 mg total) by mouth daily at bedtime 90 tablet 0   • OLANZapine (ZyPREXA) 10 mg tablet Take 1 tablet (10 mg total) by mouth 2 (two) times a day 180 tablet 1     No current facility-administered  "medications for this visit.       Objective:  /80 (BP Location: Left arm, Patient Position: Sitting, Cuff Size: Large)   Pulse 60   Temp (!) 96.3 °F (35.7 °C) (Tympanic)   Resp 16   Ht 4' 11.5\" (1.511 m)   Wt 90.7 kg (200 lb)   LMP 01/01/2011   SpO2 97%   BMI 39.72 kg/m²    Wt Readings from Last 3 Encounters:   02/25/25 90.7 kg (200 lb)   02/13/25 90.4 kg (199 lb 3.2 oz)   01/14/25 93.4 kg (206 lb)      BP Readings from Last 3 Encounters:   02/25/25 110/80   02/13/25 144/79   01/14/25 130/62          Physical Exam  Vitals and nursing note reviewed.   Constitutional:       General: She is not in acute distress.     Appearance: Normal appearance. She is well-developed. She is obese. She is not ill-appearing or toxic-appearing.   HENT:      Head: Normocephalic and atraumatic.   Eyes:      Conjunctiva/sclera: Conjunctivae normal.   Neck:      Thyroid: No thyromegaly.      Vascular: No carotid bruit.   Cardiovascular:      Rate and Rhythm: Normal rate and regular rhythm.      Pulses: Normal pulses.           Dorsalis pedis pulses are 2+ on the right side and 2+ on the left side.        Posterior tibial pulses are 2+ on the right side and 2+ on the left side.      Heart sounds: Normal heart sounds.   Pulmonary:      Effort: Pulmonary effort is normal.      Breath sounds: Normal breath sounds.   Abdominal:      General: Bowel sounds are normal. There is no distension.      Palpations: Abdomen is soft. There is no mass.      Tenderness: There is no abdominal tenderness. There is no guarding or rebound.   Musculoskeletal:         General: No swelling or tenderness.      Cervical back: Neck supple.      Right lower leg: No edema.      Left lower leg: No edema.      Left foot: Deformity and bunion present.   Feet:      Left foot:      Skin integrity: Callus and dry skin present.      Toenail Condition: Left toenails are abnormally thick. Fungal disease present.  Lymphadenopathy:      Cervical: No cervical " "adenopathy.   Neurological:      General: No focal deficit present.      Mental Status: She is alert and oriented to person, place, and time.   Psychiatric:         Mood and Affect: Mood normal.         Behavior: Behavior normal.         Thought Content: Thought content normal.         Judgment: Judgment normal.         Lab Results:      Lab Results   Component Value Date    WBC 10.06 10/25/2024    HGB 15.2 10/25/2024    HCT 46.8 (H) 10/25/2024     10/25/2024    TRIG 150 (H) 10/25/2024    HDL 60 10/25/2024    LDLDIRECT 143 (H) 10/25/2024    ALT 19 10/25/2024    AST 17 10/25/2024    K 3.9 10/25/2024     10/25/2024    CREATININE 0.74 10/25/2024    BUN 10 10/25/2024    CO2 27 10/25/2024    GLUF 123 (H) 10/25/2024    HGBA1C 6.2 (H) 10/25/2024     No results found for: \"URICACID\"  Invalid input(s): \"BASENAME\" Vitamin D    No results found.     POCT Labs               5 minutes spent on chart prep, 35 minutes spent with patient counseling/educating on their diagnoses, tests completed and any new tests ordered, any referrals placed, treatment options, and documentation of above today.   In prescribing new medications, or changing doses, we reviewed the risks and benefits and side effects of these medications along with other treatment options if appropriate.           "

## 2025-02-25 NOTE — ASSESSMENT & PLAN NOTE
Pending labs. Start Lipitor 20mg QHS. D/C Red Yeast Rice 600mg QD.  Recommend lifestyle modifications.     The 10-year ASCVD risk score (Lucho SEXTON, et al., 2019) is: 9.1%    Values used to calculate the score:      Age: 64 years      Sex: Female      Is Non- : No      Diabetic: Yes      Tobacco smoker: No      Systolic Blood Pressure: 110 mmHg      Is BP treated: No      HDL Cholesterol: 60 mg/dL      Total Cholesterol: 231 mg/dL      Orders:  •  atorvastatin (LIPITOR) 20 mg tablet; Take 1 tablet (20 mg total) by mouth every evening  •  Comprehensive metabolic panel; Future  •  TSH, 3rd generation with Free T4 reflex; Future  •  LDL cholesterol, direct; Future

## 2025-02-25 NOTE — ASSESSMENT & PLAN NOTE
Pending labs.  Continue MVI and OTC Vitamin D 3,000IU daily.      Orders:  •  Vitamin D 25 hydroxy; Future

## 2025-02-25 NOTE — ASSESSMENT & PLAN NOTE
Pending labs.  Continue Metformin XR 500mg 3 pills daily.  Patient declines statin, glucometer Rx.          Call Insurance to Ask About Diabetes Med Coverage -      GLP-1 Agonists - Pill - Rybelsus, etc., Injectable - Ozempic, Mounjaro, Trulicity, Victoza, Byetta, etc.  SGLT-2 Inhibitor - Pill - Jardiance, Farxiga, etc.       Please let us know about your medication selection and availability at pharmacy of your choice.          Lab Results   Component Value Date    HGBA1C 6.2 (H) 10/25/2024     Orders:  •  Blood Glucose Monitoring Suppl (OneTouch Verio Reflect) w/Device KIT; Check blood sugars once daily. Please substitute with appropriate alternative as covered by patient's insurance. Dx: E11.65  •  glucose blood (OneTouch Verio) test strip; Check blood sugars once daily. Please substitute with appropriate alternative as covered by patient's insurance. Dx: E11.65  •  OneTouch Delica Lancets 33G MISC; Check blood sugars once daily. Please substitute with appropriate alternative as covered by patient's insurance. Dx: E11.65  •  Ambulatory referral to Podiatry; Future  •  Comprehensive metabolic panel; Future  •  Hemoglobin A1C; Future  •  metFORMIN (GLUCOPHAGE-XR) 500 mg 24 hr tablet; Take 3 tablets (1,500 mg total) by mouth daily with breakfast

## 2025-02-25 NOTE — ASSESSMENT & PLAN NOTE
Stable. Kegel exercises Handout given previously. Patient declines Pelvic floor PT previously.

## 2025-02-25 NOTE — PATIENT INSTRUCTIONS
Call Insurance to Ask About Diabetes Med Coverage -      GLP-1 Agonists - Pill - Rybelsus, etc., Injectable - Ozempic, Mounjaro, Trulicity, Victoza, Byetta, etc.  SGLT-2 Inhibitor - Pill - Jardiance, Farxiga, etc.       Please let us know about your medication selection and availability at pharmacy of your choice.      Goal blood sugars:  Fasting in AM - Less than 100. 2 hours after any meal - Less than 140.

## 2025-02-25 NOTE — ASSESSMENT & PLAN NOTE
Stable.  Recommend lifestyle modifications.  Patient is considering GLP-1A for DM2.      Orders:  •  TSH, 3rd generation with Free T4 reflex; Future  •  Vitamin D 25 hydroxy; Future

## 2025-02-25 NOTE — ASSESSMENT & PLAN NOTE
Management per Psych.     Orders:  •  TSH, 3rd generation with Free T4 reflex; Future  •  Magnesium; Future

## 2025-02-26 ENCOUNTER — TELEPHONE (OUTPATIENT)
Dept: PSYCHIATRY | Facility: CLINIC | Age: 65
End: 2025-02-26

## 2025-02-26 DIAGNOSIS — F31.2 BIPOLAR AFFECTIVE DISORDER, CURRENTLY MANIC, SEVERE, WITH PSYCHOTIC FEATURES (HCC): ICD-10-CM

## 2025-02-26 RX ORDER — OLANZAPINE 10 MG/1
10 TABLET ORAL 2 TIMES DAILY
Qty: 180 TABLET | Refills: 1 | Status: SHIPPED | OUTPATIENT
Start: 2025-02-26

## 2025-03-03 DIAGNOSIS — G47.00 INSOMNIA, UNSPECIFIED TYPE: ICD-10-CM

## 2025-03-03 DIAGNOSIS — F31.61 BIPOLAR DISORDER, CURRENT EPISODE MIXED, MILD (HCC): ICD-10-CM

## 2025-03-03 RX ORDER — TRAZODONE HYDROCHLORIDE 100 MG/1
100 TABLET ORAL
Qty: 90 TABLET | Refills: 0 | Status: SHIPPED | OUTPATIENT
Start: 2025-03-03

## 2025-03-03 RX ORDER — LAMOTRIGINE 100 MG/1
150 TABLET ORAL DAILY
Qty: 150 TABLET | Refills: 0 | Status: SHIPPED | OUTPATIENT
Start: 2025-03-03

## 2025-03-14 DIAGNOSIS — F41.1 GAD (GENERALIZED ANXIETY DISORDER): ICD-10-CM

## 2025-03-14 DIAGNOSIS — F33.2 SEVERE EPISODE OF RECURRENT MAJOR DEPRESSIVE DISORDER, WITHOUT PSYCHOTIC FEATURES (HCC): Chronic | ICD-10-CM

## 2025-03-17 DIAGNOSIS — F33.41 RECURRENT MAJOR DEPRESSIVE DISORDER, IN PARTIAL REMISSION (HCC): ICD-10-CM

## 2025-03-17 RX ORDER — FLUOXETINE HYDROCHLORIDE 40 MG/1
40 CAPSULE ORAL DAILY
Qty: 90 CAPSULE | Refills: 2 | Status: SHIPPED | OUTPATIENT
Start: 2025-03-17

## 2025-04-09 ENCOUNTER — OFFICE VISIT (OUTPATIENT)
Dept: PSYCHIATRY | Facility: CLINIC | Age: 65
End: 2025-04-09
Payer: COMMERCIAL

## 2025-04-09 VITALS
WEIGHT: 201 LBS | DIASTOLIC BLOOD PRESSURE: 70 MMHG | HEIGHT: 60 IN | BODY MASS INDEX: 39.46 KG/M2 | SYSTOLIC BLOOD PRESSURE: 105 MMHG | HEART RATE: 66 BPM

## 2025-04-09 DIAGNOSIS — F31.81 BIPOLAR II DISORDER (HCC): Primary | ICD-10-CM

## 2025-04-09 DIAGNOSIS — F41.1 GAD (GENERALIZED ANXIETY DISORDER): ICD-10-CM

## 2025-04-09 PROCEDURE — 90833 PSYTX W PT W E/M 30 MIN: CPT | Performed by: NURSE PRACTITIONER

## 2025-04-09 PROCEDURE — 99214 OFFICE O/P EST MOD 30 MIN: CPT | Performed by: NURSE PRACTITIONER

## 2025-04-19 NOTE — BH TREATMENT PLAN
TREATMENT PLAN (Medication Management Only)         [facility]     Name and Date of Birth:  Martha MIGEL Gissel 64 y.o. 1960  Date of Treatment Plan: October 22, 2024, April 9, 2025  Diagnosis/Diagnoses:    1. Recurrent major depressive disorder, in partial remission (HCC)    2. Bipolar II disorder (HCC)    3. High risk medications (not anticoagulants) long-term use    4. History of anemia    5. Bipolar affective disorder, currently manic, severe, with psychotic features (HCC)       Strengths/Personal Resources for Self-Care: supportive family, taking medications as prescribed, ability to communicate well.  Area/Areas of need (in own words): mood instability  1.Long Term Goal: improve mood stability.  Target Date:6 months - 10/9/2025  Person/Persons responsible for completion of goal: Martha, provider and therapist, family  2.         Short Term Objective (s) - How will we reach this goal?:   A. Provider new recommended medication/dosage changes and/or continue medication(s): continue current medications as prescribed.  B.  Self care adequate nutrition and sleep .  C.  Therapy .  Target Date:6 months - 10/9/2025  Person/Persons Responsible for Completion of Goal: Martha, provider and therapist, family  Progress Towards Goals: continuing treatment  Treatment Modality: medication management every 3 months  Review due 180 days from date of this plan: 6 months - 10/9/2025  Expected length of service: maintenance  My Physician/PA/NP and I have developed this plan together and I agree to work on the goals and objectives. I understand the treatment goals that were developed for my treatment.

## 2025-04-19 NOTE — ASSESSMENT & PLAN NOTE
Olanzapine 10 mg twice a day, Lamictal 150 mg daily  Martha reports medication has helped stabilize her mood.  Denies episodes of cecy or delusions.  Tries to work out at the gym with her .  Reports appetite is good and she is sleeping with the help of trazodone 100 mg.  She denies side effects.  Aims negative, PHQ-9 score of 2 indicates minimal depression denies suicidal ideation.  Takes medication as prescribed and  is supportive.

## 2025-04-19 NOTE — PSYCH
MEDICATION MANAGEMENT NOTE        [facility]      Name and Date of Birth:  Martha Chauhan 64 y.o. 1960 MRN: [id number]    Insurance: Payor: [employer]  REP / Plan: AARP MEDICARE COMPLETE  REP / Product Type: Medicare HMO /     Date of Visit: April 9, 2025  This note was not shared with the patient due to this is a psychotherapy note  Reason for Visit:   Chief Complaint   Patient presents with    Anxiety    bipolar II    Medication Management       Assessment & Plan  Bipolar II disorder (HCC)  Olanzapine 10 mg twice a day, Lamictal 150 mg daily  Martha reports medication has helped stabilize her mood.  Denies episodes of cecy or delusions.  Tries to work out at the gym with her .  Reports appetite is good and she is sleeping with the help of trazodone 100 mg.  She denies side effects.  Aims negative, PHQ-9 score of 2 indicates minimal depression denies suicidal ideation.  Takes medication as prescribed and  is supportive.         SWAPNA (generalized anxiety disorder)  Prozac 40 mg  Martha and her  attended the session reporting minimal anxiety.  SWAPNA-7 score of 2 indicates minimal anxiety.  Situational anxiety is expressed regarding not being able to see her children or grandchildren.  They live out of state and do not seem responsive and wanting to talk to her and her .  Support was provided                   Risks/benefits/alternativies to treatment discussed, including potential adverse medication side effects, to which Martha voiced understanding and consented fully to treatment.  Also, patient is amenable to calling/contacting the outpatient office including this writer if any acute adverse effects of their medication regimen arise in addition to any comments or concerns pertaining to their psychiatric management.      Medications Risks/Benefits      Risks, Benefits And Possible Side Effects Of Medications:    Risks, benefits, and  possible side effects of medications explained to Martha and she verbalizes understanding and agreement for treatment.    Controlled Medication Discussion:     Not applicable         Subjective      Martha Chauhan is a 64 y.o. female, visited for Anxiety, bipolar II, and Medication Management, who was personally seen and evaluated today at the [facility] outpatient clinic for follow-up and medication management. Completes psychiatric assessment without difficulty.     At previous outpatient psychiatric appointment with this writer, Martha and her  attended the session, reporting moods are stable and she is eating and sleeping. Denies delusions or cecy. Sleeping more and we discussed decreasing zyprexia when there is more day light. They agreed due to this is the time of year she usually goes into the hospital. Support was provided. .   She denies any current adverse medication side effects.      Overall, Martha Thomas appears to be responding to treatment plan, she is adherent to medication regime and has the support system.  Encouraged her to call with problems or concerns            Review Of Systems:  Pertinent items are noted in HPI; all others are negative; no recent changes in medications or health status reported.    PHQ-2/9 Depression Screening    Little interest or pleasure in doing things: 0 - not at all  Feeling down, depressed, or hopeless: 0 - not at all  Trouble falling or staying asleep, or sleeping too much: 1 - several days  Feeling tired or having little energy: 1 - several days  Poor appetite or overeatin - not at all  Feeling bad about yourself - or that you are a failure or have let yourself or your family down: 0 - not at all  Trouble concentrating on things, such as reading the newspaper or watching television: 0 - not at all  Moving or speaking so slowly that other people could have noticed. Or the opposite - being so fidgety or restless that you have  been moving around a lot more than usual: 0 - not at all  Thoughts that you would be better off dead, or of hurting yourself in some way: 0 - not at all  PHQ-9 Score: 2  PHQ-9 Interpretation: No or Minimal depression         SWAPNA-7 Flowsheet Screening      Flowsheet Row Most Recent Value   Over the last two weeks, how often have you been bothered by the following problems?     Feeling nervous, anxious, or on edge 1   Not being able to stop or control worrying 0   Worrying too much about different things 1   Trouble relaxing  0   Being so restless that it's hard to sit still 0   Becoming easily annoyed or irritable  0   Feeling afraid as if something awful might happen 0   How difficult have these problems made it for you to do your work, take care of things at home, or get along with other people?  Somewhat difficult   SWAPNA Score  2            Historical Information    Past Psychiatric History Update:   - No inpatient psychiatric admission since last encounter  - No SA or SIB since last encounter  - No incidence of violent behavior since last encounter    Past Trauma History Update:   - No new onset of abuse or traumatic events since last encounter     Past Medical History:    Past Medical History:   Diagnosis Date    Anxiety     Bipolar disorder ([facility]) 04/19/2023    Class 2 severe obesity with serious comorbidity and body mass index (BMI) of 37.0 to 37.9 in adult ([facility])     Concussion     Last assessed 9/21/2017    Depression     Diabetes type 2, controlled ([facility]) 02/26/2024    History of ischemic stroke without residual deficits 2013    s/p Brain Surgery, s/p Meds and Rehab    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, 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     Social History     Substance and Sexual Activity   Drug Use Never       Social History:    Social History     Socioeconomic History    Marital status: /Civil Union     Spouse name: Not on file    Number of children: 2    Years of education: Not on file    Highest education level: Some college, no degree   Occupational History    Occupation: Disabled - Previous      Comment: s/p Brain Surgery / H/O Epilepsy   Tobacco Use    Smoking status: Never     Passive exposure: Current    Smokeless tobacco: Never   Vaping Use    Vaping status: Never Used   Substance and Sexual Activity    Alcohol use: No     Comment: doesnt drink.    Drug use: Never    Sexual activity: Not Currently     Partners: Male     Birth control/protection: Post-menopausal, Female Sterilization     Comment: Tubal Ligation   Other Topics Concern    Not on file   Social History Narrative        Lives with     2 Children - 2 Sons    Disabled s/p Brain Surgery / H/O Epilepsy - Previous      Social Drivers of Health     Financial Resource Strain: Low Risk  (2023)    Overall Financial Resource Strain (CARDIA)     Difficulty of Paying Living Expenses: Not hard at all   Food Insecurity: No Food Insecurity (10/25/2024)    Hunger Vital Sign     Worried About Running Out of Food in the Last Year: Never true     Ran Out of Food in the Last Year: Never true   Transportation Needs: No Transportation Needs (10/25/2024)    PRAPARE - Transportation     Lack of Transportation (Medical): No     Lack of Transportation (Non-Medical): No   Physical Activity: Unknown (2021)    Exercise Vital Sign     Days of Exercise per Week: 6 days     Minutes of Exercise  per Session: Not on file   Stress: Stress Concern Present (1/21/2021)    [employer] - Occupational Stress Questionnaire     Feeling of Stress : To some extent   Social Connections: Unknown (1/21/2021)    Social Connection and Isolation Panel [NHANES]     Frequency of Communication with Friends and Family: Not on file     Frequency of Social Gatherings with Friends and Family: Not on file     Attends Jew Services: Not on file     Active Member of Clubs or Organizations: Not on file     Attends Club or Organization Meetings: Not on file     Marital Status:    Intimate Partner Violence: Not At Risk (1/21/2021)    Humiliation, Afraid, Rape, and Kick questionnaire     Fear of Current or Ex-Partner: No     Emotionally Abused: No     Physically Abused: No     Sexually Abused: No   Housing Stability: Low Risk  (10/25/2024)    Housing Stability Vital Sign     Unable to Pay for Housing in the Last Year: No     Number of Times Moved in the Last Year: 1     Homeless in the Last Year: No       Family Psychiatric History:     Family History   Problem Relation Age of Onset    Aortic aneurysm Mother 70        Abdominal Aortic Aneurysm, +Smoker    Hearing loss Mother     No Known Problems Father     Bipolar disorder Brother     Mental illness Brother         bipolar 1    Depression Brother     No Known Problems Maternal Aunt     Cancer Maternal Grandmother         brain cancer    No Known Problems Son     No Known Problems Half-Sister     No Known Problems Half-Sister     No Known Problems Half-Sister     No Known Problems Half-Sister     Alcohol abuse Neg Hx     Drug abuse Neg Hx     Completed Suicide  Neg Hx        History Review: The following portions of the patient's history were reviewed and updated as appropriate: allergies, current medications, past family history, past medical history, past social history, past surgical history and problem list.           Objective      Vital signs  "in last 24 hours:  Vitals:    04/09/25 0940   BP: 105/70   Pulse: 66   Weight: 91.2 kg (201 lb)   Height: 4' 11.5\" (1.511 m)       Mental Status Evaluation:    Appearance age appropriate, casually dressed   Behavior cooperative, calm   Speech normal rate, normal volume, normal pitch   Mood normal   Affect normal range and intensity, appropriate   Thought Processes organized, goal directed   Associations intact associations   Thought Content no overt delusions   Perceptual Disturbances: no auditory hallucinations, no visual hallucinations   Abnormal Thoughts  Risk Potential Suicidal ideation - None  Homicidal ideation - None  Potential for aggression - No   Orientation oriented to: person, place, time/date, and situation   Memory recent and remote memory grossly intact   Consciousness alert and awake   Attention Span Concentration Span attention span and concentration are age appropriate   Intellect not formally assessed   Insight intact   Judgement intact   Muscle Strength and  Gait normal muscle strength and normal muscle tone, normal gait and normal balance   Motor activity no abnormal movements   Language no difficulty naming common objects   Fund of Knowledge adequate knowledge of current events   Pain none   Pain Scale 0             Current Outpatient Medications   Medication Sig Dispense Refill    FLUoxetine (PROzac) 40 MG capsule Take 1 capsule (40 mg total) by mouth daily 90 capsule 2    atorvastatin (LIPITOR) 20 mg tablet Take 1 tablet (20 mg total) by mouth every evening 30 tablet 8    Blood Glucose Monitoring Suppl (OneTouch Verio Reflect) w/Device KIT Check blood sugars once daily. Please substitute with appropriate alternative as covered by patient's insurance. Dx: E11.65 1 kit 0    Cholecalciferol (Vitamin D3) 1000 units CAPS Take 3 capsules by mouth in the morning      glucose blood (OneTouch Verio) test strip Check blood sugars once daily. Please substitute with appropriate alternative as covered by " patient's insurance. Dx: E11.65 100 each 3    lamoTRIgine (LaMICtal) 100 mg tablet TAKE 1 AND 1/2 TABLETS BY MOUTH  DAILY 150 tablet 0    metFORMIN (GLUCOPHAGE-XR) 500 mg 24 hr tablet Take 3 tablets (1,500 mg total) by mouth daily with breakfast 270 tablet 2    Multiple Vitamins-Minerals (Multivitamin Adults) TABS Take 1 tablet by mouth in the morning      OLANZapine (ZyPREXA) 10 mg tablet Take 1 tablet (10 mg total) by mouth 2 (two) times a day 180 tablet 1    OneTouch Delica Lancets 33G MISC Check blood sugars once daily. Please substitute with appropriate alternative as covered by patient's insurance. Dx: E11.65 100 each 3    traZODone (DESYREL) 100 mg tablet TAKE 1 TABLET BY MOUTH DAILY AT  BEDTIME 90 tablet 0     No current facility-administered medications for this visit.         Psychotherapy Provided:     Individual psychotherapy provided: yes   Supportive therapy  Medication evaluation  Mood assessment  Surveys reviewed and confirmed  Normalized and validated her feelings  Treatment plan updated      Visit Time    Visit Start Time: 9:45   Visit Stop Time: 10:15  Total Visit Duration:  30 minutes    Briana Varela, PhD 04/19/25    This note was completed in part utilizing Dragon dictation Software. Grammatical, translation, syntax errors, random word insertions, spelling mistakes, and incomplete sentences may be an occasional consequence of this system secondary to software limitations with voice recognition, ambient noise, and hardware issues. If you have any questions or concerns about the content, text, or information contained within the body of this dictation, please contact the provider for clarification.

## 2025-04-19 NOTE — ASSESSMENT & PLAN NOTE
Prozac 40 mg  Martha and her  attended the session reporting minimal anxiety.  WSAPNA-7 score of 2 indicates minimal anxiety.  Situational anxiety is expressed regarding not being able to see her children or grandchildren.  They live out of state and do not seem responsive and wanting to talk to her and her .  Support was provided

## 2025-05-21 DIAGNOSIS — G47.00 INSOMNIA, UNSPECIFIED TYPE: ICD-10-CM

## 2025-05-21 RX ORDER — TRAZODONE HYDROCHLORIDE 100 MG/1
100 TABLET ORAL
Qty: 90 TABLET | Refills: 0 | Status: SHIPPED | OUTPATIENT
Start: 2025-05-21

## 2025-05-28 ENCOUNTER — VBI (OUTPATIENT)
Dept: ADMINISTRATIVE | Facility: OTHER | Age: 65
End: 2025-05-28

## 2025-05-28 NOTE — TELEPHONE ENCOUNTER
05/28/25 10:29 AM     Chart reviewed for CRC: Colonoscopy ; nothing is submitted to the patient's insurance at this time.     Yaritza Barboza MA   [facility]

## 2025-06-05 LAB
LEFT EYE DIABETIC RETINOPATHY: NORMAL
RIGHT EYE DIABETIC RETINOPATHY: NORMAL

## 2025-06-15 DIAGNOSIS — F31.61 BIPOLAR DISORDER, CURRENT EPISODE MIXED, MILD (HCC): ICD-10-CM

## 2025-06-16 RX ORDER — LAMOTRIGINE 100 MG/1
150 TABLET ORAL DAILY
Qty: 150 TABLET | Refills: 0 | Status: SHIPPED | OUTPATIENT
Start: 2025-06-16

## 2025-06-19 ENCOUNTER — APPOINTMENT (OUTPATIENT)
Dept: LAB | Facility: CLINIC | Age: 65
End: 2025-06-19
Attending: FAMILY MEDICINE
Payer: COMMERCIAL

## 2025-06-19 ENCOUNTER — RESULTS FOLLOW-UP (OUTPATIENT)
Dept: FAMILY MEDICINE CLINIC | Facility: CLINIC | Age: 65
End: 2025-06-19

## 2025-06-19 DIAGNOSIS — F41.1 GAD (GENERALIZED ANXIETY DISORDER): ICD-10-CM

## 2025-06-19 DIAGNOSIS — F31.81 BIPOLAR II DISORDER (HCC): ICD-10-CM

## 2025-06-19 DIAGNOSIS — E55.9 VITAMIN D DEFICIENCY: ICD-10-CM

## 2025-06-19 DIAGNOSIS — E66.01 CLASS 2 SEVERE OBESITY WITH SERIOUS COMORBIDITY AND BODY MASS INDEX (BMI) OF 38.0 TO 38.9 IN ADULT, UNSPECIFIED OBESITY TYPE (HCC): ICD-10-CM

## 2025-06-19 DIAGNOSIS — E66.812 CLASS 2 SEVERE OBESITY WITH SERIOUS COMORBIDITY AND BODY MASS INDEX (BMI) OF 38.0 TO 38.9 IN ADULT, UNSPECIFIED OBESITY TYPE (HCC): ICD-10-CM

## 2025-06-19 DIAGNOSIS — E66.9 OBESITY (BMI 35.0-39.9 WITHOUT COMORBIDITY): ICD-10-CM

## 2025-06-19 DIAGNOSIS — E11.9 CONTROLLED TYPE 2 DIABETES MELLITUS WITHOUT COMPLICATION, WITHOUT LONG-TERM CURRENT USE OF INSULIN (HCC): ICD-10-CM

## 2025-06-19 DIAGNOSIS — E66.01 SEVERE OBESITY (BMI 35.0-39.9) WITH COMORBIDITY (HCC): ICD-10-CM

## 2025-06-19 DIAGNOSIS — E78.5 HYPERLIPIDEMIA, UNSPECIFIED HYPERLIPIDEMIA TYPE: ICD-10-CM

## 2025-06-19 DIAGNOSIS — E66.812 CLASS 2 SEVERE OBESITY WITH SERIOUS COMORBIDITY AND BODY MASS INDEX (BMI) OF 39.0 TO 39.9 IN ADULT, UNSPECIFIED OBESITY TYPE (HCC): ICD-10-CM

## 2025-06-19 DIAGNOSIS — E66.01 CLASS 2 SEVERE OBESITY WITH SERIOUS COMORBIDITY AND BODY MASS INDEX (BMI) OF 39.0 TO 39.9 IN ADULT, UNSPECIFIED OBESITY TYPE (HCC): ICD-10-CM

## 2025-06-19 LAB
25(OH)D3 SERPL-MCNC: 38.7 NG/ML (ref 30–100)
ALBUMIN SERPL BCG-MCNC: 4.4 G/DL (ref 3.5–5)
ALP SERPL-CCNC: 91 U/L (ref 34–104)
ALT SERPL W P-5'-P-CCNC: 11 U/L (ref 7–52)
ANION GAP SERPL CALCULATED.3IONS-SCNC: 5 MMOL/L (ref 4–13)
AST SERPL W P-5'-P-CCNC: 13 U/L (ref 13–39)
BILIRUB SERPL-MCNC: 0.37 MG/DL (ref 0.2–1)
BUN SERPL-MCNC: 15 MG/DL (ref 5–25)
CALCIUM SERPL-MCNC: 9.2 MG/DL (ref 8.4–10.2)
CHLORIDE SERPL-SCNC: 104 MMOL/L (ref 96–108)
CO2 SERPL-SCNC: 31 MMOL/L (ref 21–32)
CREAT SERPL-MCNC: 0.98 MG/DL (ref 0.6–1.3)
EST. AVERAGE GLUCOSE BLD GHB EST-MCNC: 128 MG/DL
GFR SERPL CREATININE-BSD FRML MDRD: 60 ML/MIN/1.73SQ M
GLUCOSE P FAST SERPL-MCNC: 120 MG/DL (ref 65–99)
HBA1C MFR BLD: 6.1 %
LDLC SERPL DIRECT ASSAY-MCNC: 55 MG/DL (ref 0–100)
MAGNESIUM SERPL-MCNC: 1.9 MG/DL (ref 1.9–2.7)
POTASSIUM SERPL-SCNC: 4.5 MMOL/L (ref 3.5–5.3)
PROT SERPL-MCNC: 7.3 G/DL (ref 6.4–8.4)
SODIUM SERPL-SCNC: 140 MMOL/L (ref 135–147)
TSH SERPL DL<=0.05 MIU/L-ACNC: 3.98 UIU/ML (ref 0.45–4.5)

## 2025-06-19 PROCEDURE — 83036 HEMOGLOBIN GLYCOSYLATED A1C: CPT

## 2025-06-19 PROCEDURE — 83721 ASSAY OF BLOOD LIPOPROTEIN: CPT

## 2025-06-19 PROCEDURE — 36415 COLL VENOUS BLD VENIPUNCTURE: CPT

## 2025-06-19 PROCEDURE — 82306 VITAMIN D 25 HYDROXY: CPT

## 2025-06-19 PROCEDURE — 83735 ASSAY OF MAGNESIUM: CPT

## 2025-06-19 PROCEDURE — 84443 ASSAY THYROID STIM HORMONE: CPT

## 2025-06-19 PROCEDURE — 80053 COMPREHEN METABOLIC PANEL: CPT

## 2025-06-27 ENCOUNTER — TELEPHONE (OUTPATIENT)
Dept: PSYCHIATRY | Facility: CLINIC | Age: 65
End: 2025-06-27

## 2025-06-27 NOTE — TELEPHONE ENCOUNTER
Called and left voicemail that the appt on 7/9 with Dr. Varela needs to be r/s due to provider no longer working wednesdays

## 2025-06-30 ENCOUNTER — OFFICE VISIT (OUTPATIENT)
Dept: FAMILY MEDICINE CLINIC | Facility: CLINIC | Age: 65
End: 2025-06-30
Payer: COMMERCIAL

## 2025-06-30 VITALS
RESPIRATION RATE: 16 BRPM | WEIGHT: 197 LBS | HEIGHT: 60 IN | DIASTOLIC BLOOD PRESSURE: 64 MMHG | TEMPERATURE: 96 F | BODY MASS INDEX: 38.68 KG/M2 | HEART RATE: 71 BPM | SYSTOLIC BLOOD PRESSURE: 100 MMHG | OXYGEN SATURATION: 96 %

## 2025-06-30 DIAGNOSIS — F41.1 GAD (GENERALIZED ANXIETY DISORDER): ICD-10-CM

## 2025-06-30 DIAGNOSIS — F31.81 BIPOLAR II DISORDER (HCC): ICD-10-CM

## 2025-06-30 DIAGNOSIS — E66.01 CLASS 2 SEVERE OBESITY WITH SERIOUS COMORBIDITY AND BODY MASS INDEX (BMI) OF 38.0 TO 38.9 IN ADULT, UNSPECIFIED OBESITY TYPE (HCC): ICD-10-CM

## 2025-06-30 DIAGNOSIS — F33.41 RECURRENT MAJOR DEPRESSIVE DISORDER, IN PARTIAL REMISSION (HCC): ICD-10-CM

## 2025-06-30 DIAGNOSIS — N39.3 PRIMARY STRESS URINARY INCONTINENCE: ICD-10-CM

## 2025-06-30 DIAGNOSIS — E78.5 HYPERLIPIDEMIA, UNSPECIFIED HYPERLIPIDEMIA TYPE: ICD-10-CM

## 2025-06-30 DIAGNOSIS — Z12.31 ENCOUNTER FOR SCREENING MAMMOGRAM FOR BREAST CANCER: ICD-10-CM

## 2025-06-30 DIAGNOSIS — E66.812 CLASS 2 SEVERE OBESITY WITH SERIOUS COMORBIDITY AND BODY MASS INDEX (BMI) OF 38.0 TO 38.9 IN ADULT, UNSPECIFIED OBESITY TYPE (HCC): ICD-10-CM

## 2025-06-30 DIAGNOSIS — E11.9 CONTROLLED TYPE 2 DIABETES MELLITUS WITHOUT COMPLICATION, WITHOUT LONG-TERM CURRENT USE OF INSULIN (HCC): Primary | ICD-10-CM

## 2025-06-30 DIAGNOSIS — E55.9 VITAMIN D DEFICIENCY: ICD-10-CM

## 2025-06-30 PROBLEM — F31.2 BIPOLAR AFFECTIVE DISORDER, CURRENTLY MANIC, SEVERE, WITH PSYCHOTIC FEATURES (HCC): Status: RESOLVED | Noted: 2025-01-14 | Resolved: 2025-06-30

## 2025-06-30 PROCEDURE — 99214 OFFICE O/P EST MOD 30 MIN: CPT | Performed by: FAMILY MEDICINE

## 2025-06-30 RX ORDER — ATORVASTATIN CALCIUM 20 MG/1
20 TABLET, FILM COATED ORAL EVERY EVENING
Qty: 90 TABLET | Refills: 2 | Status: SHIPPED | OUTPATIENT
Start: 2025-06-30

## 2025-06-30 NOTE — ASSESSMENT & PLAN NOTE
Improved.  Continue Lipitor 20mg QHS.  Recommend lifestyle modifications.         Orders:    CBC and differential; Future    Comprehensive metabolic panel; Future    Lipid panel; Future    TSH, 3rd generation with Free T4 reflex; Future    LDL cholesterol, direct; Future    atorvastatin (LIPITOR) 20 mg tablet; Take 1 tablet (20 mg total) by mouth every evening

## 2025-06-30 NOTE — ASSESSMENT & PLAN NOTE
Stable. Kegel exercises Handout given previously. Patient declines Pelvic floor PT previously.

## 2025-06-30 NOTE — ASSESSMENT & PLAN NOTE
Improved.  Recommend lifestyle modifications.  Patient is considering GLP-1A for DM2.        Orders:    Vitamin D 25 hydroxy; Future

## 2025-06-30 NOTE — PATIENT INSTRUCTIONS
Goal blood sugars:  Fasting in AM - Less than 100. 2 hours after any meal - Less than 140.       Call Insurance to Ask About Diabetes Med Coverage -      GLP-1 Agonists - Pill - Rybelsus, etc., Injectable - Ozempic, Mounjaro, Trulicity, Victoza, Byetta, etc.  SGLT-2 Inhibitor - Pill - Jardiance, Farxiga, etc.       Please let us know about your medication selection and availability at pharmacy of your choice.          Patient Education     Pelvic Floor Exercises   About this topic   The pelvic floor consists of muscles and strong bands of tissues which support all of the organs in your pelvis. Some of these organs are the bladder and the small and large bowel as well as the womb and the prostate. If the muscles and tissues get weak, your organs may drop. This can lead to other problems. Your urine may leak when you laugh, sneeze, or cough. You may not be able to drain the bladder fully. You may have less problems if you do exercises to strengthen your pelvic floor and abdominal muscles.  Kegel exercises help make the muscles in the pelvic floor stronger. Anyone can do them. It is also important to make your abdominal muscles stronger. In order for these exercises to work, you must be consistent when doing them.  General   Before starting with a program, ask your doctor if you are healthy enough to do these exercises. Your doctor may have you work with a  or physical therapist to make a safe exercise program to meet your needs.  Strengthening Exercises   Kegel exercises keep your pelvic muscles firm and strong. You can do these in many different positions. Start by lying down with your knees bent and feet on the bed. Squeeze the pelvic muscles as if you are trying to stop the flow of urine. Hold these muscles for a count of 3, and then slowly relax them for a count of 3. Try to work up to squeezing for a count of 10 and slowly relaxing for a count of 10. Increase the muscle squeeze until you get to 10. When  relaxing, slowly relax to a count of 10.  Breathe out when you are squeezing and breathe in when you are relaxing. Your goal is to try to do 10 Kegels 3 or more times each day. Take time to rest between sets. Be sure to only contract your pelvic floor muscles, not your buttocks, thighs, or abdominal muscles.  Pelvic floor contractions ? There are a few ways to feel the pelvic muscles contract.  When you are passing urine, try suddenly stopping your flow of urine. Do not do this on a regular basis, but only to feel what the contraction feels like. Doing this while passing urine can lead to other problems.  Put a finger into the vagina or rectum. Contract the muscles around your finger as if you were trying to stop the flow of urine or stop the passing of gas.  Place two chairs without arms about 2 inches (5 cm) apart. Sit so you have one butt cheek on each chair. Now, try ike your pelvic floor muscles. This will help you keep from using other muscles.  Pelvic tilts ? Lie on your back with your knees bent and feet flat on the floor. Tighten your stomach muscles and press your lower back down to the floor. Try doing Kegels with this exercise when your back is flattened. Hold 3 to 5 seconds. Relax.  Straight leg raises lying down ? Lie on your back with one leg straight. Bend your other knee so the foot is flat on the bed. Keeping your leg straight, lift the leg up to the level of your other knee. Lower it back down. Repeat with the other leg.  Hip lifts ? Lie on your back with your knees bent and feet flat on the floor. Tighten your stomach muscles and lift your buttocks off the floor. Try doing Kegels when up in this position. Hold 3 to 5 seconds. Relax.  Abdominal bracing ? Do this exercise in different positions: Lying down, sitting, and standing. Tighten your stomach muscles. While keeping the stomach muscles tight, tighten your pelvic floor muscles. Now, forcefully laugh or cough to see if you were able to  prevent urine from leaking.  Abdominal crunches ? Lie on your back with both knees bent. Keep your feet flat on the floor. Place your hands in one of these positions. Try starting with the first position since it is the easiest. As you get better, use the other positions to make it harder.  Crunches with arms at sides.  Crunches with arms across chest.  Crunches with arms behind head. Be careful not to interlock your fingers behind your neck or head while doing crunches. This may add tension to your neck and cause strain.  Look at the ceiling. Tighten your belly muscles and lift your shoulders and upper back off the floor. Breathe out while you are doing this. Lower your shoulders to the floor. Breathe in while you are doing this. Relax your belly muscles all the way before starting another crunch.             What will the results be?   Less leakage of urine when you cough, sneeze, laugh, or run  Fewer strong urges to pass urine  Fewer trips to the bathroom each day  Less risk of organs, such as the uterus or bladder, dropping into the vagina  Faster recovery after childbirth or prostate surgery  Stronger core muscles  Increased sensitivity during sex  Helpful tips   You can also try doing a different kind of Kegels. Do 5 quick, strong pelvic floor contractions. Sometimes, if you have an urge to pass urine but are not near a bathroom, you can do this kind of Kegel to calm the urge.  Stay active and work out to keep your muscles strong and flexible.  Keep a healthy weight to avoid putting too much stress on your spine. Eat a healthy diet to keep your muscles healthy.  Be sure you do not hold your breath when exercising. This can raise your blood pressure. If you tend to hold your breath, try counting out loud when exercising. If any exercise bothers you, stop right away.  Try walking or cycling at an easy pace for a few minutes to warm up your muscles. Do this again after exercising.  Doing exercises before a meal  may be a good way to get into a routine. A good time to do these exercises is each time you are stopped at a stop light while driving.  Exercise may be slightly uncomfortable, but you should not have sharp pains. If you do get sharp pains, stop what you are doing. If the sharp pains continue, call your doctor.  Last Reviewed Date   2021-03-31  Consumer Information Use and Disclaimer   This generalized information is a limited summary of diagnosis, treatment, and/or medication information. It is not meant to be comprehensive and should be used as a tool to help the user understand and/or assess potential diagnostic and treatment options. It does NOT include all information about conditions, treatments, medications, side effects, or risks that may apply to a specific patient. It is not intended to be medical advice or a substitute for the medical advice, diagnosis, or treatment of a health care provider based on the health care provider's examination and assessment of a patient’s specific and unique circumstances. Patients must speak with a health care provider for complete information about their health, medical questions, and treatment options, including any risks or benefits regarding use of medications. This information does not endorse any treatments or medications as safe, effective, or approved for treating a specific patient. [employer] and its affiliates disclaim any warranty or liability relating to this information or the use thereof. The use of this information is governed by the Terms of Use, available at https://www.[employer].com/en/know/clinical-effectiveness-terms   Copyright   Copyright © 2024 [employer], [employer] and its affiliates and/or licensors. All rights reserved.

## 2025-06-30 NOTE — PROGRESS NOTES
Assessment/Plan:  Assessment & Plan  Controlled type 2 diabetes mellitus without complication, without long-term current use of insulin (HCC)    Stable.  Continue Metformin XR 500mg 3 pills daily, Lipitor 20mg QHS.        Call Insurance to Ask About Diabetes Med Coverage -      GLP-1 Agonists - Pill - Rybelsus, etc., Injectable - Ozempic, Mounjaro, Trulicity, Victoza, Byetta, etc.  SGLT-2 Inhibitor - Pill - Jardiance, Farxiga, etc.       Please let us know about your medication selection and availability at pharmacy of your choice.          Lab Results   Component Value Date    HGBA1C 6.1 (H) 06/19/2025       Orders:    Comprehensive metabolic panel; Future    Hemoglobin A1C; Future    Albumin / creatinine urine ratio; Future     Hyperlipidemia, unspecified hyperlipidemia type  Improved.  Continue Lipitor 20mg QHS.  Recommend lifestyle modifications.         Orders:    CBC and differential; Future    Comprehensive metabolic panel; Future    Lipid panel; Future    TSH, 3rd generation with Free T4 reflex; Future    LDL cholesterol, direct; Future    atorvastatin (LIPITOR) 20 mg tablet; Take 1 tablet (20 mg total) by mouth every evening     Primary stress urinary incontinence  Stable. Kegel exercises Handout given previously. Patient declines Pelvic floor PT previously.             Vitamin D deficiency  Continue MVI and OTC Vitamin D 3,000IU daily.        Orders:    Comprehensive metabolic panel; Future    Vitamin D 25 hydroxy; Future     SWAPNA (generalized anxiety disorder)  Management per Psych.       Orders:    TSH, 3rd generation with Free T4 reflex; Future    Magnesium; Future     Bipolar II disorder (HCC)  Management per Psych.             Recurrent major depressive disorder, in partial remission (HCC)  Management per Psych.          Class 2 severe obesity with serious comorbidity and body mass index (BMI) of 38.0 to 38.9 in adult, unspecified obesity type (HCC)  Improved.  Recommend lifestyle modifications.   Patient is considering GLP-1A for DM2.        Orders:    Vitamin D 25 hydroxy; Future     Encounter for screening mammogram for breast cancer    Orders:    Mammo screening bilateral w 3d and cad; Future          Return in about 4 months (around 10/30/2025) for AWV / 4mo - DM2, HL, BPD/Anx/Dep, Vit D Def, Labs.      Future Appointments   Date Time Provider Department Center   7/21/2025  8:00 AM Briana Varela, PhD [facility]   11/3/2025 10:30 AM Teresa Torres,  [facility]          Subjective:     Martha is a 64 y.o. female who presents today for a follow-up on her chronic medical conditions.        HPI:  Chief Complaint   Patient presents with    Follow-up     4 mo     -- Above per clinical staff and reviewed. --      Diabetes  She presents for her follow-up diabetic visit. She has type 2 diabetes mellitus. There are no hypoglycemic associated symptoms. Pertinent negatives for diabetes include no chest pain and no fatigue. Risk factors for coronary artery disease include diabetes mellitus and obesity. Current diabetic treatment includes diet and oral agent (monotherapy). Her weight is decreasing steadily. She is following a generally healthy diet. She participates in exercise weekly. (Checking BS once daily.  FBS:  120-130.  ) An ACE inhibitor/angiotensin II receptor blocker is not being taken. She does not see a podiatrist.Eye exam is current.         Today:      Return in about 4 months (around 6/25/2025) for 4mo - DM2, HL, BPD/Anx/Dep, Vit D Def, Labs         4mo OV     Patient not complete labs 10/25/24, 10/29/24.     Right Great Toenail - Sympotms x 20 years.  Used topical previously s benedift.       Obesity - Trying to watch diet - trying to cut back on sugars and carbs.  +Exercise - Gym for 45 minutes, 1-2 times per week.       DM2 - On Metformin XR 500mg 3 pills daily.  No other DM meds previously.  S/p Nutrition consult c DM  2022.  Sees Optho - Dr. Feliciano - Next appt 6/26.  No  Podiatry.     Hyperlipidemia - On Lipitor 20mg QHS x 4 months.  No other statin previously.  Taking RYR.       Bipolar / Anxiety / Depression - Management per Psych Dr. Varela.  Sees q2 months - Next appt 6/25 - Overdue.  No counseling - last counseling 2021.  On Prozac 40mg QD, Zyprexa 10mg BID, Lamictal 150mg QD.  s/p partial inpatient Psych admission 3/12/24.      Vitamin D Deficiency - s/p Drisdol.  Taking MVI and OTC Vitmain D 3000IU daily.       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  Last saw Neurosurgery appt 2015 - F/U PRN.       FamHx AAA - + Mother.  Patient had normal screen 2012?.       Reviewed:  Labs 6/19/25, Gyn 10/28/24     Sees Gyn Dr. Buitrago at [facility] yearly.  Next appt 10/25.      Has Cologuard kit at home as of 6/30/25.      The following portions of the patient's history were reviewed and updated as appropriate: allergies, current medications, past family history, past medical history, past social history, past surgical history and problem list.      Review of Systems   Constitutional:  Negative for appetite change, chills, diaphoresis, fatigue and fever.   Respiratory:  Negative for chest tightness and shortness of breath.    Cardiovascular:  Negative for chest pain.   Gastrointestinal:  Negative for abdominal pain, blood in stool, diarrhea, nausea and vomiting.   Genitourinary:  Negative for dysuria.        Current Outpatient Medications   Medication Sig Dispense Refill    atorvastatin (LIPITOR) 20 mg tablet Take 1 tablet (20 mg total) by mouth every evening 90 tablet 2    Blood Glucose Monitoring Suppl (OneTouch Verio Reflect) w/Device KIT Check blood sugars once daily. Please substitute with appropriate alternative as covered by patient's insurance. Dx: E11.65 1 kit 0    Cholecalciferol (Vitamin D3) 1000 units CAPS Take 3 capsules by mouth in the morning      FLUoxetine (PROzac) 40 MG capsule Take 1  capsule (40 mg total) by mouth daily 90 capsule 2    glucose blood (OneTouch Verio) test strip Check blood sugars once daily. Please substitute with appropriate alternative as covered by patient's insurance. Dx: E11.65 100 each 3    lamoTRIgine (LaMICtal) 100 mg tablet TAKE 1 AND 1/2 TABLETS BY MOUTH  DAILY 150 tablet 0    metFORMIN (GLUCOPHAGE-XR) 500 mg 24 hr tablet Take 3 tablets (1,500 mg total) by mouth daily with breakfast 270 tablet 2    Multiple Vitamins-Minerals (Multivitamin Adults) TABS Take 1 tablet by mouth in the morning      OLANZapine (ZyPREXA) 10 mg tablet Take 1 tablet (10 mg total) by mouth 2 (two) times a day 180 tablet 1    OneTouch Delica Lancets 33G MISC Check blood sugars once daily. Please substitute with appropriate alternative as covered by patient's insurance. Dx: E11.65 100 each 3    traZODone (DESYREL) 100 mg tablet TAKE 1 TABLET BY MOUTH DAILY AT  BEDTIME 90 tablet 0     No current facility-administered medications for this visit.       Objective:  /64 (BP Location: Left arm, Patient Position: Sitting, Cuff Size: Large)   Pulse 71   Temp (!) 96 °F (35.6 °C) (Tympanic)   Resp 16   Ht 5' (1.524 m)   Wt 89.4 kg (197 lb)   LMP 01/01/2011   SpO2 96%   BMI 38.47 kg/m²    Wt Readings from Last 3 Encounters:   06/30/25 89.4 kg (197 lb)   04/09/25 91.2 kg (201 lb)   02/25/25 90.7 kg (200 lb)      BP Readings from Last 3 Encounters:   06/30/25 100/64   04/09/25 105/70   02/25/25 110/80          Physical Exam  Vitals and nursing note reviewed.   Constitutional:       General: She is not in acute distress.     Appearance: Normal appearance. She is well-developed. She is obese. She is not ill-appearing or toxic-appearing.   HENT:      Head: Normocephalic and atraumatic.     Eyes:      Conjunctiva/sclera: Conjunctivae normal.     Neck:      Thyroid: No thyromegaly.      Vascular: No carotid bruit.     Cardiovascular:      Rate and Rhythm: Normal rate and regular rhythm.      Pulses:  "Normal pulses.      Heart sounds: Normal heart sounds.   Pulmonary:      Effort: Pulmonary effort is normal.      Breath sounds: Normal breath sounds.   Abdominal:      General: Bowel sounds are normal. There is no distension.      Palpations: Abdomen is soft. There is no mass.      Tenderness: There is no abdominal tenderness. There is no guarding or rebound.     Musculoskeletal:         General: No swelling or tenderness.      Cervical back: Neck supple.      Right lower leg: No edema.      Left lower leg: No edema.   Lymphadenopathy:      Cervical: No cervical adenopathy.     Neurological:      General: No focal deficit present.      Mental Status: She is alert and oriented to person, place, and time. Mental status is at baseline.     Psychiatric:         Mood and Affect: Mood normal.         Behavior: Behavior normal.         Thought Content: Thought content normal.         Judgment: Judgment normal.         Lab Results:      Lab Results   Component Value Date    WBC 10.06 10/25/2024    HGB 15.2 10/25/2024    HCT 46.8 (H) 10/25/2024     10/25/2024    TRIG 150 (H) 10/25/2024    HDL 60 10/25/2024    LDLDIRECT 55 06/19/2025    ALT 11 06/19/2025    AST 13 06/19/2025    K 4.5 06/19/2025     06/19/2025    CREATININE 0.98 06/19/2025    BUN 15 06/19/2025    CO2 31 06/19/2025    GLUF 120 (H) 06/19/2025    HGBA1C 6.1 (H) 06/19/2025     No results found for: \"URICACID\"  Invalid input(s): \"BASENAME\" Vitamin D    No results found.     POCT Labs        Urinary Incontinence Plan of Care: counseling topics discussed: practice Kegel (pelvic floor strengthening) exercises.                     "

## 2025-06-30 NOTE — ASSESSMENT & PLAN NOTE
Stable.  Continue Metformin XR 500mg 3 pills daily, Lipitor 20mg QHS.        Call Insurance to Ask About Diabetes Med Coverage -      GLP-1 Agonists - Pill - Rybelsus, etc., Injectable - Ozempic, Mounjaro, Trulicity, Victoza, Byetta, etc.  SGLT-2 Inhibitor - Pill - Jardiance, Farxiga, etc.       Please let us know about your medication selection and availability at pharmacy of your choice.          Lab Results   Component Value Date    HGBA1C 6.1 (H) 06/19/2025       Orders:    Comprehensive metabolic panel; Future    Hemoglobin A1C; Future    Albumin / creatinine urine ratio; Future

## 2025-06-30 NOTE — ASSESSMENT & PLAN NOTE
Management per Psych.       Orders:    TSH, 3rd generation with Free T4 reflex; Future    Magnesium; Future

## 2025-06-30 NOTE — ASSESSMENT & PLAN NOTE
Continue MVI and OTC Vitamin D 3,000IU daily.        Orders:    Comprehensive metabolic panel; Future    Vitamin D 25 hydroxy; Future

## 2025-07-07 ENCOUNTER — APPOINTMENT (OUTPATIENT)
Dept: RADIOLOGY | Age: 65
End: 2025-07-07
Attending: STUDENT IN AN ORGANIZED HEALTH CARE EDUCATION/TRAINING PROGRAM
Payer: COMMERCIAL

## 2025-07-07 ENCOUNTER — OFFICE VISIT (OUTPATIENT)
Dept: URGENT CARE | Age: 65
End: 2025-07-07
Payer: COMMERCIAL

## 2025-07-07 VITALS
OXYGEN SATURATION: 96 % | SYSTOLIC BLOOD PRESSURE: 142 MMHG | HEART RATE: 79 BPM | DIASTOLIC BLOOD PRESSURE: 69 MMHG | RESPIRATION RATE: 18 BRPM | TEMPERATURE: 97.1 F

## 2025-07-07 DIAGNOSIS — S92.911A: Primary | ICD-10-CM

## 2025-07-07 DIAGNOSIS — M79.671 RIGHT FOOT PAIN: ICD-10-CM

## 2025-07-07 PROCEDURE — 99213 OFFICE O/P EST LOW 20 MIN: CPT | Performed by: STUDENT IN AN ORGANIZED HEALTH CARE EDUCATION/TRAINING PROGRAM

## 2025-07-07 PROCEDURE — 73630 X-RAY EXAM OF FOOT: CPT

## 2025-07-07 NOTE — PATIENT INSTRUCTIONS
I am concerned for fractures to the toes.  The radiologist will also read your x-ray, you can follow your results through [employer].  I recommend using your boot for walking.  Keep your foot elevated as much as possible to help with bruising and swelling.  You may use Tylenol as needed for pain.  Please call tomorrow to schedule with podiatry for recheck appointment.

## 2025-07-07 NOTE — PROGRESS NOTES
[facility]  Name: Martha Chauhan      : 1960      MRN: [id number]  Encounter Provider: Cherie Jarrett DO  Encounter Date: 2025   Encounter department: [facility] [place]  :  Assessment & Plan  Fracture of multiple toes, right, closed, initial encounter    Orders:    Ambulatory Referral to Podiatry; Future    Right foot pain    Orders:    XR foot 3+ vw right; Future    Concern for fracture to proximal phalanx of right toes 4 and 5, question of toe 2.    Patient Instructions  I am concerned for fractures to the toes.  The radiologist will also read your x-ray, you can follow your results through [employer].  I recommend using your boot for walking.  Keep your foot elevated as much as possible to help with bruising and swelling.  You may use Tylenol as needed for pain.  Please call tomorrow to schedule with podiatry for recheck appointment.    Chief Complaint:   Chief Complaint   Patient presents with    Foot Pain     Right foot and large toe pain from tripping over step this morning. Wearing boot from prior injury       History of Present Illness   Patient presents with her  for evaluation of right foot injury.  This morning, she was walking down steps with the lights off, she excellently lost her footing and tripped, unsure exactly how she hit the right foot but she feels that the toes were all bent back.  She especially noticed that the fourth toe was bent back initially.  Since then, noticed bruising around her toes, most of her pain is in the 4th and 5th toes.  She recalls previous injury and had a boot, does not believe that she had previous fractures to her foot.          Review of Systems   All other systems reviewed and are negative.    Past Medical History   Past Medical History[1]  Past Surgical History[2]  Family History[3]  she reports that she has never smoked. She has been exposed to tobacco smoke. She has never used smokeless tobacco. She reports that she  "does not drink alcohol and does not use drugs.  Current Outpatient Medications   Medication Instructions    atorvastatin (LIPITOR) 20 mg, Oral, Every evening    Blood Glucose Monitoring Suppl (OneTouch Verio Reflect) w/Device KIT Check blood sugars once daily. Please substitute with appropriate alternative as covered by patient's insurance. Dx: E11.65    Cholecalciferol (Vitamin D3) 1000 units CAPS 3 capsules, Daily    FLUoxetine (PROZAC) 40 mg, Oral, Daily    glucose blood (OneTouch Verio) test strip Check blood sugars once daily. Please substitute with appropriate alternative as covered by patient's insurance. Dx: E11.65    lamoTRIgine (LAMICTAL) 150 mg, Oral, Daily    metFORMIN (GLUCOPHAGE-XR) 1,500 mg, Oral, Daily with breakfast    Multiple Vitamins-Minerals (Multivitamin Adults) TABS 1 tablet, Daily    OLANZapine (ZYPREXA) 10 mg, Oral, 2 times daily    OneTouch Delica Lancets 33G MISC Check blood sugars once daily. Please substitute with appropriate alternative as covered by patient's insurance. Dx: E11.65    traZODone (DESYREL) 100 mg, Oral, Daily at bedtime   Allergies[4]     Objective   /69   Pulse 79   Temp (!) 97.1 °F (36.2 °C)   Resp 18   LMP 01/01/2011   SpO2 96%      Physical Exam  Vitals and nursing note reviewed.   HENT:      Head: Normocephalic and atraumatic.     Musculoskeletal:         General: Swelling and tenderness present.      Comments: Mild tenderness to the 3rd through 5th metatarsals  Greatest tenderness is to the 3rd through 4th phalanges, greatest to 4th and 5th  Full ankle ROM with mild discomfort     Skin:     Findings: Bruising present.      Comments: Bruising through toes 2 through 5.  Skin between toes appears dry, flaking  No open wounds     Neurological:      Mental Status: She is alert.         Portions of the record may have been created with voice recognition software.  Occasional wrong word or \"sound a like\" substitutions may have occurred due to the inherent " limitations of voice recognition software.  Read the chart carefully and recognize, using context, where substitutions have occurred.       [1]   Past Medical History:  Diagnosis Date    Adjustment disorder     Anxiety     Bipolar disorder ([facility]) 2023    Class 2 severe obesity with serious comorbidity and body mass index (BMI) of 37.0 to 37.9 in adult ([facility])     Cognitive impairment 2013    Concussion     Last assessed 2017    Depression     Diabetes type 2, controlled ([facility]) 2024    History of ischemic stroke without residual deficits 2013    s/p Brain Surgery, s/p Meds and Rehab    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, 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 , s/p corrective Brain Surgery at [facility]    HL (hearing loss)     Hyperlipidemia 2023    Memory loss     Obesity     Panic attack     Primary stress urinary incontinence 2023    Psychiatric illness 2017    Seizures ([facility]) 1980    Sleep difficulties 2013    Vitamin D deficiency    [2]   Past Surgical History:  Procedure Laterality Date    BRAIN SURGERY      R Temporal Resection / Amyglahippocampectomy     SECTION  1980, 1983    x 2    TUBAL LIGATION     [3]   Family History  Problem Relation Name Age of Onset    Aortic aneurysm Mother Maddi Ascencio 70        Abdominal Aortic Aneurysm, +Smoker    Hearing loss Mother Maddi Ascencio     No Known Problems Father      Bipolar disorder Brother Tejas     Mental illness Brother Tejas         bipolar 1    Depression Brother Tejas     No Known Problems Son Tyrone     Cancer Maternal Grandmother Merary Head         brain cancer    No Known Problems Maternal Aunt      No Known Problems Half-Sister Midge     No Known Problems Half-Sister Melissa     No Known Problems Half-Sister Ami     No Known Problems Half-Sister Fallon     Alcohol abuse Neg Hx      Drug abuse Neg Hx      Completed Suicide  Neg Hx     [4] No Known  Allergies

## 2025-07-09 ENCOUNTER — TELEPHONE (OUTPATIENT)
Age: 65
End: 2025-07-09

## 2025-07-09 NOTE — TELEPHONE ENCOUNTER
Caller: Martha     Doctor and/or Office: Dr. Montano     #: [phone]     Escalation: Care Patient has Fractures of the fourth and fifth proximal phalanges  she is sched for 8/27 she wanted to know if there is anything she should be doing in the mean time.  Please advise thank you

## 2025-07-16 DIAGNOSIS — F31.2 BIPOLAR AFFECTIVE DISORDER, CURRENTLY MANIC, SEVERE, WITH PSYCHOTIC FEATURES (HCC): ICD-10-CM

## 2025-07-17 RX ORDER — OLANZAPINE 10 MG/1
10 TABLET, FILM COATED ORAL 2 TIMES DAILY
Qty: 180 TABLET | Refills: 3 | Status: SHIPPED | OUTPATIENT
Start: 2025-07-17 | End: 2025-07-21 | Stop reason: SDUPTHER

## 2025-07-21 ENCOUNTER — OFFICE VISIT (OUTPATIENT)
Dept: PSYCHIATRY | Facility: CLINIC | Age: 65
End: 2025-07-21
Payer: COMMERCIAL

## 2025-07-21 VITALS
HEART RATE: 78 BPM | BODY MASS INDEX: 38.28 KG/M2 | HEIGHT: 60 IN | SYSTOLIC BLOOD PRESSURE: 118 MMHG | WEIGHT: 195 LBS | DIASTOLIC BLOOD PRESSURE: 82 MMHG

## 2025-07-21 DIAGNOSIS — F41.1 GAD (GENERALIZED ANXIETY DISORDER): ICD-10-CM

## 2025-07-21 DIAGNOSIS — G47.00 INSOMNIA, UNSPECIFIED TYPE: ICD-10-CM

## 2025-07-21 DIAGNOSIS — F31.2 BIPOLAR AFFECTIVE DISORDER, CURRENTLY MANIC, SEVERE, WITH PSYCHOTIC FEATURES (HCC): ICD-10-CM

## 2025-07-21 DIAGNOSIS — F31.81 BIPOLAR II DISORDER (HCC): Primary | ICD-10-CM

## 2025-07-21 DIAGNOSIS — F33.41 RECURRENT MAJOR DEPRESSIVE DISORDER, IN PARTIAL REMISSION (HCC): ICD-10-CM

## 2025-07-21 PROCEDURE — 90833 PSYTX W PT W E/M 30 MIN: CPT | Performed by: NURSE PRACTITIONER

## 2025-07-21 PROCEDURE — 99214 OFFICE O/P EST MOD 30 MIN: CPT | Performed by: NURSE PRACTITIONER

## 2025-07-21 RX ORDER — TRAZODONE HYDROCHLORIDE 100 MG/1
100 TABLET ORAL
Qty: 90 TABLET | Refills: 0 | Status: SHIPPED | OUTPATIENT
Start: 2025-07-21

## 2025-07-21 RX ORDER — OLANZAPINE 10 MG/1
10 TABLET, FILM COATED ORAL 2 TIMES DAILY
Qty: 180 TABLET | Refills: 3 | Status: SHIPPED | OUTPATIENT
Start: 2025-07-21

## 2025-07-21 NOTE — ASSESSMENT & PLAN NOTE
Lamictal 100 mg tablet, 1-1/2 tablets daily, olanzapine 10 mg twice a day  Martha and her  attended the session reporting medication is helping with her mood she feels less anxious.  However her  states she is sleeping a little bit more and less motivated.  Aims negative.  Usually she has motivation to work out at the gym, but she fractured her toe slipping on a stair.  Martha does not want to change her medications.  Reports anxiety and depression are under control.  Supportive therapy was provided.  Coping strategies reviewed.  Lab results were reviewed

## 2025-07-21 NOTE — ASSESSMENT & PLAN NOTE
Prozac 40 is effective for depression denies depression or suicidal ideation.  After her toe is healed, we spoke about activities to explore increasing her motivation.   is supportive.  Denies side effects of medication.

## 2025-07-21 NOTE — ASSESSMENT & PLAN NOTE
Prozac 40 mg daily   reports moods are stable denies anxiety or panic disorder.  She was able to see her granddaughter who is 5 years old when her son came up to visit his grandmother.  She reports she enjoyed the visit, denies problems or concerns.  Takes medication as prescribed and family are supportive

## 2025-07-21 NOTE — PSYCH
MEDICATION MANAGEMENT NOTE        [facility] - PSYCHIATRIC ASSOCIATES      Name and Date of Birth:  Martha Chauhan 64 y.o. 1960 MRN: [id number]    Insurance: Payor: LENA  REP / Plan: AARP MEDICARE COMPLETE  REP / Product Type: Medicare HMO /     Date of Visit: July 21, 2025  This note was not shared with the patient due to this is a psychotherapy note  Reason for Visit:   Chief Complaint   Patient presents with   • Mood Swings   • Anxiety   • Depression   • Medication Management       Assessment & Plan  Bipolar affective disorder, currently manic, severe, with psychotic features (HCC)    Orders:  •  OLANZapine (ZyPREXA) 10 mg tablet; Take 1 tablet (10 mg total) by mouth 2 (two) times a day    Bipolar II disorder (HCC)  Lamictal 100 mg tablet, 1-1/2 tablets daily, olanzapine 10 mg twice a day  Martha and her  attended the session reporting medication is helping with her mood she feels less anxious.  However her  states she is sleeping a little bit more and less motivated.  Aims negative.  Usually she has motivation to work out at the gym, but she fractured her toe slipping on a stair.  Martha does not want to change her medications.  Reports anxiety and depression are under control.  Supportive therapy was provided.  Coping strategies reviewed.  Lab results were reviewed       SWAPNA (generalized anxiety disorder)  Prozac 40 mg daily   reports moods are stable denies anxiety or panic disorder.  She was able to see her granddaughter who is 5 years old when her son came up to visit his grandmother.  She reports she enjoyed the visit, denies problems or concerns.  Takes medication as prescribed and family are supportive         Recurrent major depressive disorder, in partial remission (HCC)  Prozac 40 is effective for depression denies depression or suicidal ideation.  After her toe is healed, we spoke about activities to explore increasing her motivation.   is  supportive.  Denies side effects of medication.       Insomnia, unspecified type  Trazodone 100 mg tablet at night  They reported some effective to help her sleep.  Denies side effects of medication can function in the morning.  Appetite is fair discussed need for hydration and proteins she agreed  Orders:  •  traZODone (DESYREL) 100 mg tablet; Take 1 tablet (100 mg total) by mouth daily at bedtime              Risks/benefits/alternativies to treatment discussed, including potential adverse medication side effects, to which Martha voiced understanding and consented fully to treatment.  Also, patient is amenable to calling/contacting the outpatient office including this writer if any acute adverse effects of their medication regimen arise in addition to any comments or concerns pertaining to their psychiatric management.      Medications Risks/Benefits      Risks, Benefits And Possible Side Effects Of Medications:    Risks, benefits, and possible side effects of medications explained to Martha and she verbalizes understanding and agreement for treatment.    Controlled Medication Discussion:     Not applicable         Subjective      Martha Chauhan is a 64 y.o. female, visited for Mood Swings, Anxiety, Depression, and Medication Management, who was personally seen and evaluated today at the [facility] outpatient clinic for follow-up and medication management. Completes psychiatric assessment without difficulty.     At previous outpatient psychiatric appointment with this writer, Olanzapine 10 mg twice a day, Lamictal 150 mg daily  Martha reports medication has helped stabilize her mood.  Denies episodes of cecy or delusions.  Tries to work out at the gym with her .  Reports appetite is good and she is sleeping with the help of trazodone 100 mg.  She denies side effects.  Aims negative, PHQ-9 score of 2 indicates minimal depression denies suicidal ideation.  Takes medication as  prescribed and  is supportive..   She denies any current adverse medication side effects.  Martha and her  attended the session reporting minimal anxiety. SWAPNA-7 score of 2 indicates minimal anxiety. Situational anxiety is expressed regarding not being able to see her children or grandchildren. They live out of state and do not seem responsive and wanting to talk to her and her . Support was provided     Overall, Martha has responded well to her treatment plan.  Reports moods have been stable, but has been sleeping more.  Lab results were reviewed, discussed diet increase hydration and adequate protein with less carbs and sugar.  She agreed.  Denies depression or suicidal ideation, no delusions are present or cecy.  Supportive therapy was provided.  Coping skills reviewed.  She reports seeing her granddaughter and was very happy.  Takes medication as prescribed and family are supportive.            Review Of Systems:  Pertinent items are noted in HPI; all others are negative; no recent changes in medications or health status reported.    PHQ-2/9 Depression Screening               Historical Information    Past Psychiatric History Update:   - No inpatient psychiatric admission since last encounter  - No SA or SIB since last encounter  - No incidence of violent behavior since last encounter    Past Trauma History Update:   - No new onset of abuse or traumatic events since last encounter     Past Medical History:    Past Medical History[1]     Past Surgical History[1]  Allergies[1]    Substance Abuse History:    Social History     Substance and Sexual Activity   Alcohol Use No    Comment: doesnt drink.     Social History     Substance and Sexual Activity   Drug Use Never       Social History:    Social History     Socioeconomic History   • Marital status: /Civil Union     Spouse name: Not on file   • Number of children: 2   • Years of education: Not on file   • Highest education level: Some  college, no degree   Occupational History   • Occupation: Disabled - Previous      Comment: s/p Brain Surgery / H/O Epilepsy   Tobacco Use   • Smoking status: Never     Passive exposure: Current   • Smokeless tobacco: Never   Vaping Use   • Vaping status: Never Used   Substance and Sexual Activity   • Alcohol use: No     Comment: doesnt drink.   • Drug use: Never   • Sexual activity: Not Currently     Partners: Male     Birth control/protection: Post-menopausal, Female Sterilization     Comment: Tubal Ligation   Other Topics Concern   • Not on file   Social History Narrative        Lives with     2 Children - 2 Sons    Disabled s/p Brain Surgery / H/O Epilepsy - Previous      Social Drivers of Health     Financial Resource Strain: Low Risk  (8/22/2023)    Overall Financial Resource Strain (CARDIA)    • Difficulty of Paying Living Expenses: Not hard at all   Food Insecurity: No Food Insecurity (10/25/2024)    Nursing - Inadequate Food Risk Classification    • Worried About Running Out of Food in the Last Year: Never true    • Ran Out of Food in the Last Year: Never true    • Ran Out of Food in the Last Year: Not on file   Transportation Needs: No Transportation Needs (10/25/2024)    PRAPARE - Transportation    • Lack of Transportation (Medical): No    • Lack of Transportation (Non-Medical): No   Physical Activity: Unknown (1/21/2021)    Exercise Vital Sign    • Days of Exercise per Week: 6 days    • Minutes of Exercise per Session: Not on file   Stress: Stress Concern Present (1/21/2021)    [employer] - Occupational Stress Questionnaire    • Feeling of Stress : To some extent   Social Connections: Unknown (1/21/2021)    Social Connection and Isolation Panel    • Frequency of Communication with Friends and Family: Not on file    • Frequency of Social Gatherings with Friends and Family: Not on file    • Attends Oriental orthodox Services: Not on file    • Active  Member of Clubs or Organizations: Not on file    • Attends Club or Organization Meetings: Not on file    • Marital Status:    Intimate Partner Violence: Not At Risk (1/21/2021)    Humiliation, Afraid, Rape, and Kick questionnaire    • Fear of Current or Ex-Partner: No    • Emotionally Abused: No    • Physically Abused: No    • Sexually Abused: No   Housing Stability: Low Risk  (10/25/2024)    Housing Stability Vital Sign    • Unable to Pay for Housing in the Last Year: No    • Number of Times Moved in the Last Year: 1    • Homeless in the Last Year: No       Family Psychiatric History:     Family History[1]    History Review: The following portions of the patient's history were reviewed and updated as appropriate: allergies, current medications, past family history, past medical history, past social history, past surgical history and problem list.           Objective      Vital signs in last 24 hours:  Vitals:    07/21/25 0806   BP: 118/82   Pulse: 78   Weight: 88.5 kg (195 lb)   Height: 5' (1.524 m)       Mental Status Evaluation:    Appearance age appropriate, casually dressed   Behavior cooperative, calm   Speech normal rate, normal volume, normal pitch   Mood improved   Affect normal range and intensity, appropriate   Thought Processes organized, goal directed   Associations intact associations   Thought Content no overt delusions   Perceptual Disturbances: no auditory hallucinations, no visual hallucinations   Abnormal Thoughts  Risk Potential Suicidal ideation - None  Homicidal ideation - None  Potential for aggression - No   Orientation oriented to: person, place, time/date, and situation   Memory recent and remote memory grossly intact   Consciousness alert and awake   Attention Span Concentration Span attention span and concentration are age appropriate   Intellect not formally assessed   Insight intact   Judgement intact   Muscle Strength and  Gait normal muscle strength and normal muscle tone,  normal gait and normal balance   Motor activity no abnormal movements   Language no difficulty naming common objects   Fund of Knowledge adequate knowledge of current events   Pain none   Pain Scale 0             Current Outpatient Medications   Medication Sig Dispense Refill   • atorvastatin (LIPITOR) 20 mg tablet Take 1 tablet (20 mg total) by mouth every evening 90 tablet 2   • Blood Glucose Monitoring Suppl (OneTouch Verio Reflect) w/Device KIT Check blood sugars once daily. Please substitute with appropriate alternative as covered by patient's insurance. Dx: E11.65 1 kit 0   • Cholecalciferol (Vitamin D3) 1000 units CAPS Take 3 capsules by mouth in the morning     • FLUoxetine (PROzac) 40 MG capsule Take 1 capsule (40 mg total) by mouth daily 90 capsule 2   • glucose blood (OneTouch Verio) test strip Check blood sugars once daily. Please substitute with appropriate alternative as covered by patient's insurance. Dx: E11.65 100 each 3   • lamoTRIgine (LaMICtal) 100 mg tablet TAKE 1 AND 1/2 TABLETS BY MOUTH  DAILY 150 tablet 0   • metFORMIN (GLUCOPHAGE-XR) 500 mg 24 hr tablet Take 3 tablets (1,500 mg total) by mouth daily with breakfast 270 tablet 2   • Multiple Vitamins-Minerals (Multivitamin Adults) TABS Take 1 tablet by mouth in the morning     • OLANZapine (ZyPREXA) 10 mg tablet Take 1 tablet (10 mg total) by mouth 2 (two) times a day 180 tablet 3   • OneTouch Delica Lancets 33G MISC Check blood sugars once daily. Please substitute with appropriate alternative as covered by patient's insurance. Dx: E11.65 100 each 3   • traZODone (DESYREL) 100 mg tablet Take 1 tablet (100 mg total) by mouth daily at bedtime 90 tablet 0     No current facility-administered medications for this visit.         Psychotherapy Provided:     Individual psychotherapy provided: Yes  Counseling was provided during the session today for 20 minutes.  Medications, treatment progress and treatment plan reviewed with Martha.  Goals discussed  during in session: maintain mood stability.   Coping strategies reviewed with Martha.   Supportive therapy provided.          Visit Time    Visit Start Time: 8:00   Visit Stop Time: 8:30  Total Visit Duration: 30 minutes    Briana Varela, PhD 25    This note was completed in part utilizing Dragon dictation Software. Grammatical, translation, syntax errors, random word insertions, spelling mistakes, and incomplete sentences may be an occasional consequence of this system secondary to software limitations with voice recognition, ambient noise, and hardware issues. If you have any questions or concerns about the content, text, or information contained within the body of this dictation, please contact the provider for clarification.        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, 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 , s/p corrective Brain Surgery at [facility]   • HL (hearing loss)    • Hyperlipidemia 2023   • Memory loss    • Obesity    • Panic attack    • Primary stress urinary incontinence 2023   • Psychiatric illness    • Seizures ([facility])    • Sleep difficulties    • Vitamin D deficiency    [1]  Past Surgical History:  Procedure Laterality Date   • BRAIN SURGERY      R Temporal Resection / Amyglahippocampectomy   •  SECTION  , 1983    x 2   • TUBAL LIGATION     [1]  No Known Allergies[1]  Family History  Problem Relation Name Age of Onset   • Aortic aneurysm Mother Maddi Head Silva  70        Abdominal Aortic Aneurysm, +Smoker   • Hearing loss Mother Maddi Ascencio    • No Known Problems Father     • Bipolar disorder Brother Tejas    • Mental illness Brother Tejas         bipolar 1   • Depression Brother Tejas    • No Known Problems Son Tyrone    • Cancer Maternal Grandmother Merary Head         brain cancer   • No Known Problems Maternal Aunt     • No Known Problems Half-Sister Hailey    • No Known Problems Half-Sister Melissa    • No Known Problems Half-Sister Ami    • No Known Problems Half-Sister Fallon    • Alcohol abuse Neg Hx     • Drug abuse Neg Hx     • Completed Suicide  Neg Hx

## 2025-07-21 NOTE — ASSESSMENT & PLAN NOTE
Trazodone 100 mg tablet at night  They reported some effective to help her sleep.  Denies side effects of medication can function in the morning.  Appetite is fair discussed need for hydration and proteins she agreed

## 2025-07-22 ENCOUNTER — HOSPITAL ENCOUNTER (OUTPATIENT)
Dept: RADIOLOGY | Facility: HOSPITAL | Age: 65
Discharge: HOME | End: 2025-07-22
Attending: PODIATRIST
Payer: COMMERCIAL

## 2025-07-22 ENCOUNTER — OFFICE VISIT (OUTPATIENT)
Dept: PODIATRY | Facility: CLINIC | Age: 65
End: 2025-07-22
Payer: COMMERCIAL

## 2025-07-22 VITALS — HEART RATE: 69 BPM | BODY MASS INDEX: 38.28 KG/M2 | WEIGHT: 195 LBS | OXYGEN SATURATION: 96 % | HEIGHT: 60 IN

## 2025-07-22 DIAGNOSIS — S92.514A CLOSED NONDISPLACED FRACTURE OF PROXIMAL PHALANX OF LESSER TOE OF RIGHT FOOT, INITIAL ENCOUNTER: Primary | ICD-10-CM

## 2025-07-22 DIAGNOSIS — S92.514A CLOSED NONDISPLACED FRACTURE OF PROXIMAL PHALANX OF LESSER TOE OF RIGHT FOOT, INITIAL ENCOUNTER: ICD-10-CM

## 2025-07-22 DIAGNOSIS — S92.911A: ICD-10-CM

## 2025-07-22 PROCEDURE — 73630 X-RAY EXAM OF FOOT: CPT

## 2025-07-22 PROCEDURE — 99203 OFFICE O/P NEW LOW 30 MIN: CPT | Performed by: PODIATRIST

## 2025-07-22 NOTE — PROGRESS NOTES
Name: Martha Chauhan      : 1960      MRN: [id number]  Encounter Provider: Shreyas Montano DPM  Encounter Date: 2025   Encounter department: [facility] PODIATRY [facility]  :  Assessment & Plan  Closed nondisplaced fracture of proximal phalanx of lesser toe of right foot, initial encounter    Orders:    XR foot 3+ vw right; Future    Fracture of multiple toes, right, closed, initial encounter    Orders:    Ambulatory Referral to Podiatry    The patient's clinical examination today significant for some relatively mild residual edema of the right 4th and 5th digits.  There are no open lesions.  There is no erythema nor ecchymosis.  There is no calor.  Pedal pulses palpable.  There is some very mild tenderness with the palpation to the fracture sites of the proximal phalanges of the affected toes.  She notes that significant tenderness with ambulation.    Repeat x-rays of the patient's right foot taken today were personally reviewed and interpreted and compared to her prior films from earlier this month.  It does show interval healing fracture sites of the proximal phalanges of the right 4th and 5th toes.  Alignment is satisfactory and improved, specifically in regards to the fourth proximal phalanx, compared to her prior films.    She is doing very well from a clinical standpoint.  She has already transitioned herself out of the surgical shoe and into a sneaker and is ambulating without any pain or discomfort.  X-ray images were reviewed with her in detail.  I would recommend that she continue to avoid any high-impact activities for the next 3 to 4 weeks.  If she is asymptomatic at that point, she can return to all activities as tolerated.    As she is doing well, she can follow with me on an as needed basis.  She can follow-up with me if she notices any increase in pain or swelling or lack of continued improvement.    History of Present Illness   HPI  Martha Chauhan is a 64 y.o. female  who presents today for her initial consultation with [facility] with a chief complaint of fractures to her right 4th and 5th toes.  This was sustained in earlier this month when she tripped and struck her foot.        Review of Systems   Constitutional: Negative.    Respiratory: Negative.     Cardiovascular: Negative.    Psychiatric/Behavioral: Negative.       Pertinent Medical History     PAST MEDICAL HISTORY:  Past Medical History[1]    PAST SURGICAL HISTORY:  Past Surgical History[2]     ALLERGIES:  Patient has no known allergies.    MEDICATIONS:  Current Medications[3]    SOCIAL HISTORY:  Social History[4]             Objective   Pulse 69   Ht 5' (1.524 m)   Wt 88.5 kg (195 lb)   LMP 01/01/2011   SpO2 96%   BMI 38.08 kg/m²      Physical Exam  Vitals and nursing note reviewed.   Constitutional:       General: She is not in acute distress.     Appearance: She is well-developed.   HENT:      Head: Normocephalic and atraumatic.     Cardiovascular:      Pulses:           Dorsalis pedis pulses are 2+ on the right side.        Posterior tibial pulses are 2+ on the right side.   Pulmonary:      Effort: Pulmonary effort is normal.   Feet:      Right foot:      Skin integrity: Skin integrity normal.      Comments: The patient's clinical examination today significant for some relatively mild residual edema of the right 4th and 5th digits.  There are no open lesions.  There is no erythema nor ecchymosis.  There is no calor.  Pedal pulses palpable.  There is some very mild tenderness with the palpation to the fracture sites of the proximal phalanges of the affected toes.  She notes that significant tenderness with ambulation.    Skin:     General: Skin is warm and dry.      Capillary Refill: Capillary refill takes less than 2 seconds.     Neurological:      General: No focal deficit present.      Mental Status: She is alert and oriented to person, place, and time.     Psychiatric:         Mood and Affect:  Mood normal.                [1]   Past Medical History:  Diagnosis Date    Adjustment disorder     Anxiety     Bipolar disorder ([facility]) 2023    Class 2 severe obesity with serious comorbidity and body mass index (BMI) of 37.0 to 37.9 in adult ([facility])     Cognitive impairment     Concussion     Last assessed 2017    Depression     Diabetes type 2, controlled ([facility]) 2024    History of ischemic stroke without residual deficits 2013    s/p Brain Surgery, s/p Meds and Rehab    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, 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 , s/p corrective Brain Surgery at [facility]    HL (hearing loss)     Hyperlipidemia 2023    Memory loss     Obesity     Panic attack     Primary stress urinary incontinence 2023    Psychiatric illness 2017    Seizures ([facility])     Sleep difficulties     Vitamin D deficiency    [2]   Past Surgical History:  Procedure Laterality Date    BRAIN SURGERY      R Temporal Resection / Amyglahippocampectomy     SECTION  , 1983    x 2    TUBAL LIGATION     [3]   Current Outpatient Medications   Medication Sig Dispense Refill    atorvastatin (LIPITOR) 20 mg tablet Take 1 tablet (20 mg total) by mouth every evening 90 tablet 2    Blood Glucose Monitoring Suppl (OneTouch Verio Reflect) w/Device KIT Check blood sugars once daily. Please substitute with appropriate alternative as covered by patient's insurance. Dx: E11.65 1 kit 0    Cholecalciferol (Vitamin D3) 1000 units CAPS Take 3 capsules by mouth in the morning      FLUoxetine (PROzac) 40 MG capsule Take 1 capsule (40 mg total) by mouth daily 90 capsule 2    glucose blood (OneTouch Verio) test strip Check blood sugars once daily. Please substitute with appropriate alternative as covered by patient's insurance. Dx: E11.65 100 each 3    lamoTRIgine (LaMICtal) 100 mg tablet TAKE 1 AND 1/2 TABLETS BY MOUTH  DAILY 150 tablet 0    metFORMIN (GLUCOPHAGE-XR) 500 mg 24 hr  tablet Take 3 tablets (1,500 mg total) by mouth daily with breakfast 270 tablet 2    Multiple Vitamins-Minerals (Multivitamin Adults) TABS Take 1 tablet by mouth in the morning      OLANZapine (ZyPREXA) 10 mg tablet Take 1 tablet (10 mg total) by mouth 2 (two) times a day 180 tablet 3    OneTouch Delica Lancets 33G MISC Check blood sugars once daily. Please substitute with appropriate alternative as covered by patient's insurance. Dx: E11.65 100 each 3    traZODone (DESYREL) 100 mg tablet Take 1 tablet (100 mg total) by mouth daily at bedtime 90 tablet 0     No current facility-administered medications for this visit.   [4]   Social History  Socioeconomic History    Marital status: /Civil Union    Number of children: 2    Highest education level: Some college, no degree   Occupational History    Occupation: Disabled - Previous      Comment: s/p Brain Surgery / H/O Epilepsy   Tobacco Use    Smoking status: Never     Passive exposure: Current    Smokeless tobacco: Never   Vaping Use    Vaping status: Never Used   Substance and Sexual Activity    Alcohol use: No     Comment: doesnt drink.    Drug use: Never    Sexual activity: Not Currently     Partners: Male     Birth control/protection: Post-menopausal, Female Sterilization     Comment: Tubal Ligation   Social History Narrative        Lives with     2 Children - 2 Sons    Disabled s/p Brain Surgery / H/O Epilepsy - Previous      Social Drivers of Health     Financial Resource Strain: Low Risk  (8/22/2023)    Overall Financial Resource Strain (CARDIA)     Difficulty of Paying Living Expenses: Not hard at all   Food Insecurity: No Food Insecurity (10/25/2024)    Nursing - Inadequate Food Risk Classification     Worried About Running Out of Food in the Last Year: Never true     Ran Out of Food in the Last Year: Never true   Transportation Needs: No Transportation Needs (10/25/2024)    PRAPARE - Transportation     Lack of  Transportation (Medical): No     Lack of Transportation (Non-Medical): No   Physical Activity: Unknown (1/21/2021)    Exercise Vital Sign     Days of Exercise per Week: 6 days   Stress: Stress Concern Present (1/21/2021)    [employer] - Occupational Stress Questionnaire     Feeling of Stress : To some extent   Social Connections: Unknown (1/21/2021)    Social Connection and Isolation Panel     Marital Status:    Intimate Partner Violence: Not At Risk (1/21/2021)    Humiliation, Afraid, Rape, and Kick questionnaire     Fear of Current or Ex-Partner: No     Emotionally Abused: No     Physically Abused: No     Sexually Abused: No   Housing Stability: Low Risk  (10/25/2024)    Housing Stability Vital Sign     Unable to Pay for Housing in the Last Year: No     Number of Times Moved in the Last Year: 1     Homeless in the Last Year: No

## 2025-08-14 ENCOUNTER — TELEPHONE (OUTPATIENT)
Age: 65
End: 2025-08-14

## 2025-08-18 DIAGNOSIS — F31.61 BIPOLAR DISORDER, CURRENT EPISODE MIXED, MILD (HCC): ICD-10-CM

## 2025-08-18 RX ORDER — LAMOTRIGINE 100 MG/1
150 TABLET ORAL DAILY
Qty: 150 TABLET | Refills: 0 | Status: SHIPPED | OUTPATIENT
Start: 2025-08-18

## 2025-08-21 DIAGNOSIS — E11.9 CONTROLLED TYPE 2 DIABETES MELLITUS WITHOUT COMPLICATION, WITHOUT LONG-TERM CURRENT USE OF INSULIN (HCC): Primary | ICD-10-CM

## 2025-08-21 RX ORDER — BLOOD-GLUCOSE METER
KIT MISCELLANEOUS
Qty: 1 KIT | Refills: 0 | Status: SHIPPED | OUTPATIENT
Start: 2025-08-21

## 2025-08-21 RX ORDER — LANCETS 33 GAUGE
EACH MISCELLANEOUS
Qty: 100 EACH | Refills: 3 | Status: SHIPPED | OUTPATIENT
Start: 2025-08-21

## 2025-08-21 RX ORDER — BLOOD SUGAR DIAGNOSTIC
STRIP MISCELLANEOUS
Qty: 100 EACH | Refills: 3 | Status: SHIPPED | OUTPATIENT
Start: 2025-08-21